# Patient Record
Sex: FEMALE | Race: BLACK OR AFRICAN AMERICAN | Employment: OTHER | URBAN - METROPOLITAN AREA
[De-identification: names, ages, dates, MRNs, and addresses within clinical notes are randomized per-mention and may not be internally consistent; named-entity substitution may affect disease eponyms.]

---

## 2017-01-03 ENCOUNTER — HOSPITAL ENCOUNTER (OUTPATIENT)
Dept: MAMMOGRAPHY | Age: 82
Discharge: HOME | End: 2017-01-03
Attending: INTERNAL MEDICINE
Payer: MEDICARE

## 2017-01-03 ENCOUNTER — TELEPHONE (OUTPATIENT)
Dept: INTERNAL MEDICINE CLINIC | Age: 82
End: 2017-01-03

## 2017-01-03 DIAGNOSIS — C50.512 MALIGNANT NEOPLASM OF LOWER-OUTER QUADRANT OF LEFT FEMALE BREAST (HCC): ICD-10-CM

## 2017-01-03 PROCEDURE — 77065 DX MAMMO INCL CAD UNI: CPT

## 2017-01-03 NOTE — TELEPHONE ENCOUNTER
[facility] is requesting rx for Lantus 100 units/mlvial # 30.0Ml thirty  With 4 refills

## 2017-01-11 ENCOUNTER — OFFICE VISIT (OUTPATIENT)
Dept: CARDIOLOGY CLINIC | Age: 82
End: 2017-01-11

## 2017-01-11 VITALS
HEART RATE: 57 BPM | OXYGEN SATURATION: 95 % | RESPIRATION RATE: 18 BRPM | SYSTOLIC BLOOD PRESSURE: 172 MMHG | BODY MASS INDEX: 29.87 KG/M2 | DIASTOLIC BLOOD PRESSURE: 82 MMHG | HEIGHT: 65 IN | WEIGHT: 179.3 LBS

## 2017-01-11 DIAGNOSIS — E78.00 HYPERCHOLESTEREMIA: ICD-10-CM

## 2017-01-11 DIAGNOSIS — R60.9 EDEMA, UNSPECIFIED TYPE: ICD-10-CM

## 2017-01-11 DIAGNOSIS — I35.0 AORTIC STENOSIS, MODERATE: Primary | ICD-10-CM

## 2017-01-11 DIAGNOSIS — R06.02 SOB (SHORTNESS OF BREATH): ICD-10-CM

## 2017-01-11 DIAGNOSIS — E11.9 CONTROLLED TYPE 2 DIABETES MELLITUS WITHOUT COMPLICATION, WITHOUT LONG-TERM CURRENT USE OF INSULIN (HCC): ICD-10-CM

## 2017-01-11 DIAGNOSIS — I27.20 PULMONARY HTN (HCC): ICD-10-CM

## 2017-01-11 DIAGNOSIS — I50.32 CHRONIC DIASTOLIC CONGESTIVE HEART FAILURE (HCC): ICD-10-CM

## 2017-01-11 DIAGNOSIS — I10 HTN (HYPERTENSION), BENIGN: ICD-10-CM

## 2017-01-11 DIAGNOSIS — R94.31 ABNORMAL EKG: ICD-10-CM

## 2017-01-11 DIAGNOSIS — R06.09 DOE (DYSPNEA ON EXERTION): ICD-10-CM

## 2017-01-11 DIAGNOSIS — I20.9 ANGINA, CLASS III (HCC): ICD-10-CM

## 2017-01-11 RX ORDER — DIPHENHYDRAMINE HCL 25 MG
25 CAPSULE ORAL
COMMUNITY
End: 2019-10-23

## 2017-01-11 RX ORDER — PREDNISONE 20 MG/1
20 TABLET ORAL
COMMUNITY
End: 2017-02-13

## 2017-01-11 NOTE — MR AVS SNAPSHOT
Visit Information Date & Time Provider Department Dept. Phone Encounter #  
 1/11/2017  1:45 PM Rigoberto Mcdonald, [address] [facility] [phone] [id number] Upcoming Health Maintenance Date Due DTaP/Tdap/Td series (1 - Tdap) 8/16/1952 EYE EXAM RETINAL OR DILATED Q1 1/9/2016 Pneumococcal 65+ High/Highest Risk (2 of 2 - PPSV23) 4/19/2016 INFLUENZA AGE 9 TO ADULT 8/1/2016 HEMOGLOBIN A1C Q6M 9/30/2016 MEDICARE YEARLY EXAM 11/19/2016 GLAUCOMA SCREENING Q2Y 1/9/2017 FOOT EXAM Q1 1/28/2017 MICROALBUMIN Q1 3/10/2017 LIPID PANEL Q1 4/1/2017 Allergies as of 1/11/2017  Review Complete On: 1/11/2017 By: Rigoberto Mcdonald MD  
  
 Severity Noted Reaction Type Reactions Ace Inhibitors  05/21/2012   Systemic Swelling Peanut  05/26/2011    Swelling Pt states she's not allergic to peanuts Current Immunizations  Reviewed on 11/29/2011 Name Date Influenza High Dose Vaccine PF 11/19/2015 Influenza Vaccine 10/22/2013, 12/13/2012 Influenza Vaccine Split 9/27/2011 11:15 AM  
 Influenza Vaccine Whole 10/8/2010 Pneumococcal Vaccine (Unspecified Type) 4/19/2011 Not reviewed this visit You Were Diagnosed With   
  
 Codes Comments Hypercholesteremia    -  Primary ICD-10-CM: E78.00 ICD-9-CM: 272.0 Aortic stenosis, moderate     ICD-10-CM: I35.0 ICD-9-CM: 424.1 Chronic diastolic congestive heart failure (HCC)     ICD-10-CM: I50.32 
ICD-9-CM: 428.32, 428.0   
 HTN (hypertension), benign     ICD-10-CM: I10 
ICD-9-CM: 401.1 SOB (shortness of breath)     ICD-10-CM: R06.02 
ICD-9-CM: 786.05   
 Pulmonary HTN ([address]     ICD-10-CM: I27.2 ICD-9-CM: 416.8 ESPANA (dyspnea on exertion)     ICD-10-CM: R06.09 
ICD-9-CM: 786.09 Edema, unspecified type     ICD-10-CM: R60.9 ICD-9-CM: 646. 3 Abnormal EKG     ICD-10-CM: R94.31 
ICD-9-CM: 794.31 Vitals BP Pulse Resp Height(growth percentile) Weight(growth percentile) SpO2  
 172/82 (BP 1 Location: Left arm, BP Patient Position: Sitting) (!) 57 18 5' 5\" (1.651 m) 179 lb 4.8 oz (81.3 kg) 95% BMI OB Status Smoking Status 29.84 kg/m2 Postmenopausal Never Smoker Vitals History BMI and BSA Data Body Mass Index Body Surface Area  
 29.84 kg/m 2 1.93 m 2 Preferred Pharmacy Pharmacy Name Phone [id number]- [place], [place] - [address] [facility] AT [address] [place], [place] [phone] Your Updated Medication List  
  
   
This list is accurate as of: 1/11/17  2:46 PM.  Always use your most recent med list.  
  
  
  
  
 albuterol 2.5 mg /3 mL (0.083 %) nebulizer solution Commonly known as:  PROVENTIL VENTOLIN  
3 mL by Nebulization route every four (4) hours as needed for Wheezing. amLODIPine 10 mg tablet Commonly known as:  Arlyss Starr TAKE 1 TABLET BY MOUTH EVERY DAY  
  
 atorvastatin 20 mg tablet Commonly known as:  LIPITOR  
TAKE 1 TAB BY MOUTH DAILY. BD INSULIN SYRINGE ULTRA-FINE 0.5 mL 31 gauge x 5/16 Syrg Generic drug:  Insulin Syringe-Needle U-100  
1 Each by SubCUTAneous route daily. * Blood-Glucose Meter monitoring kit Check blood sugar daily. May substitute for insurance preferred meter * CONTOUR NEXT METER Misc Generic drug:  Blood-Glucose Meter USE TO CHECK BLOOD SUGAR EVERY DAY  
  
 calcium carbonate 600 mg (1,500 mg) tablet Commonly known as:  Ryder Souza Take 600 mg by mouth daily. denosumab 60 mg/mL injection Commonly known as:  Nitza Ramirez 60 mg by SubCUTAneous route. Twice a year  
  
 diphenhydrAMINE 25 mg capsule Commonly known as:  BENADRYL Take 25 mg by mouth every six (6) hours as needed. ergocalciferol 50,000 unit capsule Commonly known as:  VITAMIN D2 Take 1 Cap by mouth every seven (7) days. One capsule monthly  
  
 ferrous sulfate 325 mg (65 mg iron) EC tablet Commonly known as:  IRON Take 1 Tab by mouth Daily (before breakfast). furosemide 20 mg tablet Commonly known as:  LASIX Take 1 Tab by mouth every three (3) days. glucose blood VI test strips strip Commonly known as:  blood glucose test  
Check blood sugar 3 times daily: E11.65, may substitute for insurance preferred strips. insulin glargine 100 unit/mL injection Commonly known as:  LANTUS  
50 Units by SubCUTAneous route nightly. For -175 = 20 units 175-200 = 30 units > 200 = 40 units   by SubCUTAneous route nightly. Indications: type 2 diabetes mellitus LORazepam 0.5 mg tablet Commonly known as:  ATIVAN Take 1 Tab by mouth two (2) days a week. Max Daily Amount: 0.5 mg.  
  
 metFORMIN 1,000 mg tablet Commonly known as:  GLUCOPHAGE Take 1 Tab by mouth two (2) times a day. metoprolol tartrate 50 mg tablet Commonly known as:  LOPRESSOR Take 1 Tab by mouth two (2) times a day. predniSONE 20 mg tablet Commonly known as:  Merle Horacio Take 20 mg by mouth daily (with breakfast). * Notice: This list has 2 medication(s) that are the same as other medications prescribed for you. Read the directions carefully, and ask your doctor or other care provider to review them with you. We Performed the Following AMB POC EKG ROUTINE W/ 12 LEADS, INTER & REP [25376 CPT(R)] Introducing [facility]! Joseluis Woodward introduces [employer] patient portal. Now you can access parts of your medical record, email your doctor's office, and request medication refills online. 1. In your internet browser, go to [email or web address] 2. Click on the First Time User? Click Here link in the Sign In box. You will see the New Member Sign Up page. 3. Enter your [employer] Access Code exactly as it appears below. You will not need to use this code after youve completed the sign-up process.  If you do not sign up before the expiration date, you must request a new code. · [employer] Access Code: [email or web address] Expires: 1/22/2017  3:27 PM 
 
4. Enter the last four digits of your Social Security Number (xxxx) and Date of Birth (mm/dd/yyyy) as indicated and click Submit. You will be taken to the next sign-up page. 5. Create a [employer] ID. This will be your [employer] login ID and cannot be changed, so think of one that is secure and easy to remember. 6. Create a [employer] password. You can change your password at any time. 7. Enter your Password Reset Question and Answer. This can be used at a later time if you forget your password. 8. Enter your e-mail address. You will receive e-mail notification when new information is available in [address] 9. Click Sign Up. You can now view and download portions of your medical record. 10. Click the Download Summary menu link to download a portable copy of your medical information. If you have questions, please visit the Frequently Asked Questions section of the [employer] website. Remember, [employer] is NOT to be used for urgent needs. For medical emergencies, dial 911. Now available from your iPhone and Android! Please provide this summary of care documentation to your next provider. Your primary care clinician is listed as [phone]. If you have any questions after today's visit, please call [phone].

## 2017-01-11 NOTE — PROGRESS NOTES
Chief Complaint   Patient presents with    New Patient     Dr Nora Driscoll at [facility] refeered patient for cath, c/o SOB and ankle swelling

## 2017-01-11 NOTE — PROGRESS NOTES
NAME:  Roby Plata   :   1931   MRN:   [id number]   PCP:  Purnima Beltran MD           Subjective: The patient is a 80y.o. year old female  who presents for a new patient evalation for the following: edema and dyspnea with mod/severe pulm HTN and mod aortic stenosis  Patient reports limiting ESPANA with movement of any kind. She also reports lower extremity edema that progressive throughout the day. Denies chest pain, pressure, tightness, burning or frequent indigestion. No syncope. Here to discuss cath. Patient Active Problem List   Diagnosis Code    Diabetes ([facility] E11.9    HTN (hypertension), benign I10    [facility] cancer ([facility] C50.919    Anemia D64.9    Fatigue R53.83    Vein disorder I87.9    Allergic reaction T78.40XA    Multiple allergies Z88.9    Bronchitis J40    Anxiety F41.9    Hypercholesteremia E78.00    Aortic stenosis, moderate P19.0    Diastolic CHF, acute on chronic (HCC) I50.33    SOB (shortness of breath) R06.02    CHF (congestive heart failure) (HCC) I50.9    Pulmonary edema cardiac cause I50.1       Past Medical History   Diagnosis Date    Anxiety     Breast cancer ([facility] ,      right , left     Cancer [facility])       cancer right breast cancer    Cancer ([facility]      cancer left breast/new dx 2011 +bx     Diabetes ([facility]     Hypercholesterolemia     Hypertension     Other ill-defined conditions(799.89)      osteopoosis    Other ill-defined conditions(799.89)      high cholesterol    Pneumonia     Psychiatric disorder      anxiety       Social History   Substance Use Topics    Smoking status: Never Smoker    Smokeless tobacco: Never Used    Alcohol use No      Family History   Problem Relation Age of Onset    Hypertension Mother     Stroke Other         Review of Systems  Constitutional: Negative for fever, chills, and diaphoresis.    Respiratory: Negative for cough, hemoptysis, sputum production, reports shortness of breath  Cardiovascular: Negative for chest pain, palpitations, orthopnea, claudication, reports leg swelling  Gastrointestinal: Negative for heartburn, nausea, vomiting, blood in stool and melena. Genitourinary: Negative for dysuria and flank pain. Musculoskeletal: Negative for joint pain and back pain. Skin: Negative for rash. Neurological: Negative for focal weakness, seizures, loss of consciousness, weakness and headaches. Endo/Heme/Allergies: Does not bruise/bleed easily. Psychiatric/Behavioral: Negative for memory loss. The patient does not have insomnia. Objective:       Vitals:    01/11/17 1333   BP: 172/82   Pulse: (!) 57   Resp: 18   SpO2: 95%   Weight: 179 lb 4.8 oz (81.3 kg)   Height: 5' 5\" (1.651 m)    Body mass index is 29.84 kg/(m^2). General PE    Gen: NAD     Mental Status - Alert. General Appearance - Not in acute distress. Neck - no JVD     Chest and Lung Exam     Inspection: Accessory muscles - No use of accessory muscles in breathing. Auscultation:   Breath sounds: - Normal.     Cardiovascular   Inspection: Jugular vein - Bilateral - Inspection Normal.   Palpation/Percussion:   Apical Impulse: - Normal.   Auscultation: Rhythm - Regular. Heart Sounds - S1 WNL and S2 WNL. No S3 or S4. Murmurs & Other Heart Sounds: Auscultation of the heart reveals - + WILDER    Peripheral Vascular   Upper Extremity: Inspection - Bilateral - No Cyanotic nailbeds or Digital clubbing. Lower Extremity:   Palpation: Edema - Bilateral - No edema. Abdomen: Soft, non-tender, bowel sounds are active. Neuro: A&O times 3, CN and motor grossly WNL      Data Review:     EKG -  Sinus  Bradycardia  - occasional PAC     # PACs = 1. Possible left ventricular hypertrophy on non-voltage basis.    -Old anteroseptal infarct. -ST depression     Echo 9/9/16-    Left ventricle: The cavity was small. Systolic function was vigorous. Ejection fraction was estimated to be 80 %.  There were no regional wall  motion abnormalities. Wall thickness was moderately increased. There was  moderate concentric hypertrophy. There was no asymmetric hypertrophy. Mass  was definitely increased. Features were consistent with a pseudonormal  left ventricular filling pattern, with concomitant abnormal relaxation and  increased filling pressure (grade 2 diastolic dysfunction). Ventricular septum: Thickness was moderately increased. Right ventricle: The ventricle was mildly to moderately dilated. Systolic  function was moderately reduced. Systolic pressure was markedly increased. Estimated peak pressure was 70 mmHg. Left atrium: The atrium was severely dilated. Right atrium: The atrium was moderately dilated. Mitral valve: There was moderate diffuse thickening of the anterior and  posterior leaflets, involving the leaflet base more than the margin,  without chordal involvement. Aortic valve: The aortic valve peak systolic gradient is 25 mm with mean  of 10 mm. The valve was trileaflet. Leaflets exhibited moderately to  markedly increased thickness, calcification, markedly reduced cuspal  separation, reduced mobility, and sclerosis. Transaortic velocity was  increased due to valvular stenosis. There was moderate stenosis. Tricuspid valve: There was moderate to severe regurgitation.     LABS-   Lab Results   Component Value Date/Time    Cholesterol, total 191 04/01/2016 02:37 AM    Cholesterol (POC) 246 11/19/2015 09:40 AM    HDL Cholesterol 97 04/01/2016 02:37 AM    HDL Cholesterol (POC) 76 11/19/2015 09:40 AM    LDL Cholesterol (POC) 156 11/19/2015 09:40 AM    LDL, calculated 82 04/01/2016 02:37 AM    VLDL, calculated 12 04/01/2016 02:37 AM    Triglyceride 60 04/01/2016 02:37 AM    Triglycerides (POC) 72 11/19/2015 09:40 AM    CHOL/HDL Ratio 2.0 04/01/2016 02:37 AM         Echo     Allergies reviewed  Allergies   Allergen Reactions    Ace Inhibitors Swelling    Peanut Swelling     Pt states she's not allergic to peanuts Medications reviewed  Current Outpatient Prescriptions   Medication Sig    diphenhydrAMINE (BENADRYL) 25 mg capsule Take 25 mg by mouth every six (6) hours as needed.  predniSONE (DELTASONE) 20 mg tablet Take 20 mg by mouth daily (with breakfast).  insulin glargine (LANTUS) 100 unit/mL injection 50 Units by SubCUTAneous route nightly. For -175 = 20 units  175-200 = 30 units  > 200 = 40 units     by SubCUTAneous route nightly. Indications: type 2 diabetes mellitus    LORazepam (ATIVAN) 0.5 mg tablet Take 1 Tab by mouth two (2) days a week. Max Daily Amount: 0.5 mg.    ferrous sulfate (IRON) 325 mg (65 mg iron) EC tablet Take 1 Tab by mouth Daily (before breakfast).  BD INSULIN SYRINGE ULTRA-FINE 0.5 mL 31 gauge x 5/16 syrg 1 Each by SubCUTAneous route daily.  atorvastatin (LIPITOR) 20 mg tablet TAKE 1 TAB BY MOUTH DAILY.  CONTOUR NEXT METER misc USE TO CHECK BLOOD SUGAR EVERY DAY    Blood-Glucose Meter monitoring kit Check blood sugar daily. May substitute for insurance preferred meter    glucose blood VI test strips (BLOOD GLUCOSE TEST) strip Check blood sugar 3 times daily: E11.65, may substitute for insurance preferred strips.  denosumab (PROLIA) 60 mg/mL injection 60 mg by SubCUTAneous route. Twice a year    furosemide (LASIX) 20 mg tablet Take 1 Tab by mouth every three (3) days.  metFORMIN (GLUCOPHAGE) 1,000 mg tablet Take 1 Tab by mouth two (2) times a day.  metoprolol (LOPRESSOR) 50 mg tablet Take 1 Tab by mouth two (2) times a day.  ergocalciferol (VITAMIN D2) 50,000 unit capsule Take 1 Cap by mouth every seven (7) days. One capsule monthly    calcium carbonate (CALTREX) 600 mg (1,500 mg) tablet Take 600 mg by mouth daily.  albuterol (PROVENTIL VENTOLIN) 2.5 mg /3 mL (0.083 %) nebulizer solution 3 mL by Nebulization route every four (4) hours as needed for Wheezing.     amLODIPine (NORVASC) 10 mg tablet TAKE 1 TABLET BY MOUTH EVERY DAY     No current facility-administered medications for this visit. Assessment:       ICD-10-CM ICD-9-CM    1. Aortic stenosis, moderate I35.0 424.1 PROTHROMBIN TIME + INR      CBC W/O DIFF      METABOLIC PANEL, COMPREHENSIVE   2. Angina, class III (HCC) I20.9 413.9    3. Hypercholesteremia E78.00 272.0 AMB POC EKG ROUTINE W/ 12 LEADS, INTER & REP      PROTHROMBIN TIME + INR      CBC W/O DIFF      METABOLIC PANEL, COMPREHENSIVE   4. Chronic diastolic congestive heart failure (HCC) I50.32 428.32 PROTHROMBIN TIME + INR     428.0 CBC W/O DIFF      METABOLIC PANEL, COMPREHENSIVE   5. HTN (hypertension), benign I10 401.1 PROTHROMBIN TIME + INR      CBC W/O DIFF      METABOLIC PANEL, COMPREHENSIVE   6. SOB (shortness of breath) R06.02 786.05 PROTHROMBIN TIME + INR      CBC W/O DIFF      METABOLIC PANEL, COMPREHENSIVE   7. Pulmonary HTN (HCC) I27.2 416.8 PROTHROMBIN TIME + INR      CBC W/O DIFF      METABOLIC PANEL, COMPREHENSIVE   8. ESPANA (dyspnea on exertion) R06.09 786.09 PROTHROMBIN TIME + INR      CBC W/O DIFF      METABOLIC PANEL, COMPREHENSIVE   9. Edema, unspecified type R60.9 782.3 PROTHROMBIN TIME + INR      CBC W/O DIFF      METABOLIC PANEL, COMPREHENSIVE   10. Abnormal EKG R94.31 794.31 PROTHROMBIN TIME + INR      CBC W/O DIFF      METABOLIC PANEL, COMPREHENSIVE        Orders Placed This Encounter    PROTHROMBIN TIME + INR    CBC W/O DIFF    METABOLIC PANEL, COMPREHENSIVE    AMB POC EKG ROUTINE W/ 12 LEADS, INTER & REP     Order Specific Question:   Reason for Exam:     Answer:   routine    diphenhydrAMINE (BENADRYL) 25 mg capsule     Sig: Take 25 mg by mouth every six (6) hours as needed.  predniSONE (DELTASONE) 20 mg tablet     Sig: Take 20 mg by mouth daily (with breakfast). Plan:     Patient presents for new patient evaluation.      Known AS, history of pulmonary edema/possible class III angina despite maximal antianginal regimen:  Felt to be moderate although calculated mean gradient 10 mmHg with concern for underestimation of severity by echocardiogram per Dr. Joshua Yanes without history of smoking or abnormal chest x-ray, severe pulm HTN, limiting ESPANA, seeking a cardiac cath. Recommend right and left heart cath. I have discussed the risks and benefits of cardiac cath +/- PCI- the patient expressed understanding and wishes to proceed. The patient knows to go to the ER if any symptoms not relieved promptly by rest.  If no pathology other than severe pulmonary hypertension without significant CAD or aortic stenosis, we will likely refer to Dr. Teresa Powers for further evaluation of pulmonary hypertension including possible right heart catheterization with vasodilators.     Manisha Berg MD

## 2017-01-12 RX ORDER — METOPROLOL TARTRATE 50 MG/1
50 TABLET ORAL 2 TIMES DAILY
Qty: 60 TAB | Refills: 0 | OUTPATIENT
Start: 2017-01-12

## 2017-01-12 RX ORDER — METFORMIN HYDROCHLORIDE 1000 MG/1
1000 TABLET ORAL 2 TIMES DAILY
Qty: 180 TAB | Refills: 12 | OUTPATIENT
Start: 2017-01-12

## 2017-01-17 ENCOUNTER — TELEPHONE (OUTPATIENT)
Dept: CARDIOLOGY CLINIC | Age: 82
End: 2017-01-17

## 2017-01-17 NOTE — TELEPHONE ENCOUNTER
returned patient call  Verified patient with 2 patient identifier    Patient called question if Dr Brayden Jones had received and reviewed  image results ordered by Dr Jenelle Pruitt? Informed will speak with Dr Brayden Jones and callback to advise.

## 2017-01-19 NOTE — TELEPHONE ENCOUNTER
Darian- I reviewed her images and history and further detail. I agree that she is in need of right and left heart catheterization for further evaluation of her heart. I ordered precath labs. Please get with Dez Hickey to arrange for catheterization. I can do it this coming Tuesday after by 930 catheterization or Wednesday at 930. Thanks and let me know if those times do not work.

## 2017-01-20 ENCOUNTER — TELEPHONE (OUTPATIENT)
Dept: CARDIOLOGY CLINIC | Age: 82
End: 2017-01-20

## 2017-01-20 NOTE — TELEPHONE ENCOUNTER
[address], LPN Cc: Rigoberto Mcdonald MD                   Good morning,      I have Carmelina cath set up for 01/25/17 @ 9:30am. Patient is going to come get yellow folder and get labs done today!  Thanks!

## 2017-01-20 NOTE — TELEPHONE ENCOUNTER
Patient with in office today with concerns about cath due to increased BP. Per patient's daughter when speaking with Dr Pawel Sloan she was informed patient may need to stay in hospital overnight for BP monitoring. Another concern is  incision site ? Patient has had a right breast mastectomy.     Informed patient will follow up after speaking with Dr Davey Son

## 2017-01-20 NOTE — TELEPHONE ENCOUNTER
Spoke with patient  Verified patient with 2 patient identifier    Informed patient Dr Patrick Macias reviewed images and decided patient in need of heart catheterization. Explained heart cath procedure and informed patient Vinay Coley will call  with more information such as when procedure to take place and instructions prior to cath. Patient verbalized understanding.

## 2017-01-22 LAB
ALBUMIN SERPL-MCNC: 4.2 G/DL (ref 3.5–4.7)
ALBUMIN/GLOB SERPL: 1.3 {RATIO} (ref 1.1–2.5)
ALP SERPL-CCNC: 84 IU/L (ref 39–117)
ALT SERPL-CCNC: 10 IU/L (ref 0–32)
AST SERPL-CCNC: 14 IU/L (ref 0–40)
BILIRUB SERPL-MCNC: 0.2 MG/DL (ref 0–1.2)
BUN SERPL-MCNC: 17 MG/DL (ref 8–27)
BUN/CREAT SERPL: 26 (ref 11–26)
CALCIUM SERPL-MCNC: 9.6 MG/DL (ref 8.7–10.3)
CHLORIDE SERPL-SCNC: 101 MMOL/L (ref 96–106)
CO2 SERPL-SCNC: 25 MMOL/L (ref 18–29)
CREAT SERPL-MCNC: 0.65 MG/DL (ref 0.57–1)
ERYTHROCYTE [DISTWIDTH] IN BLOOD BY AUTOMATED COUNT: 16.3 % (ref 12.3–15.4)
GLOBULIN SER CALC-MCNC: 3.2 G/DL (ref 1.5–4.5)
GLUCOSE SERPL-MCNC: 99 MG/DL (ref 65–99)
HCT VFR BLD AUTO: 35.4 % (ref 34–46.6)
HGB BLD-MCNC: 11.3 G/DL (ref 11.1–15.9)
INR PPP: 1.1 (ref 0.8–1.2)
MCH RBC QN AUTO: 26.3 PG (ref 26.6–33)
MCHC RBC AUTO-ENTMCNC: 31.9 G/DL (ref 31.5–35.7)
MCV RBC AUTO: 83 FL (ref 79–97)
PLATELET # BLD AUTO: 299 X10E3/UL (ref 150–379)
POTASSIUM SERPL-SCNC: 4 MMOL/L (ref 3.5–5.2)
PROT SERPL-MCNC: 7.4 G/DL (ref 6–8.5)
PROTHROMBIN TIME: 10.9 SEC (ref 9.1–12)
RBC # BLD AUTO: 4.29 X10E6/UL (ref 3.77–5.28)
SODIUM SERPL-SCNC: 143 MMOL/L (ref 134–144)
WBC # BLD AUTO: 8.9 X10E3/UL (ref 3.4–10.8)

## 2017-01-24 ENCOUNTER — TELEPHONE (OUTPATIENT)
Dept: CARDIOLOGY CLINIC | Age: 82
End: 2017-01-24

## 2017-01-24 NOTE — TELEPHONE ENCOUNTER
Spoke with patient  Verified patient with 2 patient identifier    Informed per Dr Glenis Claros Blood pressure was not too elevated in the last few visits so we will monitor and adjust as needed while she is at the hospital.     Regarding history of mastectomy, remind us at the hospital and we will take that into consideration with her access site. We may go through her femoral artery with a tiny needle. Patient verbalized understanding.

## 2017-01-24 NOTE — TELEPHONE ENCOUNTER
Blood pressure was not too elevated in the last few visits so we will monitor and adjust as needed while she is at the hospital.    Regarding history of mastectomy, remind us at the hospital and we will take that into consideration with her access site. We may go through her femoral artery with a tiny needle.

## 2017-01-25 ENCOUNTER — HOSPITAL ENCOUNTER (OUTPATIENT)
Dept: CARDIAC CATH/INVASIVE PROCEDURES | Age: 82
Discharge: HOME | End: 2017-01-25
Attending: INTERNAL MEDICINE | Admitting: INTERNAL MEDICINE
Payer: MEDICARE

## 2017-01-25 VITALS
HEART RATE: 64 BPM | TEMPERATURE: 98.1 F | SYSTOLIC BLOOD PRESSURE: 157 MMHG | RESPIRATION RATE: 22 BRPM | DIASTOLIC BLOOD PRESSURE: 68 MMHG | HEIGHT: 65 IN | OXYGEN SATURATION: 90 % | BODY MASS INDEX: 29.82 KG/M2 | WEIGHT: 179 LBS

## 2017-01-25 DIAGNOSIS — R60.9 EDEMA, UNSPECIFIED TYPE: ICD-10-CM

## 2017-01-25 PROBLEM — I27.20 MODERATE TO SEVERE PULMONARY HYPERTENSION (HCC): Status: ACTIVE | Noted: 2017-01-25

## 2017-01-25 PROBLEM — I25.10 CAD (CORONARY ARTERY DISEASE), NATIVE CORONARY ARTERY: Status: ACTIVE | Noted: 2017-01-25

## 2017-01-25 LAB
GLUCOSE BLD STRIP.AUTO-MCNC: 115 MG/DL (ref 65–100)
GLUCOSE BLD STRIP.AUTO-MCNC: 128 MG/DL (ref 65–100)
SERVICE CMNT-IMP: ABNORMAL
SERVICE CMNT-IMP: ABNORMAL

## 2017-01-25 PROCEDURE — 77030000299 HC FEMSTP COMP GLD STJU -B

## 2017-01-25 PROCEDURE — 99152 MOD SED SAME PHYS/QHP 5/>YRS: CPT

## 2017-01-25 PROCEDURE — C1894 INTRO/SHEATH, NON-LASER: HCPCS

## 2017-01-25 PROCEDURE — 74011636320 HC RX REV CODE- 636/320

## 2017-01-25 PROCEDURE — 77030029065 HC DRSG HEMO QCLOT ZMED -B

## 2017-01-25 PROCEDURE — C1751 CATH, INF, PER/CENT/MIDLINE: HCPCS

## 2017-01-25 PROCEDURE — C1769 GUIDE WIRE: HCPCS

## 2017-01-25 PROCEDURE — 74011250636 HC RX REV CODE- 250/636: Performed by: INTERNAL MEDICINE

## 2017-01-25 PROCEDURE — 74011250637 HC RX REV CODE- 250/637

## 2017-01-25 PROCEDURE — 77030014102

## 2017-01-25 PROCEDURE — 77030004567 HC CATH ANGI DX TELE -C

## 2017-01-25 PROCEDURE — 74011250636 HC RX REV CODE- 250/636

## 2017-01-25 PROCEDURE — 82962 GLUCOSE BLOOD TEST: CPT

## 2017-01-25 PROCEDURE — 74011000250 HC RX REV CODE- 250

## 2017-01-25 PROCEDURE — 77030004550 HC CATH ANGI DX PRF MRTM -B

## 2017-01-25 PROCEDURE — 74011250637 HC RX REV CODE- 250/637: Performed by: NURSE PRACTITIONER

## 2017-01-25 RX ORDER — MIDAZOLAM HYDROCHLORIDE 1 MG/ML
INJECTION, SOLUTION INTRAMUSCULAR; INTRAVENOUS
Status: COMPLETED
Start: 2017-01-25 | End: 2017-01-25

## 2017-01-25 RX ORDER — HEPARIN SODIUM 200 [USP'U]/100ML
INJECTION, SOLUTION INTRAVENOUS
Status: COMPLETED
Start: 2017-01-25 | End: 2017-01-25

## 2017-01-25 RX ORDER — LIDOCAINE HYDROCHLORIDE 10 MG/ML
1-20 INJECTION, SOLUTION EPIDURAL; INFILTRATION; INTRACAUDAL; PERINEURAL
Status: DISCONTINUED | OUTPATIENT
Start: 2017-01-25 | End: 2017-01-25

## 2017-01-25 RX ORDER — GUAIFENESIN 100 MG/5ML
81 LIQUID (ML) ORAL DAILY
COMMUNITY
End: 2019-10-23

## 2017-01-25 RX ORDER — FUROSEMIDE 20 MG/1
20 TABLET ORAL DAILY
Qty: 60 TAB | Refills: 0 | Status: SHIPPED | OUTPATIENT
Start: 2017-01-25 | End: 2017-02-13

## 2017-01-25 RX ORDER — MIDAZOLAM HYDROCHLORIDE 1 MG/ML
1 INJECTION, SOLUTION INTRAMUSCULAR; INTRAVENOUS ONCE
Status: COMPLETED | OUTPATIENT
Start: 2017-01-25 | End: 2017-01-25

## 2017-01-25 RX ORDER — FENTANYL CITRATE 50 UG/ML
INJECTION, SOLUTION INTRAMUSCULAR; INTRAVENOUS
Status: COMPLETED
Start: 2017-01-25 | End: 2017-01-25

## 2017-01-25 RX ORDER — LORAZEPAM 0.5 MG/1
0.5 TABLET ORAL
Status: DISCONTINUED | OUTPATIENT
Start: 2017-01-25 | End: 2017-01-25 | Stop reason: HOSPADM

## 2017-01-25 RX ORDER — SODIUM CHLORIDE 9 MG/ML
75 INJECTION, SOLUTION INTRAVENOUS CONTINUOUS
Status: DISCONTINUED | OUTPATIENT
Start: 2017-01-25 | End: 2017-01-25 | Stop reason: HOSPADM

## 2017-01-25 RX ORDER — HEPARIN SODIUM 200 [USP'U]/100ML
500 INJECTION, SOLUTION INTRAVENOUS ONCE
Status: COMPLETED | OUTPATIENT
Start: 2017-01-25 | End: 2017-01-25

## 2017-01-25 RX ORDER — HEPARIN SODIUM 200 [USP'U]/100ML
INJECTION, SOLUTION INTRAVENOUS
Status: DISCONTINUED
Start: 2017-01-25 | End: 2017-01-25 | Stop reason: HOSPADM

## 2017-01-25 RX ORDER — FENTANYL CITRATE 50 UG/ML
25 INJECTION, SOLUTION INTRAMUSCULAR; INTRAVENOUS ONCE
Status: COMPLETED | OUTPATIENT
Start: 2017-01-25 | End: 2017-01-25

## 2017-01-25 RX ORDER — MIDAZOLAM HYDROCHLORIDE 1 MG/ML
.5-2 INJECTION, SOLUTION INTRAMUSCULAR; INTRAVENOUS
Status: DISCONTINUED | OUTPATIENT
Start: 2017-01-25 | End: 2017-01-25

## 2017-01-25 RX ORDER — FENTANYL CITRATE 50 UG/ML
25-50 INJECTION, SOLUTION INTRAMUSCULAR; INTRAVENOUS
Status: DISCONTINUED | OUTPATIENT
Start: 2017-01-25 | End: 2017-01-25

## 2017-01-25 RX ORDER — LIDOCAINE HYDROCHLORIDE 10 MG/ML
INJECTION INFILTRATION; PERINEURAL
Status: COMPLETED
Start: 2017-01-25 | End: 2017-01-25

## 2017-01-25 RX ORDER — ISOSORBIDE MONONITRATE 30 MG/1
30 TABLET, EXTENDED RELEASE ORAL DAILY
Qty: 60 TAB | Refills: 0 | Status: SHIPPED | OUTPATIENT
Start: 2017-01-25 | End: 2017-02-13

## 2017-01-25 RX ORDER — LIDOCAINE HYDROCHLORIDE 10 MG/ML
1-20 INJECTION INFILTRATION; PERINEURAL ONCE
Status: COMPLETED | OUTPATIENT
Start: 2017-01-25 | End: 2017-01-25

## 2017-01-25 RX ADMIN — LIDOCAINE HYDROCHLORIDE 12 ML: 10 INJECTION, SOLUTION INFILTRATION; PERINEURAL at 11:21

## 2017-01-25 RX ADMIN — FENTANYL CITRATE 25 MCG: 50 INJECTION, SOLUTION INTRAMUSCULAR; INTRAVENOUS at 11:08

## 2017-01-25 RX ADMIN — MIDAZOLAM HYDROCHLORIDE 1 MG: 1 INJECTION, SOLUTION INTRAMUSCULAR; INTRAVENOUS at 12:00

## 2017-01-25 RX ADMIN — FENTANYL CITRATE 25 MCG: 50 INJECTION, SOLUTION INTRAMUSCULAR; INTRAVENOUS at 11:33

## 2017-01-25 RX ADMIN — LORAZEPAM 0.5 MG: 0.5 TABLET ORAL at 14:14

## 2017-01-25 RX ADMIN — HEPARIN SODIUM 1000 UNITS: 200 INJECTION, SOLUTION INTRAVENOUS at 11:35

## 2017-01-25 RX ADMIN — FENTANYL CITRATE 25 MCG: 50 INJECTION, SOLUTION INTRAMUSCULAR; INTRAVENOUS at 11:40

## 2017-01-25 RX ADMIN — SODIUM CHLORIDE 75 ML/HR: 900 INJECTION, SOLUTION INTRAVENOUS at 09:36

## 2017-01-25 RX ADMIN — MIDAZOLAM HYDROCHLORIDE 1 MG: 1 INJECTION, SOLUTION INTRAMUSCULAR; INTRAVENOUS at 11:09

## 2017-01-25 RX ADMIN — LIDOCAINE HYDROCHLORIDE 12 ML: 10 INJECTION INFILTRATION; PERINEURAL at 11:21

## 2017-01-25 RX ADMIN — IOPAMIDOL 50 ML: 755 INJECTION, SOLUTION INTRAVENOUS at 11:59

## 2017-01-25 RX ADMIN — MIDAZOLAM HYDROCHLORIDE 1 MG: 1 INJECTION, SOLUTION INTRAMUSCULAR; INTRAVENOUS at 11:24

## 2017-01-25 RX ADMIN — FENTANYL CITRATE 50 MCG: 50 INJECTION, SOLUTION INTRAMUSCULAR; INTRAVENOUS at 11:22

## 2017-01-25 RX ADMIN — MIDAZOLAM HYDROCHLORIDE 1 MG: 1 INJECTION, SOLUTION INTRAMUSCULAR; INTRAVENOUS at 11:40

## 2017-01-25 RX ADMIN — MIDAZOLAM HYDROCHLORIDE 1 MG: 1 INJECTION, SOLUTION INTRAMUSCULAR; INTRAVENOUS at 11:34

## 2017-01-25 RX ADMIN — NITROGLYCERIN 1 INCH: 20 OINTMENT TOPICAL at 11:25

## 2017-01-25 RX ADMIN — HEPARIN SODIUM 1000 UNITS: 200 INJECTION, SOLUTION INTRAVENOUS at 11:36

## 2017-01-25 NOTE — PROGRESS NOTES
1330: Femostop removed. Site CDI. 1645: Discharge instructions reviewed with patient; to be discharged to home with family. Site care instructions reviewed; site CDI. Patient instructed on which medications to continue, which to start. Prescription sent to patient's pharmacy. Medication info provided and reviewed with patient. Follow-up appointment information given; follow-up appointment already made. IV and tele box removed. Opportunity for questions provided; all questions answered. All belongings returned. Patient wheeled to front door via wheelchair by nurse; to be transported home by family. Patient ambulated in room without difficulty. Dressing CDI. No complaints.

## 2017-01-25 NOTE — DISCHARGE INSTRUCTIONS
[address] [address]  [phone]        Patient ID:  Chitra Farrell  [id number]  73 y.o.  8/16/1931    Admit Date: 1/25/2017    Discharge Date: 1/25/2017     Admitting Physician: Woody Adames MD     Discharge Physician: Woody Adames MD    Admission Diagnoses:   cath    Discharge Diagnoses:   Principal Problem: Moderate to severe pulmonary hypertension ([address] (1/25/2017)    Active Problems:    CAD (coronary artery disease), native coronary artery (1/25/2017)      Overview: 1/25/2017Mild, nonobstructive        Discharge Condition: Good    Cardiology Procedures this Admission:  Diagnostic left and right heart catheterization showing severe pulmonary hypertension and only mild coronary artery disease and mild aortic stenosis by valvular gradient    Disposition: home    Reference discharge instructions provided by nursing for diet and activity. Signed:  Woody Adames MD  1/25/2017  4:37 PM      Radial Cardiac Catheterization/Angiography Discharge Instructions    It is normal to feel tired the first couple days. Take it easy and follow the physicians instructions. CHECK THE CATHETER INSERTION SITE DAILY:    Remove the wrist dressing 24 hours after the procedure. You may shower 24 hours after the procedure. Wash with soap and water and pat dry. Gentle cleaning of the site with soap and water is sufficient, cover with a dry clean dressing or bandage. Do not apply creams or powders to the area. No soaking the wrist for 3 days. Leave the puncture site open to air after 24 hours post-procedure. CALL THE PHYSICIANS:     If the site becomes red, swollen or feels warm to the touch  If there is bleeding or drainage or if there is unusual pain at the radial site. If there is any minor oozing, you may apply a band-aid and remove after 12 hours.    If the bleeding continues, hold pressure with the middle finger against the puncture site and the thumb against the back of the wrist,call 911 to be transported to the hospital.  DO NOT DRIVE YOURSELF, [address] ACTIVITY:   For the first 24 hours do not manipulate the wrist.  No lifting, pushing or pulling over 3-5 pounds with the affected wrist for 7 daysand no straining the insertion site. Do not life grocery bags or the garbage can, do not run the vacuum  or  for 7 days. Start with short walks as in the hospital and gradually increase as tolerated each day. It is recommended to walk 30 minutes 5-7 days per week. Follow your physicians instructions on activity. Avoid walking outside in extremes of heat or cold. Walk inside when it is cold and windy or hot and humid. Things to keep in mind:  No driving for at least 24 hours, or as designated by your physician. Limit the number of times you go up and down the stairs  Take rests and pace yourself with activity. Be careful and do not strain with bowel movements. MEDICATIONS:    Take all medications as prescribed  Call your physician if you have any questions  Keep an updated list of your medications with you at all times and give a list to your physician and pharmacist    SIGNS AND SYMPTOMS:   Be cautious of symptoms of angina or recurrent symptoms such as chest discomfort, unusual shortness of breath or fatigue. These could be symptoms of restenosis, a new blockage or a heart attack. If your symptoms are relieved with rest it is still recommended that you notify your physician of recurrent chest pain or discomfort. For CHEST PAIN or symptoms of angina not relieved with rest:  If the discomfort is not relieved with rest, and you have been prescribed Nitroglycerin, take as directed (taken under the tongue, one at a time 5 minutes apart for a total of 3 doses). If the discomfort is not relieved after the 3rd nitroglycerin, call 911.   If you have not been prescribed Nitroglycerin  and your chest discomfort is not relieved with rest, call 911. AFTER CARE:   Follow up with your physician as instructed. Follow a heart healthy diet with proper portion control, daily stress management, daily exercise, blood pressure and cholesterol control , and smoking cessation. Pulmonary Hypertension: Care Instructions  Your Care Instructions  Pulmonary hypertension is high blood pressure in the arteries of your lungs. These blood vessels carry blood from the heart to the lungs, where the blood picks up oxygen. The walls of the arteries may get thick, and the arteries may get narrow. When this happens, blood does not flow as well as it should. Pressure builds up in the arteries. Then your heart has to work harder to pump blood through your lungs. There are different types of pulmonary hypertension. They are caused by different things. Causes include other health conditions such as heart or lung problems. Sometimes it can happen without a known cause. When you have this condition, your body gets less oxygen from your blood. This causes symptoms such as shortness of breath and feeling tired, faint, or dizzy. Over time, these symptoms may change or get worse if your heart gets weaker. You may get heart failure. Heart failure means your heart doesn't pump as much blood as your body needs. Treatment can help you feel better and live longer. Your treatment options will depend on the type of pulmonary hypertension you have. It can be hard to learn that you have a problem with your lungs and heart. But there are things you can do to feel better and stay as active as you can. Follow-up care is a key part of your treatment and safety. Be sure to make and go to all appointments, and call your doctor if you are having problems. It's also a good idea to know your test results and keep a list of the medicines you take. How can you care for yourself at home? Medicine  · Be safe with medicines.  Take your medicines exactly as prescribed. Call your doctor if you think you are having a problem with your medicine. You will get more details on the specific medicines your doctor prescribes. · Your doctor may prescribe oxygen therapy. You will get more details on how to use it. · Talk to your doctor before you take any vitamins, over-the-counter medicine, or herbal products. Don't take ibuprofen (Advil or Motrin) and naproxen (Aleve) without talking to your doctor first.  · If you take a blood thinner, be sure to get instructions about how to take your medicine safely. Blood thinners can cause serious bleeding problems. Activity  · Be as active as you can. Talk to your doctor about making a plan before you start a new activity. · Ask your doctor if a pulmonary rehabilitation program is right for you. · Learn how to save your energy. Making small changes in daily activities can make a big impact on how you feel. Staying healthy  · Eat healthy foods, and try to stay at a healthy weight. · Do not smoke. Smoking can make this condition worse. If you need help quitting, talk to your doctor about stop-smoking programs and medicines. These can increase your chances of quitting for good. · Talk to your doctor about preventing pregnancy. You may need to take steps to avoid becoming pregnant. Pregnancy and childbirth can cause changes in the body that could be life-threatening for women who have this condition. · Avoid colds and flu. Get a pneumococcal vaccine shot. If you have had one before, ask your doctor if you need another dose. Get a flu vaccine every year. If you must be around people with colds or flu, wash your hands often. When should you call for help? Call 911 anytime you think you may need emergency care. For example, call if:  · You have symptoms of sudden heart failure. These may include:  ¨ Severe trouble breathing. ¨ A fast or irregular heartbeat. ¨ Coughing up pink, foamy mucus. ¨ Passing out.   Call your doctor now or seek immediate medical care if:  · You have new or changed symptoms of heart failure, such as:  ¨ New or increased shortness of breath. ¨ New or worse swelling in your legs, ankles, or feet. ¨ Sudden weight gain, such as 3 pounds or more in 2 to 3 days. (Your doctor may suggest a different range of weight gain.)  ¨ Feeling dizzy or lightheaded or like you may faint. ¨ Feeling so tired or weak that you cannot do your usual activities. ¨ Not sleeping well. Shortness of breath wakes you at night. You need extra pillows to prop yourself up to breathe easier. Watch closely for changes in your health, and be sure to contact your doctor if:  · You have new or worse symptoms. Where can you learn more? Go to http://ni-nohemi.info/. Enter H650 in the search box to learn more about \"Pulmonary Hypertension: Care Instructions. \"  Current as of: January 27, 2016  Content Version: 11.1  © 2006-2016 [employer]. Care instructions adapted under license by [employer] (which disclaims liability or warranty for this information). If you have questions about a medical condition or this instruction, always ask your healthcare professional. [place] [address] any warranty or liability for your use of this information. Learning About Coronary Artery Disease (CAD)  What is coronary artery disease? Coronary artery disease (CAD) occurs when plaque builds up in the arteries that bring oxygen-rich blood to your heart. Plaque is a fatty substance made of cholesterol, calcium, and other substances in the blood. This process is called hardening of the arteries, or atherosclerosis. What happens when you have coronary artery disease? · Plaque may narrow the coronary arteries. Narrowed arteries cause poor blood flow. This can lead to angina symptoms such as chest pain or discomfort.  If blood flow is completely blocked, you could have a heart attack. · You can slow CAD and reduce the risk of future problems by making changes in your lifestyle. These include quitting smoking and eating heart-healthy foods. · Treatments for CAD, along with changes in your lifestyle, can help you live a longer and healthier life. How can you prevent coronary artery disease? · Do not smoke. It may be the best thing you can do to prevent heart disease. If you need help quitting, talk to your doctor about stop-smoking programs and medicines. These can increase your chances of quitting for good. · Be active. Get at least 30 minutes of exercise on most days of the week. Walking is a good choice. You also may want to do other activities, such as running, swimming, cycling, or playing tennis or team sports. · Eat heart-healthy foods. Eat more fruits and vegetables and less foods that contain saturated and trans fats. Limit alcohol, sodium, and sweets. · Stay at a healthy weight. Lose weight if you need to. · Manage other health problems such as diabetes, high blood pressure, and high cholesterol. · Manage stress. Stress can hurt your heart. To keep stress low, talk about your problems and feelings. Don't keep your feelings hidden. · If you have talked about it with your doctor, take a low-dose aspirin every day. Aspirin can help certain people lower their risk of a heart attack or stroke. But taking aspirin isn't right for everyone, because it can cause serious bleeding. Do not start taking daily aspirin unless your doctor knows about it. How is coronary artery disease treated? · Your doctor will suggest that you make lifestyle changes. For example, your doctor may ask you to eat healthy foods, quit smoking, lose extra weight, and be more active. · You will have to take medicines. · Your doctor may suggest a procedure to open narrowed or blocked arteries. This is called angioplasty.  Or your doctor may suggest using healthy blood vessels to create detours around narrowed or blocked arteries. This is called bypass surgery. Follow-up care is a key part of your treatment and safety. Be sure to make and go to all appointments, and call your doctor if you are having problems. It's also a good idea to know your test results and keep a list of the medicines you take. Where can you learn more? Go to http://ni-nohemi.info/. Enter (78) 7590 3499 in the search box to learn more about \"Learning About Coronary Artery Disease (CAD). \"  Current as of: January 27, 2016  Content Version: 11.1  © 4055-7372 [employer]. Care instructions adapted under license by [employer] (which disclaims liability or warranty for this information). If you have questions about a medical condition or this instruction, always ask your healthcare professional. [address] any warranty or liability for your use of this information.

## 2017-01-25 NOTE — IP AVS SNAPSHOT
[address] [facility] 
[phone] Patient: Yumi Melendez MRN: [id number] IAL:[birth year] You are allergic to the following Allergen Reactions Ace Inhibitors Swelling Peanut Swelling Pt states she's not allergic to peanuts Recent Documentation Height Weight Breastfeeding? BMI OB Status Smoking Status 1.651 m 81.2 kg No 29.79 kg/m2 Postmenopausal Never Smoker Emergency Contacts Name Discharge Info Relation Home Work Mobile Lakhwinder Santana CAREGIVER [3] Child [2] [phone] 31 Abbie Ma CAREGIVER [3] Daughter [21] [phone] About your hospitalization You were admitted on:  January 25, 2017 You last received care in the:  [facility] You were discharged on:  January 25, 2017 Unit phone number:  [phone] Why you were hospitalized Your primary diagnosis was:  Not on File Providers Seen During Your Hospitalizations Provider Role Specialty Primary office phone Aminta Cuello MD Attending Provider Cardiology [phone] Your Primary Care Physician (PCP) Primary Care Physician Office Phone Office Fax Macr Loaiza [phone] Follow-up Information Follow up With Details Comments Contact Info Ileana Sanders, [address] [facility] [phone] Your Appointments Wednesday February 01, 2017  1:15 PM EST  
3 WEEK with Aminta Cuello MD  
[facility] [address]  
[phone] Current Discharge Medication List  
  
START taking these medications Dose & Instructions Dispensing Information Comments Morning Noon Evening Bedtime  
 isosorbide mononitrate ER 30 mg tablet Commonly known as:  IMDUR  
   
 Your next dose is: Today, Tomorrow Other:  _________ Dose:  30 mg Take 1 Tab by mouth daily. To help prevent shortness of breath call MD if significant headache Quantity:  60 Tab Refills:  0 CONTINUE these medications which have CHANGED Dose & Instructions Dispensing Information Comments Morning Noon Evening Bedtime  
 furosemide 20 mg tablet Commonly known as:  LASIX What changed:   
- when to take this 
- additional instructions Your next dose is: Today, Tomorrow Other:  _________ Dose:  20 mg Take 1 Tab by mouth daily. Refills by Dr. Rosa Quantity:  60 Tab Refills:  0  
     
   
   
   
  
 insulin glargine 100 unit/mL injection Commonly known as:  LANTUS What changed:   
- how much to take 
- additional instructions Your next dose is: Today, Tomorrow Other:  _________ Dose:  50 Units 50 Units by SubCUTAneous route nightly. For -175 = 20 units 175-200 = 30 units > 200 = 40 units   by SubCUTAneous route nightly. Indications: type 2 diabetes mellitus Quantity:  1 Vial  
Refills:  6 CONTINUE these medications which have NOT CHANGED Dose & Instructions Dispensing Information Comments Morning Noon Evening Bedtime  
 albuterol 2.5 mg /3 mL (0.083 %) nebulizer solution Commonly known as:  PROVENTIL VENTOLIN Your next dose is: Today, Tomorrow Other:  _________ Dose:  2.5 mg  
3 mL by Nebulization route every four (4) hours as needed for Wheezing. Quantity:  1 Package Refills:  11 amLODIPine 10 mg tablet Commonly known as:  Evelin Darting Your next dose is: Today, Tomorrow Other:  _________ TAKE 1 TABLET BY MOUTH EVERY DAY Quantity:  90 Tab Refills:  4  
     
   
   
   
  
 aspirin 81 mg chewable tablet Your next dose is: Today, Tomorrow Other:  _________ Dose:  81 mg Take 81 mg by mouth daily. Refills:  0  
     
   
   
   
  
 atorvastatin 20 mg tablet Commonly known as:  LIPITOR Your next dose is: Today, Tomorrow Other:  _________ TAKE 1 TAB BY MOUTH DAILY. Quantity:  30 Tab Refills:  11  
     
   
   
   
  
 BD INSULIN SYRINGE ULTRA-FINE 0.5 mL 31 gauge x 5/16 Syrg Generic drug:  Insulin Syringe-Needle U-100 Your next dose is: Today, Tomorrow Other:  _________ Dose:  1 Each  
1 Each by SubCUTAneous route daily. Quantity:  100 Syringe Refills:  3 * Blood-Glucose Meter monitoring kit Your next dose is: Today, Tomorrow Other:  _________ Check blood sugar daily. May substitute for insurance preferred meter Quantity:  1 Kit Refills:  0  
     
   
   
   
  
 * CONTOUR NEXT METER Misc Generic drug:  Blood-Glucose Meter Your next dose is: Today, Tomorrow Other:  _________  
   
   
 USE TO CHECK BLOOD SUGAR EVERY DAY Refills:  0  
     
   
   
   
  
 calcium carbonate 600 mg (1,500 mg) tablet Commonly known as:  Yue Pro Your next dose is: Today, Tomorrow Other:  _________ Dose:  600 mg Take 600 mg by mouth daily. Refills:  0  
     
   
   
   
  
 denosumab 60 mg/mL injection Commonly known as:  Oscar Raw Your next dose is: Today, Tomorrow Other:  _________ Dose:  60 mg  
60 mg by SubCUTAneous route. Twice a year Refills:  0  
     
   
   
   
  
 diphenhydrAMINE 25 mg capsule Commonly known as:  BENADRYL Your next dose is: Today, Tomorrow Other:  _________ Dose:  25 mg Take 25 mg by mouth every six (6) hours as needed. Refills:  0  
     
   
   
   
  
 ergocalciferol 50,000 unit capsule Commonly known as:  VITAMIN D2 Your next dose is: Today, Tomorrow Other:  _________ Dose:  68953 Units Take 1 Cap by mouth every seven (7) days. One capsule monthly Quantity:  8 Cap Refills:  4  
     
   
   
   
  
 ferrous sulfate 325 mg (65 mg iron) EC tablet Commonly known as:  IRON Your next dose is: Today, Tomorrow Other:  _________ Dose:  325 mg Take 1 Tab by mouth Daily (before breakfast). Quantity:  90 Tab Refills:  2  
     
   
   
   
  
 glucose blood VI test strips strip Commonly known as:  blood glucose test  
   
Your next dose is: Today, Tomorrow Other:  _________ Check blood sugar 3 times daily: E11.65, may substitute for insurance preferred strips. Quantity:  100 Strip Refills:  11 LORazepam 0.5 mg tablet Commonly known as:  ATIVAN Your next dose is: Today, Tomorrow Other:  _________ Dose:  0.5 mg Take 1 Tab by mouth two (2) days a week. Max Daily Amount: 0.5 mg.  
 Quantity:  30 Tab Refills:  1  
     
   
   
   
  
 metFORMIN 1,000 mg tablet Commonly known as:  GLUCOPHAGE Your next dose is: Today, Tomorrow Other:  _________ Dose:  1000 mg Take 1 Tab by mouth two (2) times a day. Quantity:  180 Tab Refills:  12  
     
   
   
   
  
 metoprolol tartrate 50 mg tablet Commonly known as:  LOPRESSOR Your next dose is: Today, Tomorrow Other:  _________ Dose:  50 mg Take 1 Tab by mouth two (2) times a day. Quantity:  60 Tab Refills:  0  
     
   
   
   
  
 predniSONE 20 mg tablet Commonly known as:  Orlean Aas Your next dose is: Today, Tomorrow Other:  _________ Dose:  20 mg Take 20 mg by mouth daily (with breakfast). Refills:  0  
     
   
   
   
  
 * Notice: This list has 2 medication(s) that are the same as other medications prescribed for you. Read the directions carefully, and ask your doctor or other care provider to review them with you. Where to Get Your Medications These medications were sent to [facility]/pharmacy #[id number] [place], [address] [place] [address]  [address] [facility], [address] [facility] [address] Phone:  [phone]  
  furosemide 20 mg tablet Information on where to get these meds will be given to you by the nurse or doctor. ! Ask your nurse or doctor about these medications  
  isosorbide mononitrate ER 30 mg tablet Discharge Instructions None Discharge Orders None Introducing [facility]! Jacki Franks introduces [facility] patient portal. Now you can access parts of your medical record, email your doctor's office, and request medication refills online. 1. In your internet browser, go to [email or web address] 2. Click on the First Time User? Click Here link in the Sign In box. You will see the New Member Sign Up page. 3. Enter your [facility] Access Code exactly as it appears below. You will not need to use this code after youve completed the sign-up process. If you do not sign up before the expiration date, you must request a new code. · [facility] Access Code: [email or web address] Expires: 4/24/2017  3:59 PM 
 
4. Enter the last four digits of your Social Security Number (xxxx) and Date of Birth (mm/dd/yyyy) as indicated and click Submit. You will be taken to the next sign-up page. 5. Create a [facility] ID. This will be your [facility] login ID and cannot be changed, so think of one that is secure and easy to remember. 6. Create a [facility] password. You can change your password at any time. 7. Enter your Password Reset Question and Answer. This can be used at a later time if you forget your password. 8. Enter your e-mail address. You will receive e-mail notification when new information is available in [address] 9. Click Sign Up. You can now view and download portions of your medical record. 10. Click the Download Summary menu link to download a portable copy of your medical information. If you have questions, please visit the Frequently Asked Questions section of the MyChart website. Remember, MyChart is NOT to be used for urgent needs. For medical emergencies, dial 911. Now available from your iPhone and Android! General Information Please provide this summary of care documentation to your next provider. Patient Signature:  ____________________________________________________________ Date:  ____________________________________________________________  
  
Keith Gisela Provider Signature:  ____________________________________________________________ Date:  ____________________________________________________________

## 2017-01-25 NOTE — PROGRESS NOTES
TRANSFER - IN REPORT:    Verbal report received from Jenniffer(name) on Parry Friend  being received from cath lab(unit) for routine progression of care      Report consisted of patients Situation, Background, Assessment and   Recommendations(SBAR). Information from the following report(s) SBAR, Procedure Summary and MAR was reviewed with the receiving nurse. Opportunity for questions and clarification was provided. Assessment completed upon patients arrival to unit and care assumed.

## 2017-01-26 ENCOUNTER — TELEPHONE (OUTPATIENT)
Dept: CARDIOLOGY CLINIC | Age: 82
End: 2017-01-26

## 2017-01-26 NOTE — TELEPHONE ENCOUNTER
Called pharmacy spoke with Esha Srivastava in reference to Imdur refill. Says medication available for . Patient notified.

## 2017-01-26 NOTE — TELEPHONE ENCOUNTER
Please call Chela Bray about her mother medication Imdur 30 mg , that never got called in to [facility] on file. Bharat Morrissey was suppose start last night  Or this morning ,  And patient had a rough time last nite with SOB.

## 2017-02-01 ENCOUNTER — OFFICE VISIT (OUTPATIENT)
Dept: CARDIOLOGY CLINIC | Age: 82
End: 2017-02-01

## 2017-02-01 VITALS
WEIGHT: 180.7 LBS | RESPIRATION RATE: 20 BRPM | OXYGEN SATURATION: 97 % | SYSTOLIC BLOOD PRESSURE: 180 MMHG | HEART RATE: 60 BPM | DIASTOLIC BLOOD PRESSURE: 80 MMHG | HEIGHT: 65 IN | BODY MASS INDEX: 30.1 KG/M2

## 2017-02-01 DIAGNOSIS — I10 HTN (HYPERTENSION), BENIGN: ICD-10-CM

## 2017-02-01 DIAGNOSIS — I27.20 MODERATE TO SEVERE PULMONARY HYPERTENSION (HCC): Primary | ICD-10-CM

## 2017-02-01 DIAGNOSIS — I50.32 CHRONIC DIASTOLIC CONGESTIVE HEART FAILURE (HCC): ICD-10-CM

## 2017-02-01 DIAGNOSIS — E78.00 HYPERCHOLESTEREMIA: ICD-10-CM

## 2017-02-01 RX ORDER — LANOLIN ALCOHOL/MO/W.PET/CERES
CREAM (GRAM) TOPICAL
Refills: 2 | COMMUNITY
Start: 2016-12-08 | End: 2017-02-01 | Stop reason: SDUPTHER

## 2017-02-01 NOTE — MR AVS SNAPSHOT
Visit Information Date & Time Provider Department Dept. Phone Encounter #  
 2/1/2017  1:15 PM Rigoberto Mcdonald, [address] [facility] [phone] [id number] Upcoming Health Maintenance Date Due DTaP/Tdap/Td series (1 - Tdap) 8/16/1952 EYE EXAM RETINAL OR DILATED Q1 1/9/2016 Pneumococcal 65+ High/Highest Risk (2 of 2 - PPSV23) 4/19/2016 HEMOGLOBIN A1C Q6M 9/30/2016 MEDICARE YEARLY EXAM 11/19/2016 GLAUCOMA SCREENING Q2Y 1/9/2017 FOOT EXAM Q1 1/28/2017 MICROALBUMIN Q1 3/10/2017 LIPID PANEL Q1 4/1/2017 Allergies as of 2/1/2017  Review Complete On: 2/1/2017 By: Ander Nunez LPN Severity Noted Reaction Type Reactions Ace Inhibitors  05/21/2012   Systemic Swelling Peanut  05/26/2011    Swelling Pt states she's not allergic to peanuts Current Immunizations  Reviewed on 11/29/2011 Name Date Influenza High Dose Vaccine PF 11/19/2015 Influenza Vaccine 10/1/2016, 10/22/2013, 12/13/2012 Influenza Vaccine Split 9/27/2011 11:15 AM  
 Influenza Vaccine Whole 10/8/2010 Pneumococcal Vaccine (Unspecified Type) 4/19/2011 Not reviewed this visit You Were Diagnosed With   
  
 Codes Comments HTN (hypertension), benign    -  Primary ICD-10-CM: I10 
ICD-9-CM: 328. 1 Chronic diastolic congestive heart failure (HCC)     ICD-10-CM: I50.32 
ICD-9-CM: 428.32, 428.0 Hypercholesteremia     ICD-10-CM: E78.00 ICD-9-CM: 272.0 Moderate to severe pulmonary hypertension (HCC)     ICD-10-CM: I27.2 ICD-9-CM: 416.8 Vitals BP Pulse Resp Height(growth percentile) Weight(growth percentile) SpO2  
 180/80 (BP 1 Location: Left arm, BP Patient Position: Sitting) 60 20 5' 5\" (1.651 m) 180 lb 11.2 oz (82 kg) 97% BMI OB Status Smoking Status 30.07 kg/m2 Postmenopausal Never Smoker BMI and BSA Data Body Mass Index Body Surface Area 30.07 kg/m 2 1.94 m 2 Preferred Pharmacy Pharmacy Name Phone [facility]Y [id number], [place] - [address] [place] [address] [phone] Your Updated Medication List  
  
   
This list is accurate as of: 2/1/17  2:03 PM.  Always use your most recent med list.  
  
  
  
  
 albuterol 2.5 mg /3 mL (0.083 %) nebulizer solution Commonly known as:  PROVENTIL VENTOLIN  
3 mL by Nebulization route every four (4) hours as needed for Wheezing. amLODIPine 10 mg tablet Commonly known as:  Suzon Salle TAKE 1 TABLET BY MOUTH EVERY DAY  
  
 aspirin 81 mg chewable tablet Take 81 mg by mouth daily. atorvastatin 20 mg tablet Commonly known as:  LIPITOR  
TAKE 1 TAB BY MOUTH DAILY. BD INSULIN SYRINGE ULTRA-FINE 0.5 mL 31 gauge x 5/16 Syrg Generic drug:  Insulin Syringe-Needle U-100  
1 Each by SubCUTAneous route daily. * Blood-Glucose Meter monitoring kit Check blood sugar daily. May substitute for insurance preferred meter * CONTOUR NEXT METER Misc Generic drug:  Blood-Glucose Meter USE TO CHECK BLOOD SUGAR EVERY DAY  
  
 denosumab 60 mg/mL injection Commonly known as:  Oscar Raw 60 mg by SubCUTAneous route. Twice a year  
  
 diphenhydrAMINE 25 mg capsule Commonly known as:  BENADRYL Take 25 mg by mouth every six (6) hours as needed. ergocalciferol 50,000 unit capsule Commonly known as:  VITAMIN D2 Take 1 Cap by mouth every seven (7) days. One capsule monthly  
  
 ferrous sulfate 325 mg (65 mg iron) EC tablet Commonly known as:  IRON Take 1 Tab by mouth Daily (before breakfast). FLUZONE HIGH-DOSE 2016-17 (PF) Syrg injection Generic drug:  influenza vaccine 2016-17 (65 yr+)(PF)  
TO BE ADMINISTERED BY PHARMACIST FOR IMMUNIZATION  
  
 furosemide 20 mg tablet Commonly known as:  LASIX Take 1 Tab by mouth daily. Refills by Dr. Lynda Steele  
  
 glucose blood VI test strips strip Commonly known as:  blood glucose test  
 Check blood sugar 3 times daily: E11.65, may substitute for insurance preferred strips. insulin glargine 100 unit/mL injection Commonly known as:  LANTUS  
50 Units by SubCUTAneous route nightly. For -175 = 20 units 175-200 = 30 units > 200 = 40 units   by SubCUTAneous route nightly. Indications: type 2 diabetes mellitus  
  
 isosorbide mononitrate ER 30 mg tablet Commonly known as:  IMDUR Take 1 Tab by mouth daily. To help prevent shortness of breath call MD if significant headache LORazepam 0.5 mg tablet Commonly known as:  ATIVAN Take 1 Tab by mouth two (2) days a week. Max Daily Amount: 0.5 mg.  
  
 metFORMIN 1,000 mg tablet Commonly known as:  GLUCOPHAGE Take 1 Tab by mouth two (2) times a day. metoprolol tartrate 50 mg tablet Commonly known as:  LOPRESSOR Take 1 Tab by mouth two (2) times a day. predniSONE 20 mg tablet Commonly known as:  Davis Stair Take 20 mg by mouth daily (with breakfast). * Notice: This list has 2 medication(s) that are the same as other medications prescribed for you. Read the directions carefully, and ask your doctor or other care provider to review them with you. We Performed the Following AMB POC EKG ROUTINE W/ 12 LEADS, INTER & REP [11698 CPT(R)] Introducing [facility] SERVICES! Alexander Ramey introduces Lokalite patient portal. Now you can access parts of your medical record, email your doctor's office, and request medication refills online. 1. In your internet browser, go to [email or web address] 2. Click on the First Time User? Click Here link in the Sign In box. You will see the New Member Sign Up page. 3. Enter your Lokalite Access Code exactly as it appears below. You will not need to use this code after youve completed the sign-up process. If you do not sign up before the expiration date, you must request a new code.  
 
· Lokalite Access Code: [email or web address] 
 Expires: 4/24/2017  3:59 PM 
 
4. Enter the last four digits of your Social Security Number (xxxx) and Date of Birth (mm/dd/yyyy) as indicated and click Submit. You will be taken to the next sign-up page. 5. Create a enosiX ID. This will be your enosiX login ID and cannot be changed, so think of one that is secure and easy to remember. 6. Create a enosiX password. You can change your password at any time. 7. Enter your Password Reset Question and Answer. This can be used at a later time if you forget your password. 8. Enter your e-mail address. You will receive e-mail notification when new information is available in [address] 9. Click Sign Up. You can now view and download portions of your medical record. 10. Click the Download Summary menu link to download a portable copy of your medical information. If you have questions, please visit the Frequently Asked Questions section of the enosiX website. Remember, enosiX is NOT to be used for urgent needs. For medical emergencies, dial 911. Now available from your iPhone and Android! Please provide this summary of care documentation to your next provider. Your primary care clinician is listed as [employer]. If you have any questions after today's visit, please call [phone].

## 2017-02-01 NOTE — PROGRESS NOTES
NAME:  Alexia Kuo   :   1931   MRN:   [id number]   PCP:  Daria Burkitt, MD           Subjective: The patient is a 80y.o. year old female  who returns for a routine follow-up from diagnostic cardiac cath. Since the last visit, patient reports no change in exercise tolerance, chest pain, edema, medication intolerance, palpitations, shortness of breath, PND/orthopnea wheezing, sputum, syncope, dizziness or light headedness. Feels the same. Stopped amlodipine - was confused. She thought when one medication was increased, she was to stop the other.       Patient Active Problem List   Diagnosis Code    Diabetes ([facility] E11.9    HTN (hypertension), benign I10    [facility] ([facility] C50.919    Anemia D64.9    Fatigue R53.83    Vein disorder I87.9    Allergic reaction T78.40XA    Multiple allergies Z88.9    Bronchitis J40    Anxiety F41.9    Hypercholesteremia E78.00    Aortic stenosis V93.5    Diastolic CHF, acute on chronic (HCC) I50.33    SOB (shortness of breath) R06.02    CHF (congestive heart failure) (HCC) I50.9    Pulmonary edema cardiac cause I50.1    Moderate to severe pulmonary hypertension (HCC) I27.2    CAD (coronary artery disease), native coronary artery I25.10       Past Medical History   Diagnosis Date    Anxiety     Breast cancer ([facility] ,      right , left     Cancer [facility])       cancer right breast cancer    Cancer ([facility]      cancer left breast/new dx 2011 +bx     Diabetes ([facility]     Hypercholesterolemia     Hypertension     Other ill-defined conditions(799.89)      osteopoosis    Other ill-defined conditions(799.89)      high cholesterol    Pneumonia     Psychiatric disorder      anxiety       Social History   Substance Use Topics    Smoking status: Never Smoker    Smokeless tobacco: Never Used    Alcohol use No      Family History   Problem Relation Age of Onset    Hypertension Mother     Stroke Other         Review of Systems  Constitutional: Negative for fever, chills, and diaphoresis. Respiratory: Negative for cough, hemoptysis, sputum production, shortness of breath and wheezing. Cardiovascular: Negative for chest pain, palpitations, orthopnea, claudication, leg swelling and PND. Gastrointestinal: Negative for heartburn, nausea, vomiting, blood in stool and melena. Genitourinary: Negative for dysuria and flank pain. Musculoskeletal: Negative for joint pain and back pain. Skin: Negative for rash. Neurological: Negative for focal weakness, seizures, loss of consciousness, weakness and headaches. Endo/Heme/Allergies: Does not bruise/bleed easily. Psychiatric/Behavioral: Negative for memory loss. The patient does not have insomnia. Objective:       Vitals:    02/01/17 1339   BP: 180/80   Pulse: 60   Resp: 20   SpO2: 97%   Weight: 180 lb 11.2 oz (82 kg)   Height: 5' 5\" (1.651 m)    Body mass index is 30.07 kg/(m^2). General PE    Gen: NAD     Mental Status - Alert. General Appearance - Not in acute distress. Neck - no JVD     Chest and Lung Exam     Inspection: Accessory muscles - No use of accessory muscles in breathing. Auscultation:   Breath sounds: - Normal.     Cardiovascular   Inspection: Jugular vein - Bilateral - Inspection Normal.   Palpation/Percussion:   Apical Impulse: - Normal.   Auscultation: Rhythm - Regular. Heart Sounds - S1 WNL and S2 WNL. No S3 or S4. Murmurs & Other Heart Sounds: Auscultation of the heart reveals - No Murmurs. Peripheral Vascular   Upper Extremity: Inspection - Bilateral - No Cyanotic nailbeds or Digital clubbing. Lower Extremity:   Palpation: Edema - Bilateral - No edema. Right groin site with normal pulse. Abdomen: Soft, non-tender, bowel sounds are active. Neuro: A&O times 3, CN and motor grossly WNL      Data Review:     EKG -  Sinus  Bradycardia   -ST depression  -Nondiagnostic.      Cardiac cath -  --  CARDIAC STRUCTURES:  --  Global left ventricular function was normal. EF calculated by contrast  ventriculography was 60 %. --  There was mild aortic stenosis by mean gradient. --  CORONARY CIRCULATION:  --  Coronary angiography demonstrated minor luminal irregularities. Allergies reviewed  Allergies   Allergen Reactions    Ace Inhibitors Swelling    Peanut Swelling     Pt states she's not allergic to peanuts       Medications reviewed  Current Outpatient Prescriptions   Medication Sig    FLUZONE HIGH-DOSE 2016-17, PF, syrg injection TO BE ADMINISTERED BY PHARMACIST FOR IMMUNIZATION    aspirin 81 mg chewable tablet Take 81 mg by mouth daily.  furosemide (LASIX) 20 mg tablet Take 1 Tab by mouth daily. Refills by Dr. Arsenio Arguelles isosorbide mononitrate ER (IMDUR) 30 mg tablet Take 1 Tab by mouth daily. To help prevent shortness of breath- call MD if significant headache    diphenhydrAMINE (BENADRYL) 25 mg capsule Take 25 mg by mouth every six (6) hours as needed.  predniSONE (DELTASONE) 20 mg tablet Take 20 mg by mouth daily (with breakfast).  insulin glargine (LANTUS) 100 unit/mL injection 50 Units by SubCUTAneous route nightly. For -175 = 20 units  175-200 = 30 units  > 200 = 40 units     by SubCUTAneous route nightly. Indications: type 2 diabetes mellitus (Patient taking differently: 40 Units by SubCUTAneous route nightly. For -175 = 20 units  175-200 = 30 units  > 200 = 40 units     by SubCUTAneous route nightly. Indications: type 2 diabetes mellitus)    LORazepam (ATIVAN) 0.5 mg tablet Take 1 Tab by mouth two (2) days a week. Max Daily Amount: 0.5 mg.    ferrous sulfate (IRON) 325 mg (65 mg iron) EC tablet Take 1 Tab by mouth Daily (before breakfast).  BD INSULIN SYRINGE ULTRA-FINE 0.5 mL 31 gauge x 5/16 syrg 1 Each by SubCUTAneous route daily.  atorvastatin (LIPITOR) 20 mg tablet TAKE 1 TAB BY MOUTH DAILY.     CONTOUR NEXT METER misc USE TO CHECK BLOOD SUGAR EVERY DAY    Blood-Glucose Meter monitoring kit Check blood sugar daily. May substitute for insurance preferred meter    glucose blood VI test strips (BLOOD GLUCOSE TEST) strip Check blood sugar 3 times daily: E11.65, may substitute for insurance preferred strips.  denosumab (PROLIA) 60 mg/mL injection 60 mg by SubCUTAneous route. Twice a year    metFORMIN (GLUCOPHAGE) 1,000 mg tablet Take 1 Tab by mouth two (2) times a day.  metoprolol (LOPRESSOR) 50 mg tablet Take 1 Tab by mouth two (2) times a day.  ergocalciferol (VITAMIN D2) 50,000 unit capsule Take 1 Cap by mouth every seven (7) days. One capsule monthly    albuterol (PROVENTIL VENTOLIN) 2.5 mg /3 mL (0.083 %) nebulizer solution 3 mL by Nebulization route every four (4) hours as needed for Wheezing.  amLODIPine (NORVASC) 10 mg tablet TAKE 1 TABLET BY MOUTH EVERY DAY     No current facility-administered medications for this visit. Assessment:       ICD-10-CM ICD-9-CM    1. Moderate to severe pulmonary hypertension (HCC) I27.2 416.8    2. HTN (hypertension), benign I10 401.1 FLUZONE HIGH-DOSE 2016-17, PF, syrg injection      AMB POC EKG ROUTINE W/ 12 LEADS, INTER & REP      DISCONTINUED: ferrous sulfate 325 mg (65 mg iron) tablet   3. Chronic diastolic congestive heart failure (HCC) I50.32 428.32      428.0    4. Hypercholesteremia E78.00 272.0         Orders Placed This Encounter    AMB POC EKG ROUTINE W/ 12 LEADS, INTER & REP     Order Specific Question:   Reason for Exam:     Answer:   routine    DISCONTD: ferrous sulfate 325 mg (65 mg iron) tablet     Sig: TAKE 1 TABLET BY MOUTH EVERY DAY BEFORE BREAKFAST     Refill:  2    FLUZONE HIGH-DOSE 2016-17, PF, syrg injection     Sig: TO BE ADMINISTERED BY PHARMACIST FOR IMMUNIZATION     Refill:  0           Plan:     Patient presents for follow up, feeling well and stable from cardiac standpoint.       Normal coronaries, only mild AS by valve gradient, but with severe pulmonary HTN with significant transpulmonary gradient, suggesting intrinsic lung disease and/or primary pulmonary hypertension. Will refer back to Dr. Roy Ormond (cardiology) and Dr Marta Cifuentes for pulm HTN management. Advised pt to restart amlodipine. Recently added Imdur. After initial workup for lung disease, she may need a right heart cath with vasodilator challenge. Diastolic heart failure:  Treat with tight blood pressure control and diuretics. As her wedge was 25 at catheterization, she may benefit from titration of Lasix. No overt orthopnea, however, and BNP only mildly elevated. Thanks for allowing me to participate in this patient's care.     Kathy Galvez MD

## 2017-02-01 NOTE — PROGRESS NOTES
Patient C/O SOB has questions about resent testing during the hospital and checking right groin site.

## 2017-02-03 ENCOUNTER — HOSPITAL ENCOUNTER (OUTPATIENT)
Dept: CT IMAGING | Age: 82
Discharge: HOME | End: 2017-02-03
Attending: FAMILY MEDICINE
Payer: MEDICARE

## 2017-02-03 DIAGNOSIS — R06.00 DYSPNEA: ICD-10-CM

## 2017-02-03 PROCEDURE — 74011636320 HC RX REV CODE- 636/320: Performed by: FAMILY MEDICINE

## 2017-02-03 PROCEDURE — 71275 CT ANGIOGRAPHY CHEST: CPT

## 2017-02-03 RX ORDER — SODIUM CHLORIDE 0.9 % (FLUSH) 0.9 %
10 SYRINGE (ML) INJECTION
Status: COMPLETED | OUTPATIENT
Start: 2017-02-03 | End: 2017-02-03

## 2017-02-03 RX ADMIN — Medication 10 ML: at 11:34

## 2017-02-03 RX ADMIN — IOPAMIDOL 100 ML: 755 INJECTION, SOLUTION INTRAVENOUS at 11:33

## 2017-02-08 ENCOUNTER — APPOINTMENT (OUTPATIENT)
Dept: GENERAL RADIOLOGY | Age: 82
DRG: 189 | End: 2017-02-08
Attending: EMERGENCY MEDICINE
Payer: MEDICARE

## 2017-02-08 ENCOUNTER — HOSPITAL ENCOUNTER (INPATIENT)
Age: 82
LOS: 5 days | Discharge: HOME HEALTH | DRG: 189 | End: 2017-02-13
Attending: EMERGENCY MEDICINE | Admitting: INTERNAL MEDICINE
Payer: MEDICARE

## 2017-02-08 DIAGNOSIS — J96.01 ACUTE RESPIRATORY FAILURE WITH HYPOXIA (HCC): Primary | ICD-10-CM

## 2017-02-08 DIAGNOSIS — J81.0 ACUTE PULMONARY EDEMA (HCC): ICD-10-CM

## 2017-02-08 PROBLEM — J06.9 ACUTE RESPIRATORY DISEASE: Status: ACTIVE | Noted: 2017-02-08

## 2017-02-08 LAB
ALBUMIN SERPL BCP-MCNC: 3 G/DL (ref 3.5–5)
ALBUMIN/GLOB SERPL: 0.6 {RATIO} (ref 1.1–2.2)
ALP SERPL-CCNC: 113 U/L (ref 45–117)
ALT SERPL-CCNC: 21 U/L (ref 12–78)
AMPHET UR QL SCN: NEGATIVE
ANION GAP BLD CALC-SCNC: 9 MMOL/L (ref 5–15)
APPEARANCE UR: CLEAR
ARTERIAL PATENCY WRIST A: YES
AST SERPL W P-5'-P-CCNC: 13 U/L (ref 15–37)
ATRIAL RATE: 68 BPM
BACTERIA URNS QL MICRO: NEGATIVE /HPF
BARBITURATES UR QL SCN: NEGATIVE
BASE EXCESS BLD CALC-SCNC: 2 MMOL/L
BASOPHILS # BLD AUTO: 0 K/UL (ref 0–0.1)
BASOPHILS # BLD: 0 % (ref 0–1)
BDY SITE: ABNORMAL
BENZODIAZ UR QL: NEGATIVE
BILIRUB SERPL-MCNC: 1.1 MG/DL (ref 0.2–1)
BILIRUB UR QL: NEGATIVE
BNP SERPL-MCNC: 486 PG/ML (ref 0–100)
BUN SERPL-MCNC: 10 MG/DL (ref 6–20)
BUN/CREAT SERPL: 16 (ref 12–20)
CALCIUM SERPL-MCNC: 8.5 MG/DL (ref 8.5–10.1)
CALCULATED R AXIS, ECG10: 13 DEGREES
CALCULATED T AXIS, ECG11: 78 DEGREES
CANNABINOIDS UR QL SCN: NEGATIVE
CHLORIDE SERPL-SCNC: 100 MMOL/L (ref 97–108)
CO2 SERPL-SCNC: 27 MMOL/L (ref 21–32)
COCAINE UR QL SCN: NEGATIVE
COLOR UR: NORMAL
CREAT SERPL-MCNC: 0.64 MG/DL (ref 0.55–1.02)
DIAGNOSIS, 93000: NORMAL
DRUG SCRN COMMENT,DRGCM: NORMAL
EOSINOPHIL # BLD: 0.3 K/UL (ref 0–0.4)
EOSINOPHIL NFR BLD: 2 % (ref 0–7)
EPITH CASTS URNS QL MICRO: NORMAL /LPF
ERYTHROCYTE [DISTWIDTH] IN BLOOD BY AUTOMATED COUNT: 16.4 % (ref 11.5–14.5)
EST. AVERAGE GLUCOSE BLD GHB EST-MCNC: 154 MG/DL
GAS FLOW.O2 O2 DELIVERY SYS: ABNORMAL L/MIN
GLOBULIN SER CALC-MCNC: 5.2 G/DL (ref 2–4)
GLUCOSE BLD STRIP.AUTO-MCNC: 151 MG/DL (ref 65–100)
GLUCOSE BLD STRIP.AUTO-MCNC: 226 MG/DL (ref 65–100)
GLUCOSE SERPL-MCNC: 174 MG/DL (ref 65–100)
GLUCOSE UR STRIP.AUTO-MCNC: NEGATIVE MG/DL
HBA1C MFR BLD: 7 % (ref 4.2–6.3)
HCO3 BLD-SCNC: 27.2 MMOL/L (ref 22–26)
HCT VFR BLD AUTO: 33.5 % (ref 35–47)
HGB BLD-MCNC: 10.2 G/DL (ref 11.5–16)
HGB UR QL STRIP: NEGATIVE
HYALINE CASTS URNS QL MICRO: NORMAL /LPF (ref 0–5)
KETONES UR QL STRIP.AUTO: NEGATIVE MG/DL
LEUKOCYTE ESTERASE UR QL STRIP.AUTO: NEGATIVE
LYMPHOCYTES # BLD AUTO: 13 % (ref 12–49)
LYMPHOCYTES # BLD: 1.8 K/UL (ref 0.8–3.5)
MCH RBC QN AUTO: 26 PG (ref 26–34)
MCHC RBC AUTO-ENTMCNC: 30.4 G/DL (ref 30–36.5)
MCV RBC AUTO: 85.5 FL (ref 80–99)
METHADONE UR QL: NEGATIVE
MONOCYTES # BLD: 0.9 K/UL (ref 0–1)
MONOCYTES NFR BLD AUTO: 6 % (ref 5–13)
NEUTS SEG # BLD: 11.3 K/UL (ref 1.8–8)
NEUTS SEG NFR BLD AUTO: 79 % (ref 32–75)
NITRITE UR QL STRIP.AUTO: NEGATIVE
O2/TOTAL GAS SETTING VFR VENT: 45 %
OPIATES UR QL: NEGATIVE
PCO2 BLD: 46.6 MMHG (ref 35–45)
PCP UR QL: NEGATIVE
PEEP RESPIRATORY: 5 CMH2O
PH BLD: 7.37 [PH] (ref 7.35–7.45)
PH UR STRIP: 5 [PH] (ref 5–8)
PLATELET # BLD AUTO: 343 K/UL (ref 150–400)
PO2 BLD: 130 MMHG (ref 80–100)
POTASSIUM SERPL-SCNC: 3.4 MMOL/L (ref 3.5–5.1)
PROT SERPL-MCNC: 8.2 G/DL (ref 6.4–8.2)
PROT UR STRIP-MCNC: NEGATIVE MG/DL
Q-T INTERVAL, ECG07: 404 MS
QRS DURATION, ECG06: 88 MS
QTC CALCULATION (BEZET), ECG08: 465 MS
RBC # BLD AUTO: 3.92 M/UL (ref 3.8–5.2)
RBC #/AREA URNS HPF: NORMAL /HPF (ref 0–5)
SAO2 % BLD: 99 % (ref 92–97)
SERVICE CMNT-IMP: ABNORMAL
SERVICE CMNT-IMP: ABNORMAL
SODIUM SERPL-SCNC: 136 MMOL/L (ref 136–145)
SP GR UR REFRACTOMETRY: 1.01 (ref 1–1.03)
SPECIMEN TYPE: ABNORMAL
TOTAL RESP. RATE, ITRR: 28
TROPONIN I SERPL-MCNC: <0.04 NG/ML
UA: UC IF INDICATED,UAUC: NORMAL
UROBILINOGEN UR QL STRIP.AUTO: 1 EU/DL (ref 0.2–1)
VENTRICULAR RATE, ECG03: 80 BPM
WBC # BLD AUTO: 14.3 K/UL (ref 3.6–11)
WBC URNS QL MICRO: NORMAL /HPF (ref 0–4)

## 2017-02-08 PROCEDURE — 82803 BLOOD GASES ANY COMBINATION: CPT

## 2017-02-08 PROCEDURE — 81001 URINALYSIS AUTO W/SCOPE: CPT | Performed by: EMERGENCY MEDICINE

## 2017-02-08 PROCEDURE — 71010 XR CHEST PORT: CPT

## 2017-02-08 PROCEDURE — 74011250636 HC RX REV CODE- 250/636: Performed by: INTERNAL MEDICINE

## 2017-02-08 PROCEDURE — 94660 CPAP INITIATION&MGMT: CPT

## 2017-02-08 PROCEDURE — 36415 COLL VENOUS BLD VENIPUNCTURE: CPT | Performed by: EMERGENCY MEDICINE

## 2017-02-08 PROCEDURE — 74011250636 HC RX REV CODE- 250/636: Performed by: EMERGENCY MEDICINE

## 2017-02-08 PROCEDURE — 77030013140 HC MSK NEB VYRM -A

## 2017-02-08 PROCEDURE — 94640 AIRWAY INHALATION TREATMENT: CPT

## 2017-02-08 PROCEDURE — 83880 ASSAY OF NATRIURETIC PEPTIDE: CPT | Performed by: EMERGENCY MEDICINE

## 2017-02-08 PROCEDURE — 99285 EMERGENCY DEPT VISIT HI MDM: CPT

## 2017-02-08 PROCEDURE — 74011000250 HC RX REV CODE- 250: Performed by: INTERNAL MEDICINE

## 2017-02-08 PROCEDURE — 80307 DRUG TEST PRSMV CHEM ANLYZR: CPT | Performed by: INTERNAL MEDICINE

## 2017-02-08 PROCEDURE — 83036 HEMOGLOBIN GLYCOSYLATED A1C: CPT | Performed by: EMERGENCY MEDICINE

## 2017-02-08 PROCEDURE — 93005 ELECTROCARDIOGRAM TRACING: CPT

## 2017-02-08 PROCEDURE — 84484 ASSAY OF TROPONIN QUANT: CPT | Performed by: EMERGENCY MEDICINE

## 2017-02-08 PROCEDURE — 80053 COMPREHEN METABOLIC PANEL: CPT | Performed by: EMERGENCY MEDICINE

## 2017-02-08 PROCEDURE — 77030012879 HC MSK CPAP FLL FAC PHIL -B

## 2017-02-08 PROCEDURE — 82962 GLUCOSE BLOOD TEST: CPT

## 2017-02-08 PROCEDURE — 74011000250 HC RX REV CODE- 250: Performed by: EMERGENCY MEDICINE

## 2017-02-08 PROCEDURE — 96374 THER/PROPH/DIAG INJ IV PUSH: CPT

## 2017-02-08 PROCEDURE — 85025 COMPLETE CBC W/AUTO DIFF WBC: CPT | Performed by: EMERGENCY MEDICINE

## 2017-02-08 PROCEDURE — 74011250637 HC RX REV CODE- 250/637: Performed by: INTERNAL MEDICINE

## 2017-02-08 PROCEDURE — 36600 WITHDRAWAL OF ARTERIAL BLOOD: CPT

## 2017-02-08 PROCEDURE — 65610000006 HC RM INTENSIVE CARE

## 2017-02-08 PROCEDURE — 74011636637 HC RX REV CODE- 636/637: Performed by: INTERNAL MEDICINE

## 2017-02-08 PROCEDURE — 93306 TTE W/DOPPLER COMPLETE: CPT

## 2017-02-08 RX ORDER — FUROSEMIDE 10 MG/ML
40 INJECTION INTRAMUSCULAR; INTRAVENOUS
Status: COMPLETED | OUTPATIENT
Start: 2017-02-08 | End: 2017-02-08

## 2017-02-08 RX ORDER — IPRATROPIUM BROMIDE 0.5 MG/2.5ML
0.5 SOLUTION RESPIRATORY (INHALATION)
Status: DISCONTINUED | OUTPATIENT
Start: 2017-02-08 | End: 2017-02-12

## 2017-02-08 RX ORDER — SODIUM CHLORIDE 0.9 % (FLUSH) 0.9 %
5-10 SYRINGE (ML) INJECTION AS NEEDED
Status: DISCONTINUED | OUTPATIENT
Start: 2017-02-08 | End: 2017-02-13 | Stop reason: HOSPADM

## 2017-02-08 RX ORDER — LORAZEPAM 1 MG/1
1 TABLET ORAL
Status: DISCONTINUED | OUTPATIENT
Start: 2017-02-08 | End: 2017-02-13 | Stop reason: HOSPADM

## 2017-02-08 RX ORDER — DEXTROSE 50 % IN WATER (D50W) INTRAVENOUS SYRINGE
12.5-25 AS NEEDED
Status: DISCONTINUED | OUTPATIENT
Start: 2017-02-08 | End: 2017-02-13 | Stop reason: HOSPADM

## 2017-02-08 RX ORDER — MAGNESIUM SULFATE 100 %
4 CRYSTALS MISCELLANEOUS AS NEEDED
Status: DISCONTINUED | OUTPATIENT
Start: 2017-02-08 | End: 2017-02-13 | Stop reason: HOSPADM

## 2017-02-08 RX ORDER — INSULIN LISPRO 100 [IU]/ML
INJECTION, SOLUTION INTRAVENOUS; SUBCUTANEOUS
Status: DISCONTINUED | OUTPATIENT
Start: 2017-02-08 | End: 2017-02-13 | Stop reason: HOSPADM

## 2017-02-08 RX ORDER — ENOXAPARIN SODIUM 100 MG/ML
1 INJECTION SUBCUTANEOUS EVERY 12 HOURS
Status: DISCONTINUED | OUTPATIENT
Start: 2017-02-08 | End: 2017-02-11

## 2017-02-08 RX ORDER — BUMETANIDE 0.25 MG/ML
1 INJECTION INTRAMUSCULAR; INTRAVENOUS EVERY 12 HOURS
Status: DISCONTINUED | OUTPATIENT
Start: 2017-02-08 | End: 2017-02-11

## 2017-02-08 RX ORDER — SODIUM CHLORIDE 0.9 % (FLUSH) 0.9 %
5-10 SYRINGE (ML) INJECTION EVERY 8 HOURS
Status: DISCONTINUED | OUTPATIENT
Start: 2017-02-08 | End: 2017-02-13 | Stop reason: HOSPADM

## 2017-02-08 RX ORDER — LANOLIN ALCOHOL/MO/W.PET/CERES
325 CREAM (GRAM) TOPICAL
Status: DISCONTINUED | OUTPATIENT
Start: 2017-02-09 | End: 2017-02-13 | Stop reason: HOSPADM

## 2017-02-08 RX ORDER — METOPROLOL TARTRATE 50 MG/1
50 TABLET ORAL 2 TIMES DAILY
Status: DISCONTINUED | OUTPATIENT
Start: 2017-02-08 | End: 2017-02-13 | Stop reason: HOSPADM

## 2017-02-08 RX ORDER — LORAZEPAM 0.5 MG/1
1 TABLET ORAL
COMMUNITY
End: 2017-04-03 | Stop reason: ALTCHOICE

## 2017-02-08 RX ORDER — ALBUTEROL SULFATE 0.83 MG/ML
2.5 SOLUTION RESPIRATORY (INHALATION)
Status: DISCONTINUED | OUTPATIENT
Start: 2017-02-08 | End: 2017-02-12

## 2017-02-08 RX ORDER — ATORVASTATIN CALCIUM 20 MG/1
20 TABLET, FILM COATED ORAL DAILY
Status: DISCONTINUED | OUTPATIENT
Start: 2017-02-09 | End: 2017-02-13 | Stop reason: HOSPADM

## 2017-02-08 RX ADMIN — METOPROLOL TARTRATE 50 MG: 50 TABLET, FILM COATED ORAL at 21:49

## 2017-02-08 RX ADMIN — ALBUTEROL SULFATE 1 DOSE: 2.5 SOLUTION RESPIRATORY (INHALATION) at 10:16

## 2017-02-08 RX ADMIN — INSULIN LISPRO 3 UNITS: 100 INJECTION, SOLUTION INTRAVENOUS; SUBCUTANEOUS at 18:44

## 2017-02-08 RX ADMIN — METHYLPREDNISOLONE SODIUM SUCCINATE 60 MG: 125 INJECTION, POWDER, FOR SOLUTION INTRAMUSCULAR; INTRAVENOUS at 15:14

## 2017-02-08 RX ADMIN — ALBUTEROL SULFATE 2.5 MG: 2.5 SOLUTION RESPIRATORY (INHALATION) at 16:24

## 2017-02-08 RX ADMIN — IPRATROPIUM BROMIDE 0.5 MG: 0.5 SOLUTION RESPIRATORY (INHALATION) at 16:37

## 2017-02-08 RX ADMIN — FUROSEMIDE 40 MG: 10 INJECTION, SOLUTION INTRAMUSCULAR; INTRAVENOUS at 11:34

## 2017-02-08 RX ADMIN — BUMETANIDE 1 MG: 0.25 INJECTION, SOLUTION INTRAMUSCULAR; INTRAVENOUS at 21:52

## 2017-02-08 RX ADMIN — Medication 10 ML: at 15:16

## 2017-02-08 RX ADMIN — METHYLPREDNISOLONE SODIUM SUCCINATE 60 MG: 125 INJECTION, POWDER, FOR SOLUTION INTRAMUSCULAR; INTRAVENOUS at 21:51

## 2017-02-08 RX ADMIN — ENOXAPARIN SODIUM 80 MG: 80 INJECTION SUBCUTANEOUS at 16:19

## 2017-02-08 NOTE — ED TRIAGE NOTES
Pt arrives via EMS from PCP Dr. Constance Becker, c/o sob. she has been having shortness of breath for a couple weeks, hx of right side heart failure, COPD, 70% of room air upon EMs arrival, belly breathing, crackles on lower lobes. Pt arrives on CPAP.  Respiratory and Dr. Jackie Jaime at the bedside    Pt was given enroute:  1- duoneb  324 mg ASA

## 2017-02-08 NOTE — IP AVS SNAPSHOT
Current Discharge Medication List  
  
Take these medications at their scheduled times Dose & Instructions Dispensing Information Comments Morning Noon Evening Bedtime  
 aspirin 81 mg chewable tablet Your next dose is: Today, Tomorrow Other:  ____________ Dose:  81 mg Take 81 mg by mouth daily. Refills:  0  
     
   
   
   
  
 bumetanide 1 mg tablet Commonly known as:  Shy Altman Your next dose is: Today, Tomorrow Other:  ____________ Dose:  1 mg Take 1 Tab by mouth two (2) times a day. Quantity:  60 Tab Refills:  0  
     
   
   
   
  
 ergocalciferol 50,000 unit capsule Commonly known as:  VITAMIN D2 Your next dose is: Today, Tomorrow Other:  ____________ Dose:  73397 Units Take 1 Cap by mouth every seven (7) days. One capsule monthly Quantity:  8 Cap Refills:  4  
     
   
   
   
  
 ferrous sulfate 325 mg (65 mg iron) EC tablet Commonly known as:  IRON Your next dose is: Today, Tomorrow Other:  ____________ Dose:  325 mg Take 1 Tab by mouth Daily (before breakfast). Quantity:  90 Tab Refills:  2  
     
   
   
   
  
 insulin glargine 100 unit/mL injection Commonly known as:  LANTUS Your next dose is: Today, Tomorrow Other:  ____________ Dose:  50 Units 50 Units by SubCUTAneous route nightly. For -175 = 20 units 175-200 = 30 units > 200 = 40 units   by SubCUTAneous route nightly. Indications: type 2 diabetes mellitus Quantity:  1 Vial  
Refills:  6  
     
   
   
   
  
 metFORMIN 1,000 mg tablet Commonly known as:  GLUCOPHAGE Your next dose is: Today, Tomorrow Other:  ____________ Dose:  1000 mg Take 1 Tab by mouth two (2) times a day. Quantity:  180 Tab Refills:  12  
     
   
   
   
  
 metoprolol tartrate 50 mg tablet Commonly known as:  LOPRESSOR  
   
 Your next dose is: Today, Tomorrow Other:  ____________ Dose:  50 mg Take 1 Tab by mouth two (2) times a day. Quantity:  60 Tab Refills:  0  
     
   
   
   
  
 potassium chloride SR 20 mEq tablet Commonly known as:  K-TAB Your next dose is: Today, Tomorrow Other:  ____________ Dose:  20 mEq Take 1 Tab by mouth daily. Quantity:  30 Tab Refills:  0  
     
   
   
   
  
 rivaroxaban 20 mg Tab tablet Commonly known as:  Carmina Mira Your next dose is: Today, Tomorrow Other:  ____________ Dose:  20 mg Take 1 Tab by mouth every evening. Quantity:  30 Tab Refills:  0 Take these medications as needed Dose & Instructions Dispensing Information Comments Morning Noon Evening Bedtime  
 albuterol 2.5 mg /3 mL (0.083 %) nebulizer solution Commonly known as:  PROVENTIL VENTOLIN Your next dose is: Today, Tomorrow Other:  ____________ Dose:  2.5 mg  
3 mL by Nebulization route every four (4) hours as needed for Wheezing. Quantity:  1 Package Refills:  11  
     
   
   
   
  
 diphenhydrAMINE 25 mg capsule Commonly known as:  BENADRYL Your next dose is: Today, Tomorrow Other:  ____________ Dose:  25 mg Take 25 mg by mouth every six (6) hours as needed (Allergic reaction/Angioedema). Refills:  0 LORazepam 0.5 mg tablet Commonly known as:  ATIVAN Your next dose is: Today, Tomorrow Other:  ____________ Dose:  1 mg Take 1 mg by mouth every eight (8) hours as needed for Anxiety (maily used when patient experiences an Allergic Reaction/Angioedema). Refills:  0 Take these medications as directed Dose & Instructions Dispensing Information Comments Morning Noon Evening Bedtime  
 atorvastatin 20 mg tablet Commonly known as:  LIPITOR Your next dose is: Today, Tomorrow Other:  ____________ TAKE 1 TAB BY MOUTH DAILY. Quantity:  30 Tab Refills:  11  
     
   
   
   
  
 denosumab 60 mg/mL injection Commonly known as:  Bruno Mckeon Your next dose is: Today, Tomorrow Other:  ____________ Dose:  60 mg  
60 mg by SubCUTAneous route. Twice a year (June and December) Refills:  0  
     
   
   
   
  
 predniSONE 5 mg tablet Commonly known as:  Hong Lowry Your next dose is: Today, Tomorrow Other:  ____________  
   
   
 6 tabs daily for 2 days then drop to, 4 tabs daily for 2 days then drop to, 2 tabs daily for 2 days then drop to, 1 tab daily for 2 days then stop. Quantity:  26 Tab Refills:  0 Where to Get Your Medications Information about where to get these medications is not yet available ! Ask your nurse or doctor about these medications  
  bumetanide 1 mg tablet  
 potassium chloride SR 20 mEq tablet  
 predniSONE 5 mg tablet  
 rivaroxaban 20 mg Tab tablet

## 2017-02-08 NOTE — PROGRESS NOTES
Spiritual Care Assessment/Progress Notes    Alexia Kuo [id number]  [id number]    8/16/1931  80 y.o.  female    Patient Telephone Number: [phone] (home)   [place] Affiliation: Roman Catholic   Language: English   Extended Emergency Contact Information  Primary Emergency Contact: Jd Swanson Phone: [phone]  Relation: Child  Secondary Emergency Contact: Julissa Santana  Address: [address] Phone: [phone]  Mobile Phone: [phone]  Relation: Daughter   Patient Active Problem List    Diagnosis Date Noted    Moderate to severe pulmonary hypertension ([facility] 01/25/2017    CAD (coronary artery disease), native coronary artery 01/25/2017    Pulmonary edema cardiac cause 04/01/2016    SOB (shortness of breath) 03/30/2016    CHF (congestive heart failure) ([facility] 40/94/9084    Diastolic [facility], [facility] 01/31/2016    Aortic stenosis 01/20/2016    Hypercholesteremia 12/13/2012    Anxiety 05/21/2012    Multiple allergies 10/12/2011    Bronchitis 10/12/2011    Allergic reaction 10/04/2011    Vein disorder 09/07/2011    Fatigue 06/28/2011    Breast cancer ([facility] 05/26/2011    Anemia 05/26/2011    Diabetes ([facility] 04/19/2011    HTN (hypertension), benign 04/19/2011        Date: 2/8/2017       Level of Bahai/Spiritual Activity:  [x]         Involved in ray tradition/spiritual practice    []         Not involved in ray tradition/spiritual practice  [x]         Spiritually oriented    []         Claims no spiritual orientation    []         seeking spiritual identity  []         Feels alienated from Catholic practice/tradition  []         Feels angry about Catholic practice/tradition  []         Spirituality/Catholic tradition a resource for coping at this time.   []         Not able to assess due to medical condition    Services Provided Today:  []         crisis intervention    [] reading Scriptures  [x]         spiritual assessment    [x]         prayer  []         empathic listening/emotional support  []         rites and rituals (cite in comments)  []         life review     []         Buddhism support  []         theological development   []         advocacy  []         ethical dialog     []         blessing  []         bereavement support    []         support to family  []         anticipatory grief support   []         help with AMD  []         spiritual guidance    []         meditation      Spiritual Care Needs  []         Emotional Support  []         Spiritual/Jain Care  []         Loss/Adjustment  []         Advocacy/Referral                /Ethics  [x]         No needs expressed at               this time  []         Other: (note in               comments)  [address]  []         Follow up visits with               pt/family  []         Provide materials  []         Schedule sacraments  []         Contact Community               Clergy  [x]         Follow up as needed  []         Other: (note in               comments)   Responded to request from RN for support of patient in [phone] who had just come into ER for SOB. Found patient on CPAP but was able to engage in conversation. Patient appeared to be in good spirits despite circumstances and coping well. Pt shared events leading to her visit to the E.R while  provided active listening. Identified as member of the [address]. Affirmed patient's ray as she presented as calm and at peace. Patient's family were with her but had stepped out for a moment. Prayed for God's leading and lending of wisdom in her case and advised of spiritual care availability. Stepped away to attend to incoming page. Will follow as needed. PATRICK Denson

## 2017-02-08 NOTE — PROGRESS NOTES
Full H&P to follow. Patient recently had negative cath at [facility] 2 weeks ago no CAD but found to have severe pulm HTN. Discussed with PCP no risk factors no tobacco hx, no environmental exposures, no hx COPD but spirometry in office consistent with severe obstructive pattern. This morning in clinic was in respiratory distress with accessory muscle use. CTA on 2/3/17   1. No evidence of pulmonary thromboembolism. Enlarged main pulmonary artery,  consistent with pulmonary arterial hypertension. 2. Groundglass opacification in the lungs and mild linear interstitial lung  disease. This may be chronic. No definite acute findings in the chest.  3. Mild right hilar and mediastinal adenopathy. 4. Bilateral pleural thickening. No free pleural fluid. Candance Huger CXR with pulm edema    Will discuss with pulmonary to determine further work-up needed, patient clinically improving on continuous BiPAP will admit to ICU and follow up labs, concern for decompensation.

## 2017-02-08 NOTE — PROGRESS NOTES
Admission Medication Reconciliation:    Information obtained from:  Patient/Family/Medication list    Significant PMH/Disease States:   Past Medical History   Diagnosis Date    Anxiety     Breast cancer ([facility] 2005, 2010     right 2005, left 2010    Cancer [facility])       cancer right breast cancer    Cancer ([facility]      cancer left breast/new dx 2011 +bx     Diabetes ([facility]     Hypercholesterolemia     Hypertension     Other ill-defined conditions(799.89)      osteopoosis    Other ill-defined conditions(799.89)      high cholesterol    Pneumonia     Psychiatric disorder      anxiety     Chief Complaint for this Admission:    Chief Complaint   Patient presents with    Shortness of Breath     Allergies:  Ace inhibitors    Prior to Admission Medications:   Prior to Admission Medications   Prescriptions Last Dose Informant Patient Reported? Taking? LORazepam (ATIVAN) 0.5 mg tablet   Yes Yes   Sig: Take 1 mg by mouth every eight (8) hours as needed for Anxiety (maily used when patient experiences an Allergic Reaction/Angioedema). albuterol (PROVENTIL VENTOLIN) 2.5 mg /3 mL (0.083 %) nebulizer solution 2017 at Unknown time  No Yes   Sig: 3 mL by Nebulization route every four (4) hours as needed for Wheezing. amLODIPine (NORVASC) 10 mg tablet 2017 at Unknown time  No Yes   Sig: TAKE 1 TABLET BY MOUTH EVERY DAY   aspirin 81 mg chewable tablet 2017 at Unknown time  Yes Yes   Sig: Take 81 mg by mouth daily. atorvastatin (LIPITOR) 20 mg tablet 2017 at Unknown time  No Yes   Sig: TAKE 1 TAB BY MOUTH DAILY. denosumab (PROLIA) 60 mg/mL injection 2016  Yes Yes   Si mg by SubCUTAneous route. Twice a year ( and December)   diphenhydrAMINE (BENADRYL) 25 mg capsule   Yes Yes   Sig: Take 25 mg by mouth every six (6) hours as needed (Allergic reaction/Angioedema). ergocalciferol (VITAMIN D2) 50,000 unit capsule 2017 at   No Yes   Sig: Take 1 Cap by mouth every seven (7) days. One capsule monthly   ferrous sulfate (IRON) 325 mg (65 mg iron) EC tablet 2017 at Unknown time  No Yes   Sig: Take 1 Tab by mouth Daily (before breakfast). furosemide (LASIX) 20 mg tablet 2017 at Unknown time  No Yes   Sig: Take 1 Tab by mouth daily. Refills by Dr. Nora Driscoll   insulin glargine (LANTUS) 100 unit/mL injection 2017 at Unknown time  No Yes   Si Units by SubCUTAneous route nightly. For -175 = 20 units  175-200 = 30 units  > 200 = 40 units     by SubCUTAneous route nightly. Indications: type 2 diabetes mellitus   Patient taking differently: 20-40 Units by SubCUTAneous route nightly. For -175 = 20 units  175-200 = 30 units  > 200 = 40 units     by SubCUTAneous route nightly. Indications: type 2 diabetes mellitus   isosorbide mononitrate ER (IMDUR) 30 mg tablet 2017 at Unknown time  No Yes   Sig: Take 1 Tab by mouth daily. To help prevent shortness of breath call MD if significant headache   metFORMIN (GLUCOPHAGE) 1,000 mg tablet 2017 at Unknown time  No Yes   Sig: Take 1 Tab by mouth two (2) times a day. metoprolol (LOPRESSOR) 50 mg tablet 2017 at Unknown time  No Yes   Sig: Take 1 Tab by mouth two (2) times a day. predniSONE (DELTASONE) 20 mg tablet   Yes Yes   Sig: Take 20 mg by mouth daily as needed (Allergic Reaction/Angioedema). Facility-Administered Medications: None     Comments/Recommendations: Updated PTA meds/reviewed patient's allergies. 1)  Patient has a history of angioedema to ACEi, however also experiences angioedema occasionally to triggers that are unproven on rechallenge. Patient has standing orders for Benadryl and prednisone when this occurs. 2)  Last doses above. Patient only took IMDUR today for SOB.

## 2017-02-08 NOTE — ED NOTES
Bedside commode placed at bedside. Pt and family updated with plan of care. Call bell within reach. VSS.

## 2017-02-08 NOTE — CONSULTS
Cardiology Consult Note      Patient Name: Juliocesar Guerrero  : 1931 MRN: [id number]  Date: 2017  Time: 4:21 PM    Admit Diagnosis: Acute respiratory disease    Primary Cardiologist: Dr. Perry Servant Cardiologist: Mahsa Walls M.D.    Reason for Consult: CHF    Requesting MD: Jarocho Baca MD    HPI:  Juliocesar Guerrero is a 80 y.o. female admitted on 2017  for Acute respiratory disease. Past medical history of Anxiety; Breast cancer ([facility] (, ); Cancer ([facility] Cancer ([facility] Diabetes ([facility] Hypercholesterolemia; Hypertension; Other ill-defined conditions(799.89); Other ill-defined conditions(799.89); Pneumonia; and Psychiatric disorder. Patient sent to ED for ongoing SOB. Patient has been feeling increasing SOB over the past few weeks. Noticeably worse today. O2 sats as low as 70%. Cardiac cath on 17 finds EF of 60%, severe pulmonary HTN. Subjective:  No c/o CP. Complains of SOB. Feels much better on BIPAP. Assessment and Plan     1. Severe PHTN   - normal cardiac cath with EF 60%   - Ground glass opacities on prior CTA   - ? ILD - ? High res CT  2. Cor Pulmonale   -    - CXR seems more c/w fibrosis   - Bumex 1 mg IV BID   - check TSH   - Echo ordered. 3. Hypoxic respiratory failure   - BIPAP as needed   - per pulmonary     4. Hypokalemia   - Replace while getting diuretics   - Follow labs    Normal cardiac cath in relation to mild if any valve disease or CAD. Severe PHTN. Prior CT scans with ground glass opacities and ? ILD. Suspect Cor Pulmonale as cause for mildly elevated BNP. Patient's SOB and O2 sats out of proportion to minimal amount of volume overload. Follow up official echo results as well as labs.   I have seen and examined the patient and agree with pa assessment  I suspect cor pulmonale possibly related to lung disease clearly DD and aortic stenosis contributing some but the degree of pulmonary htn seems to be out of proportion only for DD and a mild to moderate AS  Ca blockers  O2 and diuresis  No additional cardiac intervention   Her EF remains normal          Prior studies  TTE 3/31/16 LVEF 60%. No rWMA. G2DD. REINA. Mild MR, mild to mod MAC. AV Leaflets mod to markedly increased thickness, mod calcification, and mod  reduced cuspal separation. Mild to Mod AS. Indexed aortic valve area (by VTI) was 0.6 cm squared/m squared. Mild TR. PASP 53      TTE 1/20/16 LVEF 65-70%. No WMA. Mod- marked LVH, particularly septum consistent with eccentric hypertrophy. Doppler parameters were consistent with a restrictive pattern, indicative of decreased left ventricular diastolic compliance and/or increased left atrial pressure (grade 3/4 diastolic dysfunction). Mod LAE. Atrial septum bows from left to right. Mild to mod MAC. Mild MR. Mild to mod AV calcification, mod reduced cuspal separation, and reduced motility. Transaortic velocity and gradient were increased due to stenosis as well as concomitant increased transaortic flow. There was mild stenosis. Valve peak gradient was 29 mmHg. Valve mean gradient was 16 mmHg. Estimated aortic valve area (by VTI) was 1.7 cm squared. Estimated aortic valve area (by Vmax) was 1.5 cm2. Tricuspid valve: There was mild regurgitation. Pulmonary artery systolic  pressure: 45 mmHg. There was mild pulmonary hypertension. Review of Symptoms:  Pertinent items are noted in HPI. Previous treatment/evaluation includes echocardiogram and cardiac catheterization .   Cardiac risk factors: dyslipidemia, hypertension, post-menopausal.    Past Medical History   Diagnosis Date    Anxiety     Breast cancer ([address] 2005, 2010     right 2005, left 2010    Cancer [facility])       cancer right breast cancer    Cancer ([address]      cancer left breast/new dx 8/2011 +bx     Diabetes ([address]     Hypercholesterolemia     Hypertension     Other ill-defined conditions(389.89) osteopoosis    Other ill-defined conditions(799.89)      high cholesterol    Pneumonia     Psychiatric disorder      anxiety     Past Surgical History   Procedure Laterality Date    Pr breast surgery procedure unlisted       right mastectomy    Pr biopsy of breast, incisional       left breast 8/2011 positive for cancer cells    Hx mastectomy Right 2005    Hx breast lumpectomy Left 2010     Current Facility-Administered Medications   Medication Dose Route Frequency    sodium chloride (NS) flush 5-10 mL  5-10 mL IntraVENous Q8H    sodium chloride (NS) flush 5-10 mL  5-10 mL IntraVENous PRN    bumetanide (BUMEX) injection 1 mg  1 mg IntraVENous Q12H    albuterol (PROVENTIL VENTOLIN) nebulizer solution 2.5 mg  2.5 mg Nebulization QID RT    ipratropium (ATROVENT) 0.02 % nebulizer solution 0.5 mg  0.5 mg Nebulization QID RT    methylPREDNISolone (PF) (SOLU-MEDROL) injection 60 mg  60 mg IntraVENous Q6H    enoxaparin (LOVENOX) injection 80 mg  1 mg/kg SubCUTAneous Q12H    LORazepam (ATIVAN) tablet 1 mg  1 mg Oral Q8H PRN    . PHARMACY TO SUBSTITUTE PER PROTOCOL    Per Protocol    [START ON 2/9/2017] atorvastatin (LIPITOR) tablet 20 mg  20 mg Oral DAILY    metoprolol tartrate (LOPRESSOR) tablet 50 mg  50 mg Oral BID    glucose chewable tablet 16 g  4 Tab Oral PRN    dextrose (D50W) injection syrg 12.5-25 g  12.5-25 g IntraVENous PRN    glucagon (GLUCAGEN) injection 1 mg  1 mg IntraMUSCular PRN    insulin lispro (HUMALOG) injection   SubCUTAneous AC&HS     Current Outpatient Prescriptions   Medication Sig    LORazepam (ATIVAN) 0.5 mg tablet Take 1 mg by mouth every eight (8) hours as needed for Anxiety (maily used when patient experiences an Allergic Reaction/Angioedema).  aspirin 81 mg chewable tablet Take 81 mg by mouth daily.  furosemide (LASIX) 20 mg tablet Take 1 Tab by mouth daily. Refills by Dr. Jeannine Villanueva isosorbide mononitrate ER (IMDUR) 30 mg tablet Take 1 Tab by mouth daily.  To help prevent shortness of breath call MD if significant headache    diphenhydrAMINE (BENADRYL) 25 mg capsule Take 25 mg by mouth every six (6) hours as needed (Allergic reaction/Angioedema).  predniSONE (DELTASONE) 20 mg tablet Take 20 mg by mouth daily as needed (Allergic Reaction/Angioedema).  insulin glargine (LANTUS) 100 unit/mL injection 50 Units by SubCUTAneous route nightly. For -175 = 20 units  175-200 = 30 units  > 200 = 40 units     by SubCUTAneous route nightly. Indications: type 2 diabetes mellitus (Patient taking differently: 20-40 Units by SubCUTAneous route nightly. For -175 = 20 units  175-200 = 30 units  > 200 = 40 units     by SubCUTAneous route nightly. Indications: type 2 diabetes mellitus)    ferrous sulfate (IRON) 325 mg (65 mg iron) EC tablet Take 1 Tab by mouth Daily (before breakfast).  atorvastatin (LIPITOR) 20 mg tablet TAKE 1 TAB BY MOUTH DAILY.  denosumab (PROLIA) 60 mg/mL injection 60 mg by SubCUTAneous route. Twice a year (June and December)    metFORMIN (GLUCOPHAGE) 1,000 mg tablet Take 1 Tab by mouth two (2) times a day.  metoprolol (LOPRESSOR) 50 mg tablet Take 1 Tab by mouth two (2) times a day.  ergocalciferol (VITAMIN D2) 50,000 unit capsule Take 1 Cap by mouth every seven (7) days. One capsule monthly    albuterol (PROVENTIL VENTOLIN) 2.5 mg /3 mL (0.083 %) nebulizer solution 3 mL by Nebulization route every four (4) hours as needed for Wheezing.     amLODIPine (NORVASC) 10 mg tablet TAKE 1 TABLET BY MOUTH EVERY DAY       Allergies   Allergen Reactions    Ace Inhibitors Angioedema      Family History   Problem Relation Age of Onset    Hypertension Mother     Stroke Other       Social History     Social History    Marital status:      Spouse name: N/A    Number of children: N/A    Years of education: N/A     Social History Main Topics    Smoking status: Never Smoker    Smokeless tobacco: Never Used    Alcohol use No    Drug use: No    Sexual activity: Not Currently     Other Topics Concern    None     Social History Narrative        She does not have a history of smoking, but does have second hand smoking exposure from her  , for a period of 27- 36 yrs, , approx 30 yrs ago. 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 She has two children, her dtr and son, both of whom are present during the interview, patient states that she wants them both to make any medical decisions for her, she wants to be a full code. Objective:    Physical Exam    Vitals:   Vitals:    02/08/17 1530 02/08/17 1600 02/08/17 1630 02/08/17 1637   BP: 143/61 157/82 153/62    Pulse: 73 71 73    Resp: 24 20 22    Temp:  97.9 °F (36.6 °C)     SpO2: 98% 98% 100% 100%   Weight:       Height:           General:    Alert, cooperative, no distress, appears stated age. Neck:   Supple, no carotid bruit and no JVD. Back:     Symmetric, normal curvature. Lungs:     Clear to auscultation bilaterally. Heart[de-identified]    Regular rate and rhythm, S1, S2 normal, no murmur, click, rub or gallop. Abdomen:     Soft, non-tender. Bowel sounds normal. No masses,  No      organomegaly. Extremities:   Extremities normal, atraumatic, no cyanosis or edema. Vascular:   Pulses - 2+ radials   Skin:   Skin color normal. No rashes or lesions   Neurologic:   CN II-XII grossly intact. Telemetry: normal sinus rhythm    ECG: normal EKG, normal sinus rhythm    Data Review:     Radiology: mild edema, effusion. Recent Labs      02/08/17   1514  02/08/17   0959   TROIQ  <0.04  <0.04     Recent Labs      02/08/17   0959   NA  136   K  3.4*   CL  100   CO2  27   BUN  10   CREA  0.64   GLU  174*   CA  8.5     Recent Labs      02/08/17   0959   WBC  14.3*   HGB  10.2*   HCT  33.5*   PLT  343     Recent Labs      02/08/17   0959   SGOT  13*   AP  113     No results for input(s): TGL, CHOL, LDLC in the last 72 hours.     No lab exists for component: HDLC,  HBA1C  No results for input(s): CRP, TSH, TSHEXT, TSHEXT in the last 72 hours. No lab exists for component: ESR    Thank you very much for this referral. I appreciate the opportunity to participate in this patient's care. I will follow along with above stated plan.     Erik Patel MD         [facility] [address] [place] [address] Rd[address] [facility], [address] [address]     [facility]     [phone]    Marvin Swartz MD

## 2017-02-08 NOTE — ED PROVIDER NOTES
HPI Comments: 80 y.o. female with past medical history significant for DM, hypertension, anxiety, hypercholesterolemia, pneumonia and bilateral breast cancer who presents via EMS with chief complaint of shortness of breath. Patient reports that she has felt short of breath over the past two weeks but describes feeling severely worse this morning. She went to her PCPs office to be evaluated for this and was referred to the ED after having sats of 70% on room air while at the office. Patient was placed on a CPAP en route and given 5 of albuterol and 5 of atrovent with improvement of her sats. Patient notes additional lightheadedness and dizziness. She denies chest pain, headache, nausea, vomiting, urinary distress or issues with bowel movements. Patient had a cardiac cath 2 weeks ago with no blockages found. 324 mg ASA given en route. There are no other acute medical concerns at this time. PCP: Zhen Moreau MD    Note written by ness Chapman, as dictated by Eloisa Jaramillo MD 10:30 AM      The history is provided by the patient.         Past Medical History:   Diagnosis Date    Anxiety     Breast cancer ([address] 2005, 2010     right 2005, left 2010    Cancer [facility])       cancer right breast cancer    Cancer ([address]      cancer left breast/new dx 8/2011 +bx     Diabetes ([address]     Hypercholesterolemia     Hypertension     Other ill-defined conditions(799.89)      osteopoosis    Other ill-defined conditions(799.89)      high cholesterol    Pneumonia     Psychiatric disorder      anxiety       Past Surgical History:   Procedure Laterality Date    Pr breast surgery procedure unlisted       right mastectomy    Pr biopsy of breast, incisional       left breast 8/2011 positive for cancer cells    Hx mastectomy Right 2005    Hx breast lumpectomy Left 2010         Family History:   Problem Relation Age of Onset    Hypertension Mother     Stroke Other        Social History     Social History    Marital status:      Spouse name: N/A    Number of children: N/A    Years of education: N/A     Occupational History    Not on file. Social History Main Topics    Smoking status: Never Smoker    Smokeless tobacco: Never Used    Alcohol use No    Drug use: No    Sexual activity: Not Currently     Other Topics Concern    Not on file     Social History Narrative        She does not have a history of smoking, but does have second hand smoking exposure from her  , for a period of 27- 36 yrs, , approx 30 yrs ago. 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 She has two children, her dtr and son, both of whom are present during the interview, patient states that she wants them both to make any medical decisions for her, she wants to be a full code. ALLERGIES: Ace inhibitors and Peanut    Review of Systems   Constitutional: Negative for chills and fever. HENT: Negative for rhinorrhea and sore throat. Respiratory: Positive for shortness of breath. Negative for cough. Cardiovascular: Negative for chest pain. Gastrointestinal: Negative for abdominal pain, diarrhea, nausea and vomiting. Genitourinary: Negative for dysuria and urgency. Musculoskeletal: Negative for arthralgias and back pain. Skin: Negative for rash. Neurological: Positive for dizziness and light-headedness. Negative for weakness. All other systems reviewed and are negative.       Vitals:    02/08/17 1002 02/08/17 1009   BP: 156/60    Pulse: 78    Resp: 22    Temp: 97.4 °F (36.3 °C)    SpO2: 99% 100%   Weight: 81.6 kg (180 lb)    Height: 5' 5\" (1.651 m)             Physical Exam   Const:  No acute distress, well developed, well nourished  Head:  Atraumatic, normocephalic  Eyes:  PERRL, conjunctiva normal, no scleral icterus  Neck:  Supple, trachea midline  Cardiovascular:  RRR, no murmurs, no gallops, no rubs  Resp:  Decreased air flow in all lung fields, rales in bilateral bases, respiratory distress, increased work of breathing  Abd:  Soft, non-tender, non-distended, no rebound, no guarding, no CVA tenderness  :  Deferred  MSK:  No pedal edema, normal ROM  Neuro:  Alert and oriented x3, no cranial nerve defect  Skin:  Warm, dry, intact  Psych: normal mood and affect, behavior is normal, judgement and thought content is normal    Note written by ness Milan, as dictated by Suad Platt MD 10:32 AM    MDM  Number of Diagnoses or Management Options  Acute pulmonary edema [facility]):   Acute respiratory failure with hypoxia [facility]):      Amount and/or Complexity of Data Reviewed  Clinical lab tests: ordered and reviewed  Tests in the radiology section of CPT®: ordered and reviewed  Review and summarize past medical records: yes    Critical Care  Total time providing critical care: 30-74 minutes (40min)    Patient Progress  Patient progress: stable    ED Course     Pt. Presents to the ER with SOB. Pt's sats were in the 70s PTA. Pt. Found to have pulmonary edema, likely related to her pulmonary HTN. No pneumonia on xray. She has improved significantly in the ER after BiPAP and lasix. Pt. To be evaluated for admission by the [facility] hospitalist.      Procedures  CONSULT NOTE:  12:30 PM Suad Platt MD spoke with Dr. Babita Urena, Consult for Primary Care. Discussed available diagnostic tests and clinical findings. She asks that we call the [facility] hospitalist.    CONSULT NOTE:  12:33 PM Suad Platt MD spoke with Dr. Angelita Powell, Consult for [facility] hospitalist.  Discussed available diagnostic tests and clinical findings. She is in agreement with care plans as outlined and will admit.

## 2017-02-08 NOTE — PROGRESS NOTES
CM consult received and appreciated. EMR reviewed. History significant for DM, HTN, CAD, CHF, anxiety, hypercholesterolemia and bilateral breast CA. Patient presents to the ED w/ SOB. Met w/patient and family at bedside - introduced to role of CM.  acknowledged being hard to understand w/ breathing mask on - patient in agreement for family to remain at bedside and also provide additional history. Patient resides w/ son Mariola Thornton and daughter Douglas Gonsales. Patient is independent w/ ADLs - has a cane and uses when needed for longer distances. Patient is followed by Vinny David. Noted referral to [facility] program on 3/31/16. Patient states she had one time visit in 2011 after hospitalization.  has received rehab in [address] however unable to remember name of facility also has had [facility]. Family expressed the need for O2 to be available at home. CM informed patient would be followed by interdisciplinary team and would be tested for DME/ O2 requirements. [employer] and [employer] are utilized. PCP is Lalo Goetz. No additional transitional care needs verbalized at this time. Care Management Interventions  PCP Verified by CM:  Yes  Last Visit to PCP: 12/08/16  Mode of Transport at Discharge: [address]  [address] Time of Discharge: [address] (CM Consult): Discharge Planning  MyChart Signup: No  Discharge Durable Medical Equipment: No  Physical Therapy Consult: No  Occupational Therapy Consult: No  Speech Therapy Consult: No  Current Support Network: Relative's Home  Confirm Follow Up Transport:  (TBD)  Plan discussed with Pt/Family/Caregiver: Yes  Discharge Location  Discharge Placement:  (TBD)

## 2017-02-08 NOTE — IP AVS SNAPSHOT
[address] 
[phone] Patient: Matt Haas MRN: [id number] IID:[id number] You are allergic to the following Allergen Reactions Ace Inhibitors Angioedema Recent Documentation Height Weight BMI OB Status Smoking Status 1.651 m 77.2 kg 28.32 kg/m2 Postmenopausal Never Smoker Emergency Contacts Name Discharge Info Relation Home Work Lakhwinder Nunes CAREGIVER [3] Child [2] [phone] [address] CAREGIVER [3] Daughter [21] [phone] About your hospitalization You were admitted on:  February 8, 2017 You last received care in the:  [facility] 4 SURG/BARIATRICS You were discharged on:  February 13, 2017 Unit phone number:  [phone] Why you were hospitalized Your primary diagnosis was:  Not on File Your diagnoses also included:  Acute Respiratory Disease Providers Seen During Your Hospitalizations Provider Role Specialty Primary office phone Shay Kent MD Attending Provider Emergency Medicine [phone] [address] [place] [address],  Attending Provider Internal Medicine [phone] Your Primary Care Physician (PCP) Primary Care Physician Office Phone Office Fax Wilman Marin [phone] Follow-up Information Follow up With Details Comments Contact Info Bonnye Dakins, NP On 2/17/2017 Appoinntment on 2/17/17 at 11:20 am.  [facility] 7 [id number] 
[phone] Edith Sent On 2/14/2017 Referrred for home health services. Service to begin the day after discharge. Please call the agency if no contact by 12 noon the day after discharge. [phone] [address], [address] [facility] [address] [place] [address] 
[phone] Your Appointments Friday February 17, 2017 11:20 AM EST ACUTE CARE with Inessa Dexter NP  
[facility]) [address] [address]  
[phone] Monday February 20, 2017  1:20 PM EST  
ESTABLISHED PATIENT with Rigoberto Mayorga MD  
[facility]) [address]  
[phone] Current Discharge Medication List  
  
START taking these medications Dose & Instructions Dispensing Information Comments Morning Noon Evening Bedtime  
 bumetanide 1 mg tablet Commonly known as:  Corinn Kiang Your next dose is: Today, Tomorrow Other:  _________ Dose:  1 mg Take 1 Tab by mouth two (2) times a day. Quantity:  60 Tab Refills:  0  
     
   
   
   
  
 potassium chloride SR 20 mEq tablet Commonly known as:  K-TAB Your next dose is: Today, Tomorrow Other:  _________ Dose:  20 mEq Take 1 Tab by mouth daily. Quantity:  30 Tab Refills:  0  
     
   
   
   
  
 rivaroxaban 20 mg Tab tablet Commonly known as:  Rendell Spittle Your next dose is: Today, Tomorrow Other:  _________ Dose:  20 mg Take 1 Tab by mouth every evening. Quantity:  30 Tab Refills:  0 CONTINUE these medications which have CHANGED Dose & Instructions Dispensing Information Comments Morning Noon Evening Bedtime  
 insulin glargine 100 unit/mL injection Commonly known as:  LANTUS What changed:   
- how much to take 
- additional instructions Your next dose is: Today, Tomorrow Other:  _________ Dose:  50 Units 50 Units by SubCUTAneous route nightly. For -175 = 20 units 175-200 = 30 units > 200 = 40 units   by SubCUTAneous route nightly. Indications: type 2 diabetes mellitus Quantity:  1 Vial  
Refills:  6 LORazepam 0.5 mg tablet Commonly known as:  ATIVAN What changed:  Another medication with the same name was removed. Continue taking this medication, and follow the directions you see here. Your next dose is: Today, Tomorrow Other:  _________ Dose:  1 mg Take 1 mg by mouth every eight (8) hours as needed for Anxiety (maily used when patient experiences an Allergic Reaction/Angioedema). Refills:  0  
     
   
   
   
  
 predniSONE 5 mg tablet Commonly known as:  Orlando Cordon What changed:   
- medication strength 
- how much to take 
- how to take this - when to take this 
- reasons to take this 
- additional instructions Your next dose is: Today, Tomorrow Other:  _________  
   
   
 6 tabs daily for 2 days then drop to, 4 tabs daily for 2 days then drop to, 2 tabs daily for 2 days then drop to, 1 tab daily for 2 days then stop. Quantity:  26 Tab Refills:  0 CONTINUE these medications which have NOT CHANGED Dose & Instructions Dispensing Information Comments Morning Noon Evening Bedtime  
 albuterol 2.5 mg /3 mL (0.083 %) nebulizer solution Commonly known as:  PROVENTIL VENTOLIN Your next dose is: Today, Tomorrow Other:  _________ Dose:  2.5 mg  
3 mL by Nebulization route every four (4) hours as needed for Wheezing. Quantity:  1 Package Refills:  11  
     
   
   
   
  
 aspirin 81 mg chewable tablet Your next dose is: Today, Tomorrow Other:  _________ Dose:  81 mg Take 81 mg by mouth daily. Refills:  0  
     
   
   
   
  
 atorvastatin 20 mg tablet Commonly known as:  LIPITOR Your next dose is: Today, Tomorrow Other:  _________ TAKE 1 TAB BY MOUTH DAILY. Quantity:  30 Tab Refills:  11  
     
   
   
   
  
 denosumab 60 mg/mL injection Commonly known as:  Renee Lux Your next dose is: Today, Tomorrow Other:  _________ Dose:  60 mg  
60 mg by SubCUTAneous route. Twice a year (June and December) Refills:  0  
     
   
   
   
  
 diphenhydrAMINE 25 mg capsule Commonly known as:  BENADRYL Your next dose is: Today, Tomorrow Other:  _________ Dose:  25 mg Take 25 mg by mouth every six (6) hours as needed (Allergic reaction/Angioedema). Refills:  0  
     
   
   
   
  
 ergocalciferol 50,000 unit capsule Commonly known as:  VITAMIN D2 Your next dose is: Today, Tomorrow Other:  _________ Dose:  28224 Units Take 1 Cap by mouth every seven (7) days. One capsule monthly Quantity:  8 Cap Refills:  4  
     
   
   
   
  
 ferrous sulfate 325 mg (65 mg iron) EC tablet Commonly known as:  IRON Your next dose is: Today, Tomorrow Other:  _________ Dose:  325 mg Take 1 Tab by mouth Daily (before breakfast). Quantity:  90 Tab Refills:  2  
     
   
   
   
  
 metFORMIN 1,000 mg tablet Commonly known as:  GLUCOPHAGE Your next dose is: Today, Tomorrow Other:  _________ Dose:  1000 mg Take 1 Tab by mouth two (2) times a day. Quantity:  180 Tab Refills:  12  
     
   
   
   
  
 metoprolol tartrate 50 mg tablet Commonly known as:  LOPRESSOR Your next dose is: Today, Tomorrow Other:  _________ Dose:  50 mg Take 1 Tab by mouth two (2) times a day. Quantity:  60 Tab Refills:  0 STOP taking these medications   
 amLODIPine 10 mg tablet Commonly known as:  NORVASC  
   
  
 furosemide 20 mg tablet Commonly known as:  LASIX  
   
  
 isosorbide mononitrate ER 30 mg tablet Commonly known as:  IMDUR Where to Get Your Medications Information on where to get these meds will be given to you by the nurse or doctor. ! Ask your nurse or doctor about these medications  
  bumetanide 1 mg tablet potassium chloride SR 20 mEq tablet  
 predniSONE 5 mg tablet  
 rivaroxaban 20 mg Tab tablet Discharge Instructions DISCHARGE SUMMARY from Nurse The following personal items are in your possession at time of discharge: 
 
  
Visual Aid: Glasses, With patient PATIENT INSTRUCTIONS: 
 
After general anesthesia or intravenous sedation, for 24 hours or while taking prescription Narcotics: · Limit your activities · Do not drive and operate hazardous machinery · Do not make important personal or business decisions · Do  not drink alcoholic beverages · If you have not urinated within 8 hours after discharge, please contact your surgeon on call. Report the following to your surgeon: 
· Excessive pain, swelling, redness or odor of or around the surgical area · Temperature over 100.5 · Nausea and vomiting lasting longer than 4 hours or if unable to take medications · Any signs of decreased circulation or nerve impairment to extremity: change in color, persistent  numbness, tingling, coldness or increase pain · Any questions What to do at Home: *  Please give a list of your current medications to your Primary Care Provider. *  Please update this list whenever your medications are discontinued, doses are 
    changed, or new medications (including over-the-counter products) are added. *  Please carry medication information at all times in case of emergency situations. These are general instructions for a healthy lifestyle: No smoking/ No tobacco products/ Avoid exposure to second hand smoke Surgeon General's Warning:  Quitting smoking now greatly reduces serious risk to your health. Obesity, smoking, and sedentary lifestyle greatly increases your risk for illness A healthy diet, regular physical exercise & weight monitoring are important for maintaining a healthy lifestyle You may be retaining fluid if you have a history of heart failure or if you experience any of the following symptoms:  Weight gain of 3 pounds or more overnight or 5 pounds in a week, increased swelling in our hands or feet or shortness of breath while lying flat in bed. Please call your doctor as soon as you notice any of these symptoms; do not wait until your next office visit. Recognize signs and symptoms of STROKE: 
 
F-face looks uneven A-arms unable to move or move unevenly S-speech slurred or non-existent T-time-call 911 as soon as signs and symptoms begin-DO NOT go Back to bed or wait to see if you get better-TIME IS BRAIN. Warning Signs of HEART ATTACK Call 911 if you have these symptoms: 
? Chest discomfort. Most heart attacks involve discomfort in the center of the chest that lasts more than a few minutes, or that goes away and comes back. It can feel like uncomfortable pressure, squeezing, fullness, or pain. ? Discomfort in other areas of the upper body. Symptoms can include pain or discomfort in one or both arms, the back, neck, jaw, or stomach. ? Shortness of breath with or without chest discomfort. ? Other signs may include breaking out in a cold sweat, nausea, or lightheadedness. Don't wait more than five minutes to call [address]! Fast action can save your life. Calling 911 is almost always the fastest way to get lifesaving treatment. Emergency Medical Services staff can begin treatment when they arrive  up to an hour sooner than if someone gets to the hospital by car. The discharge information has been reviewed with the patient. The patient verbalized understanding. Discharge medications reviewed with the patient and appropriate educational materials and side effects teaching were provided. Discharge Instructions Attachments/References HEART FAILURE: AVOIDING TRIGGERS (ENGLISH) Discharge Orders None [employer] Announcement We are excited to announce that we are making your provider's discharge notes available to you in Baiy[employer]uan. You will see these notes when they are completed and signed by the physician that discharged you from your recent hospital stay. If you have any questions or concerns about any information you see in B[employer], please call the Health Information Department where you were seen or reach out to your Primary Care Provider for more information about your plan of care. Introducing [facility] & HEALTH SERVICES! [employer] introduces [employer] patient portal. Now you can access parts of your medical record, email your doctor's office, and request medication refills online. 1. In your internet browser, go to [email or web address] 2. Click on the First Time User? Click Here link in the Sign In box. You will see the New Member Sign Up page. 3. Enter your B[employer] Access Code exactly as it appears below. You will not need to use this code after youve completed the sign-up process. If you do not sign up before the expiration date, you must request a new code. · [employer] Access Code: [email or web address] Expires: 4/24/2017  3:59 PM 
 
4. Enter the last four digits of your Social Security Number (xxxx) and Date of Birth (mm/dd/yyyy) as indicated and click Submit. You will be taken to the next sign-up page. 5. Create a Baiyaxuan ID. This will be your B[employer] login ID and cannot be changed, so think of one that is secure and easy to remember. 6. Create a [employer] password. You can change your password at any time. 7. Enter your Password Reset Question and Answer. This can be used at a later time if you forget your password. 8. Enter your e-mail address. You will receive e-mail notification when new information is available in [address] 9. Click Sign Up. You can now view and download portions of your medical record. 10. Click the Download Summary menu link to download a portable copy of your medical information. If you have questions, please visit the Frequently Asked Questions section of the [employer] website. Remember, [employer] is NOT to be used for urgent needs. For medical emergencies, dial 911. Now available from your iPhone and Android! General Information Please provide this summary of care documentation to your next provider. Patient Signature:  ____________________________________________________________ Date:  ____________________________________________________________  
  
Aloma Snellen Provider Signature:  ____________________________________________________________ Date:  ____________________________________________________________ More Information Avoiding Triggers With Heart Failure: Care Instructions Your Care Instructions Triggers are anything that make your heart failure flare up. A flare-up is also called \"sudden heart failure\" or \"acute heart failure. \" When you have a flare-up, fluid builds up in your lungs, and you have problems breathing. You might need to go to the hospital. By watching for changes in your condition and avoiding triggers, you can prevent heart failure flare-ups. Follow-up care is a key part of your treatment and safety. Be sure to make and go to all appointments, and call your doctor if you are having problems. It's also a good idea to know your test results and keep a list of the medicines you take. How can you care for yourself at home? Watch for changes in your weight and condition · Weigh yourself without clothing at the same time each day. Record your weight. Call your doctor if you gain 3 pounds or more in 2 to 3 days. A sudden weight gain may mean that your heart failure is getting worse. · Keep a daily record of your symptoms.  Write down any changes in how you feel, such as new shortness of breath, cough, or problems eating. Also record if your ankles are more swollen than usual and if you have to urinate in the night more often. Note anything that you ate or did that could have triggered these changes. Limit sodium Sodium causes your body to hold on to water, making it harder for your heart to pump. People get most of their sodium from processed foods. Fast food and restaurant meals also tend to be very high in sodium. · Your doctor may suggest that you limit sodium to 2,000 milligrams (mg) a day or less. That is less than 1 teaspoon of salt a day, including all the salt you eat in cooking or in packaged foods. · Read food labels on cans and food packages. They tell you how much sodium you get in one serving. Check the serving size. If you eat more than one serving, you are getting more sodium. · Be aware that sodium can come in forms other than salt, including monosodium glutamate (MSG), sodium citrate, and sodium bicarbonate (baking soda). MSG is often added to Asian food. You can sometimes ask for food without MSG or salt. · Slowly reducing salt will help you adjust to the taste. Take the salt shaker off the table. · Flavor your food with garlic, lemon juice, onion, vinegar, herbs, and spices instead of salt. Do not use soy sauce, steak sauce, onion salt, garlic salt, mustard, or ketchup on your food, unless it is labeled \"low-sodium\" or \"low-salt. \" 
· Make your own salad dressings, sauces, and ketchup without adding salt. · Use fresh or frozen ingredients, instead of canned ones, whenever you can. Choose low-sodium canned goods. · Eat less processed food and food from restaurants, including fast food. Exercise as directed Moderate, regular exercise is very good for your heart. It improves your blood flow and helps control your weight. But too much exercise can stress your heart and cause a heart failure flare-up. · Check with your doctor before you start an exercise program. 
· Walking is an easy way to get exercise. Start out slowly. Gradually increase the length and pace of your walk. Swimming, riding a bike, and using a treadmill are also good forms of exercise. · When you exercise, watch for signs that your heart is working too hard. You are pushing yourself too hard if you cannot talk while you are exercising. If you become short of breath or dizzy or have chest pain, stop, sit down, and rest. 
· Do not exercise when you do not feel well. Take medicines correctly · Take your medicines exactly as prescribed. Call your doctor if you think you are having a problem with your medicine. · Make a list of all the medicines you take. Include those prescribed to you by other doctors and any over-the-counter medicines, vitamins, or supplements you take. Take this list with you when you go to any doctor. · Take your medicines at the same time every day. It may help you to post a list of all the medicines you take every day and what time of day you take them. · Make taking your medicine as simple as you can. Plan times to take your medicines when you are doing other things, such as eating a meal or getting ready for bed. This will make it easier to remember to take your medicines. · Get organized. Use helpful tools, such as daily or weekly pill containers. When should you call for help? Call 911 if you have symptoms of sudden heart failure such as: 
· You have severe trouble breathing. · You cough up pink, foamy mucus. · You have a new irregular or rapid heartbeat. Call your doctor now or seek immediate medical care if: 
· You have new or increased shortness of breath. · You are dizzy or lightheaded, or you feel like you may faint. · You have sudden weight gain, such as 3 pounds or more in 2 to 3 days. · You have increased swelling in your legs, ankles, or feet. · You are suddenly so tired or weak that you cannot do your usual activities. Watch closely for changes in your health, and be sure to contact your doctor if you develop new symptoms. Where can you learn more? Go to http://ni-nohemi.info/. Enter W249 in the search box to learn more about \"Avoiding Triggers With Heart Failure: Care Instructions. \" Current as of: April 27, 2016 Content Version: 11.1 © 8113-7407 [employer]. Care instructions adapted under license by [employer] (which disclaims liability or warranty for this information). If you have questions about a medical condition or this instruction, always ask your healthcare professional. [address] any warranty or liability for your use of this information.

## 2017-02-08 NOTE — CONSULTS
PULMONARY/CRITICAL CARE/SLEEP MEDICINE    Initial Physician Consultation Note    Name: Curt Barlow   : 1931   MRN: [id number]   Date: 2017      Subjective:   Consult Note: 2017     This patient has been seen and evaluated for resp distress. Medical records and data reviewed. Patient is a 80 y.o. female who was sent to the ER from physician's office for dyspnea and hypoxemia. She reports she has had worsening pedal edema despite adjustment in diuretic therapy after recent cath. She denies fever, cough, wheeze. She is a non-smoker and denies history of asthma. She does have OHD and underwent recent R/LHC that showed severe mixed pulmonary hypertension with both precapillary and post capillary components and elevated filling pressures. Previous echo has shown LVH and dilated LA suggestive of left ventricular dysfunction. CTA done this admission was negative for PE but did show bilateral GGO suggestive of pulmonary edema. Previous VQ scan was normal in 2016. Assessment:   Acute hypoxic resp insufficiency  Acute on chronic HFpEF with LVH and minimal CAD/valvular heart disease  Pulmonary hypertension severe, group 2 and associated precapillary component from arteriopathy from long standing left sided CHF  H/O systemic hypertension  Other medical problems per chart  Non-smoker  Normal VQ scan 2016      Recommendations:     O2- may need home O2  Diuresis- will need optimization of outpatient dose  Continue calcium channel blocker  CHF educator for salt and fluid restriction  Underlying LV diastolic dysfunction of uncorrectable etiology (no CAD or valvular disease to fix) will make treatment with pulmonary vasodilators difficult and would not be recommended  OP PFTs when CHF is compensated  Discussed with Dr. Cony Lee, patient and her family at bedside. Acutely ill.  Time spent 28'      Active Problem List:     Problem List  Date Reviewed: 2017          Codes Class    Acute respiratory disease ICD-10-CM: J06.9  ICD-9-CM: 465.9         Moderate to severe pulmonary hypertension (HCC) ICD-10-CM: I27.2  ICD-9-CM: 416.8         CAD (coronary artery disease), native coronary artery ICD-10-CM: I25.10  ICD-9-CM: 414.01     Overview Signed 1/25/2017  4:38 PM by Julianna Holly MD     1/25/2017Mild, nonobstructive             Pulmonary edema cardiac cause ICD-10-CM: I50.1  ICD-9-CM: 514         SOB (shortness of breath) ICD-10-CM: R06.02  ICD-9-CM: 786.05         CHF (congestive heart failure) (HCC) ICD-10-CM: I50.9  ICD-9-CM: 340.8         Diastolic CHF, acute on chronic (HCC) ICD-10-CM: I50.33  ICD-9-CM: 428.33, 428.0         Aortic stenosis ICD-10-CM: I35.0  ICD-9-CM: 424.1         Hypercholesteremia ICD-10-CM: E78.00  ICD-9-CM: 272.0         Anxiety ICD-10-CM: F41.9  ICD-9-CM: 300.00         Multiple allergies ICD-10-CM: Z88.9  ICD-9-CM: V15.09         Bronchitis ICD-10-CM: J40  ICD-9-CM: 490         Allergic reaction ICD-10-CM: T78.40XA  ICD-9-CM: 995.3         Vein disorder ICD-10-CM: I87.9  ICD-9-CM: 459.9         Fatigue ICD-10-CM: R53.83  ICD-9-CM: 780.79         Breast cancer ([facility] ICD-10-CM: C50.919  ICD-9-CM: 174.9         Anemia ICD-10-CM: D64.9  ICD-9-CM: 285.9         Diabetes ([facility] ICD-10-CM: E11.9  ICD-9-CM: 250.00         HTN (hypertension), benign ICD-10-CM: I10  ICD-9-CM: 401.1               Past Medical History:      has a past medical history of Anxiety; Breast cancer ([facility] (2005, 2010); Cancer ([facility] Cancer ([facility] Diabetes ([facility] Hypercholesterolemia; Hypertension; Other ill-defined conditions(799.89); Other ill-defined conditions(799.89); Pneumonia; and Psychiatric disorder. Past Surgical History:      has a past surgical history that includes breast surgery procedure unlisted; biopsy of breast, incisional; mastectomy (Right, 2005); and breast lumpectomy (Left, 2010). Home Medications:     Prior to Admission medications    Medication Sig Start Date End Date Taking?  Authorizing Provider   LORazepam (ATIVAN) 0.5 mg tablet Take 1 mg by mouth every eight (8) hours as needed for Anxiety (maily used when patient experiences an Allergic Reaction/Angioedema). Yes Historical Provider   aspirin 81 mg chewable tablet Take 81 mg by mouth daily. Yes Historical Provider   furosemide (LASIX) 20 mg tablet Take 1 Tab by mouth daily. Refills by Dr. Dionna Rivera 1/25/17  Yes Donya Lee MD   isosorbide mononitrate ER (IMDUR) 30 mg tablet Take 1 Tab by mouth daily. To help prevent shortness of breath call MD if significant headache 1/25/17  Yes Donya Lee MD   diphenhydrAMINE (BENADRYL) 25 mg capsule Take 25 mg by mouth every six (6) hours as needed (Allergic reaction/Angioedema). Yes Historical Provider   predniSONE (DELTASONE) 20 mg tablet Take 20 mg by mouth daily as needed (Allergic Reaction/Angioedema). Yes Historical Provider   insulin glargine (LANTUS) 100 unit/mL injection 50 Units by SubCUTAneous route nightly. For -175 = 20 units  175-200 = 30 units  > 200 = 40 units     by SubCUTAneous route nightly. Indications: type 2 diabetes mellitus  Patient taking differently: 20-40 Units by SubCUTAneous route nightly. For -175 = 20 units  175-200 = 30 units  > 200 = 40 units     by SubCUTAneous route nightly. Indications: type 2 diabetes mellitus 12/9/16  Yes Oley Landau, MD   ferrous sulfate (IRON) 325 mg (65 mg iron) EC tablet Take 1 Tab by mouth Daily (before breakfast). 12/8/16  Yes Norma Parada PA-C   atorvastatin (LIPITOR) 20 mg tablet TAKE 1 TAB BY MOUTH DAILY. 10/17/16  Yes Lynn Galdamez NP   denosumab (PROLIA) 60 mg/mL injection 60 mg by SubCUTAneous route. Twice a year (June and December)   Yes Historical Provider   metFORMIN (GLUCOPHAGE) 1,000 mg tablet Take 1 Tab by mouth two (2) times a day. 7/21/16  Yes Oley Landau, MD   metoprolol (LOPRESSOR) 50 mg tablet Take 1 Tab by mouth two (2) times a day.  4/1/16  Yes Hermes Sarkar NP ergocalciferol (VITAMIN D2) 50,000 unit capsule Take 1 Cap by mouth every seven (7) days. One capsule monthly 3/10/16  Yes Whit Mendiola MD   albuterol (PROVENTIL VENTOLIN) 2.5 mg /3 mL (0.083 %) nebulizer solution 3 mL by Nebulization route every four (4) hours as needed for Wheezing. 1/9/15  Yes Whit Mendiola MD   amLODIPine (NORVASC) 10 mg tablet TAKE 1 TABLET BY MOUTH EVERY DAY 13  Yes Mikie Murguia MD       Allergies/Social/Family History: Allergies   Allergen Reactions    Ace Inhibitors Angioedema      Social History   Substance Use Topics    Smoking status: Never Smoker    Smokeless tobacco: Never Used    Alcohol use No      Family History   Problem Relation Age of Onset    Hypertension Mother     Stroke Other         Review of Systems:     A comprehensive review of systems was negative except for that written in the HPI. Objective:   Vital Signs:  Visit Vitals    /68    Pulse 75    Temp 97.4 °F (36.3 °C)    Resp 23    Ht 5' 5\" (1.651 m)    Wt 82 kg (180 lb 12.4 oz)    SpO2 97%    BMI 30.08 kg/m2      O2 Device: CPAP mask Temp (24hrs), Av.4 °F (36.3 °C), Min:97.4 °F (36.3 °C), Max:97.4 °F (36.3 °C)           Intake/Output:     Intake/Output Summary (Last 24 hours) at 17 1537  Last data filed at 17 1300   Gross per 24 hour   Intake                0 ml   Output              250 ml   Net             -250 ml       Physical Exam:   General:  Alert, cooperative, no distress, appears stated age. Head:  Normocephalic, without obvious abnormality, atraumatic. Eyes:  Conjunctivae/corneas clear. PERRL, EOMs intact. Neck: Supple, symmetrical, trachea midline, no adenopathy, thyroid: no enlargment/tenderness/nodules, no carotid bruit and no JVD. Lungs:   Diminished air entry   Chest wall:  No tenderness or deformity. Heart:  Regular rate and rhythm, S1, S2 normal, 3/6 apical systolic murmur, no click, rub or gallop.    Abdomen:   Soft, non-tender. Bowel sounds normal. No masses,  No organomegaly. Extremities: Extremities normal, atraumatic, no cyanosis or edema. Pulses: 2+ and symmetric all extremities. Skin: Skin color, texture, turgor normal. No rashes or lesions   Neurologic: Grossly nonfocal         LABS AND  DATA: Personally reviewed  Recent Labs      02/08/17   0959   WBC  14.3*   HGB  10.2*   HCT  33.5*   PLT  343     Recent Labs      02/08/17   0959   NA  136   K  3.4*   CL  100   CO2  27   BUN  10   CREA  0.64   GLU  174*   CA  8.5     Recent Labs      02/08/17   0959   SGOT  13*   AP  113   TP  8.2   ALB  3.0*   GLOB  5.2*     No results for input(s): INR, PTP, APTT in the last 72 hours. No lab exists for component: INREXT   No results for input(s): PHI, PCO2I, PO2I, FIO2I in the last 72 hours. Recent Labs      02/08/17   0959   TROIQ  <0.04   BNPP  486*       MEDS: Reviewed    Chest X-Ray: personally reviewed and report checked    EKG/ Tele      Thank you for allowing me to participate in this patient's care.     MD Kamala Marino MD, [facility]  [facility]

## 2017-02-08 NOTE — H&P
[address] [facility] [place] [facility], [address]   HISTORY AND PHYSICAL       Name:  Shanda Livingston   MR#:  [id number]   :  1931   Account #:  [de-identified]        Date of Adm:  2017       PRIMARY ADMITTING DIAGNOSES   1. Acute respiratory failure. 2. Right-sided heart failure secondary to possible underlying chronic   obstructive pulmonary disease exacerbation versus other. 3. Pulmonary edema. 4. Dizziness and lightheadedness, likely secondary to above. 5. Diastolic congestive heart failure. 6. Leukocytosis, likely secondary to stress reaction. 7. Atrial fibrillation, rate controlled. HISTORY OF PRESENT ILLNESS: The patient is an 44-year-old   female who is relatively new to the [facility], presented to the   clinic today after reports of feeling shortness of breath over the last 2   weeks, ever since she had her heart cath 2 weeks ago at [facility], which was negative for any coronary artery   disease, but did show severe pulmonary hypertension. While she was   in the office, her oxygen level was found to be in the 70s, and she was   found to have accessory muscle use with retractions, was given back-  to-back DuoNeb treatments, and EMS was called. The patient was   placed on CPAP en route, and taken emergently to [facility].   Since then, her respiratory status has improved. She is currently   weaned off of BiPAP completely, and currently sitting up with the nasal   cannula. She did report some feelings of lightheadedness and   dizziness, which have now improved. Overall, she feels like a new   person. She does report that lately she has not been able to ambulate   as much as she previously could, reported that she could not walk up   stairs at all and has not been able to walk around the [employer] field as   she previously could. This has been progressive over time.  When   asking about pulmonary risk factors, the patient does not have any   history of tobacco use. She has no history of environmental exposures,   has never been diagnosed with asthma or COPD, has never been on   any nebulizers before, reports no previous history of lung cancer. She   does have a history of breast cancer, for which she had a mastectomy   in the past, was previously on aromatase inhibitor, but discontinued in   the last year. She has had no fevers or chills, no cough, no congestion,   no recent history of infections, no other medical concerns. PAST MEDICAL HISTORY: Includes the diabetes, history of breast   cancer, hyperlipidemia, hypertension and history of anxiety. PAST SURGICAL HISTORY: Includes the right breast mastectomy   done in 2005. FAMILY HISTORY: Noncontributory. Denies any family history of   cardiac, pulmonary or cancers. SOCIAL HISTORY: The patient lives at home alone. She is able to   perform most activities of daily living without difficulty. She has no   history of tobacco, alcohol or drug use. The patient has 2 children, her   son and daughter. The patient states that she wants both of them to   make her medical decisions for her, and SHE WANTS TO BE A FULL   CODE. ALLERGIES: INCLUDE   1. ACE INHIBITOR. 1. PEANUTS. REVIEW OF SYSTEMS: A 12-point review of system was reviewed   and unremarkable, except for that stated per HPI. CURRENT PHYSICAL EXAMINATION   VITAL SIGNS: Temperature 97.4, pulse is 75, respirations of 23, blood   pressure 157/68, saturation 97%. GENERAL: The patient is awake, alert, oriented to person, place and   time, not in any distress at this time. She is currently sitting up with   THE nasal cannula. HENT: Normocephalic, atraumatic. Pupils are round, equal and react   to light. Extraocular muscles are intact. NECK: Supple, no JVD, no carotid bruit. CARDIOVASCULAR: Regular. She is in sinus rhythm per telemetry. No murmurs, gallops or rubs appreciated.    LUNGS: She has poor inspiratory effort, but otherwise good air   movement. She does have some bilateral expiratory wheezes. ABDOMEN: Soft, nontender, nondistended, positive bowel sounds. EXTREMITIES: She has 2+ edema of the lower extremities bilaterally   up to the mid tibial region. Normal range of motion. Palpable dorsalis   pedis and radial pulses. NEUROLOGIC: The patient is awake, alert, oriented. Speech,   language and memory are intact. Cranial nerves 2 through 12 are   grossly intact. No focal neurologic deficits noted. PSYCHIATRIC: Mood and affect are appropriate. LABORATORY DATA: WBC 14.3, hemoglobin 10.2, hematocrit 33.4,   platelets 696. Sodium 136, potassium 3.4, chloride 100, bicarbonate   27, BUN 10, creatinine 0.6, glucose 174. LFTs are grossly within   normal limits. Troponin less than 0.04. . DIAGNOSTIC DATA: Chest x-ray showed small bilateral effusions,   with underlying atelectasis and pulmonary edema. She had a CT angio   back on 02/03/2017, which showed no evidence of pulmonary   thromboembolism, enlarged pulmonary artery consistent with   pulmonary artery hypertension, ground-glass opacification of the lungs   with mild linear interstitial lung disease, which may be chronic, no   definitive acute findings in the chest, mild right hilar and mediastinal   adenopathy and bilateral pleural thickening. ASSESSMENT AND PLAN   1. Acute respiratory distress, possibly secondary to underlying chronic   obstructive pulmonary disease exacerbation, as well as severe   pulmonary hypertension and possible right-sided heart failure. I have   called Dr. Haley Knight, who will come and evaluate the patient. This is unlikely   to be a pulmonary embolism, given her recent negative CT angio;   however, given the severity of her dyspnea and hypoxia, I am unclear   of the etiology. She did have a recent catheterization, but this was   negative.  She does not have a body habitus consistent with obstructive   sleep apnea, but at this time, she does appear to have had need for   diuresis, as well as oxygen. Will continue to titrate as needed. At this   time, I do have the patient on therapeutic Lovenox, but likely I do not   see a thromboembolic etiology at this time, so will switch to   prophylactic dose. 2. Diastolic congestive heart failure exacerbation. Again, etiology not   entirely clear. Will ask Cardiology to evaluate. Continue diuresis and   monitor troponin, but as first set is negative and recent left heart   catheterization was negative, unlikely to be acute coronary disease. Will obtain echo for further evaluation.         [facility],       AB / [facility] BARRIE STRICKLAND   D:  02/08/2017   16:00   T:  02/08/2017   16:34   Job #:  [id number]

## 2017-02-09 LAB
ALBUMIN SERPL BCP-MCNC: 2.6 G/DL (ref 3.5–5)
ALBUMIN/GLOB SERPL: 0.6 {RATIO} (ref 1.1–2.2)
ALP SERPL-CCNC: 96 U/L (ref 45–117)
ALT SERPL-CCNC: 17 U/L (ref 12–78)
ANION GAP BLD CALC-SCNC: 9 MMOL/L (ref 5–15)
ARTERIAL PATENCY WRIST A: ABNORMAL
AST SERPL W P-5'-P-CCNC: 10 U/L (ref 15–37)
BASE EXCESS BLD CALC-SCNC: 3 MMOL/L
BDY SITE: ABNORMAL
BILIRUB SERPL-MCNC: 0.5 MG/DL (ref 0.2–1)
BUN SERPL-MCNC: 13 MG/DL (ref 6–20)
BUN/CREAT SERPL: 21 (ref 12–20)
CALCIUM SERPL-MCNC: 7.9 MG/DL (ref 8.5–10.1)
CHLORIDE SERPL-SCNC: 103 MMOL/L (ref 97–108)
CO2 SERPL-SCNC: 27 MMOL/L (ref 21–32)
CREAT SERPL-MCNC: 0.63 MG/DL (ref 0.55–1.02)
ERYTHROCYTE [DISTWIDTH] IN BLOOD BY AUTOMATED COUNT: 16.5 % (ref 11.5–14.5)
GAS FLOW.O2 O2 DELIVERY SYS: ABNORMAL L/MIN
GLOBULIN SER CALC-MCNC: 4.3 G/DL (ref 2–4)
GLUCOSE BLD STRIP.AUTO-MCNC: 214 MG/DL (ref 65–100)
GLUCOSE BLD STRIP.AUTO-MCNC: 237 MG/DL (ref 65–100)
GLUCOSE BLD STRIP.AUTO-MCNC: 269 MG/DL (ref 65–100)
GLUCOSE SERPL-MCNC: 203 MG/DL (ref 65–100)
HCO3 BLD-SCNC: 27.7 MMOL/L (ref 22–26)
HCT VFR BLD AUTO: 29.6 % (ref 35–47)
HGB BLD-MCNC: 9.1 G/DL (ref 11.5–16)
MCH RBC QN AUTO: 25.9 PG (ref 26–34)
MCHC RBC AUTO-ENTMCNC: 30.7 G/DL (ref 30–36.5)
MCV RBC AUTO: 84.1 FL (ref 80–99)
O2/TOTAL GAS SETTING VFR VENT: 0.35 %
PCO2 BLD: 46.5 MMHG (ref 35–45)
PEEP RESPIRATORY: 5 CMH2O
PH BLD: 7.38 [PH] (ref 7.35–7.45)
PLATELET # BLD AUTO: 330 K/UL (ref 150–400)
PO2 BLD: 89 MMHG (ref 80–100)
POTASSIUM SERPL-SCNC: 3.8 MMOL/L (ref 3.5–5.1)
PROT SERPL-MCNC: 6.9 G/DL (ref 6.4–8.2)
RBC # BLD AUTO: 3.52 M/UL (ref 3.8–5.2)
SAO2 % BLD: 97 % (ref 92–97)
SERVICE CMNT-IMP: ABNORMAL
SODIUM SERPL-SCNC: 139 MMOL/L (ref 136–145)
SPECIMEN TYPE: ABNORMAL
T4 FREE SERPL-MCNC: 1.3 NG/DL (ref 0.8–1.5)
TROPONIN I SERPL-MCNC: <0.04 NG/ML
TSH SERPL DL<=0.05 MIU/L-ACNC: 0.79 UIU/ML (ref 0.36–3.74)
WBC # BLD AUTO: 5.6 K/UL (ref 3.6–11)

## 2017-02-09 PROCEDURE — 82962 GLUCOSE BLOOD TEST: CPT

## 2017-02-09 PROCEDURE — 84443 ASSAY THYROID STIM HORMONE: CPT | Performed by: INTERNAL MEDICINE

## 2017-02-09 PROCEDURE — 74011250636 HC RX REV CODE- 250/636: Performed by: INTERNAL MEDICINE

## 2017-02-09 PROCEDURE — 94640 AIRWAY INHALATION TREATMENT: CPT

## 2017-02-09 PROCEDURE — 36415 COLL VENOUS BLD VENIPUNCTURE: CPT | Performed by: INTERNAL MEDICINE

## 2017-02-09 PROCEDURE — 65660000000 HC RM CCU STEPDOWN

## 2017-02-09 PROCEDURE — 80053 COMPREHEN METABOLIC PANEL: CPT | Performed by: INTERNAL MEDICINE

## 2017-02-09 PROCEDURE — 77010033678 HC OXYGEN DAILY

## 2017-02-09 PROCEDURE — 82803 BLOOD GASES ANY COMBINATION: CPT

## 2017-02-09 PROCEDURE — 74011250637 HC RX REV CODE- 250/637: Performed by: INTERNAL MEDICINE

## 2017-02-09 PROCEDURE — 74011000250 HC RX REV CODE- 250: Performed by: INTERNAL MEDICINE

## 2017-02-09 PROCEDURE — 84439 ASSAY OF FREE THYROXINE: CPT | Performed by: INTERNAL MEDICINE

## 2017-02-09 PROCEDURE — 77030029684 HC NEB SM VOL KT MONA -A

## 2017-02-09 PROCEDURE — 84484 ASSAY OF TROPONIN QUANT: CPT | Performed by: INTERNAL MEDICINE

## 2017-02-09 PROCEDURE — 85027 COMPLETE CBC AUTOMATED: CPT | Performed by: INTERNAL MEDICINE

## 2017-02-09 PROCEDURE — 74011636637 HC RX REV CODE- 636/637: Performed by: INTERNAL MEDICINE

## 2017-02-09 PROCEDURE — 36600 WITHDRAWAL OF ARTERIAL BLOOD: CPT

## 2017-02-09 RX ADMIN — ATORVASTATIN CALCIUM 20 MG: 20 TABLET, FILM COATED ORAL at 08:19

## 2017-02-09 RX ADMIN — INSULIN LISPRO 5 UNITS: 100 INJECTION, SOLUTION INTRAVENOUS; SUBCUTANEOUS at 23:46

## 2017-02-09 RX ADMIN — METOPROLOL TARTRATE 50 MG: 50 TABLET, FILM COATED ORAL at 08:19

## 2017-02-09 RX ADMIN — METHYLPREDNISOLONE SODIUM SUCCINATE 60 MG: 125 INJECTION, POWDER, FOR SOLUTION INTRAMUSCULAR; INTRAVENOUS at 03:00

## 2017-02-09 RX ADMIN — INSULIN LISPRO 3 UNITS: 100 INJECTION, SOLUTION INTRAVENOUS; SUBCUTANEOUS at 11:45

## 2017-02-09 RX ADMIN — Medication 10 ML: at 21:21

## 2017-02-09 RX ADMIN — IPRATROPIUM BROMIDE 0.5 MG: 0.5 SOLUTION RESPIRATORY (INHALATION) at 08:16

## 2017-02-09 RX ADMIN — INSULIN LISPRO 5 UNITS: 100 INJECTION, SOLUTION INTRAVENOUS; SUBCUTANEOUS at 16:16

## 2017-02-09 RX ADMIN — BUMETANIDE 1 MG: 0.25 INJECTION, SOLUTION INTRAMUSCULAR; INTRAVENOUS at 08:20

## 2017-02-09 RX ADMIN — Medication 10 ML: at 22:00

## 2017-02-09 RX ADMIN — ALBUTEROL SULFATE 2.5 MG: 2.5 SOLUTION RESPIRATORY (INHALATION) at 08:16

## 2017-02-09 RX ADMIN — METOPROLOL TARTRATE 50 MG: 50 TABLET, FILM COATED ORAL at 21:21

## 2017-02-09 RX ADMIN — METHYLPREDNISOLONE SODIUM SUCCINATE 60 MG: 125 INJECTION, POWDER, FOR SOLUTION INTRAMUSCULAR; INTRAVENOUS at 08:19

## 2017-02-09 RX ADMIN — ALBUTEROL SULFATE 2.5 MG: 2.5 SOLUTION RESPIRATORY (INHALATION) at 11:34

## 2017-02-09 RX ADMIN — ALBUTEROL SULFATE 2.5 MG: 2.5 SOLUTION RESPIRATORY (INHALATION) at 15:27

## 2017-02-09 RX ADMIN — FERROUS SULFATE TAB 325 MG (65 MG ELEMENTAL FE) 325 MG: 325 (65 FE) TAB at 06:57

## 2017-02-09 RX ADMIN — IPRATROPIUM BROMIDE 0.5 MG: 0.5 SOLUTION RESPIRATORY (INHALATION) at 11:35

## 2017-02-09 RX ADMIN — IPRATROPIUM BROMIDE 0.5 MG: 0.5 SOLUTION RESPIRATORY (INHALATION) at 15:27

## 2017-02-09 RX ADMIN — ALBUTEROL SULFATE 2.5 MG: 2.5 SOLUTION RESPIRATORY (INHALATION) at 19:09

## 2017-02-09 RX ADMIN — METHYLPREDNISOLONE SODIUM SUCCINATE 60 MG: 125 INJECTION, POWDER, FOR SOLUTION INTRAMUSCULAR; INTRAVENOUS at 16:18

## 2017-02-09 RX ADMIN — BUMETANIDE 1 MG: 0.25 INJECTION, SOLUTION INTRAMUSCULAR; INTRAVENOUS at 21:20

## 2017-02-09 RX ADMIN — ENOXAPARIN SODIUM 80 MG: 80 INJECTION SUBCUTANEOUS at 05:05

## 2017-02-09 RX ADMIN — METHYLPREDNISOLONE SODIUM SUCCINATE 60 MG: 125 INJECTION, POWDER, FOR SOLUTION INTRAMUSCULAR; INTRAVENOUS at 21:21

## 2017-02-09 RX ADMIN — Medication 10 ML: at 05:06

## 2017-02-09 RX ADMIN — ENOXAPARIN SODIUM 80 MG: 80 INJECTION SUBCUTANEOUS at 16:17

## 2017-02-09 RX ADMIN — IPRATROPIUM BROMIDE 0.5 MG: 0.5 SOLUTION RESPIRATORY (INHALATION) at 19:09

## 2017-02-09 RX ADMIN — INSULIN LISPRO 3 UNITS: 100 INJECTION, SOLUTION INTRAVENOUS; SUBCUTANEOUS at 06:57

## 2017-02-09 NOTE — ED NOTES
TRANSFER - OUT REPORT:    Verbal report given to Kp(name) on Rajwinder Sanders  being transferred to ICU(unit) for routine progression of care       Report consisted of patients Situation, Background, Assessment and   Recommendations(SBAR). Information from the following report(s) SBAR and ED Summary was reviewed with the receiving nurse. Lines:   Peripheral IV 02/08/17 Left Forearm (Active)   Site Assessment Clean, dry, & intact 2/8/2017  3:19 PM   Phlebitis Assessment 0 2/8/2017  3:19 PM   Infiltration Assessment 0 2/8/2017  3:19 PM   Dressing Status Clean, dry, & intact 2/8/2017  3:19 PM   Dressing Type Transparent 2/8/2017  3:19 PM   Hub Color/Line Status Pink;Flushed;Patent 2/8/2017  3:19 PM   Action Taken Blood drawn 2/8/2017  3:19 PM        Opportunity for questions and clarification was provided.       Patient transported with:   Monitor  Registered Nurse

## 2017-02-09 NOTE — PROGRESS NOTES
PULMONARY/CRITICAL CARE/SLEEP MEDICINE    Initial Physician Consultation Note    Name: Shanique Motley   : 1931   MRN: [id number]   Date: 2017      Subjective:       Feels better. Labs stable. Gas exchange preserved. CTA with ground glass opacities and fluid in fissures suggestive of cardiogenic pulmonary edema    Consult Note:    This patient has been seen and evaluated for resp distress. Medical records and data reviewed. Patient is a 80 y.o. female who was sent to the ER from physician's office for dyspnea and hypoxemia. She reports she has had worsening pedal edema despite adjustment in diuretic therapy after recent cath. She denies fever, cough, wheeze. She is a non-smoker and denies history of asthma. She does have OHD and underwent recent R/LHC that showed severe mixed pulmonary hypertension with both precapillary and post capillary components and elevated filling pressures. Previous echo has shown LVH and dilated LA suggestive of left ventricular dysfunction. CTA done this admission was negative for PE but did show bilateral GGO suggestive of pulmonary edema. Previous VQ scan was normal in 2016. Assessment:   Acute hypoxic resp insufficiency  Acute on chronic HFpEF with LVH and minimal CAD/valvular heart disease, pulmonary edema and fluid in major fissures on CT, no significant ILD  ?  New onset A fib may have precipitated CHF  Pulmonary hypertension severe, group 2 and associated precapillary component from arteriopathy from long standing left sided CHF  H/O systemic hypertension  Other medical problems per chart  Non-smoker  Normal VQ scan 2016      Recommendations:     O2- may need home O2  Diuresis- will need optimization of outpatient dose  Continue calcium channel blocker  Does not need therapeutic anticoagulation from pulmonary standpoint  CHF educator for salt and fluid restriction  Underlying LV diastolic dysfunction of uncorrectable etiology (no CAD or valvular disease to fix) will make treatment with pulmonary vasodilators difficult and would not be recommended  Rhythm management per cardiology  OP PFTs when CHF is compensated- we will arrange this after discharge  Discussed with Dr. Reno November 2/8  Transfer to [facility] and we will be available to see her during hospitalization as needed        Active Problem List:     Problem List  Date Reviewed: 2/5/2017          Codes Class    Acute respiratory disease ICD-10-CM: J06.9  ICD-9-CM: 465.9         Moderate to severe pulmonary hypertension (HCC) ICD-10-CM: I27.2  ICD-9-CM: 416.8         CAD (coronary artery disease), native coronary artery ICD-10-CM: I25.10  ICD-9-CM: 414.01     Overview Signed 1/25/2017  4:38 PM by Jordin Jewell MD     1/25/2017Mild, nonobstructive             Pulmonary edema cardiac cause ICD-10-CM: I50.1  ICD-9-CM: 514         SOB (shortness of breath) ICD-10-CM: R06.02  ICD-9-CM: 786.05         CHF (congestive heart failure) ([facility] ICD-10-CM: I50.9  ICD-9-CM: 117.2         Diastolic CHF, acute on chronic (HCC) ICD-10-CM: I50.33  ICD-9-CM: 428.33, 428.0         Aortic stenosis ICD-10-CM: I35.0  ICD-9-CM: 424.1         Hypercholesteremia ICD-10-CM: E78.00  ICD-9-CM: 272.0         Anxiety ICD-10-CM: F41.9  ICD-9-CM: 300.00         Multiple allergies ICD-10-CM: Z88.9  ICD-9-CM: V15.09         Bronchitis ICD-10-CM: J40  ICD-9-CM: 490         Allergic reaction ICD-10-CM: T78.40XA  ICD-9-CM: 995.3         Vein disorder ICD-10-CM: I87.9  ICD-9-CM: 459.9         Fatigue ICD-10-CM: R53.83  ICD-9-CM: 780.79         Breast cancer ([facility] ICD-10-CM: C50.919  ICD-9-CM: 174.9         Anemia ICD-10-CM: D64.9  ICD-9-CM: 285.9         Diabetes ([facility] ICD-10-CM: E11.9  ICD-9-CM: 250.00         HTN (hypertension), benign ICD-10-CM: I10  ICD-9-CM: 401.1               Past Medical History:      has a past medical history of Anxiety; Breast cancer ([facility] (2005, 2010); Cancer ([facility] Cancer ([facility] Diabetes ([facility]  Hypercholesterolemia; Hypertension; Other ill-defined conditions(799.89); Other ill-defined conditions(799.89); Pneumonia; and Psychiatric disorder. Past Surgical History:      has a past surgical history that includes breast surgery procedure unlisted; biopsy of breast, incisional; mastectomy (Right, 2005); and breast lumpectomy (Left, 2010). Home Medications:     Prior to Admission medications    Medication Sig Start Date End Date Taking? Authorizing Provider   LORazepam (ATIVAN) 0.5 mg tablet Take 1 mg by mouth every eight (8) hours as needed for Anxiety (maily used when patient experiences an Allergic Reaction/Angioedema). Yes Historical Provider   aspirin 81 mg chewable tablet Take 81 mg by mouth daily. Yes Historical Provider   furosemide (LASIX) 20 mg tablet Take 1 Tab by mouth daily. Refills by Dr. Rosa 1/25/17  Yes Pricila Mari MD   isosorbide mononitrate ER (IMDUR) 30 mg tablet Take 1 Tab by mouth daily. To help prevent shortness of breath call MD if significant headache 1/25/17  Yes Pricila Mari MD   diphenhydrAMINE (BENADRYL) 25 mg capsule Take 25 mg by mouth every six (6) hours as needed (Allergic reaction/Angioedema). Yes Historical Provider   predniSONE (DELTASONE) 20 mg tablet Take 20 mg by mouth daily as needed (Allergic Reaction/Angioedema). Yes Historical Provider   insulin glargine (LANTUS) 100 unit/mL injection 50 Units by SubCUTAneous route nightly. For -175 = 20 units  175-200 = 30 units  > 200 = 40 units     by SubCUTAneous route nightly. Indications: type 2 diabetes mellitus  Patient taking differently: 20-40 Units by SubCUTAneous route nightly. For -175 = 20 units  175-200 = 30 units  > 200 = 40 units     by SubCUTAneous route nightly. Indications: type 2 diabetes mellitus 12/9/16  Yes Mag Gould MD   ferrous sulfate (IRON) 325 mg (65 mg iron) EC tablet Take 1 Tab by mouth Daily (before breakfast).  12/8/16  Yes Norma Parada PA-C   atorvastatin (LIPITOR) 20 mg tablet TAKE 1 TAB BY MOUTH DAILY. 10/17/16  Yes Ramon Saha NP   denosumab (PROLIA) 60 mg/mL injection 60 mg by SubCUTAneous route. Twice a year ( and December)   Yes Historical Provider   metFORMIN (GLUCOPHAGE) 1,000 mg tablet Take 1 Tab by mouth two (2) times a day. 16  Yes Dylan Almonte MD   metoprolol (LOPRESSOR) 50 mg tablet Take 1 Tab by mouth two (2) times a day. 16  Yes Bella uGillory NP   ergocalciferol (VITAMIN D2) 50,000 unit capsule Take 1 Cap by mouth every seven (7) days. One capsule monthly 3/10/16  Yes Whit Goode MD   albuterol (PROVENTIL VENTOLIN) 2.5 mg /3 mL (0.083 %) nebulizer solution 3 mL by Nebulization route every four (4) hours as needed for Wheezing. 1/9/15  Yes Whit Goode MD   amLODIPine (NORVASC) 10 mg tablet TAKE 1 TABLET BY MOUTH EVERY DAY 13  Yes Jovany Gabriel MD       Allergies/Social/Family History: Allergies   Allergen Reactions    Ace Inhibitors Angioedema      Social History   Substance Use Topics    Smoking status: Never Smoker    Smokeless tobacco: Never Used    Alcohol use No      Family History   Problem Relation Age of Onset    Hypertension Mother     Stroke Other         Review of Systems:     A comprehensive review of systems was negative except for that written in the HPI. Objective:   Vital Signs:  Visit Vitals    /56    Pulse 65    Temp 97.2 °F (36.2 °C)    Resp 20    Ht 5' 5\" (1.651 m)    Wt 82 kg (180 lb 12.4 oz)    SpO2 98%    BMI 30.08 kg/m2    O2 Flow Rate (L/min): 4 l/min O2 Device: CPAP mask Temp (24hrs), Av.6 °F (36.4 °C), Min:97.2 °F (36.2 °C), Max:97.9 °F (36.6 °C)           Intake/Output:     Intake/Output Summary (Last 24 hours) at 17 1009  Last data filed at 17 0200   Gross per 24 hour   Intake              240 ml   Output              650 ml   Net             -410 ml       Physical Exam:   General:  Alert, cooperative, no distress, appears stated age.    Head: Normocephalic, without obvious abnormality, atraumatic. Eyes:  Conjunctivae/corneas clear. PERRL, EOMs intact. Neck: Supple, symmetrical, trachea midline, no adenopathy, thyroid: no enlargment/tenderness/nodules, no carotid bruit and no JVD. Lungs:   Diminished air entry   Chest wall:  No tenderness or deformity. Heart:  Regular rate and rhythm, S1, S2 normal, 3/6 apical systolic murmur, no click, rub or gallop. Abdomen:   Soft, non-tender. Bowel sounds normal. No masses,  No organomegaly. Extremities: Extremities normal, atraumatic, no cyanosis or edema. Pulses: 2+ and symmetric all extremities. Skin: Skin color, texture, turgor normal. No rashes or lesions   Neurologic: Grossly nonfocal         LABS AND  DATA: Personally reviewed  Recent Labs      02/09/17   0504  02/08/17   0959   WBC  5.6  14.3*   HGB  9.1*  10.2*   HCT  29.6*  33.5*   PLT  330  343     Recent Labs      02/09/17   0504  02/08/17   0959   NA  139  136   K  3.8  3.4*   CL  103  100   CO2  27  27   BUN  13  10   CREA  0.63  0.64   GLU  203*  174*   CA  7.9*  8.5     Recent Labs      02/09/17   0504  02/08/17   0959   SGOT  10*  13*   AP  96  113   TP  6.9  8.2   ALB  2.6*  3.0*   GLOB  4.3*  5.2*     No results for input(s): INR, PTP, APTT in the last 72 hours. No lab exists for component: Lizeth Eagle   Recent Labs      02/09/17   0536  02/08/17   1644   PHI  7.382  7.374   PCO2I  46.5*  46.6*   PO2I  89  130*   FIO2I  0.35  45     Recent Labs      02/09/17   0503  02/08/17   1851   02/08/17   0959   TROIQ  <0.04  <0.04   < >  <0.04   BNPP   --    --    --   486*    < > = values in this interval not displayed. MEDS: Reviewed    Chest X-Ray: personally reviewed and report checked    EKG/ Tele      Thank you for allowing me to participate in this patient's care.     MD Kwaku Duke MD, [facility]  [facility]

## 2017-02-09 NOTE — CARDIO/PULMONARY
Cardiac Wellness: Visited to provide CHF education to Ricardo De Leon for [facility] based on a Cardiac Rehab consult. Education deferred at this time by the staff nurse since she is in ICU utilizing Wilfr[facility] Matthewra. Will follow for transfer to stepdown and see then. Zander Mitchell RN

## 2017-02-09 NOTE — CDMP QUERY
Dr. Ben Wright,     Please clarify if this patient is being treated/managed for:    =>Acute on chronic diastolic CHF in the setting of SOB and elevated BNP requiring IV Lasix  =>Other Explanation of clinical findings  =>Unable to Determine (no explanation of clinical findings)    The medical record reflects the following clinical findings, treatment, and risk factors:    Risk Factors: 81 y/o pt  Clinical Indicators: , normal EF per echo  Treatment: IV Lasix, Bumex    Please clarify and document your clinical opinion in the progress notes and discharge summary including the definitive and/or presumptive diagnosis, (suspected or probable), related to the above clinical findings. Please include clinical findings supporting your diagnosis.     Thank you,   Henry Miller, [facility]

## 2017-02-09 NOTE — NURSE NAVIGATOR
Chart reviewed by Heart Failure Nurse Navigator. Heart Failure database completed. EF 55/60%. ACEi/ARB: allergy to ACEi. BB: not currently indicated. CRT not currently indicated    NYHA Functional Class documentation requested via Provider Message on [address]. Heart Failure Teach Back in Patient Education. Heart Failure Avoiding Triggers on Discharge Instructions. Cardiologist:  Dr. Marylen Gale ([facility])    RAINA Cloud ([facility]) notified of admission.

## 2017-02-09 NOTE — PROGRESS NOTES
Patient admitted to ICU with acute respiratory failure and right sided heart failure. Patient is followed by Dr Omayra Edwards with [facility]. Patient lives with her adult children. Noted consult for [facility] for heart failure. Care management will follow for additional care management needs.  Vika Rice RN,CRM

## 2017-02-09 NOTE — PROGRESS NOTES
[facility]   [address] [place] [address], [address]  [phone]                                   Hospitalist Service  Progress Note    Daily Progress Note: 2017    Assessment/Plan:   1. Acute Respiratory Failure with hypoxia need for continuous BiPAP on admission- clinically improved with diuesis. ECHO with EF 55-60%, no wall motion abd, severe Pulm HTN  2. Severe Pulm HTN- etiology unclear may be mixed picture of both pulmonary and cardiac component, no reversible causes so continue symptom support, unclear if patient may need BiPAP outpatient? 3. A. Fib rate controlled on BB will start coumadin in AM  4. Weakness/decondioning will have PT/OT eval  5. Hypokalemia- replete  6. DM on SSI    VTE prophylaxis: on lovenox  Discussed plan of care with Nurse   Disposition:  discharge to home likely     Subjective:   Patient seen and examined with family at bedside report she is doing great much better today from yesterday. They are concerned with the rapid decline she has had over the last few months as she was previously able to walk without difficult and now SOB with minimal activity. Review of Systems:   Pertinent items are noted in HPI.     Objective:   Physical examination  Visit Vitals    /64    Pulse 68    Temp 97.5 °F (36.4 °C)    Resp 20    Ht 5' 5\" (1.651 m)    Wt 82 kg (180 lb 12.4 oz)    SpO2 99%    BMI 30.08 kg/m2      Temp (24hrs), Av.6 °F (36.4 °C), Min:97.2 °F (36.2 °C), Max:97.9 °F (36.6 °C)     O2 Flow Rate (L/min): 4 l/min   O2 Device: Nasal cannula      Intake/Output Summary (Last 24 hours) at 17 1235  Last data filed at 17 0900   Gross per 24 hour   Intake              240 ml   Output              800 ml   Net             -560 ml     Last shift:    701 - 1900  In: -   Out: 150 [Urine:150]  Last 3 shifts:    1901 -  07  In: 240 [P.O.:240]  Out: 650 [Urine:650]    General:   Alert, cooperative, no acute distress   Head:   Atraumatic   Eyes:   Conjunctivae clear   ENT:  Oral mucosa normal   Neck:  Supple, trachea midline, no adenopathy   No JVD   Back:    No CVA tenderness    Chest wall:    No tenderness or deformities    Lungs:   Decreased BS, prolonged expirtory phase    Heart:   Regular rhythm, no murmur   Abdomen:    Soft, non-tender   No masses or organomegaly    Extremities:  No edema or DVT signs   Pulses:  Symmetric all extremities   Skin:  Warm and dry    No rashes or lesions   Neurologic:  Oriented   No focal deficits   Psychiatric:  good insight; normal affect   Urinary catheter:  none     Data Review:   Reviewed yes  Telemetry sinus    Recent Labs      02/09/17   0504  02/08/17   0959   WBC  5.6  14.3*   HGB  9.1*  10.2*   HCT  29.6*  33.5*   PLT  330  343     Recent Labs      02/09/17   0504  02/08/17   0959   NA  139  136   K  3.8  3.4*   CL  103  100   CO2  27  27   GLU  203*  174*   BUN  13  10   CREA  0.63  0.64   CA  7.9*  8.5   ALB  2.6*  3.0*   SGOT  10*  13*   ALT  17  21     All Micro Results     None        Medications reviewed  Current Facility-Administered Medications   Medication Dose Route Frequency    sodium chloride (NS) flush 5-10 mL  5-10 mL IntraVENous Q8H    sodium chloride (NS) flush 5-10 mL  5-10 mL IntraVENous PRN    bumetanide (BUMEX) injection 1 mg  1 mg IntraVENous Q12H    albuterol (PROVENTIL VENTOLIN) nebulizer solution 2.5 mg  2.5 mg Nebulization QID RT    ipratropium (ATROVENT) 0.02 % nebulizer solution 0.5 mg  0.5 mg Nebulization QID RT    methylPREDNISolone (PF) (SOLU-MEDROL) injection 60 mg  60 mg IntraVENous Q6H    enoxaparin (LOVENOX) injection 80 mg  1 mg/kg SubCUTAneous Q12H    LORazepam (ATIVAN) tablet 1 mg  1 mg Oral Q8H PRN    ferrous sulfate tablet 325 mg  325 mg Oral ACB    atorvastatin (LIPITOR) tablet 20 mg  20 mg Oral DAILY    metoprolol tartrate (LOPRESSOR) tablet 50 mg  50 mg Oral BID    glucose chewable tablet 16 g  4 Tab Oral PRN    dextrose (D50W) injection syrg 12.5-25 g  12.5-25 g IntraVENous PRN    glucagon (GLUCAGEN) injection 1 mg  1 mg IntraMUSCular PRN    insulin lispro (HUMALOG) injection   SubCUTAneous AC&HS         Signed:   Luma Salcedo DO   Internist / Yessenia Casarez

## 2017-02-09 NOTE — DIABETES MGMT
DTC Progress Note    Recommendations/ Comments: Chart reviewed due to hyperglycemia likely due to steroids. If appropriate, please consider:  1. Changing diet to carb consistent cardiac  2. Change correction scale to resistant scale  3. Starting Lantus 40 units. Chart reviewed on Tasha Micheline. Patient is a 80 y.o. female with known diabetes on Lantus variable scale and Metformin 1000 BID at home. A1c:   Lab Results   Component Value Date/Time    Hemoglobin A1c 7.0 02/08/2017 09:59 AM    Hemoglobin A1c 7.9 03/31/2016 03:57 AM       Recent Glucose Results: Lab Results   Component Value Date/Time     (H) 02/09/2017 05:04 AM    GLUCPOC 237 (H) 02/09/2017 11:41 AM    GLUCPOC 214 (H) 02/09/2017 06:53 AM    GLUCPOC 226 (H) 02/08/2017 06:38 PM        Lab Results   Component Value Date/Time    Creatinine 0.63 02/09/2017 05:04 AM       Active Orders   Diet    DIET CARDIAC Regular        PO intake: Patient Vitals for the past 72 hrs:   % Diet Eaten   02/08/17 1600 10 %       Current hospital DM medication: correction scale Humalog, normal sensitivity. Will continue to follow as needed.     Thank you  Pamila Oppenheim, RD, CDE

## 2017-02-09 NOTE — ED NOTES
Report received from [facility]. Assumed care at this time. Pt is A&OX3, airway patent resting on str without signs of distress. Pt on BiPAP and tolerating well. Awaiting room assignment at this time.

## 2017-02-09 NOTE — PROGRESS NOTES
Cardiology Progress Note            Admit Date: 2/8/2017  Admit Diagnosis: Acute respiratory disease  Date: 2/9/2017     Time: 9:18 AM    HPI:  Kat Rice is a 80 y.o. female admitted on 2/8/2017 for Acute respiratory disease. Past medical history of Anxiety; Breast cancer ([facility] (2005, 2010); Cancer ([facility] Cancer ([facility] Diabetes ([facility] Hypercholesterolemia; Hypertension; Other ill-defined conditions(799.89); Other ill-defined conditions(799.89); Pneumonia; and Psychiatric disorder. Patient sent to ED for ongoing SOB. Patient has been feeling increasing SOB over the past few weeks. Noticeably worse today. O2 sats as low as 70%. Cardiac cath on 1/27/17 finds EF of 60%, severe pulmonary HTN.     Subjective:  No c/o CP. Complains of SOB. Continues to feel much better on BIPAP.     Assessment and Plan      1. Severe PHTN   - normal cardiac cath with EF 60%   - Echo EF 55-60%, NOWMA, severe PHTN    - Ground glass opacities and ILD on prior CTA   - ? ILD more extensive - ? High res CT  2. Cor Pulmonale   -    - CXR seems more c/w fibrosis   - Bumex 1 mg IV BID   - check TSH  3. Hypoxic respiratory failure   - BIPAP as needed   - per pulmonary    4. Hypokalemia   - Replace while getting diuretics   - Follow labs     Echo confirms EF 55-60%, continues with severe PHTN. Associated DD and mild to mod AS, but degree of SOB way out of proportion to degree of DD and AS. Severe PHTN - ? Etiology, suspect more related to Group 3 - ILD. Continue diuresis, BB. Needs no further cardiac testing at this time. I have seen and examined the patient and agree with pa. Assessment  Continue lopressor and bumex , dyspnea improved  Dyspnea multifactorial but I suspect lung disease playing also a significant role in addition to  DD and AS  AF: somewhat new finding rate is controlled continue metoprolol and for now full dose of lovenox.  She will likely need [address] no h/o significant bleeding reported at this time        ROS:  Pertinent items are noted in HPI. Objective:      Physical Exam:                Visit Vitals    /56    Pulse 65    Temp 97.2 °F (36.2 °C)    Resp 20    Ht 5' 5\" (1.651 m)    Wt 82 kg (180 lb 12.4 oz)    SpO2 98%    BMI 30.08 kg/m2          General Appearance:   Well developed, well nourished,alert and oriented x 3, and   individual in no acute distress. Ears/Nose/Mouth/Throat:    Hearing grossly normal.         Neck:  Supple. Chest:    Lungs decreased BS, prolonged expiration phase to auscultation bilaterally. Cardiovascular:   Regular rate and rhythm, S1, S2 normal, no murmur. Abdomen:    Soft, non-tender, bowel sounds are active. Extremities:  No edema bilaterally. Skin:  Warm and dry.      Telemetry: normal sinus rhythm          Data Review:    Labs:    Recent Results (from the past 24 hour(s))   GLUCOSE, POC    Collection Time: 02/08/17  2:25 PM   Result Value Ref Range    Glucose (POC) 151 (H) 65 - 100 mg/dL    Performed by Diann Conway (PCT)    TROPONIN I    Collection Time: 02/08/17  3:14 PM   Result Value Ref Range    Troponin-I, Qt. <0.04 <0.05 ng/mL   DRUG SCREEN, URINE    Collection Time: 02/08/17  4:03 PM   Result Value Ref Range    AMPHETAMINE NEGATIVE  NEG      BARBITURATES NEGATIVE  NEG      BENZODIAZEPINE NEGATIVE  NEG      COCAINE NEGATIVE  NEG      METHADONE NEGATIVE  NEG      OPIATES NEGATIVE  NEG      PCP(PHENCYCLIDINE) NEGATIVE  NEG      THC (TH-CANNABINOL) NEGATIVE  NEG      Drug screen comment (NOTE)    URINALYSIS W/ REFLEX CULTURE    Collection Time: 02/08/17  4:03 PM   Result Value Ref Range    Color YELLOW/STRAW      Appearance CLEAR CLEAR      Specific gravity 1.009 1.003 - 1.030      pH (UA) 5.0 5.0 - 8.0      Protein NEGATIVE  NEG mg/dL    Glucose NEGATIVE  NEG mg/dL    Ketone NEGATIVE  NEG mg/dL    Bilirubin NEGATIVE  NEG      Blood NEGATIVE  NEG      Urobilinogen 1.0 0.2 - 1.0 EU/dL Nitrites NEGATIVE  NEG      Leukocyte Esterase NEGATIVE  NEG      WBC 0-4 0 - 4 /hpf    RBC 0-5 0 - 5 /hpf    Epithelial cells FEW FEW /lpf    Bacteria NEGATIVE  NEG /hpf    UA:UC IF INDICATED CULTURE NOT INDICATED BY UA RESULT CNI      Hyaline cast 0-2 0 - 5 /lpf   POC G3 - PUL    Collection Time: 02/08/17  4:44 PM   Result Value Ref Range    FIO2 (POC) 45 %    pH (POC) 7.374 7.35 - 7.45      pCO2 (POC) 46.6 (H) 35.0 - 45.0 MMHG    pO2 (POC) 130 (H) 80 - 100 MMHG    HCO3 (POC) 27.2 (H) 22 - 26 MMOL/L    sO2 (POC) 99 (H) 92 - 97 %    Base excess (POC) 2 mmol/L    Site LEFT RADIAL      Device: CPAP      PEEP/CPAP (POC) 5 cmH2O    Allens test (POC) YES      Specimen type (POC) ARTERIAL      Total resp. rate 28     GLUCOSE, POC    Collection Time: 02/08/17  6:38 PM   Result Value Ref Range    Glucose (POC) 226 (H) 65 - 100 mg/dL    Performed by iNrmal Ramos    TROPONIN I    Collection Time: 02/08/17  6:51 PM   Result Value Ref Range    Troponin-I, Qt. <0.04 <0.05 ng/mL   TROPONIN I    Collection Time: 02/09/17  5:03 AM   Result Value Ref Range    Troponin-I, Qt. <0.04 <0.05 ng/mL   METABOLIC PANEL, COMPREHENSIVE    Collection Time: 02/09/17  5:04 AM   Result Value Ref Range    Sodium 139 136 - 145 mmol/L    Potassium 3.8 3.5 - 5.1 mmol/L    Chloride 103 97 - 108 mmol/L    CO2 27 21 - 32 mmol/L    Anion gap 9 5 - 15 mmol/L    Glucose 203 (H) 65 - 100 mg/dL    BUN 13 6 - 20 MG/DL    Creatinine 0.63 0.55 - 1.02 MG/DL    BUN/Creatinine ratio 21 (H) 12 - 20      GFR est AA >60 >60 ml/min/1.73m2    GFR est non-AA >60 >60 ml/min/1.73m2    Calcium 7.9 (L) 8.5 - 10.1 MG/DL    Bilirubin, total 0.5 0.2 - 1.0 MG/DL    ALT (SGPT) 17 12 - 78 U/L    AST (SGOT) 10 (L) 15 - 37 U/L    Alk.  phosphatase 96 45 - 117 U/L    Protein, total 6.9 6.4 - 8.2 g/dL    Albumin 2.6 (L) 3.5 - 5.0 g/dL    Globulin 4.3 (H) 2.0 - 4.0 g/dL    A-G Ratio 0.6 (L) 1.1 - 2.2     CBC W/O DIFF    Collection Time: 02/09/17  5:04 AM   Result Value Ref Range WBC 5.6 3.6 - 11.0 K/uL    RBC 3.52 (L) 3.80 - 5.20 M/uL    HGB 9.1 (L) 11.5 - 16.0 g/dL    HCT 29.6 (L) 35.0 - 47.0 %    MCV 84.1 80.0 - 99.0 FL    MCH 25.9 (L) 26.0 - 34.0 PG    MCHC 30.7 30.0 - 36.5 g/dL    RDW 16.5 (H) 11.5 - 14.5 %    PLATELET 241 767 - 077 K/uL   T4, FREE    Collection Time: 02/09/17  5:04 AM   Result Value Ref Range    T4, Free 1.3 0.8 - 1.5 NG/DL   TSH, 3RD GENERATION    Collection Time: 02/09/17  5:04 AM   Result Value Ref Range    TSH 0.79 0.36 - 3.74 uIU/mL   POC G3 - PUL    Collection Time: 02/09/17  5:36 AM   Result Value Ref Range    FIO2 (POC) 0.35 %    pH (POC) 7.382 7.35 - 7.45      pCO2 (POC) 46.5 (H) 35.0 - 45.0 MMHG    pO2 (POC) 89 80 - 100 MMHG    HCO3 (POC) 27.7 (H) 22 - 26 MMOL/L    sO2 (POC) 97 92 - 97 %    Base excess (POC) 3 mmol/L    Site LEFT RADIAL      Device: CPAP      PEEP/CPAP (POC) 5 cmH2O    Allens test (POC) N/A      Specimen type (POC) ARTERIAL     GLUCOSE, POC    Collection Time: 02/09/17  6:53 AM   Result Value Ref Range    Glucose (POC) 214 (H) 65 - 100 mg/dL    Performed by [employer]           Radiology:        Current Facility-Administered Medications   Medication Dose Route Frequency    sodium chloride (NS) flush 5-10 mL  5-10 mL IntraVENous Q8H    sodium chloride (NS) flush 5-10 mL  5-10 mL IntraVENous PRN    bumetanide (BUMEX) injection 1 mg  1 mg IntraVENous Q12H    albuterol (PROVENTIL VENTOLIN) nebulizer solution 2.5 mg  2.5 mg Nebulization QID RT    ipratropium (ATROVENT) 0.02 % nebulizer solution 0.5 mg  0.5 mg Nebulization QID RT    methylPREDNISolone (PF) (SOLU-MEDROL) injection 60 mg  60 mg IntraVENous Q6H    enoxaparin (LOVENOX) injection 80 mg  1 mg/kg SubCUTAneous Q12H    LORazepam (ATIVAN) tablet 1 mg  1 mg Oral Q8H PRN    ferrous sulfate tablet 325 mg  325 mg Oral ACB    atorvastatin (LIPITOR) tablet 20 mg  20 mg Oral DAILY    metoprolol tartrate (LOPRESSOR) tablet 50 mg  50 mg Oral BID    glucose chewable tablet 16 g  4 Tab Oral PRN    dextrose (D50W) injection syrg 12.5-25 g  12.5-25 g IntraVENous PRN    glucagon (GLUCAGEN) injection 1 mg  1 mg IntraMUSCular PRN    insulin lispro (HUMALOG) injection   SubCUTAneous AC&HS          Radha Saravia MD     [facility] [address] [place] [address], [address] [place] [address],[address]   Mari Mason   [phone]

## 2017-02-09 NOTE — PROGRESS NOTES
Care Management: Care Management order received, noting that patient has Heart Failure. I spoke to patient & her family at bedside & offered one time, complimentary [facility] to Home visit, that will occur, once patient is discharged. She was agreeable to the one visit with the Hospital to Home program.  Will send referral via [facility]. Care Management will continue to follow for disposition needs.   MANSI Lancaster BSN CCM

## 2017-02-10 LAB
GLUCOSE BLD STRIP.AUTO-MCNC: 212 MG/DL (ref 65–100)
GLUCOSE BLD STRIP.AUTO-MCNC: 261 MG/DL (ref 65–100)
GLUCOSE BLD STRIP.AUTO-MCNC: 278 MG/DL (ref 65–100)
GLUCOSE BLD STRIP.AUTO-MCNC: 325 MG/DL (ref 65–100)
GLUCOSE BLD STRIP.AUTO-MCNC: 329 MG/DL (ref 65–100)
GLUCOSE BLD STRIP.AUTO-MCNC: 333 MG/DL (ref 65–100)
SERVICE CMNT-IMP: ABNORMAL

## 2017-02-10 PROCEDURE — 74011636637 HC RX REV CODE- 636/637: Performed by: INTERNAL MEDICINE

## 2017-02-10 PROCEDURE — 97165 OT EVAL LOW COMPLEX 30 MIN: CPT

## 2017-02-10 PROCEDURE — 94640 AIRWAY INHALATION TREATMENT: CPT

## 2017-02-10 PROCEDURE — 94660 CPAP INITIATION&MGMT: CPT

## 2017-02-10 PROCEDURE — 77010033678 HC OXYGEN DAILY

## 2017-02-10 PROCEDURE — 82962 GLUCOSE BLOOD TEST: CPT

## 2017-02-10 PROCEDURE — 74011000250 HC RX REV CODE- 250: Performed by: INTERNAL MEDICINE

## 2017-02-10 PROCEDURE — 74011250637 HC RX REV CODE- 250/637: Performed by: INTERNAL MEDICINE

## 2017-02-10 PROCEDURE — 74011250636 HC RX REV CODE- 250/636: Performed by: INTERNAL MEDICINE

## 2017-02-10 PROCEDURE — 94761 N-INVAS EAR/PLS OXIMETRY MLT: CPT

## 2017-02-10 PROCEDURE — 65660000000 HC RM CCU STEPDOWN

## 2017-02-10 RX ADMIN — METHYLPREDNISOLONE SODIUM SUCCINATE 60 MG: 125 INJECTION, POWDER, FOR SOLUTION INTRAMUSCULAR; INTRAVENOUS at 15:54

## 2017-02-10 RX ADMIN — ALBUTEROL SULFATE 2.5 MG: 2.5 SOLUTION RESPIRATORY (INHALATION) at 15:34

## 2017-02-10 RX ADMIN — INSULIN LISPRO 5 UNITS: 100 INJECTION, SOLUTION INTRAVENOUS; SUBCUTANEOUS at 17:37

## 2017-02-10 RX ADMIN — ALBUTEROL SULFATE 2.5 MG: 2.5 SOLUTION RESPIRATORY (INHALATION) at 11:29

## 2017-02-10 RX ADMIN — ATORVASTATIN CALCIUM 20 MG: 20 TABLET, FILM COATED ORAL at 08:50

## 2017-02-10 RX ADMIN — Medication 10 ML: at 15:55

## 2017-02-10 RX ADMIN — FERROUS SULFATE TAB 325 MG (65 MG ELEMENTAL FE) 325 MG: 325 (65 FE) TAB at 07:04

## 2017-02-10 RX ADMIN — BUMETANIDE 1 MG: 0.25 INJECTION, SOLUTION INTRAMUSCULAR; INTRAVENOUS at 08:50

## 2017-02-10 RX ADMIN — ENOXAPARIN SODIUM 80 MG: 80 INJECTION SUBCUTANEOUS at 15:55

## 2017-02-10 RX ADMIN — IPRATROPIUM BROMIDE 0.5 MG: 0.5 SOLUTION RESPIRATORY (INHALATION) at 20:00

## 2017-02-10 RX ADMIN — METOPROLOL TARTRATE 50 MG: 50 TABLET, FILM COATED ORAL at 08:51

## 2017-02-10 RX ADMIN — Medication 10 ML: at 21:52

## 2017-02-10 RX ADMIN — INSULIN LISPRO 7 UNITS: 100 INJECTION, SOLUTION INTRAVENOUS; SUBCUTANEOUS at 22:00

## 2017-02-10 RX ADMIN — METHYLPREDNISOLONE SODIUM SUCCINATE 60 MG: 125 INJECTION, POWDER, FOR SOLUTION INTRAMUSCULAR; INTRAVENOUS at 21:52

## 2017-02-10 RX ADMIN — METHYLPREDNISOLONE SODIUM SUCCINATE 60 MG: 125 INJECTION, POWDER, FOR SOLUTION INTRAMUSCULAR; INTRAVENOUS at 04:54

## 2017-02-10 RX ADMIN — IPRATROPIUM BROMIDE 0.5 MG: 0.5 SOLUTION RESPIRATORY (INHALATION) at 07:22

## 2017-02-10 RX ADMIN — IPRATROPIUM BROMIDE 0.5 MG: 0.5 SOLUTION RESPIRATORY (INHALATION) at 15:34

## 2017-02-10 RX ADMIN — BUMETANIDE 1 MG: 0.25 INJECTION, SOLUTION INTRAMUSCULAR; INTRAVENOUS at 21:52

## 2017-02-10 RX ADMIN — METHYLPREDNISOLONE SODIUM SUCCINATE 60 MG: 125 INJECTION, POWDER, FOR SOLUTION INTRAMUSCULAR; INTRAVENOUS at 08:51

## 2017-02-10 RX ADMIN — ENOXAPARIN SODIUM 80 MG: 80 INJECTION SUBCUTANEOUS at 04:00

## 2017-02-10 RX ADMIN — ALBUTEROL SULFATE 2.5 MG: 2.5 SOLUTION RESPIRATORY (INHALATION) at 07:22

## 2017-02-10 RX ADMIN — ALBUTEROL SULFATE 2.5 MG: 2.5 SOLUTION RESPIRATORY (INHALATION) at 20:00

## 2017-02-10 RX ADMIN — IPRATROPIUM BROMIDE 0.5 MG: 0.5 SOLUTION RESPIRATORY (INHALATION) at 11:29

## 2017-02-10 RX ADMIN — ALBUTEROL SULFATE 2.5 MG: 2.5 SOLUTION RESPIRATORY (INHALATION) at 19:24

## 2017-02-10 RX ADMIN — INSULIN LISPRO 7 UNITS: 100 INJECTION, SOLUTION INTRAVENOUS; SUBCUTANEOUS at 12:05

## 2017-02-10 RX ADMIN — INSULIN LISPRO 3 UNITS: 100 INJECTION, SOLUTION INTRAVENOUS; SUBCUTANEOUS at 07:07

## 2017-02-10 RX ADMIN — METOPROLOL TARTRATE 50 MG: 50 TABLET, FILM COATED ORAL at 21:52

## 2017-02-10 RX ADMIN — Medication 10 ML: at 07:04

## 2017-02-10 NOTE — PROGRESS NOTES
PULMONARY/CRITICAL CARE/SLEEP MEDICINE    Physician Consultation Note    Name: Tasha Tobias   : 1931   MRN: [id number]   Date: 2/10/2017      Subjective:     2/10  Seen and examined. Feels better after adequate diuresis. SpO2 100% on 2 L      Feels better. Labs stable. Gas exchange preserved. CTA with ground glass opacities and fluid in fissures suggestive of cardiogenic pulmonary edema    Consult Note:    This patient has been seen and evaluated for resp distress. Medical records and data reviewed. Patient is a 80 y.o. female who was sent to the ER from physician's office for dyspnea and hypoxemia. She reports she has had worsening pedal edema despite adjustment in diuretic therapy after recent cath. She denies fever, cough, wheeze. She is a non-smoker and denies history of asthma. She does have OHD and underwent recent R/LHC that showed severe mixed pulmonary hypertension with both precapillary and post capillary components and elevated filling pressures. Previous echo has shown LVH and dilated LA suggestive of left ventricular dysfunction. CTA done this admission was negative for PE but did show bilateral GGO suggestive of pulmonary edema. Previous VQ scan was normal in 2016. Assessment:   Acute hypoxic resp insufficiency  Acute on chronic HFpEF with LVH and minimal CAD/valvular heart disease, pulmonary edema and fluid in major fissures on CT, no significant ILD  ?  New onset A fib may have precipitated CHF  Pulmonary hypertension severe, group 2 and associated precapillary component from arteriopathy from long standing left sided CHF  H/O systemic hypertension  Other medical problems per chart  Non-smoker  Normal VQ scan 2016      Recommendations:     O2- may need home O2: assess with exercise oximetry and arrange as indicated  Diuresis- will need optimization of outpatient dose  Continue calcium channel blocker  Does not need therapeutic anticoagulation from pulmonary standpoint  CHF educator for salt and fluid restriction  Underlying LV diastolic dysfunction of uncorrectable etiology (no CAD or valvular disease to fix) will make treatment with pulmonary vasodilators difficult and would not be recommended  Rhythm management per cardiology  OP PFTs when CHF is compensated- we will arrange this after discharge  Discussed with Dr. Violetta Romero 2/8  Transfer to [facility] and we will be available to see her during hospitalization again as needed        Active Problem List:     Problem List  Date Reviewed: 2/5/2017          Codes Class    Acute respiratory disease ICD-10-CM: J06.9  ICD-9-CM: 465.9         Moderate to severe pulmonary hypertension (HCC) ICD-10-CM: I27.2  ICD-9-CM: 416.8         CAD (coronary artery disease), native coronary artery ICD-10-CM: I25.10  ICD-9-CM: 414.01     Overview Signed 1/25/2017  4:38 PM by Aminta Cuello MD     1/25/2017Mild, nonobstructive             Pulmonary edema cardiac cause ICD-10-CM: I50.1  ICD-9-CM: 514         SOB (shortness of breath) ICD-10-CM: R06.02  ICD-9-CM: 786.05         CHF (congestive heart failure) ([facility] ICD-10-CM: I50.9  ICD-9-CM: 076.3         Diastolic CHF, acute on chronic (HCC) ICD-10-CM: I50.33  ICD-9-CM: 428.33, 428.0         Aortic stenosis ICD-10-CM: I35.0  ICD-9-CM: 424.1         Hypercholesteremia ICD-10-CM: E78.00  ICD-9-CM: 272.0         Anxiety ICD-10-CM: F41.9  ICD-9-CM: 300.00         Multiple allergies ICD-10-CM: Z88.9  ICD-9-CM: V15.09         Bronchitis ICD-10-CM: J40  ICD-9-CM: 490         Allergic reaction ICD-10-CM: T78.40XA  ICD-9-CM: 995.3         Vein disorder ICD-10-CM: I87.9  ICD-9-CM: 459.9         Fatigue ICD-10-CM: R53.83  ICD-9-CM: 780.79         Breast cancer ([facility] ICD-10-CM: C50.919  ICD-9-CM: 174.9         Anemia ICD-10-CM: D64.9  ICD-9-CM: 285.9         Diabetes ([address] ICD-10-CM: E11.9  ICD-9-CM: 250.00         HTN (hypertension), benign ICD-10-CM: I10  ICD-9-CM: 401.1               Past Medical History:      has a past medical history of Anxiety; Breast cancer ([facility] (2005, 2010); Cancer ([facility] Cancer ([facility] Diabetes ([facility] Hypercholesterolemia; Hypertension; Other ill-defined conditions(799.89); Other ill-defined conditions(799.89); Pneumonia; and Psychiatric disorder. Past Surgical History:      has a past surgical history that includes breast surgery procedure unlisted; biopsy of breast, incisional; mastectomy (Right, 2005); and breast lumpectomy (Left, 2010). Home Medications:     Prior to Admission medications    Medication Sig Start Date End Date Taking? Authorizing Provider   LORazepam (ATIVAN) 0.5 mg tablet Take 1 mg by mouth every eight (8) hours as needed for Anxiety (maily used when patient experiences an Allergic Reaction/Angioedema). Yes Historical Provider   aspirin 81 mg chewable tablet Take 81 mg by mouth daily. Yes Historical Provider   furosemide (LASIX) 20 mg tablet Take 1 Tab by mouth daily. Refills by Dr. Floridalma Vargas 1/25/17  Yes Sintia Hood MD   isosorbide mononitrate ER (IMDUR) 30 mg tablet Take 1 Tab by mouth daily. To help prevent shortness of breath call MD if significant headache 1/25/17  Yes Sintia Hood MD   diphenhydrAMINE (BENADRYL) 25 mg capsule Take 25 mg by mouth every six (6) hours as needed (Allergic reaction/Angioedema). Yes Historical Provider   predniSONE (DELTASONE) 20 mg tablet Take 20 mg by mouth daily as needed (Allergic Reaction/Angioedema). Yes Historical Provider   insulin glargine (LANTUS) 100 unit/mL injection 50 Units by SubCUTAneous route nightly. For -175 = 20 units  175-200 = 30 units  > 200 = 40 units     by SubCUTAneous route nightly. Indications: type 2 diabetes mellitus  Patient taking differently: 20-40 Units by SubCUTAneous route nightly. For -175 = 20 units  175-200 = 30 units  > 200 = 40 units     by SubCUTAneous route nightly.   Indications: type 2 diabetes mellitus 12/9/16  Yes Kelsey Monteiro MD   ferrous sulfate (IRON) 325 mg (65 mg iron) EC tablet Take 1 Tab by mouth Daily (before breakfast). 16  Yes Norma Parada PA-C   atorvastatin (LIPITOR) 20 mg tablet TAKE 1 TAB BY MOUTH DAILY. 10/17/16  Yes Birgit Boss NP   denosumab (PROLIA) 60 mg/mL injection 60 mg by SubCUTAneous route. Twice a year ( and December)   Yes Historical Provider   metFORMIN (GLUCOPHAGE) 1,000 mg tablet Take 1 Tab by mouth two (2) times a day. 16  Yes Adrien Chowdhury MD   metoprolol (LOPRESSOR) 50 mg tablet Take 1 Tab by mouth two (2) times a day. 16  Yes Concepcion Tomlinson NP   ergocalciferol (VITAMIN D2) 50,000 unit capsule Take 1 Cap by mouth every seven (7) days. One capsule monthly 3/10/16  Yes Whit Berumen MD   albuterol (PROVENTIL VENTOLIN) 2.5 mg /3 mL (0.083 %) nebulizer solution 3 mL by Nebulization route every four (4) hours as needed for Wheezing. 1/9/15  Yes Whit Berumen MD   amLODIPine (NORVASC) 10 mg tablet TAKE 1 TABLET BY MOUTH EVERY DAY 13  Yes Steve Chandler MD       Allergies/Social/Family History: Allergies   Allergen Reactions    Ace Inhibitors Angioedema      Social History   Substance Use Topics    Smoking status: Never Smoker    Smokeless tobacco: Never Used    Alcohol use No      Family History   Problem Relation Age of Onset    Hypertension Mother     Stroke Other         Review of Systems:     A comprehensive review of systems was negative except for that written in the HPI.     Objective:   Vital Signs:  Visit Vitals    /66    Pulse 83    Temp 98.3 °F (36.8 °C)    Resp 20    Ht 5' 5\" (1.651 m)    Wt 87.1 kg (192 lb 0.3 oz)    SpO2 100%    BMI 31.95 kg/m2    O2 Flow Rate (L/min): 2 l/min O2 Device: Nasal cannula Temp (24hrs), Av.2 °F (36.8 °C), Min:97.5 °F (36.4 °C), Max:98.5 °F (36.9 °C)           Intake/Output:     Intake/Output Summary (Last 24 hours) at 02/10/17 1013  Last data filed at 02/10/17 0500   Gross per 24 hour   Intake                0 ml   Output 701 ml   Net             -701 ml       Physical Exam:   General:  Alert, cooperative, no distress, appears stated age. Head:  Normocephalic, without obvious abnormality, atraumatic. Eyes:  Conjunctivae/corneas clear. PERRL, EOMs intact. Neck: Supple, symmetrical, trachea midline, no adenopathy, thyroid: no enlargment/tenderness/nodules, no carotid bruit and no JVD. Lungs:   Diminished air entry   Chest wall:  No tenderness or deformity. Heart:  Regular rate and rhythm, S1, S2 normal, 3/6 apical systolic murmur, no click, rub or gallop. Abdomen:   Soft, non-tender. Bowel sounds normal. No masses,  No organomegaly. Extremities: Extremities normal, atraumatic, no cyanosis or edema. Pulses: 2+ and symmetric all extremities. Skin: Skin color, texture, turgor normal. No rashes or lesions   Neurologic: Grossly nonfocal         LABS AND  DATA: Personally reviewed  Recent Labs      02/09/17   0504  02/08/17   0959   WBC  5.6  14.3*   HGB  9.1*  10.2*   HCT  29.6*  33.5*   PLT  330  343     Recent Labs      02/09/17   0504  02/08/17   0959   NA  139  136   K  3.8  3.4*   CL  103  100   CO2  27  27   BUN  13  10   CREA  0.63  0.64   GLU  203*  174*   CA  7.9*  8.5     Recent Labs      02/09/17   0504  02/08/17   0959   SGOT  10*  13*   AP  96  113   TP  6.9  8.2   ALB  2.6*  3.0*   GLOB  4.3*  5.2*     No results for input(s): INR, PTP, APTT in the last 72 hours. No lab exists for component: Paco Aw   Recent Labs      02/09/17   0536  02/08/17   1644   PHI  7.382  7.374   PCO2I  46.5*  46.6*   PO2I  89  130*   FIO2I  0.35  45     Recent Labs      02/09/17   0503  02/08/17   1851   02/08/17   0959   TROIQ  <0.04  <0.04   < >  <0.04   BNPP   --    --    --   486*    < > = values in this interval not displayed. MEDS: Reviewed    Chest X-Ray: personally reviewed and report checked    EKG/ Tele      Thank you for allowing me to participate in this patient's care.     Qian Dimas, MD Mary Borden MD, [facility]  [facility]

## 2017-02-10 NOTE — PROGRESS NOTES
0800  Bedside shift change report given to Tc Bernstein (oncoming nurse) by Miguel Angel Núñez (offgoing nurse). Report included the following information SBAR, Kardex and MAR. Full assessment done. No complaints. Discussed the plan of care for the day. 1000  OOB to chair and commode. Ambulates with one person minimal assist.      1200 Shift reassessment done.      1400.  6 minute walk with RT to assess home O2 needs. Able to walk one time around unit with walker. Did desaturate on room air, had to wear 3 liters while ambulating.   Family in to [address].

## 2017-02-10 NOTE — PROGRESS NOTES
Cardiology Progress Note            Admit Date: 2/8/2017  Admit Diagnosis: Acute respiratory disease  Date: 2/10/2017     Time: 9:18 AM    HPI:  Chitra Farrell is a 80 y.o. female admitted on 2/8/2017 for Acute respiratory disease. Past medical history of Anxiety; Breast cancer ([facility] (2005, 2010); Cancer ([facility] Cancer ([facility] Diabetes ([facility] Hypercholesterolemia; Hypertension; Other ill-defined conditions(799.89); Other ill-defined conditions(799.89); Pneumonia; and Psychiatric disorder. Patient sent to ED for ongoing SOB. Patient has been feeling increasing SOB over the past few weeks. Noticeably worse today. O2 sats as low as 70%. Cardiac cath on 1/27/17 finds EF of 60%, severe pulmonary HTN.     Subjective:  No c/o CP. SOB improved off BIPAP. Feeling better     Assessment and Plan      1. Severe PHTN   - normal cardiac cath with EF 60%   - Echo EF 55-60%, NOWMA, severe PHTN    - Ground glass opacities and ILD on prior CTA   - ? ILD more extensive - ? High res CT  2. Cor Pulmonale   -    - CXR seems more c/w fibrosis   - Bumex 1 mg IV BID   - check TSH  3. Hypoxic respiratory failure   - BIPAP as needed   - per pulmonary  4. Afib - rate controlled   - Lopressor 50 mg BID   - [id number] - 4   - Currently Lovenox 1mg/kg   - No history for bleeding by chart review and patient admission. She will need [facility] prior to d/c     Continues to improve. Now with Afib and controlled rate. Lovenox currently, but will need [facility] prior to d/c. Angelica Longoria from Cardiology standpoint to transfer to step down unit. ROS:  Pertinent items are noted in HPI.     Objective:      Physical Exam:                Visit Vitals    /66    Pulse 83    Temp 98.3 °F (36.8 °C)    Resp 20    Ht 5' 5\" (1.651 m)    Wt 87.1 kg (192 lb 0.3 oz)    SpO2 100%    BMI 31.95 kg/m2          General Appearance:   Well developed, well nourished,alert and oriented x 3, and   individual in no acute distress. Ears/Nose/Mouth/Throat:    Hearing grossly normal.         Neck:  Supple. Chest:    Lungs decreased BS, prolonged expiration phase to auscultation bilaterally. Cardiovascular:   Irregular rate and rhythm, S1, S2 normal, no murmur. Abdomen:    Soft, non-tender, bowel sounds are active. Extremities:  No edema bilaterally. Skin:  Warm and dry.      Telemetry: AFIB          Data Review:    Labs:    Recent Results (from the past 24 hour(s))   GLUCOSE, POC    Collection Time: 02/09/17 11:41 AM   Result Value Ref Range    Glucose (POC) 237 (H) 65 - 100 mg/dL    Performed by Calli Wolf, POC    Collection Time: 02/09/17  3:33 PM   Result Value Ref Range    Glucose (POC) 269 (H) 65 - 100 mg/dL    Performed by Hermila Walker    GLUCOSE, POC    Collection Time: 02/09/17 11:43 PM   Result Value Ref Range    Glucose (POC) 261 (H) 65 - 100 mg/dL    Performed by [employer]    GLUCOSE, POC    Collection Time: 02/10/17  7:07 AM   Result Value Ref Range    Glucose (POC) 212 (H) 65 - 100 mg/dL    Performed by [employer]           Radiology:        Current Facility-Administered Medications   Medication Dose Route Frequency    sodium chloride (NS) flush 5-10 mL  5-10 mL IntraVENous Q8H    sodium chloride (NS) flush 5-10 mL  5-10 mL IntraVENous PRN    bumetanide (BUMEX) injection 1 mg  1 mg IntraVENous Q12H    albuterol (PROVENTIL VENTOLIN) nebulizer solution 2.5 mg  2.5 mg Nebulization QID RT    ipratropium (ATROVENT) 0.02 % nebulizer solution 0.5 mg  0.5 mg Nebulization QID RT    methylPREDNISolone (PF) (SOLU-MEDROL) injection 60 mg  60 mg IntraVENous Q6H    enoxaparin (LOVENOX) injection 80 mg  1 mg/kg SubCUTAneous Q12H    LORazepam (ATIVAN) tablet 1 mg  1 mg Oral Q8H PRN    ferrous sulfate tablet 325 mg  325 mg Oral ACB    atorvastatin (LIPITOR) tablet 20 mg  20 mg Oral DAILY    metoprolol tartrate (LOPRESSOR) tablet 50 mg  50 mg Oral BID    glucose chewable tablet 16 g  4 Tab Oral PRN    dextrose (D50W) injection syrg 12.5-25 g  12.5-25 g IntraVENous PRN    glucagon (GLUCAGEN) injection 1 mg  1 mg IntraMUSCular PRN    insulin lispro (HUMALOG) injection   SubCUTAneous AC&HS          Lakia Lea PA-C     [facility] [address] [place] [address] Drive, [address] [place] [address],[address]   [address]   [phone]

## 2017-02-10 NOTE — DIABETES MGMT
DTC Progress Note    Recommendations/ Comments: If appropriate, please consider adding 40 units of Lantus for BS consistently over 180 mg/dl. This would be her home dose for BS > 200 mg/dl . Pt required 16 units of correction insulin yesterday and BS remained elevated never returning to target. Also please consider:  1. Changing diet to carb consistent cardiac  2. Changing correction scale to resistant scale if Lantus does not control BS    Chart reviewed on [facility]. Patient is a 80 y.o. female with known  Type 2 Diabetes on oral agent (monotherapy): metformin 1000 mg bid (generic)  insulin injections: Lantus : if -175 20 units; 176-200- 30 units; > 200 mg/dl 40 units at home. A1c:   Lab Results   Component Value Date/Time    Hemoglobin A1c 7.0 02/08/2017 09:59 AM    Hemoglobin A1c 7.9 03/31/2016 03:57 AM       Recent Glucose Results: Lab Results   Component Value Date/Time    GLUCPOC 212 (H) 02/10/2017 07:07 AM    GLUCPOC 261 (H) 02/09/2017 11:43 PM    GLUCPOC 269 (H) 02/09/2017 03:33 PM        Lab Results   Component Value Date/Time    Creatinine 0.63 02/09/2017 05:04 AM       Active Orders   Diet    DIET CARDIAC Regular        PO intake: Patient Vitals for the past 72 hrs:   % Diet Eaten   02/08/17 1600 10 %       Current hospital DM medication: lispro insulin correction scale    Will continue to follow as needed.     Thank you

## 2017-02-10 NOTE — NURSE NAVIGATOR
Follow up scheduled with primary care, Kris Sheppard NP for 2/17/17 at 11:20 am information placed on After Visit Summary.

## 2017-02-10 NOTE — PROGRESS NOTES
02/10/17 1415 02/10/17 1416 02/10/17 1418   RT Walking Oximetry   Stage Resting (Room Air) During Walk (Room Air) During Walk (on O2)   SpO2 94 % (!) 86 % 94 %   HR 87 bpm 88 bpm 88 bpm   Rate of Dyspnea 1 1 1   Symptoms --  Shortness of Breath Shortness of Breath   O2 Device None (Room air) None (Room air) Nasal cannula   O2 Flow Rate (L/min) --  --  2 l/min   Walk/Assistive Device --  Cleatis Spare       02/10/17 1420 02/10/17 1421 02/10/17 1423   RT Walking Oximetry   Stage During Walk (on O2) During Walk (on O2) During Walk (on O2)   SpO2 90 % (!) 88 % 95 %   HR 91 bpm 91 bpm 87 bpm   Rate of Dyspnea 1 1 1   Symptoms Shortness of Breath Shortness of Breath Shortness of Breath   O2 Device Nasal cannula Nasal cannula Nasal cannula   O2 Flow Rate (L/min) 2 l/min 2 l/min 3 l/min   Walk/Assistive Device Walker Walker Walker       02/10/17 1425   RT Walking Oximetry   Stage After Walk   SpO2 94 %   HR 87 bpm   Rate of Dyspnea 1   Symptoms Shortness of Breath   O2 Device Nasal cannula   O2 Flow Rate (L/min) 3 l/min   Walk/Assistive Device --

## 2017-02-10 NOTE — PROGRESS NOTES
Problem: Self Care Deficits Care Plan (Adult)  Goal: *Acute Goals and Plan of Care (Insert Text)  Occupational Therapy Goals  Initiated 2/10/2017    1. Patient will perform simple grooming tasks standing at sink for 5 minutes without LOB or complaints of fatigue within 7 days. 2. Patient will gather 5/5 ADL items around the room with overall independence within 7 days. 3. Patient will participate in upper body therapeutic exercises/activities in order to maximize strength and activity tolerance needed for functional ADLs within 7 days. OCCUPATIONAL THERAPY EVALUATION  Patient: Tammy Mckeon (82 y.o. female)  Date: 2/10/2017  Primary Diagnosis: Acute respiratory disease        Precautions:          ASSESSMENT :  Based on the objective data described below, the patient presents with overall supervision-CGA with RW for functional mobility and dynamic standing ADLs, independent for upper body and seated ADLs s/p acute respiratory disease. Patient received supine in bed with nursing present, cleared for therapy by nursing. Patient overall independent for bed mobility. Demonstrating good functional reach to BLEs for lower body dressing. O2 saturation remaining stable on NC. Patient appears close to functional baseline (has been ambulating around unit with RT). However, will follow up 1-2 more times to ensure patient has returned to baseline activity tolerance/endurance. Anticipate no further needs following d/c home when medically stable. Patient will benefit from skilled intervention to address the above impairments.   Patients rehabilitation potential is considered to be Good  Factors which may influence rehabilitation potential include:   [ ]             None noted  [ ]             Mental ability/status  [ ]             Medical condition  [ ]             Home/family situation and support systems  [ ]             Safety awareness  [ ]             Pain tolerance/management  [ ]             Other:        PLAN :  Recommendations and Planned Interventions:  [X]               Self Care Training                  [X]        Therapeutic Activities  [X]               Functional Mobility Training    [ ]        Cognitive Retraining  [X]               Therapeutic Exercises           [X]        Endurance Activities  [X]               Balance Training                   [ ]        Neuromuscular Re-Education  [ ]               Visual/Perceptual Training     [X]   Home Safety Training  [X]               Patient Education                 [X]        Family Training/Education  [ ]               Other (comment):     Frequency/Duration: Patient will be followed by occupational therapy 2 times a week to address goals. Discharge Recommendations: None  Further Equipment Recommendations for Discharge: None noted       SUBJECTIVE:   Patient stated I like doing this, it makes me stronger!       OBJECTIVE DATA SUMMARY:   HISTORY:   Past Medical History   Diagnosis Date    Anxiety      Breast cancer ([facility] [address] 2005, 2010       right 2005, left 2010    Cancer [facility])          cancer right breast cancer    Cancer ([facility] [address]         cancer left breast/new dx 8/2011 +bx     Diabetes ([facility] [address]      Hypercholesterolemia      Hypertension      Other ill-defined conditions(799.89)         osteopoosis    Other ill-defined conditions(799.89)         high cholesterol    Pneumonia      Psychiatric disorder         anxiety     Past Surgical History   Procedure Laterality Date    Pr breast surgery procedure unlisted           right mastectomy    Pr biopsy of breast, incisional           left breast 8/2011 positive for cancer cells    Hx mastectomy Right 2005    Hx breast lumpectomy Left 2010        Prior Level of Function/Home Situation: Per patient report, lives at home with family. Independent at baseline. Ambulates with walker. Completes ADLs. Walk in shower.   Expanded or extensive additional review of patient history:         [ ]  Right hand dominant   [ ] Left hand dominant     EXAMINATION OF PERFORMANCE DEFICITS:  Cognitive/Behavioral Status:  Neurologic State: Alert  Orientation Level: Oriented X4  Cognition: Appropriate decision making; Appropriate for age attention/concentration; Appropriate safety awareness; Follows commands  Perception: Appears intact  Perseveration: No perseveration noted  Safety/Judgement: Awareness of environment;Home safety;Good awareness of safety precautions; Insight into deficits  Skin: Appears intact  Edema: None noted in BUEs  Vision/Perceptual:    Tracking: Able to track stimulus in all quadrants w/o difficulty                      Acuity: Within Defined Limits       Range of Motion:  AROM: Within functional limits  PROM: Within functional limits                    Strength:  Strength: Within functional limits              Coordination:  Coordination: Within functional limits  Fine Motor Skills-Upper: Left Intact; Right Intact    Gross Motor Skills-Upper: Left Intact; Right Intact  Tone & Sensation:  Tone: Normal  Sensation: Intact                       Balance:  Sitting: Intact  Standing: Intact     Functional Mobility and Transfers for ADLs:  Bed Mobility:  Supine to Sit: Independent  Sit to Supine: Independent  Scooting: Independent     Transfers:  Sit to Stand: Supervision  Stand to Sit: Supervision  Toilet Transfer : Supervision     ADL Assessment:  Feeding: Independent     Oral Facial Hygiene/Grooming: Independent     Bathing: Supervision     Upper Body Dressing: Independent     Lower Body Dressing: Supervision     Toileting: Supervision                 ADL Intervention and task modifications:     Lower Body Dressing Assistance  Socks: Independent  Leg Crossed Method Used: Yes  Position Performed: Seated edge of bed           Cognitive Retraining  Safety/Judgement: Awareness of environment;Home safety;Good awareness of safety precautions; Insight into deficits     Functional Measure:  Barthel Index:      Bathin  Bladder: 10  Bowels: 10  Groomin  Dressing: 10  Feeding: 10  Mobility: 10  Stairs: 0  Toilet Use: 10  Transfer (Bed to Chair and Back): 10  Total: 75         Barthel and G-code impairment scale:  Percentage of impairment CH  0% CI  1-19% CJ  20-39% CK  40-59% CL  60-79% CM  80-99% CN  100%   Barthel Score 0-100 100 99-80 79-60 59-40 20-39 1-19    0   Barthel Score 0-20 20 17-19 13-16 9-12 5-8 1-4 0      The Barthel ADL Index: Guidelines  1. The index should be used as a record of what a patient does, not as a record of what a patient could do. 2. The main aim is to establish degree of independence from any help, physical or verbal, however minor and for whatever reason. 3. The need for supervision renders the patient not independent. 4. A patient's performance should be established using the best available evidence. Asking the patient, friends/relatives and nurses are the usual sources, but direct observation and common sense are also important. However direct testing is not needed. 5. Usually the patient's performance over the preceding 24-48 hours is important, but occasionally longer periods will be relevant. 6. Middle categories imply that the patient supplies over 50 per cent of the effort. 7. Use of aids to be independent is allowed. Marce Allen., Barthel, D.W. (0665). Functional evaluation: the Barthel Index. [facility] (14)2. [facility] mariah Evie, SIERRA.J.KALEIGH, Lidya Marcelo., [facility] [place], [address] [place] [address] (). Measuring the change indisability after inpatient rehabilitation; comparison of the responsiveness of the Barthel Index and Functional [place] Measure. Journal of Neurology, Neurosurgery, and Psychiatry, 66(4), 950-254. Beatrice Aragon, N.J.A, Alie Castaneda,  W.J.M, & Fatuma Flowers MJUAN MIGUEL. (2004.) Assessment of post-stroke quality of life in cost-effectiveness studies: The usefulness of the Barthel Index and the EuroQoL-5D. Quality of Life Research, 13, 233-97            G codes:   In compliance with CMSs Claims Based Outcome Reporting, the following G-code set was chosen for this patient based on their primary functional limitation being treated: The outcome measure chosen to determine the severity of the functional limitation was the Barthel Index with a score of 75/100 which was correlated with the impairment scale. · Self Care:               - CURRENT STATUS:    CJ - 20%-39% impaired, limited or restricted               - GOAL STATUS:           CI - 1%-19% impaired, limited or restricted               - D/C STATUS:                       ---------------To be determined---------------      Occupational Therapy Evaluation Charge Determination   History Examination Decision-Making   LOW Complexity : Brief history review  LOW Complexity : 1-3 performance deficits relating to physical, cognitive , or psychosocial skils that result in activity limitations and / or participation restrictions  LOW Complexity : No comorbidities that affect functional and no verbal or physical assistance needed to complete eval tasks       Based on the above components, the patient evaluation is determined to be of the following complexity level: LOW   Pain:  Pain Scale 1: Numeric (0 - 10)  Pain Intensity 1: 0              Activity Tolerance:   Good. VSS. Please refer to the flowsheet for vital signs taken during this treatment. After treatment:   [ ] Patient left in no apparent distress sitting up in chair  [X] Patient left in no apparent distress in bed  [X] Call bell left within reach  [X] Nursing notified  [X] Caregiver present  [ ] Bed alarm activated      COMMUNICATION/EDUCATION:   The patients plan of care was discussed with: Physical Therapist and Registered Nurse.  [X] Home safety education was provided and the patient/caregiver indicated understanding. [X] Patient/family have participated as able in goal setting and plan of care.   [ ] Patient/family agree to work toward stated goals and plan of care.  [ ] Patient understands intent and goals of therapy, but is neutral about his/her participation. [ ] Patient is unable to participate in goal setting and plan of care. This patients plan of care is appropriate for delegation to [facility].      Thank you for this referral.  Pipo Galeano OT  Time Calculation: 11 mins

## 2017-02-10 NOTE — PROGRESS NOTES
Care Management: Phone call received from Hector Ojeda, advising me that [facility] was not on contract for Hospital to Home services. I called Dr. Karena Pink MD, and she advised that [facility] were in network with [facility]. I spoke to patient & family at bedside & they were agreeable to referrals to [facility] signed Freedom of Choice forms were placed on chart. Preliminary O2 orders & 6 min walk test done by [facility]piratory [facility] (patient dropped to 86% on room air) sent via [employer] to [address] [place] [address]. [address] [place] [address] will require more specific orders faxed via [address] left for MD.  Preliminary referral sent to [facility] via [employer], noting FYI left on chart requesting MD order for PT/OT/Skilled Nursing for Heart Failure. Care Management will continue to follow. MANSI Lancaster CCM     Addendum at 4:28 PM: Received call from [address] [place] [address] and they will accept patient for home O2. They will reach out to nursing unit over the weekend to follow-up on patient, if they need more specific orders. Please advise [address] [place] [address] regarding discharge date, so they can bring portable oxygen set up to hospital for patient's transport home.   MANSI Lancaster CCM

## 2017-02-11 LAB
GLUCOSE BLD STRIP.AUTO-MCNC: 241 MG/DL (ref 65–100)
GLUCOSE BLD STRIP.AUTO-MCNC: 263 MG/DL (ref 65–100)
GLUCOSE BLD STRIP.AUTO-MCNC: 290 MG/DL (ref 65–100)
GLUCOSE BLD STRIP.AUTO-MCNC: 320 MG/DL (ref 65–100)
SERVICE CMNT-IMP: ABNORMAL

## 2017-02-11 PROCEDURE — 74011250636 HC RX REV CODE- 250/636: Performed by: INTERNAL MEDICINE

## 2017-02-11 PROCEDURE — G8978 MOBILITY CURRENT STATUS: HCPCS

## 2017-02-11 PROCEDURE — G8979 MOBILITY GOAL STATUS: HCPCS

## 2017-02-11 PROCEDURE — 77010033678 HC OXYGEN DAILY

## 2017-02-11 PROCEDURE — 97116 GAIT TRAINING THERAPY: CPT

## 2017-02-11 PROCEDURE — 65660000000 HC RM CCU STEPDOWN

## 2017-02-11 PROCEDURE — 82962 GLUCOSE BLOOD TEST: CPT

## 2017-02-11 PROCEDURE — 74011636637 HC RX REV CODE- 636/637: Performed by: INTERNAL MEDICINE

## 2017-02-11 PROCEDURE — 74011000250 HC RX REV CODE- 250: Performed by: INTERNAL MEDICINE

## 2017-02-11 PROCEDURE — 74011250637 HC RX REV CODE- 250/637: Performed by: INTERNAL MEDICINE

## 2017-02-11 PROCEDURE — 97161 PT EVAL LOW COMPLEX 20 MIN: CPT

## 2017-02-11 PROCEDURE — 94640 AIRWAY INHALATION TREATMENT: CPT

## 2017-02-11 RX ORDER — BUMETANIDE 1 MG/1
1 TABLET ORAL 2 TIMES DAILY
Status: DISCONTINUED | OUTPATIENT
Start: 2017-02-11 | End: 2017-02-13 | Stop reason: HOSPADM

## 2017-02-11 RX ADMIN — METOPROLOL TARTRATE 50 MG: 50 TABLET, FILM COATED ORAL at 22:00

## 2017-02-11 RX ADMIN — IPRATROPIUM BROMIDE 0.5 MG: 0.5 SOLUTION RESPIRATORY (INHALATION) at 15:54

## 2017-02-11 RX ADMIN — INSULIN LISPRO 3 UNITS: 100 INJECTION, SOLUTION INTRAVENOUS; SUBCUTANEOUS at 07:08

## 2017-02-11 RX ADMIN — METOPROLOL TARTRATE 50 MG: 50 TABLET, FILM COATED ORAL at 08:00

## 2017-02-11 RX ADMIN — ENOXAPARIN SODIUM 80 MG: 80 INJECTION SUBCUTANEOUS at 04:00

## 2017-02-11 RX ADMIN — ALBUTEROL SULFATE 2.5 MG: 2.5 SOLUTION RESPIRATORY (INHALATION) at 07:18

## 2017-02-11 RX ADMIN — IPRATROPIUM BROMIDE 0.5 MG: 0.5 SOLUTION RESPIRATORY (INHALATION) at 12:02

## 2017-02-11 RX ADMIN — METHYLPREDNISOLONE SODIUM SUCCINATE 60 MG: 125 INJECTION, POWDER, FOR SOLUTION INTRAMUSCULAR; INTRAVENOUS at 08:04

## 2017-02-11 RX ADMIN — Medication 10 ML: at 14:00

## 2017-02-11 RX ADMIN — ALBUTEROL SULFATE 2.5 MG: 2.5 SOLUTION RESPIRATORY (INHALATION) at 20:00

## 2017-02-11 RX ADMIN — BUMETANIDE 1 MG: 0.25 INJECTION, SOLUTION INTRAMUSCULAR; INTRAVENOUS at 08:00

## 2017-02-11 RX ADMIN — INSULIN LISPRO 5 UNITS: 100 INJECTION, SOLUTION INTRAVENOUS; SUBCUTANEOUS at 11:33

## 2017-02-11 RX ADMIN — RIVAROXABAN 20 MG: 20 TABLET, FILM COATED ORAL at 18:17

## 2017-02-11 RX ADMIN — BUMETANIDE 1 MG: 1 TABLET ORAL at 18:17

## 2017-02-11 RX ADMIN — FERROUS SULFATE TAB 325 MG (65 MG ELEMENTAL FE) 325 MG: 325 (65 FE) TAB at 07:08

## 2017-02-11 RX ADMIN — ATORVASTATIN CALCIUM 20 MG: 20 TABLET, FILM COATED ORAL at 08:00

## 2017-02-11 RX ADMIN — INSULIN LISPRO 4 UNITS: 100 INJECTION, SOLUTION INTRAVENOUS; SUBCUTANEOUS at 22:10

## 2017-02-11 RX ADMIN — ALBUTEROL SULFATE 2.5 MG: 2.5 SOLUTION RESPIRATORY (INHALATION) at 12:02

## 2017-02-11 RX ADMIN — METHYLPREDNISOLONE SODIUM SUCCINATE 60 MG: 125 INJECTION, POWDER, FOR SOLUTION INTRAMUSCULAR; INTRAVENOUS at 04:00

## 2017-02-11 RX ADMIN — Medication 10 ML: at 05:42

## 2017-02-11 RX ADMIN — ALBUTEROL SULFATE 2.5 MG: 2.5 SOLUTION RESPIRATORY (INHALATION) at 15:54

## 2017-02-11 RX ADMIN — Medication 10 ML: at 22:00

## 2017-02-11 RX ADMIN — IPRATROPIUM BROMIDE 0.5 MG: 0.5 SOLUTION RESPIRATORY (INHALATION) at 20:00

## 2017-02-11 RX ADMIN — IPRATROPIUM BROMIDE 0.5 MG: 0.5 SOLUTION RESPIRATORY (INHALATION) at 07:18

## 2017-02-11 RX ADMIN — INSULIN LISPRO 5 UNITS: 100 INJECTION, SOLUTION INTRAVENOUS; SUBCUTANEOUS at 16:17

## 2017-02-11 NOTE — PROGRESS NOTES
Problem: Mobility Impaired (Adult and Pediatric)  Goal: *Acute Goals and Plan of Care (Insert Text)  Physical Therapy Goals  Initiated 2/11/2017  1. Patient will move from supine to sit and sit to supine , scoot up and down and roll side to side in bed with independence within 7 day(s). 2. Patient will transfer from bed to chair and chair to bed with modified independence using the least restrictive device within 7 day(s). 3. Patient will perform sit to stand with independence within 7 day(s). 4. Patient will ambulate with modified independence for 300 feet with the least restrictive device within 7 day(s). 5. Patient will ascend/descend 2 stairs with no handrail(s) with modified independence within 7 day(s). 6. Patient will improve Tinetti score by 4-5 points within 7 days. PHYSICAL THERAPY EVALUATION  Patient: Alexia Kuo (37 y.o. female)  Date: 2/11/2017  Primary Diagnosis: Acute respiratory disease  Precautions:   Fall, no BP in R arm (mastectomy)      ASSESSMENT :  Based on the objective data described below, the patient presents with decreased activity tolerance, requiring O2 support and overall decreased endurance. Patient received supine in bed with HOB elevated. Mod I with bed mobility and required no physical assist throughout session with transfers or gait. Patient ambulated 200 ft around unit with RW and on 2 L O2 via NC. Did not require standing rest breaks or cues for breathing. Returned to bed per patient preference, however encouraged her to sit in chair with RN assist for all meals. Patient agreeable. Patient does not use O2 at home, and if d/c tomorrow, RN will need to complete home O2 assessment. Patient is clear and safe to ambulate with RN and RW. Will see 1-2 more times to assist with increasing endurance. Patient may d/c with HHPT vs none. Patient will benefit from skilled intervention to address the above impairments.   Patients rehabilitation potential is considered to be Good  Factors which may influence rehabilitation potential include:   [ ]         None noted  [ ]         Mental ability/status  [X]         Medical condition  [ ]         Home/family situation and support systems  [ ]         Safety awareness  [ ]         Pain tolerance/management  [ ]         Other:        PLAN :  Recommendations and Planned Interventions:  [X]           Bed Mobility Training             [ ]    Neuromuscular Re-Education  [X]           Transfer Training                   [ ]    Orthotic/Prosthetic Training  [X]           Gait Training                         [ ]    Modalities  [X]           Therapeutic Exercises           [ ]    Edema Management/Control  [X]           Therapeutic Activities            [X]    Patient and Family Training/Education  [ ]           Other (comment):     Frequency/Duration: Patient will be followed by physical therapy  3 times a week to address goals. Discharge Recommendations: Home Health vs None  Further Equipment Recommendations for Discharge: TBD       SUBJECTIVE:   Patient stated I think it's just my nerves.       OBJECTIVE DATA SUMMARY:   HISTORY:    Past Medical History   Diagnosis Date    Anxiety      Breast cancer ([facility] [address] 2005, 2010       right 2005, left 2010    Cancer [facility])          cancer right breast cancer    Cancer ([facility] [address]         cancer left breast/new dx 8/2011 +bx     Diabetes ([facility] [address]      Hypercholesterolemia      Hypertension      Other ill-defined conditions(799.89)         osteopoosis    Other ill-defined conditions(799.89)         high cholesterol    Pneumonia      Psychiatric disorder         anxiety     Past Surgical History   Procedure Laterality Date    Pr breast surgery procedure unlisted           right mastectomy    Pr biopsy of breast, incisional           left breast 8/2011 positive for cancer cells    Hx mastectomy Right 2005    Hx breast lumpectomy Left 2010     Prior Level of Function/Home Situation: Patient lives with daughter and son-in-law who are both retired. Ambulates with SPC but owns RW at home. No fall hx. Does not drive. But goes out into community with family. Personal factors and/or comorbidities impacting plan of care: PMH     Home Situation  Home Environment: Private residence  # Steps to Enter: 2  One/Two Story Residence: Two story, live on 1st floor  Lift Chair Available: No  Living Alone: No  Support Systems: Child(leah), Family member(s)  Current DME Used/Available at Home: [employer], straight     EXAMINATION/PRESENTATION/DECISION MAKING:   Critical Behavior:  Neurologic State: Alert  Orientation Level: Oriented X4  Cognition: Appropriate decision making, Appropriate for age attention/concentration, Appropriate safety awareness, Follows commands  Safety/Judgement: Awareness of environment, Home safety, Good awareness of safety precautions, Insight into deficits  Strength:    Strength: Within functional limits  Tone & Sensation:   Tone: Normal  Sensation: Intact  Range Of Motion:  AROM: Within functional limits  Coordination:  Coordination: Within functional limits  Functional Mobility:  Bed Mobility:  Supine to Sit: Modified independent (HOB elevated)  Sit to Supine: Modified independent  Scooting: Modified independent  Transfers:  Sit to Stand: Supervision  Stand to Sit: Supervision  Balance:   Sitting: Intact  Standing: Intact; With support  Ambulation/Gait Training:  Distance (ft): 200 Feet (ft)  Assistive Device: Gait belt;Walker, rolling  Ambulation - Level of Assistance: Contact guard assistance;Stand-by asssistance (CGA progressing to SBA)  Gait Abnormalities: Decreased step clearance  Base of Support:  (WNL)  Speed/Chana: Pace decreased (<100 feet/min)  Step Length: Right shortened;Left shortened     Functional Measure:  Tinetti test:      Sitting Balance: 1  Arises: 1  Attempts to Rise: 2  Immediate Standing Balance: 2  Standing Balance: 2  Nudged: 1  Eyes Closed: 1  Turn 360 Degrees - Continuous/Discontinuous: 1  Turn 360 Degrees - Steady/Unsteady: 1  Sitting Down: 1  Balance Score: 13  Indication of Gait: 1  R Step Length/Height: 1  L Step Length/Height: 1  R Foot Clearance: 1  L Foot Clearance: 1  Step Symmetry: 1  Step Continuity: 1  Path: 1  Trunk: 0  Walking Time: 1  Gait Score: 9  Total Score: 22         Tinetti Test and G-code impairment scale:  Percentage of Impairment CH     0%    CI     1-19% CJ     20-39% CK     40-59% CL     60-79% CM     80-99% CN      100%   Tinetti  Score 0-28 28 23-27 17-22 12-16 6-11 1-5 0          Tinetti Tool Score Risk of Falls  <19 = High Fall Risk  19-24 = Moderate Fall Risk  25-28 = Low Fall Risk  Tinetti ME. Performance-Oriented Assessment of Mobility Problems in Elderly Patients. [facility] 66; [id number]. (Scoring Description: PT Bulletin Feb. 10, 1993)     Older adults: Arron Jackson et al, 2009; n = 1000 [facility] elderly evaluated with ABC, ETIENNE, ADL, and IADL)  · Mean ETIENNE score for males aged 69-68 years = 26.21(3.40)  · Mean ETIENNE score for females age 69-68 years = 25.16(4.30)  · Mean ETIENNE score for males over 80 years = 23.29(6.02)  · Mean ETIENNE score for females over 80 years = 17.20(8.32)            G codes: In compliance with CMSs Claims Based Outcome Reporting, the following G-code set was chosen for this patient based on their primary functional limitation being treated: The outcome measure chosen to determine the severity of the functional limitation was the Tinetti with a score of 22/28 which was correlated with the impairment scale.       · Mobility - Walking and Moving Around:               - CURRENT STATUS:    CJ - 20%-39% impaired, limited or restricted               - GOAL STATUS:           CI - 1%-19% impaired, limited or restricted               - D/C STATUS:                       ---------------To be determined---------------      Physical Therapy Evaluation Charge Determination   History Examination Presentation Decision-Making HIGH Complexity :3+ comorbidities / personal factors will impact the outcome/ POC  MEDIUM Complexity : 3 Standardized tests and measures addressing body structure, function, activity limitation and / or participation in recreation  LOW Complexity : Stable, uncomplicated  Other outcome measures Tinetti 22/28  LOW       Based on the above components, the patient evaluation is determined to be of the following complexity level: LOW      Pain:  Pain Scale 1: Numeric (0 - 10)  Pain Intensity 1: 0  Activity Tolerance:   Good, sats >94% throughout session  Please refer to the flowsheet for vital signs taken during this treatment. After treatment:   [ ]         Patient left in no apparent distress sitting up in chair  [X]         Patient left in no apparent distress in bed  [X]         Call bell left within reach  [X]         Nursing notified  [ ]         Caregiver present  [ ]         Bed alarm activated      COMMUNICATION/EDUCATION:   The patients plan of care was discussed with: Registered Nurse.  [X]         Fall prevention education was provided and the patient/caregiver indicated understanding. [X]         Patient/family have participated as able in goal setting and plan of care. [X]         Patient/family agree to work toward stated goals and plan of care. [ ]         Patient understands intent and goals of therapy, but is neutral about his/her participation. [ ]         Patient is unable to participate in goal setting and plan of care.      Thank you for this referral.  Rebekah Liz, PT, DPT   Time Calculation: 17 mins

## 2017-02-11 NOTE — PROGRESS NOTES
Bedside and Verbal shift change report given to [facility] Toya Guan (oncoming nurse) by Shante Osorio RN (offgoing nurse). Report included the following information Kardex, Intake/Output, MAR and Recent Results. 0800: VSS pt assessed will cont to monitor. Pt sitting bed watching tv. Pt states\"feels much better today\"  1130: PT working with pt walking in guzman  1200: no changes will cont to monitor  1300: family here pt sitting in chair  1600: no changes will cont to monitor. Sitting in bed watching tv with family  1930: Bedside and Verbal shift change report given to [place] (oncoming nurse) by Liudmila Mercado RN (offgoing nurse). Report included the following information Kardex, Intake/Output and MAR.

## 2017-02-11 NOTE — PROGRESS NOTES
[address], [address] D[address]  [phone]                                   Hospitalist Service  Progress Note    Daily Progress Note: 2017    Assessment/Plan:   1. Acute Respiratory Failure with hypoxia need for continuous BiPAP on admission- clinically improved with diuesis. ECHO with EF 55-60%, no wall motion abd, severe Pulm HTN, noted to have desats to 86% with ambulation will need continuous O2 2lNC  2. Severe Pulm HTN- etiology unclear may be mixed picture of both pulmonary and cardiac component, no reversible causes so continue symptom support, diuresis with bumex BID,  likely no benefit from pulmonary vasodilators, will need outpatient PFTs   3. A. Fib rate controlled on BB will start xeralto to discharge since CHADs=4  4. CHF with preserved EF- as above, continue CHF education  5. Weakness/decondioning will have PT/OT eval  6. Hypokalemia- repleted  7. DM on SSI    VTE prophylaxis: on lovenox  Discussed plan of care with Nurse   Disposition:  discharge to home clinically ready to go today but will need home O2 delivered prior to discharge. Subjective:   Patient seen and examined feels better, ready to go home. Review of Systems:   Pertinent items are noted in HPI.     Objective:   Physical examination  Visit Vitals    /67    Pulse 61    Temp 98 °F (36.7 °C)    Resp 20    Ht 5' 5\" (1.651 m)    Wt 79.8 kg (175 lb 14.8 oz)    SpO2 100%    BMI 29.28 kg/m2      Temp (24hrs), Av.1 °F (36.7 °C), Min:97.6 °F (36.4 °C), Max:98.5 °F (36.9 °C)     O2 Flow Rate (L/min): 2 l/min   O2 Device: Nasal cannula      Intake/Output Summary (Last 24 hours) at 17 1139  Last data filed at 17 0100   Gross per 24 hour   Intake              240 ml   Output              665 ml   Net             -425 ml     Last shift:       Last 3 shifts:     1901 -  0700  In: 360 [P.O.:360]  [facility] 1366 [id number]    General:   Alert, cooperative, no acute distress   Head:   Atraumatic   Eyes:   Conjunctivae clear   ENT:  Oral mucosa normal   Neck:  Supple, trachea midline, no adenopathy   No JVD   Back:    No CVA tenderness    Chest wall:    No tenderness or deformities    Lungs:   Decreased BS, prolonged expirtory phase    Heart:   Regular rhythm, no murmur   Abdomen:    Soft, non-tender   No masses or organomegaly    Extremities:  No edema or DVT signs   Pulses:  Symmetric all extremities   Skin:  Warm and dry    No rashes or lesions   Neurologic:  Oriented   No focal deficits   Psychiatric:  good insight; normal affect   Urinary catheter:  none     Data Review:   Reviewed yes  Telemetry sinus    Recent Labs      02/09/17   0504   WBC  5.6   HGB  9.1*   HCT  29.6*   PLT  330     Recent Labs      02/09/17   0504   NA  139   K  3.8   CL  103   CO2  27   GLU  203*   BUN  13   CREA  0.63   CA  7.9*   ALB  2.6*   SGOT  10*   ALT  17     All Micro Results     None        Medications reviewed  Current Facility-Administered Medications   Medication Dose Route Frequency    sodium chloride (NS) flush 5-10 mL  5-10 mL IntraVENous Q8H    sodium chloride (NS) flush 5-10 mL  5-10 mL IntraVENous PRN    bumetanide (BUMEX) injection 1 mg  1 mg IntraVENous Q12H    albuterol (PROVENTIL VENTOLIN) nebulizer solution 2.5 mg  2.5 mg Nebulization QID RT    ipratropium (ATROVENT) 0.02 % nebulizer solution 0.5 mg  0.5 mg Nebulization QID RT    methylPREDNISolone (PF) (SOLU-MEDROL) injection 60 mg  60 mg IntraVENous Q6H    enoxaparin (LOVENOX) injection 80 mg  1 mg/kg SubCUTAneous Q12H    LORazepam (ATIVAN) tablet 1 mg  1 mg Oral Q8H PRN    ferrous sulfate tablet 325 mg  325 mg Oral ACB    atorvastatin (LIPITOR) tablet 20 mg  20 mg Oral DAILY    metoprolol tartrate (LOPRESSOR) tablet 50 mg  50 mg Oral BID    glucose chewable tablet 16 g  4 Tab Oral PRN    dextrose (D50W) injection syrg 12.5-25 g  12.5-25 g IntraVENous PRN  glucagon (GLUCAGEN) injection 1 mg  1 mg IntraMUSCular PRN    insulin lispro (HUMALOG) injection   SubCUTAneous AC&HS         Signed:   Luma Willoughby, DO   Internist / Jose Carlos Arguelles

## 2017-02-12 LAB
ANION GAP BLD CALC-SCNC: 7 MMOL/L (ref 5–15)
BASOPHILS # BLD AUTO: 0 K/UL (ref 0–0.1)
BASOPHILS # BLD: 0 % (ref 0–1)
BUN SERPL-MCNC: 16 MG/DL (ref 6–20)
BUN/CREAT SERPL: 23 (ref 12–20)
CALCIUM SERPL-MCNC: 7.3 MG/DL (ref 8.5–10.1)
CHLORIDE SERPL-SCNC: 101 MMOL/L (ref 97–108)
CO2 SERPL-SCNC: 33 MMOL/L (ref 21–32)
CREAT SERPL-MCNC: 0.7 MG/DL (ref 0.55–1.02)
DIFFERENTIAL METHOD BLD: ABNORMAL
EOSINOPHIL # BLD: 0 K/UL (ref 0–0.4)
EOSINOPHIL NFR BLD: 0 % (ref 0–7)
ERYTHROCYTE [DISTWIDTH] IN BLOOD BY AUTOMATED COUNT: 16.9 % (ref 11.5–14.5)
GLUCOSE BLD STRIP.AUTO-MCNC: 162 MG/DL (ref 65–100)
GLUCOSE BLD STRIP.AUTO-MCNC: 243 MG/DL (ref 65–100)
GLUCOSE BLD STRIP.AUTO-MCNC: 341 MG/DL (ref 65–100)
GLUCOSE BLD STRIP.AUTO-MCNC: 342 MG/DL (ref 65–100)
GLUCOSE SERPL-MCNC: 193 MG/DL (ref 65–100)
HCT VFR BLD AUTO: 31.7 % (ref 35–47)
HGB BLD-MCNC: 9.5 G/DL (ref 11.5–16)
LYMPHOCYTES # BLD AUTO: 9 % (ref 12–49)
LYMPHOCYTES # BLD: 0.7 K/UL (ref 0.8–3.5)
MCH RBC QN AUTO: 25.6 PG (ref 26–34)
MCHC RBC AUTO-ENTMCNC: 30 G/DL (ref 30–36.5)
MCV RBC AUTO: 85.4 FL (ref 80–99)
MONOCYTES # BLD: 0.4 K/UL (ref 0–1)
MONOCYTES NFR BLD AUTO: 5 % (ref 5–13)
NEUTS SEG # BLD: 6.6 K/UL (ref 1.8–8)
NEUTS SEG NFR BLD AUTO: 86 % (ref 32–75)
PLATELET # BLD AUTO: 326 K/UL (ref 150–400)
POTASSIUM SERPL-SCNC: 3.3 MMOL/L (ref 3.5–5.1)
RBC # BLD AUTO: 3.71 M/UL (ref 3.8–5.2)
RBC MORPH BLD: ABNORMAL
RBC MORPH BLD: ABNORMAL
SERVICE CMNT-IMP: ABNORMAL
SODIUM SERPL-SCNC: 141 MMOL/L (ref 136–145)
WBC # BLD AUTO: 7.7 K/UL (ref 3.6–11)

## 2017-02-12 PROCEDURE — 77010033678 HC OXYGEN DAILY

## 2017-02-12 PROCEDURE — 74011636637 HC RX REV CODE- 636/637: Performed by: INTERNAL MEDICINE

## 2017-02-12 PROCEDURE — 74011000250 HC RX REV CODE- 250: Performed by: INTERNAL MEDICINE

## 2017-02-12 PROCEDURE — 80048 BASIC METABOLIC PNL TOTAL CA: CPT | Performed by: INTERNAL MEDICINE

## 2017-02-12 PROCEDURE — 82962 GLUCOSE BLOOD TEST: CPT

## 2017-02-12 PROCEDURE — 74011250637 HC RX REV CODE- 250/637: Performed by: INTERNAL MEDICINE

## 2017-02-12 PROCEDURE — 74011250636 HC RX REV CODE- 250/636: Performed by: INTERNAL MEDICINE

## 2017-02-12 PROCEDURE — 94640 AIRWAY INHALATION TREATMENT: CPT

## 2017-02-12 PROCEDURE — 65660000000 HC RM CCU STEPDOWN

## 2017-02-12 PROCEDURE — 85025 COMPLETE CBC W/AUTO DIFF WBC: CPT | Performed by: INTERNAL MEDICINE

## 2017-02-12 PROCEDURE — 36415 COLL VENOUS BLD VENIPUNCTURE: CPT | Performed by: INTERNAL MEDICINE

## 2017-02-12 RX ORDER — ALBUTEROL SULFATE 0.83 MG/ML
2.5 SOLUTION RESPIRATORY (INHALATION)
Status: DISCONTINUED | OUTPATIENT
Start: 2017-02-12 | End: 2017-02-13 | Stop reason: HOSPADM

## 2017-02-12 RX ORDER — IPRATROPIUM BROMIDE 0.5 MG/2.5ML
0.5 SOLUTION RESPIRATORY (INHALATION)
Status: DISCONTINUED | OUTPATIENT
Start: 2017-02-12 | End: 2017-02-13 | Stop reason: HOSPADM

## 2017-02-12 RX ORDER — POTASSIUM CHLORIDE 750 MG/1
20 TABLET, FILM COATED, EXTENDED RELEASE ORAL
Status: COMPLETED | OUTPATIENT
Start: 2017-02-12 | End: 2017-02-12

## 2017-02-12 RX ADMIN — ALBUTEROL SULFATE 2.5 MG: 2.5 SOLUTION RESPIRATORY (INHALATION) at 21:44

## 2017-02-12 RX ADMIN — METOPROLOL TARTRATE 50 MG: 50 TABLET, FILM COATED ORAL at 08:00

## 2017-02-12 RX ADMIN — ALBUTEROL SULFATE 2.5 MG: 2.5 SOLUTION RESPIRATORY (INHALATION) at 13:06

## 2017-02-12 RX ADMIN — Medication 10 ML: at 22:54

## 2017-02-12 RX ADMIN — METOPROLOL TARTRATE 50 MG: 50 TABLET, FILM COATED ORAL at 22:53

## 2017-02-12 RX ADMIN — ALBUTEROL SULFATE 2.5 MG: 2.5 SOLUTION RESPIRATORY (INHALATION) at 16:02

## 2017-02-12 RX ADMIN — BUMETANIDE 1 MG: 1 TABLET ORAL at 17:08

## 2017-02-12 RX ADMIN — IPRATROPIUM BROMIDE 0.5 MG: 0.5 SOLUTION RESPIRATORY (INHALATION) at 21:44

## 2017-02-12 RX ADMIN — BUMETANIDE 1 MG: 1 TABLET ORAL at 08:00

## 2017-02-12 RX ADMIN — IPRATROPIUM BROMIDE 0.5 MG: 0.5 SOLUTION RESPIRATORY (INHALATION) at 07:29

## 2017-02-12 RX ADMIN — METHYLPREDNISOLONE SODIUM SUCCINATE 60 MG: 125 INJECTION, POWDER, FOR SOLUTION INTRAMUSCULAR; INTRAVENOUS at 08:01

## 2017-02-12 RX ADMIN — INSULIN LISPRO 7 UNITS: 100 INJECTION, SOLUTION INTRAVENOUS; SUBCUTANEOUS at 11:30

## 2017-02-12 RX ADMIN — IPRATROPIUM BROMIDE 0.5 MG: 0.5 SOLUTION RESPIRATORY (INHALATION) at 16:05

## 2017-02-12 RX ADMIN — FERROUS SULFATE TAB 325 MG (65 MG ELEMENTAL FE) 325 MG: 325 (65 FE) TAB at 06:03

## 2017-02-12 RX ADMIN — INSULIN LISPRO 4 UNITS: 100 INJECTION, SOLUTION INTRAVENOUS; SUBCUTANEOUS at 22:53

## 2017-02-12 RX ADMIN — ATORVASTATIN CALCIUM 20 MG: 20 TABLET, FILM COATED ORAL at 08:00

## 2017-02-12 RX ADMIN — INSULIN LISPRO 3 UNITS: 100 INJECTION, SOLUTION INTRAVENOUS; SUBCUTANEOUS at 17:06

## 2017-02-12 RX ADMIN — Medication 10 ML: at 14:00

## 2017-02-12 RX ADMIN — ALBUTEROL SULFATE 2.5 MG: 2.5 SOLUTION RESPIRATORY (INHALATION) at 07:29

## 2017-02-12 RX ADMIN — INSULIN LISPRO 2 UNITS: 100 INJECTION, SOLUTION INTRAVENOUS; SUBCUTANEOUS at 06:40

## 2017-02-12 RX ADMIN — IPRATROPIUM BROMIDE 0.5 MG: 0.5 SOLUTION RESPIRATORY (INHALATION) at 13:06

## 2017-02-12 RX ADMIN — POTASSIUM CHLORIDE 20 MEQ: 750 TABLET, FILM COATED, EXTENDED RELEASE ORAL at 12:06

## 2017-02-12 RX ADMIN — RIVAROXABAN 20 MG: 20 TABLET, FILM COATED ORAL at 17:08

## 2017-02-12 RX ADMIN — Medication 10 ML: at 06:03

## 2017-02-12 NOTE — ROUTINE PROCESS
CM follow up. CM noted previous home health referral had been sent via [employer] to [address] [place] [address] noting order was pending, and referral was accepted. CM received needed order today and forwarded order to [address] [place] [address] along with clinical updates. CM updated AVS.    CM also noted that referral had been sent to [address] [place] [address] for home oxygen, and needed order is now available, but oxygen sats need to be done closer to discharge.

## 2017-02-12 NOTE — PROGRESS NOTES
[employer]   [address] [facility], [address] D[address]O.  [phone]                                   Hospitalist Service  Progress Note    Daily Progress Note: 2017    Assessment/Plan:   1. Acute Respiratory Failure with hypoxia need for continuous BiPAP on admission- clinically improved with diuesis. ECHO with EF 55-60%, no wall motion abd, severe Pulm HTN, noted to have desats to 86% with ambulation will need continuous O2 2lNC while ambulating  2. Severe Pulm HTN- etiology unclear may be mixed picture of both pulmonary and cardiac component, no reversible causes so continue symptom support, diuresis with bumex BID,  likely no benefit from pulmonary vasodilators, will need outpatient PFTs   3. A. Fib rate controlled on BB will start xeralto to discharge since CHADs=4  4. CHF with preserved EF- as above, continue CHF education  5. Weakness/decondioning will have PT/OT eval  6. Hypokalemia- repleted  7. DM on SSI    VTE prophylaxis: on lovenox  Discussed plan of care with Nurse   Disposition:  discharge to home clinically ready to go today but will need home O2 delivered prior to discharge. Subjective:   Patient seen and examined feels better, no respiratory distress. Review of Systems:   Pertinent items are noted in HPI.     Objective:   Physical examination  Visit Vitals    /48    Pulse (!) 59    Temp 98.2 °F (36.8 °C)    Resp 19    Ht 5' 5\" (1.651 m)    Wt 77.6 kg (171 lb 1.2 oz)    SpO2 99%    BMI 28.47 kg/m2      Temp (24hrs), Av.9 °F (36.6 °C), Min:96.2 °F (35.7 °C), Max:98.7 °F (37.1 °C)     O2 Flow Rate (L/min): 2 l/min   O2 Device: Nasal cannula      Intake/Output Summary (Last 24 hours) at 17 1045  Last data filed at 17 0900   Gross per 24 hour   Intake                0 ml   Output             2075 ml   Net            -2075 ml     Last shift:     0701 -  1900  In: -   Out: 206 [Urine:425]  Last 3 shifts:    02/10 1901 - 02/12 0700  In: -   Out: 2650 [Urine:2650]    General:   Alert, cooperative, no acute distress   Head:   Atraumatic   Eyes:   Conjunctivae clear   ENT:  Oral mucosa normal   Neck:  Supple, trachea midline, no adenopathy   No JVD   Back:    No CVA tenderness    Chest wall:    No tenderness or deformities    Lungs:   Decreased BS, prolonged expirtory phase    Heart:   Regular rhythm, no murmur   Abdomen:    Soft, non-tender   No masses or organomegaly    Extremities:  No edema or DVT signs   Pulses:  Symmetric all extremities   Skin:  Warm and dry    No rashes or lesions   Neurologic:  Oriented   No focal deficits   Psychiatric:  good insight; normal affect   Urinary catheter:  none     Data Review:   Reviewed yes  Telemetry sinus    Recent Labs      02/12/17 0241   WBC  7.7   HGB  9.5*   HCT  31.7*   PLT  326     Recent Labs      02/12/17 0241   NA  141   K  3.3*   CL  101   CO2  33*   GLU  193*   BUN  16   CREA  0.70   CA  7.3*     All Micro Results     None        Medications reviewed  Current Facility-Administered Medications   Medication Dose Route Frequency    bumetanide (BUMEX) tablet 1 mg  1 mg Oral BID    methylPREDNISolone (PF) (SOLU-MEDROL) injection 60 mg  60 mg IntraVENous DAILY    rivaroxaban (XARELTO) tablet 20 mg  20 mg Oral QPM    sodium chloride (NS) flush 5-10 mL  5-10 mL IntraVENous Q8H    sodium chloride (NS) flush 5-10 mL  5-10 mL IntraVENous PRN    albuterol (PROVENTIL VENTOLIN) nebulizer solution 2.5 mg  2.5 mg Nebulization QID RT    ipratropium (ATROVENT) 0.02 % nebulizer solution 0.5 mg  0.5 mg Nebulization QID RT    LORazepam (ATIVAN) tablet 1 mg  1 mg Oral Q8H PRN    ferrous sulfate tablet 325 mg  325 mg Oral ACB    atorvastatin (LIPITOR) tablet 20 mg  20 mg Oral DAILY    metoprolol tartrate (LOPRESSOR) tablet 50 mg  50 mg Oral BID    glucose chewable tablet 16 g  4 Tab Oral PRN    dextrose (D50W) injection syrg 12.5-25 g  12.5-25 g IntraVENous PRN    glucagon (GLUCAGEN) injection 1 mg  1 mg IntraMUSCular PRN    insulin lispro (HUMALOG) injection   SubCUTAneous AC&HS         Signed:   Luma Lyle DO   Internist / Siddharth Gallagher

## 2017-02-12 NOTE — PROGRESS NOTES
[facility]  -Progress Note     Tannre Alexandra [phone]   2/11/2017      Qi Dickey M.D. , F.A.C.C.   --------Claudene Francis, MD   -----Subjective:   . [facility] is a 80 y.o. female   Presented 2-8-17 with shortness of breath and COPD with hypoxia  Has atrial fibrillation   No complaints  Feels back to usual baseline  Nurse reports pt going home sson  Patient Vitals for the past 12 hrs:   Temp Pulse Resp BP SpO2   02/11/17 1800 - 74 - 148/60 97 %   02/11/17 1700 - 64 - 139/56 -   02/11/17 1600 98.7 °F (37.1 °C) 60 - 138/59 100 %   02/11/17 1554 - - - - 98 %   02/11/17 1500 - (!) 59 - 132/53 100 %   02/11/17 1400 - 81 - 140/84 -   02/11/17 1300 - 67 - 124/58 99 %   02/11/17 1202 - - - - 100 %   02/11/17 1200 96.2 °F (35.7 °C) (!) 56 - 149/53 100 %   02/11/17 1000 - 61 20 136/67 100 %   02/11/17 0800 98 °F (36.7 °C) 71 18 141/56 100 %   02/11/17 0718 - - - - 99 %        Discussion/Plans/Recs    afib-rate controlled, main question is risk benefit of OAC  Discussed pt to call MD if any bleeding or tarry stools  Pa htn  Afib - rate controlled  - Lopressor 50 mg BID  - [id number] - 4  - Currently Lovenox 1mg/kg  - No history for bleeding by chart review and patient admission. She will need [facility] prior to d/c      Severe PHTN  - normal cardiac cath with EF 60%  - Echo EF 55-60%, NOWMA, severe PHTN   - Ground glass opacities and ILD on prior CTA      Cor Pulmonale  -   - CXR seems more c/w fibrosis  - Bumex 1 mg IV BID  Cor pulmonale  diastolic dysfun-mild  5-2+ AS  Consider [facility] for stroke prevention  Fu with Dr Rex Monson  We will sign off, thanks  Future Appointments  Date Time Provider Calli Jefferson   2/17/2017 11:20 AM Cassandra Joe  [address]   2/20/2017 1:20 PM Mayra Sims MD [address] [place] [address]           Cardiac Studies/Hx:  TTE 3/31/16 LVEF 60%. No rWMA. G2DD. REINA. Mild MR, mild to mod MAC.    AV Leaflets mod to markedly increased thickness, mod calcification, and mod  reduced cuspal separation. Mild to Mod AS. Indexed aortic valve area (by VTI) was 0.6 cm squared/m squared. Mild TR. PASP 53      TTE 1/20/16 LVEF 65-70%. No WMA. Mod- marked LVH, particularly septum consistent with eccentric hypertrophy. Doppler parameters were consistent with a restrictive pattern, indicative of decreased left ventricular diastolic compliance and/or increased left atrial pressure (grade 3/4 diastolic dysfunction). Mod LAE. Atrial septum bows from left to right. Mild to mod MAC. Mild MR. Mild to mod AV calcification, mod reduced cuspal separation, and reduced motility. Transaortic velocity and gradient were increased due to stenosis as well as concomitant increased transaortic flow. There was mild stenosis. Valve peak gradient was 29 mmHg. Valve mean gradient was 16 mmHg. Estimated aortic valve area (by VTI) was 1.7 cm squared. Estimated aortic valve area (by Vmax) was 1.5 cm2. Tricuspid valve: There was mild regurgitation. Pulmonary artery systolic  pressure: 45 mmHg. There was mild pulmonary hypertension. Past Medical History   Diagnosis Date    Anxiety     Breast cancer ([facility] 2005, 2010     right 2005, left 2010    Cancer [facility])       cancer right breast cancer    Cancer ([facility]      cancer left breast/new dx 8/2011 +bx     Diabetes ([facility]     Hypercholesterolemia     Hypertension     Other ill-defined conditions(799.89)      osteopoosis    Other ill-defined conditions(799.89)      high cholesterol    Pneumonia     Psychiatric disorder      anxiety      ROS-pertinents  negative except as above  The pertinent portions of the medical history,physician and nursing notes, meds,vitals , labs and Ins/Outs,are reviewed in the electronic record.     Results for orders placed or performed during the hospital encounter of 02/08/17   EKG, 12 LEAD, INITIAL   Result Value Ref Range    Ventricular Rate 80 BPM    Atrial Rate 68 BPM    QRS Duration 88 ms    Q-T Interval 404 ms    QTC Calculation (Bezet) 465 ms    Calculated R Axis 13 degrees    Calculated T Axis 78 degrees    Diagnosis       Atrial fibrillation  ST depression, consider subendocardial injury  When compared with ECG of 01-APR-2016 09:45,  Atrial fibrillation has replaced Sinus rhythm  Confirmed by Bisi Bahena MD, Donya Gates ([id number]) on 2/8/2017 10:45:02 AM        Vitals:    02/11/17 1554 02/11/17 1600 02/11/17 1700 02/11/17 1800   BP:  138/59 139/56 148/60   BP 1 Location:  Left arm     BP Patient Position:  At rest     Pulse:  60 64 74   Resp:       Temp:  98.7 °F (37.1 °C)     SpO2: 98% 100%  97%   Weight:       Height:           Objective:    Physical Exam:   Patient Vitals for the past 12 hrs:   Temp Pulse Resp BP SpO2   02/11/17 1800 - 74 - 148/60 97 %   02/11/17 1700 - 64 - 139/56 -   02/11/17 1600 98.7 °F (37.1 °C) 60 - 138/59 100 %   02/11/17 1554 - - - - 98 %   02/11/17 1500 - (!) 59 - 132/53 100 %   02/11/17 1400 - 81 - 140/84 -   02/11/17 1300 - 67 - 124/58 99 %   02/11/17 1202 - - - - 100 %   02/11/17 1200 96.2 °F (35.7 °C) (!) 56 - 149/53 100 %   02/11/17 1000 - 61 20 136/67 100 %   02/11/17 0800 98 °F (36.7 °C) 71 18 141/56 100 %   02/11/17 0718 - - - - 99 %      General:  alert, cooperative, no distress, appears stated age   ENT, Neck:  no jvd   Chest Wall: inspection normal - no chest wall deformities or tenderness, respiratory effort normal   Lung: clear to auscultation bilaterally   Heart:  no murmurs noted, no gallops noted, irregularly irregular rhythm with rate ok, no JVD   Abdomen: nondistended   Extremities: no edema     Last 24hr Input/Output:    Intake/Output Summary (Last 24 hours) at 02/11/17 1910  Last data filed at 02/11/17 1437   Gross per 24 hour   Intake                0 ml   Output             1150 ml   Net            -1150 ml        Data Review:   Recent Results (from the past 24 hour(s))   GLUCOSE, POC    Collection Time: 02/10/17  9:54 PM   Result Value Ref Range    Glucose (POC) 325 (H) 65 - 100 mg/dL    Performed by JAYESH JENNIFER    GLUCOSE, POC    Collection Time: 02/11/17  7:01 AM   Result Value Ref Range    Glucose (POC) 241 (H) 65 - 100 mg/dL    Performed by JAYESH JENNIFER    GLUCOSE, POC    Collection Time: 02/11/17 11:29 AM   Result Value Ref Range    Glucose (POC) 263 (H) 65 - 100 mg/dL    Performed by Alina Willingham ([facility]) [address] [place] [address],[address], POC    Collection Time: 02/11/17  4:13 PM   Result Value Ref Range    Glucose (POC) 290 (H) 65 - 100 mg/dL    Performed by Saúl Maria MD 2/11/2017

## 2017-02-12 NOTE — PROGRESS NOTES
Bedside and Verbal shift change report given to [address] Toya Guan (oncoming nurse) by Richa Sauceda RN (offgoing nurse). Report included the following information Kardex, Intake/Output, MAR and Recent Results. 0800: VSS pt assessed will cont to monitor  1100: ambulated pt around unit on room air. Pt desatted 86-88%. Upon returning to room put on O2 2Lnc pt O2 sats returned to 94-98% within 3 minutes. 1200: . No changes will cont to monitor. Pt has been transferred waiting for bed on stepdown  1445: TRANSFER - OUT REPORT:    Verbal report given to [facility] RN(name) on Da Wong  being transferred to [facility](unit) for routine progression of care       Report consisted of patients Situation, Background, Assessment and   Recommendations(SBAR). Information from the following report(s) Kardex, Intake/Output, MAR and Accordion was reviewed with the receiving nurse. Lines:   Peripheral IV 02/08/17 Left Forearm (Active)   Site Assessment Clean, dry, & intact 2/12/2017 12:00 PM   Phlebitis Assessment 0 2/12/2017 12:00 PM   Infiltration Assessment 0 2/12/2017 12:00 PM   Dressing Status Clean, dry, & intact 2/12/2017 12:00 PM   Dressing Type Transparent;Tape 2/12/2017 12:00 PM   Hub Color/Line Status Pink;Capped 2/12/2017 12:00 PM   Action Taken Open ports on tubing capped 2/12/2017 12:00 PM   Alcohol Cap Used Yes 2/12/2017 12:00 PM        Opportunity for questions and clarification was provided.       Patient transported with:   Monitor  O2 @ 2 liters  Registered Nurse  [employer]

## 2017-02-12 NOTE — PROGRESS NOTES
ADULT PROTOCOL: JET AEROSOL ASSESSMENT    Patient  Chitra Farrell     80 y.o.   female     2/12/2017  5:51 PM    Breath Sounds Pre Procedure: Right Breath Sounds: Diminished                               Left Breath Sounds: Diminished    Breath Sounds Post Procedure: Right Breath Sounds: Clear, Diminished                                 Left Breath Sounds: Clear, Diminished    Breathing pattern: Pre procedure Breathing Pattern: Regular          Post procedure Breathing Pattern: Regular    Heart Rate: Pre procedure Pulse: 72           Post procedure Pulse: 69    Resp Rate: Pre procedure Respirations: 20           Post procedure Respirations: 18    Peak Flow: Pre bronchodilator             Post bronchodilator       FVC/FEV1:      Incentive Spirometry:  Actual Volume (ml): 300 ml          Cough: Pre procedure Cough: Non-productive               Post procedure Cough: Non-productive    Suctioned: NO    Sputum: Pre procedure                   Post procedure      Oxygen: O2 Device: Nasal cannula  1LNC     Changed: NO    SpO2: Pre procedure SpO2: 99 %   with oxygen              Post procedure SpO2: 99 %  with oxygen    Nebulizer Therapy: Current medications Aerosolized Medications: Albuterol & Atrovent Q4      Changed: YES (changed Atroven & Albuterol frequency to BID & added Q4 Prn Albuterol to match home regimen)      Problem List:   Patient Active Problem List   Diagnosis Code    Diabetes ([facility] E11.9    HTN (hypertension), benign I10    [facility] C50.919    Anemia D64.9    Fatigue R53.83    Vein disorder I87.9    Allergic reaction T78.40XA    Multiple allergies Z88.9    Bronchitis J40    Anxiety F41.9    Hypercholesteremia E78.00    Aortic stenosis M20.6    Diastolic CHF, acute on chronic (HCC) I50.33    SOB (shortness of breath) R06.02    CHF (congestive heart failure) (HCC) I50.9    Pulmonary edema cardiac cause I50.1    Moderate to severe pulmonary hypertension (HCC) I27.2    CAD (coronary artery disease), native coronary artery I25.10    Acute respiratory disease J06.9       Respiratory Therapist: Mary Pham RT

## 2017-02-12 NOTE — PROGRESS NOTES
2000:pt's assessment done, pt assisted to bedside commode, pt has no complaints of pain, pt can move all extremities, pt on 2L NC and is in no resp distress at this time. Bedside and Verbal shift change report given to [address] (oncoming nurse) by Vida wheeler (offgoing nurse). Report included the following information SBAR, Kardex, ED Summary, Med Rec Status and Cardiac Rhythm nsr.

## 2017-02-12 NOTE — ROUTINE PROCESS
TRANSFER - IN REPORT:    Verbal report received from Tania RN(name) on Jennifer Wisdom  being received from ICU(unit) for routine progression of care      Report consisted of patients Situation, Background, Assessment and   Recommendations(SBAR). Information from the following report(s) SBAR, ED Summary, MAR, Recent Results, Med Rec Status and Cardiac Rhythm NSR was reviewed with the receiving nurse. Opportunity for questions and clarification was provided. Assessment completed upon patients arrival to unit and care assumed.

## 2017-02-13 VITALS
BODY MASS INDEX: 28.36 KG/M2 | RESPIRATION RATE: 18 BRPM | SYSTOLIC BLOOD PRESSURE: 158 MMHG | DIASTOLIC BLOOD PRESSURE: 60 MMHG | HEART RATE: 65 BPM | HEIGHT: 65 IN | WEIGHT: 170.19 LBS | OXYGEN SATURATION: 99 % | TEMPERATURE: 98.1 F

## 2017-02-13 LAB
GLUCOSE BLD STRIP.AUTO-MCNC: 201 MG/DL (ref 65–100)
GLUCOSE BLD STRIP.AUTO-MCNC: 391 MG/DL (ref 65–100)
SERVICE CMNT-IMP: ABNORMAL
SERVICE CMNT-IMP: ABNORMAL

## 2017-02-13 PROCEDURE — 74011000250 HC RX REV CODE- 250: Performed by: INTERNAL MEDICINE

## 2017-02-13 PROCEDURE — 77010033678 HC OXYGEN DAILY

## 2017-02-13 PROCEDURE — 74011250637 HC RX REV CODE- 250/637: Performed by: INTERNAL MEDICINE

## 2017-02-13 PROCEDURE — 94640 AIRWAY INHALATION TREATMENT: CPT

## 2017-02-13 PROCEDURE — 82962 GLUCOSE BLOOD TEST: CPT

## 2017-02-13 PROCEDURE — 97116 GAIT TRAINING THERAPY: CPT

## 2017-02-13 PROCEDURE — 74011250636 HC RX REV CODE- 250/636: Performed by: INTERNAL MEDICINE

## 2017-02-13 PROCEDURE — 74011636637 HC RX REV CODE- 636/637: Performed by: INTERNAL MEDICINE

## 2017-02-13 RX ORDER — BUMETANIDE 1 MG/1
1 TABLET ORAL 2 TIMES DAILY
Qty: 60 TAB | Refills: 0 | Status: SHIPPED | OUTPATIENT
Start: 2017-02-13 | End: 2017-03-20 | Stop reason: ALTCHOICE

## 2017-02-13 RX ORDER — POTASSIUM CHLORIDE 1500 MG/1
20 TABLET, FILM COATED, EXTENDED RELEASE ORAL DAILY
Qty: 30 TAB | Refills: 0 | Status: SHIPPED | OUTPATIENT
Start: 2017-02-13 | End: 2017-03-20 | Stop reason: ALTCHOICE

## 2017-02-13 RX ORDER — PREDNISONE 5 MG/1
TABLET ORAL
Qty: 26 TAB | Refills: 0 | Status: SHIPPED | OUTPATIENT
Start: 2017-02-13 | End: 2019-08-30 | Stop reason: ALTCHOICE

## 2017-02-13 RX ADMIN — METOPROLOL TARTRATE 50 MG: 50 TABLET, FILM COATED ORAL at 09:15

## 2017-02-13 RX ADMIN — ATORVASTATIN CALCIUM 20 MG: 20 TABLET, FILM COATED ORAL at 09:15

## 2017-02-13 RX ADMIN — METHYLPREDNISOLONE SODIUM SUCCINATE 60 MG: 125 INJECTION, POWDER, FOR SOLUTION INTRAMUSCULAR; INTRAVENOUS at 09:17

## 2017-02-13 RX ADMIN — IPRATROPIUM BROMIDE 0.5 MG: 0.5 SOLUTION RESPIRATORY (INHALATION) at 08:25

## 2017-02-13 RX ADMIN — INSULIN LISPRO 8 UNITS: 100 INJECTION, SOLUTION INTRAVENOUS; SUBCUTANEOUS at 06:46

## 2017-02-13 RX ADMIN — Medication 10 ML: at 06:47

## 2017-02-13 RX ADMIN — INSULIN LISPRO 3 UNITS: 100 INJECTION, SOLUTION INTRAVENOUS; SUBCUTANEOUS at 12:11

## 2017-02-13 RX ADMIN — FERROUS SULFATE TAB 325 MG (65 MG ELEMENTAL FE) 325 MG: 325 (65 FE) TAB at 06:36

## 2017-02-13 RX ADMIN — BUMETANIDE 1 MG: 1 TABLET ORAL at 09:15

## 2017-02-13 RX ADMIN — ALBUTEROL SULFATE 2.5 MG: 2.5 SOLUTION RESPIRATORY (INHALATION) at 08:25

## 2017-02-13 NOTE — ROUTINE PROCESS
TRANSFER - OUT REPORT:    Verbal report given to Otto Cannon on Lj Singh, who was transferred to [facility] [employer] [facility] [place] for routine progression of care   Report consisted of patients Situation, Background, Assessment and Recommendations(SBAR).   Tonny RN BSN CCM

## 2017-02-13 NOTE — ROUTINE PROCESS
Bedside shift change report given to Mihaela Duron RN (oncoming nurse) by Stephenie Killian RN (offgoing nurse). Report included the following information SBAR, Intake/Output, Recent Results, Med Rec Status and Cardiac Rhythm A fib.

## 2017-02-13 NOTE — DISCHARGE SUMMARY
[facility]  Parul Harman D.O.  [phone]    Discharge Summary     PATIENT ID: William Hawkins  MRN: [id number]   YOB: 1931    DATE OF ADMISSION: 2/8/2017  9:49 AM    DATE OF DISCHARGE: 2/13/17   PRIMARY CARE PROVIDER: Kamala Valdes MD       DISCHARGING PHYSICIAN: Shalonda Monterroso DO    To contact this individual call [phone] and ask the  to page. If unavailable ask to be transferred the Adult Hospitalist Department. CONSULTATIONS: IP CONSULT TO FAMILY PRACTICE  IP CONSULT TO CARDIOLOGY  IP CONSULT TO PULMONOLOGY    PROCEDURES/SURGERIES: * No surgery found *    ADMITTING [facility] COURSE:   The patient is an 51-year-old   female who is relatively new to the [facility], presented to the   clinic today after reports of feeling shortness of breath over the last 2   weeks, ever since she had her heart cath 2 weeks ago at [facility], which was negative for any coronary artery   disease, but did show severe pulmonary hypertension. While she was   in the office, her oxygen level was found to be in the 70s, and she was   found to have accessory muscle use with retractions, was given back-  to-back DuoNeb treatments, and EMS was called. The patient was   placed on CPAP en route, and taken emergently to [address]   Since then, her respiratory status has improved. She is currently   weaned off of BiPAP completely, and currently sitting up with the nasal   cannula. She did report some feelings of lightheadedness and   dizziness, which have now improved. Overall, she feels like a new   person. She does report that lately she has not been able to ambulate   as much as she previously could, reported that she could not walk up   stairs at all and has not been able to walk around the [place] as   she previously could. This has been progressive over time.  When asking about pulmonary risk factors, the patient does not have any   history of tobacco use. She has no history of environmental exposures,   has never been diagnosed with asthma or COPD, has never been on   any nebulizers before, reports no previous history of lung cancer. She   does have a history of breast cancer, for which she had a mastectomy   in the past, was previously on aromatase inhibitor, but discontinued in   the last year. She has had no fevers or chills, no cough, no congestion,   no recent history of infections, no other medical concerns. DISCHARGE DIAGNOSES / PLAN:      1. Acute Respiratory Failure with hypoxia need for continuous BiPAP on admission- clinically improved with diuesis. ECHO with EF 55-60%, no wall motion abd, severe Pulm HTN, noted to have desats to 86% with ambulation will need continuous O2 2lNC while ambulating  2. Severe Pulm HTN- etiology unclear may be mixed picture of both pulmonary and cardiac component, no reversible causes so continue symptom support, diuresis with bumex BID, likely no benefit from pulmonary vasodilators, will need outpatient PFTs   3. A. Fib rate controlled on BB will start xeralto to discharge since CHADs=4  4. CHF with preserved EF- as above, continue CHF education  5. Weakness/decondioning will have PT/OT eval  6. Hypokalemia- repleted  7. DM on SSI     VTE prophylaxis: on lovenox  Discussed plan of care with Nurse   Disposition: discharge to home clinically ready to go today but will need home O2 delivered prior to discharge.        PENDING TEST RESULTS:   At the time of discharge the following test results are still pending: none, waiting on home O2    FOLLOW UP APPOINTMENTS:    Follow-up Information     Follow up With Details Comments Contact ESTEFANY Meyer On 2/17/2017 Appoinntment on 2/17/17 at 11:20 am.  [facility] [address]  [address]  Referrred for home health services. Service to begin the day after discharge. Please call the agency if no contact by 12 noon the day after discharge. Salima Lorenzo [phone], [id number] [facility]  [phone]             ADDITIONAL CARE RECOMMENDATIONS: Will need outpateint    DIET: Diabetic Diet    ACTIVITY: Activity as tolerated    WOUND CARE: none    EQUIPMENT needed: Oxygen      DISCHARGE MEDICATIONS:  Current Discharge Medication List      START taking these medications    Details   bumetanide (BUMEX) 1 mg tablet Take 1 Tab by mouth two (2) times a day. Qty: 60 Tab, Refills: 0      rivaroxaban (XARELTO) 20 mg tab tablet Take 1 Tab by mouth every evening. Qty: 30 Tab, Refills: 0      potassium chloride SR (K-TAB) 20 mEq tablet Take 1 Tab by mouth daily. Qty: 30 Tab, Refills: 0         CONTINUE these medications which have CHANGED    Details   predniSONE (DELTASONE) 5 mg tablet 6 tabs daily for 2 days then drop to, 4 tabs daily for 2 days then drop to, 2 tabs daily for 2 days then drop to, 1 tab daily for 2 days then stop. Qty: 26 Tab, Refills: 0         CONTINUE these medications which have NOT CHANGED    Details   LORazepam (ATIVAN) 0.5 mg tablet Take 1 mg by mouth every eight (8) hours as needed for Anxiety (maily used when patient experiences an Allergic Reaction/Angioedema). aspirin 81 mg chewable tablet Take 81 mg by mouth daily. diphenhydrAMINE (BENADRYL) 25 mg capsule Take 25 mg by mouth every six (6) hours as needed (Allergic reaction/Angioedema). insulin glargine (LANTUS) 100 unit/mL injection 50 Units by SubCUTAneous route nightly. For -175 = 20 units  175-200 = 30 units  > 200 = 40 units     by SubCUTAneous route nightly. Indications: type 2 diabetes mellitus  Qty: 1 Vial, Refills: 6    Associated Diagnoses: SOB (shortness of breath); Encounter for medication refill;  Aortic stenosis, moderate; Type 2 diabetes mellitus without complication, with long-term current use of insulin (HCC)      ferrous sulfate (IRON) 325 mg (65 mg iron) EC tablet Take 1 Tab by mouth Daily (before breakfast). Qty: 90 Tab, Refills: 2    Associated Diagnoses: Iron deficiency anemia, unspecified iron deficiency anemia type      atorvastatin (LIPITOR) 20 mg tablet TAKE 1 TAB BY MOUTH DAILY. Qty: 30 Tab, Refills: 11      denosumab (PROLIA) 60 mg/mL injection 60 mg by SubCUTAneous route. Twice a year (June and December)      metFORMIN (GLUCOPHAGE) 1,000 mg tablet Take 1 Tab by mouth two (2) times a day. Qty: 180 Tab, Refills: 12    Associated Diagnoses: Type II diabetes mellitus, uncontrolled ([address] Diabetes mellitus type 2, controlled ([address]      metoprolol (LOPRESSOR) 50 mg tablet Take 1 Tab by mouth two (2) times a day. Qty: 60 Tab, Refills: 0      ergocalciferol (VITAMIN D2) 50,000 unit capsule Take 1 Cap by mouth every seven (7) days. One capsule monthly  Qty: 8 Cap, Refills: 4    Associated Diagnoses: Vitamin D deficiency; Osteoporosis      albuterol (PROVENTIL VENTOLIN) 2.5 mg /3 mL (0.083 %) nebulizer solution 3 mL by Nebulization route every four (4) hours as needed for Wheezing. Qty: 1 Package, Refills: 11    Associated Diagnoses: SOB (shortness of breath)         STOP taking these medications       furosemide (LASIX) 20 mg tablet Comments:   Reason for Stopping:         isosorbide mononitrate ER (IMDUR) 30 mg tablet Comments:   Reason for Stopping:         amLODIPine (NORVASC) 10 mg tablet Comments:   Reason for Stopping:                 NOTIFY YOUR PHYSICIAN FOR ANY OF THE FOLLOWING:   Fever over 101 degrees for 24 hours. Chest pain, shortness of breath, fever, chills, nausea, vomiting, diarrhea, change in mentation, falling, weakness, bleeding. Severe pain or pain not relieved by medications. Or, any other signs or symptoms that you may have questions about.     DISPOSITION:  x  Home With:  x OT x PT x HH  RN       Long term SNF/Inpatient Rehab    Independent/assisted living Hospice    Other:       PATIENT CONDITION AT DISCHARGE:     Functional status    Poor     Deconditioned    x Independent      Cognition    x Lucid     Forgetful     Dementia      Catheters/lines (plus indication)    Mejia     PICC     PEG    x None      Code status   x  Full code     DNR      PHYSICAL EXAMINATION AT DISCHARGE:     General:  Alert, cooperative, no acute distress   Head:  Atraumatic   Eyes:  Conjunctivae clear   ENT: Oral mucosa normal   Neck: Supple, trachea midline, no adenopathy  No JVD   Back:  No CVA tenderness    Chest wall:  No tenderness or deformities    Lungs:  Decreased BS, prolonged expirtory phase    Heart:  Regular rhythm, no murmur   Abdomen:  Soft, non-tender  No masses or organomegaly    Extremities: No edema or DVT signs   Pulses: Symmetric all extremities   Skin: Warm and dry   No rashes or lesions   Neurologic: Oriented  No focal deficits   Psychiatric: good insight; normal affect   Urinary catheter: none         CHRONIC MEDICAL DIAGNOSES:  Problem List as of 2/13/2017  Date Reviewed: 2/5/2017          Codes Class Noted - Resolved    Acute respiratory disease ICD-10-CM: J06.9  ICD-9-CM: 465.9  2/8/2017 - Present        Moderate to severe pulmonary hypertension (HCC) ICD-10-CM: I27.2  ICD-9-CM: 416.8  1/25/2017 - Present        CAD (coronary artery disease), native coronary artery ICD-10-CM: I25.10  ICD-9-CM: 414.01  1/25/2017 - Present    Overview Signed 1/25/2017  4:38 PM by Vickey Maguire MD     1/25/2017Mild, nonobstructive             Pulmonary edema cardiac cause ICD-10-CM: I50.1  ICD-9-CM: 514  4/1/2016 - Present        SOB (shortness of breath) ICD-10-CM: R06.02  ICD-9-CM: 786.05  3/30/2016 - Present        CHF (congestive heart failure) (HCC) ICD-10-CM: I50.9  ICD-9-CM: 428.0  3/30/2016 - Present        Diastolic CHF, acute on chronic (HCC) ICD-10-CM: I50.33  ICD-9-CM: 428.33, 428.0  1/31/2016 - Present        Aortic stenosis ICD-10-CM: I35.0  ICD-9-CM: 424.1 1/20/2016 - Present        Hypercholesteremia ICD-10-CM: E78.00  ICD-9-CM: 272.0  12/13/2012 - Present        Anxiety ICD-10-CM: F41.9  ICD-9-CM: 300.00  5/21/2012 - Present        Multiple allergies ICD-10-CM: Z88.9  ICD-9-CM: V15.09  10/12/2011 - Present        Bronchitis ICD-10-CM: J40  ICD-9-CM: 490  10/12/2011 - Present        Allergic reaction ICD-10-CM: T78.40XA  ICD-9-CM: 995.3  10/4/2011 - Present        Vein disorder ICD-10-CM: I87.9  ICD-9-CM: 459.9  9/7/2011 - Present        Fatigue ICD-10-CM: R53.83  ICD-9-CM: 780.79  6/28/2011 - Present        Breast cancer ([facility] ICD-10-CM: C50.919  ICD-9-CM: 174.9  5/26/2011 - Present        Anemia ICD-10-CM: D64.9  ICD-9-CM: 285.9  5/26/2011 - Present        Diabetes ([facility] ICD-10-CM: E11.9  ICD-9-CM: 250.00  4/19/2011 - Present        HTN (hypertension), benign ICD-10-CM: I10  ICD-9-CM: 401.1  4/19/2011 - Present        RESOLVED: Angioedema ICD-10-CM: T78. 3XXA  ICD-9-CM: 995.1  8/8/2011 - 8/19/2011        RESOLVED: Acute respiratory failure ([facility] ICD-10-CM: J96.00  ICD-9-CM: 518.81  8/8/2011 - 8/19/2011        RESOLVED: Pneumonia, organism unspecified ICD-10-CM: J18.9  ICD-9-CM: 023  4/19/2011 - 5/26/2011        RESOLVED: Hypoxia ICD-10-CM: R09.02  ICD-9-CM: 799.02  4/19/2011 - 5/26/2011              Greater than 35 minutes were spent with the patient on counseling and coordination of care    Signed:   Pk Dobbins DO  2/13/2017  11:27 AM

## 2017-02-13 NOTE — DISCHARGE INSTRUCTIONS
DISCHARGE SUMMARY from Nurse    The following personal items are in your possession at time of discharge:       Visual Aid: Glasses, With patient                            PATIENT INSTRUCTIONS:    After general anesthesia or intravenous sedation, for 24 hours or while taking prescription Narcotics:  · Limit your activities  · Do not drive and operate hazardous machinery  · Do not make important personal or business decisions  · Do  not drink alcoholic beverages  · If you have not urinated within 8 hours after discharge, please contact your surgeon on call. Report the following to your surgeon:  · Excessive pain, swelling, redness or odor of or around the surgical area  · Temperature over 100.5  · Nausea and vomiting lasting longer than 4 hours or if unable to take medications  · Any signs of decreased circulation or nerve impairment to extremity: change in color, persistent  numbness, tingling, coldness or increase pain  · Any questions        What to do at Home:    *  Please give a list of your current medications to your Primary Care Provider. *  Please update this list whenever your medications are discontinued, doses are      changed, or new medications (including over-the-counter products) are added. *  Please carry medication information at all times in case of emergency situations. These are general instructions for a healthy lifestyle:    No smoking/ No tobacco products/ Avoid exposure to second hand smoke    Surgeon General's Warning:  Quitting smoking now greatly reduces serious risk to your health.     Obesity, smoking, and sedentary lifestyle greatly increases your risk for illness    A healthy diet, regular physical exercise & weight monitoring are important for maintaining a healthy lifestyle    You may be retaining fluid if you have a history of heart failure or if you experience any of the following symptoms:  Weight gain of 3 pounds or more overnight or 5 pounds in a week, increased swelling in our hands or feet or shortness of breath while lying flat in bed. Please call your doctor as soon as you notice any of these symptoms; do not wait until your next office visit. Recognize signs and symptoms of STROKE:    F-face looks uneven    A-arms unable to move or move unevenly    S-speech slurred or non-existent    T-time-call 911 as soon as signs and symptoms begin-DO NOT go       Back to bed or wait to see if you get better-TIME IS BRAIN. Warning Signs of HEART ATTACK     Call 911 if you have these symptoms:   Chest discomfort. Most heart attacks involve discomfort in the center of the chest that lasts more than a few minutes, or that goes away and comes back. It can feel like uncomfortable pressure, squeezing, fullness, or pain.  Discomfort in other areas of the upper body. Symptoms can include pain or discomfort in one or both arms, the back, neck, jaw, or stomach.  Shortness of breath with or without chest discomfort.  Other signs may include breaking out in a cold sweat, nausea, or lightheadedness. Don't wait more than five minutes to call 911 - MINUTES MATTER! Fast action can save your life. Calling 911 is almost always the fastest way to get lifesaving treatment. Emergency Medical Services staff can begin treatment when they arrive -- up to an hour sooner than if someone gets to the hospital by car. The discharge information has been reviewed with the patient. The patient verbalized understanding. Discharge medications reviewed with the patient and appropriate educational materials and side effects teaching were provided.

## 2017-02-13 NOTE — INTERDISCIPLINARY ROUNDS
Interdisciplinary team rounds were held 2/13/2017 with the following team members:Care Management, Nursing, Nutrition, Physical Therapy and Clinical Coordinator and the patient. Plan of care discussed. See clinical pathway and/or care plan for interventions and desired outcomes.

## 2017-02-13 NOTE — PROGRESS NOTES
Problem: Diabetes Self-Management  Goal: *Disease process and treatment process  Define diabetes and identify own type of diabetes; list 3 options for treating diabetes. Outcome: Progressing Towards Goal  Blood sugar monitored AC and HS, results and tx discussed with pt. Problem: Falls - Risk of  Goal: *Absence of falls  Outcome: Progressing Towards Goal  Pt alert and oriented. Bedside commode easily accessible. Call bell and belongings within reach. Problem: Pressure Ulcer - Risk of  Goal: *Prevention of pressure ulcer  Outcome: Progressing Towards Goal  Skin cdi and blanchable with no breakdown. Problem: Heart Failure: Day 4  Goal: *Optimal pain control at patients stated goal  Outcome: Progressing Towards Goal  Pt denies pain at this time.

## 2017-02-13 NOTE — PROGRESS NOTES
0640  Pt has blood sugar of 391. Dr Joshua Marques paged. Order received on telephone for 8 units humalog. 2822  Bedside shift change report given to Bright (oncoming nurse) by Karishma Hunt (offgoing nurse). Report included the following information SBAR, Intake/Output, MAR and Cardiac Rhythm A fib.

## 2017-02-13 NOTE — CARDIO/PULMONARY
Cardiac Wellness: Heart Failure education folder given to Kadeem Griffiths. Consult for cardiac rehab noted; diastolic dysfunction per cardiology note. Educated using teach back method. Discussed diagnosis definition and assessed patient understanding. Reviewed importance of daily weight monitoring and low sodium diet (less than 1500 mg. daily). Reviewed the \"heart failure zones\" with patient, and discussed importance of reporting signs and symptoms of exacerbation and when to report them to the doctor to prevent re-hospitalization. Kadeem Griffiths was encouraged to keep all appointments with doctor. Smoking history assessed. Pt is a non smoker. HF teachback questions answered by patient.     Ana Lilia William RN

## 2017-02-13 NOTE — PROGRESS NOTES
CM noted patient is discharged home with home health for PT/OT/SN and home O2. [facility] notified of discharge. 11:00 am. CM called [facility] for home O2 tank and home set up. 11:30 am. Faxed signed order to [phone]    11:45 am. CM met with patient to discuss discharge planning, informed patient of services from [facility] and [facility] for home O2. Patient discharged on Xarelto, CM gave patient a 30 Day Free Trial Card to use at the drug store. Patient understood instructions. 12:20 pm. [facility] for estimated time of delivery, spoke with Sera Tomeka, he stated that he sent the referral to their . CM informed bedside nurse of home O2 delivery. Randy Galvan MSA, RN, CRM.

## 2017-02-16 ENCOUNTER — PATIENT OUTREACH (OUTPATIENT)
Dept: CARDIOLOGY CLINIC | Age: 82
End: 2017-02-16

## 2017-02-20 ENCOUNTER — OFFICE VISIT (OUTPATIENT)
Dept: CARDIOLOGY CLINIC | Age: 82
End: 2017-02-20

## 2017-02-20 VITALS
HEIGHT: 65 IN | WEIGHT: 171 LBS | OXYGEN SATURATION: 96 % | HEART RATE: 65 BPM | SYSTOLIC BLOOD PRESSURE: 174 MMHG | BODY MASS INDEX: 28.49 KG/M2 | DIASTOLIC BLOOD PRESSURE: 73 MMHG

## 2017-02-20 DIAGNOSIS — I27.20 PULMONARY HYPERTENSION (HCC): ICD-10-CM

## 2017-02-20 DIAGNOSIS — I48.0 PAROXYSMAL ATRIAL FIBRILLATION (HCC): Primary | ICD-10-CM

## 2017-02-20 RX ORDER — PEN NEEDLE, DIABETIC 29 G X1/2"
NEEDLE, DISPOSABLE MISCELLANEOUS
COMMUNITY
Start: 2017-02-18 | End: 2019-09-17

## 2017-02-20 RX ORDER — CALCIUM CITRATE/VITAMIN D3 200MG-6.25
TABLET ORAL
COMMUNITY
Start: 2017-02-18 | End: 2019-09-17

## 2017-02-20 RX ORDER — FUROSEMIDE 20 MG/1
TABLET ORAL
COMMUNITY
Start: 2017-01-22 | End: 2017-03-20 | Stop reason: ALTCHOICE

## 2017-02-20 RX ORDER — IPRATROPIUM BROMIDE 0.5 MG/2.5ML
0.5 SOLUTION RESPIRATORY (INHALATION)
COMMUNITY
Start: 2017-02-02

## 2017-02-20 RX ORDER — ATORVASTATIN CALCIUM 40 MG/1
TABLET, FILM COATED ORAL
COMMUNITY
Start: 2017-02-02 | End: 2017-05-15 | Stop reason: DRUGHIGH

## 2017-02-20 RX ORDER — POTASSIUM CHLORIDE 1500 MG/1
TABLET, EXTENDED RELEASE ORAL
Refills: 0 | COMMUNITY
Start: 2017-02-14 | End: 2017-03-20 | Stop reason: ALTCHOICE

## 2017-02-20 RX ORDER — ALBUTEROL SULFATE 90 UG/1
AEROSOL, METERED RESPIRATORY (INHALATION)
COMMUNITY
Start: 2017-02-02 | End: 2017-03-20 | Stop reason: ALTCHOICE

## 2017-02-20 NOTE — MR AVS SNAPSHOT
Visit Information Date & Time Provider Department Dept. Phone Encounter #  
 2/20/2017  1:20 PM Hyacinth Mtz MD [facility] [phone] [id number] Upcoming Health Maintenance Date Due DTaP/Tdap/Td series (1 - Tdap) 8/16/1952 EYE EXAM RETINAL OR DILATED Q1 1/9/2016 Pneumococcal 65+ High/Highest Risk (2 of 2 - PPSV23) 4/19/2016 MEDICARE YEARLY EXAM 11/19/2016 GLAUCOMA SCREENING Q2Y 1/9/2017 FOOT EXAM Q1 1/28/2017 MICROALBUMIN Q1 3/10/2017 LIPID PANEL Q1 4/1/2017 HEMOGLOBIN A1C Q6M 8/8/2017 Allergies as of 2/20/2017  Review Complete On: 2/20/2017 By: Yudelka Robbins Severity Noted Reaction Type Reactions Ace Inhibitors High 05/21/2012   Systemic Angioedema Current Immunizations  Reviewed on 2/13/2017 Name Date Influenza High Dose Vaccine PF 11/19/2015 Influenza Vaccine 10/1/2016, 10/22/2013, 12/13/2012 Influenza Vaccine Split 9/27/2011 11:15 AM  
 Influenza Vaccine Whole 10/8/2010 Pneumococcal Vaccine (Unspecified Type) 4/19/2011 Not reviewed this visit You Were Diagnosed With   
  
 Codes Comments Paroxysmal atrial fibrillation (HCC)    -  Primary ICD-10-CM: I48.0 ICD-9-CM: 427.31 Vitals BP Pulse Height(growth percentile) Weight(growth percentile) SpO2 BMI  
 174/73 65 5' 5\" (1.651 m) 171 lb (77.6 kg) 96% 28.46 kg/m2 OB Status Smoking Status Postmenopausal Never Smoker Vitals History BMI and BSA Data Body Mass Index Body Surface Area  
 28.46 kg/m 2 1.89 m 2 Preferred Pharmacy Pharmacy Name Phone [facility]/PHARMACY #[id number] [id number] [facility] AT [address] [place] [address] [phone] Your Updated Medication List  
  
   
This list is accurate as of: 2/20/17  2:13 PM.  Always use your most recent med list.  
  
  
  
  
 albuterol 2.5 mg /3 mL (0.083 %) nebulizer solution Commonly known as:  PROVENTIL VENTOLIN  
3 mL by Nebulization route every four (4) hours as needed for Wheezing. aspirin 81 mg chewable tablet Take 81 mg by mouth daily. atorvastatin 20 mg tablet Commonly known as:  LIPITOR  
TAKE 1 TAB BY MOUTH DAILY. bumetanide 1 mg tablet Commonly known as:  Marcheta Mocha Take 1 Tab by mouth two (2) times a day. denosumab 60 mg/mL injection Commonly known as:  Kirti Mires 60 mg by SubCUTAneous route. Twice a year (June and December) diphenhydrAMINE 25 mg capsule Commonly known as:  BENADRYL Take 25 mg by mouth every six (6) hours as needed (Allergic reaction/Angioedema). ergocalciferol 50,000 unit capsule Commonly known as:  VITAMIN D2 Take 1 Cap by mouth every seven (7) days. One capsule monthly  
  
 ferrous sulfate 325 mg (65 mg iron) EC tablet Commonly known as:  IRON Take 1 Tab by mouth Daily (before breakfast). insulin glargine 100 unit/mL injection Commonly known as:  LANTUS  
50 Units by SubCUTAneous route nightly. For -175 = 20 units 175-200 = 30 units > 200 = 40 units   by SubCUTAneous route nightly. Indications: type 2 diabetes mellitus LORazepam 0.5 mg tablet Commonly known as:  ATIVAN Take 1 mg by mouth every eight (8) hours as needed for Anxiety (maily used when patient experiences an Allergic Reaction/Angioedema). metFORMIN 1,000 mg tablet Commonly known as:  GLUCOPHAGE Take 1 Tab by mouth two (2) times a day. metoprolol tartrate 50 mg tablet Commonly known as:  LOPRESSOR Take 1 Tab by mouth two (2) times a day. potassium chloride SR 20 mEq tablet Commonly known as:  K-TAB Take 1 Tab by mouth daily. predniSONE 5 mg tablet Commonly known as:  DELTASONE  
6 tabs daily for 2 days then drop to, 4 tabs daily for 2 days then drop to, 2 tabs daily for 2 days then drop to, 1 tab daily for 2 days then stop.  
  
 rivaroxaban 20 mg Tab tablet Commonly known as:  [place] Take 1 Tab by mouth every evening. To-Do List   
 02/20/2017 ECG:  ECG HOLTER MONITOR, UP TO 48 HRS Patient Instructions -- Please make appointment for the 48-hour holter monitor 
-- We will call you with the results of this test and further need for Xarelto -- Please call Dr. Elba Maria office for evaluation of pulmonary hypertension -- Please make a follow up appointment with us in 1 month Atrial Fibrillation: Care Instructions Your Care Instructions Atrial fibrillation is an irregular and often fast heartbeat. Treating this condition is important for several reasons. It can cause blood clots, which can travel from your heart to your brain and cause a stroke. If you have a fast heartbeat, you may feel lightheaded, dizzy, and weak. An irregular heartbeat can also increase your risk for heart failure. Atrial fibrillation is often the result of another heart condition, such as high blood pressure or coronary artery disease. Making changes to improve your heart condition will help you stay healthy and active. Follow-up care is a key part of your treatment and safety. Be sure to make and go to all appointments, and call your doctor if you are having problems. It's also a good idea to know your test results and keep a list of the medicines you take. How can you care for yourself at home? Medicines · Take your medicines exactly as prescribed. Call your doctor if you think you are having a problem with your medicine. You will get more details on the specific medicines your doctor prescribes. · If your doctor has given you a blood thinner to prevent a stroke, be sure you get instructions about how to take your medicine safely. Blood thinners can cause serious bleeding problems. · Do not take any vitamins, over-the-counter drugs, or herbal products without talking to your doctor first. 
Lifestyle changes · Do not smoke. Smoking can increase your chance of a stroke and heart attack. If you need help quitting, talk to your doctor about stop-smoking programs and medicines. These can increase your chances of quitting for good. · Eat a heart-healthy diet. · Stay at a healthy weight. Lose weight if you need to. · Limit alcohol to 2 drinks a day for men and 1 drink a day for women. Too much alcohol can cause health problems. · Avoid colds and flu. Get a pneumococcal vaccine shot. If you have had one before, ask your doctor whether you need another dose. Get a flu shot every year. If you must be around people with colds or flu, wash your hands often. Activity · If your doctor recommends it, get more exercise. Walking is a good choice. Bit by bit, increase the amount you walk every day. Try for at least 30 minutes on most days of the week. You also may want to swim, bike, or do other activities. Your doctor may suggest that you join a cardiac rehabilitation program so that you can have help increasing your physical activity safely. · Start light exercise if your doctor says it is okay. Even a small amount will help you get stronger, have more energy, and manage stress. Walking is an easy way to get exercise. Start out by walking a little more than you did in the hospital. Gradually increase the amount you walk. · When you exercise, watch for signs that your heart is working too hard. You are pushing too hard if you cannot talk while you are exercising. If you become short of breath or dizzy or have chest pain, sit down and rest immediately. · Check your pulse regularly. Place two fingers on the artery at the palm side of your wrist, in line with your thumb. If your heartbeat seems uneven or fast, talk to your doctor. When should you call for help? Call 911 anytime you think you may need emergency care. For example, call if: 
· You have symptoms of a heart attack. These may include: ¨ Chest pain or pressure, or a strange feeling in the chest. 
¨ Sweating. ¨ Shortness of breath. ¨ Nausea or vomiting. ¨ Pain, pressure, or a strange feeling in the back, neck, jaw, or upper belly or in one or both shoulders or arms. ¨ Lightheadedness or sudden weakness. ¨ A fast or irregular heartbeat. After you call 911, the  may tell you to chew 1 adult-strength or 2 to 4 low-dose aspirin. Wait for an ambulance. Do not try to drive yourself. · You have symptoms of a stroke. These may include: 
¨ Sudden numbness, tingling, weakness, or loss of movement in your face, arm, or leg, especially on only one side of your body. ¨ Sudden vision changes. ¨ Sudden trouble speaking. ¨ Sudden confusion or trouble understanding simple statements. ¨ Sudden problems with walking or balance. ¨ A sudden, severe headache that is different from past headaches. · You passed out (lost consciousness). Call your doctor now or seek immediate medical care if: 
· You have new or increased shortness of breath. · You feel dizzy or lightheaded, or you feel like you may faint. · Your heart rate becomes irregular. · You can feel your heart flutter in your chest or skip heartbeats. Tell your doctor if these symptoms are new or worse. Watch closely for changes in your health, and be sure to contact your doctor if you have any problems. Where can you learn more? Go to http://ni-nohemi.info/. Enter U020 in the search box to learn more about \"Atrial Fibrillation: Care Instructions. \" Current as of: January 27, 2016 Content Version: 11.1 © 2616-0624 [employer] Care instructions adapted under license by [employer] (which disclaims liability or warranty for this information).  If you have questions about a medical condition or this instruction, always ask your healthcare professional. [address] any warranty or liability for your use of this information. Introducing hospitals & HEALTH SERVICES! [facility] introduces eGifter patient portal. Now you can access parts of your medical record, email your doctor's office, and request medication refills online. 1. In your internet browser, go to [email or web address] 2. Click on the First Time User? Click Here link in the Sign In box. You will see the New Member Sign Up page. 3. Enter your eGifter Access Code exactly as it appears below. You will not need to use this code after youve completed the sign-up process. If you do not sign up before the expiration date, you must request a new code. · eGifter Access Code: [email or web address] Expires: 4/24/2017  3:59 PM 
 
4. Enter the last four digits of your Social Security Number (xxxx) and Date of Birth (mm/dd/yyyy) as indicated and click Submit. You will be taken to the next sign-up page. 5. Create a eGifter ID. This will be your eGifter login ID and cannot be changed, so think of one that is secure and easy to remember. 6. Create a eGifter password. You can change your password at any time. 7. Enter your Password Reset Question and Answer. This can be used at a later time if you forget your password. 8. Enter your e-mail address. You will receive e-mail notification when new information is available in [address] 9. Click Sign Up. You can now view and download portions of your medical record. 10. Click the Download Summary menu link to download a portable copy of your medical information. If you have questions, please visit the Frequently Asked Questions section of the eGifter website. Remember, eGifter is NOT to be used for urgent needs. For medical emergencies, dial 911. Now available from your iPhone and Android! Please provide this summary of care documentation to your next provider. Your primary care clinician is listed as [phone].  If you have any questions after today's visit, please call [phone].

## 2017-02-20 NOTE — PATIENT INSTRUCTIONS
-- Please make appointment for the 48-hour holter monitor  -- We will call you with the results of this test and further need for Xarelto  -- Please call Dr. Skinny Shafer office for evaluation of pulmonary hypertension  -- Please make a follow up appointment with us in 1 month    Atrial Fibrillation: Care Instructions  Your Care Instructions    Atrial fibrillation is an irregular and often fast heartbeat. Treating this condition is important for several reasons. It can cause blood clots, which can travel from your heart to your brain and cause a stroke. If you have a fast heartbeat, you may feel lightheaded, dizzy, and weak. An irregular heartbeat can also increase your risk for heart failure. Atrial fibrillation is often the result of another heart condition, such as high blood pressure or coronary artery disease. Making changes to improve your heart condition will help you stay healthy and active. Follow-up care is a key part of your treatment and safety. Be sure to make and go to all appointments, and call your doctor if you are having problems. It's also a good idea to know your test results and keep a list of the medicines you take. How can you care for yourself at home? Medicines  · Take your medicines exactly as prescribed. Call your doctor if you think you are having a problem with your medicine. You will get more details on the specific medicines your doctor prescribes. · If your doctor has given you a blood thinner to prevent a stroke, be sure you get instructions about how to take your medicine safely. Blood thinners can cause serious bleeding problems. · Do not take any vitamins, over-the-counter drugs, or herbal products without talking to your doctor first.  Lifestyle changes  · Do not smoke. Smoking can increase your chance of a stroke and heart attack. If you need help quitting, talk to your doctor about stop-smoking programs and medicines.  These can increase your chances of quitting for good.  · Eat a heart-healthy diet. · Stay at a healthy weight. Lose weight if you need to. · Limit alcohol to 2 drinks a day for men and 1 drink a day for women. Too much alcohol can cause health problems. · Avoid colds and flu. Get a pneumococcal vaccine shot. If you have had one before, ask your doctor whether you need another dose. Get a flu shot every year. If you must be around people with colds or flu, wash your hands often. Activity  · If your doctor recommends it, get more exercise. Walking is a good choice. Bit by bit, increase the amount you walk every day. Try for at least 30 minutes on most days of the week. You also may want to swim, bike, or do other activities. Your doctor may suggest that you join a cardiac rehabilitation program so that you can have help increasing your physical activity safely. · Start light exercise if your doctor says it is okay. Even a small amount will help you get stronger, have more energy, and manage stress. Walking is an easy way to get exercise. Start out by walking a little more than you did in the hospital. Gradually increase the amount you walk. · When you exercise, watch for signs that your heart is working too hard. You are pushing too hard if you cannot talk while you are exercising. If you become short of breath or dizzy or have chest pain, sit down and rest immediately. · Check your pulse regularly. Place two fingers on the artery at the palm side of your wrist, in line with your thumb. If your heartbeat seems uneven or fast, talk to your doctor. When should you call for help? Call 911 anytime you think you may need emergency care. For example, call if:  · You have symptoms of a heart attack. These may include:  ¨ Chest pain or pressure, or a strange feeling in the chest.  ¨ Sweating. ¨ Shortness of breath. ¨ Nausea or vomiting.   ¨ Pain, pressure, or a strange feeling in the back, neck, jaw, or upper belly or in one or both shoulders or arms.  ¨ Lightheadedness or sudden weakness. ¨ A fast or irregular heartbeat. After you call 911, the  may tell you to chew 1 adult-strength or 2 to 4 low-dose aspirin. Wait for an ambulance. Do not try to drive yourself. · You have symptoms of a stroke. These may include:  ¨ Sudden numbness, tingling, weakness, or loss of movement in your face, arm, or leg, especially on only one side of your body. ¨ Sudden vision changes. ¨ Sudden trouble speaking. ¨ Sudden confusion or trouble understanding simple statements. ¨ Sudden problems with walking or balance. ¨ A sudden, severe headache that is different from past headaches. · You passed out (lost consciousness). Call your doctor now or seek immediate medical care if:  · You have new or increased shortness of breath. · You feel dizzy or lightheaded, or you feel like you may faint. · Your heart rate becomes irregular. · You can feel your heart flutter in your chest or skip heartbeats. Tell your doctor if these symptoms are new or worse. Watch closely for changes in your health, and be sure to contact your doctor if you have any problems. Where can you learn more? Go to http://ni-nohemi.info/. Enter U020 in the search box to learn more about \"Atrial Fibrillation: Care Instructions. \"  Current as of: January 27, 2016  Content Version: 11.1  © 0010-5229 [employer]. Care instructions adapted under license by [employer] (which disclaims liability or warranty for this information). If you have questions about a medical condition or this instruction, always ask your healthcare professional. [place] [address] any warranty or liability for your use of this information.

## 2017-02-20 NOTE — PROGRESS NOTES
[facility]   [address] [facility], [address] [place] [address]                                          NEW PATIENT HPI/FOLLOW-UP      NAME:  William Hawkins   :   1931   MRN:   [id number]   PCP:  Kamala Valdes MD           Subjective: The patient is a 80y.o. year old female  who returns for a routine follow-up. Since the last visit, patient reports no new symptoms. Denies change in exercise tolerance, chest pain, edema, medication intolerance, palpitations, shortness of breath, PND/orthopnea wheezing, sputum, syncope, dizziness or light headedness. Doing satisfactorily. Review of Systems  General: Pt denies excessive weight gain or loss. Pt is able to conduct ADL's. Respiratory: Denies shortness of breath, ESPANA, wheezing or stridor.   Cardiovascular: Denies precordial pain, palpitations, edema or PND  Gastrointestinal: Denies poor appetite, indigestion, abdominal pain or blood in stool  Peripheral vascular: Denies claudication, leg cramps  Neuropsychiatric: Denies paresthesias,tingling,numbness,anxiety,depression,fatigue  Musculoskeletal: Denies pain,tenderness, soreness,swelling      Past Medical History   Diagnosis Date    Anxiety     Breast cancer ([address] , 2010     right , left 2010    Cancer [facility])       cancer right breast cancer    Cancer ([address]      cancer left breast/new dx 2011 +bx     Diabetes ([address]     Hypercholesterolemia     Hypertension     Other ill-defined conditions(799.89)      osteopoosis    Other ill-defined conditions(799.89)      high cholesterol    Pneumonia     Psychiatric disorder      anxiety     Patient Active Problem List    Diagnosis Date Noted    Acute respiratory disease 2017    Moderate to severe pulmonary hypertension ([address] 2017    CAD (coronary artery disease), native coronary artery 2017    Pulmonary edema cardiac cause 2016    SOB (shortness of breath) 2016    CHF (congestive heart failure) ([facility] 45/45/7964    Diastolic CHF, acute on chronic ([place] [address] 01/31/2016    Aortic stenosis 01/20/2016    Hypercholesteremia 12/13/2012    Anxiety 05/21/2012    Multiple allergies 10/12/2011    Bronchitis 10/12/2011    Allergic reaction 10/04/2011    Vein disorder 09/07/2011    Fatigue 06/28/2011    Breast cancer ([place] [address] 05/26/2011    Anemia 05/26/2011    Diabetes ([place] [address] 04/19/2011    HTN (hypertension), benign 04/19/2011      Past Surgical History   Procedure Laterality Date    Pr breast surgery procedure unlisted       right mastectomy    Pr biopsy of breast, incisional       left breast 8/2011 positive for cancer cells    Hx mastectomy Right 2005    Hx breast lumpectomy Left 2010     Allergies   Allergen Reactions    Ace Inhibitors Angioedema      Family History   Problem Relation Age of Onset    Hypertension Mother     Stroke Other       Social History     Social History    Marital status:      Spouse name: N/A    Number of children: N/A    Years of education: N/A     Occupational History    Not on file. Social History Main Topics    Smoking status: Never Smoker    Smokeless tobacco: Never Used    Alcohol use No    Drug use: No    Sexual activity: Not Currently     Other Topics Concern    Not on file     Social History Narrative        She does not have a history of smoking, but does have second hand smoking exposure from her  , for a period of 27- 36 yrs, , approx 30 yrs ago. 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 She has two children, her dtr and son, both of whom are present during the interview, patient states that she wants them both to make any medical decisions for her, she wants to be a full code. Current Outpatient Prescriptions   Medication Sig    bumetanide (BUMEX) 1 mg tablet Take 1 Tab by mouth two (2) times a day.  rivaroxaban (XARELTO) 20 mg tab tablet Take 1 Tab by mouth every evening.     predniSONE (DELTASONE) 5 mg tablet 6 tabs daily for 2 days then drop to, 4 tabs daily for 2 days then drop to, 2 tabs daily for 2 days then drop to, 1 tab daily for 2 days then stop.  potassium chloride SR (K-TAB) 20 mEq tablet Take 1 Tab by mouth daily.  LORazepam (ATIVAN) 0.5 mg tablet Take 1 mg by mouth every eight (8) hours as needed for Anxiety (maily used when patient experiences an Allergic Reaction/Angioedema).  aspirin 81 mg chewable tablet Take 81 mg by mouth daily.  diphenhydrAMINE (BENADRYL) 25 mg capsule Take 25 mg by mouth every six (6) hours as needed (Allergic reaction/Angioedema).  insulin glargine (LANTUS) 100 unit/mL injection 50 Units by SubCUTAneous route nightly. For -175 = 20 units  175-200 = 30 units  > 200 = 40 units     by SubCUTAneous route nightly. Indications: type 2 diabetes mellitus (Patient taking differently: 20-40 Units by SubCUTAneous route nightly. For -175 = 20 units  175-200 = 30 units  > 200 = 40 units     by SubCUTAneous route nightly. Indications: type 2 diabetes mellitus)    ferrous sulfate (IRON) 325 mg (65 mg iron) EC tablet Take 1 Tab by mouth Daily (before breakfast).  atorvastatin (LIPITOR) 20 mg tablet TAKE 1 TAB BY MOUTH DAILY.  denosumab (PROLIA) 60 mg/mL injection 60 mg by SubCUTAneous route. Twice a year (June and December)    metFORMIN (GLUCOPHAGE) 1,000 mg tablet Take 1 Tab by mouth two (2) times a day.  metoprolol (LOPRESSOR) 50 mg tablet Take 1 Tab by mouth two (2) times a day.  ergocalciferol (VITAMIN D2) 50,000 unit capsule Take 1 Cap by mouth every seven (7) days. One capsule monthly    albuterol (PROVENTIL VENTOLIN) 2.5 mg /3 mL (0.083 %) nebulizer solution 3 mL by Nebulization route every four (4) hours as needed for Wheezing.     VENTOLIN HFA 90 mcg/actuation inhaler     atorvastatin (LIPITOR) 40 mg tablet     TRUE METRIX GLUCOSE TEST STRIP strip     furosemide (LASIX) 20 mg tablet     ipratropium (ATROVENT) 0.02 % nebulizer solution     KLOR-CON M20 20 mEq tablet TAKE 1 TABLET BY MOUTH EVERY DAY    BD INSULIN SYRINGE ULTRA-FINE 0.5 mL 31 gauge x 5/16 syrg      No current facility-administered medications for this visit. I have reviewed the nurses notes, vitals, problem list, allergy list, medical history, family medical, social history and medications. Objective:     Physical Exam:     Vitals:    02/20/17 1313   BP: 174/73   Pulse: 65   SpO2: 96%   Weight: 171 lb (77.6 kg)   Height: 5' 5\" (1.651 m)    Body mass index is 28.46 kg/(m^2). General: Well developed, in no acute distress. HEENT: No carotid bruits, no JVD, trach is midline. Heart:  Normal S1/S2 negative S3 or S4. Regular, no murmur, gallop or rub.   Respiratory: Clear bilaterally, no wheezing or rales  Abdomen:   Soft, non-tender, bowel sounds are active.   Extremities:  No edema, normal cap refill, no cyanosis. Neuro: A&Ox3, speech clear, gait stable. Skin: Skin color is normal. No rashes or lesions. No diaphoresis.   Vascular: 2+ pulses symmetric in all extremities        Data Review:       Cardiographics:    EKG: NSR, with inferolateral ST depression at rest.     Cardiology Labs:    Results for orders placed or performed during the hospital encounter of 02/08/17   EKG, 12 LEAD, INITIAL   Result Value Ref Range    Ventricular Rate 80 BPM    Atrial Rate 68 BPM    QRS Duration 88 ms    Q-T Interval 404 ms    QTC Calculation (Bezet) 465 ms    Calculated R Axis 13 degrees    Calculated T Axis 78 degrees    Diagnosis       Atrial fibrillation  ST depression, consider subendocardial injury  When compared with ECG of 01-APR-2016 09:45,  Atrial fibrillation has replaced Sinus rhythm  Confirmed by Sabi Wilson MD, Peyton Officer ([id number]) on 2/8/2017 10:45:02 AM         Lab Results   Component Value Date/Time    Cholesterol, total 191 04/01/2016 02:37 AM    HDL Cholesterol 97 04/01/2016 02:37 AM    LDL, calculated 82 04/01/2016 02:37 AM    Triglyceride 60 04/01/2016 02:37 AM    CHOL/HDL Ratio 2.0 04/01/2016 02:37 AM       Lab Results   Component Value Date/Time    Sodium 141 02/12/2017 02:41 AM    Potassium 3.3 02/12/2017 02:41 AM    Chloride 101 02/12/2017 02:41 AM    CO2 33 02/12/2017 02:41 AM    Anion gap 7 02/12/2017 02:41 AM    Glucose 193 02/12/2017 02:41 AM    BUN 16 02/12/2017 02:41 AM    Creatinine 0.70 02/12/2017 02:41 AM    BUN/Creatinine ratio 23 02/12/2017 02:41 AM    GFR est AA >60 02/12/2017 02:41 AM    GFR est non-AA >60 02/12/2017 02:41 AM    Calcium 7.3 02/12/2017 02:41 AM    Bilirubin, total 0.5 02/09/2017 05:04 AM    AST (SGOT) 10 02/09/2017 05:04 AM    Alk. phosphatase 96 02/09/2017 05:04 AM    Protein, total 6.9 02/09/2017 05:04 AM    Albumin 2.6 02/09/2017 05:04 AM    Globulin 4.3 02/09/2017 05:04 AM    A-G Ratio 0.6 02/09/2017 05:04 AM    ALT (SGPT) 17 02/09/2017 05:04 AM          Assessment:       ICD-10-CM ICD-9-CM    1. Paroxysmal atrial fibrillation (HCC) I48.0 427.31 ECG HOLTER MONITOR, UP TO 48 HRS         Discussion: Patient presents at this time stable from a cardiac perspective. Pleased with present status. Plan: 1. Continue same meds. 2.Encouraged to exercise to tolerance, lose weight, stop smoking and follow low fat, low cholesterol, low sodium predominantly Plant-based (consider Mediterranean) diet. Call with questions or concerns. Will follow up any test results by phone and/or f/u here in office if needed. Andrzej Zuniga 3.Follow up: on a monthly basis    I have discussed the diagnosis with the patient and the intended plan as seen in the above orders. The patient has received an after-visit summary and questions were answered concerning future plans. I have discussed any concerning medication side effects and warnings with the patient as well.     Veverly CB Nicholas  2/20/2017

## 2017-02-22 ENCOUNTER — DOCUMENTATION ONLY (OUTPATIENT)
Dept: CARDIOLOGY CLINIC | Age: 82
End: 2017-02-22

## 2017-02-22 ENCOUNTER — HOSPITAL ENCOUNTER (OUTPATIENT)
Dept: NON INVASIVE DIAGNOSTICS | Age: 82
Discharge: HOME | End: 2017-02-22
Attending: PHYSICIAN ASSISTANT

## 2017-02-22 DIAGNOSIS — I48.0 PAROXYSMAL ATRIAL FIBRILLATION (HCC): ICD-10-CM

## 2017-02-22 NOTE — PROGRESS NOTES
holter placed after instruction to pt and daughter, o2 placed on patient for duration of instruction and visit as well as transport to Dr Fischer Child office. holter placed with instruction sheet and extra supplies given , diary explained, noted to daughter to return monitor on friday  After 1130. fo reading. Pt states\" I have had bleeding from right nares Monday and last night and this am spit up some blood for a bout one to two minutes. \"    This rn transported the patient to Dr Fischer Child office where Chaparrita Sheridan spoke with patient and daughter, noting to complete holter and wait for report. Then decision will be made to conitnue zarelto or discontinue or change med completely, this noted by roro Green to pt and daughter. Noted by Lidia Blunt if bleeding is not controllable return to emergency room. I as well told pt if she does not feel well to return to er. I assisted pt to car with daughter and placed seat belt for safety. Oxygen was changed over to pt's tank while in car at 2l nc.    [address]  [facility] [id number]

## 2017-02-22 NOTE — PROGRESS NOTES
12:23 PM    Pt was wheeled over to our office from cardiology lab by cardiac RN Louise Najera, where pt was having a 48-hr holter was being placed. Pt noted a nosebleed last night that resolved after 5 minutes of pressure to cardiac RN, which prompted the quick visit to our clinic. She also notes coughing up a blood clot this morning after the nosebleed. She is concerned the nosebleed was d/t Xarelto. She is also on O2 by NC. Pt was advised to use vaseline to keep nasal membranes moist, to return to ER if she has another nosebleed that she cannot stop. A stat read request was placed on the 48-hr holter and I advised we address the need for blood thinner after determining burden of A-fib at that time. Discussed with Dr. Sonja Burns.      Mohini Rose PA-C

## 2017-02-28 NOTE — PROGRESS NOTES
Cardiology attending:    I also interviewed and examined this patient and we had a discussion with her family about the cardiac catheterization findings and plan of care. Recent right and left heart catheterization are summarized as follows:      --  HEMODYNAMICS:  --  Hemodynamic assessment demonstrates mildly to moderately depressed  cardiac output and mildly to moderately elevated pulmonary capillary wedge  pressure. -- Cole Ricardo is severe pulmonary hypertension. --  CARDIAC STRUCTURES:  --  Global left ventricular function was normal. EF calculated by contrast  ventriculography was 60 %. --  There was mild aortic stenosis by mean gradient. --  CORONARY CIRCULATION:  --  Coronary angiography demonstrated minor luminal irregularities. RECOMMENDATIONS:  Patient management should include optimal medical therapy, an exercise  program, and weight reduction. The patient should follow a low fat and low  calorie diet. Recommend further pulmonary evaluation of severe pulmonary  hypertension of combined precapillary greater than postcapillary origin. The patient would benefit from nitric oxide challenge in order to decide about potential reversibility of the pulmonary hypertension. She will be offered the opportunity to be assessed by Dr. Jyoti Paul for this purpose.     All findings and full management plan discussed and agreed with CESAR Parada

## 2017-03-20 ENCOUNTER — OFFICE VISIT (OUTPATIENT)
Dept: CARDIOLOGY CLINIC | Age: 82
End: 2017-03-20

## 2017-03-20 VITALS
SYSTOLIC BLOOD PRESSURE: 182 MMHG | OXYGEN SATURATION: 93 % | DIASTOLIC BLOOD PRESSURE: 72 MMHG | WEIGHT: 176.4 LBS | BODY MASS INDEX: 29.39 KG/M2 | HEIGHT: 65 IN | HEART RATE: 69 BPM

## 2017-03-20 DIAGNOSIS — I50.32 CHRONIC DIASTOLIC CONGESTIVE HEART FAILURE (HCC): ICD-10-CM

## 2017-03-20 DIAGNOSIS — I25.10 CORONARY ARTERY DISEASE INVOLVING NATIVE CORONARY ARTERY OF NATIVE HEART WITHOUT ANGINA PECTORIS: Primary | ICD-10-CM

## 2017-03-20 DIAGNOSIS — R60.9 EDEMA, UNSPECIFIED TYPE: ICD-10-CM

## 2017-03-20 RX ORDER — SPIRONOLACTONE 25 MG/1
25 TABLET ORAL DAILY
Qty: 30 TAB | Refills: 3 | Status: SHIPPED | OUTPATIENT
Start: 2017-03-20 | End: 2017-11-01 | Stop reason: SDUPTHER

## 2017-03-20 RX ORDER — POTASSIUM CHLORIDE 1500 MG/1
TABLET, EXTENDED RELEASE ORAL
Qty: 30 TAB | Refills: 3 | Status: SHIPPED | OUTPATIENT
Start: 2017-03-20 | End: 2017-08-02 | Stop reason: ALTCHOICE

## 2017-03-20 RX ORDER — BUMETANIDE 1 MG/1
1 TABLET ORAL 2 TIMES DAILY
Qty: 60 TAB | Refills: 3 | Status: SHIPPED | OUTPATIENT
Start: 2017-03-20 | End: 2017-08-02 | Stop reason: SDUPTHER

## 2017-03-20 NOTE — PATIENT INSTRUCTIONS
-- START Bumex, Spironolactone and Potassium  -- STOP Lasix   -- You did not show any A-fib on your holter monitor, and since there was some concern with the nosebleeds, we can stop the Xarelto  -- Please make a follow up appointment with us in 2 weeks   -- We will recheck your labs at this next appointment       Heart-Healthy Diet: Care Instructions  Your Care Instructions    A heart-healthy diet has lots of vegetables, fruits, nuts, beans, and whole grains, and is low in salt. It limits foods that are high in saturated fat, such as meats, cheeses, and fried foods. It may be hard to change your diet, but even small changes can lower your risk of heart attack and heart disease. Follow-up care is a key part of your treatment and safety. Be sure to make and go to all appointments, and call your doctor if you are having problems. It's also a good idea to know your test results and keep a list of the medicines you take. How can you care for yourself at home? Watch your portions  · Learn what a serving is. A \"serving\" and a \"portion\" are not always the same thing. Make sure that you are not eating larger portions than are recommended. For example, a serving of pasta is ½ cup. A serving size of meat is 2 to 3 ounces. A 3-ounce serving is about the size of a deck of cards. Measure serving sizes until you are good at [employer]" them. Keep in mind that restaurants often serve portions that are 2 or 3 times the size of one serving. · To keep your energy level up and keep you from feeling hungry, eat often but in smaller portions. · Eat only the number of calories you need to stay at a healthy weight. If you need to lose weight, eat fewer calories than your body burns (through exercise and other physical activity). Eat more fruits and vegetables  · Eat a variety of fruit and vegetables every day. Dark green, deep orange, red, or yellow fruits and vegetables are especially good for you.  Examples include spinach, carrots, peaches, and berries. · Keep carrots, celery, and other veggies handy for snacks. Buy fruit that is in season and store it where you can see it so that you will be tempted to eat it. · Cook dishes that have a lot of veggies in them, such as stir-fries and soups. Limit saturated and trans fat  · Read food labels, and try to avoid saturated and trans fats. They increase your risk of heart disease. Trans fat is found in many processed foods such as cookies and crackers. · Use olive or canola oil when you cook. Try cholesterol-lowering spreads, such as Benecol or Take Control. · Bake, broil, grill, or steam foods instead of frying them. · Choose lean meats instead of high-fat meats such as hot dogs and sausages. Cut off all visible fat when you prepare meat. · Eat fish, skinless poultry, and meat alternatives such as soy products instead of high-fat meats. Soy products, such as tofu, may be especially good for your heart. · Choose low-fat or fat-free milk and dairy products. Eat fish  · Eat at least two servings of fish a week. Certain fish, such as salmon and tuna, contain omega-3 fatty acids, which may help reduce your risk of heart attack. Eat foods high in fiber  · Eat a variety of grain products every day. Include whole-grain foods that have lots of fiber and nutrients. Examples of whole-grain foods include oats, whole wheat bread, and brown rice. · Buy whole-grain breads and cereals, instead of white bread or pastries. Limit salt and sodium  · Limit how much salt and sodium you eat to help lower your blood pressure. · Taste food before you salt it. Add only a little salt when you think you need it. With time, your taste buds will adjust to less salt. · Eat fewer snack items, fast foods, and other high-salt, processed foods. Check food labels for the amount of sodium in packaged foods. · Choose low-sodium versions of canned goods (such as soups, vegetables, and beans).   Limit sugar  · Limit drinks and foods with added sugar. These include candy, desserts, and soda pop. Limit alcohol  · Limit alcohol to no more than 2 drinks a day for men and 1 drink a day for women. Too much alcohol can cause health problems. When should you call for help? Watch closely for changes in your health, and be sure to contact your doctor if:  · You would like help planning heart-healthy meals. Where can you learn more? Go to http://ni-nohemi.info/. Enter V137 in the search box to learn more about \"Heart-Healthy Diet: Care Instructions. \"  Current as of: January 27, 2016  Content Version: 11.1  © 2262-8801 [employer]. Care instructions adapted under license by [employer] (which disclaims liability or warranty for this information). If you have questions about a medical condition or this instruction, always ask your healthcare professional. [place] [address] any warranty or liability for your use of this information.

## 2017-03-20 NOTE — PROGRESS NOTES
[facility]   [address] [facility], [address] [place] [address]                                          NEW PATIENT HPI/FOLLOW-UP      NAME:  aD Wong   :   1931   MRN:   [id number]   PCP:  Noemi More MD           Subjective: The patient is a 80y.o. year old female  who returns for a routine follow-up. Since the last visit, patient reports no new symptoms. Denies change in exercise tolerance, chest pain, edema, medication intolerance, palpitations, shortness of breath, PND/orthopnea wheezing, sputum, syncope, dizziness or light headedness. Doing satisfactorily. Review of Systems  General: Pt denies excessive weight gain or loss. Pt is able to conduct ADL's. Respiratory: Denies shortness of breath, ESPANA, wheezing or stridor.   Cardiovascular: Denies precordial pain, palpitations, edema or PND  Gastrointestinal: Denies poor appetite, indigestion, abdominal pain or blood in stool  Peripheral vascular: Denies claudication, leg cramps  Neuropsychiatric: Denies paresthesias,tingling,numbness,anxiety,depression,fatigue  Musculoskeletal: Denies pain,tenderness, soreness,swelling      Past Medical History:   Diagnosis Date    Anxiety     Breast cancer ([address] ,     right , left 2010    Cancer [facility])      cancer right breast cancer    Cancer ([address]     cancer left breast/new dx 2011 +bx     Diabetes ([address]     Hypercholesterolemia     Hypertension     Other ill-defined conditions(799.89)     osteopoosis    Other ill-defined conditions(799.89)     high cholesterol    Pneumonia     Psychiatric disorder     anxiety     Patient Active Problem List    Diagnosis Date Noted    Acute respiratory disease 2017    Moderate to severe pulmonary hypertension ([address] 2017    CAD (coronary artery disease), native coronary artery 2017    Pulmonary edema cardiac cause 2016    SOB (shortness of breath) 2016    CHF (congestive heart failure) ([address] 50/63/3220    Diastolic CHF, acute on chronic (HCC) 01/31/2016    Aortic stenosis 01/20/2016    Hypercholesteremia 12/13/2012    Anxiety 05/21/2012    Multiple allergies 10/12/2011    Bronchitis 10/12/2011    Allergic reaction 10/04/2011    Vein disorder 09/07/2011    Fatigue 06/28/2011    Breast cancer ([email or web address] 05/26/2011    Anemia 05/26/2011    Diabetes ([email or web address] 04/19/2011    HTN (hypertension), benign 04/19/2011      Past Surgical History:   Procedure Laterality Date    BIOPSY OF BREAST, INCISIONAL      left breast 8/2011 positive for cancer cells    BREAST SURGERY PROCEDURE UNLISTED      right mastectomy    HX BREAST LUMPECTOMY Left 2010    HX MASTECTOMY Right 2005     Allergies   Allergen Reactions    Ace Inhibitors Angioedema      Family History   Problem Relation Age of Onset    Hypertension Mother     Stroke Other       Social History     Social History    Marital status:      Spouse name: N/A    Number of children: N/A    Years of education: N/A     Occupational History    Not on file. Social History Main Topics    Smoking status: Never Smoker    Smokeless tobacco: Never Used    Alcohol use No    Drug use: No    Sexual activity: Not Currently     Other Topics Concern    Not on file     Social History Narrative        She does not have a history of smoking, but does have second hand smoking exposure from her  , for a period of 27- 36 yrs, , approx 30 yrs ago. 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 She has two children, her dtr and son, both of whom are present during the interview, patient states that she wants them both to make any medical decisions for her, she wants to be a full code. Current Outpatient Prescriptions   Medication Sig    bumetanide (BUMEX) 1 mg tablet Take 1 Tab by mouth two (2) times a day.     KLOR-CON M20 20 mEq tablet TAKE 1 TABLET BY MOUTH EVERY DAY    spironolactone (ALDACTONE) 25 mg tablet Take 1 Tab by mouth daily. Indications: Edema    atorvastatin (LIPITOR) 40 mg tablet     TRUE METRIX GLUCOSE TEST STRIP strip     ipratropium (ATROVENT) 0.02 % nebulizer solution     BD INSULIN SYRINGE ULTRA-FINE 0.5 mL 31 gauge x 5/16 syrg     predniSONE (DELTASONE) 5 mg tablet 6 tabs daily for 2 days then drop to, 4 tabs daily for 2 days then drop to, 2 tabs daily for 2 days then drop to, 1 tab daily for 2 days then stop.  LORazepam (ATIVAN) 0.5 mg tablet Take 1 mg by mouth every eight (8) hours as needed for Anxiety (maily used when patient experiences an Allergic Reaction/Angioedema).  aspirin 81 mg chewable tablet Take 81 mg by mouth daily.  diphenhydrAMINE (BENADRYL) 25 mg capsule Take 25 mg by mouth every six (6) hours as needed (Allergic reaction/Angioedema).  insulin glargine (LANTUS) 100 unit/mL injection 50 Units by SubCUTAneous route nightly. For -175 = 20 units  175-200 = 30 units  > 200 = 40 units     by SubCUTAneous route nightly. Indications: type 2 diabetes mellitus (Patient taking differently: 20-40 Units by SubCUTAneous route nightly. For -175 = 20 units  175-200 = 30 units  > 200 = 40 units     by SubCUTAneous route nightly. Indications: type 2 diabetes mellitus)    ferrous sulfate (IRON) 325 mg (65 mg iron) EC tablet Take 1 Tab by mouth Daily (before breakfast).  denosumab (PROLIA) 60 mg/mL injection 60 mg by SubCUTAneous route. Twice a year (June and December)    metFORMIN (GLUCOPHAGE) 1,000 mg tablet Take 1 Tab by mouth two (2) times a day.  metoprolol (LOPRESSOR) 50 mg tablet Take 1 Tab by mouth two (2) times a day.  ergocalciferol (VITAMIN D2) 50,000 unit capsule Take 1 Cap by mouth every seven (7) days. One capsule monthly    albuterol (PROVENTIL VENTOLIN) 2.5 mg /3 mL (0.083 %) nebulizer solution 3 mL by Nebulization route every four (4) hours as needed for Wheezing. No current facility-administered medications for this visit.          I have reviewed the nurses notes, vitals, problem list, allergy list, medical history, family medical, social history and medications. Objective:     Physical Exam:     Vitals:    17 1316   BP: 182/72   Pulse: 69   SpO2: 93%   Weight: 176 lb 6.4 oz (80 kg)   Height: 5' 5\" (1.651 m)    Body mass index is 29.35 kg/(m^2). General: Well developed, in no acute distress. HEENT: No carotid bruits, no JVD, trach is midline. Heart:  Normal S1/S2 negative S3 or S4. Regular, no murmur, gallop or rub.   Respiratory: Clear bilaterally, no wheezing or rales  Abdomen:   Soft, non-tender, bowel sounds are active.   Extremities:  No edema, normal cap refill, no cyanosis. Neuro: A&Ox3, speech clear, gait stable. Skin: Skin color is normal. No rashes or lesions. No diaphoresis. Vascular: 2+ pulses symmetric in all extremities        Data Review:       Cardiographics:    EKG: None today    Cardiology Labs:    Results for orders placed or performed during the hospital encounter of 17   [facility]  [address], [address] [place] [address]                                         Test Date:    2017  Julianna Hernandez Name:     Per Navarro             Department:     Patient ID:   [id number]                Room:           Gender:       Female                   Technician:     :          66-               Requested By: Dr. Rogelio Gao  Order Number:                          Reading MD:   Rogelio Gao MD                    Interpretive Statements  Andrew Mckeon was in Sinus Rhythm. The average heart rate, excluding ectopy, was 62 BPM with a minimum of 47 BPM   at  01:52D3 and a maximum of 109 BPM at   07:35D3. Heart beats, including ectopy, totaled [id number] beats.     VENTRICULAR ECTOPICS totaled 1011  averaging  22.0 per hour  with 1005   single, 0 paired, 0 trigeminy and 0 R on T.  VENTRICULAR TACHYCARDIA occurred 2 times. The fastest run was at 132 BPM and occurred at 17:42D1 with 3 beats   The longest run was 3 beats and occurred at  17:42D1 at a rate of 132 BPM.    SUPRAVENTRICULAR ECTOPICS totaled 777  averaging  16.9 per hour  ,with 680   single and 48 paired beats. SUPRAVENTRICULAR TACHYCARDIA occurred 9 times. The fastest run was at 129 BPM and occurred at 18:59D2 with 5 beats. The longest run was 13 beats at 13:52D1 at a rate of 117 BPM.                                                  Report Summary    Susan Lacy was in Sinus Rhythm over 48 hr HM recording. A diary was not   submitted. The average heart rate, excluding ectopy, was 62 BPM with a minimum of 47 BPM   at 01:52D3 and a maximum of 109 BPM at 07:35D3. Heart beats, including ectopy, totaled [id number] beats. VENTRICULAR ECTOPICS totaled 1011 averaging 22.0 per hour with 1005 single, 0   paired, 0 trigeminy and 0 R on T.    VENTRICULAR TACHYCARDIA occurred 2 times. The fastest run was at 132 BPM and occurred at 17:42D1 with 3 beats  The longest run was 3 beats and occurred at 17:42D1 at a rate of 132 BPM.    SUPRAVENTRICULAR ECTOPICS totaled 777 averaging 16.9 per hour ,with 680   single and 48 paired beats. SUPRAVENTRICULAR TACHYCARDIA occurred 5 times. The fastest run was at 129 BPM and occurred at 18:59D2 with 5 beats. The longest run was 13 beats at 13:52D1 at a rate of 117 BPM.      Conclusion:    1. Normal sinus rhythm with sinus bradycardia to as slow as 47 bpm and sinus   tachycardia to as rapid as 109 bpm.  2. Rare to occasional single APC's, rare atrial couplets and five (5) short   bursts of essentially regular SVT to as slow as 118 bpm and to as rapid as   130 bpm over durations ranging from 5 to 13 beats.   3. Rare to occasional single PVC's with two (2) short bursts of ventricular   tachycardia (triplets) ranging from 100 to 132 bpm.  4. No conduction abnormalities except for borderline first degree AV HB,   sustained rhythm disturbances, obvious ischemic ST segment changes noted. Signed: Radha Moise        Electronically signed by Brian Navarro MD on Mar  1 2017  5:30PM CDT   Results for orders placed or performed during the hospital encounter of 02/08/17   EKG, 12 LEAD, INITIAL   Result Value Ref Range    Ventricular Rate 80 BPM    Atrial Rate 68 BPM    QRS Duration 88 ms    Q-T Interval 404 ms    QTC Calculation (Bezet) 465 ms    Calculated R Axis 13 degrees    Calculated T Axis 78 degrees    Diagnosis       Atrial fibrillation  ST depression, consider subendocardial injury  When compared with ECG of 01-APR-2016 09:45,  Atrial fibrillation has replaced Sinus rhythm  Confirmed by Sabi Wilson MD, Peyton Officer ([id number]) on 2/8/2017 10:45:02 AM         Lab Results   Component Value Date/Time    Cholesterol, total 191 04/01/2016 02:37 AM    HDL Cholesterol 97 04/01/2016 02:37 AM    LDL, calculated 82 04/01/2016 02:37 AM    Triglyceride 60 04/01/2016 02:37 AM    CHOL/HDL Ratio 2.0 04/01/2016 02:37 AM       Lab Results   Component Value Date/Time    Sodium 141 02/12/2017 02:41 AM    Potassium 3.3 02/12/2017 02:41 AM    Chloride 101 02/12/2017 02:41 AM    CO2 33 02/12/2017 02:41 AM    Anion gap 7 02/12/2017 02:41 AM    Glucose 193 02/12/2017 02:41 AM    BUN 16 02/12/2017 02:41 AM    Creatinine 0.70 02/12/2017 02:41 AM    BUN/Creatinine ratio 23 02/12/2017 02:41 AM    GFR est AA >60 02/12/2017 02:41 AM    GFR est non-AA >60 02/12/2017 02:41 AM    Calcium 7.3 02/12/2017 02:41 AM    Bilirubin, total 0.5 02/09/2017 05:04 AM    AST (SGOT) 10 02/09/2017 05:04 AM    Alk. phosphatase 96 02/09/2017 05:04 AM    Protein, total 6.9 02/09/2017 05:04 AM    Albumin 2.6 02/09/2017 05:04 AM    Globulin 4.3 02/09/2017 05:04 AM    A-G Ratio 0.6 02/09/2017 05:04 AM    ALT (SGPT) 17 02/09/2017 05:04 AM          Assessment:       ICD-10-CM ICD-9-CM    1.  Coronary artery disease involving native coronary artery of native heart without angina pectoris I25.10 414.01 bumetanide (BUMEX) 1 mg tablet      KLOR-CON M20 20 mEq tablet      spironolactone (ALDACTONE) 25 mg tablet   2. Chronic diastolic congestive heart failure (HCC) I50.32 428.32 bumetanide (BUMEX) 1 mg tablet     428.0 KLOR-CON M20 20 mEq tablet      spironolactone (ALDACTONE) 25 mg tablet   3. Edema, unspecified type R60.9 782.3 bumetanide (BUMEX) 1 mg tablet      KLOR-CON M20 20 mEq tablet      spironolactone (ALDACTONE) 25 mg tablet         Discussion: Patient presents at this time stable from a cardiac perspective; has run out of hospital prescribed medications (Bumex, potassium and XArelto) and is having LE edema. Otherwise pleased with present status. Plan:     1. STOP Lasix. -- START Spironolactone and Bumex   -- Continue Potassium   -- STOP Xarelto   -- Will recheck labs at next visit    2. Encouraged to exercise to tolerance, lose weight, stop smoking and follow low fat, low cholesterol, low sodium predominantly Plant-based (consider Mediterranean) diet. Call with questions or concerns. 3.Follow up: 2 weeks    I have discussed the diagnosis with the patient and the intended plan as seen in the above orders. The patient has received an after-visit summary and questions were answered concerning future plans. I have discussed any concerning medication side effects and warnings with the patient as well.     Ramonita Curry PA-C  3/20/2017

## 2017-03-20 NOTE — MR AVS SNAPSHOT
Visit Information Date & Time Provider Department Dept. Phone Encounter #  
 3/20/2017  1:00 PM MD [facility] [place]  Your Appointments 4/3/2017  1:00 PM  
ESTABLISHED PATIENT with MD [facility]) Appt Note: 2 WEEKS F/U  
 [address] 
[address]  
[phone] [address] Upcoming Health Maintenance Date Due DTaP/Tdap/Td series (1 - Tdap) 8/16/1952 EYE EXAM RETINAL OR DILATED Q1 1/9/2016 Pneumococcal 65+ High/Highest Risk (2 of 2 - PPSV23) 4/19/2016 MEDICARE YEARLY EXAM 11/19/2016 GLAUCOMA SCREENING Q2Y 1/9/2017 FOOT EXAM Q1 1/28/2017 MICROALBUMIN Q1 3/10/2017 LIPID PANEL Q1 4/1/2017 HEMOGLOBIN A1C Q6M 8/8/2017 Allergies as of 3/20/2017  Review Complete On: 3/20/2017 By: Pinky Modi Severity Noted Reaction Type Reactions Ace Inhibitors High 05/21/2012   Systemic Angioedema Current Immunizations  Reviewed on 2/13/2017 Name Date Influenza High Dose Vaccine PF 11/19/2015 Influenza Vaccine 10/1/2016, 10/22/2013, 12/13/2012 Influenza Vaccine Split 9/27/2011 11:15 AM  
 Influenza Vaccine Whole 10/8/2010 Pneumococcal Vaccine (Unspecified Type) 4/19/2011 Not reviewed this visit You Were Diagnosed With   
  
 Codes Comments Coronary artery disease involving native coronary artery of native heart without angina pectoris    -  Primary ICD-10-CM: I25.10 ICD-9-CM: 414.01 Chronic diastolic congestive heart failure (HCC)     ICD-10-CM: I50.32 
ICD-9-CM: 428.32, 428.0 Edema, unspecified type     ICD-10-CM: R60.9 ICD-9-CM: 307. 3 Vitals BP Pulse Height(growth percentile) Weight(growth percentile) SpO2 BMI  
 182/72 69 5' 5\" (1.651 m) 176 lb 6.4 oz (80 kg) 93% 29.35 kg/m2 OB Status Smoking Status Postmenopausal Never Smoker Vitals History BMI and BSA Data Body Mass Index Body Surface Area  
 29.35 kg/m 2 1.92 m 2 Preferred Pharmacy Pharmacy Name Phone [facility]/PHARMACY #[id number]- [place], [place] - [address] AT [address] [place] [address] [phone] Your Updated Medication List  
  
   
This list is accurate as of: 3/20/17  3:06 PM.  Always use your most recent med list.  
  
  
  
  
 albuterol 2.5 mg /3 mL (0.083 %) nebulizer solution Commonly known as:  PROVENTIL VENTOLIN  
3 mL by Nebulization route every four (4) hours as needed for Wheezing. aspirin 81 mg chewable tablet Take 81 mg by mouth daily. atorvastatin 40 mg tablet Commonly known as:  LIPITOR  
  
 BD INSULIN SYRINGE ULTRA-FINE 0.5 mL 31 gauge x 5/16 Syrg Generic drug:  Insulin Syringe-Needle U-100  
  
 bumetanide 1 mg tablet Commonly known as:  Barnie Pulse Take 1 Tab by mouth two (2) times a day. denosumab 60 mg/mL injection Commonly known as:  Pedro Pablo Jay 60 mg by SubCUTAneous route. Twice a year (June and December) diphenhydrAMINE 25 mg capsule Commonly known as:  BENADRYL Take 25 mg by mouth every six (6) hours as needed (Allergic reaction/Angioedema). ergocalciferol 50,000 unit capsule Commonly known as:  VITAMIN D2 Take 1 Cap by mouth every seven (7) days. One capsule monthly  
  
 ferrous sulfate 325 mg (65 mg iron) EC tablet Commonly known as:  IRON Take 1 Tab by mouth Daily (before breakfast). insulin glargine 100 unit/mL injection Commonly known as:  LANTUS  
50 Units by SubCUTAneous route nightly. For -175 = 20 units 175-200 = 30 units > 200 = 40 units   by SubCUTAneous route nightly. Indications: type 2 diabetes mellitus  
  
 ipratropium 0.02 % nebulizer solution Commonly known as:  ATROVENT  
  
 KLOR-CON M20 20 mEq tablet Generic drug:  potassium chloride TAKE 1 TABLET BY MOUTH EVERY DAY  
 LORazepam 0.5 mg tablet Commonly known as:  ATIVAN Take 1 mg by mouth every eight (8) hours as needed for Anxiety (maily used when patient experiences an Allergic Reaction/Angioedema). metFORMIN 1,000 mg tablet Commonly known as:  GLUCOPHAGE Take 1 Tab by mouth two (2) times a day. metoprolol tartrate 50 mg tablet Commonly known as:  LOPRESSOR Take 1 Tab by mouth two (2) times a day. predniSONE 5 mg tablet Commonly known as:  DELTASONE  
6 tabs daily for 2 days then drop to, 4 tabs daily for 2 days then drop to, 2 tabs daily for 2 days then drop to, 1 tab daily for 2 days then stop. spironolactone 25 mg tablet Commonly known as:  ALDACTONE Take 1 Tab by mouth daily. Indications: Edema TRUE METRIX GLUCOSE TEST STRIP strip Generic drug:  glucose blood VI test strips Prescriptions Sent to Pharmacy Refills  
 bumetanide (BUMEX) 1 mg tablet 3 Sig: Take 1 Tab by mouth two (2) times a day. Class: Normal  
 Pharmacy: [facility]/pharmacy #[id number] AT [address] [place] [address] Ph #: [phone] Route: Oral  
 KLOR-CON M20 20 mEq tablet 3 Sig: TAKE 1 TABLET BY MOUTH EVERY DAY Class: Normal  
 Pharmacy: [facility]/pharmacy #[id number] AT [address] [place] [address] Ph #: [phone]  
 spironolactone (ALDACTONE) 25 mg tablet 3 Sig: Take 1 Tab by mouth daily. Indications: Edema Class: Normal  
 Pharmacy: [facility]/pharmacy #[id number] AT [address] [place] [address] Ph #: [phone] Route: Oral  
  
Patient Instructions -- START Bumex, Spironolactone and Potassium -- STOP Lasix -- You did not show any A-fib on your holter monitor, and since there was some concern with the nosebleeds, we can stop the Xarelto -- Please make a follow up appointment with us in 2 weeks  
-- We will recheck your labs at this next appointment Heart-Healthy Diet: Care Instructions Your Care Instructions A heart-healthy diet has lots of vegetables, fruits, nuts, beans, and whole grains, and is low in salt. It limits foods that are high in saturated fat, such as meats, cheeses, and fried foods. It may be hard to change your diet, but even small changes can lower your risk of heart attack and heart disease. Follow-up care is a key part of your treatment and safety. Be sure to make and go to all appointments, and call your doctor if you are having problems. It's also a good idea to know your test results and keep a list of the medicines you take. How can you care for yourself at home? Watch your portions · Learn what a serving is. A \"serving\" and a \"portion\" are not always the same thing. Make sure that you are not eating larger portions than are recommended. For example, a serving of pasta is ½ cup. A serving size of meat is 2 to 3 ounces. A 3-ounce serving is about the size of a deck of cards. Measure serving sizes until you are good at [place]" them. Keep in mind that restaurants often serve portions that are 2 or 3 times the size of one serving. · To keep your energy level up and keep you from feeling hungry, eat often but in smaller portions. · Eat only the number of calories you need to stay at a healthy weight. If you need to lose weight, eat fewer calories than your body burns (through exercise and other physical activity). Eat more fruits and vegetables · Eat a variety of fruit and vegetables every day. Dark green, deep orange, red, or yellow fruits and vegetables are especially good for you. Examples include spinach, carrots, peaches, and berries. · Keep carrots, celery, and other veggies handy for snacks. Buy fruit that is in season and store it where you can see it so that you will be tempted to eat it. · Cook dishes that have a lot of veggies in them, such as stir-fries and soups. Limit saturated and trans fat · Read food labels, and try to avoid saturated and trans fats. They increase your risk of heart disease. Trans fat is found in many processed foods such as cookies and crackers. · Use olive or canola oil when you cook. Try cholesterol-lowering spreads, such as Benecol or Take Control. · Bake, broil, grill, or steam foods instead of frying them. · Choose lean meats instead of high-fat meats such as hot dogs and sausages. Cut off all visible fat when you prepare meat. · Eat fish, skinless poultry, and meat alternatives such as soy products instead of high-fat meats. Soy products, such as tofu, may be especially good for your heart. · Choose low-fat or fat-free milk and dairy products. Eat fish · Eat at least two servings of fish a week. Certain fish, such as salmon and tuna, contain omega-3 fatty acids, which may help reduce your risk of heart attack. Eat foods high in fiber · Eat a variety of grain products every day. Include whole-grain foods that have lots of fiber and nutrients. Examples of whole-grain foods include oats, whole wheat bread, and brown rice. · Buy whole-grain breads and cereals, instead of white bread or pastries. Limit salt and sodium · Limit how much salt and sodium you eat to help lower your blood pressure. · Taste food before you salt it. Add only a little salt when you think you need it. With time, your taste buds will adjust to less salt. · Eat fewer snack items, fast foods, and other high-salt, processed foods. Check food labels for the amount of sodium in packaged foods. · Choose low-sodium versions of canned goods (such as soups, vegetables, and beans). Limit sugar · Limit drinks and foods with added sugar. These include candy, desserts, and soda pop. Limit alcohol · Limit alcohol to no more than 2 drinks a day for men and 1 drink a day for women. Too much alcohol can cause health problems. When should you call for help? Watch closely for changes in your health, and be sure to contact your doctor if: 
· You would like help planning heart-healthy meals. Where can you learn more? Go to http://ni-nohemi.info/. Enter V137 in the search box to learn more about \"Heart-Healthy Diet: Care Instructions. \" Current as of: January 27, 2016 Content Version: 11.1 © 0191-3658 [employer]. Care instructions adapted under license by [employer] (which disclaims liability or warranty for this information). If you have questions about a medical condition or this instruction, always ask your healthcare professional. [place] [address] any warranty or liability for your use of this information. Introducing [facility]! [employer] introduces myMedScore patient portal. Now you can access parts of your medical record, email your doctor's office, and request medication refills online. 1. In your internet browser, go to [email or web address] 2. Click on the First Time User? Click Here link in the Sign In box. You will see the New Member Sign Up page. 3. Enter your myMedScore Access Code exactly as it appears below. You will not need to use this code after youve completed the sign-up process. If you do not sign up before the expiration date, you must request a new code. · myMedScore Access Code: [email or web address] Expires: 4/24/2017  4:59 PM 
 
4. Enter the last four digits of your Social Security Number (xxxx) and Date of Birth (mm/dd/yyyy) as indicated and click Submit. You will be taken to the next sign-up page. 5. Create a myMedScore ID. This will be your myMedScore login ID and cannot be changed, so think of one that is secure and easy to remember. 6. Create a myMedScore password. You can change your password at any time. 7. Enter your Password Reset Question and Answer. This can be used at a later time if you forget your password. 8. Enter your e-mail address. You will receive e-mail notification when new information is available in [address]. 9. Click Sign Up. You can now view and download portions of your medical record. 10. Click the Download Summary menu link to download a portable copy of your medical information. If you have questions, please visit the Frequently Asked Questions section of the [employer] website. Remember, [employer] is NOT to be used for urgent needs. For medical emergencies, dial 911. Now available from your iPhone and Android! Please provide this summary of care documentation to your next provider. Your primary care clinician is listed as [employer]. If you have any questions after today's visit, please call [phone].

## 2017-04-03 ENCOUNTER — OFFICE VISIT (OUTPATIENT)
Dept: CARDIOLOGY CLINIC | Age: 82
End: 2017-04-03

## 2017-04-03 ENCOUNTER — HOSPITAL ENCOUNTER (OUTPATIENT)
Dept: LAB | Age: 82
Discharge: HOME | End: 2017-04-03

## 2017-04-03 VITALS
HEIGHT: 65 IN | BODY MASS INDEX: 28.66 KG/M2 | DIASTOLIC BLOOD PRESSURE: 71 MMHG | OXYGEN SATURATION: 97 % | HEART RATE: 99 BPM | WEIGHT: 172 LBS | SYSTOLIC BLOOD PRESSURE: 125 MMHG

## 2017-04-03 DIAGNOSIS — I50.32 CHRONIC DIASTOLIC CONGESTIVE HEART FAILURE (HCC): ICD-10-CM

## 2017-04-03 DIAGNOSIS — I10 HTN (HYPERTENSION), BENIGN: ICD-10-CM

## 2017-04-03 DIAGNOSIS — I35.0 AORTIC VALVE STENOSIS, UNSPECIFIED ETIOLOGY: ICD-10-CM

## 2017-04-03 DIAGNOSIS — I25.10 CORONARY ARTERY DISEASE INVOLVING NATIVE CORONARY ARTERY OF NATIVE HEART WITHOUT ANGINA PECTORIS: Primary | ICD-10-CM

## 2017-04-03 PROCEDURE — 99001 SPECIMEN HANDLING PT-LAB: CPT | Performed by: PHYSICIAN ASSISTANT

## 2017-04-03 RX ORDER — CITALOPRAM 10 MG/1
TABLET ORAL DAILY
COMMUNITY
End: 2017-08-02 | Stop reason: ALTCHOICE

## 2017-04-03 NOTE — PROGRESS NOTES
[facility]   [address] [facility], [address] [place] [address]                                          NEW PATIENT HPI/FOLLOW-UP      NAME:  Enoch Coles   :   1931   MRN:   [id number]   PCP:  Levy River MD           Subjective: The patient is a 80y.o. year old female  who returns for a routine follow-up. Since the last visit, patient reports two episode of tachycardia that she caught with home BP and HR monitoring. Rate to 114. Pt states she felt \"off\", without being able to fully describe lightheadedness or other sx. She specifically denied chest pain or palpitations a/w tachycardia. She denies SOB. Reports improvement in LE edema with change in medications at last visit. Notes that she was placed on Celexa instead of Lorazepam by her PCP. Pt asked about interactions with current meds. Denies change in exercise tolerance, chest pain, edema, medication intolerance, PND/orthopnea, wheezing, sputum, syncope, dizziness or light headedness. Doing satisfactorily. Review of Systems  General: Pt denies excessive weight gain or loss. Pt is able to conduct ADL's. Respiratory: Denies shortness of breath, ESPANA, wheezing or stridor.   Cardiovascular: +rapid HR, Denies precordial pain, palpitations, edema or PND  Gastrointestinal: Denies poor appetite, indigestion, abdominal pain or blood in stool  Peripheral vascular: Denies claudication, leg cramps  Neuropsychiatric: Denies paresthesias,tingling,numbness,anxiety,depression,fatigue  Musculoskeletal: Denies pain,tenderness, soreness,swelling      Past Medical History:   Diagnosis Date    Anxiety     Breast cancer ([facility] ,     right , left 2010    Cancer [facility])      cancer right breast cancer    Cancer ([facility]     cancer left breast/new dx 2011 +bx     Diabetes ([facility]     Hypercholesterolemia     Hypertension     Other ill-defined conditions     osteopoosis    Other ill-defined conditions     high cholesterol    Pneumonia     Psychiatric disorder     anxiety     Patient Active Problem List    Diagnosis Date Noted    Acute respiratory disease 02/08/2017    Moderate to severe pulmonary hypertension ([facility] 01/25/2017    CAD (coronary artery disease), native coronary artery 01/25/2017    Pulmonary edema cardiac cause (HCC) 04/01/2016    SOB (shortness of breath) 03/30/2016    CHF (congestive heart failure) ([facility] 41/85/6683    Diastolic CHF, acute on chronic (HCC) 01/31/2016    Aortic stenosis 01/20/2016    Hypercholesteremia 12/13/2012    Anxiety 05/21/2012    Multiple allergies 10/12/2011    Bronchitis 10/12/2011    Allergic reaction 10/04/2011    Vein disorder 09/07/2011    Fatigue 06/28/2011    Breast cancer ([facility] 05/26/2011    Anemia 05/26/2011    Diabetes ([facility] 04/19/2011    HTN (hypertension), benign 04/19/2011      Past Surgical History:   Procedure Laterality Date    BIOPSY OF BREAST, INCISIONAL      left breast 8/2011 positive for cancer cells    BREAST SURGERY PROCEDURE UNLISTED      right mastectomy    HX BREAST LUMPECTOMY Left 2010    HX MASTECTOMY Right 2005     Allergies   Allergen Reactions    Ace Inhibitors Angioedema      Family History   Problem Relation Age of Onset    Hypertension Mother     Stroke Other       Social History     Social History    Marital status:      Spouse name: N/A    Number of children: N/A    Years of education: N/A     Occupational History    Not on file. Social History Main Topics    Smoking status: Never Smoker    Smokeless tobacco: Never Used    Alcohol use No    Drug use: No    Sexual activity: Not Currently     Other Topics Concern    Not on file     Social History Narrative        She does not have a history of smoking, but does have second hand smoking exposure from her  , for a period of 27- 36 yrs, , approx 30 yrs ago. 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         She has two children, her dtr and son, both of whom are present during the interview, patient states that she wants them both to make any medical decisions for her, she wants to be a full code. Current Outpatient Prescriptions   Medication Sig    citalopram (CELEXA) 10 mg tablet Take  by mouth daily.  bumetanide (BUMEX) 1 mg tablet Take 1 Tab by mouth two (2) times a day.  KLOR-CON M20 20 mEq tablet TAKE 1 TABLET BY MOUTH EVERY DAY    spironolactone (ALDACTONE) 25 mg tablet Take 1 Tab by mouth daily. Indications: Edema    atorvastatin (LIPITOR) 40 mg tablet     TRUE METRIX GLUCOSE TEST STRIP strip     ipratropium (ATROVENT) 0.02 % nebulizer solution     BD INSULIN SYRINGE ULTRA-FINE 0.5 mL 31 gauge x 5/16 syrg     predniSONE (DELTASONE) 5 mg tablet 6 tabs daily for 2 days then drop to, 4 tabs daily for 2 days then drop to, 2 tabs daily for 2 days then drop to, 1 tab daily for 2 days then stop.  aspirin 81 mg chewable tablet Take 81 mg by mouth daily.  diphenhydrAMINE (BENADRYL) 25 mg capsule Take 25 mg by mouth every six (6) hours as needed (Allergic reaction/Angioedema).  insulin glargine (LANTUS) 100 unit/mL injection 50 Units by SubCUTAneous route nightly. For -175 = 20 units  175-200 = 30 units  > 200 = 40 units     by SubCUTAneous route nightly. Indications: type 2 diabetes mellitus (Patient taking differently: 20-40 Units by SubCUTAneous route nightly. For -175 = 20 units  175-200 = 30 units  > 200 = 40 units     by SubCUTAneous route nightly. Indications: type 2 diabetes mellitus)    ferrous sulfate (IRON) 325 mg (65 mg iron) EC tablet Take 1 Tab by mouth Daily (before breakfast).  denosumab (PROLIA) 60 mg/mL injection 60 mg by SubCUTAneous route. Twice a year (June and December)    metFORMIN (GLUCOPHAGE) 1,000 mg tablet Take 1 Tab by mouth two (2) times a day.  metoprolol (LOPRESSOR) 50 mg tablet Take 1 Tab by mouth two (2) times a day.     ergocalciferol (VITAMIN D2) 50,000 unit capsule Take 1 Cap by mouth every seven (7) days. One capsule monthly    albuterol (PROVENTIL VENTOLIN) 2.5 mg /3 mL (0.083 %) nebulizer solution 3 mL by Nebulization route every four (4) hours as needed for Wheezing. No current facility-administered medications for this visit. I have reviewed the MAs notes, vitals, problem list, allergy list, medical history, family medical, social history and medications. Objective:     Physical Exam:     Vitals:    17 1319   BP: 125/71   Pulse: 99   SpO2: 97%   Weight: 172 lb (78 kg)   Height: 5' 5\" (1.651 m)    Body mass index is 28.62 kg/(m^2). General: WDWN elderly woman, in no acute distress. HEENT: No carotid bruits, no JVD, trach is midline. Heart:  Normal S1/S2 negative S3 or S4. Regular, no murmur, gallop or rub.   Respiratory: Clear bilaterally, no wheezing or rales  Abdomen:   Soft, non-tender, bowel sounds are active.   Extremities:  No edema, normal cap refill, no cyanosis. Neuro: A&Ox3, speech clear, gait stable. Skin: Skin color is normal. No rashes or lesions. No diaphoresis.   Vascular: 2+ pulses symmetric in all extremities        Data Review:       Cardiographics:    EKG: None today    Cardiology Labs:    Results for orders placed or performed during the hospital encounter of 17   ECG HOLTER MONITOR, UP  [facility]                    [address] [facility], [address]                                         Test Date:    2017  [facility] Name:     Salazar Newell             Department:     Patient ID:   [id number]                Room:           Gender:       Female                   Technician:     :          58-               Requested By: Dr. Jaylyn Denny  Order Number:                          Reading MD:   Jaylyn Denny MD                    Interpretive Statements  Joaquín Tucker was in Sinus Rhythm. The average heart rate, excluding ectopy, was 62 BPM with a minimum of 47 BPM   at  01:52D3 and a maximum of 109 BPM at   07:35D3. Heart beats, including ectopy, totaled 261975 beats. VENTRICULAR ECTOPICS totaled 1011  averaging  22.0 per hour  with 1005   single, 0 paired, 0 trigeminy and 0 R on T.  VENTRICULAR TACHYCARDIA occurred 2 times. The fastest run was at 132 BPM and occurred at 17:42D1 with 3 beats   The longest run was 3 beats and occurred at  17:42D1 at a rate of 132 BPM.    SUPRAVENTRICULAR ECTOPICS totaled 777  averaging  16.9 per hour  ,with 680   single and 48 paired beats. SUPRAVENTRICULAR TACHYCARDIA occurred 9 times. The fastest run was at 129 BPM and occurred at 18:59D2 with 5 beats. The longest run was 13 beats at 13:52D1 at a rate of 117 BPM.                                                  Report Summary    Karlene Bonilla was in Sinus Rhythm over 48 hr HM recording. A diary was not   submitted. The average heart rate, excluding ectopy, was 62 BPM with a minimum of 47 BPM   at 01:52D3 and a maximum of 109 BPM at 07:35D3. Heart beats, including ectopy, totaled [id number] beats. VENTRICULAR ECTOPICS totaled 1011 averaging 22.0 per hour with 1005 single, 0   paired, 0 trigeminy and 0 R on T.    VENTRICULAR TACHYCARDIA occurred 2 times. The fastest run was at 132 BPM and occurred at 17:42D1 with 3 beats  The longest run was 3 beats and occurred at 17:42D1 at a rate of 132 BPM.    SUPRAVENTRICULAR ECTOPICS totaled 777 averaging 16.9 per hour ,with 680   single and 48 paired beats. SUPRAVENTRICULAR TACHYCARDIA occurred 5 times. The fastest run was at 129 BPM and occurred at 18:59D2 with 5 beats. The longest run was 13 beats at 13:52D1 at a rate of 117 BPM.      Conclusion:    1.  Normal sinus rhythm with sinus bradycardia to as slow as 47 bpm and sinus   tachycardia to as rapid as 109 bpm.  2. Rare to occasional single APC's, rare atrial couplets and five (5) short   bursts of essentially regular SVT to as slow as 118 bpm and to as rapid as   130 bpm over durations ranging from 5 to 13 beats. 3. Rare to occasional single PVC's with two (2) short bursts of ventricular   tachycardia (triplets) ranging from 100 to 132 bpm.  4. No conduction abnormalities except for borderline first degree AV HB,   sustained rhythm disturbances, obvious ischemic ST segment changes noted. Signed: Stacy Irene        Electronically signed by Sumit Mario MD on Mar  1 2017  5:30PM CDT   Results for orders placed or performed during the hospital encounter of 02/08/17   EKG, 12 LEAD, INITIAL   Result Value Ref Range    Ventricular Rate 80 BPM    Atrial Rate 68 BPM    QRS Duration 88 ms    Q-T Interval 404 ms    QTC Calculation (Bezet) 465 ms    Calculated R Axis 13 degrees    Calculated T Axis 78 degrees    Diagnosis       Atrial fibrillation  ST depression, consider subendocardial injury  When compared with ECG of 01-APR-2016 09:45,  Atrial fibrillation has replaced Sinus rhythm  Confirmed by Wendie Branch MD, Gwendolyn Shoemaker ([id number]) on 2/8/2017 10:45:02 AM         Lab Results   Component Value Date/Time    Cholesterol, total 191 04/01/2016 02:37 AM    HDL Cholesterol 97 04/01/2016 02:37 AM    LDL, calculated 82 04/01/2016 02:37 AM    Triglyceride 60 04/01/2016 02:37 AM    CHOL/HDL Ratio 2.0 04/01/2016 02:37 AM       Lab Results   Component Value Date/Time    Sodium 141 02/12/2017 02:41 AM    Potassium 3.3 02/12/2017 02:41 AM    Chloride 101 02/12/2017 02:41 AM    CO2 33 02/12/2017 02:41 AM    Anion gap 7 02/12/2017 02:41 AM    Glucose 193 02/12/2017 02:41 AM    BUN 16 02/12/2017 02:41 AM    Creatinine 0.70 02/12/2017 02:41 AM    BUN/Creatinine ratio 23 02/12/2017 02:41 AM    GFR est AA >60 02/12/2017 02:41 AM    GFR est non-AA >60 02/12/2017 02:41 AM    Calcium 7.3 02/12/2017 02:41 AM    Bilirubin, total 0.5 02/09/2017 05:04 AM    AST (SGOT) 10 02/09/2017 05:04 AM    Alk. phosphatase 96 02/09/2017 05:04 AM    Protein, total 6.9 02/09/2017 05:04 AM    Albumin 2.6 02/09/2017 05:04 AM    Globulin 4.3 02/09/2017 05:04 AM    A-G Ratio 0.6 02/09/2017 05:04 AM    ALT (SGPT) 17 02/09/2017 05:04 AM          Assessment:       ICD-10-CM ICD-9-CM    1. Coronary artery disease involving native coronary artery of native heart without angina pectoris P81.29 437.73 METABOLIC PANEL, BASIC      MAGNESIUM   2. Chronic diastolic congestive heart failure (HCC) W11.84 724.89 METABOLIC PANEL, BASIC     428.0 MAGNESIUM   3. Aortic valve stenosis, unspecified etiology N56.1 977.7 METABOLIC PANEL, BASIC      MAGNESIUM   4. HTN (hypertension), benign Q17 118.3 METABOLIC PANEL, BASIC      MAGNESIUM         Discussion: Patient presents at this time stable from a cardiac perspective. Pleased with present status. LE edema much improved. Will check K+ status with other electrolytes today. Plan:   Discussed with Dr. Alisha Craig     1. Continue same meds. -- BMP and Mag drawn in lab today    2. Encouraged to exercise to tolerance and follow low fat, low cholesterol, low sodium predominantly Plant-based (consider Mediterranean) diet. Call with questions or concerns. Will follow up any test results by phone and/or f/u here in office if needed. Rey Diaz 3.Follow up: 1 month    I have discussed the diagnosis with the patient and the intended plan as seen in the above orders. The patient has received an after-visit summary and questions were answered concerning future plans. I have discussed any concerning medication side effects and warnings with the patient as well.     Keily Saenz PA-C  4/3/2017

## 2017-04-03 NOTE — PATIENT INSTRUCTIONS
-- No medication changes today  -- Please get labs drawn  -- We will call you with results  -- 1 month follow up     Heart Failure: Care Instructions  Your Care Instructions    Heart failure occurs when your heart does not pump as much blood as the body needs. Failure does not mean that the heart has stopped pumping but rather that it is not pumping as well as it should. Over time, this causes fluid buildup in your lungs and other parts of your body. Fluid buildup can cause shortness of breath, fatigue, swollen ankles, and other problems. By taking medicines regularly, reducing sodium (salt) in your diet, checking your weight every day, and making lifestyle changes, you can feel better and live longer. Follow-up care is a key part of your treatment and safety. Be sure to make and go to all appointments, and call your doctor if you are having problems. It's also a good idea to know your test results and keep a list of the medicines you take. How can you care for yourself at home? Medicines  · Be safe with medicines. Take your medicines exactly as prescribed. Call your doctor if you think you are having a problem with your medicine. · Do not take any vitamins, over-the-counter medicine, or herbal products without talking to your doctor first. Too Staff not take ibuprofen (Advil or Motrin) and naproxen (Aleve) without talking to your doctor first. They could make your heart failure worse. · You may be taking some of the following medicine. ¨ Beta-blockers can slow heart rate, decrease blood pressure, and improve your condition. Taking a beta-blocker may lower your chance of needing to be hospitalized. ¨ Angiotensin-converting enzyme inhibitors (ACEIs) reduce the heart's workload, lower blood pressure, and reduce swelling. Taking an ACEI may lower your chance of needing to be hospitalized again. ¨ Angiotensin II receptor blockers (ARBs) work like ACEIs. Your doctor may prescribe them instead of ACEIs.   ¨ Diuretics, also called water pills, reduce swelling. ¨ Potassium supplements replace this important mineral, which is sometimes lost with diuretics. ¨ Aspirin and other blood thinners prevent blood clots, which can cause a stroke or heart attack. You will get more details on the specific medicines your doctor prescribes. Diet  · Your doctor may suggest that you limit sodium to 2,000 milligrams (mg) a day or less. That is less than 1 teaspoon of salt a day, including all the salt you eat in cooking or in packaged foods. People get most of their sodium from processed foods. Fast food and restaurant meals also tend to be very high in sodium. · Ask your doctor how much liquid you can drink each day. You may have to limit liquids. Weight  · Weigh yourself without clothing at the same time each day. Record your weight. Call your doctor if you gain more than 3 pounds in 2 to 3 days. A sudden weight gain may mean that your heart failure is getting worse. Activity level  · Start light exercise (if your doctor says it is okay). Even if you can only do a small amount, exercise will help you get stronger, have more energy, and manage your weight and your stress. Walking is an easy way to get exercise. Start out by walking a little more than you did before. Bit by bit, increase the amount you walk. · When you exercise, watch for signs that your heart is working too hard. You are pushing yourself too hard if you cannot talk while you are exercising. If you become short of breath or dizzy or have chest pain, stop, sit down, and rest.  · If you feel \"wiped out\" the day after you exercise, walk slower or for a shorter distance until you can work up to a better pace. · Get enough rest at night. Sleeping with 1 or 2 pillows under your upper body and head may help you breathe easier. Lifestyle changes  · Do not smoke. Smoking can make a heart condition worse.  If you need help quitting, talk to your doctor about stop-smoking programs and medicines. These can increase your chances of quitting for good. Quitting smoking may be the most important step you can take to protect your heart. · Limit alcohol to 2 drinks a day for men and 1 drink a day for women. Too much alcohol can cause health problems. · Avoid getting sick from colds and the flu. Get a pneumococcal vaccine shot. If you have had one before, ask your doctor whether you need another dose. Get a flu shot each year. If you must be around people with colds or the flu, wash your hands often. When should you call for help? Call 911 if you have symptoms of sudden heart failure such as:  · You have severe trouble breathing. · You cough up pink, foamy mucus. · You have a new irregular or rapid heartbeat. Call your doctor now or seek immediate medical care if:  · You have new or increased shortness of breath. · You are dizzy or lightheaded, or you feel like you may faint. · You have sudden weight gain, such as 3 pounds or more in 2 to 3 days. · You have increased swelling in your legs, ankles, or feet. · You are suddenly so tired or weak that you cannot do your usual activities. Watch closely for changes in your health, and be sure to contact your doctor if:  · You develop new symptoms. Where can you learn more? Go to http://ni-nohemi.info/. Enter K164 in the search box to learn more about \"Heart Failure: Care Instructions. \"  Current as of: January 27, 2016  Content Version: 11.2  © 1157-8271 [employer]. Care instructions adapted under license by [employer] (which disclaims liability or warranty for this information). If you have questions about a medical condition or this instruction, always ask your healthcare professional. [place] [address] any warranty or liability for your use of this information.

## 2017-04-03 NOTE — MR AVS SNAPSHOT
Visit Information Date & Time Provider Department Dept. Phone Encounter #  
 4/3/2017  1:00 PM MD [facility] [phone] Your Appointments 5/3/2017  1:00 AM  
ESTABLISHED PATIENT with MD Marlon [facility]) Appt Note: 1 MO. F/U  
 [address] 1400 [address] [place] [address]  
[phone] [address] Upcoming Health Maintenance Date Due DTaP/Tdap/Td series (1 - Tdap) 8/16/1952 EYE EXAM RETINAL OR DILATED Q1 1/9/2016 Pneumococcal 65+ High/Highest Risk (2 of 2 - PPSV23) 4/19/2016 MEDICARE YEARLY EXAM 11/19/2016 GLAUCOMA SCREENING Q2Y 1/9/2017 FOOT EXAM Q1 1/28/2017 MICROALBUMIN Q1 3/10/2017 LIPID PANEL Q1 4/1/2017 HEMOGLOBIN A1C Q6M 8/8/2017 Allergies as of 4/3/2017  Review Complete On: 4/3/2017 By: Penny Quispe PA-C Severity Noted Reaction Type Reactions Ace Inhibitors High 05/21/2012   Systemic Angioedema Current Immunizations  Reviewed on 2/13/2017 Name Date Influenza High Dose Vaccine PF 11/19/2015 Influenza Vaccine 10/1/2016, 10/22/2013, 12/13/2012 Influenza Vaccine Split 9/27/2011 11:15 AM  
 Influenza Vaccine Whole 10/8/2010 Pneumococcal Vaccine (Unspecified Type) 4/19/2011 Not reviewed this visit You Were Diagnosed With   
  
 Codes Comments Coronary artery disease involving native coronary artery of native heart without angina pectoris    -  Primary ICD-10-CM: I25.10 ICD-9-CM: 414.01 Chronic diastolic congestive heart failure (HCC)     ICD-10-CM: I50.32 
ICD-9-CM: 428.32, 428.0 Aortic valve stenosis, unspecified etiology     ICD-10-CM: I35.0 ICD-9-CM: 424.1 HTN (hypertension), benign     ICD-10-CM: I10 
ICD-9-CM: 401.1 Vitals BP Pulse Height(growth percentile) Weight(growth percentile) SpO2 BMI  
 125/71 99 5' 5\" (1.651 m) 172 lb (78 kg) 97% 28.62 kg/m2 OB Status Smoking Status Postmenopausal Never Smoker Vitals History BMI and BSA Data Body Mass Index Body Surface Area  
 28.62 kg/m 2 1.89 m 2 Preferred Pharmacy Pharmacy Name Phone [id number] [address] [facility] AT [address] [place] [address] [phone] Your Updated Medication List  
  
   
This list is accurate as of: 4/3/17  2:08 PM.  Always use your most recent med list.  
  
  
  
  
 albuterol 2.5 mg /3 mL (0.083 %) nebulizer solution Commonly known as:  PROVENTIL VENTOLIN  
3 mL by Nebulization route every four (4) hours as needed for Wheezing. aspirin 81 mg chewable tablet Take 81 mg by mouth daily. atorvastatin 40 mg tablet Commonly known as:  LIPITOR  
  
 BD INSULIN SYRINGE ULTRA-FINE 0.5 mL 31 gauge x 5/16 Syrg Generic drug:  Insulin Syringe-Needle U-100  
  
 bumetanide 1 mg tablet Commonly known as:  Velmelanie Sandifer Take 1 Tab by mouth two (2) times a day. citalopram 10 mg tablet Commonly known as:  Cyrus Allen Take  by mouth daily. denosumab 60 mg/mL injection Commonly known as:  Maribel Medina 60 mg by SubCUTAneous route. Twice a year (June and December) diphenhydrAMINE 25 mg capsule Commonly known as:  BENADRYL Take 25 mg by mouth every six (6) hours as needed (Allergic reaction/Angioedema). ergocalciferol 50,000 unit capsule Commonly known as:  VITAMIN D2 Take 1 Cap by mouth every seven (7) days. One capsule monthly  
  
 ferrous sulfate 325 mg (65 mg iron) EC tablet Commonly known as:  IRON Take 1 Tab by mouth Daily (before breakfast). insulin glargine 100 unit/mL injection Commonly known as:  LANTUS  
50 Units by SubCUTAneous route nightly. For -175 = 20 units 175-200 = 30 units > 200 = 40 units   by SubCUTAneous route nightly. Indications: type 2 diabetes mellitus  
  
 ipratropium 0.02 % nebulizer solution Commonly known as:  ATROVENT  
  
 KLOR-CON M20 20 mEq tablet Generic drug:  potassium chloride TAKE 1 TABLET BY MOUTH EVERY DAY  
  
 metFORMIN 1,000 mg tablet Commonly known as:  GLUCOPHAGE Take 1 Tab by mouth two (2) times a day. metoprolol tartrate 50 mg tablet Commonly known as:  LOPRESSOR Take 1 Tab by mouth two (2) times a day. predniSONE 5 mg tablet Commonly known as:  DELTASONE  
6 tabs daily for 2 days then drop to, 4 tabs daily for 2 days then drop to, 2 tabs daily for 2 days then drop to, 1 tab daily for 2 days then stop. spironolactone 25 mg tablet Commonly known as:  ALDACTONE Take 1 Tab by mouth daily. Indications: Edema TRUE METRIX GLUCOSE TEST STRIP strip Generic drug:  glucose blood VI test strips We Performed the Following MAGNESIUM [id number] CPT(R)] METABOLIC PANEL, BASIC [59457 CPT(R)] Patient Instructions -- No medication changes today -- Please get labs drawn 
-- We will call you with results -- 1 month follow up Heart Failure: Care Instructions Your Care Instructions Heart failure occurs when your heart does not pump as much blood as the body needs. Failure does not mean that the heart has stopped pumping but rather that it is not pumping as well as it should. Over time, this causes fluid buildup in your lungs and other parts of your body. Fluid buildup can cause shortness of breath, fatigue, swollen ankles, and other problems. By taking medicines regularly, reducing sodium (salt) in your diet, checking your weight every day, and making lifestyle changes, you can feel better and live longer. Follow-up care is a key part of your treatment and safety. Be sure to make and go to all appointments, and call your doctor if you are having problems.  It's also a good idea to know your test results and keep a list of the medicines you take. How can you care for yourself at home? Medicines · Be safe with medicines. Take your medicines exactly as prescribed. Call your doctor if you think you are having a problem with your medicine. · Do not take any vitamins, over-the-counter medicine, or herbal products without talking to your doctor first. Yasmeen Gu not take ibuprofen (Advil or Motrin) and naproxen (Aleve) without talking to your doctor first. They could make your heart failure worse. · You may be taking some of the following medicine. ¨ Beta-blockers can slow heart rate, decrease blood pressure, and improve your condition. Taking a beta-blocker may lower your chance of needing to be hospitalized. ¨ Angiotensin-converting enzyme inhibitors (ACEIs) reduce the heart's workload, lower blood pressure, and reduce swelling. Taking an ACEI may lower your chance of needing to be hospitalized again. ¨ Angiotensin II receptor blockers (ARBs) work like ACEIs. Your doctor may prescribe them instead of ACEIs. ¨ Diuretics, also called water pills, reduce swelling. ¨ Potassium supplements replace this important mineral, which is sometimes lost with diuretics. ¨ Aspirin and other blood thinners prevent blood clots, which can cause a stroke or heart attack. You will get more details on the specific medicines your doctor prescribes. Diet · Your doctor may suggest that you limit sodium to 2,000 milligrams (mg) a day or less. That is less than 1 teaspoon of salt a day, including all the salt you eat in cooking or in packaged foods. People get most of their sodium from processed foods. Fast food and restaurant meals also tend to be very high in sodium. · Ask your doctor how much liquid you can drink each day. You may have to limit liquids. Weight · Weigh yourself without clothing at the same time each day. Record your weight. Call your doctor if you gain more than 3 pounds in 2 to 3 days.  A sudden weight gain may mean that your heart failure is getting worse. Activity level · Start light exercise (if your doctor says it is okay). Even if you can only do a small amount, exercise will help you get stronger, have more energy, and manage your weight and your stress. Walking is an easy way to get exercise. Start out by walking a little more than you did before. Bit by bit, increase the amount you walk. · When you exercise, watch for signs that your heart is working too hard. You are pushing yourself too hard if you cannot talk while you are exercising. If you become short of breath or dizzy or have chest pain, stop, sit down, and rest. 
· If you feel \"wiped out\" the day after you exercise, walk slower or for a shorter distance until you can work up to a better pace. · Get enough rest at night. Sleeping with 1 or 2 pillows under your upper body and head may help you breathe easier. Lifestyle changes · Do not smoke. Smoking can make a heart condition worse. If you need help quitting, talk to your doctor about stop-smoking programs and medicines. These can increase your chances of quitting for good. Quitting smoking may be the most important step you can take to protect your heart. · Limit alcohol to 2 drinks a day for men and 1 drink a day for women. Too much alcohol can cause health problems. · Avoid getting sick from colds and the flu. Get a pneumococcal vaccine shot. If you have had one before, ask your doctor whether you need another dose. Get a flu shot each year. If you must be around people with colds or the flu, wash your hands often. When should you call for help? Call 911 if you have symptoms of sudden heart failure such as: 
· You have severe trouble breathing. · You cough up pink, foamy mucus. · You have a new irregular or rapid heartbeat. Call your doctor now or seek immediate medical care if: 
· You have new or increased shortness of breath. · You are dizzy or lightheaded, or you feel like you may faint. · You have sudden weight gain, such as 3 pounds or more in 2 to 3 days. · You have increased swelling in your legs, ankles, or feet. · You are suddenly so tired or weak that you cannot do your usual activities. Watch closely for changes in your health, and be sure to contact your doctor if: 
· You develop new symptoms. Where can you learn more? Go to http://ni-nohemi.info/. Enter H054 in the search box to learn more about \"Heart Failure: Care Instructions. \" Current as of: January 27, 2016 Content Version: 11.2 © 4595-4921 [employer]. Care instructions adapted under license by [employer] (which disclaims liability or warranty for this information). If you have questions about a medical condition or this instruction, always ask your healthcare professional. [place] [address] any warranty or liability for your use of this information. Introducing [facility] & HEALTH SERVICES! Richy Noland introduces [employer] patient portal. Now you can access parts of your medical record, email your doctor's office, and request medication refills online. 1. In your internet browser, go to [email or web address] 2. Click on the First Time User? Click Here link in the Sign In box. You will see the New Member Sign Up page. 3. Enter your [employer] Access Code exactly as it appears below. You will not need to use this code after youve completed the sign-up process. If you do not sign up before the expiration date, you must request a new code. · [employer] Access Code: [email or web address] Expires: 4/24/2017  4:59 PM 
 
4. Enter the last four digits of your Social Security Number (xxxx) and Date of Birth (mm/dd/yyyy) as indicated and click Submit. You will be taken to the next sign-up page. 5. Create a [employer] ID.  This will be your [employer] login ID and cannot be changed, so think of one that is secure and easy to remember. 6. Create a Medmonk password. You can change your password at any time. 7. Enter your Password Reset Question and Answer. This can be used at a later time if you forget your password. 8. Enter your e-mail address. You will receive e-mail notification when new information is available in [address] 9. Click Sign Up. You can now view and download portions of your medical record. 10. Click the Download Summary menu link to download a portable copy of your medical information. If you have questions, please visit the Frequently Asked Questions section of the [employer]k website. Remember, Med[employer]k is NOT to be used for urgent needs. For medical emergencies, dial 911. Now available from your iPhone and Android! Please provide this summary of care documentation to your next provider. Your primary care clinician is listed as [phone]. If you have any questions after today's visit, please call [phone].

## 2017-04-04 ENCOUNTER — TELEPHONE (OUTPATIENT)
Dept: CARDIOLOGY CLINIC | Age: 82
End: 2017-04-04

## 2017-04-04 LAB
BUN SERPL-MCNC: 17 MG/DL (ref 8–27)
BUN/CREAT SERPL: 22 (ref 12–28)
CALCIUM SERPL-MCNC: 9.2 MG/DL (ref 8.7–10.3)
CHLORIDE SERPL-SCNC: 99 MMOL/L (ref 96–106)
CO2 SERPL-SCNC: 25 MMOL/L (ref 18–29)
CREAT SERPL-MCNC: 0.79 MG/DL (ref 0.57–1)
GLUCOSE SERPL-MCNC: 206 MG/DL (ref 65–99)
MAGNESIUM SERPL-MCNC: 2.1 MG/DL (ref 1.6–2.3)
POTASSIUM SERPL-SCNC: 5.2 MMOL/L (ref 3.5–5.2)
SODIUM SERPL-SCNC: 143 MMOL/L (ref 134–144)

## 2017-04-04 NOTE — TELEPHONE ENCOUNTER
Outgoing call to patient per. [facility] AMANDA GIPSON PA-C letting her know that her lab results were normal.   Patient verbalized understanding.

## 2017-05-15 ENCOUNTER — OFFICE VISIT (OUTPATIENT)
Dept: CARDIOLOGY CLINIC | Age: 82
End: 2017-05-15

## 2017-05-15 VITALS
OXYGEN SATURATION: 95 % | DIASTOLIC BLOOD PRESSURE: 77 MMHG | HEIGHT: 65 IN | HEART RATE: 89 BPM | WEIGHT: 178 LBS | BODY MASS INDEX: 29.66 KG/M2 | SYSTOLIC BLOOD PRESSURE: 133 MMHG

## 2017-05-15 DIAGNOSIS — I50.33 DIASTOLIC CHF, ACUTE ON CHRONIC (HCC): ICD-10-CM

## 2017-05-15 DIAGNOSIS — R06.02 SOB (SHORTNESS OF BREATH): ICD-10-CM

## 2017-05-15 DIAGNOSIS — I50.30 DIASTOLIC CONGESTIVE HEART FAILURE, UNSPECIFIED CONGESTIVE HEART FAILURE CHRONICITY: ICD-10-CM

## 2017-05-15 DIAGNOSIS — I35.0 NONRHEUMATIC AORTIC VALVE STENOSIS: ICD-10-CM

## 2017-05-15 DIAGNOSIS — I10 HTN (HYPERTENSION), BENIGN: Primary | ICD-10-CM

## 2017-05-15 RX ORDER — LANOLIN ALCOHOL/MO/W.PET/CERES
CREAM (GRAM) TOPICAL
Refills: 2 | COMMUNITY
Start: 2017-03-30 | End: 2017-05-15 | Stop reason: DRUGHIGH

## 2017-05-15 RX ORDER — METOPROLOL TARTRATE 50 MG/1
50 TABLET ORAL 2 TIMES DAILY
Qty: 60 TAB | Refills: 6 | Status: SHIPPED | OUTPATIENT
Start: 2017-05-15 | End: 2017-09-07 | Stop reason: SDUPTHER

## 2017-05-15 RX ORDER — ATORVASTATIN CALCIUM 20 MG/1
TABLET, FILM COATED ORAL
Refills: 11 | COMMUNITY
Start: 2017-03-22 | End: 2019-09-17

## 2017-05-15 NOTE — PATIENT INSTRUCTIONS
You can stop the potassium pill (Chlor-Con) now that you are taking the spironolactone.     We will try to get you AM appointment witht the doctor for pulmonary hypertension

## 2017-05-15 NOTE — MR AVS SNAPSHOT
Visit Information Date & Time Provider Department Dept. Phone Encounter #  
 5/15/2017  1:30 PM Carisa Cox MD [facility] [place] [address] [id number] Upcoming Health Maintenance Date Due DTaP/Tdap/Td series (1 - Tdap) 8/16/1952 EYE EXAM RETINAL OR DILATED Q1 1/9/2016 Pneumococcal 65+ High/Highest Risk (2 of 2 - PPSV23) 4/19/2016 MEDICARE YEARLY EXAM 11/19/2016 GLAUCOMA SCREENING Q2Y 1/9/2017 FOOT EXAM Q1 1/28/2017 MICROALBUMIN Q1 3/10/2017 LIPID PANEL Q1 4/1/2017 INFLUENZA AGE 9 TO ADULT 8/1/2017 HEMOGLOBIN A1C Q6M 8/8/2017 Allergies as of 5/15/2017  Review Complete On: 5/15/2017 By: Rip Cool Severity Noted Reaction Type Reactions Ace Inhibitors High 05/21/2012   Systemic Angioedema Current Immunizations  Reviewed on 2/13/2017 Name Date Influenza High Dose Vaccine PF 11/19/2015 Influenza Vaccine 10/1/2016, 10/22/2013, 12/13/2012 Influenza Vaccine Split 9/27/2011 11:15 AM  
 Influenza Vaccine Whole 10/8/2010 Pneumococcal Vaccine (Unspecified Type) 4/19/2011 Not reviewed this visit Vitals BP Pulse Height(growth percentile) Weight(growth percentile) SpO2 BMI  
 133/77 89 5' 5\" (1.651 m) 178 lb (80.7 kg) 95% 29.62 kg/m2 OB Status Smoking Status Postmenopausal Never Smoker Vitals History BMI and BSA Data Body Mass Index Body Surface Area  
 29.62 kg/m 2 1.92 m 2 Preferred Pharmacy Pharmacy Name Phone [address] [facility] [address] [phone] Your Updated Medication List  
  
   
This list is accurate as of: 5/15/17  2:10 PM.  Always use your most recent med list.  
  
  
  
  
 albuterol 2.5 mg /3 mL (0.083 %) nebulizer solution Commonly known as:  PROVENTIL VENTOLIN  
3 mL by Nebulization route every four (4) hours as needed for Wheezing. aspirin 81 mg chewable tablet Take 81 mg by mouth daily. atorvastatin 20 mg tablet Commonly known as:  LIPITOR  
TAKE 1 TABLET BY MOUTH EVERY DAY  
  
 BD INSULIN SYRINGE ULTRA-FINE 0.5 mL 31 gauge x 5/16 Syrg Generic drug:  Insulin Syringe-Needle U-100  
  
 bumetanide 1 mg tablet Commonly known as:  Beckey Pancoast Take 1 Tab by mouth two (2) times a day. citalopram 10 mg tablet Commonly known as:  Paul Breeding Take  by mouth daily. denosumab 60 mg/mL injection Commonly known as:  Maebelle Cobble 60 mg by SubCUTAneous route. Twice a year (June and December) diphenhydrAMINE 25 mg capsule Commonly known as:  BENADRYL Take 25 mg by mouth every six (6) hours as needed (Allergic reaction/Angioedema). ergocalciferol 50,000 unit capsule Commonly known as:  VITAMIN D2 Take 1 Cap by mouth every seven (7) days. One capsule monthly  
  
 ferrous sulfate 325 mg (65 mg iron) EC tablet Commonly known as:  IRON Take 1 Tab by mouth Daily (before breakfast). insulin glargine 100 unit/mL injection Commonly known as:  LANTUS  
50 Units by SubCUTAneous route nightly. For -175 = 20 units 175-200 = 30 units > 200 = 40 units   by SubCUTAneous route nightly. Indications: type 2 diabetes mellitus  
  
 ipratropium 0.02 % nebulizer solution Commonly known as:  ATROVENT  
  
 KLOR-CON M20 20 mEq tablet Generic drug:  potassium chloride TAKE 1 TABLET BY MOUTH EVERY DAY  
  
 metFORMIN 1,000 mg tablet Commonly known as:  GLUCOPHAGE Take 1 Tab by mouth two (2) times a day. metoprolol tartrate 50 mg tablet Commonly known as:  LOPRESSOR Take 1 Tab by mouth two (2) times a day. predniSONE 5 mg tablet Commonly known as:  DELTASONE  
6 tabs daily for 2 days then drop to, 4 tabs daily for 2 days then drop to, 2 tabs daily for 2 days then drop to, 1 tab daily for 2 days then stop. spironolactone 25 mg tablet Commonly known as:  ALDACTONE Take 1 Tab by mouth daily. Indications: Edema TRUE METRIX GLUCOSE TEST STRIP strip Generic drug:  glucose blood VI test strips Prescriptions Sent to Pharmacy Refills  
 metoprolol tartrate (LOPRESSOR) 50 mg tablet 06 Sig: Take 1 Tab by mouth two (2) times a day. Class: Normal  
 Pharmacy: [facility]/pharmacy #[id number] AT [address] [place] [address] #: [phone] Route: Oral  
  
Patient Instructions You can stop the potassium pill (Chlor-Con) now that you are taking the spironolactone. EWe will try to get you anm appointment witht  doctor for pulmonary hypertension Introducing [facility] SERVICES! Johanny Edwards introduces Ever[employer] patient portal. Now you can access parts of your medical record, email your doctor's office, and request medication refills online. 1. In your internet browser, go to [email or web address] 2. Click on the First Time User? Click Here link in the Sign In box. You will see the New Member Sign Up page. 3. Enter your Everloop Access Code exactly as it appears below. You will not need to use this code after youve completed the sign-up process. If you do not sign up before the expiration date, you must request a new code. · Everloop Access Code: [email or web address] Expires: 8/13/2017  2:10 PM 
 
4. Enter the last four digits of your Social Security Number (xxxx) and Date of Birth (mm/dd/yyyy) as indicated and click Submit. You will be taken to the next sign-up page. 5. Create a Ludic Labst ID. This will be your Everloop login ID and cannot be changed, so think of one that is secure and easy to remember. 6. Create a Everloop password. You can change your password at any time. 7. Enter your Password Reset Question and Answer. This can be used at a later time if you forget your password. 8. Enter your e-mail address. You will receive e-mail notification when new information is available in [address] 9. Click Sign Up. You can now view and download portions of your medical record. 10. Click the Download Summary menu link to download a portable copy of your medical information. If you have questions, please visit the Frequently Asked Questions section of the Divided website. Remember, Divided is NOT to be used for urgent needs. For medical emergencies, dial 911. Now available from your iPhone and Android! Please provide this summary of care documentation to your next provider. Your primary care clinician is listed as [id number]. If you have any questions after today's visit, please call [phone].

## 2017-05-31 NOTE — PROGRESS NOTES
Mrs. Jose Manzanares is back for routine reassessment. She is being followed for hypertension with underlying aortic stenosis. She has been feeling well. She has moderate exertional dyspnea, but she has not had any episodes of angina or syncope or near syncope. Her last echo in February of this year was summarized as follows;    SUMMARY:  Left ventricle: Systolic function was normal. Ejection fraction was  estimated in the range of 55 % to 60 %. No obvious wall motion  abnormalities identified in the views obtained. Right ventricle: The ventricle was mildly to moderately dilated. Systolic  function was moderately reduced. Left atrium: The atrium was moderately dilated. Right atrium: The atrium was moderately to severely dilated. Mitral valve: There was mild regurgitation. Aortic valve: Leaflets exhibited calcification and sclerosis. There was  mild to moderate stenosis. DIAN 1.5 cm2 and mean gradient of 17 mmhg    Tricuspid valve: There was mild to moderate regurgitation. Pulmonary arteries: There was severe pulmonary artery hypertension. rvsp  around 85-90 mmhg    Thus, her biggest issue is pulmonary hypertension out of proportion to the rest of her heart disease. She has significant LV diastolic impairment which might be contributory via elevation of left atrial and pulmonary venous pressure. She feels well at the present time without major cardiopulmonary complaints. She denies chest pain, dyspnea, and exertional dizziness, and there have been no episodes of syncope. She has had episodes of clinical CHF in the past that were easily reversed. She has also been in and out of chemotherapy for breast cancer for the last several years. It appears that hewr principal limitation for activity tolerance right now is from the pulmonary hypertension. On ROS, she notes ESPANA with moderate activity.  She doesn't initially offer this aspect but on further questioning she admits to gradually increasing effort intolerance manifest as fatigue without chest pain, with dyspnea if she pushes herself \"far enough\"    On exam, she is a robust older lady appearing less than stated age. She is in no distress. BP is 133/77. Cardiac rhythm is regular with normal S1. S2 is audible but blends in to the harsh systolic murmur audible at aortic focus and along LSB. A diastolic murmur of AI is not audible. There is no audible gallop. Peripheral pulses are normal with minimal slowing of upstroke. There is no capillary pulsation in the nailbeds. Lungs are clear with some dullness in the left base. There is trivial ankle edema witrh no sign of DVT. Leg pulses are normal. Carotids are probably silent but a transmitted systolic murmur is notable mostly on the right. Her last chest X ray earlier this year shows increased lung vascular markings in general with likely fluid in the left base. EKG in late January showed sinus rhythm with anterolateral ST depression consistent with strain or ischemia. Heart catheterization in January was summarized as follows:      --  HEMODYNAMICS:  --  Hemodynamic assessment demonstrates mildly to moderately depressed  cardiac output and mildly to moderately elevated pulmonary capillary wedge  pressure. -- Toula Smack is severe pulmonary hypertension. --  CARDIAC STRUCTURES:  --  Global left ventricular function was normal. EF calculated by contrast  ventriculography was 60 %. --  There was mild aortic stenosis by mean gradient. --  CORONARY CIRCULATION:  --  Coronary angiography demonstrated minor luminal irregularities. RECOMMENDATIONS:  Patient management should include optimal medical therapy, an exercise  program, and weight reduction. The patient should follow a low fat and low  calorie diet. Recommend further pulmonary evaluation of severe pulmonary  hypertension of combined precapillary greater than postcapillary origin.     It seems that the PA HTN is mostly from elevated pulmonary venous pressure from LV diastolic dysfunction. Impression: Aortic stenosis, mild    Aortic regurgitation, moderate    History of breast cancer with prior chemo    The myocardium has been unaffected by the chemo and remains hypertrophic with above normal systolic function. Hypertension, adequate;ly controlled at present. Sleep apnea    Pleural reaction and probable effusion left base    Small pericardial effusion            Plan:  No change in existing treatment. Continue existing medications    Follow closely in ambulatory setting.      Defer management of intrathoracic process to oncology and pulmonary

## 2017-08-02 ENCOUNTER — OFFICE VISIT (OUTPATIENT)
Dept: CARDIOLOGY CLINIC | Age: 82
End: 2017-08-02

## 2017-08-02 VITALS
WEIGHT: 186 LBS | OXYGEN SATURATION: 96 % | BODY MASS INDEX: 30.99 KG/M2 | HEIGHT: 65 IN | DIASTOLIC BLOOD PRESSURE: 67 MMHG | SYSTOLIC BLOOD PRESSURE: 120 MMHG | HEART RATE: 91 BPM

## 2017-08-02 DIAGNOSIS — I50.32 CHRONIC DIASTOLIC CONGESTIVE HEART FAILURE (HCC): ICD-10-CM

## 2017-08-02 DIAGNOSIS — R60.9 EDEMA, UNSPECIFIED TYPE: ICD-10-CM

## 2017-08-02 DIAGNOSIS — I27.20 PULMONARY HYPERTENSION (HCC): Primary | ICD-10-CM

## 2017-08-02 DIAGNOSIS — I25.10 CORONARY ARTERY DISEASE INVOLVING NATIVE CORONARY ARTERY OF NATIVE HEART WITHOUT ANGINA PECTORIS: ICD-10-CM

## 2017-08-02 RX ORDER — ALLOPURINOL 100 MG/1
TABLET ORAL
Refills: 0 | COMMUNITY
Start: 2017-06-21 | End: 2017-09-07 | Stop reason: SDUPTHER

## 2017-08-02 RX ORDER — AZITHROMYCIN 250 MG/1
TABLET, FILM COATED ORAL
COMMUNITY
Start: 2017-07-27 | End: 2017-08-02 | Stop reason: ALTCHOICE

## 2017-08-02 RX ORDER — CITALOPRAM 20 MG/1
20 TABLET, FILM COATED ORAL DAILY
COMMUNITY
Start: 2017-07-17 | End: 2019-09-17

## 2017-08-02 RX ORDER — LANOLIN ALCOHOL/MO/W.PET/CERES
CREAM (GRAM) TOPICAL
Refills: 2 | COMMUNITY
Start: 2017-07-02 | End: 2017-08-02 | Stop reason: SDUPTHER

## 2017-08-02 RX ORDER — BUMETANIDE 1 MG/1
1 TABLET ORAL DAILY
Qty: 60 TAB | Refills: 3 | Status: SHIPPED | OUTPATIENT
Start: 2017-08-02 | End: 2017-09-07 | Stop reason: SDUPTHER

## 2017-08-02 RX ORDER — ATORVASTATIN CALCIUM 40 MG/1
TABLET, FILM COATED ORAL
COMMUNITY
Start: 2017-06-15 | End: 2017-08-02 | Stop reason: ALTCHOICE

## 2017-08-02 NOTE — Clinical Note
This lady has extremely severe pulmonary hypertension. She has postural symptoms based on fixed cardiac output. She needs to be on as little diuretic as possible. I will be setting her up for referral to a specialty pulmonary hypertension service if she is willing.

## 2017-08-02 NOTE — PROGRESS NOTES
Ramesh Granados put her on allopurinol,  presumably for hyperuricemia. We do not have that lab value in our files. I asked the patient to request physical copies of her labs to bring to her next office visit. She is getting postural dizinees bad enough to have to sit back down sometimes, but no syncope. She mainly notices this problem getting out of bed and she has learned to get up very slowly flexing her leg muscles before she moves, and using her walker right away at bedside. She denies chest pain. She denies PND and orthopnea. She has had trivial edema. Still on Bumex BID    On examination, blood pressure is 120/67 with a rapid resting pulse. Lungs are clear to auscultation. She has trivial ankle edema with no sign of DVT. Abdominal girth is increased since the last visit but there is no sign of ascites. The liver feels normal.  Bowel sounds are normal.  There is no abdominal bruit or pulsation. Lungs are clear to auscultation. Cardiac auscultation shows regular rhythm with very loud P2, a harsh systolic murmur along the left sternal border and at the aortic focus with diminished aortic second sound, and moderate pulmonic regurgitation audible on the left side of the sternum as well. Peripheral pulse upstroke does not seem severely reduced. There is no readily audible gallop. Echocardiography performed in February of this year shows moderate aortic stenosis with severe pulmonary hypertension and RV dilatation and hypokinesis. Holter was also performed in February and did not demonstrate any form of complex ectopy or AV block. She did have episodes of sinus bradycardia as low as 47 bpm.    Laboratory work in April of this year was as follows:    Results for Iain Colorado (MRN [id number]) as of 8/7/2017 13:53   Ref.  Range 2/20/2017 12:00 4/3/2017 00:00   Sodium Latest Ref Range: 134 - 144 mmol/L  143   Potassium Latest Ref Range: 3.5 - 5.2 mmol/L  5.2   Chloride Latest Ref Range: 96 - 106 mmol/L  99 CO2 Latest Ref Range: 18 - 29 mmol/L  25   Glucose Latest Ref Range: 65 - 99 mg/dL  206 (H)   BUN Latest Ref Range: 8 - 27 mg/dL  17   Creatinine Latest Ref Range: 0.57 - 1.00 mg/dL  0.79   BUN/Creatinine ratio Latest Ref Range: 12 - 28   22   Calcium Latest Ref Range: 8.7 - 10.3 mg/dL  9.2   Magnesium Latest Ref Range: 1.6 - 2.3 mg/dL  2.1   GFR est non-AA Latest Ref Range: >59 mL/min/1.73  68   GFR est AA Latest Ref Range: >59 mL/min/1.73  79   Her last hemogram was in February, resulted as follows:    Results for Alejandro Alvarado (MRN [id number]) as of 8/7/2017 13:53   Ref. Range 2/12/2017 02:41   WBC Latest Ref Range: 3.6 - 11.0 K/uL 7.7   RBC Latest Ref Range: 3.80 - 5.20 M/uL 3.71 (L)   HGB Latest Ref Range: 11.5 - 16.0 g/dL 9.5 (L)   HCT Latest Ref Range: 35.0 - 47.0 % 31.7 (L)   MCV Latest Ref Range: 80.0 - 99.0 FL 85.4   MCH Latest Ref Range: 26.0 - 34.0 PG 25.6 (L)   MCHC Latest Ref Range: 30.0 - 36.5 g/dL 30.0   RDW Latest Ref Range: 11.5 - 14.5 % 16.9 (H)   PLATELET Latest Ref Range: 150 - 400 K/uL 326   NEUTROPHILS Latest Ref Range: 32 - 75 % 86 (H)   LYMPHOCYTES Latest Ref Range: 12 - 49 % 9 (L)   MONOCYTES Latest Ref Range: 5 - 13 % 5   EOSINOPHILS Latest Ref Range: 0 - 7 % 0   BASOPHILS Latest Ref Range: 0 - 1 % 0   DF Latest Units:   MANUAL   ABS. NEUTROPHILS Latest Ref Range: 1.8 - 8.0 K/UL 6.6   ABS. LYMPHOCYTES Latest Ref Range: 0.8 - 3.5 K/UL 0.7 (L)   ABS. MONOCYTES Latest Ref Range: 0.0 - 1.0 K/UL 0.4   ABS. EOSINOPHILS Latest Ref Range: 0.0 - 0.4 K/UL 0.0   ABS. BASOPHILS Latest Ref Range: 0.0 - 0.1 K/UL 0.0   We do not have a uric acid measurement in our system. I explained to the patient and her  that uric acid elevation can predispose to gout and that preemptive treatment with allopurinol is generally advisable under the circumstances. Mrs. Cosme had not been back to this office since her cardiac catheterization performed in January, resulted as follows:    [address] [facility] [address] [facility], [address] [facility]  [phone]    Cardiovascular Catheterization Comprehensive Report    Patient: Karthikeyan Mauricio  MRN: [id number]  ACCT #: [de-identified]  : 16-Aug-1931  Age: 80 years  Gender: Female  Height: 65 in  Weight: 178.6 lb  BSA: 1.89 m squared  Study date: 2017  Study #:   Fluoro time:  [facility] #: [id number]    Allergies: ACE INHIBITORS, PEANUT    Diagnostic Cardiologist: Rose Amaya M.D. PROCEDURES PERFORMED:    --  Right heart catheterization. --  Left coronary angiography. --  Right coronary angiography. --  MOD CS SAME PHY+5 YR  15 [id number]. --  Left Heart Cath Complete. --  Left and Right Heart Cath Complete. --  MOD CS SAME PHY ADD 15 MIN 52127. --  MOD CS SAME PHY ADD 15 MIN 07755. --  MOD CS SAME PHY ADD 15 MIN 85345. --  MOD CS SAME PHY ADD 15 MIN 59907. SUMMARY:    --  HEMODYNAMICS:  --  Hemodynamic assessment demonstrates mildly to moderately depressed  cardiac output and mildly to moderately elevated pulmonary capillary wedge  pressure. -- Espiridion Amel is severe pulmonary hypertension. --  CARDIAC STRUCTURES:  --  Global left ventricular function was normal. EF calculated by contrast  ventriculography was 60 %. --  There was mild aortic stenosis by mean gradient. --  CORONARY CIRCULATION:  --  Coronary angiography demonstrated minor luminal irregularities. RECOMMENDATIONS:  Patient management should include optimal medical therapy, an exercise  program, and weight reduction. The patient should follow a low fat and low  calorie diet. Recommend further pulmonary evaluation of severe pulmonary  hypertension of combined precapillary greater than postcapillary origin. DISPOSITION: The patient left the catheterization laboratory in stable  condition. HISTORY: No history of previous myocardial infarction. The patient has  hypertension and insulin-controlled diabetes.  There was no history of  cerebrovascular disease, peripheral arterial disease, or chronic lung  disease. There was no family history of coronary artery disease. PRIOR  CARDIOVASCULAR PROCEDURES: No history of valve surgery, coronary or graft  percutaneous intervention, or coronary bypass surgery. HEMODYNAMICS: Hemodynamic assessment demonstrates mildly to moderately  depressed cardiac output and mildly to moderately elevated pulmonary  capillary wedge pressure. There is severe pulmonary hypertension. VENTRICLES: Global left ventricular function was normal. EF calculated by  contrast ventriculography was 60 %. VALVES: AORTIC VALVE: There was mild aortic stenosis by mean gradient. CORONARY CIRCULATION: The coronary circulation is right dominant. Coronary  angiography demonstrated minor luminal irregularities. Left main:  Angiography showed minor luminal irregularities. LAD: Angiography showed  minor luminal irregularities. Circumflex: Angiography showed minor luminal  irregularities. 1st obtuse marginal: Angiography showed minor luminal  irregularities. 2nd obtuse marginal: Angiography showed minor luminal  irregularities. 3rd obtuse marginal: Angiography showed minor luminal  irregularities. Ramus intermedius: Angiography showed minor luminal  irregularities. RCA: Angiography showed minor luminal irregularities. Right PDA: Angiography showed minor luminal irregularities. Right  posterolateral segment: Angiography showed minor luminal irregularities. PROCEDURE: The risks and alternatives of the procedures and conscious  sedation were explained to the patient and informed consent was obtained. The patient was brought to the cath lab and placed on the table. The  planned puncture sites were prepped and draped in the usual sterile  fashion. A timeout was performed prior to the start of the case. Cardiac  catheterization performed electively. --  Right femoral artery access. The puncture site was infiltrated with 2  % lidocaine.  The vessel was accessed using the modified Seldinger  technique, a wire was threaded into the vessel, and a sheath was advanced  over the wire into the vessel. --  Right femoral vein access. The puncture site was infiltrated with 2 %  lidocaine. The vessel was accessed using the modified Seldinger technique,  a wire was threaded into the vessel, and a sheath was advanced over the  wire into the vessel. --  Right heart catheterization. A catheter was advanced to the pulmonary  artery wedge position. Measurements of cardiac output (by Lakshmi using  measured VO2) were obtained. --  Left coronary artery angiography. A catheter was advanced to the aorta  and positioned in the vessel ostium under fluoroscopic guidance. Angiography was performed in multiple projections using hand-injection of  contrast.    --  Right coronary artery angiography. A catheter was advanced to the  aorta and positioned in the vessel ostium under fluoroscopic guidance. Angiography was performed in multiple projections using hand-injection of  contrast.    --  MOD CS SAME PHY+5 YR 1ST 15 [id number]. --  Left Heart Cath Complete. --  Left and Right Heart Cath Complete. --  MOD CS SAME PHY ADD 15 MIN 70903. --  MOD CS SAME PHY ADD 15 MIN 35361. --  MOD CS SAME PHY ADD 15 MIN 68604. --  MOD CS SAME PHY ADD 15 MIN 29082. COMPLICATIONS:  There were no adverse outcomes. Estimated blood loss: minimal.  Specimens removed: none. PROCEDURE COMPLETION: The patient tolerated the procedure well. An IABP  was not used. No mechanical ventricular support was required. TIMING: Test  started at 11:15. Test concluded at 12:04. RADIATION EXPOSURE: Fluoroscopy  dose: 0.994 Vazquez. Prepared and E-signed by    Kendra Santos. Julian Lehman M.D.   Signed 25-Jan-2017 13:19:57    HEMODYNAMIC TABLES    Pressures:  Baseline  Pressures:  - HR: 58  Pressures:  - Rhythm:  Pressures:  -- Aortic Pressure (S/D/M): 159/62/99  Pressures:  -- Aortic Valve Splice - AO: 159/61/--  Pressures:  -- Aortic Valve Splice - LV: 179/8/--  Pressures:  -- Left Ventricle (s/edp): 162/18/--  Pressures:  -- Pulmonary Artery (S/D/M): 84/35/45  Pressures:  -- Pulmonary Capillary Wedge: 25/25/25  Pressures:  -- Right Atrium (a/v/M): 13/13/13  Pressures:  -- Right Ventricle (s/edp): 85/15/--    O2 Sats:  Baseline  O2 Sats:  - HR: 58  O2 Sats:  - Rhythm:  O2 Sats:  -- AO: 11.3/91.5/14.06  O2 Sats:  -- Pa: 11.3/55.8/8.58    Valves:  Baseline  Valves:  -- AORTIC: Aortic systolic ejection period: 19.05  Valves:  -- AORTIC: Aortic valve area: 0.95  Valves:  -- AORTIC: Aortic valve flow: 152.70  Valves:  -- AORTIC: Aortic valve index: 0.50  Valves:  -- AORTIC: Aortic Valve Splice - Gradient: 61.37    Outputs:  Baseline  Outputs:  -- CALCULATIONS[age]  Outputs:  -- CALCULATIONS: Body Surface Area: 1.89  Outputs:  -- CALCULATIONS: Height in cm: 165.00  Outputs:  -- CALCULATIONS: Sex: Female  Outputs:  -- CALCULATIONS: Weight in k.20  Outputs:  -- OUTPUTS: Blood Oxygen Difference: 5.49  Outputs:  -- OUTPUTS: CO by Lakshmi: 2.91  Outputs:  -- OUTPUTS: Lakshmi cardiac index: 1.54  Outputs:  -- OUTPUTS: Lakshmi HR: 59.00  Outputs:  -- OUTPUTS: O2 consumption: 159.60  Outputs:  -- OUTPUTS: Vo2 Indexed: 84.61  Outputs:  -- RESISTANCES: Pulmonary vascular index (Wood Units): 46.69  Outputs:  -- RESISTANCES: Pulmonary vascular resistance (Wood Units):  24.75    Impression: Mild to moderate aortic stenosis    Orthostatic hypotension    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   (Chandni Avendaño MD is Dr. Merlinda Amis  [phone])    Plan:  Daily weights with trend reporting to this office    Decrease Bumex to once daily with the ultimate goal being even less diuresis because of pulmonary hypertension and orthostatic hypotension (fixed cardiac output)    Patient will ask Jayna Martinez for copies of her lab work    She will need to be referred to a pulmonary hypertension specialty service for further management based on the cardiac catheterization and echocardiography data    Return to this office within 2-3 weeks

## 2017-08-02 NOTE — PATIENT INSTRUCTIONS
Please weigh your self daily and keep a record. If you notice an upward trend of more than two pounds or you notice ankle puffiness, please call me. Please come back for a visit in 3-4 weeks. Please decrease the BUMEX to once a day, taken early in the morning. Please ask Sissy Settler for copies of your laboratory tests for our records.

## 2017-08-02 NOTE — MR AVS SNAPSHOT
Visit Information Date & Time Provider Department Dept. Phone Encounter #  
 8/2/2017  1:20 PM Mary Barfield MD [facility] at [address] [place] [address] [phone] [id number] Upcoming Health Maintenance Date Due DTaP/Tdap/Td series (1 - Tdap) 8/16/1952 EYE EXAM RETINAL OR DILATED Q1 1/9/2016 Pneumococcal 65+ High/Highest Risk (2 of 2 - PPSV23) 4/19/2016 MEDICARE YEARLY EXAM 11/19/2016 GLAUCOMA SCREENING Q2Y 1/9/2017 FOOT EXAM Q1 1/28/2017 MICROALBUMIN Q1 3/10/2017 LIPID PANEL Q1 4/1/2017 INFLUENZA AGE 9 TO ADULT 8/1/2017 HEMOGLOBIN A1C Q6M 8/8/2017 Allergies as of 8/2/2017  Review Complete On: 8/2/2017 By: Mary Barfield MD  
  
 Severity Noted Reaction Type Reactions Ace Inhibitors High 05/21/2012   Systemic Angioedema Current Immunizations  Reviewed on 2/13/2017 Name Date Influenza High Dose Vaccine PF 11/19/2015 Influenza Vaccine 10/1/2016, 10/22/2013, 12/13/2012 Influenza Vaccine Split 9/27/2011 11:15 AM  
 Influenza Vaccine Whole 10/8/2010 ZZZ-RETIRED (DO NOT USE) Pneumococcal Vaccine (Unspecified Type) 4/19/2011 Not reviewed this visit You Were Diagnosed With   
  
 Codes Comments Coronary artery disease involving native coronary artery of native heart without angina pectoris     ICD-10-CM: I25.10 ICD-9-CM: 414.01 Chronic diastolic congestive heart failure (HCC)     ICD-10-CM: I50.32 
ICD-9-CM: 428.32, 428.0 Edema, unspecified type     ICD-10-CM: R60.9 ICD-9-CM: 772. 3 Vitals BP Pulse Height(growth percentile) Weight(growth percentile) SpO2 BMI  
 120/67 91 5' 5\" (1.651 m) 186 lb (84.4 kg) 96% 30.95 kg/m2 OB Status Smoking Status Postmenopausal Never Smoker BMI and BSA Data Body Mass Index Body Surface Area 30.95 kg/m 2 1.97 m 2 Preferred Pharmacy Pharmacy Name Phone [id number] [facility] AT [facility] [phone] Your Updated Medication List  
  
   
This list is accurate as of: 8/2/17  2:48 PM.  Always use your most recent med list.  
  
  
  
  
 albuterol 2.5 mg /3 mL (0.083 %) nebulizer solution Commonly known as:  PROVENTIL VENTOLIN  
3 mL by Nebulization route every four (4) hours as needed for Wheezing. allopurinol 100 mg tablet Commonly known as:  ZYLOPRIM  
TAKE 1 TABLET BY MOUTH EVERY DAY  
  
 aspirin 81 mg chewable tablet Take 81 mg by mouth daily. atorvastatin 20 mg tablet Commonly known as:  LIPITOR  
TAKE 1 TABLET BY MOUTH EVERY DAY  
  
 BD INSULIN SYRINGE ULTRA-FINE 0.5 mL 31 gauge x 5/16 Syrg Generic drug:  Insulin Syringe-Needle U-100  
  
 bumetanide 1 mg tablet Commonly known as:  Royal Patrick Take 1 Tab by mouth two (2) times a day. citalopram 20 mg tablet Commonly known as:  CELEXA  
daily. denosumab 60 mg/mL injection Commonly known as:  Gaston Lazar 60 mg by SubCUTAneous route. Twice a year (June and December) diphenhydrAMINE 25 mg capsule Commonly known as:  BENADRYL Take 25 mg by mouth every six (6) hours as needed (Allergic reaction/Angioedema). ferrous sulfate 325 mg (65 mg iron) EC tablet Commonly known as:  IRON Take 1 Tab by mouth Daily (before breakfast). insulin glargine 100 unit/mL injection Commonly known as:  LANTUS  
50 Units by SubCUTAneous route nightly. For -175 = 20 units 175-200 = 30 units > 200 = 40 units   by SubCUTAneous route nightly. Indications: type 2 diabetes mellitus  
  
 ipratropium 0.02 % nebulizer solution Commonly known as:  ATROVENT  
  
 metFORMIN 1,000 mg tablet Commonly known as:  GLUCOPHAGE Take 1 Tab by mouth two (2) times a day. metoprolol tartrate 50 mg tablet Commonly known as:  LOPRESSOR Take 1 Tab by mouth two (2) times a day. predniSONE 5 mg tablet Commonly known as:  DELTASONE  
6 tabs daily for 2 days then drop to, 4 tabs daily for 2 days then drop to, 2 tabs daily for 2 days then drop to, 1 tab daily for 2 days then stop. spironolactone 25 mg tablet Commonly known as:  ALDACTONE Take 1 Tab by mouth daily. Indications: Edema TRUE METRIX GLUCOSE TEST STRIP strip Generic drug:  glucose blood VI test strips To-Do List   
 01/05/2018 11:00 AM  
  Appointment with [address] [place] [address] 1 at [address] [place] [address] ([phone]) Shower or bathe using soap and water. Do not use deodorant, powder, perfumes, or lotion the day of your exam.  If your prior mammograms were not performed at [facility] 6 please bring films with you or forward prior images 2 days before your procedure. If patient is not a callback diagnostic, the patient must have an order/script from the physician for the diagnostic. Please bring it on the day of the mammogram or have it faxed to the department. [facility]  [phone] [facility] [address]  [phone] [facility] [phone] [facility] [address] [facility] [phone] [facility] [phone] Patient Instructions Please weigh your self daily and keep a record. If you notice an upward trend of more than two pounds or you notice ankle puffiness, please call me. Please come back for a visit in 3-4 weeks. Please decrease the BUMEX to once a day, taken early in the morning. Please ask Margie Brandon for copies of your laboratory tests for our records. Introducing [facility]! Kartik Hernandez introduces [employer] patient portal. Now you can access parts of your medical record, email your doctor's office, and request medication refills online. 1. In your internet browser, go to [email or web address] 2. Click on the First Time User? Click Here link in the Sign In box. You will see the New Member Sign Up page. 3. Enter your [employer] Access Code exactly as it appears below.  You will not need to use this code after youve completed the sign-up process. If you do not sign up before the expiration date, you must request a new code. · [employer] Access Code: [email or web address] Expires: 8/13/2017  2:10 PM 
 
4. Enter the last four digits of your Social Security Number (xxxx) and Date of Birth (mm/dd/yyyy) as indicated and click Submit. You will be taken to the next sign-up page. 5. Create a Tel[employer]i ID. This will be your [employer] login ID and cannot be changed, so think of one that is secure and easy to remember. 6. Create a [employer] password. You can change your password at any time. 7. Enter your Password Reset Question and Answer. This can be used at a later time if you forget your password. 8. Enter your e-mail address. You will receive e-mail notification when new information is available in [address]. 9. Click Sign Up. You can now view and download portions of your medical record. 10. Click the Download Summary menu link to download a portable copy of your medical information. If you have questions, please visit the Frequently Asked Questions section of the [employer] website. Remember, [employer] is NOT to be used for urgent needs. For medical emergencies, dial 911. Now available from your iPhone and Android! Please provide this summary of care documentation to your next provider. Your primary care clinician is listed as [phone]. If you have any questions after today's visit, please call [phone].

## 2017-09-07 ENCOUNTER — OFFICE VISIT (OUTPATIENT)
Dept: CARDIOLOGY CLINIC | Age: 82
End: 2017-09-07

## 2017-09-07 VITALS
WEIGHT: 187.8 LBS | SYSTOLIC BLOOD PRESSURE: 124 MMHG | HEART RATE: 95 BPM | HEIGHT: 65 IN | DIASTOLIC BLOOD PRESSURE: 83 MMHG | BODY MASS INDEX: 31.29 KG/M2 | OXYGEN SATURATION: 95 %

## 2017-09-07 DIAGNOSIS — R00.0 TACHYCARDIA: ICD-10-CM

## 2017-09-07 DIAGNOSIS — I25.10 CORONARY ARTERY DISEASE INVOLVING NATIVE CORONARY ARTERY OF NATIVE HEART WITHOUT ANGINA PECTORIS: ICD-10-CM

## 2017-09-07 DIAGNOSIS — M10.00 IDIOPATHIC GOUT, UNSPECIFIED CHRONICITY, UNSPECIFIED SITE: ICD-10-CM

## 2017-09-07 DIAGNOSIS — E78.00 HYPERCHOLESTEREMIA: ICD-10-CM

## 2017-09-07 DIAGNOSIS — R60.9 EDEMA, UNSPECIFIED TYPE: ICD-10-CM

## 2017-09-07 DIAGNOSIS — E79.0 HYPERURICEMIA: ICD-10-CM

## 2017-09-07 DIAGNOSIS — I50.32 CHRONIC DIASTOLIC CONGESTIVE HEART FAILURE (HCC): ICD-10-CM

## 2017-09-07 DIAGNOSIS — I35.0 AORTIC VALVE STENOSIS, UNSPECIFIED ETIOLOGY: ICD-10-CM

## 2017-09-07 DIAGNOSIS — I10 HTN (HYPERTENSION), BENIGN: Primary | ICD-10-CM

## 2017-09-07 RX ORDER — METOPROLOL TARTRATE 100 MG/1
TABLET ORAL
COMMUNITY
Start: 2017-08-21 | End: 2017-09-07 | Stop reason: ALTCHOICE

## 2017-09-07 RX ORDER — ALLOPURINOL 100 MG/1
100 TABLET ORAL DAILY
Qty: 30 TAB | Refills: 12 | Status: SHIPPED | OUTPATIENT
Start: 2017-09-07

## 2017-09-07 RX ORDER — BUMETANIDE 1 MG/1
1 TABLET ORAL 2 TIMES DAILY
Qty: 60 TAB | Refills: 12 | Status: ON HOLD | OUTPATIENT
Start: 2017-09-07 | End: 2019-11-01 | Stop reason: SDUPTHER

## 2017-09-07 RX ORDER — METOPROLOL TARTRATE 50 MG/1
100 TABLET ORAL 2 TIMES DAILY
Qty: 60 TAB | Refills: 12 | Status: SHIPPED | OUTPATIENT
Start: 2017-09-07 | End: 2019-08-30

## 2017-09-07 NOTE — PROGRESS NOTES
Feels well. Sleeping well. on and off but she is unclkear as to whether this has changed before or after the change in  Christos Che. She went back to BID Bumex because of edema. This oimproved her but breathjing is satisfactory and there was no change either way. She was lightheadd she seems very anxious at the prospect of being on Spironolactone by itself. She  denies chest pain, lightheadedness, syncope, and paroxysmal nocturnal dyspnea. She has mild to moderate dyspnea on exertion but the pattern has not changed in the last year. The low lung is answering      Last echo 2/17:    SUMMARY:  Left ventricle: Systolic function was normal. Ejection fraction was  estimated in the range of 55 % to 60 %. No obvious wall motion  abnormalities identified in the views obtained. Right ventricle: The ventricle was mildly to moderately dilated. Systolic  function was moderately reduced. Left atrium: The atrium was moderately dilated. Right atrium: The atrium was moderately to severely dilated. Mitral valve: There was mild regurgitation. Aortic valve: Leaflets exhibited calcification and sclerosis. There was  mild to moderate stenosis. DIAN 1.5 cm2 and mean gradient of 17 mmhg    Her PMD is Dr. Felicitas Guy. On examination today, she is in no distress. She has trivial ankle edema with no sign of DVT. Peripheral pulses are intact. Carotid upstrokes are normal without primary bruit but there is a slight transmitted systolic murmur on the right side. Lungs are clear to auscultation. Cardiac auscultation shows regular rhythm with normal quality S1, diminished but intact aortic second sound, with a crescendo decrescendo systolic murmur at the aortic focus and left sternal border consistent with her known aortic valve disease. There is no audible diastolic murmur or gallop. She is 5 feet 5 inches tall, weight is 187 pounds. Room air oxygen saturation is 95%. Resting pulse is 95/min and regular.   Blood pressure is 124/83. Abdomen is negative for pulsation, bruit, tenderness, endorgan enlargement. Impression: Mild aortic stenosis. Not critical but requiring antibiotic prophylaxis    Preserved left ventricular systolic function with mild diastolic impairment    Tendency to retain water independent of cardiac status    Well-controlled hypertension    Plan:  Encourage reduction of oral diuretic whenever edema is fully controlled    Follow aortic valve about once every 18 months for possible progression of stenosis    As long as she follows with her primary physician new visits to this office can be every 3-4 months.     No other change was made in existing therapy at this time

## 2017-09-07 NOTE — PATIENT INSTRUCTIONS
You are doing very well. Please continue the same treatment.  I will take care of renewals and communicate with Dr. Luis Chen

## 2017-09-07 NOTE — Clinical Note
Mrs. Brandon Oliveros is completely stable from a cardiac standpoint. She has mild noncritical aortic stenosis that does not require further intervention other than consideration of appropriate antibiotic prophylaxis. She probably has a tendency to overuse loop diuretic. She should be returning to your care with as needed visits to this office. Thank you.   Kayli Benitez

## 2017-10-30 ENCOUNTER — OFFICE VISIT (OUTPATIENT)
Dept: CARDIOLOGY CLINIC | Age: 82
End: 2017-10-30

## 2017-10-30 VITALS
OXYGEN SATURATION: 95 % | HEART RATE: 97 BPM | WEIGHT: 191.6 LBS | BODY MASS INDEX: 31.92 KG/M2 | DIASTOLIC BLOOD PRESSURE: 76 MMHG | SYSTOLIC BLOOD PRESSURE: 136 MMHG | HEIGHT: 65 IN

## 2017-10-30 DIAGNOSIS — I35.0 AORTIC VALVE STENOSIS, ETIOLOGY OF CARDIAC VALVE DISEASE UNSPECIFIED: ICD-10-CM

## 2017-10-30 DIAGNOSIS — I10 HTN (HYPERTENSION), BENIGN: ICD-10-CM

## 2017-10-30 DIAGNOSIS — I50.30 DIASTOLIC CONGESTIVE HEART FAILURE, UNSPECIFIED CONGESTIVE HEART FAILURE CHRONICITY: Primary | ICD-10-CM

## 2017-10-30 RX ORDER — ATORVASTATIN CALCIUM 40 MG/1
40 TABLET, FILM COATED ORAL
COMMUNITY
Start: 2017-10-02

## 2017-10-30 NOTE — PATIENT INSTRUCTIONS
-- You did not show any A-fib on your holter monitor, and since there was some concern with the nosebleeds, we stopped the Xarelto    Heart-Healthy Diet: Care Instructions  Your Care Instructions    A heart-healthy diet has lots of vegetables, fruits, nuts, beans, and whole grains, and is low in salt. It limits foods that are high in saturated fat, such as meats, cheeses, and fried foods. It may be hard to change your diet, but even small changes can lower your risk of heart attack and heart disease. Follow-up care is a key part of your treatment and safety. Be sure to make and go to all appointments, and call your doctor if you are having problems. It's also a good idea to know your test results and keep a list of the medicines you take. How can you care for yourself at home? Watch your portions  · Learn what a serving is. A \"serving\" and a \"portion\" are not always the same thing. Make sure that you are not eating larger portions than are recommended. For example, a serving of pasta is ½ cup. A serving size of meat is 2 to 3 ounces. A 3-ounce serving is about the size of a deck of cards. Measure serving sizes until you are good at [place]" them. Keep in mind that restaurants often serve portions that are 2 or 3 times the size of one serving. · To keep your energy level up and keep you from feeling hungry, eat often but in smaller portions. · Eat only the number of calories you need to stay at a healthy weight. If you need to lose weight, eat fewer calories than your body burns (through exercise and other physical activity). Eat more fruits and vegetables  · Eat a variety of fruit and vegetables every day. Dark green, deep orange, red, or yellow fruits and vegetables are especially good for you. Examples include spinach, carrots, peaches, and berries. · Keep carrots, celery, and other veggies handy for snacks.  Buy fruit that is in season and store it where you can see it so that you will be tempted to eat it.  · Cook dishes that have a lot of veggies in them, such as stir-fries and soups. Limit saturated and trans fat  · Read food labels, and try to avoid saturated and trans fats. They increase your risk of heart disease. Trans fat is found in many processed foods such as cookies and crackers. · Use olive or canola oil when you cook. Try cholesterol-lowering spreads, such as Benecol or Take Control. · Bake, broil, grill, or steam foods instead of frying them. · Choose lean meats instead of high-fat meats such as hot dogs and sausages. Cut off all visible fat when you prepare meat. · Eat fish, skinless poultry, and meat alternatives such as soy products instead of high-fat meats. Soy products, such as tofu, may be especially good for your heart. · Choose low-fat or fat-free milk and dairy products. Eat fish  · Eat at least two servings of fish a week. Certain fish, such as salmon and tuna, contain omega-3 fatty acids, which may help reduce your risk of heart attack. Eat foods high in fiber  · Eat a variety of grain products every day. Include whole-grain foods that have lots of fiber and nutrients. Examples of whole-grain foods include oats, whole wheat bread, and brown rice. · Buy whole-grain breads and cereals, instead of white bread or pastries. Limit salt and sodium  · Limit how much salt and sodium you eat to help lower your blood pressure. · Taste food before you salt it. Add only a little salt when you think you need it. With time, your taste buds will adjust to less salt. · Eat fewer snack items, fast foods, and other high-salt, processed foods. Check food labels for the amount of sodium in packaged foods. · Choose low-sodium versions of canned goods (such as soups, vegetables, and beans). Limit sugar  · Limit drinks and foods with added sugar. These include candy, desserts, and soda pop. Limit alcohol  · Limit alcohol to no more than 2 drinks a day for men and 1 drink a day for women.  Too much alcohol can cause health problems. When should you call for help? Watch closely for changes in your health, and be sure to contact your doctor if:  ? · You would like help planning heart-healthy meals. Where can you learn more? Go to http://ni-nohemi.info/. Enter V137 in the search box to learn more about \"Heart-Healthy Diet: Care Instructions. \"  Current as of: September 21, 2016  Content Version: 11.4  © 2982-4122 Healthwise, [employer]. Care instructions adapted under license by [employer] (which disclaims liability or warranty for this information). If you have questions about a medical condition or this instruction, always ask your healthcare professional. [address] any warranty or liability for your use of this information.

## 2017-10-30 NOTE — MR AVS SNAPSHOT
Visit Information Date & Time Provider Department Dept. Phone Encounter #  
 10/30/2017  3:40 PM Hyacinth Mtz MD [facility] [phone] Upcoming Health Maintenance Date Due DTaP/Tdap/Td series (1 - Tdap) 8/16/1952 EYE EXAM RETINAL OR DILATED Q1 1/9/2016 Pneumococcal 65+ High/Highest Risk (2 of 2 - PPSV23) 4/19/2016 MEDICARE YEARLY EXAM 11/19/2016 GLAUCOMA SCREENING Q2Y 1/9/2017 FOOT EXAM Q1 1/28/2017 MICROALBUMIN Q1 3/10/2017 LIPID PANEL Q1 4/1/2017 INFLUENZA AGE 9 TO ADULT 8/1/2017 HEMOGLOBIN A1C Q6M 8/8/2017 Allergies as of 10/30/2017  Review Complete On: 10/30/2017 By: Yudelka Robbins Severity Noted Reaction Type Reactions Ace Inhibitors High 05/21/2012   Systemic Angioedema Current Immunizations  Reviewed on 2/13/2017 Name Date Influenza High Dose Vaccine PF 11/19/2015 Influenza Vaccine 10/1/2016, 10/22/2013, 12/13/2012 Influenza Vaccine Split 9/27/2011 11:15 AM  
 Influenza Vaccine Whole 10/8/2010 ZZZ-RETIRED (DO NOT USE) Pneumococcal Vaccine (Unspecified Type) 4/19/2011 Not reviewed this visit You Were Diagnosed With   
  
 Codes Comments Diastolic congestive heart failure, unspecified congestive heart failure chronicity ([facility] [address]    -  Primary ICD-10-CM: I50.30 ICD-9-CM: 428.30, 428.0 Aortic valve stenosis, etiology of cardiac valve disease unspecified     ICD-10-CM: I35.0 ICD-9-CM: 424.1 HTN (hypertension), benign     ICD-10-CM: I10 
ICD-9-CM: 401.1 Vitals BP Pulse Height(growth percentile) Weight(growth percentile) SpO2 BMI  
 136/76 97 5' 5\" (1.651 m) 191 lb 9.6 oz (86.9 kg) 95% 31.88 kg/m2 OB Status Smoking Status Postmenopausal Never Smoker Vitals History BMI and BSA Data Body Mass Index Body Surface Area  
 31.88 kg/m 2 2 m 2 Preferred Pharmacy Pharmacy Name Phone [id number] [id number] [facility] [address] [facility] [place] [phone] Your Updated Medication List  
  
   
This list is accurate as of: 10/30/17  4:07 PM.  Always use your most recent med list.  
  
  
  
  
 albuterol 2.5 mg /3 mL (0.083 %) nebulizer solution Commonly known as:  PROVENTIL VENTOLIN  
3 mL by Nebulization route every four (4) hours as needed for Wheezing. allopurinol 100 mg tablet Commonly known as:  [place]ler Night Take 1 Tab by mouth daily. aspirin 81 mg chewable tablet Take 81 mg by mouth daily. * atorvastatin 20 mg tablet Commonly known as:  LIPITOR  
TAKE 1 TABLET BY MOUTH EVERY DAY  
  
 * atorvastatin 40 mg tablet Commonly known as:  LIPITOR  
  
 BD INSULIN SYRINGE ULTRA-FINE 0.5 mL 31 gauge x 5/16 Syrg Generic drug:  Insulin Syringe-Needle U-100  
  
 bumetanide 1 mg tablet Commonly known as:  Romana  Take 1 Tab by mouth two (2) times a day. citalopram 20 mg tablet Commonly known as:  Chad Flatter 20 mg daily. denosumab 60 mg/mL injection Commonly known as:  Dollene Caulk 60 mg by SubCUTAneous route. Twice a year (June and December) diphenhydrAMINE 25 mg capsule Commonly known as:  BENADRYL Take 25 mg by mouth every six (6) hours as needed (Allergic reaction/Angioedema). ferrous sulfate 325 mg (65 mg iron) EC tablet Commonly known as:  IRON Take 1 Tab by mouth Daily (before breakfast). insulin glargine 100 unit/mL injection Commonly known as:  LANTUS  
50 Units by SubCUTAneous route nightly. For -175 = 20 units 175-200 = 30 units > 200 = 40 units   by SubCUTAneous route nightly. Indications: type 2 diabetes mellitus  
  
 ipratropium 0.02 % Soln Commonly known as:  ATROVENT  
  
 metFORMIN 1,000 mg tablet Commonly known as:  GLUCOPHAGE Take 1 Tab by mouth two (2) times a day. metoprolol tartrate 50 mg tablet Commonly known as:  LOPRESSOR  
 Take 2 Tabs by mouth two (2) times a day. predniSONE 5 mg tablet Commonly known as:  DELTASONE  
6 tabs daily for 2 days then drop to, 4 tabs daily for 2 days then drop to, 2 tabs daily for 2 days then drop to, 1 tab daily for 2 days then stop. spironolactone 25 mg tablet Commonly known as:  ALDACTONE Take 1 Tab by mouth daily. Indications: Edema TRUE METRIX GLUCOSE TEST STRIP strip Generic drug:  glucose blood VI test strips * Notice: This list has 2 medication(s) that are the same as other medications prescribed for you. Read the directions carefully, and ask your doctor or other care provider to review them with you. To-Do List   
 01/05/2018 11:00 AM  
  Appointment with Melinda Prather 1 at [facility] ([phone]) Shower or bathe using soap and water. Do not use deodorant, powder, perfumes, or lotion the day of your exam.  If your prior mammograms were not performed at [facility] please bring films with you or forward prior images 2 days before your procedure. If patient is not a callback diagnostic, the patient must have an order/script from the physician for the diagnostic. Please bring it on the day of the mammogram or have it faxed to the department. [facility] [place]  [phone] [facility] [address]  [phone] [facility] [phone] [facility] [address] [facility] [phone] [facility] [phone] Patient Instructions   
-- You did not show any A-fib on your holter monitor, and since there was some concern with the nosebleeds, we stopped the Xarelto Heart-Healthy Diet: Care Instructions Your Care Instructions A heart-healthy diet has lots of vegetables, fruits, nuts, beans, and whole grains, and is low in salt. It limits foods that are high in saturated fat, such as meats, cheeses, and fried foods. It may be hard to change your diet, but even small changes can lower your risk of heart attack and heart disease. Follow-up care is a key part of your treatment and safety. Be sure to make and go to all appointments, and call your doctor if you are having problems. It's also a good idea to know your test results and keep a list of the medicines you take. How can you care for yourself at home? Watch your portions · Learn what a serving is. A \"serving\" and a \"portion\" are not always the same thing. Make sure that you are not eating larger portions than are recommended. For example, a serving of pasta is ½ cup. A serving size of meat is 2 to 3 ounces. A 3-ounce serving is about the size of a deck of cards. Measure serving sizes until you are good at [place]" them. Keep in mind that restaurants often serve portions that are 2 or 3 times the size of one serving. · To keep your energy level up and keep you from feeling hungry, eat often but in smaller portions. · Eat only the number of calories you need to stay at a healthy weight. If you need to lose weight, eat fewer calories than your body burns (through exercise and other physical activity). Eat more fruits and vegetables · Eat a variety of fruit and vegetables every day. Dark green, deep orange, red, or yellow fruits and vegetables are especially good for you. Examples include spinach, carrots, peaches, and berries. · Keep carrots, celery, and other veggies handy for snacks. Buy fruit that is in season and store it where you can see it so that you will be tempted to eat it. · Cook dishes that have a lot of veggies in them, such as stir-fries and soups. Limit saturated and trans fat · Read food labels, and try to avoid saturated and trans fats. They increase your risk of heart disease. Trans fat is found in many processed foods such as cookies and crackers. · Use olive or canola oil when you cook. Try cholesterol-lowering spreads, such as Benecol or Take Control. · Bake, broil, grill, or steam foods instead of frying them. · Choose lean meats instead of high-fat meats such as hot dogs and sausages. Cut off all visible fat when you prepare meat. · Eat fish, skinless poultry, and meat alternatives such as soy products instead of high-fat meats. Soy products, such as tofu, may be especially good for your heart. · Choose low-fat or fat-free milk and dairy products. Eat fish · Eat at least two servings of fish a week. Certain fish, such as salmon and tuna, contain omega-3 fatty acids, which may help reduce your risk of heart attack. Eat foods high in fiber · Eat a variety of grain products every day. Include whole-grain foods that have lots of fiber and nutrients. Examples of whole-grain foods include oats, whole wheat bread, and brown rice. · Buy whole-grain breads and cereals, instead of white bread or pastries. Limit salt and sodium · Limit how much salt and sodium you eat to help lower your blood pressure. · Taste food before you salt it. Add only a little salt when you think you need it. With time, your taste buds will adjust to less salt. · Eat fewer snack items, fast foods, and other high-salt, processed foods. Check food labels for the amount of sodium in packaged foods. · Choose low-sodium versions of canned goods (such as soups, vegetables, and beans). Limit sugar · Limit drinks and foods with added sugar. These include candy, desserts, and soda pop. Limit alcohol · Limit alcohol to no more than 2 drinks a day for men and 1 drink a day for women. Too much alcohol can cause health problems. When should you call for help? Watch closely for changes in your health, and be sure to contact your doctor if: 
? · You would like help planning heart-healthy meals. Where can you learn more? Go to http://ni-nohemi.info/. Enter V137 in the search box to learn more about \"Heart-Healthy Diet: Care Instructions. \" Current as of: September 21, 2016 Content Version: 11.4 © 1525-9420 Healthwise, Incorporated. Care instructions adapted under license by [employer] (which disclaims liability or warranty for this information). If you have questions about a medical condition or this instruction, always ask your healthcare professional. [address] any warranty or liability for your use of this information. Introducing hospitals & HEALTH SERVICES! Hector Ojeda introduces Tunespeak patient portal. Now you can access parts of your medical record, email your doctor's office, and request medication refills online. 1. In your internet browser, go to [email or web address] 2. Click on the First Time User? Click Here link in the Sign In box. You will see the New Member Sign Up page. 3. Enter your Tunespeak Access Code exactly as it appears below. You will not need to use this code after youve completed the sign-up process. If you do not sign up before the expiration date, you must request a new code. · Tunespeak Access Code: [email or web address] Expires: 12/6/2017 12:53 PM 
 
4. Enter the last four digits of your Social Security Number (xxxx) and Date of Birth (mm/dd/yyyy) as indicated and click Submit. You will be taken to the next sign-up page. 5. Create a Tunespeak ID. This will be your Tunespeak login ID and cannot be changed, so think of one that is secure and easy to remember. 6. Create a Tunespeak password. You can change your password at any time. 7. Enter your Password Reset Question and Answer. This can be used at a later time if you forget your password. 8. Enter your e-mail address. You will receive e-mail notification when new information is available in [address] 9. Click Sign Up. You can now view and download portions of your medical record. 10. Click the Download Summary menu link to download a portable copy of your medical information.  
 
If you have questions, please visit the Frequently Asked Questions section of the [employer]. Remember, Predict[employer]hart is NOT to be used for urgent needs. For medical emergencies, dial 911. Now available from your iPhone and Android! Please provide this summary of care documentation to your next provider. Your primary care clinician is listed as Saima Zhao. If you have any questions after today's visit, please call [phone].

## 2017-11-01 ENCOUNTER — TELEPHONE (OUTPATIENT)
Dept: CARDIOLOGY CLINIC | Age: 82
End: 2017-11-01

## 2017-11-01 DIAGNOSIS — R60.9 EDEMA, UNSPECIFIED TYPE: ICD-10-CM

## 2017-11-01 DIAGNOSIS — I25.10 CORONARY ARTERY DISEASE INVOLVING NATIVE CORONARY ARTERY OF NATIVE HEART WITHOUT ANGINA PECTORIS: ICD-10-CM

## 2017-11-01 DIAGNOSIS — I50.32 CHRONIC DIASTOLIC CONGESTIVE HEART FAILURE (HCC): ICD-10-CM

## 2017-11-01 RX ORDER — SPIRONOLACTONE 25 MG/1
25 TABLET ORAL DAILY
Qty: 30 TAB | Refills: 11 | Status: SHIPPED | OUTPATIENT
Start: 2017-11-01 | End: 2019-10-23

## 2017-11-01 NOTE — TELEPHONE ENCOUNTER
Please call patient she needs refills on medication sent to [facility] on [address]. Patient phone #[phone].  Thanks   Rina Vicente

## 2019-08-30 ENCOUNTER — OFFICE VISIT (OUTPATIENT)
Dept: CARDIOLOGY CLINIC | Age: 84
End: 2019-08-30

## 2019-08-30 VITALS
WEIGHT: 194 LBS | HEIGHT: 65 IN | SYSTOLIC BLOOD PRESSURE: 124 MMHG | BODY MASS INDEX: 32.32 KG/M2 | HEART RATE: 92 BPM | OXYGEN SATURATION: 98 % | RESPIRATION RATE: 20 BRPM | DIASTOLIC BLOOD PRESSURE: 84 MMHG

## 2019-08-30 DIAGNOSIS — R00.0 TACHYCARDIA: ICD-10-CM

## 2019-08-30 DIAGNOSIS — I50.9 CONGESTIVE HEART FAILURE, UNSPECIFIED HF CHRONICITY, UNSPECIFIED HEART FAILURE TYPE (HCC): ICD-10-CM

## 2019-08-30 DIAGNOSIS — I10 HTN (HYPERTENSION), BENIGN: Primary | ICD-10-CM

## 2019-08-30 DIAGNOSIS — I48.0 PAROXYSMAL ATRIAL FIBRILLATION (HCC): ICD-10-CM

## 2019-08-30 RX ORDER — METOPROLOL TARTRATE 50 MG/1
100 TABLET ORAL 2 TIMES DAILY
Qty: 60 TAB | Refills: 12 | Status: ON HOLD | OUTPATIENT
Start: 2019-08-30 | End: 2019-10-22 | Stop reason: SDUPTHER

## 2019-08-30 NOTE — PROGRESS NOTES
Vikki Crowell is an 80year old lady last seen in this office in September 2017. She has a prior history of breast cancer dating back to 2005. She is a type II diabetic requiring insulin and a hypertensive with chronic obstructive pulmonary disease and a prior history of heart failure (diastolic). She also has previously moderate aortic stenosis. At the present time she is obtaining  primary care with [facility], Dr. Sissy Matute (lady). She has had a problem in the past with recurrent edema with significant diuretic use. Echocardiography performed in 2017 showed moderate LVH with 55 to 60% ejection fraction and left atrial enlargement. RV function was moderately reduced. The aortic valve had noncritical stenosis with a calculated area of 1.5 cm². There was severe pulmonary hypertension with estimated right heart pressure is 85 to 90 mm. The study was performed during a bout of pulmonary edema without hypotension. 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 Vision loss was gradual with futile injection therapy    At present she uses oxygen at a constant 2L/min. She has a concentrator with her but she isn't using it. Room air 98% without supplement today. Her activity level has clearly been limited but she denies recent syncope and lightheadedness. She is complaining of palpitations that do not appear to be associated with physical activity. She does have intermittent episodes of palpitations with subjective sensation of rapid heartbeat. It is noted that back in 2017 her left atrium was already enlarged. She admits to intermittent recurrent leg edema. She denies claudication. She sleeps on 2 pillows but she does not have PND. She denies chest pain. On examination today, she is 5 feet 5 inches tall. She weighs 194 pounds. Respiratory rate is 20 without visible distress. She is not using her oxygen concentrator at the time of interview and exam and her room air oxygen saturation is 98%.   Blood pressure is 124/84 in the left arm. Resting pulse is 92 and irregular. Jugular veins fill without pulsation below 45 degrees, carotids are silent. Lungs are clear without wheezing. Cardiac auscultation shows a harsh systolic murmur on both sides of the sternal border. A2 is present but diminished. P2 is slightly louder than A2. There is no audible gallop or diastolic murmur. Cardiac rhythm is continuously irregular with rapid ventricular rate and with no pauses. Peripheral pulse varies in intensity. There is 2+ ankle edema with no sign of DVT. Abdomen is nontender without pulsation or bruit. Welve-lead EKG performed today shows atrial fibrillation with moderately rapid ventricular response, no ventricular aberrancy but with diffuse inferolateral rounded ST segment depression. Axis is mildly leftward    There are no chemistries in this chart after 2017. At that time potassium was 5.2, creatinine was 0.73.     Recent chest x-ray shows severe right heart enlargement with congested hilar vessels:        Impression: Sustained atrial fibrillation    Moderate hypertensive heart disease    History of pulmonary hypertension and right-sided failure    History of breast cancer currently not an issue    Plan:  Determine total burden of atrial fibrillation with Holter monitoring    Echocardiography to try to determine prognosis for stable conversion and for further triage    Continue metoprolol 100 mg twice daily    Early contact to arrange definitive care for atrial fibrillation as needed    Lindsay Jimenez MD, [facility]

## 2019-08-30 NOTE — LETTER
10/14/19 Patient: Lexi Echeverria YOB: 1931 Date of Visit: 8/30/2019 Yolanda Del Valle MD 
[place] [address] VIA Facsimile: [phone] Dear Yolanda Del Valle MD, Thank you for referring Ms. Meli Maurer to [address] [place] [address] for evaluation. My notes for this consultation are attached. If you have questions, please do not hesitate to call me. I look forward to following your patient along with you.  
 
 
Sincerely, 
 
Dario Martinez MD

## 2019-08-30 NOTE — PROGRESS NOTES
Chief Complaint   Patient presents with    New Patient    CHF    Hypertension     1. Have you been to the ER, urgent care clinic since your last visit? Hospitalized since your last visit? No    2. Have you seen or consulted any other health care providers outside of the [address] [place] [address] since your last visit? Include any pap smears or colon screening.  No

## 2019-08-30 NOTE — PATIENT INSTRUCTIONS
I updated your medication list to reflect the 100 mg dose of METOPROLOL twice daily. I have not written any new prescriptions in case [facility] calls you. I have ordered two tests, both of which are familiar to you. An ECHOCARDIOGRAM, which is an ultrasound of your heart, and a HOLTER MONITOR, which is a 48 hour recording of your electrocardiogram.     You are in an abnormal cardiac rhythm today. If it is persistent, we need to do something about it, and you might need to be considered for a blood thinner to prevent stroke.      I will be in touch as soon as I have these test results

## 2019-09-12 ENCOUNTER — HOSPITAL ENCOUNTER (OUTPATIENT)
Dept: NON INVASIVE DIAGNOSTICS | Age: 84
Discharge: HOME | End: 2019-09-12
Attending: INTERNAL MEDICINE
Payer: MEDICARE

## 2019-09-12 DIAGNOSIS — I10 HTN (HYPERTENSION), BENIGN: ICD-10-CM

## 2019-09-12 DIAGNOSIS — I50.9 CONGESTIVE HEART FAILURE, UNSPECIFIED HF CHRONICITY, UNSPECIFIED HEART FAILURE TYPE (HCC): ICD-10-CM

## 2019-09-12 DIAGNOSIS — R00.0 TACHYCARDIA: ICD-10-CM

## 2019-09-12 DIAGNOSIS — I48.0 PAROXYSMAL ATRIAL FIBRILLATION (HCC): ICD-10-CM

## 2019-09-12 LAB
ECHO AO ROOT DIAM: 2.82 CM
ECHO AV AREA PEAK VELOCITY: 0.7 CM2
ECHO AV AREA PLAN: 0.7 CM2
ECHO AV AREA/BSA PEAK VELOCITY: 0.3 CM2/M2
ECHO AV MEAN GRADIENT: 15.6 MMHG
ECHO AV PEAK GRADIENT: 29.1 MMHG
ECHO AV PEAK VELOCITY: 269.51 CM/S
ECHO AV VTI: 51.62 CM
ECHO EST RA PRESSURE: 5 MMHG
ECHO LA AREA 4C: 30.7 CM2
ECHO LA MAJOR AXIS: 4.72 CM
ECHO LA TO AORTIC ROOT RATIO: 1.67
ECHO LA VOL 4C: 104.55 ML (ref 22–52)
ECHO LV EDV A4C: 62.4 ML
ECHO LV EJECTION FRACTION A4C: 80 %
ECHO LV ESV A4C: 12.7 ML
ECHO LV INTERNAL DIMENSION DIASTOLIC: 5 CM (ref 3.9–5.3)
ECHO LV INTERNAL DIMENSION SYSTOLIC: 4.18 CM
ECHO LV IVSD: 1.01 CM (ref 0.6–0.9)
ECHO LV MASS 2D: 223.2 G (ref 67–162)
ECHO LV POSTERIOR WALL DIASTOLIC: 1.06 CM (ref 0.6–0.9)
ECHO LVOT DIAM: 1.7 CM
ECHO LVOT PEAK GRADIENT: 2.4 MMHG
ECHO LVOT PEAK VELOCITY: 77.09 CM/S
ECHO MV A VELOCITY: 15.59 CM/S
ECHO MV AREA PHT: 5.8 CM2
ECHO MV AREA PLAN: 2.7 CM2
ECHO MV E DECELERATION TIME (DT): 89.2 MS
ECHO MV E VELOCITY: 65.73 CM/S
ECHO MV E/A RATIO: 4.22
ECHO MV PRESSURE HALF TIME (PHT): 37.6 MS
ECHO MV REGURGITANT PEAK GRADIENT: 85.6 MMHG
ECHO MV REGURGITANT PEAK VELOCITY: 462.62 CM/S
ECHO PULMONARY ARTERY SYSTOLIC PRESSURE (PASP): 60 MMHG
ECHO PV MAX VELOCITY: 53.85 CM/S
ECHO PV PEAK GRADIENT: 1.2 MMHG
ECHO PV REGURGITANT MAX VELOCITY: 185.66 CM/S
ECHO RA AREA 4C: 24.88 CM2
ECHO RIGHT VENTRICULAR SYSTOLIC PRESSURE (RVSP): 60.7 MMHG
ECHO TV REGURGITANT MAX VELOCITY: 373.29 CM/S
ECHO TV REGURGITANT PEAK GRADIENT: 55.7 MMHG

## 2019-09-12 PROCEDURE — 93306 TTE W/DOPPLER COMPLETE: CPT

## 2019-09-12 PROCEDURE — 93225 XTRNL ECG REC<48 HRS REC: CPT

## 2019-09-12 NOTE — PROGRESS NOTES
Explanation of monitor to pt and family member  50 hour holter. Relating that this records event to the chip and if she has a life threatening event occurs  she needs to call 911, pt was wearing 02 per portable tank at 2l nc and sats were 92-97% , pt relates no distress at this time of any kind, pt just had echo completed. Noted to pt to remove device on Saturday and return on Monday to  by family member. Pt was given extra supplies and reminded not to get the device wet. Pt transported to and from this cardiology area by wheel chair by tech with family in attendance.

## 2019-09-17 ENCOUNTER — APPOINTMENT (OUTPATIENT)
Dept: CT IMAGING | Age: 84
DRG: 270 | End: 2019-09-17
Attending: EMERGENCY MEDICINE
Payer: MEDICARE

## 2019-09-17 ENCOUNTER — HOSPITAL ENCOUNTER (INPATIENT)
Age: 84
LOS: 36 days | Discharge: NURSING, REHAB OR LONG-TERM CARE | DRG: 270 | End: 2019-10-23
Attending: EMERGENCY MEDICINE | Admitting: INTERNAL MEDICINE
Payer: MEDICARE

## 2019-09-17 ENCOUNTER — APPOINTMENT (OUTPATIENT)
Dept: GENERAL RADIOLOGY | Age: 84
DRG: 270 | End: 2019-09-17
Attending: EMERGENCY MEDICINE
Payer: MEDICARE

## 2019-09-17 DIAGNOSIS — I50.9 ACUTE ON CHRONIC CONGESTIVE HEART FAILURE, UNSPECIFIED HEART FAILURE TYPE (HCC): Primary | ICD-10-CM

## 2019-09-17 DIAGNOSIS — R53.1 WEAKNESS GENERALIZED: ICD-10-CM

## 2019-09-17 DIAGNOSIS — Z71.89 ADVANCED CARE PLANNING/COUNSELING DISCUSSION: ICD-10-CM

## 2019-09-17 DIAGNOSIS — R06.02 SOB (SHORTNESS OF BREATH): ICD-10-CM

## 2019-09-17 DIAGNOSIS — R00.0 TACHYCARDIA: ICD-10-CM

## 2019-09-17 DIAGNOSIS — I99.8 ISCHEMIC LEG: ICD-10-CM

## 2019-09-17 DIAGNOSIS — Z71.89 GOALS OF CARE, COUNSELING/DISCUSSION: ICD-10-CM

## 2019-09-17 DIAGNOSIS — R29.898 TRANSIENT RIGHT LEG WEAKNESS: ICD-10-CM

## 2019-09-17 DIAGNOSIS — I10 HTN (HYPERTENSION), BENIGN: ICD-10-CM

## 2019-09-17 DIAGNOSIS — Z71.89 ACP (ADVANCE CARE PLANNING): ICD-10-CM

## 2019-09-17 DIAGNOSIS — Z71.89 DNR (DO NOT RESUSCITATE) DISCUSSION: ICD-10-CM

## 2019-09-17 DIAGNOSIS — G46.4 CEREBELLAR STROKE SYNDROME: ICD-10-CM

## 2019-09-17 DIAGNOSIS — R53.81 PHYSICAL DEBILITY: ICD-10-CM

## 2019-09-17 DIAGNOSIS — R53.81 DEBILITY: ICD-10-CM

## 2019-09-17 DIAGNOSIS — E88.09 HYPOALBUMINEMIA: ICD-10-CM

## 2019-09-17 DIAGNOSIS — R06.00 DYSPNEA, UNSPECIFIED TYPE: ICD-10-CM

## 2019-09-17 DIAGNOSIS — F41.9 ANXIETY: ICD-10-CM

## 2019-09-17 DIAGNOSIS — N28.9 ACUTE RENAL INSUFFICIENCY: ICD-10-CM

## 2019-09-17 PROBLEM — E87.5 HYPERKALEMIA: Status: ACTIVE | Noted: 2019-09-17

## 2019-09-17 PROBLEM — N17.9 AKI (ACUTE KIDNEY INJURY) (HCC): Status: ACTIVE | Noted: 2019-09-17

## 2019-09-17 PROBLEM — E87.1 HYPONATREMIA: Status: ACTIVE | Noted: 2019-09-17

## 2019-09-17 LAB
ALBUMIN SERPL-MCNC: 2.9 G/DL (ref 3.5–5)
ALBUMIN/GLOB SERPL: 0.5 {RATIO} (ref 1.1–2.2)
ALP SERPL-CCNC: 109 U/L (ref 45–117)
ALT SERPL-CCNC: 24 U/L (ref 12–78)
ANION GAP SERPL CALC-SCNC: 8 MMOL/L (ref 5–15)
APPEARANCE UR: CLEAR
AST SERPL-CCNC: 18 U/L (ref 15–37)
ATRIAL RATE: 59 BPM
BACTERIA URNS QL MICRO: NEGATIVE /HPF
BASOPHILS # BLD: 0 K/UL (ref 0–0.1)
BASOPHILS NFR BLD: 0 % (ref 0–1)
BILIRUB SERPL-MCNC: 0.7 MG/DL (ref 0.2–1)
BILIRUB UR QL: NEGATIVE
BNP SERPL-MCNC: 4677 PG/ML
BUN SERPL-MCNC: 27 MG/DL (ref 6–20)
BUN/CREAT SERPL: 20 (ref 12–20)
CALCIUM SERPL-MCNC: 9 MG/DL (ref 8.5–10.1)
CALCULATED P AXIS, ECG09: 51 DEGREES
CALCULATED R AXIS, ECG10: 14 DEGREES
CALCULATED T AXIS, ECG11: 3 DEGREES
CHLORIDE SERPL-SCNC: 101 MMOL/L (ref 97–108)
CHOLEST SERPL-MCNC: 144 MG/DL
CO2 SERPL-SCNC: 24 MMOL/L (ref 21–32)
COLOR UR: NORMAL
COMMENT, HOLDF: NORMAL
CREAT SERPL-MCNC: 1.35 MG/DL (ref 0.55–1.02)
DIAGNOSIS, 93000: NORMAL
DIFFERENTIAL METHOD BLD: ABNORMAL
EOSINOPHIL # BLD: 0 K/UL (ref 0–0.4)
EOSINOPHIL NFR BLD: 0 % (ref 0–7)
EPITH CASTS URNS QL MICRO: NORMAL /LPF
ERYTHROCYTE [DISTWIDTH] IN BLOOD BY AUTOMATED COUNT: 15.3 % (ref 11.5–14.5)
EST. AVERAGE GLUCOSE BLD GHB EST-MCNC: 206 MG/DL
GLOBULIN SER CALC-MCNC: 5.5 G/DL (ref 2–4)
GLUCOSE BLD STRIP.AUTO-MCNC: 224 MG/DL (ref 65–100)
GLUCOSE BLD STRIP.AUTO-MCNC: 299 MG/DL (ref 65–100)
GLUCOSE SERPL-MCNC: 199 MG/DL (ref 65–100)
GLUCOSE UR STRIP.AUTO-MCNC: NEGATIVE MG/DL
HBA1C MFR BLD: 8.8 % (ref 4.2–6.3)
HCT VFR BLD AUTO: 32.3 % (ref 35–47)
HDLC SERPL-MCNC: 55 MG/DL
HDLC SERPL: 2.6 {RATIO} (ref 0–5)
HGB BLD-MCNC: 9.7 G/DL (ref 11.5–16)
HGB UR QL STRIP: NEGATIVE
HYALINE CASTS URNS QL MICRO: NORMAL /LPF (ref 0–5)
IMM GRANULOCYTES # BLD AUTO: 0.1 K/UL (ref 0–0.04)
IMM GRANULOCYTES NFR BLD AUTO: 1 % (ref 0–0.5)
KETONES UR QL STRIP.AUTO: NEGATIVE MG/DL
LDLC SERPL CALC-MCNC: 72.6 MG/DL (ref 0–100)
LEUKOCYTE ESTERASE UR QL STRIP.AUTO: NEGATIVE
LIPID PROFILE,FLP: NORMAL
LYMPHOCYTES # BLD: 0.6 K/UL (ref 0.8–3.5)
LYMPHOCYTES NFR BLD: 5 % (ref 12–49)
MCH RBC QN AUTO: 27.3 PG (ref 26–34)
MCHC RBC AUTO-ENTMCNC: 30 G/DL (ref 30–36.5)
MCV RBC AUTO: 91 FL (ref 80–99)
MONOCYTES # BLD: 0.4 K/UL (ref 0–1)
MONOCYTES NFR BLD: 3 % (ref 5–13)
NEUTS SEG # BLD: 10.8 K/UL (ref 1.8–8)
NEUTS SEG NFR BLD: 91 % (ref 32–75)
NITRITE UR QL STRIP.AUTO: NEGATIVE
NRBC # BLD: 0 K/UL (ref 0–0.01)
NRBC BLD-RTO: 0 PER 100 WBC
P-R INTERVAL, ECG05: 188 MS
PH UR STRIP: 6 [PH] (ref 5–8)
PLATELET # BLD AUTO: 291 K/UL (ref 150–400)
PMV BLD AUTO: 10.2 FL (ref 8.9–12.9)
POTASSIUM SERPL-SCNC: 5.4 MMOL/L (ref 3.5–5.1)
POTASSIUM SERPL-SCNC: 5.6 MMOL/L (ref 3.5–5.1)
PROT SERPL-MCNC: 8.4 G/DL (ref 6.4–8.2)
PROT UR STRIP-MCNC: NEGATIVE MG/DL
Q-T INTERVAL, ECG07: 460 MS
QRS DURATION, ECG06: 92 MS
QTC CALCULATION (BEZET), ECG08: 455 MS
RBC # BLD AUTO: 3.55 M/UL (ref 3.8–5.2)
RBC #/AREA URNS HPF: NORMAL /HPF (ref 0–5)
RBC MORPH BLD: ABNORMAL
SAMPLES BEING HELD,HOLD: NORMAL
SERVICE CMNT-IMP: ABNORMAL
SERVICE CMNT-IMP: ABNORMAL
SODIUM SERPL-SCNC: 133 MMOL/L (ref 136–145)
SP GR UR REFRACTOMETRY: 1.03 (ref 1–1.03)
T4 FREE SERPL-MCNC: 1.3 NG/DL (ref 0.8–1.5)
TRIGL SERPL-MCNC: 82 MG/DL (ref ?–150)
TSH SERPL DL<=0.05 MIU/L-ACNC: 2.3 UIU/ML (ref 0.36–3.74)
UR CULT HOLD, URHOLD: NORMAL
UROBILINOGEN UR QL STRIP.AUTO: 0.2 EU/DL (ref 0.2–1)
VENTRICULAR RATE, ECG03: 59 BPM
VLDLC SERPL CALC-MCNC: 16.4 MG/DL
WBC # BLD AUTO: 11.9 K/UL (ref 3.6–11)
WBC URNS QL MICRO: NORMAL /HPF (ref 0–4)

## 2019-09-17 PROCEDURE — 74011250636 HC RX REV CODE- 250/636: Performed by: INTERNAL MEDICINE

## 2019-09-17 PROCEDURE — 36415 COLL VENOUS BLD VENIPUNCTURE: CPT

## 2019-09-17 PROCEDURE — 83880 ASSAY OF NATRIURETIC PEPTIDE: CPT

## 2019-09-17 PROCEDURE — 70450 CT HEAD/BRAIN W/O DYE: CPT

## 2019-09-17 PROCEDURE — 0042T CT CODE NEURO PERF W CBF: CPT

## 2019-09-17 PROCEDURE — 71046 X-RAY EXAM CHEST 2 VIEWS: CPT

## 2019-09-17 PROCEDURE — 84439 ASSAY OF FREE THYROXINE: CPT

## 2019-09-17 PROCEDURE — 82962 GLUCOSE BLOOD TEST: CPT

## 2019-09-17 PROCEDURE — 74011636637 HC RX REV CODE- 636/637: Performed by: INTERNAL MEDICINE

## 2019-09-17 PROCEDURE — 74011636320 HC RX REV CODE- 636/320: Performed by: RADIOLOGY

## 2019-09-17 PROCEDURE — 84443 ASSAY THYROID STIM HORMONE: CPT

## 2019-09-17 PROCEDURE — 74011250637 HC RX REV CODE- 250/637: Performed by: INTERNAL MEDICINE

## 2019-09-17 PROCEDURE — 94762 N-INVAS EAR/PLS OXIMTRY CONT: CPT

## 2019-09-17 PROCEDURE — 93005 ELECTROCARDIOGRAM TRACING: CPT

## 2019-09-17 PROCEDURE — 70496 CT ANGIOGRAPHY HEAD: CPT

## 2019-09-17 PROCEDURE — 81001 URINALYSIS AUTO W/SCOPE: CPT

## 2019-09-17 PROCEDURE — 74011250636 HC RX REV CODE- 250/636: Performed by: EMERGENCY MEDICINE

## 2019-09-17 PROCEDURE — 65660000001 HC RM ICU INTERMED STEPDOWN

## 2019-09-17 PROCEDURE — 80061 LIPID PANEL: CPT

## 2019-09-17 PROCEDURE — 99285 EMERGENCY DEPT VISIT HI MDM: CPT

## 2019-09-17 PROCEDURE — 84132 ASSAY OF SERUM POTASSIUM: CPT

## 2019-09-17 PROCEDURE — 80053 COMPREHEN METABOLIC PANEL: CPT

## 2019-09-17 PROCEDURE — 71045 X-RAY EXAM CHEST 1 VIEW: CPT

## 2019-09-17 PROCEDURE — 85025 COMPLETE CBC W/AUTO DIFF WBC: CPT

## 2019-09-17 PROCEDURE — 74011000258 HC RX REV CODE- 258: Performed by: RADIOLOGY

## 2019-09-17 PROCEDURE — 83036 HEMOGLOBIN GLYCOSYLATED A1C: CPT

## 2019-09-17 RX ORDER — METOPROLOL TARTRATE 50 MG/1
100 TABLET ORAL 2 TIMES DAILY
Status: DISCONTINUED | OUTPATIENT
Start: 2019-09-17 | End: 2019-09-21

## 2019-09-17 RX ORDER — FUROSEMIDE 10 MG/ML
40 INJECTION INTRAMUSCULAR; INTRAVENOUS EVERY 12 HOURS
Status: DISCONTINUED | OUTPATIENT
Start: 2019-09-17 | End: 2019-09-18

## 2019-09-17 RX ORDER — FUROSEMIDE 10 MG/ML
40 INJECTION INTRAMUSCULAR; INTRAVENOUS
Status: COMPLETED | OUTPATIENT
Start: 2019-09-17 | End: 2019-09-17

## 2019-09-17 RX ORDER — SODIUM POLYSTYRENE SULFONATE 15 G/60ML
30 SUSPENSION ORAL; RECTAL
Status: COMPLETED | OUTPATIENT
Start: 2019-09-17 | End: 2019-09-17

## 2019-09-17 RX ORDER — SODIUM CHLORIDE 0.9 % (FLUSH) 0.9 %
10 SYRINGE (ML) INJECTION
Status: COMPLETED | OUTPATIENT
Start: 2019-09-17 | End: 2019-09-17

## 2019-09-17 RX ORDER — INSULIN LISPRO 100 [IU]/ML
INJECTION, SOLUTION INTRAVENOUS; SUBCUTANEOUS
Status: DISCONTINUED | OUTPATIENT
Start: 2019-09-17 | End: 2019-09-18

## 2019-09-17 RX ORDER — ACETAMINOPHEN 325 MG/1
650 TABLET ORAL
Status: DISCONTINUED | OUTPATIENT
Start: 2019-09-17 | End: 2019-10-17

## 2019-09-17 RX ORDER — SODIUM CHLORIDE 0.9 % (FLUSH) 0.9 %
5-40 SYRINGE (ML) INJECTION AS NEEDED
Status: DISCONTINUED | OUTPATIENT
Start: 2019-09-17 | End: 2019-10-23 | Stop reason: HOSPADM

## 2019-09-17 RX ORDER — HEPARIN SODIUM 5000 [USP'U]/ML
5000 INJECTION, SOLUTION INTRAVENOUS; SUBCUTANEOUS EVERY 8 HOURS
Status: DISCONTINUED | OUTPATIENT
Start: 2019-09-17 | End: 2019-09-20

## 2019-09-17 RX ORDER — MAGNESIUM SULFATE 100 %
4 CRYSTALS MISCELLANEOUS AS NEEDED
Status: DISCONTINUED | OUTPATIENT
Start: 2019-09-17 | End: 2019-09-24 | Stop reason: SDUPTHER

## 2019-09-17 RX ORDER — GUAIFENESIN 100 MG/5ML
81 LIQUID (ML) ORAL DAILY
Status: DISCONTINUED | OUTPATIENT
Start: 2019-09-18 | End: 2019-09-30

## 2019-09-17 RX ORDER — ONDANSETRON 2 MG/ML
4 INJECTION INTRAMUSCULAR; INTRAVENOUS
Status: DISCONTINUED | OUTPATIENT
Start: 2019-09-17 | End: 2019-10-23 | Stop reason: HOSPADM

## 2019-09-17 RX ORDER — ALLOPURINOL 100 MG/1
100 TABLET ORAL DAILY
Status: DISCONTINUED | OUTPATIENT
Start: 2019-09-18 | End: 2019-10-23 | Stop reason: HOSPADM

## 2019-09-17 RX ORDER — INSULIN GLARGINE 100 [IU]/ML
30-40 INJECTION, SOLUTION SUBCUTANEOUS
COMMUNITY
End: 2019-10-23

## 2019-09-17 RX ORDER — SODIUM CHLORIDE 0.9 % (FLUSH) 0.9 %
5-40 SYRINGE (ML) INJECTION EVERY 8 HOURS
Status: DISCONTINUED | OUTPATIENT
Start: 2019-09-17 | End: 2019-10-23 | Stop reason: HOSPADM

## 2019-09-17 RX ORDER — IPRATROPIUM BROMIDE AND ALBUTEROL SULFATE 2.5; .5 MG/3ML; MG/3ML
3 SOLUTION RESPIRATORY (INHALATION)
Status: DISCONTINUED | OUTPATIENT
Start: 2019-09-17 | End: 2019-09-23

## 2019-09-17 RX ORDER — ATORVASTATIN CALCIUM 40 MG/1
40 TABLET, FILM COATED ORAL DAILY
Status: DISCONTINUED | OUTPATIENT
Start: 2019-09-17 | End: 2019-09-30

## 2019-09-17 RX ADMIN — FUROSEMIDE 40 MG: 10 INJECTION, SOLUTION INTRAMUSCULAR; INTRAVENOUS at 21:48

## 2019-09-17 RX ADMIN — FUROSEMIDE 40 MG: 10 INJECTION, SOLUTION INTRAMUSCULAR; INTRAVENOUS at 12:00

## 2019-09-17 RX ADMIN — Medication 10 ML: at 09:49

## 2019-09-17 RX ADMIN — INSULIN LISPRO 5 UNITS: 100 INJECTION, SOLUTION INTRAVENOUS; SUBCUTANEOUS at 17:16

## 2019-09-17 RX ADMIN — HEPARIN SODIUM 5000 UNITS: 5000 INJECTION INTRAVENOUS; SUBCUTANEOUS at 21:47

## 2019-09-17 RX ADMIN — ATORVASTATIN CALCIUM 40 MG: 40 TABLET, FILM COATED ORAL at 17:16

## 2019-09-17 RX ADMIN — METOPROLOL TARTRATE 100 MG: 50 TABLET ORAL at 17:15

## 2019-09-17 RX ADMIN — INSULIN LISPRO 2 UNITS: 100 INJECTION, SOLUTION INTRAVENOUS; SUBCUTANEOUS at 21:48

## 2019-09-17 RX ADMIN — SODIUM POLYSTYRENE SULFONATE 30 G: 15 SUSPENSION ORAL; RECTAL at 15:32

## 2019-09-17 RX ADMIN — SODIUM CHLORIDE 100 ML: 900 INJECTION, SOLUTION INTRAVENOUS at 09:49

## 2019-09-17 RX ADMIN — Medication 10 ML: at 21:51

## 2019-09-17 RX ADMIN — IOPAMIDOL 120 ML: 755 INJECTION, SOLUTION INTRAVENOUS at 09:49

## 2019-09-17 NOTE — ED TRIAGE NOTES
Pt states that she was in her \"normal\" state at 9 pm, woke at  3 am unable to walk with no feeling from her right mid thigh down the remainder of her right leg.  Pt is on continuous oxygen but states that her SOB became more intense at 3 am. BS per EMS was 286

## 2019-09-17 NOTE — ACP (ADVANCE CARE PLANNING)
Advance Care Planning Note    Name: Anne-Marie Blair  YOB: 1931  MRN: [id number]  Admission Date: 9/17/2019  8:47 AM    Date of discussion: 9/17/2019    Active Diagnoses:    Hospital Problems  Date Reviewed: 9/17/2019          Codes Class Noted POA    CHF exacerbation ([facility] [address] ICD-10-CM: I50.9  ICD-9-CM: 428.0  9/17/2019 Yes        Hyperkalemia ICD-10-CM: E87.5  ICD-9-CM: 276.7  9/17/2019 Yes        Hyponatremia ICD-10-CM: E87.1  ICD-9-CM: 276.1  9/17/2019 Yes        HOLLAND (acute kidney injury) [facility]) ICD-10-CM: N17.9  ICD-9-CM: 584.9  9/17/2019 Yes        Acute hypoxemic respiratory failure [facility]) ICD-10-CM: J96.01  ICD-9-CM: 518.81  8/8/2011 Yes        Debility ICD-10-CM: R53.81  ICD-9-CM: 799.3  6/28/2011 Yes               These active diagnoses are of sufficient risk that focused discussion on advance care planning is indicated in order to allow the patient to thoughtfully consider personal goals of care, and if situations arise that prevent the ability to personally give input, to ensure appropriate representation of their personal desires for different levels and aggressiveness of care. Discussion:     Persons present and participating in discussion: Anne-Marie Blair, patient's son Filiberto Cooepr and  Dillon Hneley MD.    Discussion: Addressed decompensated medical problems, co-morbidities, Advance Directives and confirmation of a Surrogate decision Maker. Time Spent:    Total time spent face-to-face in education and discussion: 16 minutes.      Dillon Henley MD  9/17/2019  1:09 PM

## 2019-09-17 NOTE — H&P
[address]   HISTORY AND PHYSICAL    Name:  Iain Cifuentes  MR#:  [id number]  :  1931  ACCOUNT #:  [de-identified]  ADMIT DATE:  2019    PRIMARY CARE PHYSICIAN:  Pari Arias MD.    PRIMARY CARDIOLOGIST:  Joanne Sadler MD.    CHIEF COMPLAINT:  Shortness of breath. HISTORY OF PRESENTING ILLNESS:  Please note the patient's son at the bedside contributed to most of the history. An 80-year-old female with an extensive past medical history including chronic hypoxemic respiratory failure with home oxygen, COPD, chronic kidney disease, type 2 diabetes mellitus, atrial fibrillation, diastolic congestive heart failure, dyslipidemia, primary hypertension, osteoporosis, pulmonary hypertension, depression and anxiety disorder, was brought to the emergency room from home for evaluation of an approximately 2-3 day history of worsening shortness of breath. Per the patient's son, the patient ambulates unaided at baseline; however, in the 2-3 days leading up to the emergency room presentation, noted to have worsening shortness of breath even with easy activity. The patient also complained of some numbness and weakness in the right lower extremity. The patient admitted to being compliant with all her medical therapies. The patient denied associated headaches, dizziness, visual deficits, chest pains, palpitations, nausea, vomiting, cough, sputum production, fevers, chills, abdominal pain, bladder or bowel irregularities. PAST MEDICAL HISTORY:  1. Chronic hypoxemic respiratory failure with home oxygen. 2.  COPD. 3.  Chronic kidney disease. 4.  Type 2 diabetes mellitus. 5.  Atrial fibrillation. 6.  Diastolic CHF. 7.  Pulmonary hypertension. 8.  Depression. 9.  Anxiety disorder. PAST SURGICAL HISTORY:  1. Breast lumpectomy. 2.  Right mastectomy. HOME MEDICATIONS:  1. Albuterol nebulizer every 4 hours as needed for wheezing. 2.  Allopurinol 100 mg p.o. daily.   3.  Aspirin 81 mg p.o. daily. 4.  Atorvastatin 20 mg p.o. daily. 5.  Bumex 1 mg p.o. twice a day. 6.  Celexa 20 mg p.o. daily. 7.  Prolia 60 mg subcutaneous twice a year. 8.  Benadryl 25 mg p.o. every 6 hours as needed for allergic reaction. 9.  Ferrous sulfate 325 mg p.o. daily. 10.  Insulin glargine 50 units subcutaneous nightly. 11.  Ipratropium nebulizer every 4 hours as needed. 12.  Metformin 1000 mg p.o. twice a day. 13.  Metoprolol tartrate 50 mg 2 tablets p.o. twice a day. 14.  Spironolactone 25 mg p.o. daily. ALLERGIES:  ACE INHIBITORS, DEVELOPS ANGIOEDEMA. SOCIAL HISTORY:  Significant for living at home with her son, requires some assistance with activities and instrumental activities of daily living, is a never smoker. Denies alcohol use disorder. Denies any recent contacts. FAMILY HISTORY:  Reviewed and positive for hypertension on the maternal side of the family, CVAs on the paternal side of the family. REVIEW OF SYSTEMS:  A complete system review conducted essentially negative. Otherwise as outlined in the history of the presenting illness. PHYSICAL EXAMINATION:  GENERAL:  The patient was awake, alert, not in any distress. VITAL SIGNS:  Blood pressure of 147/83, heart rate 71, respiratory rate 25, afebrile, saturating 99% on 4 liters nasal cannula. HEAD:  Normocephalic. Pupils equal and reactive to light without any nystagmus. ENT:  Ear, nose and throat clear. NECK:  No jugular venous distention or carotid bruits. RESPIRATORY:  Lungs with decreased air entry at both bases with bibasilar crepitations. No rhonchi appreciated. CARDIOVASCULAR:  S1, S2 present. GI:  Bowel sounds present. Abdomen is soft, nontender, no rebound, no guarding. GENITOURINARY:  No suprapubic or flank tenderness. MUSCULOSKELETAL:  No asymmetry of the extremities, about 1-2+ bipedal edema. Power intact in bilateral upper and lower extremities.   CNS:  The patient is awake, alert, oriented to place, person. Sensation intact. Coordination was not assessed. Gait was not assessed. LABORATORY/RADIOGRAPHIC FINDINGS:  A CT of the head with no evidence of acute large vessel occlusion or flow limiting stenosis. A CT angiogram of the neck with no evidence of flow-limiting stenosis. CT of the brain without any acute abnormality. A 12-lead EKG personally reviewed that showed sinus bradycardia at 59 per minute. Urinalysis currently pending. CBC with a WBC of 11.9, 91% polys, hemoglobin 9.7, hematocrit 32.3, platelet count of 424. Metabolic panel with a sodium of 133, potassium 5.6, chloride 101, bicarb 24, BUN 27, creatinine 1.35, glucose 199, albumin of 2.9, ALT of 24, AST of 18. ProBNP of 4677. Most recent 2-D echocardiogram done from 09/12/2019 reviewed from the old records that showed normal cavity size with an ejection fraction of 70%, moderate pulmonary hypertension, moderate to severe tricuspid valve regurgitation. ASSESSMENT/PLAN:  1. Cardiovascular: The patient will be admitted to the inpatient level monitored floor for acute-on-chronic diastolic congestive heart failure with a preserved ejection fraction of about 70%. [place] Heart Association Functional Class IV on admission. Probable uncontrolled primary hypertension. Dyslipidemia. Most recent 2-D echocardiogram reviewed from 09/12/2019 with ejection fraction of 70%, moderate pulmonary hypertension and moderate to severe tricuspid valve regurgitation. We will continue with oxygen via nasal cannula, IV diuresis, daily weights, input-output monitoring, congestive heart failure teaching. Cardiology and heart failure team consults. 2.  Electrolytes. Probable multifactorial hyponatremia present on admission. Hyperkalemia without any dysrhythmias. We will repeat levels and treat accordingly. 3.  Renal.  Acute kidney injury, on probable chronic kidney disease stage II to III probably due to medication side effects including diuretics. We will hold nephrotoxic medications. Close monitoring of BUN and creatinine whilst being diuresed. 4.  Hematology. Probable anemia of chronicity. 5.  CNS. Resolving right lower extremity numbness and weakness without any acute findings on CT of the head, CT angiogram of the head and neck. No other focal neurologic deficits noted at this time. Neurochecks. 6.  Endocrine. Probable uncontrolled insulin treated type 2 diabetes mellitus with complications including hyperglycemia. We will hold metformin, initiate Accu-Chek monitoring, basal and sliding scale insulin, diabetic teaching. 7.  Respiratory:  Chronic obstructive pulmonary disease without any prior tobacco use. Chronic hypoxemic respiratory failure with home oxygen. We will continue with oxygen and nebulizers as needed. 8.  Musculoskeletal.  Osteoporosis. 9.  Psychiatry. Anxiety disorder and depression without any current behavioral disturbances. 10.  Prophylaxis for deep venous thrombosis. 11.  Directives. Full code with a very guarded prognosis. Discussed with the patient. In summary, an 27-year-old female with an extensive past medical history including chronic diastolic congestive heart failure, COPD, chronic hypoxemic respiratory failure with home oxygen, uncontrolled insulin treated type 2 diabetes mellitus and moderate pulmonary hypertension is being admitted to the inpatient level monitored floor for acute-on-chronic diastolic congestive heart failure exacerbation, electrolyte abnormalities, acute kidney injury with debility. This patient is at increased risk of further decompensation and will benefit from inpatient management including with IV diuresis, correction of electrolytes, Cardiology and Heart Failure team consults. The patient may even benefit from short-term post acute care facility placement for ongoing PT, OT on discharge.  The entire admission plan i discussed in detail with the patient and the patient's son, Asif Crandall Esther who was at the bedside and can be reached at cell [phone]. All questions and concerns were addressed. The patient's functional status prior to admission significant for the patient being able to ambulate unassisted. Total time for admission 45 minutes of which at least 50% of the time was spent in plan of care and counseling of the patient.         Cheyenne Clifton MD      [id number]  D:  09/17/2019 13:07  T:  09/17/2019 13:13  JOB #:  [id number]

## 2019-09-17 NOTE — CONSULTS
Monico Casillas, Lexi Yoder, and Joann Angeles have taken care of this patient in recent past. Please call them for cardiology consult on this patient

## 2019-09-17 NOTE — ED PROVIDER NOTES
80 y.o. female with past medical history significant for breast cancer, a-fib, CHF, hypercholesterolemia, anxiety, HTN, DM, and osteoporosis who presents from home with chief complaint of SOB. It is also noted that the patient has right lower extremity numbness/decreased movement as well. Pt complains of numbness and weakness in her right lower extremity from the mid thigh down that started around 3:00 AM today. The numbness is more of a tingly sensation after having the patient explained in detail which she was feeling. The patient states she is unable to ambulate due to the \"numbness. \" Pt also complains of worsening shortness of breath from baseline around the same time. This is unchanged with ambulation. It seems to be constant since 3 AM pt states she is on 2 L continuous home oxygen. Pt notes she did not take her lasix yesterday. Pt denies chest pain, headache, visual disturbance, hearing change, abdominal pain, diarrhea, constipation, or urinary symptoms. There are no other acute medical concerns at this time. Social hx: Never Smoker. Denies EtOH Use. PCP: Dr. Trinidad Adkins   Cardiology: Yonas Alfredfer    Note written by Tierra Aguilar. Bridger Escalera, as dictated by Aleisha Harman, DO 8:46 AM      The history is provided by the patient and the EMS personnel.         Past Medical History:   Diagnosis Date    Anxiety     Breast cancer ([address] 2005, 2010    right 2005, left 2010    Cancer [facility])      cancer right breast cancer    Cancer ([address]     cancer left breast/new dx 8/2011 +bx     Chronic obstructive pulmonary disease (HCC)     Diabetes ([address]     Heart failure ([address]     Hypercholesterolemia     Hypertension     Other ill-defined conditions(799.89)     osteopoosis    Other ill-defined conditions(799.89)     high cholesterol    Pneumonia     Psychiatric disorder     anxiety       Past Surgical History:   Procedure Laterality Date    BIOPSY OF BREAST, INCISIONAL      left breast 8/2011 positive for cancer cells    BREAST SURGERY PROCEDURE UNLISTED      right mastectomy    HX BREAST LUMPECTOMY Left 2010    HX MASTECTOMY Right 2005         Family History:   Problem Relation Age of Onset    Hypertension Mother     Stroke Other        Social History     Socioeconomic History    Marital status:      Spouse name: Not on file    Number of children: Not on file    Years of education: Not on file    Highest education level: Not on file   Occupational History    Not on file   Social Needs    Financial resource strain: Not on file    Food insecurity:     Worry: Not on file     Inability: Not on file    Transportation needs:     Medical: Not on file     Non-medical: Not on file   Tobacco Use    Smoking status: Never Smoker    Smokeless tobacco: Never Used   Substance and Sexual Activity    Alcohol use: No    Drug use: No    Sexual activity: Not Currently   Lifestyle    Physical activity:     Days per week: Not on file     Minutes per session: Not on file    Stress: Not on file   Relationships    Social connections:     Talks on phone: Not on file     Gets together: Not on file     Attends Congregation service: Not on file     Active member of club or organization: Not on file     Attends meetings of clubs or organizations: Not on file     Relationship status: Not on file    Intimate partner violence:     Fear of current or ex partner: Not on file     Emotionally abused: Not on file     Physically abused: Not on file     Forced sexual activity: Not on file   Other Topics Concern    Not on file   Social History Narrative        She does not have a history of smoking, but does have second hand smoking exposure from her  , for a period of 27- 36 yrs, , approx 30 yrs ago. 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         She has two children, her dtr and son, both of whom are present during the interview, patient states that she wants them both to make any medical decisions for her, she wants to be a full code. ALLERGIES: Ace inhibitors    Review of Systems   Constitutional: Negative for fever. HENT: Negative for hearing loss. Eyes: Negative for visual disturbance. Respiratory: Positive for shortness of breath. Cardiovascular: Negative for chest pain. Gastrointestinal: Negative for abdominal pain. Genitourinary: Negative for flank pain. Musculoskeletal: Negative for back pain. Skin: Negative for rash. Neurological: Positive for weakness and numbness. Negative for dizziness and light-headedness. Further description of this \"numbness\" sensation is more of a tingling sensation. All other systems reviewed and are negative. Vitals:    09/17/19 1100 09/17/19 1115 09/17/19 1136 09/17/19 1152   BP: 148/71 146/64 (!) 157/98 147/83   Pulse: 61 61 67 71   Resp: 24 23 27 25   Temp:    97.6 °F (36.4 °C)   SpO2: 95% 98% 96% 99%   Weight:    91.2 kg (201 lb 1 oz)   Height:                Physical Exam   Constitutional: She is oriented to person, place, and time. She appears well-developed and well-nourished. No distress. HENT:   Head: Normocephalic and atraumatic. Mouth/Throat: Oropharynx is clear and moist.   Eyes: Pupils are equal, round, and reactive to light. Conjunctivae and EOM are normal. No scleral icterus. Neck: Neck supple. No JVD present. No tracheal deviation present. Cardiovascular: Normal rate and regular rhythm. Exam reveals no gallop. No murmur heard. Pulmonary/Chest: Effort normal. No respiratory distress. Coarse breath sounds throughout, but good air entry. Abdominal: Soft. Bowel sounds are normal. She exhibits no distension. There is no tenderness. Musculoskeletal: Normal range of motion. She exhibits no edema, tenderness or deformity. Neurological: She is alert and oriented to person, place, and time. A sensory deficit is present. No cranial nerve deficit.    Right lower extremity 0/5 strength from the knee down with the exception of some plantar flexion. No dorsiflexion of the right foot. No sensation from the right mid thigh down. No feeling of pain, sharp or dull sensory    Otherwise:    Mental Status:  Oriented to time, place and person. Cranial Nerves: Intact visual fields. Fundi are benign. PERRL, EOM's full and intact, no nystagmus, no ptosis. Facial sensation is normal. Facial movement is symmetric. Palate is midline with normal sternocleidomastoid and trapezius muscle strength. Tongue is midline. Motor:  5/5 strength in upper; Normal bulk and tone. Sensory:  Normal to light touch in b/l UE, LLE    Gait:   Not tested    Cerebellar:  Normal finger-to-nose and heal to shin. Negative Romberg. Skin: Skin is warm and dry. Capillary refill takes less than 2 seconds. No rash noted. Psychiatric: She has a normal mood and affect. Her behavior is normal. Judgment and thought content normal.   Nursing note and vitals reviewed. Note written by Jaida Triana. Ruiz Peñaloza, as dictated by Addy Zepeda, DO 8:47 AM      MDM  Number of Diagnoses or Management Options  Acute on chronic congestive heart failure, unspecified heart failure type [facility]):   Acute renal insufficiency:   Dyspnea, unspecified type:   Transient right leg weakness:      Amount and/or Complexity of Data Reviewed  Decide to obtain previous medical records or to obtain history from someone other than the patient: yes      This patient arrived as a level 2 stroke alert. Upon arrival, patient had no movement of her right lower extremity with exception of some plantar flexion. Throughout her ER course her movement and motor skills did improve. Tele-neurologist thought they may have seen a clot but the CTA was read as unremarkable. At this time, patient also was found to have acute on chronic renal insufficiency, acute congestive heart failure shortness of breath.   Patient will remain in the hospital for further evaluation and management of her symptoms. Patient is agreeable to do so. At disposition she remains hemodynamically stable. ED Course as of Sep 17 1614   Tue Sep 17, 2019   3585 I spoke with the tele-neurologist who noted that they will see the patient after the level 1 stroke patient is evaluated    [GG]   2123 Nurse reports patient can move right LE now. Dopler pulses noted behind bilateral knees. Difficult finding pulses in bilateral feet. [GG]   1109 Baseline     HGB(!): 9.7 [GG]      ED Course User Index  [GG] Kwame Bo DO       Procedures    CONSULT NOTE:  8:52 AM Kwame Bo DO spoke with Dr. Deanne Finnegan, Consult for Teleneurology. Discussed available diagnostic tests and clinical findings. Dr. Deanne Finnegan will evaluate the patient. PROGRESS NOTE:  9:18 AM  Patient is now complaining of a sensation of pain in the right lower extremity, but states she still cannot feel the right lower extremity. Patient is breathing better. ED EKG interpretation:  Rhythm: sinus bradycardia; and regular . Rate (approx.): 59; Axis: normal; ST/T wave: normal; No acute ischemia. Note written by Kalyan Carson. Thersa Fears, as dictated by Kwame Bo DO 9:19 AM       10:40 AM  Dr. Deanne Finnegan states she sees a small clot in the left SANDRA which corresponds with the patient right lower extremity symptoms. Hospitalist Adolph for Admission  11:34 AM    ED Room Number: ER25/25  Patient Name and age:  Saunders Bamberger 80 y.o.  female  Working Diagnosis:   1. Acute on chronic congestive heart failure, unspecified heart failure type ([address]    2. Dyspnea, unspecified type    3. Acute renal insufficiency    4.  Transient right leg weakness      Readmission: no  Isolation Requirements:  no  Recommended Level of Care:  telemetry  Code Status:  Full Code  Department:[facility] Adult ED - 21   Other:  Pending consult with neuro/interventional rad

## 2019-09-17 NOTE — PROGRESS NOTES
Admission Medication Reconciliation:    Information obtained from:  Patient, family member, RX query  RxQuery data available¹:  YES    Comments/Recommendations: Updated PTA meds/reviewed patient's allergies. 1.  Patient was a fair historian. Was able to recall medication names once prompted and knew doses and frequencies. 2.  Medication changes (since last review): Added  - none    Adjusted  - Lantus 50 units to 40 units if BG >200 and 30 units if BG <200    Removed  - Lipitor 20mg, Prolia, Celexa, ferrous sulfate       ¹RxQuery pharmacy benefit data reflects medications filled and processed through the patient's insurance, however   this data does NOT capture whether the medication was picked up or is currently being taken by the patient. Allergies:  Ace inhibitors    Significant PMH/Disease States:   Past Medical History:   Diagnosis Date    Anxiety     Breast cancer ([facility] [address] 2005, 2010    right 2005, left 2010    Cancer [facility])      cancer right breast cancer    Cancer ([facility] [address]     cancer left breast/new dx 8/2011 +bx     Chronic obstructive pulmonary disease ([facility] [address]     Diabetes ([facility] [address]     Heart failure ([facility] [address]     Hypercholesterolemia     Hypertension     Other ill-defined conditions(799.89)     osteopoosis    Other ill-defined conditions(799.89)     high cholesterol    Pneumonia     Psychiatric disorder     anxiety     Chief Complaint for this Admission:    Chief Complaint   Patient presents with    Numbness     Prior to Admission Medications   Prescriptions Last Dose Informant Patient Reported? Taking? albuterol (PROVENTIL VENTOLIN) 2.5 mg /3 mL (0.083 %) nebulizer solution   No Yes   Sig: 3 mL by Nebulization route every four (4) hours as needed for Wheezing. allopurinol (ZYLOPRIM) 100 mg tablet 9/16/2019  No Yes   Sig: Take 1 Tab by mouth daily. aspirin 81 mg chewable tablet 9/16/2019  Yes Yes   Sig: Take 81 mg by mouth daily.    atorvastatin (LIPITOR) 40 mg tablet 9/16/2019  Yes Yes   Sig: Take 40 mg by mouth daily. bumetanide (BUMEX) 1 mg tablet 2019  No Yes   Sig: Take 1 Tab by mouth two (2) times a day. diphenhydrAMINE (BENADRYL) 25 mg capsule   Yes Yes   Sig: Take 25 mg by mouth every six (6) hours as needed (Allergic reaction/Angioedema). insulin glargine (LANTUS U-100 INSULIN) 100 unit/mL injection 2019  Yes Yes   Si-40 Units by SubCUTAneous route nightly. 40 units if BG >200  30 units if BG <200   ipratropium (ATROVENT) 0.02 % nebulizer solution   Yes Yes   Si.5 mg by Nebulization route every four (4) hours as needed. metFORMIN (GLUCOPHAGE) 1,000 mg tablet 2019  No Yes   Sig: Take 1 Tab by mouth two (2) times a day. metoprolol tartrate (LOPRESSOR) 50 mg tablet 2019  No Yes   Sig: Take 2 Tabs by mouth two (2) times a day. spironolactone (ALDACTONE) 25 mg tablet 2019  No Yes   Sig: Take 1 Tab by mouth daily.  Indications: Edema      Facility-Administered Medications: None

## 2019-09-18 LAB
ANION GAP SERPL CALC-SCNC: 10 MMOL/L (ref 5–15)
ATRIAL RATE: 60 BPM
BASOPHILS # BLD: 0 K/UL (ref 0–0.1)
BASOPHILS NFR BLD: 0 % (ref 0–1)
BUN SERPL-MCNC: 32 MG/DL (ref 6–20)
BUN/CREAT SERPL: 21 (ref 12–20)
CALCIUM SERPL-MCNC: 9.3 MG/DL (ref 8.5–10.1)
CALCULATED P AXIS, ECG09: 70 DEGREES
CALCULATED R AXIS, ECG10: 27 DEGREES
CALCULATED T AXIS, ECG11: -54 DEGREES
CHLORIDE SERPL-SCNC: 101 MMOL/L (ref 97–108)
CO2 SERPL-SCNC: 27 MMOL/L (ref 21–32)
CREAT SERPL-MCNC: 1.53 MG/DL (ref 0.55–1.02)
DIAGNOSIS, 93000: NORMAL
DIFFERENTIAL METHOD BLD: ABNORMAL
EOSINOPHIL # BLD: 0 K/UL (ref 0–0.4)
EOSINOPHIL NFR BLD: 0 % (ref 0–7)
ERYTHROCYTE [DISTWIDTH] IN BLOOD BY AUTOMATED COUNT: 15.3 % (ref 11.5–14.5)
GLUCOSE BLD STRIP.AUTO-MCNC: 165 MG/DL (ref 65–100)
GLUCOSE BLD STRIP.AUTO-MCNC: 172 MG/DL (ref 65–100)
GLUCOSE BLD STRIP.AUTO-MCNC: 198 MG/DL (ref 65–100)
GLUCOSE BLD STRIP.AUTO-MCNC: 199 MG/DL (ref 65–100)
GLUCOSE SERPL-MCNC: 191 MG/DL (ref 65–100)
HCT VFR BLD AUTO: 30.2 % (ref 35–47)
HGB BLD-MCNC: 9.1 G/DL (ref 11.5–16)
IMM GRANULOCYTES # BLD AUTO: 0.1 K/UL (ref 0–0.04)
IMM GRANULOCYTES NFR BLD AUTO: 1 % (ref 0–0.5)
LYMPHOCYTES # BLD: 0.7 K/UL (ref 0.8–3.5)
LYMPHOCYTES NFR BLD: 6 % (ref 12–49)
MCH RBC QN AUTO: 27 PG (ref 26–34)
MCHC RBC AUTO-ENTMCNC: 30.1 G/DL (ref 30–36.5)
MCV RBC AUTO: 89.6 FL (ref 80–99)
MONOCYTES # BLD: 0.9 K/UL (ref 0–1)
MONOCYTES NFR BLD: 8 % (ref 5–13)
NEUTS SEG # BLD: 9.5 K/UL (ref 1.8–8)
NEUTS SEG NFR BLD: 85 % (ref 32–75)
NRBC # BLD: 0 K/UL (ref 0–0.01)
NRBC BLD-RTO: 0 PER 100 WBC
P-R INTERVAL, ECG05: 210 MS
PLATELET # BLD AUTO: 270 K/UL (ref 150–400)
PMV BLD AUTO: 10.1 FL (ref 8.9–12.9)
POTASSIUM SERPL-SCNC: 4.9 MMOL/L (ref 3.5–5.1)
Q-T INTERVAL, ECG07: 536 MS
QRS DURATION, ECG06: 88 MS
QTC CALCULATION (BEZET), ECG08: 536 MS
RBC # BLD AUTO: 3.37 M/UL (ref 3.8–5.2)
SERVICE CMNT-IMP: ABNORMAL
SODIUM SERPL-SCNC: 138 MMOL/L (ref 136–145)
TROPONIN I SERPL-MCNC: 0.58 NG/ML
TROPONIN I SERPL-MCNC: 0.7 NG/ML
VENTRICULAR RATE, ECG03: 60 BPM
WBC # BLD AUTO: 11.2 K/UL (ref 3.6–11)

## 2019-09-18 PROCEDURE — 97161 PT EVAL LOW COMPLEX 20 MIN: CPT

## 2019-09-18 PROCEDURE — 93005 ELECTROCARDIOGRAM TRACING: CPT

## 2019-09-18 PROCEDURE — 94762 N-INVAS EAR/PLS OXIMTRY CONT: CPT

## 2019-09-18 PROCEDURE — 97116 GAIT TRAINING THERAPY: CPT

## 2019-09-18 PROCEDURE — 82962 GLUCOSE BLOOD TEST: CPT

## 2019-09-18 PROCEDURE — 36415 COLL VENOUS BLD VENIPUNCTURE: CPT

## 2019-09-18 PROCEDURE — 85025 COMPLETE CBC W/AUTO DIFF WBC: CPT

## 2019-09-18 PROCEDURE — 80048 BASIC METABOLIC PNL TOTAL CA: CPT

## 2019-09-18 PROCEDURE — 74011636637 HC RX REV CODE- 636/637: Performed by: INTERNAL MEDICINE

## 2019-09-18 PROCEDURE — 77010033678 HC OXYGEN DAILY

## 2019-09-18 PROCEDURE — 74011250636 HC RX REV CODE- 250/636: Performed by: INTERNAL MEDICINE

## 2019-09-18 PROCEDURE — 65660000000 HC RM CCU STEPDOWN

## 2019-09-18 PROCEDURE — 84484 ASSAY OF TROPONIN QUANT: CPT

## 2019-09-18 PROCEDURE — 74011250637 HC RX REV CODE- 250/637: Performed by: INTERNAL MEDICINE

## 2019-09-18 RX ORDER — MAGNESIUM SULFATE 100 %
4 CRYSTALS MISCELLANEOUS AS NEEDED
Status: DISCONTINUED | OUTPATIENT
Start: 2019-09-18 | End: 2019-09-18 | Stop reason: SDUPTHER

## 2019-09-18 RX ORDER — INSULIN GLARGINE 100 [IU]/ML
15 INJECTION, SOLUTION SUBCUTANEOUS DAILY
Status: DISCONTINUED | OUTPATIENT
Start: 2019-09-18 | End: 2019-10-18

## 2019-09-18 RX ORDER — FUROSEMIDE 10 MG/ML
20 INJECTION INTRAMUSCULAR; INTRAVENOUS EVERY 12 HOURS
Status: DISCONTINUED | OUTPATIENT
Start: 2019-09-18 | End: 2019-09-20

## 2019-09-18 RX ORDER — INSULIN LISPRO 100 [IU]/ML
INJECTION, SOLUTION INTRAVENOUS; SUBCUTANEOUS
Status: DISCONTINUED | OUTPATIENT
Start: 2019-09-18 | End: 2019-09-24

## 2019-09-18 RX ADMIN — ASPIRIN 81 MG CHEWABLE TABLET 81 MG: 81 TABLET CHEWABLE at 08:37

## 2019-09-18 RX ADMIN — Medication 10 ML: at 14:23

## 2019-09-18 RX ADMIN — ALLOPURINOL 100 MG: 100 TABLET ORAL at 08:37

## 2019-09-18 RX ADMIN — METOPROLOL TARTRATE 100 MG: 50 TABLET ORAL at 21:45

## 2019-09-18 RX ADMIN — METOPROLOL TARTRATE 100 MG: 50 TABLET ORAL at 08:36

## 2019-09-18 RX ADMIN — FUROSEMIDE 40 MG: 10 INJECTION, SOLUTION INTRAMUSCULAR; INTRAVENOUS at 08:37

## 2019-09-18 RX ADMIN — INSULIN LISPRO 2 UNITS: 100 INJECTION, SOLUTION INTRAVENOUS; SUBCUTANEOUS at 12:08

## 2019-09-18 RX ADMIN — INSULIN GLARGINE 15 UNITS: 100 INJECTION, SOLUTION SUBCUTANEOUS at 16:36

## 2019-09-18 RX ADMIN — INSULIN LISPRO 3 UNITS: 100 INJECTION, SOLUTION INTRAVENOUS; SUBCUTANEOUS at 16:36

## 2019-09-18 RX ADMIN — Medication 10 ML: at 21:52

## 2019-09-18 RX ADMIN — Medication 10 ML: at 07:00

## 2019-09-18 RX ADMIN — FUROSEMIDE 20 MG: 10 INJECTION, SOLUTION INTRAMUSCULAR; INTRAVENOUS at 21:45

## 2019-09-18 RX ADMIN — HEPARIN SODIUM 5000 UNITS: 5000 INJECTION INTRAVENOUS; SUBCUTANEOUS at 21:47

## 2019-09-18 RX ADMIN — ATORVASTATIN CALCIUM 40 MG: 40 TABLET, FILM COATED ORAL at 08:37

## 2019-09-18 RX ADMIN — HEPARIN SODIUM 5000 UNITS: 5000 INJECTION INTRAVENOUS; SUBCUTANEOUS at 14:22

## 2019-09-18 RX ADMIN — INSULIN LISPRO 2 UNITS: 100 INJECTION, SOLUTION INTRAVENOUS; SUBCUTANEOUS at 06:59

## 2019-09-18 NOTE — PROGRESS NOTES
Hospitalist Progress Note                               Edgar Peters MD                                     Answering service: [phone]                               [phone] from in house phone                                         Date of Service:  2019  NAME:  Zane Moe  :  1931  MRN:  [id number]      Admission Summary:   80-year-old female with an extensive past medical history including chronic hypoxemic respiratory failure with home oxygen, COPD, chronic kidney disease, type 2 diabetes mellitus, atrial fibrillation, diastolic congestive heart failure, dyslipidemia, primary hypertension, osteoporosis, pulmonary hypertension, depression and anxiety disorder, was brought to the emergency room from home for evaluation of an approximately 2-3 day history of worsening shortness of breath. Per the patient's son, the patient ambulates unaided at baseline; however, in the 2-3 days leading up to the emergency room presentation, noted to have worsening shortness of breath even with easy activity. The patient also complained of some numbness and weakness in the right lower extremity. The patient admitted to being compliant with all her medical therapies. The patient denied associated headaches, dizziness, visual deficits, chest pains, palpitations, nausea, vomiting, cough, sputum production, fevers, chills, abdominal pain, bladder or bowel irregularities. Reason for follow up:       CC:SOB    All events in the past 24 hours reviewed in their entirety. Reviewed with nursing at bedside. Patient was in no acute distress on early am rounds. Denied any chest pains or worsening SOB. Stated that she felt mildly better since admission. Assessment & Plan:     1) CVS: Acute on chronic diastolic CHF exarcerbation with a preserved EF. Probable Demand ischemia with mildly elevated troponins due to the CHF exarcerbation.  Indeterminable NYHA Functional Class due to the multiple Co-morbidities per Cardiology. Primary Hypertension. Dyslipidemia. 2D ECHO (9/12/19) with EF 70 percent. Continue oxygen, IV diuresis, daily weight input/output monitoring, CHF teaching. Cardiology and Heart failure team evals noted and appreciated. 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 Will decrease lasix from 40mg to 20mg twice daily with close monitoring of the BUN/Cr. 3) Electrolytes: Resolving probable multifactorial Hyponatremia present on admission. Resolved Hyperkalemia without any dysrhythmias. 4) Resp: Chronic Hypoxemic respiratory failure due to COPD with home oxygen. Nebulizers, inhalers, incentive spirometry. 5) Hematology: Anemia of chronicity. 6) CNS: Resolved RLE numbness and weakness without any other focal neurologic deficits. CT head, CTA head and neck negative for acute findings. 7) Endocrine: Uncontrolled insulin treated type 2 DM with complications including hyperglycemia. Hold metformin. Accucheck monitoring, Basal and sliding scale insulins, diabetic teaching. Diabetic team evaluation with recommendations appreciated. 8) MANNIE: Osteoporosis. 9) Psychiatry: Anxiety disorder and Depression without any current behavioral disturbances. 10) Prophylaxis: DVT. 11) Directives: Full Code with a guarded prognosis. D/W patient and patient's son.    15) Plan: Downgrade to telemetry. Anticipate D/C to home in 3-5 days. D/W patient, patient's son Ellie Gaston at [phone]. D/W Nursing.     Hospital Problems  Date Reviewed: 9/17/2019          Codes Class Noted POA    CHF exacerbation ([facility] ICD-10-CM: I50.9  ICD-9-CM: 428.0  9/17/2019 Yes        Hyperkalemia ICD-10-CM: E87.5  ICD-9-CM: 276.7  9/17/2019 Yes        Hyponatremia ICD-10-CM: E87.1  ICD-9-CM: 276.1  9/17/2019 Yes        HOLLAND (acute kidney injury) ([facility] ICD-10-CM: N17.9  ICD-9-CM: 584.9 9/17/2019 Yes        Acute hypoxemic respiratory failure [facility]) ICD-10-CM: J96.01  ICD-9-CM: 518.81  8/8/2011 Yes        Debility ICD-10-CM: R53.81  ICD-9-CM: 799.3  6/28/2011 Yes                  Physical Examination:      Last 24hrs VS reviewed since prior progress note. Most recent are:  Visit Vitals  /46 (BP 1 Location: Left arm, BP Patient Position: At rest)   Pulse 62   Temp 98.5 °F (36.9 °C)   Resp 16   Ht 5' 5\" (1.651 m)   Wt 88.1 kg (194 lb 3.6 oz)   SpO2 100%   Breastfeeding? No   BMI 32.32 kg/m²           Constitutional:  No acute distress. HEENT: Head is a traumatic,  Un icteric sclera. Pink conjunctiva,no erythema or discharge. Oral mucous moist, oropharynx benign. Neck supple,    Resp:  Decreased air entry B/L.   CV:  S1,S2 present. GI:  Soft, non distended, non tender. normoactive bowel sounds, no hepatosplenomegaly    :  No CVA or suprapubic tenderness   Skin  :  No erythema,rash,bullae,dipigmentation     Musculoskeletal:  No asymmetry. Bipedal edema. Neurologic:  Awake, alert and oriented. No intake or output data in the 24 hours ending 09/18/19 1437           Labs:     Recent Labs     09/18/19 0223 09/17/19  0921   WBC 11.2* 11.9*   HGB 9.1* 9.7*   HCT 30.2* 32.3*    291     Recent Labs     09/18/19  0223 09/17/19  1236 09/17/19  0921     --  133*   K 4.9 5.4* 5.6*     --  101   CO2 27  --  24   BUN 32*  --  27*   CREA 1.53*  --  1.35*   *  --  199*   CA 9.3  --  9.0     Recent Labs     09/17/19  0921   SGOT 18   ALT 24      TBILI 0.7   TP 8.4*   ALB 2.9*   GLOB 5.5*     No results for input(s): INR, PTP, APTT in the last 72 hours. No lab exists for component: INREXT   No results for input(s): FE, TIBC, PSAT, FERR in the last 72 hours. No results found for: FOL, RBCF   No results for input(s): PH, PCO2, PO2 in the last 72 hours.   Recent Labs     09/18/19  0847 09/18/19  0223   TROIQ 0.58* 0.70*     Lab Results   Component Value Date/Time Cholesterol, total 144 09/17/2019 09:21 AM    HDL Cholesterol 55 09/17/2019 09:21 AM    LDL, calculated 72.6 09/17/2019 09:21 AM    Triglyceride 82 09/17/2019 09:21 AM    CHOL/HDL Ratio 2.6 09/17/2019 09:21 AM     Lab Results   Component Value Date/Time    Glucose (POC) 172 (H) 09/18/2019 11:53 AM    Glucose (POC) 198 (H) 09/18/2019 06:27 AM    Glucose (POC) 224 (H) 09/17/2019 09:21 PM    Glucose (POC) 299 (H) 09/17/2019 05:10 PM    Glucose (POC) 201 (H) 02/13/2017 11:35 AM     Lab Results   Component Value Date/Time    Color YELLOW/STRAW 09/17/2019 12:49 PM    Appearance CLEAR 09/17/2019 12:49 PM    Specific gravity 1.030 09/17/2019 12:49 PM    Specific gravity 1.010 12/18/2011 09:28 AM    pH (UA) 6.0 09/17/2019 12:49 PM    Protein NEGATIVE  09/17/2019 12:49 PM    Glucose NEGATIVE  09/17/2019 12:49 PM    Ketone NEGATIVE  09/17/2019 12:49 PM    Bilirubin NEGATIVE  09/17/2019 12:49 PM    Urobilinogen 0.2 09/17/2019 12:49 PM    Nitrites NEGATIVE  09/17/2019 12:49 PM    Leukocyte Esterase NEGATIVE  09/17/2019 12:49 PM    Epithelial cells FEW 09/17/2019 12:49 PM    Bacteria NEGATIVE  09/17/2019 12:49 PM    WBC 0-4 09/17/2019 12:49 PM    RBC 0-5 09/17/2019 12:49 PM         Medications Reviewed:     Current Facility-Administered Medications   Medication Dose Route Frequency    furosemide (LASIX) injection 20 mg  20 mg IntraVENous Q12H    ondansetron (ZOFRAN) injection 4 mg  4 mg IntraVENous Q6H PRN    glucose chewable tablet 16 g  4 Tab Oral PRN    glucagon (GLUCAGEN) injection 1 mg  1 mg IntraMUSCular PRN    insulin lispro (HUMALOG) injection   SubCUTAneous AC&HS    sodium chloride (NS) flush 5-40 mL  5-40 mL IntraVENous Q8H    sodium chloride (NS) flush 5-40 mL  5-40 mL IntraVENous PRN    acetaminophen (TYLENOL) tablet 650 mg  650 mg Oral Q4H PRN    heparin (porcine) injection 5,000 Units  5,000 Units SubCUTAneous Q8H    allopurinol (ZYLOPRIM) tablet 100 mg  100 mg Oral DAILY    aspirin chewable tablet 81 mg 81 mg Oral DAILY    atorvastatin (LIPITOR) tablet 40 mg  40 mg Oral DAILY    metoprolol tartrate (LOPRESSOR) tablet 100 mg  100 mg Oral BID    albuterol-ipratropium (DUO-NEB) 2.5 MG-0.5 MG/3 ML  3 mL Nebulization Q4H PRN     ______________________________________________________________________  EXPECTED LENGTH OF STAY: - - -  ACTUAL LENGTH OF STAY:          1                 Anita Cordova MD

## 2019-09-18 NOTE — PROGRESS NOTES
MARIANN: Discharge back home/ TBD    Reason for Admission:  CHF exacerbation, c/o numbness and weakness RLE, hx of breast CA, HTN, Hypocholesteremia, anxiety, DM, osteoporosis               RRAT Score: 22/ high                 Resources/supports as identified by patient/family:  Pt has [employer] Medicare HMO, [employer], [employer]. Pt gets her medications from [employer] or uses [facility] [address].                 Rohm and Braxton facing patient (as identified by patient/family and CM): Finances/Medication cost?  None expressed                Transportation? Pt's son, Ellie Gaston, [phone], provides transportation               Support system or lack thereof? Pt's son and daughter live with her. Living arrangements? Pt lives in private residence with family. Self-care/ADLs/Cognition? Pt does own self care. She understands what is said and answers appropriately. Her son helps make decision. Current Advanced Directive/Advance Care Plan:  Pt does not have an AMD. Her son, Ellie Gaston, would make decisions if she were not able to do so. Plan for utilizing home health:  Pt in agreement for [facility] visit from [facility]. She has used them in past for [facility]. Transition of Care Plan:  TBD per PT/ot recommendations. At this time pt plans to return home and f/u up with PCP and Cardiology. Care Management Interventions  PCP Verified by CM: Yes(JOE Culver MD, last office visit was 2 weeks ago)  Palliative Care Criteria Met (RRAT>21 & CHF Dx)?: Yes(RRAT score 22 /high)  Palliative Consult Recommended?: No(No order rec'd)  Mode of Transport at Discharge:  Other (see comment)(son will )  Current Support Network: Own Home(pt's son and daughter live with her)  Confirm Follow Up Transport: Family  Plan discussed with Pt/Family/Caregiver: Yes  Freedom of Choice Offered: Yes     Referral sent to [facility] via Selina Lyman, RN ACM [facility] canceled [facility] referral because pt is a [facility] pt.

## 2019-09-18 NOTE — NURSE NAVIGATOR
Chart reviewed by Heart Failure Nurse Navigator. Heart Failure database completed. EF:  >70%     ACEi/ARB/ARNi: contrainidcated due to allergy    BB: Lopressor 50mg BID    Aldosterone Antagonist: Spironolactone 25mg QD    Obstructive Sleep Apnea Screening:   STOP-BANG score:   Referred to Sleep Medicine:     CRT currently not indicated. NYHA Functional Class IV on admission. Heart Failure Teach Back in Patient Education. Heart Failure Avoiding Triggers on Discharge Instructions. Cardiologist: Dr. Washington/CAV; Dr. Kwaku Nguyễn regular cardiologist.  Last OV 8/30/19      Post discharge follow up phone call to be made within 48-72 hours of discharge.

## 2019-09-18 NOTE — PROGRESS NOTES
Primary RN(chaitanya) agrees with Student RN( concepcion) assessment & documentation.  Cosign Complete

## 2019-09-18 NOTE — ROUTINE PROCESS
TRANSFER - OUT REPORT:    Verbal report given to IMCU RN(name) on Luther Haile  being transferred to [facility]) for routine progression of care       Report consisted of patients Situation, Background, Assessment and   Recommendations(SBAR). Information from the following report(s) SBAR, ED Summary and MAR was reviewed with the receiving nurse. Lines:   Peripheral IV 09/17/19 Left Forearm (Active)        Opportunity for questions and clarification was provided.       Patient transported with:   [employer]

## 2019-09-18 NOTE — PROGRESS NOTES
Problem: Diabetes Self-Management  Goal: *Disease process and treatment process  Description  Define diabetes and identify own type of diabetes; list 3 options for treating diabetes. Outcome: Progressing Towards Goal  Goal: *Incorporating nutritional management into lifestyle  Description  Describe effect of type, amount and timing of food on blood glucose; list 3 methods for planning meals. Outcome: Progressing Towards Goal     Problem: Heart Failure: Day 2  Goal: Activity/Safety  Outcome: Progressing Towards Goal  Note:   PT/OT consulted  Goal: Consults, if ordered  Outcome: Progressing Towards Goal     Problem: Breathing Pattern - Ineffective  Goal: *Use of effective breathing techniques  Outcome: Progressing Towards Goal     Problem: Pressure Injury - Risk of  Goal: *Prevention of pressure injury  Description  Document Ajgdeep Scale and appropriate interventions in the flowsheet. Outcome: Progressing Towards Goal  Note:   Pressure Injury Interventions:  Sensory Interventions: Assess changes in LOC    Moisture Interventions: Absorbent underpads, Check for incontinence Q2 hours and as needed, Internal/External urinary devices    Activity Interventions: Pressure redistribution bed/mattress(bed type), PT/OT evaluation    Mobility Interventions: HOB 30 degrees or less, Pressure redistribution bed/mattress (bed type)    Nutrition Interventions: Document food/fluid/supplement intake    Friction and Shear Interventions: Lift sheet     Bedside and Verbal shift change report given to [facility] (oncoming nurse) by Buzz Tinajero (student nurse) & Thea Hassan (offgoing nurse).  Report included the following information SBAR, Kardex, [facility] and Recent Results   Patient Vitals for the past 12 hrs:   Temp Pulse Resp BP SpO2   09/18/19 1910 98.1 °F (36.7 °C) 62 18 123/47 100 %   09/18/19 1600     100 %   09/18/19 1510 97.9 °F (36.6 °C) 88 26 111/54 100 %   09/18/19 1200     100 %   09/18/19 1148 98.5 °F (36.9 °C) 62 16 109/46 100 % 09/18/19 1111     100 %   09/18/19 0836  63  119/54    09/18/19 0800     100 %   .

## 2019-09-18 NOTE — DIABETES MGMT
[facility]    [facility] Consult Note    Recommendations/ Comments: Consult received for medication recommendations and hospital glucose mgmt. Pt currently NPO status. BG consistently above 180 mg/dl. Pt uses Lantus at home. If appropriate, please consider:  Initiate 15 units of Lantus to optimize BG control  Change lispro correction scale to high sensitivity given renal status     Current hospital DM medication: lispro correction scale - emely sensitivity    Chart reviewed on [facility]. Patient is a 80 y.o. female with known DM on Lantus 30-40 units HS depending on BG and 1000 mg Metformin BID at home. A1c:   Lab Results   Component Value Date/Time    Hemoglobin A1c 8.8 (H) 09/17/2019 09:21 AM    Hemoglobin A1c 7.0 (H) 02/08/2017 09:59 AM       Recent Glucose Results:   Lab Results   Component Value Date/Time     (H) 09/18/2019 02:23 AM    GLUCPOC 198 (H) 09/18/2019 06:27 AM    GLUCPOC 224 (H) 09/17/2019 09:21 PM    GLUCPOC 299 (H) 09/17/2019 05:10 PM        Lab Results   Component Value Date/Time    Creatinine 1.53 (H) 09/18/2019 02:23 AM     Estimated Creatinine Clearance: 27.8 mL/min (A) (based on SCr of 1.53 mg/dL (H)). Active Orders   Diet    DIET NPO        PO intake: No data found. Will continue to follow as needed.     Thank you  Barby Schuler RD, Diabetes Clinician       Time spent: 4 minutes

## 2019-09-18 NOTE — PROGRESS NOTES
A Spiritual Care Partner Volunteer visited patient in Rm [id number] on 9/18/2019.   Documented by:  Chaplain Marinelli MDiv, MS, [email or web address]  [facility] ([id number])

## 2019-09-18 NOTE — CONSULTS
Cardiology Consult Note      Patient Name: Chelsey Blackmon  : 1931 MRN: [id number]  Date: 2019  Time: 10:48 AM    Admit Diagnosis: CHF exacerbation ([address] [I50.9]    Primary Cardiologist: Dr. Mynor Wolff   Consulting Cardiologist: Pamela Villegas III, M.D. Delroy Hurter for Consult: HF    Requesting MD: Oscar Kwok MD    HPI:  Chelsey Blackmon is a 80 y.o. female admitted on 2019  for CHF exacerbation ([address] [I50.9]. has a past medical history of Anxiety, Breast cancer ([address] (, ), Cancer ([address] Cancer ([address] Chronic obstructive pulmonary disease ([address] Diabetes ([address] Heart failure ([address] Hypercholesterolemia, Hypertension, Other ill-defined conditions(799.89), Other ill-defined conditions(799.89), Pneumonia, and Psychiatric disorder. Presented to the ED with c/o right leg weakness and numbness. She states yesterday, she was unable to walk because her right leg is \"so weak\". No significant pain, some numbness though. She also notes her leg feels colder than her left. Prior to day of presentation, she was able to walk. She has a h/o COPD, DHF, PHTN and mild linear interstitial lung disease on CTA in 2017. She notes her currently level of SOB is the same as when she was diagnosed with HF 2 years ago. She uses a concentrator at home. Mostly ambulating short distances before need to sit and rest.   Other histories include Afib, CKD, BrCa, HTN, HLD, and depression/anxiety d/o    Subjective:  No c/o SOB above her baseline. No CP or palpitations. C/o R LE weakness and feeling cold. No pain in right leg, however. Assessment and Plan     1. Diastolic HF - chronic- compensated   - Unable to classify NYHA level with concurrent COPD/PHTN and chronic O2 use 2-4 L at home   - Lungs without crackles from edema.   Coarse BS and \"dry\" rales    - Echo EF >70% (19) -     19   ECHO ADULT COMPLETE 2019 Narrative · Left Ventricle: Normal cavity size. Mild to moderate asymmetric   hypertrophy predominantly basal septal, ovoid-shaped cavity. Hyperdynamic   systolic dysfunction. Estimated left ventricular ejection fraction is   >70%. Visually measured ejection fraction. No regional wall motion   abnormality noted. · Aortic Valve: Probably trileaflet aortic valve. Aortic valve leaflet   calcification present. Moderate aortic valve stenosis is present. · Mitral Valve: Mitral valve non-specific thickening. Moderate mitral   annular calcification. Mild to moderate mitral valve regurgitation is   present. · Tricuspid Valve: Moderate to severe tricuspid valve regurgitation is   present. · Pulmonary Artery: Moderate pulmonary hypertension. · Right Ventricle: Increased (hyperdynamic) systolic function. · Pulmonic Valve: Mild pulmonic valve regurgitation is present. Signed by: Tyesha Witt MD    - PTA Bumex 1 mg BID   - proBNP elevation - suspect from [place] from PHTN, chronic (oxygen dependent) respiratory failure and AS  2. R LE weakness   - CTA head negative for acute CVA   - non palpable pulse on right (iliac, popliteal or DP/PT). - cold to touch   - ABIs  3. Elevated troponin   - suspect demand mismatch   - Cath in 2017 was without significant CAD. Minor luminal irregularities in all vessels  4. Chronic respiratory failure   - chronic oxygen use. 2-4L 24 hours   - multifactorial - COPD, PHTN, AS, noted ILD on CTA in 2017  5. COPD   - Home O2   - PTA Duonebs   - Does not follow up with Pulmonary specialist  6. PHTN   - moderate on last echo. PAS 60 mmHg  7. AS   - Moderate on last echo. The peak gradient was 36 mmHg and mean gradient 20 mmHg. 8. Anemia   - Hgb 9.1, chronic  9.  HLD   Cholesterol, total 144 09/17/2019 09:21 AM   HDL Cholesterol 55 09/17/2019 09:21 AM   LDL, calculated 72.6 09/17/2019 09:21 AM   VLDL, calculated 16.4 09/17/2019 09:21 AM   Triglyceride 82 09/17/2019 09:21 AM   CHOL/HDL Ratio 2.6 09/17/2019 09:21 AM    - Lipitor 40 PTA  10. HTN   - Lopressor 50 mg PTA   - Well controlled  11. DM   - A1c 8.8   - Glucophage PTA    Chronic respiratory failure, on home O2 24 hours. No SOB above baseline. Dry rales on exam, but no crackles. No edema of legs. Continue to c/o R LE weakness. CTA negative for CVA. Non palpable pulses on R. Left popliteal 2+. Troponin elevation of no consequence, related to mismatch. Cath in 2017 without CAD. Compensated from a HF standpoint. Many contributing factors to chronic respiratory failure, but SOB is baseline. Needs no further cardiac testing. She can resume her home meds for diuresis. Cardiology attending: seen and examined. Agree with assess and plan  She came to hospital for leg symptoms, not sob which she and daughter state is baseline. Chest few dry crackles, cv rrr as murmur, ext cool right greater than left pulses not palpable in feet. Her sob is baseline, bnp elevation likely to due to severe pul htn and no signs of volume overload, if anything may be dry given increase creatine. Chest ct from 2017 suggests interstitial lung disease and cxray always shows increased markings. Doubt elevated trop represents acs. Needs lower extremity vascular evaluation.          Patient Active Problem List   Diagnosis Code    Diabetes ([facility] E11.9    HTN (hypertension), benign I10    Breast cancer ([facility] C50.919    Anemia D64.9    Debility R53.81    Acute hypoxemic respiratory failure (HCC) J96.01    Vein disorder I87.9    Allergic reaction T78.40XA    Multiple allergies Z88.9    Bronchitis J40    Anxiety F41.9    Hypercholesteremia E78.00    Aortic stenosis U66.6    Diastolic CHF, acute on chronic (HCC) I50.33    SOB (shortness of breath) R06.02    CHF (congestive heart failure) (HCC) I50.9    Pulmonary edema cardiac cause (HCC) I50.1    Moderate to severe pulmonary hypertension (HCC) I27.20    Acute respiratory disease J06.9    CHF exacerbation (HCC) I50.9    Hyperkalemia E87.5    Hyponatremia E87.1    HOLLAND (acute kidney injury) ([facility] N17.9     TTE 3/31/16 LVEF 60%. No rWMA. G2DD. REINA. Mild MR, mild to mod MAC. AV Leaflets mod to markedly increased thickness, mod calcification, and mod  reduced cuspal separation. Mild to Mod AS. Indexed aortic valve area (by VTI) was 0.6 cm squared/m squared. Mild TR. PASP 53      TTE 1/20/16 LVEF 65-70%. No WMA. Mod- marked LVH, particularly septum consistent with eccentric hypertrophy. Doppler parameters were consistent with a restrictive pattern, indicative of decreased left ventricular diastolic compliance and/or increased left atrial pressure (grade 3/4 diastolic dysfunction). Mod LAE. Atrial septum bows from left to right. Mild to mod MAC. Mild MR. Mild to mod AV calcification, mod reduced cuspal separation, and reduced motility. Transaortic velocity and gradient were increased due to stenosis as well as concomitant increased transaortic flow. There was mild stenosis. Valve peak gradient was 29 mmHg. Valve mean gradient was 16 mmHg. Estimated aortic valve area (by VTI) was 1.7 cm squared. Estimated aortic valve area (by Vmax) was 1.5 cm2. Tricuspid valve: There was mild regurgitation. Pulmonary artery systolic  pressure: 45 mmHg. There was mild pulmonary hypertension.     Review of Systems:     GENERAL   Recent weight loss - no   Fever -----------------   no   Chills -----------------   no     EYES, VISION   Visual Changes - no     EARS, NOSE, THROAT   Hearing loss ----------- no   Swallowing difficulties - no     CARDIOVASCULAR   Chest pain/pressure ---- no   Arrhythmia/palpitations - no       RESPIRATORY   Cough ------------------ no   Shortness of breath - yes   Wheezing -------------- no   GASTROINTESTINAL   Abdominal pain - no   Heartburn -------- no   Bloody stool ----- no     GENITOURINARY   Frequent urination - no   Urgency -------------- no     MUSCULOSKELETAL   Joint pain/swelling ---- no   Musculoskeletal pain - no   Weakness -- yes   SKIN & INTEGUMENTARY   Rashes - no   Sores --- no         NEUROLOGICAL   Numbness/tingling - yes   Sensation loss ------ no     PSYCHIATRIC   Nervousness/anxiety - no   Depression -------------- no     ENDOCRINE   Heat/cold intolerance - no   Excessive thirst -------- no     HEMATOLOGIC/LYMPHATIC   Abnormal bleeding - no     ALL/IMMUN   Allergic reaction ------ no   Recurrent infections - no     Previous treatment/evaluation includes echocardiogram and cardiac catheterization .   Cardiac risk factors: dyslipidemia, diabetes mellitus, sedentary life style, hypertension, post-menopausal.    Past Medical History:   Diagnosis Date    Anxiety     Breast cancer ([facility] 2005, 2010    right 2005, left 2010    Cancer [facility])      cancer right breast cancer    Cancer ([facility]     cancer left breast/new dx 8/2011 +bx     Chronic obstructive pulmonary disease (HCC)     Diabetes ([facility]     Heart failure ([facility]     Hypercholesterolemia     Hypertension     Other ill-defined conditions(799.89)     osteopoosis    Other ill-defined conditions(799.89)     high cholesterol    Pneumonia     Psychiatric disorder     anxiety     Past Surgical History:   Procedure Laterality Date    BIOPSY OF BREAST, INCISIONAL      left breast 8/2011 positive for cancer cells    BREAST SURGERY PROCEDURE UNLISTED      right mastectomy    HX BREAST LUMPECTOMY Left 2010    HX MASTECTOMY Right 2005     Current Facility-Administered Medications   Medication Dose Route Frequency    furosemide (LASIX) injection 20 mg  20 mg IntraVENous Q12H    ondansetron (ZOFRAN) injection 4 mg  4 mg IntraVENous Q6H PRN    glucose chewable tablet 16 g  4 Tab Oral PRN    glucagon (GLUCAGEN) injection 1 mg  1 mg IntraMUSCular PRN    insulin lispro (HUMALOG) injection   SubCUTAneous AC&HS    sodium chloride (NS) flush 5-40 mL  5-40 mL IntraVENous Q8H    sodium chloride (NS) flush 5-40 mL  5-40 mL IntraVENous PRN    acetaminophen (TYLENOL) tablet 650 mg  650 mg Oral Q4H PRN    heparin (porcine) injection 5,000 Units  5,000 Units SubCUTAneous Q8H    allopurinol (ZYLOPRIM) tablet 100 mg  100 mg Oral DAILY    aspirin chewable tablet 81 mg  81 mg Oral DAILY    atorvastatin (LIPITOR) tablet 40 mg  40 mg Oral DAILY    metoprolol tartrate (LOPRESSOR) tablet 100 mg  100 mg Oral BID    albuterol-ipratropium (DUO-NEB) 2.5 MG-0.5 MG/3 ML  3 mL Nebulization Q4H PRN       Allergies   Allergen Reactions    Ace Inhibitors Angioedema      Family History   Problem Relation Age of Onset    Hypertension Mother     Stroke Other       Social History     Socioeconomic History    Marital status:      Spouse name: Not on file    Number of children: Not on file    Years of education: Not on file    Highest education level: Not on file   Tobacco Use    Smoking status: Never Smoker    Smokeless tobacco: Never Used   Substance and Sexual Activity    Alcohol use: No    Drug use: No    Sexual activity: Not Currently   Social History Narrative        She does not have a history of smoking, but does have second hand smoking exposure from her  , for a period of 27- 40 yrs, , approx 30 yrs ago. 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 She has two children, her dtr and son, both of whom are present during the interview, patient states that she wants them both to make any medical decisions for her, she wants to be a full code. Objective:    Physical Exam    Vitals:   Vitals:    09/17/19 2322 09/18/19 0330 09/18/19 0734 09/18/19 0836   BP: 107/58 112/44 117/55 119/54   Pulse: 62 61 63 63   Resp: 21 21 20    Temp: 97.6 °F (36.4 °C) 97.8 °F (36.6 °C) 98.3 °F (36.8 °C)    SpO2: 100% 100% 100%    Weight:  194 lb 3.6 oz (88.1 kg)     Height:           General:    Alert, cooperative, no distress, appears stated age. Neck:   Supple, no carotid bruit and no JVD.    Back: Symmetric, normal curvature. Lungs:     Dry rales to auscultation bilaterally. Heart[de-identified]    Regular rate and rhythm, S1, S2 normal as murmur, no click, rub or gallop. Abdomen:     Soft, non-tender. Bowel sounds normal.    Extremities:   Extremities normal, atraumatic, no cyanosis or edema. Vascular:   Pulses - non palpable on right LE.  2+ LE popliteal   Skin:   Skin color normal. No rashes or lesions   Neurologic:   CN II-XII grossly intact. Telemetry: normal sinus rhythm    ECG:   EKG Results     Procedure 720 Value Units Date/Time    EKG, 12 LEAD, INITIAL [[id number]]     Order Status:  Sent     EKG, 12 LEAD, INITIAL [[id number]] Collected:  09/17/19 0913    Order Status:  Completed Updated:  09/17/19 1233     Ventricular Rate 59 BPM      Atrial Rate 59 BPM      P-R Interval 188 ms      QRS Duration 92 ms      Q-T Interval 460 ms      QTC Calculation (Bezet) 455 ms      Calculated P Axis 51 degrees      Calculated R Axis 14 degrees      Calculated T Axis 3 degrees      Diagnosis --     Sinus bradycardia  Nonspecific ST and T wave abnormality  When compared with ECG of 08-FEB-2017 09:58,  No significant change  Confirmed by Tim La M.D., Camila Whitlock ([id number]) on 9/17/2019 12:33:10 PM              Data Review:     Radiology:   XR Results (most recent):  Results from Hospital Encounter encounter on 09/17/19   XR CHEST PORT    Narrative PORTABLE CHEST RADIOGRAPH/S: 9/17/2019 11:35 AM    INDICATION: Stroke, dyspnea. COMPARISON: 2/8/2017, 12/8/2016, 9/19/2016, 3/30/2016; CT chest 2/3/2017. TECHNIQUE: Portable frontal semiupright radiograph/s of the chest.    FINDINGS:   The heart is enlarged, better seen on prior chest CT. There is pulmonary  vascular congestion and interstitial edema. Chronic volume loss in the left  lung. Trace right pleural effusion. No pneumothorax. The central airways are  patent. Impression IMPRESSION:   Pulmonary edema and trace right pleural effusion.          Recent Labs 09/18/19  0847 09/18/19 0223   TROIQ 0.58* 0.70*     Recent Labs     09/18/19  0223 09/17/19  1236 09/17/19  0921     --  133*   K 4.9 5.4* 5.6*     --  101   CO2 27  --  24   BUN 32*  --  27*   CREA 1.53*  --  1.35*   *  --  199*   CA 9.3  --  9.0     Recent Labs     09/18/19 0223 09/17/19  0921   WBC 11.2* 11.9*   HGB 9.1* 9.7*   HCT 30.2* 32.3*    291     Recent Labs     09/17/19  0921   SGOT 18        Recent Labs     09/17/19 0921   CHOL 144   LDLC 72.6     Recent Labs     09/17/19  0921   TSH 2.30       Abebe Garcia M.D.          [facility] [address] [place] [address] Rd[address] [facility] [address], [address] [facility] [address]     Mari Mason     [phone]    Amber Simmons MD

## 2019-09-18 NOTE — PROGRESS NOTES
Bedside shift change report given to Vale RN (oncoming nurse) by Thomas Sinclair RN (offgoing nurse). Report included the following information SBAR, Kardex, Intake/Output, MAR, Recent Results and Cardiac Rhythm SR w/First Degree AV Block. Problem: Diabetes Self-Management  Goal: *Disease process and treatment process  Description  Define diabetes and identify own type of diabetes; list 3 options for treating diabetes.   Outcome: Progressing Towards Goal     Problem: Heart Failure: Day 1  Goal: Off Pathway (Use only if patient is Off Pathway)  Outcome: Progressing Towards Goal  Goal: Activity/Safety  Outcome: Progressing Towards Goal  Goal: Consults, if ordered  Outcome: Progressing Towards Goal  Goal: Diagnostic Test/Procedures  Outcome: Progressing Towards Goal  Goal: Nutrition/Diet  Outcome: Progressing Towards Goal  Goal: Discharge Planning  Outcome: Progressing Towards Goal  Goal: Medications  Outcome: Progressing Towards Goal  Goal: Respiratory  Outcome: Progressing Towards Goal  Goal: Treatments/Interventions/Procedures  Outcome: Progressing Towards Goal  Goal: Psychosocial  Outcome: Progressing Towards Goal  Goal: *Oxygen saturation within defined limits  Outcome: Progressing Towards Goal  Goal: *Hemodynamically stable  Outcome: Progressing Towards Goal  Goal: *Optimal pain control at patient's stated goal  Outcome: Progressing Towards Goal  Goal: *Anxiety reduced or absent  Outcome: Progressing Towards Goal

## 2019-09-18 NOTE — PROGRESS NOTES
Problem: Mobility Impaired (Adult and Pediatric)  Goal: *Acute Goals and Plan of Care (Insert Text)  Description  FUNCTIONAL STATUS PRIOR TO ADMISSION: Patient was independent for all functional mobility. Patient on 2L/min O2 via nasal cannula at baseline. HOME SUPPORT PRIOR TO ADMISSION: The patient lived with her son and daughter but did not require assist.    Physical Therapy Goals  Initiated 9/18/2019  1. Patient will move from supine to sit and sit to supine  in bed with independence within 7 day(s). 2.  Patient will transfer from bed to chair and chair to bed with independence using the least restrictive device within 7 day(s). 3.  Patient will perform sit to stand with independence within 7 day(s). 4.  Patient will ambulate with independence for 150 feet with the least restrictive device within 7 day(s). 5.  Patient will ascend/descend 1 stairs with 1 handrail(s) with modified independence within 7 day(s). Outcome: Progressing Towards Goal   PHYSICAL THERAPY EVALUATION  Patient: Anne-Marie Blair (36 y.o. female)  Date: 9/18/2019  Primary Diagnosis: [facility]F exacerbation ([facility] [I50.9]       Precautions: Fall         ASSESSMENT  Based on the objective data described below, the patient presents with decreased R LE active ROM, decreased strength, decreased sensation, and increased need for assistance for functional mobility S/P admission for CHF exacerbation. Prior to admission patient reported increased SOB and progressive R LE weakness and numbness. CT scan is clear. Patient awaiting dopplers. On evaluation patient is received on 4l/min O2 via nasal cannula with O2 saturation 98% and HR 64 bpm. On evaluation patient R leg is cold to touch. She is unable to wiggle her R toes or perform a SLR. Patient is able ADD and ABD R LE for supine<>sit with CGA. Patient declines attempting to stand at this time stating she is fearful.      Current Level of Function Impacting Discharge (mobility/balance): CGA-SBA for supine<>sit, Max A scooting up the bed    Functional Outcome Measure: The patient scored 40/100 on the Barthel outcome measure which is indicative of need for assistance with at least 60% of ADLs and IADLs. Other factors to consider for discharge: PLOF independent      Patient will benefit from skilled therapy intervention to address the above noted impairments. PLAN :  Recommendations and Planned Interventions: bed mobility training, transfer training, gait training, therapeutic exercises, neuromuscular re-education, edema management/control, patient and family training/education and therapeutic activities      Frequency/Duration: Patient will be followed by physical therapy:  5 times a week to address goals. Recommendation for discharge: (in order for the patient to meet his/her long term goals)  To be determined: patient unable to stand at this time, awaiting DVT rule out     This discharge recommendation:  Has not yet been discussed the attending provider and/or case management    Equipment recommendations for successful discharge (if) home: TBD          SUBJECTIVE:   Patient stated i'm not going to fool with standing, This leg is not working.     OBJECTIVE DATA SUMMARY:   HISTORY:    Past Medical History:   Diagnosis Date    Anxiety     Breast cancer ([facility] 2005, 2010    right 2005, left 2010    Cancer [facility])      cancer right breast cancer    Cancer ([facility]     cancer left breast/new dx 8/2011 +bx     Chronic obstructive pulmonary disease ([address]     Diabetes ([address]     Heart failure ([address]     Hypercholesterolemia     Hypertension     Other ill-defined conditions(799.89)     osteopoosis    Other ill-defined conditions(799.89)     high cholesterol    Pneumonia     Psychiatric disorder     anxiety     Past Surgical History:   Procedure Laterality Date    BIOPSY OF BREAST, INCISIONAL      left breast 8/2011 positive for cancer cells    BREAST SURGERY PROCEDURE UNLISTED      right mastectomy    HX BREAST LUMPECTOMY Left 2010    HX MASTECTOMY Right        Personal factors and/or comorbidities impacting plan of care: please see above    Home Situation  Home Environment: Private residence  # Steps to Enter: 1  Rails to Enter: Yes  One/Two Story Residence: Two story, live on 1st floor  Interior Rails: Both  Lift Chair Available: No  Living Alone: No  Support Systems: Family member(s)( )  Patient Expects to be Discharged to[de-identified] Private residence  Current DME Used/Available at Home: Commode, bedside, Oxygen, portable, Shower chair, Grab bars  Tub or Shower Type: Shower    EXAMINATION/PRESENTATION/DECISION MAKING:   Critical Behavior:  Neurologic State: Alert, Appropriate for age, Eyes open spontaneously  Orientation Level: Oriented X4  Cognition: Appropriate decision making     Hearing: Auditory  Auditory Impairment: None  Skin:    Edema:   Range Of Motion:  AROM: Generally decreased, functional(R LE)           PROM: Within functional limits           Strength:    Strength: Generally decreased, functional                    Tone & Sensation:   Tone: Normal              Sensation: Impaired               Coordination:  Coordination: Generally decreased, functional  Vision:      Functional Mobility:  Bed Mobility:     Supine to Sit: Contact guard assistance  Sit to Supine: Contact guard assistance  Scooting: Maximum assistance  Transfers:                             Balance:   Sitting: Intact; Without support  Ambulation/Gait Training:                                                         Stairs: Therapeutic Exercises:       Functional Measure:  Barthel Index:    Bathin  Bladder: 5  Bowels: 10  Groomin  Dressin  Feeding: 10  Mobility: 0  Stairs: 0  Toilet Use: 0  Transfer (Bed to Chair and Back): 5  Total: 40/100       The Barthel ADL Index: Guidelines  1. The index should be used as a record of what a patient does, not as a record of what a patient could do.   2. The main aim is to establish degree of independence from any help, physical or verbal, however minor and for whatever reason. 3. The need for supervision renders the patient not independent. 4. A patient's performance should be established using the best available evidence. Asking the patient, friends/relatives and nurses are the usual sources, but direct observation and common sense are also important. However direct testing is not needed. 5. Usually the patient's performance over the preceding 24-48 hours is important, but occasionally longer periods will be relevant. 6. Middle categories imply that the patient supplies over 50 per cent of the effort. 7. Use of aids to be independent is allowed. Norm Alva., Barthel, DKerrieW. (6401). Functional evaluation: the Barthel Index. [address] [facility] (14)2. Laurent Hodgkins der [place], J.J.M.F, Ru Palencia., Dali Post., [place], [address] (1999). Measuring the change indisability after inpatient rehabilitation; comparison of the responsiveness of the Barthel Index and Functional Walker Measure. Journal of Neurology, Neurosurgery, and Psychiatry, 66(4), 294-360. Lucretia Conway, N.J.A, BARRY Garcia, & Fredy Galvez, MKerrieA. (2004.) Assessment of post-stroke quality of life in cost-effectiveness studies: The usefulness of the Barthel Index and the EuroQoL-5D.  Quality of Life Research, 15, 275-67            Physical Therapy Evaluation Charge Determination   History Examination Presentation Decision-Making   MEDIUM  Complexity : 1-2 comorbidities / personal factors will impact the outcome/ POC  LOW Complexity : 1-2 Standardized tests and measures addressing body structure, function, activity limitation and / or participation in recreation  MEDIUM Complexity : Evolving with changing characteristics  Other outcome measures Barthel  HIGH       Based on the above components, the patient evaluation is determined to be of the following complexity level: MEDIUM    Pain Rating:      Activity Tolerance:   Fair  Please refer to the flowsheet for vital signs taken during this treatment. After treatment patient left in no apparent distress:   Supine in bed, Call bell within reach, Caregiver / family present and Side rails x 3    COMMUNICATION/EDUCATION:   The patients plan of care was discussed with: Registered Nurse. Fall prevention education was provided and the patient/caregiver indicated understanding., Patient/family have participated as able in goal setting and plan of care. and Patient/family agree to work toward stated goals and plan of care.     Thank you for this referral.  Yvette Cochran, PT   Time Calculation: 27 mins

## 2019-09-18 NOTE — PROGRESS NOTES
Nocturnist NP Progress note    Name: Zane Moe  YOB: 1931  MRN: [id number]  Admission Date: 9/17/2019  8:47 AM    Date of service: 9/18/2019 0300 AM    Overnight Update:        Nursing notes new inverted T waves on patient in inferior and anterolateral leads, not noted on prior EKG. Patient sleeping, no complaints at this time. Cardiology consult pending, will trend troponins, diet NPO. Discussed with Dr Marylen Hillock, floor cross cover attending, no emergent intervention required at this time.      Fatemeh Wall FNP-C, PA-C  [phone] or [email or web address]

## 2019-09-19 ENCOUNTER — APPOINTMENT (OUTPATIENT)
Dept: ULTRASOUND IMAGING | Age: 84
DRG: 270 | End: 2019-09-19
Attending: INTERNAL MEDICINE
Payer: MEDICARE

## 2019-09-19 ENCOUNTER — APPOINTMENT (OUTPATIENT)
Dept: VASCULAR SURGERY | Age: 84
DRG: 270 | End: 2019-09-19
Attending: INTERNAL MEDICINE
Payer: MEDICARE

## 2019-09-19 ENCOUNTER — APPOINTMENT (OUTPATIENT)
Dept: MRI IMAGING | Age: 84
DRG: 270 | End: 2019-09-19
Attending: INTERNAL MEDICINE
Payer: MEDICARE

## 2019-09-19 LAB
ANION GAP SERPL CALC-SCNC: 8 MMOL/L (ref 5–15)
BNP SERPL-MCNC: ABNORMAL PG/ML
BUN SERPL-MCNC: 48 MG/DL (ref 6–20)
BUN/CREAT SERPL: 26 (ref 12–20)
CALCIUM SERPL-MCNC: 8.8 MG/DL (ref 8.5–10.1)
CHLORIDE SERPL-SCNC: 99 MMOL/L (ref 97–108)
CO2 SERPL-SCNC: 29 MMOL/L (ref 21–32)
CREAT SERPL-MCNC: 1.86 MG/DL (ref 0.55–1.02)
GLUCOSE BLD STRIP.AUTO-MCNC: 104 MG/DL (ref 65–100)
GLUCOSE BLD STRIP.AUTO-MCNC: 111 MG/DL (ref 65–100)
GLUCOSE BLD STRIP.AUTO-MCNC: 114 MG/DL (ref 65–100)
GLUCOSE BLD STRIP.AUTO-MCNC: 136 MG/DL (ref 65–100)
GLUCOSE BLD STRIP.AUTO-MCNC: 202 MG/DL (ref 65–100)
GLUCOSE SERPL-MCNC: 165 MG/DL (ref 65–100)
POTASSIUM SERPL-SCNC: 4.6 MMOL/L (ref 3.5–5.1)
SERVICE CMNT-IMP: ABNORMAL
SODIUM SERPL-SCNC: 136 MMOL/L (ref 136–145)

## 2019-09-19 PROCEDURE — 74011250637 HC RX REV CODE- 250/637: Performed by: INTERNAL MEDICINE

## 2019-09-19 PROCEDURE — 36415 COLL VENOUS BLD VENIPUNCTURE: CPT

## 2019-09-19 PROCEDURE — 77010033678 HC OXYGEN DAILY

## 2019-09-19 PROCEDURE — 83880 ASSAY OF NATRIURETIC PEPTIDE: CPT

## 2019-09-19 PROCEDURE — 74011250636 HC RX REV CODE- 250/636: Performed by: INTERNAL MEDICINE

## 2019-09-19 PROCEDURE — 74011250637 HC RX REV CODE- 250/637: Performed by: PSYCHIATRY & NEUROLOGY

## 2019-09-19 PROCEDURE — 76770 US EXAM ABDO BACK WALL COMP: CPT

## 2019-09-19 PROCEDURE — 82962 GLUCOSE BLOOD TEST: CPT

## 2019-09-19 PROCEDURE — 65660000000 HC RM CCU STEPDOWN

## 2019-09-19 PROCEDURE — 70553 MRI BRAIN STEM W/O & W/DYE: CPT

## 2019-09-19 PROCEDURE — 93922 UPR/L XTREMITY ART 2 LEVELS: CPT

## 2019-09-19 PROCEDURE — 74011636637 HC RX REV CODE- 636/637: Performed by: INTERNAL MEDICINE

## 2019-09-19 PROCEDURE — 80048 BASIC METABOLIC PNL TOTAL CA: CPT

## 2019-09-19 PROCEDURE — A9575 INJ GADOTERATE MEGLUMI 0.1ML: HCPCS | Performed by: INTERNAL MEDICINE

## 2019-09-19 PROCEDURE — 74011250637 HC RX REV CODE- 250/637: Performed by: NURSE PRACTITIONER

## 2019-09-19 RX ORDER — SODIUM CHLORIDE 0.9 % (FLUSH) 0.9 %
10 SYRINGE (ML) INJECTION
Status: COMPLETED | OUTPATIENT
Start: 2019-09-19 | End: 2019-09-19

## 2019-09-19 RX ORDER — CLOPIDOGREL BISULFATE 75 MG/1
75 TABLET ORAL DAILY
Status: DISCONTINUED | OUTPATIENT
Start: 2019-09-19 | End: 2019-09-30

## 2019-09-19 RX ORDER — CYCLOBENZAPRINE HCL 10 MG
5 TABLET ORAL ONCE
Status: COMPLETED | OUTPATIENT
Start: 2019-09-20 | End: 2019-09-19

## 2019-09-19 RX ORDER — GADOTERATE MEGLUMINE 376.9 MG/ML
19 INJECTION INTRAVENOUS
Status: COMPLETED | OUTPATIENT
Start: 2019-09-19 | End: 2019-09-19

## 2019-09-19 RX ADMIN — ATORVASTATIN CALCIUM 40 MG: 40 TABLET, FILM COATED ORAL at 09:13

## 2019-09-19 RX ADMIN — Medication 10 ML: at 07:34

## 2019-09-19 RX ADMIN — HEPARIN SODIUM 5000 UNITS: 5000 INJECTION INTRAVENOUS; SUBCUTANEOUS at 06:00

## 2019-09-19 RX ADMIN — Medication 10 ML: at 21:48

## 2019-09-19 RX ADMIN — CLOPIDOGREL BISULFATE 75 MG: 75 TABLET ORAL at 14:53

## 2019-09-19 RX ADMIN — Medication 10 ML: at 14:00

## 2019-09-19 RX ADMIN — ASPIRIN 81 MG CHEWABLE TABLET 81 MG: 81 TABLET CHEWABLE at 09:13

## 2019-09-19 RX ADMIN — INSULIN LISPRO 4 UNITS: 100 INJECTION, SOLUTION INTRAVENOUS; SUBCUTANEOUS at 07:34

## 2019-09-19 RX ADMIN — METOPROLOL TARTRATE 100 MG: 50 TABLET ORAL at 21:41

## 2019-09-19 RX ADMIN — Medication 10 ML: at 11:00

## 2019-09-19 RX ADMIN — INSULIN GLARGINE 15 UNITS: 100 INJECTION, SOLUTION SUBCUTANEOUS at 09:18

## 2019-09-19 RX ADMIN — ACETAMINOPHEN 650 MG: 325 TABLET, FILM COATED ORAL at 12:40

## 2019-09-19 RX ADMIN — ALLOPURINOL 100 MG: 100 TABLET ORAL at 09:13

## 2019-09-19 RX ADMIN — ACETAMINOPHEN 650 MG: 325 TABLET, FILM COATED ORAL at 06:14

## 2019-09-19 RX ADMIN — GADOTERATE MEGLUMINE 19 ML: 376.9 INJECTION INTRAVENOUS at 11:00

## 2019-09-19 RX ADMIN — METOPROLOL TARTRATE 100 MG: 50 TABLET ORAL at 09:13

## 2019-09-19 RX ADMIN — FUROSEMIDE 20 MG: 10 INJECTION, SOLUTION INTRAMUSCULAR; INTRAVENOUS at 21:40

## 2019-09-19 RX ADMIN — FUROSEMIDE 20 MG: 10 INJECTION, SOLUTION INTRAMUSCULAR; INTRAVENOUS at 09:13

## 2019-09-19 RX ADMIN — HEPARIN SODIUM 5000 UNITS: 5000 INJECTION INTRAVENOUS; SUBCUTANEOUS at 14:53

## 2019-09-19 RX ADMIN — ACETAMINOPHEN 650 MG: 325 TABLET, FILM COATED ORAL at 19:41

## 2019-09-19 RX ADMIN — CYCLOBENZAPRINE HYDROCHLORIDE 5 MG: 10 TABLET, FILM COATED ORAL at 23:35

## 2019-09-19 NOTE — PROGRESS NOTES
Hospitalist Progress Note                               Chani River MD                                     Answering service: [phone]                               OR 36 from in house phone                                         Date of Service:  2019  NAME:  Alfredo Sandifer  :  1931  MRN:  [id number]      Admission Summary:   80-year-old female with an extensive past medical history including chronic hypoxemic respiratory failure with home oxygen, COPD, chronic kidney disease, type 2 diabetes mellitus, atrial fibrillation, diastolic congestive heart failure, dyslipidemia, primary hypertension, osteoporosis, pulmonary hypertension, depression and anxiety disorder, was brought to the emergency room from home for evaluation of an approximately 2-3 day history of worsening shortness of breath. Per the patient's son, the patient ambulates unaided at baseline; however, in the 2-3 days leading up to the emergency room presentation, noted to have worsening shortness of breath even with easy activity. The patient also complained of some numbness and weakness in the right lower extremity. The patient admitted to being compliant with all her medical therapies. The patient denied associated headaches, dizziness, visual deficits, chest pains, palpitations, nausea, vomiting, cough, sputum production, fevers, chills, abdominal pain, bladder or bowel irregularities. Reason for follow up:       CC: SOB    All events in the past 24 hours reviewed in their entirety. Reviewed with nursing at bedside. Patient was in no acute distress on early am rounds. However, C/O recurrent right leg numbness and weakness. Assessment & Plan:     1) CVS: Acute on chronic diastolic CHF exarcerbation with a preserved EF. Probable Demand ischemia with mildly elevated troponins due to the CHF exarcerbation.  Indeterminable NYHA Functional Class due to the multiple Co-morbidities per Cardiology. Primary Hypertension. Dyslipidemia. 2D ECHO (9/12/19) with EF 70 percent. Continue oxygen, IV diuresis, daily weight input/output monitoring, CHF teaching. Cardiology and Heart failure team evals noted and appreciated. 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 Worsening BUN and Cr. Lasix decreased from 40mg to 20mg twice. Renal US to evaluate for obstructive uropathy. Nephrology consult. 3) Electrolytes: Resolved probable multifactorial Hyponatremia present on admission. Resolved Hyperkalemia without any dysrhythmias. 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 Continue Aspirin, statin, neurochecks. Neurology consult. 5) Vascular: LE asymmetry with decreased pulsation. For Doppler studies. 6) Resp: Chronic Hypoxemic respiratory failure due to COPD with home oxygen. Nebulizers, inhalers, incentive spirometry. 7) Hematology: Anemia of chronicity. 8) CNS: Resolved RLE numbness and weakness without any other focal neurologic deficits. CT head, CTA head and neck negative for acute findings. 9) Endocrine: Uncontrolled insulin treated type 2 DM with complications including hyperglycemia. Hold metformin. Accucheck monitoring, Basal and sliding scale insulins, diabetic teaching. Diabetic team evaluation with recommendations appreciated. 10) MANNIE: Osteoporosis. 11) Psychiatry: Anxiety disorder and Depression without any current behavioral disturbances. 12) Prophylaxis: DVT. 13) Directives: Full Code with a guarded prognosis. Readdressed with patient and patient's son. 14) Plan: Anticipate patient may require Short-term Post-Acute care facility placement for ongoing PT/OT. D/W patient, patient's son Carlin Olson at [phone]. D/W Nursing.     Hospital Problems  Date Reviewed: 9/17/2019          Codes Class Noted POA    CHF exacerbation ([facility] ICD-10-CM: I50.9  ICD-9-CM: 428.0  9/17/2019 Yes        Hyperkalemia ICD-10-CM: E87.5  ICD-9-CM: 276.7  9/17/2019 Yes        Hyponatremia ICD-10-CM: E87.1  ICD-9-CM: 276.1  9/17/2019 Yes        HOLLAND (acute kidney injury) ([facility] ICD-10-CM: N17.9  ICD-9-CM: 584.9  9/17/2019 Yes        Acute hypoxemic respiratory failure ([facility] ICD-10-CM: J96.01  ICD-9-CM: 518.81  8/8/2011 Yes        Debility ICD-10-CM: R53.81  ICD-9-CM: 799.3  6/28/2011 Yes                  Physical Examination:      Last 24hrs VS reviewed since prior progress note. Most recent are:  Visit Vitals  /53   Pulse 65   Temp 97.9 °F (36.6 °C)   Resp 17   Ht 5' 5\" (1.651 m)   Wt 86.4 kg (190 lb 7.6 oz)   SpO2 100%   Breastfeeding? No   BMI 31.70 kg/m²           Constitutional:  No acute distress. HEENT: Head is a traumatic,  Un icteric sclera. Pink conjunctiva,no erythema or discharge. Oral mucous moist, oropharynx benign. Neck supple,    Resp:  Decreased air entry B/L.   CV:  S1,S2 present. GI:  Soft, non distended, non tender. normoactive bowel sounds, no hepatosplenomegaly    :  No CVA or suprapubic tenderness   Skin  :  No erythema,rash,bullae,dipigmentation     Musculoskeletal:  RLE slightly bigger than LLE. Decreased Dorsalis Pedis Pulses B/L. Power 3-4/5 RLE, 5/5 other extremities. Neurologic:  Awake, alert and oriented. Decreased sensation RLE.              Intake/Output Summary (Last 24 hours) at 9/19/2019 1149  Last data filed at 9/19/2019 0629  Gross per 24 hour   Intake    Output 200 ml   Net -200 ml              Labs:     Recent Labs     09/18/19  0223 09/17/19  0921   WBC 11.2* 11.9*   HGB 9.1* 9.7*   HCT 30.2* 32.3*    291     Recent Labs     09/19/19  0534 09/18/19  0223 09/17/19  1236 09/17/19  0921    138  --  133*   K 4.6 4.9 5.4* 5.6*   CL 99 101  --  101   CO2 29 27  --  24   BUN 48* 32*  --  27*   CREA 1.86* 1.53*  --  1.35*   * 191*  --  199*   CA 8.8 9.3  --  9.0 Recent Labs     09/17/19  0921   SGOT 18   ALT 24      TBILI 0.7   TP 8.4*   ALB 2.9*   GLOB 5.5*     No results for input(s): INR, PTP, APTT in the last 72 hours. No lab exists for component: INREXT, INREXT   No results for input(s): FE, TIBC, PSAT, FERR in the last 72 hours. No results found for: FOL, RBCF   No results for input(s): PH, PCO2, PO2 in the last 72 hours.   Recent Labs     09/18/19  0847 09/18/19  0223   TROIQ 0.58* 0.70*     Lab Results   Component Value Date/Time    Cholesterol, total 144 09/17/2019 09:21 AM    HDL Cholesterol 55 09/17/2019 09:21 AM    LDL, calculated 72.6 09/17/2019 09:21 AM    Triglyceride 82 09/17/2019 09:21 AM    CHOL/HDL Ratio 2.6 09/17/2019 09:21 AM     Lab Results   Component Value Date/Time    Glucose (POC) 202 (H) 09/19/2019 06:53 AM    Glucose (POC) 199 (H) 09/18/2019 09:10 PM    Glucose (POC) 165 (H) 09/18/2019 04:24 PM    Glucose (POC) 172 (H) 09/18/2019 11:53 AM    Glucose (POC) 198 (H) 09/18/2019 06:27 AM     Lab Results   Component Value Date/Time    Color YELLOW/STRAW 09/17/2019 12:49 PM    Appearance CLEAR 09/17/2019 12:49 PM    Specific gravity 1.030 09/17/2019 12:49 PM    Specific gravity 1.010 12/18/2011 09:28 AM    pH (UA) 6.0 09/17/2019 12:49 PM    Protein NEGATIVE  09/17/2019 12:49 PM    Glucose NEGATIVE  09/17/2019 12:49 PM    Ketone NEGATIVE  09/17/2019 12:49 PM    Bilirubin NEGATIVE  09/17/2019 12:49 PM    Urobilinogen 0.2 09/17/2019 12:49 PM    Nitrites NEGATIVE  09/17/2019 12:49 PM    Leukocyte Esterase NEGATIVE  09/17/2019 12:49 PM    Epithelial cells FEW 09/17/2019 12:49 PM    Bacteria NEGATIVE  09/17/2019 12:49 PM    WBC 0-4 09/17/2019 12:49 PM    RBC 0-5 09/17/2019 12:49 PM         Medications Reviewed:     Current Facility-Administered Medications   Medication Dose Route Frequency    furosemide (LASIX) injection 20 mg  20 mg IntraVENous Q12H    insulin lispro (HUMALOG) injection   SubCUTAneous AC&HS    insulin glargine (LANTUS) injection 15 Units  15 Units SubCUTAneous DAILY    ondansetron (ZOFRAN) injection 4 mg  4 mg IntraVENous Q6H PRN    glucose chewable tablet 16 g  4 Tab Oral PRN    glucagon (GLUCAGEN) injection 1 mg  1 mg IntraMUSCular PRN    sodium chloride (NS) flush 5-40 mL  5-40 mL IntraVENous Q8H    sodium chloride (NS) flush 5-40 mL  5-40 mL IntraVENous PRN    acetaminophen (TYLENOL) tablet 650 mg  650 mg Oral Q4H PRN    heparin (porcine) injection 5,000 Units  5,000 Units SubCUTAneous Q8H    allopurinol (ZYLOPRIM) tablet 100 mg  100 mg Oral DAILY    aspirin chewable tablet 81 mg  81 mg Oral DAILY    atorvastatin (LIPITOR) tablet 40 mg  40 mg Oral DAILY    metoprolol tartrate (LOPRESSOR) tablet 100 mg  100 mg Oral BID    albuterol-ipratropium (DUO-NEB) 2.5 MG-0.5 MG/3 ML  3 mL Nebulization Q4H PRN     ______________________________________________________________________  EXPECTED LENGTH OF STAY: 3d 7h  ACTUAL LENGTH OF STAY:          2                 Chaim Humphreys MD

## 2019-09-19 NOTE — CONSULTS
NEPHROLOGY CONSULT NOTE     Patient: Giovanni Ram MRN: [id number]  PCP: Antonella Santa MD   :     1931  Age:   80 y.o. Sex:  female      Referring physician: Belen Eagle MD  Reason for consultation: 80 y.o. 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  - agree with diuretic reduction  - daily labs   - strict I/Os    Probable CKD:  - unclear baseline but suspect she has some component of CKD  - likely from DM and HTN    Hypervolemia:  - cont diuretics for now    Acute CVA:  - R cerebellar infarct  - on ASA, plavix and statin  - per neurology    HFpEF:  - cont diuretics    DM2:  - on insulin     Active Problems / Assessment AAActive  : Active Problems:    Debility (2011)      Acute hypoxemic respiratory failure ([facility] (2011)      CHF exacerbation (HCC) (2019)      Hyperkalemia (2019)      Hyponatremia (2019)      HOLLAND (acute kidney injury) ([facility] (2019)         Subjective:   HPI: Giovanni Ram is a 80 y.o.  female who has been admitted to the hospital for SOB and weakness. She has a hx of HFpEF, HLD, HTN, DM2. She had sudden onset R LE weakness. CTA done on  showed atherosclerotic disease. MRI  showed a small R cerebellar infarct. CXR showed pulm edema and effusions. She was started on lasix 40mg IV BID. Cr was 1.3 on admission, now up to 1.9. Renal U/S today showed no obstruction. Her lasix was cut back to 20mg IV BID. She has lost about 10 lbs in the past day, no recorded UOP. On NC O2, feeling better. BP stable. No cp, sob, n/v/d reported at this time.      Past Medical Hx:   Past Medical History:   Diagnosis Date    Anxiety     Breast cancer ([facility] ,  right 2005, left 2010    Cancer [facility])      cancer right breast cancer    Cancer ([facility]     cancer left breast/new dx 8/2011 +bx     Chronic obstructive pulmonary disease (HCC)     Diabetes ([facility]     Heart failure ([facility]     Hypercholesterolemia     Hypertension     Other ill-defined conditions(799.89)     osteopoosis    Other ill-defined conditions(799.89)     high cholesterol    Pneumonia     Psychiatric disorder     anxiety        Past Surgical Hx:     Past Surgical History:   Procedure Laterality Date    BIOPSY OF BREAST, INCISIONAL      left breast 8/2011 positive for cancer cells    BREAST SURGERY PROCEDURE UNLISTED      right mastectomy    HX BREAST LUMPECTOMY Left 2010    HX MASTECTOMY Right 2005       Medications:  Prior to Admission medications    Medication Sig Start Date End Date Taking? Authorizing Provider   insulin glargine (LANTUS U-100 INSULIN) 100 unit/mL injection 30-40 Units by SubCUTAneous route nightly. 40 units if BG >200  30 units if BG <200   Yes Provider, Historical   metoprolol tartrate (LOPRESSOR) 50 mg tablet Take 2 Tabs by mouth two (2) times a day. 8/30/19  Yes Radha Schaeffer MD   spironolactone (ALDACTONE) 25 mg tablet Take 1 Tab by mouth daily. Indications: Edema 11/1/17  Yes Norma Parada PA-C   atorvastatin (LIPITOR) 40 mg tablet Take 40 mg by mouth daily. 10/2/17  Yes Provider, Historical   allopurinol (ZYLOPRIM) 100 mg tablet Take 1 Tab by mouth daily. 9/7/17  Yes Radha Schaeffer MD   bumetanide (BUMEX) 1 mg tablet Take 1 Tab by mouth two (2) times a day. 9/7/17  Yes Radha Schaeffer MD   ipratropium (ATROVENT) 0.02 % nebulizer solution 0.5 mg by Nebulization route every four (4) hours as needed. 2/2/17  Yes Provider, Historical   aspirin 81 mg chewable tablet Take 81 mg by mouth daily. Yes Provider, Historical   diphenhydrAMINE (BENADRYL) 25 mg capsule Take 25 mg by mouth every six (6) hours as needed (Allergic reaction/Angioedema).    Yes Provider, Historical   metFORMIN (GLUCOPHAGE) 1,000 mg tablet Take 1 Tab by mouth two (2) times a day. 7/21/16  Yes Fela Almaraz MD   albuterol (PROVENTIL VENTOLIN) 2.5 mg /3 mL (0.083 %) nebulizer solution 3 mL by Nebulization route every four (4) hours as needed for Wheezing. 1/9/15  Yes Nancy Garcia MD       Allergies   Allergen Reactions    Ace Inhibitors Angioedema       Social Hx:  reports that she has never smoked. She has never used smokeless tobacco. She reports that she does not drink alcohol or use drugs. Family History   Problem Relation Age of Onset    Hypertension Mother     Stroke Other        Review of Systems:  A twelve point review of system was performed today. Pertinent positives and negatives are mentioned in the HPI. The reminder of the ROS is negative and noncontributory. Objective:    Vitals:    Vitals:    09/19/19 0800 09/19/19 0808 09/19/19 0913 09/19/19 1220   BP:  133/54 132/53 105/52   Pulse:  60 65 (!) 49   Resp:  17  20   Temp:  97.9 °F (36.6 °C)  97.9 °F (36.6 °C)   SpO2: 95% 100%  100%   Weight:       Height:         I&O's:  09/18 0701 - 09/19 0700  In: -   Out: 200 [Urine:200]  Visit Vitals  /52 (BP Patient Position: At rest)   Pulse (!) 49   Temp 97.9 °F (36.6 °C)   Resp 20   Ht 5' 5\" (1.651 m)   Wt 86.4 kg (190 lb 7.6 oz)   SpO2 100%   Breastfeeding? No   BMI 31.70 kg/m²       Physical Exam:  General:Alert, No distress  HEENT: Eyes are PERRL. Conjunctiva without pallor ,erythema. The sclerae without icterus. .   Neck:Supple,no mass palpable  Lungs : lungs mostly clear b/l  CVS: RRR, S1 S2 normal, No rub,  1-2+ b/l LE edema  Abdomen: Soft, Non tender, No hepatosplenomegaly, bowel sounds present  Extremities: No cyanosis, No clubbing  Skin: No rash or lesions.   Lymph nodes: No palpable nodes  MS: No joint swelling, erythema, warmth  Neurologic: awake and alert, RLE weakness  Psych: normal affect    Laboratory Results:    Lab Results   Component Value Date BUN 48 (H) 09/19/2019     09/19/2019    K 4.6 09/19/2019    CL 99 09/19/2019    CO2 29 09/19/2019       Lab Results   Component Value Date    BUN 48 (H) 09/19/2019    BUN 32 (H) 09/18/2019    BUN 27 (H) 09/17/2019    BUN 17 04/03/2017    BUN 16 02/12/2017    K 4.6 09/19/2019    K 4.9 09/18/2019    K 5.4 (H) 09/17/2019    K 5.6 (H) 09/17/2019    K 5.2 04/03/2017       Lab Results   Component Value Date    WBC 11.2 (H) 09/18/2019    RBC 3.37 (L) 09/18/2019    HGB 9.1 (L) 09/18/2019    HCT 30.2 (L) 09/18/2019    MCV 89.6 09/18/2019    MCH 27.0 09/18/2019    RDW 15.3 (H) 09/18/2019     09/18/2019       Lab Results   Component Value Date    PHOS 4.0 03/31/2016       Urine dipstick:   Lab Results   Component Value Date/Time    Color YELLOW/STRAW 09/17/2019 12:49 PM    Appearance CLEAR 09/17/2019 12:49 PM    Specific gravity 1.030 09/17/2019 12:49 PM    Specific gravity 1.010 12/18/2011 09:28 AM    pH (UA) 6.0 09/17/2019 12:49 PM    Protein NEGATIVE  09/17/2019 12:49 PM    Glucose NEGATIVE  09/17/2019 12:49 PM    Ketone NEGATIVE  09/17/2019 12:49 PM    Bilirubin NEGATIVE  09/17/2019 12:49 PM    Urobilinogen 0.2 09/17/2019 12:49 PM    Nitrites NEGATIVE  09/17/2019 12:49 PM    Leukocyte Esterase NEGATIVE  09/17/2019 12:49 PM    Epithelial cells FEW 09/17/2019 12:49 PM    Bacteria NEGATIVE  09/17/2019 12:49 PM    WBC 0-4 09/17/2019 12:49 PM    RBC 0-5 09/17/2019 12:49 PM       I have reviewed the following: All pertinent labs, microbiology data, radiology imaging for my assessment         Thank you for allowing us to participate in the care of this patient. We will follow patient. Please dont hesitate to call with any questions    Jayla Kessler MD  9/19/2019        [facility] THE [address], [place] , [facility]  Phone - [phone]   Fax - [phone]  [email or web address]

## 2019-09-19 NOTE — PROGRESS NOTES
CM noted consult and reviewed PT/OT recommendations. CM notes patient is a [facility] member. CM contacted nurse SANJUANA Desai [phone] to inform of the possibility of patient needing SNF. Typically [address] [place] [address] recommends [facility] or [facility] for SNF and [facility] for home health but patient still has the freedom choose. CM to monitor.      Jena Veras MS

## 2019-09-19 NOTE — DIABETES MGMT
[facility]    [facility] Consult Follow Up Note    Recommendations/ Comments: BG variable at this time.  mg/dl after receiving 15 units of Lantus. PPG at lunch down to 136 mg/dl. She is on a resistant correction scale. GFR of 26 ml/min noted. If appropriate, please consider:  Increase Lantus to 20 units daily  Change lispro correction scale to high sensitivity given renal status     Current hospital DM medication: lispro correction scale - normal sensitivity    Chart reviewed on Anne-Marie Blair. Patient is a 80 y.o. female with known DM on Lantus 30-40 units HS depending on BG and 1000 mg Metformin BID at home. A1c:   Lab Results   Component Value Date/Time    Hemoglobin A1c 8.8 (H) 09/17/2019 09:21 AM    Hemoglobin A1c 7.0 (H) 02/08/2017 09:59 AM       Recent Glucose Results:   Lab Results   Component Value Date/Time     (H) 09/19/2019 05:34 AM    GLUCPOC 136 (H) 09/19/2019 12:37 PM    GLUCPOC 202 (H) 09/19/2019 06:53 AM    GLUCPOC 199 (H) 09/18/2019 09:10 PM        Lab Results   Component Value Date/Time    Creatinine 1.86 (H) 09/19/2019 05:34 AM     Estimated Creatinine Clearance: 22.7 mL/min (A) (based on SCr of 1.86 mg/dL (H)). Active Orders   Diet    DIET DIABETIC CONSISTENT CARB Regular        PO intake: No data found. Will continue to follow as needed.     Thank you  Jeniffer Beltran RD, Diabetes Clinician       Time spent: 4 minutes

## 2019-09-19 NOTE — PROGRESS NOTES
Occupational Therapy    Acknowledge OT order and completed chart review. Patient currently leaving floor for head MRI. Will follow up for OT eval as medically appropriate. Patient back to floor from MRU- noted tiny infarct in cerebellum. Awaiting B LE doppler. Patient with c/o R LE weakness and numbness. Will hold for OT until doppler study completed.      Thank you,    Amanda Dorsey, OT

## 2019-09-19 NOTE — PROGRESS NOTES
Problem: Diabetes Self-Management  Goal: *Disease process and treatment process  Description  Define diabetes and identify own type of diabetes; list 3 options for treating diabetes. Outcome: Progressing Towards Goal     Problem: Diabetes Self-Management  Goal: *Disease process and treatment process  Description  Define diabetes and identify own type of diabetes; list 3 options for treating diabetes.   Outcome: Progressing Towards Goal     Problem: Heart Failure: Day 2  Goal: Off Pathway (Use only if patient is Off Pathway)  Outcome: Progressing Towards Goal

## 2019-09-19 NOTE — CONSULTS
INPATIENT NEUROLOGY CONSULTATION  9/19/2019     Consulted by: Lizeth Villasenor MD        Patient ID:  Vikki Crowell  [id number]  6 y.o.  8/16/1931    CC: Right leg weakness    HPI    Jo Figueroa is an 69-year-old woman who was admitted 2 days ago for increasing shortness of breath with right leg weakness. Her baseline is to walk independently. She has multiple risk factors for stroke include COPD, diabetes, A. fib, CHF and hypertension. She tells me she takes aspirin daily prior to admission without fail. CTA showing scattered moderate atherosclerosis but normal perfusion. MRI was done showing a very small punctate right cerebellar infarct. Her right leg still feels weak. No double vision or slurred speech. Review of Systems   Eyes: Negative for double vision. Neurological: Positive for focal weakness. Negative for speech change. All other systems reviewed and are negative.       Past Medical History:   Diagnosis Date    Anxiety     Breast cancer ([facility] [address] 2005, 2010    right 2005, left 2010    Cancer [facility])      cancer right breast cancer    Cancer ([facility] [address]     cancer left breast/new dx 8/2011 +bx     Chronic obstructive pulmonary disease (HCC)     Diabetes ([facility] [address]     Heart failure ([facility] [address]     Hypercholesterolemia     Hypertension     Other ill-defined conditions(799.89)     osteopoosis    Other ill-defined conditions(799.89)     high cholesterol    Pneumonia     Psychiatric disorder     anxiety     Family History   Problem Relation Age of Onset    Hypertension Mother     Stroke Other      Social History     Socioeconomic History    Marital status:      Spouse name: Not on file    Number of children: Not on file    Years of education: Not on file    Highest education level: Not on file   Occupational History    Not on file   Social Needs    Financial resource strain: Not on file    Food insecurity:     Worry: Not on file     Inability: Not on file    Transportation needs: Medical: Not on file     Non-medical: Not on file   Tobacco Use    Smoking status: Never Smoker    Smokeless tobacco: Never Used   Substance and Sexual Activity    Alcohol use: No    Drug use: No    Sexual activity: Not Currently   Lifestyle    Physical activity:     Days per week: Not on file     Minutes per session: Not on file    Stress: Not on file   Relationships    Social connections:     Talks on phone: Not on file     Gets together: Not on file     Attends Protestant service: Not on file     Active member of club or organization: Not on file     Attends meetings of clubs or organizations: Not on file     Relationship status: Not on file    Intimate partner violence:     Fear of current or ex partner: Not on file     Emotionally abused: Not on file     Physically abused: Not on file     Forced sexual activity: Not on file   Other Topics Concern    Not on file   Social History Narrative        She does not have a history of smoking, but does have second hand smoking exposure from her  , for a period of 27- 36 yrs, , approx 30 yrs ago. 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 She has two children, her dtr and son, both of whom are present during the interview, patient states that she wants them both to make any medical decisions for her, she wants to be a full code.      Current Facility-Administered Medications   Medication Dose Route Frequency    furosemide (LASIX) injection 20 mg  20 mg IntraVENous Q12H    insulin lispro (HUMALOG) injection   SubCUTAneous AC&HS    insulin glargine (LANTUS) injection 15 Units  15 Units SubCUTAneous DAILY    ondansetron (ZOFRAN) injection 4 mg  4 mg IntraVENous Q6H PRN    glucose chewable tablet 16 g  4 Tab Oral PRN    glucagon (GLUCAGEN) injection 1 mg  1 mg IntraMUSCular PRN    sodium chloride (NS) flush 5-40 mL  5-40 mL IntraVENous Q8H    sodium chloride (NS) flush 5-40 mL  5-40 mL IntraVENous PRN    acetaminophen (TYLENOL) tablet 650 mg  650 mg Oral Q4H PRN    heparin (porcine) injection 5,000 Units  5,000 Units SubCUTAneous Q8H    allopurinol (ZYLOPRIM) tablet 100 mg  100 mg Oral DAILY    aspirin chewable tablet 81 mg  81 mg Oral DAILY    atorvastatin (LIPITOR) tablet 40 mg  40 mg Oral DAILY    metoprolol tartrate (LOPRESSOR) tablet 100 mg  100 mg Oral BID    albuterol-ipratropium (DUO-NEB) 2.5 MG-0.5 MG/3 ML  3 mL Nebulization Q4H PRN     Allergies   Allergen Reactions    Ace Inhibitors Angioedema       Visit Vitals  /52 (BP Patient Position: At rest)   Pulse (!) 49   Temp 97.9 °F (36.6 °C)   Resp 20   Ht 5' 5\" (1.651 m)   Wt 86.4 kg (190 lb 7.6 oz)   SpO2 100%   Breastfeeding? No   BMI 31.70 kg/m²     Physical Exam   Constitutional: She appears well-developed and well-nourished. Cardiovascular: Normal rate. Pulmonary/Chest: Effort normal.   Skin: Skin is warm and dry. Psychiatric: Her behavior is normal.   Vitals reviewed. Neurologic Exam     Mental Status   Elderly woman in bed awake and alert talkative can answer my questions and follows commands.   Pupils are equal, EOMI  Face is grossly symmetric tongue midline speech clear  Right arm 4/5 right leg 1/5  Left side intact  Sensation intact throughout   no abnormal movements  Gait deferred              Lab Results   Component Value Date/Time    WBC 11.2 (H) 09/18/2019 02:23 AM    HGB 9.1 (L) 09/18/2019 02:23 AM    Hemoglobin (POC) 11.2 12/13/2012 09:11 AM    HCT 30.2 (L) 09/18/2019 02:23 AM    PLATELET 203 15/17/9501 02:23 AM    MCV 89.6 09/18/2019 02:23 AM     Lab Results   Component Value Date/Time    Hemoglobin A1c 8.8 (H) 09/17/2019 09:21 AM    Hemoglobin A1c 7.0 (H) 02/08/2017 09:59 AM    Hemoglobin A1c 7.9 (H) 03/31/2016 03:57 AM    Glucose 165 (H) 09/19/2019 05:34 AM    Glucose (POC) 136 (H) 09/19/2019 12:37 PM    LDL, calculated 72.6 09/17/2019 09:21 AM    Creatinine 1.86 (H) 09/19/2019 05:34 AM      Lab Results   Component Value Date/Time    Cholesterol, total 144 09/17/2019 09:21 AM    Cholesterol (POC) 246 11/19/2015 09:40 AM    HDL Cholesterol 55 09/17/2019 09:21 AM    LDL, calculated 72.6 09/17/2019 09:21 AM    LDL Cholesterol (POC) 156 11/19/2015 09:40 AM    Triglyceride 82 09/17/2019 09:21 AM    Triglycerides (POC) 72 11/19/2015 09:40 AM    CHOL/HDL Ratio 2.6 09/17/2019 09:21 AM     Lab Results   Component Value Date/Time    ALT (SGPT) 24 09/17/2019 09:21 AM    AST (SGOT) 18 09/17/2019 09:21 AM    Alk. phosphatase 109 09/17/2019 09:21 AM    Bilirubin, total 0.7 09/17/2019 09:21 AM    Albumin 2.9 (L) 09/17/2019 09:21 AM    Protein, total 8.4 (H) 09/17/2019 09:21 AM    INR 1.1 01/20/2017 01:24 PM    Prothrombin time 10.9 01/20/2017 01:24 PM    PLATELET 850 43/86/7869 02:23 AM        CT Results (maximum last 3): Results from Hospital Encounter encounter on 09/17/19   CT CODE NEURO PERF W CBF    Narrative EXAM:  CTA CODE NEURO HEAD AND NECK W CONT, CT CODE NEURO PERF W CBF    INDICATION:   Right lower extremity numbness/weakness. COMPARISON:  CT head 9/17/2019. CONTRAST:  120 mL of Isovue-370. TECHNIQUE:  Unenhanced  images were obtained to localize the volume for  acquisition. Multislice helical axial CT angiography was performed from the  aortic arch to the top of the head during uneventful rapid bolus intravenous  contrast administration. Coronal and sagittal reformations and 3D post  processing was performed. CT dose reduction was achieved through use of a  standardized protocol tailored for this examination and automatic exposure  control for dose modulation. CT brain perfusion was performed with generation of hemodynamic maps of multiple  parameters, including cerebral blood flow, cerebral blood volume, and MTT (mean  transit time). CT dose reduction was achieved through use of a standardized  protocol tailored for this examination and automatic exposure control for dose  modulation.     FINDINGS:    CTA Head:  There is no evidence of large vessel occlusion or flow-limiting stenosis of the  intracranial internal carotid, anterior cerebral, and middle cerebral arteries. Calcific atherosclerosis of the right carotid siphon with moderate stenosis of  the cavernous right internal carotid artery. Calcific atherosclerosis of the  left carotid siphon with moderate stenosis of the distal cavernous left internal  carotid artery. The anterior communicating artery is patent with small right A1  segment. There is no evidence of large vessel occlusion or flow-limiting stenosis of the  intracranial vertebral arteries, basilar artery, or posterior cerebral arteries. Multifocal moderate stenoses of the right P2 segment. The posterior  communicating arteries are not seen. There is no evidence of aneurysm or vascular malformation. The dural venous  sinuses and deep cerebral venous system are patent. No evidence of abnormal  enhancement on delayed phase images. CTA NECK:  NASCET method was utilized for calculating stenosis. Moderate calcific atherosclerosis of the aortic arch. The common carotid  arteries are normal in course and caliber bilaterally. Calcific atherosclerosis  of the right carotid bifurcation without significant stenosis. Calcific  atherosclerosis of the left carotid bifurcation without significant stenosis. There is a codominant vertebrobasilar arterial system. Mild stenosis at the  origin of the right vertebral artery. Visualized soft tissues of the neck are unremarkable. Small right pleural  effusion. Diffuse interlobular septal thickening and scattered groundglass  opacities throughout the upper lobes. There are secretions noted in the trachea. No acute fracture or aggressive osseous lesion. Degenerative disc disease in the  mid and lower cervical spine.     CT brain perfusion:  Qualitatively, there are no regional areas of increased mean transit time,  decreased cerebral blood flow or blood volume. Impression IMPRESSION:   CTA Head:  1. No evidence of large vessel occlusion or flow-limiting stenosis. 2. Scattered atherosclerotic disease as above, including moderate stenoses of  the right P2 segment and moderate stenoses of the bilateral cavernous internal  carotid arteries. CTA Neck:  1. No evidence of flow-limiting stenosis. 2. Pulmonary interstitial and alveolar edema. Small right pleural effusion. Secretions in the trachea. CT brain perfusion:  1. No acute abnormality. CTA CODE NEURO HEAD AND NECK W CONT    Narrative EXAM:  CTA CODE NEURO HEAD AND NECK W CONT, CT CODE NEURO PERF W CBF    INDICATION:   Right lower extremity numbness/weakness. COMPARISON:  CT head 9/17/2019. CONTRAST:  120 mL of Isovue-370. TECHNIQUE:  Unenhanced  images were obtained to localize the volume for  acquisition. Multislice helical axial CT angiography was performed from the  aortic arch to the top of the head during uneventful rapid bolus intravenous  contrast administration. Coronal and sagittal reformations and 3D post  processing was performed. CT dose reduction was achieved through use of a  standardized protocol tailored for this examination and automatic exposure  control for dose modulation. CT brain perfusion was performed with generation of hemodynamic maps of multiple  parameters, including cerebral blood flow, cerebral blood volume, and MTT (mean  transit time). CT dose reduction was achieved through use of a standardized  protocol tailored for this examination and automatic exposure control for dose  modulation. FINDINGS:    CTA Head:  There is no evidence of large vessel occlusion or flow-limiting stenosis of the  intracranial internal carotid, anterior cerebral, and middle cerebral arteries. Calcific atherosclerosis of the right carotid siphon with moderate stenosis of  the cavernous right internal carotid artery.  Calcific atherosclerosis of the  left carotid siphon with moderate stenosis of the distal cavernous left internal  carotid artery. The anterior communicating artery is patent with small right A1  segment. There is no evidence of large vessel occlusion or flow-limiting stenosis of the  intracranial vertebral arteries, basilar artery, or posterior cerebral arteries. Multifocal moderate stenoses of the right P2 segment. The posterior  communicating arteries are not seen. There is no evidence of aneurysm or vascular malformation. The dural venous  sinuses and deep cerebral venous system are patent. No evidence of abnormal  enhancement on delayed phase images. CTA NECK:  NASCET method was utilized for calculating stenosis. Moderate calcific atherosclerosis of the aortic arch. The common carotid  arteries are normal in course and caliber bilaterally. Calcific atherosclerosis  of the right carotid bifurcation without significant stenosis. Calcific  atherosclerosis of the left carotid bifurcation without significant stenosis. There is a codominant vertebrobasilar arterial system. Mild stenosis at the  origin of the right vertebral artery. Visualized soft tissues of the neck are unremarkable. Small right pleural  effusion. Diffuse interlobular septal thickening and scattered groundglass  opacities throughout the upper lobes. There are secretions noted in the trachea. No acute fracture or aggressive osseous lesion. Degenerative disc disease in the  mid and lower cervical spine. CT brain perfusion:  Qualitatively, there are no regional areas of increased mean transit time,  decreased cerebral blood flow or blood volume. Impression IMPRESSION:   CTA Head:  1. No evidence of large vessel occlusion or flow-limiting stenosis. 2. Scattered atherosclerotic disease as above, including moderate stenoses of  the right P2 segment and moderate stenoses of the bilateral cavernous internal  carotid arteries. CTA Neck:  1.  No evidence of flow-limiting stenosis. 2. Pulmonary interstitial and alveolar edema. Small right pleural effusion. Secretions in the trachea. CT brain perfusion:  1. No acute abnormality. MRI Results (maximum last 3): Results from [place] encounter on 09/17/19   MRI BRAIN W WO CONT    Narrative EXAM:  MRI BRAIN W WO CONT    INDICATION:    Stroke; Right leg weakness    COMPARISON:  CTA head neck 9/17/2019. CONTRAST: 19 ml Dotarem. TECHNIQUE:    Multiplanar multisequence acquisition without and with contrast of the brain. FINDINGS:  Tiny acute infarct in the right cerebellum. No acute hemorrhage. Mild generalized parenchymal volume loss with commensurate dilation of the sulci  and ventricular system. Periventricular deep white matter T2/FLAIR  hyperintensities, and patchy T2/FLAIR hyperintensity in the raymundo, consistent  with mild chronic microvascular ischemic disease. Small chronic infarct in the  right frontal periventricular white matter. There is no cerebellar tonsillar  herniation. Expected arterial flow-voids are present. No evidence of abnormal  enhancement. Paranasal sinuses are clear. Trace bilateral mastoid effusions. The orbital  contents are within normal limits with bilateral lens implants. No significant  osseous or scalp lesions are identified. Impression IMPRESSION:     1. Tiny acute infarct in the right cerebellum. 2. Mild generalized parenchymal volume loss and mild chronic microvascular  ischemic disease. Small chronic infarct in the right frontal periventricular  white matter. 23X             VAS/US/Carotid Doppler Results (maximum last 3): No results found for this or any previous visit. PET Results (maximum last 3): No results found for this or any previous visit. Assessment and Plan        80year-old woman who presented with shortness of breath and right leg weakness. The small cerebellar infarct does explain her weakness.   She is technically an aspirin failure now.  I am going to add Plavix given the evidence of atherosclerosis on vascular imaging. She has history of heart failure. Will defer to cardiology if there is any indication for anticoagulation. Echo reviewed. She will need rehabilitation. I discussed this with her and her family at the bedside at length. Questions answered. Please consult inpatient rehabilitation. During this evaluation, we also discussed stroke education to include signs and symptoms of stroke and TIA. This clinical note was dictated with an electronic dictation software that can make unintentional errors. If there are any questions, please contact me directly for clarification.       [address] [facility],   NEUROLOGIST  Diplomate ABPN  9/19/2019

## 2019-09-19 NOTE — PROGRESS NOTES
Cardiology Progress Note            Admit Date: 9/17/2019  Admit Diagnosis: CHF exacerbation ([address] [I50.9]  Date: 9/19/2019     Time: 12:31 PM    Subjective:  Still c/o right leg weakness and numbness. SOB is baseline     Assessment and Plan     1. Diastolic HF - chronic- compensated              - Unable to classify NYHA level with concurrent COPD/PHTN and chronic O2 use 2-4 L at home              - Lungs without crackles from edema. Coarse BS and \"dry\" rales               - Echo EF >70% (9/12/19) -                       09/12/19   ECHO ADULT COMPLETE 09/12/2019 9/12/2019     Narrative · Left Ventricle: Normal cavity size. Mild to moderate asymmetric   hypertrophy predominantly basal septal, ovoid-shaped cavity. Hyperdynamic   systolic dysfunction. Estimated left ventricular ejection fraction is   >70%. Visually measured ejection fraction. No regional wall motion   abnormality noted. · Aortic Valve: Probably trileaflet aortic valve. Aortic valve leaflet   calcification present. Moderate aortic valve stenosis is present. · Mitral Valve: Mitral valve non-specific thickening. Moderate mitral   annular calcification. Mild to moderate mitral valve regurgitation is   present. · Tricuspid Valve: Moderate to severe tricuspid valve regurgitation is   present. · Pulmonary Artery: Moderate pulmonary hypertension. · Right Ventricle: Increased (hyperdynamic) systolic function. · Pulmonic Valve: Mild pulmonic valve regurgitation is present.          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 R LE weakness              - CTA head negative for acute CVA              - non palpable pulse on right (iliac, popliteal or DP/PT).                       - cold to touch              - ABIs   - MRI with small cerebellar infarct - Neuro following. Starting Plavix  3. Elevated troponin              - suspect demand mismatch              - Cath in 2017 was without significant CAD. Minor luminal irregularities in all vessels  4. Chronic respiratory failure              - chronic oxygen use. 2-4L 24 hours              - multifactorial - COPD, PHTN, AS, noted ILD on CTA in 2017  5. COPD              - Home O2              - PTA Duonebs              - Does not follow up with Pulmonary specialist  6. PHTN              - moderate on last echo. PAS 60 mmHg  7. AS              - Moderate on last echo. The peak gradient was 36 mmHg and mean gradient 20 mmHg. 8. Anemia              - Hgb 9.1, chronic  9. HLD   Cholesterol, total 144 09/17/2019 09:21 AM   HDL Cholesterol 55 09/17/2019 09:21 AM   LDL, calculated 72.6 09/17/2019 09:21 AM   VLDL, calculated 16.4 09/17/2019 09:21 AM   Triglyceride 82 09/17/2019 09:21 AM   CHOL/HDL Ratio 2.6 09/17/2019 09:21 AM               - Lipitor 40 PTA  10. HTN              - Lopressor 50 mg PTA              - Well controlled  11. DM              - A1c 8.8              - Glucophage PTA     Still with right leg weakness and numbness. MRI notes small cerebellar infarct. Neuro starting Plavix. SOB baseline. Still awaiting ABIs. Cr increased. Renal US per Primary team.  It would appropriate for Palliative Care to see for goals of care and completion of ACP. No further cardiac testing at this time    Cardiology attending: seen and examined early this am. Agree with assess and plan  Symptoms unchanged, breathing baseline. Slept poorly. Chest dry crackles, cv rrr as murmur, ext no edema. Cardiac status appears stable. minal's pending.         Past Medical History:   Diagnosis Date    Anxiety     Breast cancer ([address] 2005, 2010    right 2005, left 2010    Cancer [facility])      cancer right breast cancer    Cancer ([facility]     cancer left breast/new dx 8/2011 +bx     Chronic obstructive pulmonary disease ([facility]     Diabetes ([facility]     Heart failure ([facility]     Hypercholesterolemia     Hypertension     Other ill-defined conditions(799.89)     osteopoosis    Other ill-defined conditions(799.89)     high cholesterol    Pneumonia     Psychiatric disorder     anxiety      Social History     Tobacco Use    Smoking status: Never Smoker    Smokeless tobacco: Never Used   Substance Use Topics    Alcohol use: No    Drug use: No         ROS:     GENERAL   Recent weight loss - no   Fever -----------------   no   Chills -----------------   no     EYES, VISION   Visual Changes - no     EARS, NOSE, THROAT   Hearing loss ----------- no   Swallowing difficulties - no     CARDIOVASCULAR   Chest pain/pressure ---- no   Arrhythmia/palpitations - no       RESPIRATORY   Cough ------------------ no   Shortness of breath - yes   Wheezing -------------- no   GASTROINTESTINAL   Abdominal pain - no   Heartburn -------- no   Bloody stool ----- no     GENITOURINARY   Frequent urination - no   Urgency -------------- no     MUSCULOSKELETAL   Joint pain/swelling ---- no   Musculoskeletal pain - yes     SKIN & INTEGUMENTARY   Rashes - no   Sores --- no         NEUROLOGICAL   Numbness/tingling - no   Sensation loss ------ no     PSYCHIATRIC   Nervousness/anxiety - no   Depression -------------- no     ENDOCRINE   Heat/cold intolerance - no   Excessive thirst -------- no     HEMATOLOGIC/LYMPHATIC   Abnormal bleeding - no     ALL/IMMUN   Allergic reaction ------ no   Recurrent infections - no     Objective:     Physical Exam:                Visit Vitals  /52 (BP Patient Position: At rest)   Pulse (!) 49   Temp 97.9 °F (36.6 °C)   Resp 20   Ht 5' 5\" (1.651 m)   Wt 190 lb 7.6 oz (86.4 kg)   SpO2 100%   Breastfeeding? No   BMI 31.70 kg/m²        General Appearance:   Well developed, well nourished,alert and oriented x 3, and   individual in no acute distress.    Ears/Nose/Mouth/Throat:    Hearing grossly normal.         Neck: Supple. Chest:    Lungs dry crackles to auscultation bilaterally. Cardiovascular:   Regular rate and rhythm, S1, S2 normal, no murmur. Abdomen:    Soft, non-tender, bowel sounds are active. Extremities:  No edema bilaterally. R LE non palpable pulses.   Cool to touch   Skin:  without rashes or lesions     Telemetry: normal sinus rhythm          Data Review:    Labs:    Recent Results (from the past 24 hour(s))   GLUCOSE, POC    Collection Time: 09/18/19  4:24 PM   Result Value Ref Range    Glucose (POC) 165 (H) 65 - 100 mg/dL    Performed by [facility], POC    Collection Time: 09/18/19  9:10 PM   Result Value Ref Range    Glucose (POC) 199 (H) 65 - 100 mg/dL    Performed by [address] [place] [address], BASIC    Collection Time: 09/19/19  5:34 AM   Result Value Ref Range    Sodium 136 136 - 145 mmol/L    Potassium 4.6 3.5 - 5.1 mmol/L    Chloride 99 97 - 108 mmol/L    CO2 29 21 - 32 mmol/L    Anion gap 8 5 - 15 mmol/L    Glucose 165 (H) 65 - 100 mg/dL    BUN 48 (H) 6 - 20 MG/DL    Creatinine 1.86 (H) 0.55 - 1.02 MG/DL    BUN/Creatinine ratio 26 (H) 12 - 20      GFR est AA 31 (L) >60 ml/min/1.73m2    GFR est non-AA 26 (L) >60 ml/min/1.73m2    Calcium 8.8 8.5 - 10.1 MG/DL   NT-PRO BNP    Collection Time: 09/19/19  5:34 AM   Result Value Ref Range    NT pro-BNP 32,354 (H) <450 PG/ML   GLUCOSE, POC    Collection Time: 09/19/19  6:53 AM   Result Value Ref Range    Glucose (POC) 202 (H) 65 - 100 mg/dL    Performed by Valda Romberg           Radiology:        Current Facility-Administered Medications   Medication Dose Route Frequency    furosemide (LASIX) injection 20 mg  20 mg IntraVENous Q12H    insulin lispro (HUMALOG) injection   SubCUTAneous AC&HS    insulin glargine (LANTUS) injection 15 Units  15 Units SubCUTAneous DAILY    ondansetron (ZOFRAN) injection 4 mg  4 mg IntraVENous Q6H PRN    glucose chewable tablet 16 g  4 Tab Oral PRN    glucagon (GLUCAGEN) injection 1 mg  1 mg IntraMUSCular PRN    sodium chloride (NS) flush 5-40 mL  5-40 mL IntraVENous Q8H    sodium chloride (NS) flush 5-40 mL  5-40 mL IntraVENous PRN    acetaminophen (TYLENOL) tablet 650 mg  650 mg Oral Q4H PRN    heparin (porcine) injection 5,000 Units  5,000 Units SubCUTAneous Q8H    allopurinol (ZYLOPRIM) tablet 100 mg  100 mg Oral DAILY    aspirin chewable tablet 81 mg  81 mg Oral DAILY    atorvastatin (LIPITOR) tablet 40 mg  40 mg Oral DAILY    metoprolol tartrate (LOPRESSOR) tablet 100 mg  100 mg Oral BID    albuterol-ipratropium (DUO-NEB) 2.5 MG-0.5 MG/3 ML  3 mL Nebulization Q4H PRN       Chacho Dickens. Hubert Pinto M.D.      [facility] [address] [place] [address] 13, [address] [facility] [address]   [facility]   [phone]

## 2019-09-19 NOTE — PROGRESS NOTES
Problem: Diabetes Self-Management  Goal: *Disease process and treatment process  Description  Define diabetes and identify own type of diabetes; list 3 options for treating diabetes. Outcome: Progressing Towards Goal  Goal: *Incorporating nutritional management into lifestyle  Description  Describe effect of type, amount and timing of food on blood glucose; list 3 methods for planning meals. Outcome: Progressing Towards Goal  Goal: *Incorporating physical activity into lifestyle  Description  State effect of exercise on blood glucose levels. Outcome: Progressing Towards Goal     Problem: Heart Failure: Day 3  Goal: Activity/Safety  Outcome: Progressing Towards Goal  Goal: Diagnostic Test/Procedures  Outcome: Progressing Towards Goal  Goal: Nutrition/Diet  Outcome: Progressing Towards Goal  Goal: Discharge Planning  Outcome: Progressing Towards Goal     Problem: Falls - Risk of  Goal: *Absence of Falls  Description  Document Candice Garcia Fall Risk and appropriate interventions in the flowsheet. Outcome: Progressing Towards Goal  Note:   Fall Risk Interventions:  Mobility Interventions: Communicate number of staff needed for ambulation/transfer, OT consult for ADLs, Patient to call before getting OOB, PT Consult for mobility concerns, Strengthening exercises (ROM-active/passive)  Elimination Interventions: Call light in reach, Elevated toilet seat, Stay With Me (per policy), Toileting schedule/hourly rounds     Problem: Patient Education: Go to Patient Education Activity  Goal: Patient/Family Education  Outcome: Progressing Towards Goal     Problem: Pressure Injury - Risk of  Goal: *Prevention of pressure injury  Description  Document Jagdeep Scale and appropriate interventions in the flowsheet.   Outcome: Progressing Towards Goal  Note:   Pressure Injury Interventions:  Sensory Interventions: Assess need for specialty bed, Check visual cues for pain, Discuss PT/OT consult with provider, Float heels, Keep linens dry and wrinkle-free, Minimize linen layers, Monitor skin under medical devices, Pad between skin to skin, Turn and reposition approx. every two hours (pillows and wedges if needed)    Moisture Interventions: Apply protective barrier, creams and emollients, Assess need for specialty bed, Check for incontinence Q2 hours and as needed, Limit adult briefs, Maintain skin hydration (lotion/cream), Minimize layers, Offer toileting Q_hr    Activity Interventions: Assess need for specialty bed, Increase time out of bed, PT/OT evaluation    Mobility Interventions: Assess need for specialty bed, Float heels, Pressure redistribution bed/mattress (bed type), PT/OT evaluation, Turn and reposition approx. every two hours(pillow and wedges)    Nutrition Interventions: Document food/fluid/supplement intake    Friction and Shear Interventions: Apply protective barrier, creams and emollients, Lift sheet, Lift team/patient mobility team, Minimize layers    Bedside and Verbal shift change report given to MANSI Madrid (oncoming nurse) by Maribel Pedro (offgoing nurse). Report included the following information SBAR, Kardex, MAR, Recent Results and Cardiac Rhythm NSR.      Patient Vitals for the past 12 hrs:   Temp Pulse Resp BP SpO2   09/19/19 1941 97.5 °F (36.4 °C) 66 19 112/54 100 %   09/19/19 1627 97.8 °F (36.6 °C) (!) 55 17 121/55 100 %   09/19/19 1220 97.9 °F (36.6 °C) (!) 49 20 105/52 100 %   09/19/19 0913  65  132/53 

## 2019-09-19 NOTE — CDMP QUERY
Query 1 of 1 Patient admitted with increased SOB. Noted documentation of (acute on chronic diastolic HF by hospitalist in H&P 9/17 and PN 9/18) and (chronic diastolic HF by cardiology in PN 9/18). If possible, please document in progress notes and d/c summary if you are evaluating and /or treating any of the following: 
 
=> (acute on chronic diastolic HF) confirmed and (chronic diastolic HF), ruled out 
=> (chronic diastolic HF) confirmed and (acute on chronic diastolic HF), ruled out 
=> Other Explanation of clinical findings 
=> Clinically Undetermined The medical record reflects the following: 
  Risk Factors: hx dHF Clinical Indicators: cards consult \"Diastolic HF - chronic- compensated. Lesle Gulling Lesle Gulling Lungs without crackles from edema. 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  
[facility] 
[phone]

## 2019-09-20 ENCOUNTER — ANESTHESIA (OUTPATIENT)
Dept: SURGERY | Age: 84
DRG: 270 | End: 2019-09-20
Payer: MEDICARE

## 2019-09-20 ENCOUNTER — ANESTHESIA EVENT (OUTPATIENT)
Dept: SURGERY | Age: 84
DRG: 270 | End: 2019-09-20
Payer: MEDICARE

## 2019-09-20 LAB
ANION GAP SERPL CALC-SCNC: 7 MMOL/L (ref 5–15)
APTT PPP: 30.3 SEC (ref 22.1–32)
APTT PPP: 76.3 SEC (ref 22.1–32)
BASOPHILS # BLD: 0 K/UL (ref 0–0.1)
BASOPHILS NFR BLD: 0 % (ref 0–1)
BUN SERPL-MCNC: 51 MG/DL (ref 6–20)
BUN/CREAT SERPL: 26 (ref 12–20)
CALCIUM SERPL-MCNC: 8.8 MG/DL (ref 8.5–10.1)
CHLORIDE SERPL-SCNC: 99 MMOL/L (ref 97–108)
CO2 SERPL-SCNC: 29 MMOL/L (ref 21–32)
CREAT SERPL-MCNC: 1.93 MG/DL (ref 0.55–1.02)
DIFFERENTIAL METHOD BLD: ABNORMAL
EOSINOPHIL # BLD: 0.2 K/UL (ref 0–0.4)
EOSINOPHIL NFR BLD: 1 % (ref 0–7)
ERYTHROCYTE [DISTWIDTH] IN BLOOD BY AUTOMATED COUNT: 15.6 % (ref 11.5–14.5)
GLUCOSE BLD STRIP.AUTO-MCNC: 115 MG/DL (ref 65–100)
GLUCOSE BLD STRIP.AUTO-MCNC: 117 MG/DL (ref 65–100)
GLUCOSE BLD STRIP.AUTO-MCNC: 122 MG/DL (ref 65–100)
GLUCOSE BLD STRIP.AUTO-MCNC: 131 MG/DL (ref 65–100)
GLUCOSE BLD STRIP.AUTO-MCNC: 132 MG/DL (ref 65–100)
GLUCOSE BLD STRIP.AUTO-MCNC: 139 MG/DL (ref 65–100)
GLUCOSE BLD STRIP.AUTO-MCNC: 284 MG/DL (ref 65–100)
GLUCOSE SERPL-MCNC: 106 MG/DL (ref 65–100)
HCT VFR BLD AUTO: 29.4 % (ref 35–47)
HGB BLD-MCNC: 8.9 G/DL (ref 11.5–16)
IMM GRANULOCYTES # BLD AUTO: 0 K/UL
IMM GRANULOCYTES NFR BLD AUTO: 0 %
LEFT ARM BP: 124 MMHG
LYMPHOCYTES # BLD: 0.5 K/UL (ref 0.8–3.5)
LYMPHOCYTES NFR BLD: 3 % (ref 12–49)
MCH RBC QN AUTO: 27.1 PG (ref 26–34)
MCHC RBC AUTO-ENTMCNC: 30.3 G/DL (ref 30–36.5)
MCV RBC AUTO: 89.4 FL (ref 80–99)
MONOCYTES # BLD: 0.9 K/UL (ref 0–1)
MONOCYTES NFR BLD: 6 % (ref 5–13)
NEUTS SEG # BLD: 13.7 K/UL (ref 1.8–8)
NEUTS SEG NFR BLD: 90 % (ref 32–75)
NRBC # BLD: 0 K/UL (ref 0–0.01)
NRBC BLD-RTO: 0 PER 100 WBC
PLATELET # BLD AUTO: 263 K/UL (ref 150–400)
PMV BLD AUTO: 10.2 FL (ref 8.9–12.9)
POTASSIUM SERPL-SCNC: 4.4 MMOL/L (ref 3.5–5.1)
RBC # BLD AUTO: 3.29 M/UL (ref 3.8–5.2)
RBC MORPH BLD: ABNORMAL
RBC MORPH BLD: ABNORMAL
SERVICE CMNT-IMP: ABNORMAL
SODIUM SERPL-SCNC: 135 MMOL/L (ref 136–145)
THERAPEUTIC RANGE,PTTT: ABNORMAL SECS (ref 58–77)
THERAPEUTIC RANGE,PTTT: NORMAL SECS (ref 58–77)
WBC # BLD AUTO: 15.3 K/UL (ref 3.6–11)

## 2019-09-20 PROCEDURE — 74011250637 HC RX REV CODE- 250/637: Performed by: PSYCHIATRY & NEUROLOGY

## 2019-09-20 PROCEDURE — 77030039266 HC ADH SKN EXOFIN S2SG -A: Performed by: SURGERY

## 2019-09-20 PROCEDURE — 74011636637 HC RX REV CODE- 636/637: Performed by: INTERNAL MEDICINE

## 2019-09-20 PROCEDURE — 77030008467 HC STPLR SKN COVD -B: Performed by: SURGERY

## 2019-09-20 PROCEDURE — 74011250637 HC RX REV CODE- 250/637: Performed by: INTERNAL MEDICINE

## 2019-09-20 PROCEDURE — 77030018846 HC SOL IRR STRL H20 ICUM -A: Performed by: SURGERY

## 2019-09-20 PROCEDURE — 85730 THROMBOPLASTIN TIME PARTIAL: CPT

## 2019-09-20 PROCEDURE — 77030003704 HC NDL VASC ACC ARMD -A: Performed by: SURGERY

## 2019-09-20 PROCEDURE — 74011250636 HC RX REV CODE- 250/636: Performed by: NURSE ANESTHETIST, CERTIFIED REGISTERED

## 2019-09-20 PROCEDURE — 80048 BASIC METABOLIC PNL TOTAL CA: CPT

## 2019-09-20 PROCEDURE — 74011250637 HC RX REV CODE- 250/637: Performed by: SURGERY

## 2019-09-20 PROCEDURE — 65660000000 HC RM CCU STEPDOWN

## 2019-09-20 PROCEDURE — 77030031753 HC SHR ENDO COAG HARM J&J -E: Performed by: SURGERY

## 2019-09-20 PROCEDURE — 77030031139 HC SUT VCRL2 J&J -A: Performed by: SURGERY

## 2019-09-20 PROCEDURE — 74011250636 HC RX REV CODE- 250/636: Performed by: SURGERY

## 2019-09-20 PROCEDURE — 77030018836 HC SOL IRR NACL ICUM -A: Performed by: SURGERY

## 2019-09-20 PROCEDURE — 77030011640 HC PAD GRND REM COVD -A: Performed by: SURGERY

## 2019-09-20 PROCEDURE — 04CC0ZZ EXTIRPATION OF MATTER FROM RIGHT COMMON ILIAC ARTERY, OPEN APPROACH: ICD-10-PCS | Performed by: SURGERY

## 2019-09-20 PROCEDURE — 77030002986 HC SUT PROL J&J -A: Performed by: SURGERY

## 2019-09-20 PROCEDURE — 77030020256 HC SOL INJ NACL 0.9%  500ML: Performed by: SURGERY

## 2019-09-20 PROCEDURE — 77010033678 HC OXYGEN DAILY

## 2019-09-20 PROCEDURE — 77030002933 HC SUT MCRYL J&J -A: Performed by: SURGERY

## 2019-09-20 PROCEDURE — 74011250636 HC RX REV CODE- 250/636: Performed by: INTERNAL MEDICINE

## 2019-09-20 PROCEDURE — 77030008684 HC TU ET CUF COVD -B: Performed by: ANESTHESIOLOGY

## 2019-09-20 PROCEDURE — C1757 CATH, THROMBECTOMY/EMBOLECT: HCPCS | Performed by: SURGERY

## 2019-09-20 PROCEDURE — 74011000250 HC RX REV CODE- 250: Performed by: NURSE ANESTHETIST, CERTIFIED REGISTERED

## 2019-09-20 PROCEDURE — 77030014008 HC SPNG HEMSTAT J&J -C: Performed by: SURGERY

## 2019-09-20 PROCEDURE — 36415 COLL VENOUS BLD VENIPUNCTURE: CPT

## 2019-09-20 PROCEDURE — 76210000016 HC OR PH I REC 1 TO 1.5 HR: Performed by: SURGERY

## 2019-09-20 PROCEDURE — C1894 INTRO/SHEATH, NON-LASER: HCPCS | Performed by: SURGERY

## 2019-09-20 PROCEDURE — 76010000149 HC OR TIME 1 TO 1.5 HR: Performed by: SURGERY

## 2019-09-20 PROCEDURE — 85025 COMPLETE CBC W/AUTO DIFF WBC: CPT

## 2019-09-20 PROCEDURE — 76060000034 HC ANESTHESIA 1.5 TO 2 HR: Performed by: SURGERY

## 2019-09-20 PROCEDURE — 74011000272 HC RX REV CODE- 272: Performed by: SURGERY

## 2019-09-20 PROCEDURE — 88304 TISSUE EXAM BY PATHOLOGIST: CPT

## 2019-09-20 PROCEDURE — 82962 GLUCOSE BLOOD TEST: CPT

## 2019-09-20 PROCEDURE — 74011000250 HC RX REV CODE- 250: Performed by: SURGERY

## 2019-09-20 RX ORDER — SODIUM CHLORIDE, SODIUM LACTATE, POTASSIUM CHLORIDE, CALCIUM CHLORIDE 600; 310; 30; 20 MG/100ML; MG/100ML; MG/100ML; MG/100ML
INJECTION, SOLUTION INTRAVENOUS
Status: DISCONTINUED | OUTPATIENT
Start: 2019-09-20 | End: 2019-09-20 | Stop reason: HOSPADM

## 2019-09-20 RX ORDER — PROPOFOL 10 MG/ML
INJECTION, EMULSION INTRAVENOUS AS NEEDED
Status: DISCONTINUED | OUTPATIENT
Start: 2019-09-20 | End: 2019-09-20 | Stop reason: HOSPADM

## 2019-09-20 RX ORDER — FENTANYL CITRATE 50 UG/ML
50 INJECTION, SOLUTION INTRAMUSCULAR; INTRAVENOUS
Status: DISCONTINUED | OUTPATIENT
Start: 2019-09-20 | End: 2019-10-17 | Stop reason: SDUPTHER

## 2019-09-20 RX ORDER — CEFAZOLIN SODIUM/WATER 2 G/20 ML
2 SYRINGE (ML) INTRAVENOUS ONCE
Status: COMPLETED | OUTPATIENT
Start: 2019-09-20 | End: 2019-09-20

## 2019-09-20 RX ORDER — FENTANYL CITRATE 50 UG/ML
INJECTION, SOLUTION INTRAMUSCULAR; INTRAVENOUS AS NEEDED
Status: DISCONTINUED | OUTPATIENT
Start: 2019-09-20 | End: 2019-09-20 | Stop reason: HOSPADM

## 2019-09-20 RX ORDER — TRAMADOL HYDROCHLORIDE 50 MG/1
25 TABLET ORAL
Status: DISCONTINUED | OUTPATIENT
Start: 2019-09-20 | End: 2019-10-23 | Stop reason: HOSPADM

## 2019-09-20 RX ORDER — GLYCOPYRROLATE 0.2 MG/ML
INJECTION INTRAMUSCULAR; INTRAVENOUS AS NEEDED
Status: DISCONTINUED | OUTPATIENT
Start: 2019-09-20 | End: 2019-09-20 | Stop reason: HOSPADM

## 2019-09-20 RX ORDER — HEPARIN SODIUM 10000 [USP'U]/100ML
16-36 INJECTION, SOLUTION INTRAVENOUS
Status: DISCONTINUED | OUTPATIENT
Start: 2019-09-20 | End: 2019-09-24

## 2019-09-20 RX ORDER — LIDOCAINE HYDROCHLORIDE 20 MG/ML
INJECTION, SOLUTION EPIDURAL; INFILTRATION; INTRACAUDAL; PERINEURAL AS NEEDED
Status: DISCONTINUED | OUTPATIENT
Start: 2019-09-20 | End: 2019-09-20 | Stop reason: HOSPADM

## 2019-09-20 RX ORDER — ONDANSETRON 2 MG/ML
INJECTION INTRAMUSCULAR; INTRAVENOUS AS NEEDED
Status: DISCONTINUED | OUTPATIENT
Start: 2019-09-20 | End: 2019-09-20 | Stop reason: HOSPADM

## 2019-09-20 RX ORDER — ROCURONIUM BROMIDE 10 MG/ML
INJECTION, SOLUTION INTRAVENOUS AS NEEDED
Status: DISCONTINUED | OUTPATIENT
Start: 2019-09-20 | End: 2019-09-20 | Stop reason: HOSPADM

## 2019-09-20 RX ORDER — SUCCINYLCHOLINE CHLORIDE 20 MG/ML
INJECTION INTRAMUSCULAR; INTRAVENOUS AS NEEDED
Status: DISCONTINUED | OUTPATIENT
Start: 2019-09-20 | End: 2019-09-20 | Stop reason: HOSPADM

## 2019-09-20 RX ORDER — SODIUM CHLORIDE 9 MG/ML
125 INJECTION, SOLUTION INTRAVENOUS CONTINUOUS
Status: DISCONTINUED | OUTPATIENT
Start: 2019-09-20 | End: 2019-09-22

## 2019-09-20 RX ORDER — NEOSTIGMINE METHYLSULFATE 1 MG/ML
INJECTION INTRAVENOUS AS NEEDED
Status: DISCONTINUED | OUTPATIENT
Start: 2019-09-20 | End: 2019-09-20 | Stop reason: HOSPADM

## 2019-09-20 RX ORDER — EPHEDRINE SULFATE/0.9% NACL/PF 50 MG/5 ML
SYRINGE (ML) INTRAVENOUS AS NEEDED
Status: DISCONTINUED | OUTPATIENT
Start: 2019-09-20 | End: 2019-09-20 | Stop reason: HOSPADM

## 2019-09-20 RX ADMIN — Medication 10 ML: at 21:59

## 2019-09-20 RX ADMIN — ACETAMINOPHEN 650 MG: 325 TABLET, FILM COATED ORAL at 09:08

## 2019-09-20 RX ADMIN — Medication 10 ML: at 19:28

## 2019-09-20 RX ADMIN — PHENYLEPHRINE HYDROCHLORIDE 20 MCG/MIN: 10 INJECTION INTRAVENOUS at 17:16

## 2019-09-20 RX ADMIN — CLOPIDOGREL BISULFATE 75 MG: 75 TABLET ORAL at 09:02

## 2019-09-20 RX ADMIN — ALLOPURINOL 100 MG: 100 TABLET ORAL at 09:03

## 2019-09-20 RX ADMIN — Medication 10 MG: at 17:09

## 2019-09-20 RX ADMIN — SODIUM CHLORIDE 75 ML/HR: 900 INJECTION, SOLUTION INTRAVENOUS at 19:39

## 2019-09-20 RX ADMIN — SODIUM CHLORIDE, POTASSIUM CHLORIDE, SODIUM LACTATE AND CALCIUM CHLORIDE: 600; 310; 30; 20 INJECTION, SOLUTION INTRAVENOUS at 16:31

## 2019-09-20 RX ADMIN — Medication 10 ML: at 05:34

## 2019-09-20 RX ADMIN — FUROSEMIDE 20 MG: 10 INJECTION, SOLUTION INTRAMUSCULAR; INTRAVENOUS at 09:02

## 2019-09-20 RX ADMIN — ROCURONIUM BROMIDE 20 MG: 10 SOLUTION INTRAVENOUS at 17:02

## 2019-09-20 RX ADMIN — ONDANSETRON HYDROCHLORIDE 4 MG: 2 INJECTION, SOLUTION INTRAMUSCULAR; INTRAVENOUS at 17:52

## 2019-09-20 RX ADMIN — METOPROLOL TARTRATE 100 MG: 50 TABLET ORAL at 09:02

## 2019-09-20 RX ADMIN — SUCCINYLCHOLINE CHLORIDE 120 MG: 20 INJECTION, SOLUTION INTRAMUSCULAR; INTRAVENOUS at 16:47

## 2019-09-20 RX ADMIN — ASPIRIN 81 MG CHEWABLE TABLET 81 MG: 81 TABLET CHEWABLE at 09:03

## 2019-09-20 RX ADMIN — ATORVASTATIN CALCIUM 40 MG: 40 TABLET, FILM COATED ORAL at 09:03

## 2019-09-20 RX ADMIN — Medication 10 MG: at 16:54

## 2019-09-20 RX ADMIN — Medication 2 G: at 17:01

## 2019-09-20 RX ADMIN — FENTANYL CITRATE 50 MCG: 50 INJECTION, SOLUTION INTRAMUSCULAR; INTRAVENOUS at 17:04

## 2019-09-20 RX ADMIN — PROPOFOL 140 MG: 10 INJECTION, EMULSION INTRAVENOUS at 16:47

## 2019-09-20 RX ADMIN — HEPARIN SODIUM 16 UNITS/KG/HR: 10000 INJECTION, SOLUTION INTRAVENOUS at 11:59

## 2019-09-20 RX ADMIN — ACETAMINOPHEN 650 MG: 325 TABLET, FILM COATED ORAL at 03:14

## 2019-09-20 RX ADMIN — INSULIN GLARGINE 15 UNITS: 100 INJECTION, SOLUTION SUBCUTANEOUS at 09:01

## 2019-09-20 RX ADMIN — GLYCOPYRROLATE 0.6 MG: 0.2 INJECTION, SOLUTION INTRAMUSCULAR; INTRAVENOUS at 17:50

## 2019-09-20 RX ADMIN — LIDOCAINE HYDROCHLORIDE 50 MG: 20 INJECTION, SOLUTION EPIDURAL; INFILTRATION; INTRACAUDAL; PERINEURAL at 16:47

## 2019-09-20 RX ADMIN — TRAMADOL HYDROCHLORIDE 25 MG: 50 TABLET ORAL at 19:42

## 2019-09-20 RX ADMIN — HEPARIN SODIUM 5000 UNITS: 5000 INJECTION INTRAVENOUS; SUBCUTANEOUS at 07:30

## 2019-09-20 RX ADMIN — METOPROLOL TARTRATE 100 MG: 50 TABLET ORAL at 21:58

## 2019-09-20 RX ADMIN — ROCURONIUM BROMIDE 5 MG: 10 SOLUTION INTRAVENOUS at 16:47

## 2019-09-20 RX ADMIN — NEOSTIGMINE METHYLSULFATE 3 MG: 1 INJECTION, SOLUTION INTRAMUSCULAR; INTRAVENOUS; SUBCUTANEOUS at 17:50

## 2019-09-20 RX ADMIN — INSULIN LISPRO 10 UNITS: 100 INJECTION, SOLUTION INTRAVENOUS; SUBCUTANEOUS at 16:48

## 2019-09-20 NOTE — ANESTHESIA PREPROCEDURE EVALUATION
Relevant Problems   No relevant active problems       Anesthetic History   No history of anesthetic complications            Review of Systems / Medical History  Patient summary reviewed, nursing notes reviewed and pertinent labs reviewed    Pulmonary  Within defined limits  COPD: moderate            Comments:  - COPD/PHTN and chronic O2 use 2-4 L at home              - Lungs without crackles from edema.  Coarse BS and \"dry\" rales    Neuro/Psych   Within defined limits    CVA       Cardiovascular  Within defined limits  Hypertension              Exercise tolerance: <4 METS  Comments: Diastolic HF - chronic- compensated  9/19 echo:  LV/RVEF 70%  Mod AS, Mod/Sev TR, Mod MR   GI/Hepatic/Renal  Within defined limits       Renal disease: CRI       Endo/Other  Within defined limits  Diabetes: type 2    Anemia     Other Findings   Comments: . R LE weakness   3. Elevated troponin   4. Chronic respiratory failure              - chronic oxygen use. 2-4L 24 hours              - multifactorial - COPD, PHTN, AS, noted ILD on CTA in 2017  5. COPD  6. PHTN              - moderate on last echo. PAS 60 mmHg  7. AS              -8. Anemia             10. HTN                11. DM              12. HOLLAND on CKD           Physical Exam    Airway  Mallampati: II  TM Distance: > 6 cm  Neck ROM: normal range of motion   Mouth opening: Normal     Cardiovascular          Murmur: Grade 3, Aortic area     Dental    Dentition: Edentulous     Pulmonary      Decreased breath sounds           Abdominal  GI exam deferred       Other Findings            Anesthetic Plan    ASA: 4, emergent  Anesthesia type: general          Induction: Intravenous  Anesthetic plan and risks discussed with: Patient      Pt understands the mod-high cardiopulm risk due to emergent surgery, and that resp failure is a risk requiring vent support. All questions answered.

## 2019-09-20 NOTE — ANESTHESIA POSTPROCEDURE EVALUATION
Post-Anesthesia Evaluation and Assessment    Patient: Liliane Johnston MRN: [id number]  SSN: [id number]    YOB: 1931  Age: 80 y.o. Sex: female      I have evaluated the patient and they are stable and ready for discharge from the PACU. Cardiovascular Function/Vital Signs  Visit Vitals  /56   Pulse 71   Temp 36.9 °C (98.4 °F)   Resp 23   Ht 5' 5\" (1.651 m)   Wt 89.2 kg (196 lb 10.4 oz)   SpO2 95%   Breastfeeding? No   BMI 32.72 kg/m²       Patient is status post General anesthesia for Procedure(s):  THROMBECTOMY/EMBOLECTOMY RIGHT LEG. Nausea/Vomiting: None    Postoperative hydration reviewed and adequate. Pain:  Pain Scale 1: Numeric (0 - 10) (09/20/19 1813)  Pain Intensity 1: 0 (09/20/19 1813)   Managed    Neurological Status:   Neuro (WDL): Within Defined Limits (09/20/19 1813)  Neuro  Neurologic State: Alert; Appropriate for age (09/20/19 5037)  Orientation Level: Appropriate for age;Oriented X4 (09/20/19 1593)  Cognition: Appropriate for age attention/concentration (09/20/19 3311)  Speech: Appropriate for age (09/20/19 6493)  Assessment L Pupil: Brisk (09/20/19 0839)  Size L Pupil (mm): 3 (09/19/19 2010)  Assessment R Pupil: Brisk (09/20/19 0839)  Size R Pupil (mm): 3 (09/19/19 2010)  LUE Motor Response: Purposeful (09/20/19 1813)  LLE Motor Response: Purposeful (09/20/19 1813)  RUE Motor Response: Purposeful (09/20/19 1813)  RLE Motor Response: Purposeful (09/20/19 1813)   At baseline    Mental Status, Level of Consciousness: Alert and  oriented to person, place, and time    Pulmonary Status:   O2 Device: CO2 nasal cannula (09/20/19 1813)   Adequate oxygenation and airway patent    Complications related to anesthesia: None    Post-anesthesia assessment completed. No concerns    Signed By: Juliet Villagran MD     September 20, 2019              Procedure(s):  THROMBECTOMY/EMBOLECTOMY RIGHT LEG.     general    <BSHSIANPOST>    Vitals Value Taken Time   /56 9/20/2019  6:45 PM Temp 36.9 °C (98.4 °F) 9/20/2019  6:13 PM   Pulse 69 9/20/2019  6:56 PM   Resp 20 9/20/2019  6:56 PM   SpO2 96 % 9/20/2019  6:56 PM   Vitals shown include unvalidated device data.

## 2019-09-20 NOTE — PROGRESS NOTES
Spiritual Care Assessment/Progress Note  [facility]      NAME: Junior Koyanagi      MRN: [id number]  AGE: 80 y.o.  SEX: female  Baptism Affiliation: Baptism   Language: English     9/20/2019     Total Time (in minutes): 5     Spiritual Assessment begun in [facility] 4 IMCU 2 through conversation with:         [x]Patient        [] Family    [] Friend(s)        Reason for Consult: Palliative Care, Initial/Spiritual Assessment     Spiritual beliefs: (Please include comment if needed)     [x] Identifies with a ray tradition:  [address]       [x] Supported by a ray community:            [] Claims no spiritual orientation:           [] Seeking spiritual identity:                [] Adheres to an individual form of spirituality:           [] Not able to assess:                           Identified resources for coping:      [x] Prayer                               [] Music                  [] Guided Imagery     [x] Family/friends                 [] Pet visits     [] Devotional reading                         [] Unknown     [] Other:                                               Interventions offered during this visit: (See comments for more details)    Patient Interventions: Affirmation of emotions/emotional suffering, Catharsis/review of pertinent events in supportive environment, Iconic (affirming the presence of God/Higher Power), Initial/Spiritual assessment, patient floor, Prayer (actual), Prayer (assurance of)           Plan of Care:     [x] Support spiritual and/or cultural needs    [] Support AMD and/or advance care planning process      [] Support grieving process   [] Coordinate Rites and/or Rituals    [] Coordination with community clergy   [] No spiritual needs identified at this time   [] Detailed Plan of Care below (See Comments)  [] Make referral to Music Therapy  [] Make referral to Pet Therapy     [] Make referral to Addiction services  [] Make referral to [facility]  [] Make referral to Spiritual Care Partner  [] No future visits requested        [x] Follow up visits as needed     Comments: Visited Ms Monica Deal in room [phone] for initial Palliative Care spiritual assessment. Ms Monica Deal was lying quietly in bed; she was frowning. Patient's nurse was with her at time of visit. Provided active listening as she shared that she had had a stroke; she said that her primary concern was pain control and she would like for  to have prayer for her in regards to that. Ms Monica Deal stated that she was a member of [employer] and that her  had visited her yesterday. Had prayer on behalf of patient as requested and assured her of ongoing  availability for support. : Rev. Lakhwinder Chawla.  Ryder Valentin; [facility], to contact [phone] call: [phone]

## 2019-09-20 NOTE — PROGRESS NOTES
0730- Bedside and Verbal shift change report given to Kathy Ochoa RN (oncoming nurse) by Gisela Verdugo RN (offgoing nurse). Report included the following information SBAR, Kardex, Procedure Summary, Intake/Output, MAR, Accordion and Recent Results. PT is able to keep O2 sat with 4L of O2 per NC.     Problem: Heart Failure: Day 3  Goal: Respiratory  Outcome: Progressing Towards Goal

## 2019-09-20 NOTE — CONSULTS
[address]    Name:  Kimberly Nickerson  MR#:  [id number]  :  1931  ACCOUNT #:  [de-identified]  DATE OF SERVICE:  2019      REASON FOR CONSULTATION:  Acute right lower extremity ischemia. HISTORY OF PRESENT ILLNESS:  The patient is an 66-year-old female with extensive medical history, who presented to the hospital three days ago with worsening shortness of breath and right leg pain and weakness, difficulty ambulating. She was evaluated for her shortness of breath and possible fluid overload and also seen by Neurology for a workup that showed a small cerebellar infarct. Of note, she has noted a complaint of right leg pain and numbness extending up her leg, and a vascular study showed no ankle pressure in the right leg. We are consulted for vascular evaluation. The patient reports an abrupt acute onset of right leg pain and weakness on Tuesday morning. PAST MEDICAL HISTORY:  Significant for,  1. Diabetes. 2.  CHF. 3.  Morbid obesity. 4.  atrial fibrillation. 5.  Depression. 6.  Anxiety. 7.  Pulmonary hypertension. 8.  COPD. MEDICATIONS AT HOME:  Included:  1. Albuterol nebulizer. 2.  Allopurinol. 3.  Aspirin. 4.  Atorvastatin. 5.  Bumex. 6.  Celexa. 7.  Prolia. 8.  Benadryl. 9.  Ferrous sulfate. 10.  Insulin. 11.  Ipratropium nebulizer. 12.  Metformin. 13.  Metoprolol. 14.  Spironolactone. ALLERGIES:  SHE IS ALLERGIC TO ACE INHIBITORS. SOCIAL HISTORY:  The patient denies ever smoking. No daily alcohol. REVIEW OF SYSTEMS:  Negative other than what has been mentioned. PHYSICAL EXAMINATION:  GENERAL:  She is alert and oriented, able to answer questions appropriately. VITAL SIGNS:  Temperature is 98.3, heart rate 56, blood pressure is 145/60, she is 100% on 3 L nasal cannula. LUNGS:  Coarse breath sounds bilaterally. HEART:  Regular rate and rhythm at this time. ABDOMEN:  Morbidly obese, nontender.   EXTREMITIES:  Pulse exam reveals a strong palpable left femoral pulse. Left foot is lukewarm, palpable popliteal pulse. The right femoral pulse is absent. The right foot is cool and the calf is somewhat tender. She has had right foot weakness. LABORATORY DATA:  Lab values have been reviewed. White blood cell count is 11.2, her hemoglobin is 9.1, platelet count is 112. Chemistries have been reviewed. She does have a very elevated BNP and decreased GFR of 30. There has been no other pertinent imaging. IMPRESSION AND PLAN:  An 49-year-old female with extensive medical history and acute ischemia of the right leg consistent with probable arterial thrombus and possible \"acute-on-chronic disease. \"  I discussed with the patient and her son and daughter and they are all in agreement to attempt at salvage keeping in mind that she is high risk for any procedure. Given the length of the ischemia, there is a possibility of some permanent deficit, I have explained this to them. We would agree with continuing the heparin and we will proceed with surgical thrombectomy plus revascularization.       Estelita Russo MD      [id number]  D:  09/20/2019 14:34  T:  09/20/2019 17:28  JOB #:  [id number]  CC:  MD Reza Orantes MD

## 2019-09-20 NOTE — CONSULTS
Vascular Surgery Consult  --full note dictated  --Impression: acute RLE ischemia; presented Tuesday and has tolerated this situation since then; may have some chronic disease.   --Plan:  Emergent right leg revascularization in OR; may require femoral femoral bypass or fasciotomy; possible risk of permanent deficits discussed with family   --continuing heparin

## 2019-09-20 NOTE — PROGRESS NOTES
Palliative Medicine  [place]: [phone]  [facility]: [phone]        Code Status: Full Code    Advance Care Planning:  Advance Care Planning 9/17/2019   Patient's Healthcare Decision Maker is: -   Primary Decision Maker Name -   Primary Decision Maker Phone Number -   Primary Decision Maker Relationship to Patient -   Secondary Decision Maker Name -   Secondary Decision Maker Relationship to Patient -   Confirm Advance Directive None   Patient Would Like to Complete Advance Directive No   Does the patient have other document types -   [address] (Other) Decision Maker: Lakhwinder Santana - Son - [phone]    Supplemental (Other) Decision Maker: Magdalene Skelton - Daughter - [phone]    Patient / Family Encounter Documentation    Participants (names): Patient, daughter Jocelyne Villaseñor LSW and Vida ALLISON    Narrative:     Met with patient and daughter in room. Patient was very sleepy. Daughter provided information. Daughter and son live with patient in family home in [facility]. Patient able to walk, stopped cooking about a year ago because she was becoming forgetful. Per daughter, son Hank Wiley is MPOA though there are no documents. Patient has previously verbalized that he would be decisions maker. Will follow up with Hankcarol Wiely and patient on Monday to complete AMD if patient willing. Longer note to follow    Goals of Care / Plan:     Attempted to call ilene Wiley to set up Monday meeting. Could not leave message. Will try again Monday. Per daughter, he comes to hospital at 4pm most days. Thank you for the opportunity to be involved in the care of Ms. Cosme and her family.     Valentino Guy LMSW, Supervisee in Social Work  Palliative Medicine   [phone]

## 2019-09-20 NOTE — BRIEF OP NOTE
BRIEF OPERATIVE NOTE    Date of Procedure: 9/20/2019   Preoperative Diagnosis: Acute right lower leg ischemia   Postoperative Diagnosis: Acute right lower leg ischemia     Procedure(s):  THROMBECTOMY/EMBOLECTOMY RIGHT LEG  Surgeon(s) and Role:     Samuel Johnson MD - Primary         Surgical Assistant: Carmina Gabriel    Surgical Staff:  Circ-1: Christiano Henry RN  Scrub RN-1: Stephan Lee RN  Surg Asst-1: Christin PRASAD  Event Time In Time Out   Incision Start 09/20/2019 1707    Incision Close 09/20/2019 1755      Anesthesia: General   Estimated Blood Loss: 100cc  Specimens:   ID Type Source Tests Collected by Time Destination   1 : Right iliac thrombus [employer]  Diaz Prince MD 9/20/2019 1729 Pathology      Findings: iliac artery subacute thrombus; good doppler signal following thrombectomy  Complications: none  Implants: * No implants in log *

## 2019-09-20 NOTE — CONSULTS
Palliative Medicine Consult  [place]: [phone])    Patient Name: Ronit Najera  YOB: 1931    Date of Initial Consult: 9/20/2019  Reason for Consult: Goals of care discussion   Requesting Provider: Dr. Fernando Cruz  Primary Care Physician: Kori Jorgensen MD     SUMMARY:   Ronit Najera is a 80 y.o. with a past history of COPD on home O2, hypoxic respiratory failure, DM 2, A. fib, CKD, CHF, HTN, pulmonary hypertension, moderate AV stenosis, echo with EF >70% (9/12/2019), MV with moderate annular calcification, TV with moderate to severe regurgitation depression and anxiety, who was admitted on 9/17/2019 from home with a diagnosis of acute on chronic CHF. Patient presented to the ER with CC of 2-3-day worsening of shortness of breath with numbness and weakness of right lower extremity. In ER labs revealed + hyponatremia with , K5.6 and hypoalbuminemia with albumin 2.9      Current medical issues leading to Palliative Medicine involvement include: Goals of care discussion      Psychosocial assessment: See palliative  Sammie Knutson note, she lives with her daugher and son. Baseline cognitive and functional status: Patient alert and oriented able to ambulate independently no longer cooks because was forgetful and left hands on the stove burning     PALLIATIVE DIAGNOSES:   1. Advanced care planning discussion  2. DNR discussion  3. Goals of care discussion  4. Weakness, generalized  5. Debility       PLAN:   1. Prior to visit completed extensive chart review including results of labs and other diagnostics. 2. Patient was seen and evaluated, she was somewhat somnolent, difficult to wake and would not stay awake long enough to answer questions. Her daughter Yunior Obrien was at bedside  3. Advanced care planning discussion-patient has 2 children, 1 son and 1 daughter, both live with the patient.   Daughter Yunior collins stated that her brother [address], is the patient's primary healthcare decision-maker. We have splinted to bring in copy of AMD they are able to find one. 4. Goals of care discussion-likely home with home health and therapy. 5. DNR discussion-will wait to discuss with patient's son who reportedly is also with patient's medical decision-maker, patient drowsy not able to answer all questions  6. Weakness, generalized-worsening  7. Initial consult note routed to primary continuity provider and/or primary health care team members  8. Communicated plan of care with: Palliative IDT, [facility] [place], operated by [facility] Team     GOALS OF CARE / TREATMENT PREFERENCES:     GOALS OF CARE:  Patient/Health Care Proxy Stated Goals: Cure    TREATMENT PREFERENCES:   Code Status: Full Code    Advance Care Planning:  [x] [facility] Interdisciplinary Team has updated the ACP Navigator with Health Care Decision Maker and Patient Capacity      Advance Care Planning 9/17/2019   Patient's Healthcare Decision Maker is: -   Primary Decision Maker Name -   Primary Decision Maker Phone Number -   Primary Decision Maker Relationship to Patient -   Secondary Decision Maker Name -   Secondary Decision Maker Relationship to Patient -   Confirm Advance Directive None   Patient Would Like to Complete Advance Directive No   Does the patient have other document types -       Medical Interventions: Full interventions     Other Instructions: Other:    As far as possible, the palliative care team has discussed with patient / health care proxy about goals of care / treatment preferences for patient.      HISTORY:     History obtained from: Medical records    CHIEF COMPLAINT: N/A    HPI/SUBJECTIVE:    The patient is:   [] Verbal and participatory  [x] Non-participatory due to:       Clinical Pain Assessment (nonverbal scale for severity on nonverbal patients):   Clinical Pain Assessment  Location: right leg  Character: patient unable to report  Duration: patient unable to report  Effect: patient unable to report  Factors: patient unable to report  Frequency: patient unable to report     Activity (Movement): Lying quietly, normal position    Duration: for how long has pt been experiencing pain (e.g., 2 days, 1 month, years)  Frequency: how often pain is an issue (e.g., several times per day, once every few days, constant)     FUNCTIONAL ASSESSMENT:     Palliative Performance Scale (PPS):  PPS: 30       PSYCHOSOCIAL/SPIRITUAL SCREENING:     Palliative IDT has assessed this patient for cultural preferences / practices and a referral made as appropriate to needs (Cultural Services, Patient Advocacy, Ethics, etc.)    Any spiritual / Muslim concerns:  [] Yes /  [x] No    Caregiver Burnout:  [] Yes /  [x] No /  [] No Caregiver Present      Anticipatory grief assessment:   [x] Normal  / [] Maladaptive       ESAS Anxiety:      ESAS Depression:          REVIEW OF SYSTEMS:     Positive and pertinent negative findings in ROS are noted above in HPI. The following systems were [x] reviewed / [] unable to be reviewed as noted in HPI  Other findings are noted below. Systems: constitutional, ears/nose/mouth/throat, respiratory, gastrointestinal, genitourinary, musculoskeletal, integumentary, neurologic, psychiatric, endocrine. Positive findings noted below. Modified ESAS Completed by: provider   Fatigue: 10 Drowsiness: 9               Anorexia: 8 Dyspnea: 0           Stool Occurrence(s): 1        PHYSICAL EXAM:     From RN flowsheet:  Wt Readings from Last 3 Encounters:   09/20/19 196 lb 10.4 oz (89.2 kg)   09/19/19 190 lb 7.6 oz (86.4 kg)   08/30/19 194 lb (88 kg)     Blood pressure 124/88, pulse 71, temperature 97.6 °F (36.4 °C), resp. rate 24, height 5' 5\" (1.651 m), weight 196 lb 10.4 oz (89.2 kg), SpO2 99 %, not currently breastfeeding.     Pain Scale 1: Numeric (0 - 10)  Pain Intensity 1: 0  Pain Onset 1: Just now- postop  Pain Location 1: Leg  Pain Orientation 1: Right  Pain Description 1: Sore  Pain Intervention(s) 1: Medication (see MAR)  Last bowel movement, if known:     Constitutional: Well-nourished, somnolent, NAD  Eyes: pupils equal, anicteric  ENMT: no nasal discharge, moist mucous membranes  Cardiovascular: regular rhythm, distal pulses intact  Respiratory: breathing not labored, symmetric  Gastrointestinal: soft non-tender, +bowel sounds  Musculoskeletal: no deformity, no tenderness to palpation  Skin: warm, dry  Neurologic: following simple commands, no movement observed in her lower extremities  Psychiatric: Unable to fully assess  Other:       HISTORY:     Active Problems:    Debility (6/28/2011)      Acute hypoxemic respiratory failure ([address] (8/8/2011)      CHF exacerbation (HCC) (9/17/2019)      Hyperkalemia (9/17/2019)      Hyponatremia (9/17/2019)      HOLLAND (acute kidney injury) ([address] (9/17/2019)      Past Medical History:   Diagnosis Date    Anxiety     Breast cancer ([address] 2005, 2010    right 2005, left 2010    Cancer [facility])      cancer right breast cancer    Cancer ([address]     cancer left breast/new dx 8/2011 +bx     Chronic obstructive pulmonary disease (HCC)     Diabetes ([address]     Heart failure ([address]     Hypercholesterolemia     Hypertension     Other ill-defined conditions(799.89)     osteopoosis    Other ill-defined conditions(799.89)     high cholesterol    Pneumonia     Psychiatric disorder     anxiety      Past Surgical History:   Procedure Laterality Date    BIOPSY OF BREAST, INCISIONAL      left breast 8/2011 positive for cancer cells    BREAST SURGERY PROCEDURE UNLISTED      right mastectomy    HX BREAST LUMPECTOMY Left 2010    HX MASTECTOMY Right 2005      Family History   Problem Relation Age of Onset    Hypertension Mother     Stroke Other       History reviewed, no pertinent family history.   Social History     Tobacco Use    Smoking status: Never Smoker    Smokeless tobacco: Never Used   Substance Use Topics    Alcohol use: No     Allergies   Allergen Reactions    Ace Inhibitors Angioedema      Current Facility-Administered Medications   Medication Dose Route Frequency    heparin 25,000 units in D5W 250 ml infusion  16-36 Units/kg/hr IntraVENous TITRATE    traMADol (ULTRAM) tablet 25 mg  25 mg Oral Q8H PRN    0.9% sodium chloride infusion  75 mL/hr IntraVENous CONTINUOUS    fentaNYL citrate (PF) injection 50 mcg  50 mcg IntraVENous Q3H PRN    clopidogrel (PLAVIX) tablet 75 mg  75 mg Oral DAILY    insulin lispro (HUMALOG) injection   SubCUTAneous AC&HS    insulin glargine (LANTUS) injection 15 Units  15 Units SubCUTAneous DAILY    ondansetron (ZOFRAN) injection 4 mg  4 mg IntraVENous Q6H PRN    glucose chewable tablet 16 g  4 Tab Oral PRN    glucagon (GLUCAGEN) injection 1 mg  1 mg IntraMUSCular PRN    sodium chloride (NS) flush 5-40 mL  5-40 mL IntraVENous Q8H    sodium chloride (NS) flush 5-40 mL  5-40 mL IntraVENous PRN    acetaminophen (TYLENOL) tablet 650 mg  650 mg Oral Q4H PRN    allopurinol (ZYLOPRIM) tablet 100 mg  100 mg Oral DAILY    aspirin chewable tablet 81 mg  81 mg Oral DAILY    atorvastatin (LIPITOR) tablet 40 mg  40 mg Oral DAILY    metoprolol tartrate (LOPRESSOR) tablet 100 mg  100 mg Oral BID    albuterol-ipratropium (DUO-NEB) 2.5 MG-0.5 MG/3 ML  3 mL Nebulization Q4H PRN          LAB AND IMAGING FINDINGS:     Lab Results   Component Value Date/Time    WBC 15.3 (H) 09/20/2019 07:44 PM    HGB 8.9 (L) 09/20/2019 07:44 PM    PLATELET 335 46/47/7160 07:44 PM     Lab Results   Component Value Date/Time    Sodium 135 (L) 09/20/2019 03:08 AM    Potassium 4.4 09/20/2019 03:08 AM    Chloride 99 09/20/2019 03:08 AM    CO2 29 09/20/2019 03:08 AM    BUN 51 (H) 09/20/2019 03:08 AM    Creatinine 1.93 (H) 09/20/2019 03:08 AM    Calcium 8.8 09/20/2019 03:08 AM    Magnesium 2.1 04/03/2017 12:00 AM    Phosphorus 4.0 03/31/2016 03:57 AM      Lab Results   Component Value Date/Time    AST (SGOT) 18 09/17/2019 09:21 AM    Alk.  phosphatase 109 09/17/2019 09:21 AM    Protein, total 8.4 (H) 09/17/2019 09:21 AM    Albumin 2.9 (L) 09/17/2019 09:21 AM    Globulin 5.5 (H) 09/17/2019 09:21 AM     Lab Results   Component Value Date/Time    INR 1.1 01/20/2017 01:24 PM    Prothrombin time 10.9 01/20/2017 01:24 PM    aPTT 76.3 (H) 09/20/2019 07:44 PM      No results found for: IRON, FE, TIBC, IBCT, PSAT, FERR   Lab Results   Component Value Date/Time    pH 7.42 04/20/2011 01:50 AM    PCO2 49 (H) 04/20/2011 01:50 AM    PO2 82 04/20/2011 01:50 AM     No components found for: [place]   Lab Results   Component Value Date/Time     01/20/2016 10:30 AM    CK - MB 2.3 01/20/2016 10:30 AM                Total time: 70 min  Counseling / coordination time, spent as noted above: 50  > 50% counseling / coordination?:  Yes    Prolonged service was provided for  []30 min   []75 min in face to face time in the presence of the patient, spent as noted above. Time Start:   Time End:   Note: this can only be billed with 12377 (initial) or 60998 (follow up). If multiple start / stop times, list each separately.

## 2019-09-20 NOTE — PROGRESS NOTES
Orders acknowledged and chart reviewed. MD requesting to hold OT at this time stating she is adjusting Heparin and patient with no pulse in RLE (vascular consult). Will follow up for OT evaluation as able and appropriate. Thank you.

## 2019-09-20 NOTE — PROGRESS NOTES
Hospitalist Progress Note                               Leonard Patrick MD                                     Answering service: [phone]                               [phone] from in house phone                                         Date of Service:  2019  NAME:  Tom Galdamez  :  1931  MRN:  [id number]      Admission Summary:   60-year-old female with an extensive past medical history including chronic hypoxemic respiratory failure with home oxygen, COPD, chronic kidney disease, type 2 diabetes mellitus, atrial fibrillation, diastolic congestive heart failure, dyslipidemia, primary hypertension, osteoporosis, pulmonary hypertension, depression and anxiety disorder, was brought to the emergency room from home for evaluation of an approximately 2-3 day history of worsening shortness of breath. Per the patient's son, the patient ambulates unaided at baseline; however, in the 2-3 days leading up to the emergency room presentation, noted to have worsening shortness of breath even with easy activity. The patient also complained of some numbness and weakness in the right lower extremity. The patient admitted to being compliant with all her medical therapies. The patient denied associated headaches, dizziness, visual deficits, chest pains, palpitations, nausea, vomiting, cough, sputum production, fevers, chills, abdominal pain, bladder or bowel irregularities. Reason for follow up:       CC: SOB    All events in the past 24 hours reviewed in their entirety. Reviewed with nursing at bedside. Patient C/O persistent right leg pain with numbness. Denied any chest pains or worsening  SOB. Assessment & Plan:     1) CVS: Acute on chronic diastolic CHF exarcerbation with a preserved EF. Probable Demand ischemia with mildly elevated troponins due to the CHF exarcerbation.  Indeterminable NYHA Functional Class due to the multiple Co-morbidities per Cardiology. Primary Hypertension. Dyslipidemia. 2D ECHO (9/12/19) with EF 70 percent. Continue oxygen, gentle IV diuresis, daily weight input/output monitoring, CHF teaching. Cardiology and Heart failure team evals noted and appreciated. 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 Worsening BUN and Cr. Lasix decreased from 40mg to 20mg twice. Renal US negative for obstructive uropathy. Nephrology eval noted and appreciated. D/W Dr Prasad Coleman. 3) Electrolytes: Resolving probable multifactorial Hyponatremia present on admission. Resolved Hyperkalemia without any dysrhythmias. 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 Continue Aspirin, Plavix, statin, neurochecks. Neurology eval noted and appreciated. D/W Dr Cyndi Baker. 5) Vascular: RLE numbness with absent pulsation. Unobtainable GONAZLEZ/PVR. Heparin drip. Vascular surgical consult. 6) Resp: Chronic Hypoxemic respiratory failure due to COPD with home oxygen. Nebulizers, inhalers, incentive spirometry. 7) Hematology: Anemia of chronicity. 8) Endocrine: Uncontrolled insulin treated type 2 DM with complications including hyperglycemia. Hold metformin. Accucheck monitoring, Basal and sliding scale insulins, diabetic teaching. Diabetic team evaluation with recommendations appreciated. 9) MANNIE: Osteoporosis. 10) Psychiatry: Anxiety disorder and Depression without any current behavioral disturbances. 11) Prophylaxis: DVT. 12) Directives: Full Code with a guarded prognosis. Readdressed with patient and patient's son. Palliative care consult. 13) Plan: Anticipate patient will require Short-term Post-Acute care facility placement for ongoing PT/OT. D/W patient, patient's son Daniele Viramontes at [phone]. D/W MANSI Russo.     Hospital Problems  Date Reviewed: 9/17/2019          Codes Class Noted POA CHF exacerbation ([facility] ICD-10-CM: I50.9  ICD-9-CM: 428.0  9/17/2019 Yes        Hyperkalemia ICD-10-CM: E87.5  ICD-9-CM: 276.7  9/17/2019 Yes        Hyponatremia ICD-10-CM: E87.1  ICD-9-CM: 276.1  9/17/2019 Yes        HOLLAND (acute kidney injury) ([facility] ICD-10-CM: N17.9  ICD-9-CM: 584.9  9/17/2019 Yes        Acute hypoxemic respiratory failure ([facility] ICD-10-CM: J96.01  ICD-9-CM: 518.81  8/8/2011 Yes        Debility ICD-10-CM: R53.81  ICD-9-CM: 799.3  6/28/2011 Yes                  Physical Examination:      Last 24hrs VS reviewed since prior progress note. Most recent are:  Visit Vitals  /44   Pulse 64   Temp 97.8 °F (36.6 °C)   Resp 20   Ht 5' 5\" (1.651 m)   Wt 89.2 kg (196 lb 10.4 oz)   SpO2 100%   Breastfeeding? No   BMI 32.72 kg/m²           Constitutional:  No acute distress. HEENT: Head is a traumatic,  Un icteric sclera. Pink conjunctiva,no erythema or discharge. Oral mucous moist, oropharynx benign. Neck supple,    Resp:  Decreased air entry B/L.   CV:  S1,S2 present. GI:  Soft, non distended, non tender. normoactive bowel sounds, no hepatosplenomegaly    :  No CVA or suprapubic tenderness   Skin  :  No erythema,rash,bullae,dipigmentation . Cool lower extremities. Musculoskeletal:  RLE slightly bigger than LLE. Absent Dorsalis Pedis Pulse RLE. Power 3-4/5 RLE, 5/5 other extremities. Neurologic:  Awake, alert and oriented. Decreased sensation RLE. No intake or output data in the 24 hours ending 09/20/19 1008           Labs:     Recent Labs     09/18/19  0223   WBC 11.2*   HGB 9.1*   HCT 30.2*        Recent Labs     09/20/19  0308 09/19/19  0534 09/18/19  0223   * 136 138   K 4.4 4.6 4.9   CL 99 99 101   CO2 29 29 27   BUN 51* 48* 32*   CREA 1.93* 1.86* 1.53*   * 165* 191*   CA 8.8 8.8 9.3     No results for input(s): SGOT, GPT, ALT, AP, TBIL, TBILI, TP, ALB, GLOB, GGT, AML, LPSE in the last 72 hours.     No lab exists for component: AMYP, HLPSE  No results for input(s): INR, PTP, APTT in the last 72 hours. No lab exists for component: INREXT, INREXT   No results for input(s): FE, TIBC, PSAT, FERR in the last 72 hours. No results found for: FOL, RBCF   No results for input(s): PH, PCO2, PO2 in the last 72 hours.   Recent Labs     09/18/19  0847 09/18/19  0223   TROIQ 0.58* 0.70*     Lab Results   Component Value Date/Time    Cholesterol, total 144 09/17/2019 09:21 AM    HDL Cholesterol 55 09/17/2019 09:21 AM    LDL, calculated 72.6 09/17/2019 09:21 AM    Triglyceride 82 09/17/2019 09:21 AM    CHOL/HDL Ratio 2.6 09/17/2019 09:21 AM     Lab Results   Component Value Date/Time    Glucose (POC) 117 (H) 09/20/2019 09:00 AM    Glucose (POC) 115 (H) 09/20/2019 06:32 AM    Glucose (POC) 104 (H) 09/19/2019 09:39 PM    Glucose (POC) 114 (H) 09/19/2019 09:13 PM    Glucose (POC) 111 (H) 09/19/2019 04:46 PM     Lab Results   Component Value Date/Time    Color YELLOW/STRAW 09/17/2019 12:49 PM    Appearance CLEAR 09/17/2019 12:49 PM    Specific gravity 1.030 09/17/2019 12:49 PM    Specific gravity 1.010 12/18/2011 09:28 AM    pH (UA) 6.0 09/17/2019 12:49 PM    Protein NEGATIVE  09/17/2019 12:49 PM    Glucose NEGATIVE  09/17/2019 12:49 PM    Ketone NEGATIVE  09/17/2019 12:49 PM    Bilirubin NEGATIVE  09/17/2019 12:49 PM    Urobilinogen 0.2 09/17/2019 12:49 PM    Nitrites NEGATIVE  09/17/2019 12:49 PM    Leukocyte Esterase NEGATIVE  09/17/2019 12:49 PM    Epithelial cells FEW 09/17/2019 12:49 PM    Bacteria NEGATIVE  09/17/2019 12:49 PM    WBC 0-4 09/17/2019 12:49 PM    RBC 0-5 09/17/2019 12:49 PM         Medications Reviewed:     Current Facility-Administered Medications   Medication Dose Route Frequency    heparin 25,000 units in D5W 250 ml infusion  18-36 Units/kg/hr IntraVENous TITRATE    clopidogrel (PLAVIX) tablet 75 mg  75 mg Oral DAILY    insulin lispro (HUMALOG) injection   SubCUTAneous AC&HS    insulin glargine (LANTUS) injection 15 Units  15 Units SubCUTAneous DAILY    ondansetron (ZOFRAN) injection 4 mg  4 mg IntraVENous Q6H PRN    glucose chewable tablet 16 g  4 Tab Oral PRN    glucagon (GLUCAGEN) injection 1 mg  1 mg IntraMUSCular PRN    sodium chloride (NS) flush 5-40 mL  5-40 mL IntraVENous Q8H    sodium chloride (NS) flush 5-40 mL  5-40 mL IntraVENous PRN    acetaminophen (TYLENOL) tablet 650 mg  650 mg Oral Q4H PRN    allopurinol (ZYLOPRIM) tablet 100 mg  100 mg Oral DAILY    aspirin chewable tablet 81 mg  81 mg Oral DAILY    atorvastatin (LIPITOR) tablet 40 mg  40 mg Oral DAILY    metoprolol tartrate (LOPRESSOR) tablet 100 mg  100 mg Oral BID    albuterol-ipratropium (DUO-NEB) 2.5 MG-0.5 MG/3 ML  3 mL Nebulization Q4H PRN     ______________________________________________________________________  EXPECTED LENGTH OF STAY: 3d 7h  ACTUAL LENGTH OF STAY:          3                 Morena Carmona MD

## 2019-09-20 NOTE — PROGRESS NOTES
Chart checked, case dicussed with Dr. Nathaniel Alicea who asked that therapy hold at this time, stating she is adjusting Heparin and patient with no pulse in RLE (now has a vascular consult). Will defer, follow and see for PT as appropriate. Thank you.  Gaynel Nageotte, PT

## 2019-09-20 NOTE — PROGRESS NOTES
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 Stable, Cr up slightly, UOP not recorded. O2 sats stable, no edema on exam.    Current Medications: all current  Medications have been eviewed in EPIC  Review of Systems: Pertinent items are noted in HPI. Objective:  Vitals:    Vitals:    09/20/19 0558 09/20/19 0727 09/20/19 0836 09/20/19 0902   BP:  135/42 149/62 134/44   Pulse:  64 62 64   Resp:  19 20    Temp:  97.4 °F (36.3 °C) 97.8 °F (36.6 °C)    SpO2: 100% 100% 100%    Weight:       Height:         Intake and Output:  No intake/output data recorded.   09/18 1901 - 09/20 0700  In: -   Out: 200 [Urine:200]    Physical Examination:  General: NAD,Conversant, frail  Neck:  Supple, no mass  Resp:  Lungs mostly clear b/l  CV:  RRR,  no murmur or rub, no LE edema, no palpable LE pulses, cold extremities  GI:  Soft, NT, + Bowel sounds, no hepatosplenomegaly  Neurologic:  Non focal  Psych:             AAO x 3 appropriate affect   Skin:  No Rash  :  No altman    []    High complexity decision making was performed  []    Patient is at high-risk of decompensation with multiple organ involvement    Lab Data Personally Reviewed: I have reviewed all the pertinent labs, microbiology data and radiology studies during assessment. Recent Labs     09/20/19  0308 09/19/19  0534 09/18/19 0223 09/17/19  0921   * 136 138  --  133*   K 4.4 4.6 4.9   < > 5.6*   CL 99 99 101  --  101   CO2 29 29 27  --  24   * 165* 191*  --  199*   BUN 51* 48* 32*  --  27*   CREA 1.93* 1.86* 1.53*  --  1.35*   CA 8.8 8.8 9.3  --  9.0   ALB  --   --   --   --  2.9*   SGOT  --   --   --   --  18   ALT  --   --   --   --  24    < > = values in this interval not displayed. Recent Labs     09/18/19 0223 09/17/19 0921   WBC 11.2* 11.9*   HGB 9.1* 9.7*   HCT 30.2* 32.3*    291     Lab Results   Component Value Date/Time    Specimen Description: NARES 09/27/2011 03:58 AM    Specimen Description: BLOOD 08/15/2011 06:15 AM    Specimen Description: CATH URINE 08/15/2011 06:15 AM     Lab Results   Component Value Date/Time    Culture result: MRSA NOT PRESENT 03/31/2016 12:30 PM    Culture result:  03/31/2016 12:30 PM         Screening of patient nares for MRSA is for surveillance purposes and, if positive, to facilitate isolation considerations in high risk settings. It is not intended for automatic decolonization interventions per se as regimens are not sufficiently effective to warrant routine use. Culture result:  09/27/2011 03:58 AM     MRSA PRESENT      Screening of patient nares for MRSA is for surveillance purposes and, if positive, to facilitate isolation considerations in high risk settings. It is not intended for automatic decolonization interventions per se as regimens are not sufficiently effective to warrant routine use.      Recent Results (from the past 24 hour(s))   GLUCOSE, POC    Collection Time: 09/19/19 12:37 PM   Result Value Ref Range    Glucose (POC) 136 (H) 65 - 100 mg/dL    Performed by DORINA DE LA ROSA    ANKLE BRACHIAL INDEX    Collection Time: 09/19/19  4:42 PM   Result Value Ref Range    Left arm  mmHg   GLUCOSE, POC    Collection Time: 09/19/19 4:46 PM   Result Value Ref Range    Glucose (POC) 111 (H) 65 - 100 mg/dL    Performed by Ryley Diaz, POC    Collection Time: 09/19/19  9:13 PM   Result Value Ref Range    Glucose (POC) 114 (H) 65 - 100 mg/dL    Performed by Richa Garay    GLUCOSE, POC    Collection Time: 09/19/19  9:39 PM   Result Value Ref Range    Glucose (POC) 104 (H) 65 - 100 mg/dL    Performed by Juan David Hurtado    METABOLIC PANEL, BASIC    Collection Time: 09/20/19  3:08 AM   Result Value Ref Range    Sodium 135 (L) 136 - 145 mmol/L    Potassium 4.4 3.5 - 5.1 mmol/L    Chloride 99 97 - 108 mmol/L    CO2 29 21 - 32 mmol/L    Anion gap 7 5 - 15 mmol/L    Glucose 106 (H) 65 - 100 mg/dL    BUN 51 (H) 6 - 20 MG/DL    Creatinine 1.93 (H) 0.55 - 1.02 MG/DL    BUN/Creatinine ratio 26 (H) 12 - 20      GFR est AA 30 (L) >60 ml/min/1.73m2    GFR est non-AA 25 (L) >60 ml/min/1.73m2    Calcium 8.8 8.5 - 10.1 MG/DL   GLUCOSE, POC    Collection Time: 09/20/19  6:32 AM   Result Value Ref Range    Glucose (POC) 115 (H) 65 - 100 mg/dL    Performed by Richa Garay    GLUCOSE, POC    Collection Time: 09/20/19  9:00 AM   Result Value Ref Range    Glucose (POC) 117 (H) 65 - 100 mg/dL    Performed by Nixon Hutchinson MD  [address]  Phone - [phone]   Fax - [phone]  www. [facility]

## 2019-09-20 NOTE — PROGRESS NOTES
Cardiology Progress Note            Admit Date: 9/17/2019  Admit Diagnosis: CHF exacerbation ([address] [I50.9]  Date: 9/20/2019     Time: 12:31 PM    Subjective:  R LE now very ttp. Cold to touch. SOB baseline     Assessment and Plan     1. Diastolic HF - chronic- compensated              - Unable to classify NYHA level with concurrent COPD/PHTN and chronic O2 use 2-4 L at home              - Lungs without crackles from edema. Coarse BS and \"dry\" rales               - Echo EF >70% (9/12/19) -                       09/12/19   ECHO ADULT COMPLETE 09/12/2019 9/12/2019     Narrative · Left Ventricle: Normal cavity size. Mild to moderate asymmetric   hypertrophy predominantly basal septal, ovoid-shaped cavity. Hyperdynamic   systolic dysfunction. Estimated left ventricular ejection fraction is   >70%. Visually measured ejection fraction. No regional wall motion   abnormality noted. · Aortic Valve: Probably trileaflet aortic valve. Aortic valve leaflet   calcification present. Moderate aortic valve stenosis is present. · Mitral Valve: Mitral valve non-specific thickening. Moderate mitral   annular calcification. Mild to moderate mitral valve regurgitation is   present. · Tricuspid Valve: Moderate to severe tricuspid valve regurgitation is   present. · Pulmonary Artery: Moderate pulmonary hypertension. · Right Ventricle: Increased (hyperdynamic) systolic function. · Pulmonic Valve: Mild pulmonic valve regurgitation is present.          Signed by: Prema Camp MD               - PTA Bumex 1 mg BID              - proBNP elevation - suspect from [place] Pulmon[place] from PHTN, chronic (oxygen dependent) respiratory failure and AS  2. R LE weakness              - CTA head negative for acute CVA   - MRI with small cerebellar infarct - Neuro following. Starting Plavix              - non palpable pulse on right (iliac, popliteal or DP/PT). - cold to touch              - ABIs - no pulsatile flow to measure - Vascular Surgery consulted. 3. Elevated troponin              - suspect demand mismatch              - Cath in 2017 was without significant CAD. Minor luminal irregularities in all vessels  4. Chronic respiratory failure              - chronic oxygen use. 2-4L 24 hours              - multifactorial - COPD, PHTN, AS, noted ILD on CTA in 2017  5. COPD              - Home O2              - PTA Duonebs              - Does not follow up with Pulmonary specialist  6. PHTN              - moderate on last echo. PAS 60 mmHg  7. AS              - Moderate on last echo. The peak gradient was 36 mmHg and mean gradient 20 mmHg. 8. Anemia              - Hgb 9.1, chronic  9. HLD   Cholesterol, total 144 09/17/2019 09:21 AM   HDL Cholesterol 55 09/17/2019 09:21 AM   LDL, calculated 72.6 09/17/2019 09:21 AM   VLDL, calculated 16.4 09/17/2019 09:21 AM   Triglyceride 82 09/17/2019 09:21 AM   CHOL/HDL Ratio 2.6 09/17/2019 09:21 AM               - Lipitor 40 PTA  10. HTN              - Lopressor 50 mg PTA              - Well controlled  11. DM              - A1c 8.8              - Glucophage PTA  12. HOLLAND on CKD   - D/w Dr. Juan Carlos Olivera - He notes most likely over diuresed and contrast load as cause   - Nephrology stopped Bumex. Suspect Cr at plateau, should decrease tomorrow. .  Compensated and stable cardiac wise. Exam unchanged. Nephrology suspect over diuresed and contrast load as cause for HOLLAND. Bumex stopped. R LE now very TTP. No pulsatile flow for GONZALEZ to detect. Suspect arterial occlusion at inflow tract. Vascular surgery consulted. Cardiology attending: seen and examined. Agree with assess and plan  No new complaints. Exam unchanged. Cardiac status stable. Vascular to see. Will see prn.     Past Medical History:   Diagnosis Date    Anxiety     Breast cancer ([facility] [address] 2005, 2010    right 2005, left 2010    Cancer [facility])      cancer right breast cancer    Cancer [facility])     cancer left breast/new dx 8/2011 +bx     Chronic obstructive pulmonary disease (HCC)     Diabetes (HCC)     Heart failure (HCC)     Hypercholesterolemia     Hypertension     Other ill-defined conditions(799.89)     osteopoosis    Other ill-defined conditions(799.89)     high cholesterol    Pneumonia     Psychiatric disorder     anxiety      Social History     Tobacco Use    Smoking status: Never Smoker    Smokeless tobacco: Never Used   Substance Use Topics    Alcohol use: No    Drug use: No         ROS:     GENERAL   Recent weight loss - no   Fever -----------------   no   Chills -----------------   no     EYES, VISION   Visual Changes - no     EARS, NOSE, THROAT   Hearing loss ----------- no   Swallowing difficulties - no     CARDIOVASCULAR   Chest pain/pressure ---- no   Arrhythmia/palpitations - no       RESPIRATORY   Cough ------------------ no   Shortness of breath - yes   Wheezing -------------- no   GASTROINTESTINAL   Abdominal pain - no   Heartburn -------- no   Bloody stool ----- no     GENITOURINARY   Frequent urination - no   Urgency -------------- no     MUSCULOSKELETAL   Joint pain/swelling ---- no   Musculoskeletal pain - yes     SKIN & INTEGUMENTARY   Rashes - no   Sores --- no         NEUROLOGICAL   Numbness/tingling - no   Sensation loss ------ no     PSYCHIATRIC   Nervousness/anxiety - no   Depression -------------- no     ENDOCRINE   Heat/cold intolerance - no   Excessive thirst -------- no     HEMATOLOGIC/LYMPHATIC   Abnormal bleeding - no     ALL/IMMUN   Allergic reaction ------ no   Recurrent infections - no     Objective:     Physical Exam:                Visit Vitals  /60 (BP 1 Location: Left arm)   Pulse (!) 56   Temp 98.3 °F (36.8 °C)   Resp 21   Ht 5' 5\" (1.651 m)   Wt 196 lb 10.4 oz (89.2 kg)   SpO2 100%   Breastfeeding?  No   BMI 32.72 kg/m²        General Appearance:   Well developed, well nourished,alert and oriented x 3, and individual in no acute distress. Ears/Nose/Mouth/Throat:    Hearing grossly normal.         Neck:  Supple. Chest:    Lungs dry crackles to auscultation bilaterally. Cardiovascular:   Regular rate and rhythm, S1, S2 normal, as murmur. Abdomen:    Soft, non-tender, bowel sounds are active. Extremities:  No edema bilaterally. R LE non palpable pulses. Cool to touch.  Very TTP   Skin:  without rashes or lesions     Telemetry: normal sinus rhythm          Data Review:    Labs:    Recent Results (from the past 24 hour(s))   GLUCOSE, POC    Collection Time: 09/19/19 12:37 PM   Result Value Ref Range    Glucose (POC) 136 (H) 65 - 100 mg/dL    Performed by DORINA DE LA ROSA    ANKLE BRACHIAL INDEX    Collection Time: 09/19/19  4:42 PM   Result Value Ref Range    Left arm  mmHg   GLUCOSE, POC    Collection Time: 09/19/19  4:46 PM   Result Value Ref Range    Glucose (POC) 111 (H) 65 - 100 mg/dL    Performed by Rolo Orantes    GLUCOSE, POC    Collection Time: 09/19/19  9:13 PM   Result Value Ref Range    Glucose (POC) 114 (H) 65 - 100 mg/dL    Performed by Tere Ariza    GLUCOSE, POC    Collection Time: 09/19/19  9:39 PM   Result Value Ref Range    Glucose (POC) 104 (H) 65 - 100 mg/dL    Performed by Jagdeep Ocasio    METABOLIC PANEL, BASIC    Collection Time: 09/20/19  3:08 AM   Result Value Ref Range    Sodium 135 (L) 136 - 145 mmol/L    Potassium 4.4 3.5 - 5.1 mmol/L    Chloride 99 97 - 108 mmol/L    CO2 29 21 - 32 mmol/L    Anion gap 7 5 - 15 mmol/L    Glucose 106 (H) 65 - 100 mg/dL    BUN 51 (H) 6 - 20 MG/DL    Creatinine 1.93 (H) 0.55 - 1.02 MG/DL    BUN/Creatinine ratio 26 (H) 12 - 20      GFR est AA 30 (L) >60 ml/min/1.73m2    GFR est non-AA 25 (L) >60 ml/min/1.73m2    Calcium 8.8 8.5 - 10.1 MG/DL   GLUCOSE, POC    Collection Time: 09/20/19  6:32 AM   Result Value Ref Range    Glucose (POC) 115 (H) 65 - 100 mg/dL    Performed by Valdemar Angeles, POC    Collection Time: 09/20/19 9:00 AM   Result Value Ref Range    Glucose (POC) 117 (H) 65 - 100 mg/dL    Performed by Corey Wolf    GLUCOSE, POC    Collection Time: 09/20/19 11:33 AM   Result Value Ref Range    Glucose (POC) 132 (H) 65 - 100 mg/dL    Performed by Nory Chavez           Radiology:        Current Facility-Administered Medications   Medication Dose Route Frequency    heparin 25,000 units in D5W 250 ml infusion  16-36 Units/kg/hr IntraVENous TITRATE    clopidogrel (PLAVIX) tablet 75 mg  75 mg Oral DAILY    insulin lispro (HUMALOG) injection   SubCUTAneous AC&HS    insulin glargine (LANTUS) injection 15 Units  15 Units SubCUTAneous DAILY    ondansetron (ZOFRAN) injection 4 mg  4 mg IntraVENous Q6H PRN    glucose chewable tablet 16 g  4 Tab Oral PRN    glucagon (GLUCAGEN) injection 1 mg  1 mg IntraMUSCular PRN    sodium chloride (NS) flush 5-40 mL  5-40 mL IntraVENous Q8H    sodium chloride (NS) flush 5-40 mL  5-40 mL IntraVENous PRN    acetaminophen (TYLENOL) tablet 650 mg  650 mg Oral Q4H PRN    allopurinol (ZYLOPRIM) tablet 100 mg  100 mg Oral DAILY    aspirin chewable tablet 81 mg  81 mg Oral DAILY    atorvastatin (LIPITOR) tablet 40 mg  40 mg Oral DAILY    metoprolol tartrate (LOPRESSOR) tablet 100 mg  100 mg Oral BID    albuterol-ipratropium (DUO-NEB) 2.5 MG-0.5 MG/3 ML  3 mL Nebulization Q4H PRN       Kaykay Messer. Darryl Parrish M.D.      [facility] [address] [place] [address] 13, [address] [facility] [place] [address]   [facility]   [phone]

## 2019-09-20 NOTE — PERIOP NOTES
FIBRILLAR WAS GIVEN TO THE STERILE FIELD TO BE USED BY MD DURING PROCEDURE  DEBORAH:[id number]  LOT: [id number]  EXP: 01/31/2022

## 2019-09-21 ENCOUNTER — APPOINTMENT (OUTPATIENT)
Dept: VASCULAR SURGERY | Age: 84
DRG: 270 | End: 2019-09-21
Attending: SURGERY
Payer: MEDICARE

## 2019-09-21 ENCOUNTER — ANESTHESIA EVENT (OUTPATIENT)
Dept: SURGERY | Age: 84
DRG: 270 | End: 2019-09-21
Payer: MEDICARE

## 2019-09-21 ENCOUNTER — ANESTHESIA (OUTPATIENT)
Dept: SURGERY | Age: 84
DRG: 270 | End: 2019-09-21
Payer: MEDICARE

## 2019-09-21 LAB
ALBUMIN SERPL-MCNC: 2.3 G/DL (ref 3.5–5)
ANION GAP SERPL CALC-SCNC: 9 MMOL/L (ref 5–15)
APTT PPP: 125 SEC (ref 22.1–32)
APTT PPP: 52.7 SEC (ref 22.1–32)
APTT PPP: 58.9 SEC (ref 22.1–32)
APTT PPP: 82.2 SEC (ref 22.1–32)
BASOPHILS # BLD: 0 K/UL (ref 0–0.1)
BASOPHILS NFR BLD: 0 % (ref 0–1)
BUN SERPL-MCNC: 49 MG/DL (ref 6–20)
BUN/CREAT SERPL: 27 (ref 12–20)
CALCIUM SERPL-MCNC: 8.2 MG/DL (ref 8.5–10.1)
CHLORIDE SERPL-SCNC: 99 MMOL/L (ref 97–108)
CO2 SERPL-SCNC: 26 MMOL/L (ref 21–32)
CREAT SERPL-MCNC: 1.83 MG/DL (ref 0.55–1.02)
DIFFERENTIAL METHOD BLD: ABNORMAL
EOSINOPHIL # BLD: 0.1 K/UL (ref 0–0.4)
EOSINOPHIL NFR BLD: 1 % (ref 0–7)
ERYTHROCYTE [DISTWIDTH] IN BLOOD BY AUTOMATED COUNT: 15.6 % (ref 11.5–14.5)
GLUCOSE BLD STRIP.AUTO-MCNC: 175 MG/DL (ref 65–100)
GLUCOSE BLD STRIP.AUTO-MCNC: 182 MG/DL (ref 65–100)
GLUCOSE BLD STRIP.AUTO-MCNC: 185 MG/DL (ref 65–100)
GLUCOSE BLD STRIP.AUTO-MCNC: 239 MG/DL (ref 65–100)
GLUCOSE SERPL-MCNC: 133 MG/DL (ref 65–100)
HCT VFR BLD AUTO: 30.1 % (ref 35–47)
HGB BLD-MCNC: 9.2 G/DL (ref 11.5–16)
IMM GRANULOCYTES # BLD AUTO: 0.1 K/UL (ref 0–0.04)
IMM GRANULOCYTES NFR BLD AUTO: 1 % (ref 0–0.5)
LYMPHOCYTES # BLD: 0.7 K/UL (ref 0.8–3.5)
LYMPHOCYTES NFR BLD: 5 % (ref 12–49)
MCH RBC QN AUTO: 27.3 PG (ref 26–34)
MCHC RBC AUTO-ENTMCNC: 30.6 G/DL (ref 30–36.5)
MCV RBC AUTO: 89.3 FL (ref 80–99)
MONOCYTES # BLD: 1.2 K/UL (ref 0–1)
MONOCYTES NFR BLD: 8 % (ref 5–13)
NEUTS SEG # BLD: 12.5 K/UL (ref 1.8–8)
NEUTS SEG NFR BLD: 85 % (ref 32–75)
NRBC # BLD: 0 K/UL (ref 0–0.01)
NRBC BLD-RTO: 0 PER 100 WBC
PHOSPHATE SERPL-MCNC: 4.5 MG/DL (ref 2.6–4.7)
PLATELET # BLD AUTO: 270 K/UL (ref 150–400)
PMV BLD AUTO: 10.5 FL (ref 8.9–12.9)
POTASSIUM SERPL-SCNC: 4.5 MMOL/L (ref 3.5–5.1)
RBC # BLD AUTO: 3.37 M/UL (ref 3.8–5.2)
RBC MORPH BLD: ABNORMAL
RBC MORPH BLD: ABNORMAL
SERVICE CMNT-IMP: ABNORMAL
SODIUM SERPL-SCNC: 134 MMOL/L (ref 136–145)
THERAPEUTIC RANGE,PTTT: ABNORMAL SECS (ref 58–77)
WBC # BLD AUTO: 14.6 K/UL (ref 3.6–11)

## 2019-09-21 PROCEDURE — 77030019908 HC STETH ESOPH SIMS -A: Performed by: ANESTHESIOLOGY

## 2019-09-21 PROCEDURE — 77030018836 HC SOL IRR NACL ICUM -A: Performed by: SURGERY

## 2019-09-21 PROCEDURE — 74011636637 HC RX REV CODE- 636/637: Performed by: SURGERY

## 2019-09-21 PROCEDURE — 74011250636 HC RX REV CODE- 250/636: Performed by: SURGERY

## 2019-09-21 PROCEDURE — 77030038269 HC DRN EXT URIN PURWCK BARD -A

## 2019-09-21 PROCEDURE — 88304 TISSUE EXAM BY PATHOLOGIST: CPT

## 2019-09-21 PROCEDURE — 74011000250 HC RX REV CODE- 250

## 2019-09-21 PROCEDURE — 36415 COLL VENOUS BLD VENIPUNCTURE: CPT

## 2019-09-21 PROCEDURE — 85730 THROMBOPLASTIN TIME PARTIAL: CPT

## 2019-09-21 PROCEDURE — 0KNS0ZZ RELEASE RIGHT LOWER LEG MUSCLE, OPEN APPROACH: ICD-10-PCS | Performed by: SURGERY

## 2019-09-21 PROCEDURE — 82962 GLUCOSE BLOOD TEST: CPT

## 2019-09-21 PROCEDURE — 77030008684 HC TU ET CUF COVD -B: Performed by: ANESTHESIOLOGY

## 2019-09-21 PROCEDURE — 76060000035 HC ANESTHESIA 2 TO 2.5 HR: Performed by: SURGERY

## 2019-09-21 PROCEDURE — 74011250636 HC RX REV CODE- 250/636: Performed by: NURSE ANESTHETIST, CERTIFIED REGISTERED

## 2019-09-21 PROCEDURE — 74011000272 HC RX REV CODE- 272: Performed by: SURGERY

## 2019-09-21 PROCEDURE — 76210000017 HC OR PH I REC 1.5 TO 2 HR: Performed by: SURGERY

## 2019-09-21 PROCEDURE — 74011000250 HC RX REV CODE- 250: Performed by: SURGERY

## 2019-09-21 PROCEDURE — 93926 LOWER EXTREMITY STUDY: CPT

## 2019-09-21 PROCEDURE — 77030026438 HC STYL ET INTUB CARD -A: Performed by: ANESTHESIOLOGY

## 2019-09-21 PROCEDURE — 04CM0ZZ EXTIRPATION OF MATTER FROM RIGHT POPLITEAL ARTERY, OPEN APPROACH: ICD-10-PCS | Performed by: SURGERY

## 2019-09-21 PROCEDURE — 77030031139 HC SUT VCRL2 J&J -A: Performed by: SURGERY

## 2019-09-21 PROCEDURE — C1757 CATH, THROMBECTOMY/EMBOLECT: HCPCS | Performed by: SURGERY

## 2019-09-21 PROCEDURE — 74011258636 HC RX REV CODE- 258/636: Performed by: FAMILY MEDICINE

## 2019-09-21 PROCEDURE — 77030011640 HC PAD GRND REM COVD -A: Performed by: SURGERY

## 2019-09-21 PROCEDURE — 74011000250 HC RX REV CODE- 250: Performed by: NURSE ANESTHETIST, CERTIFIED REGISTERED

## 2019-09-21 PROCEDURE — 80069 RENAL FUNCTION PANEL: CPT

## 2019-09-21 PROCEDURE — 85025 COMPLETE CBC W/AUTO DIFF WBC: CPT

## 2019-09-21 PROCEDURE — 74011250637 HC RX REV CODE- 250/637: Performed by: SURGERY

## 2019-09-21 PROCEDURE — 76010000131 HC OR TIME 2 TO 2.5 HR: Performed by: SURGERY

## 2019-09-21 PROCEDURE — 77030020256 HC SOL INJ NACL 0.9%  500ML: Performed by: SURGERY

## 2019-09-21 PROCEDURE — 74011250637 HC RX REV CODE- 250/637: Performed by: NURSE ANESTHETIST, CERTIFIED REGISTERED

## 2019-09-21 PROCEDURE — 77030018846 HC SOL IRR STRL H20 ICUM -A: Performed by: SURGERY

## 2019-09-21 PROCEDURE — 65660000000 HC RM CCU STEPDOWN

## 2019-09-21 PROCEDURE — 77030002986 HC SUT PROL J&J -A: Performed by: SURGERY

## 2019-09-21 RX ORDER — ETOMIDATE 2 MG/ML
INJECTION INTRAVENOUS AS NEEDED
Status: DISCONTINUED | OUTPATIENT
Start: 2019-09-21 | End: 2019-09-21 | Stop reason: HOSPADM

## 2019-09-21 RX ORDER — GLYCOPYRROLATE 0.2 MG/ML
INJECTION INTRAMUSCULAR; INTRAVENOUS AS NEEDED
Status: DISCONTINUED | OUTPATIENT
Start: 2019-09-21 | End: 2019-09-21 | Stop reason: HOSPADM

## 2019-09-21 RX ORDER — METOPROLOL TARTRATE 50 MG/1
50 TABLET ORAL 2 TIMES DAILY
Status: DISCONTINUED | OUTPATIENT
Start: 2019-09-21 | End: 2019-09-22

## 2019-09-21 RX ORDER — PROPOFOL 10 MG/ML
INJECTION, EMULSION INTRAVENOUS AS NEEDED
Status: DISCONTINUED | OUTPATIENT
Start: 2019-09-21 | End: 2019-09-21 | Stop reason: HOSPADM

## 2019-09-21 RX ORDER — SODIUM CHLORIDE 9 MG/ML
INJECTION, SOLUTION INTRAVENOUS
Status: DISCONTINUED | OUTPATIENT
Start: 2019-09-21 | End: 2019-09-21 | Stop reason: HOSPADM

## 2019-09-21 RX ORDER — DEXTROSE MONOHYDRATE AND SODIUM CHLORIDE 5; .9 G/100ML; G/100ML
500 INJECTION, SOLUTION INTRAVENOUS ONCE
Status: COMPLETED | OUTPATIENT
Start: 2019-09-21 | End: 2019-09-21

## 2019-09-21 RX ORDER — ROCURONIUM BROMIDE 10 MG/ML
INJECTION, SOLUTION INTRAVENOUS AS NEEDED
Status: DISCONTINUED | OUTPATIENT
Start: 2019-09-21 | End: 2019-09-21 | Stop reason: HOSPADM

## 2019-09-21 RX ORDER — IPRATROPIUM BROMIDE AND ALBUTEROL SULFATE 2.5; .5 MG/3ML; MG/3ML
SOLUTION RESPIRATORY (INHALATION)
Status: COMPLETED
Start: 2019-09-21 | End: 2019-09-21

## 2019-09-21 RX ORDER — CEFAZOLIN SODIUM 1 G/3ML
INJECTION, POWDER, FOR SOLUTION INTRAMUSCULAR; INTRAVENOUS AS NEEDED
Status: DISCONTINUED | OUTPATIENT
Start: 2019-09-21 | End: 2019-09-21 | Stop reason: HOSPADM

## 2019-09-21 RX ORDER — IPRATROPIUM BROMIDE AND ALBUTEROL SULFATE 2.5; .5 MG/3ML; MG/3ML
3 SOLUTION RESPIRATORY (INHALATION)
Status: COMPLETED | OUTPATIENT
Start: 2019-09-21 | End: 2019-09-21

## 2019-09-21 RX ORDER — FENTANYL CITRATE 50 UG/ML
INJECTION, SOLUTION INTRAMUSCULAR; INTRAVENOUS AS NEEDED
Status: DISCONTINUED | OUTPATIENT
Start: 2019-09-21 | End: 2019-09-21 | Stop reason: HOSPADM

## 2019-09-21 RX ORDER — ONDANSETRON 2 MG/ML
INJECTION INTRAMUSCULAR; INTRAVENOUS AS NEEDED
Status: DISCONTINUED | OUTPATIENT
Start: 2019-09-21 | End: 2019-09-21 | Stop reason: HOSPADM

## 2019-09-21 RX ORDER — DEXAMETHASONE SODIUM PHOSPHATE 4 MG/ML
INJECTION, SOLUTION INTRA-ARTICULAR; INTRALESIONAL; INTRAMUSCULAR; INTRAVENOUS; SOFT TISSUE AS NEEDED
Status: DISCONTINUED | OUTPATIENT
Start: 2019-09-21 | End: 2019-09-21 | Stop reason: HOSPADM

## 2019-09-21 RX ORDER — ALBUTEROL SULFATE 90 UG/1
AEROSOL, METERED RESPIRATORY (INHALATION) AS NEEDED
Status: DISCONTINUED | OUTPATIENT
Start: 2019-09-21 | End: 2019-09-21 | Stop reason: HOSPADM

## 2019-09-21 RX ORDER — SUCCINYLCHOLINE CHLORIDE 20 MG/ML
INJECTION INTRAMUSCULAR; INTRAVENOUS AS NEEDED
Status: DISCONTINUED | OUTPATIENT
Start: 2019-09-21 | End: 2019-09-21 | Stop reason: HOSPADM

## 2019-09-21 RX ORDER — LIDOCAINE HYDROCHLORIDE 20 MG/ML
INJECTION, SOLUTION EPIDURAL; INFILTRATION; INTRACAUDAL; PERINEURAL AS NEEDED
Status: DISCONTINUED | OUTPATIENT
Start: 2019-09-21 | End: 2019-09-21 | Stop reason: HOSPADM

## 2019-09-21 RX ADMIN — FENTANYL CITRATE 25 MCG: 50 INJECTION, SOLUTION INTRAMUSCULAR; INTRAVENOUS at 18:55

## 2019-09-21 RX ADMIN — SODIUM CHLORIDE 75 ML/HR: 900 INJECTION, SOLUTION INTRAVENOUS at 23:58

## 2019-09-21 RX ADMIN — PROPOFOL 20 MG: 10 INJECTION, EMULSION INTRAVENOUS at 18:54

## 2019-09-21 RX ADMIN — SODIUM CHLORIDE 50 ML/HR: 900 INJECTION, SOLUTION INTRAVENOUS at 10:19

## 2019-09-21 RX ADMIN — FENTANYL CITRATE 25 MCG: 50 INJECTION, SOLUTION INTRAMUSCULAR; INTRAVENOUS at 18:48

## 2019-09-21 RX ADMIN — Medication 10 ML: at 13:30

## 2019-09-21 RX ADMIN — PROPOFOL 40 MG: 10 INJECTION, EMULSION INTRAVENOUS at 18:19

## 2019-09-21 RX ADMIN — CEFAZOLIN 2 G: 330 INJECTION, POWDER, FOR SOLUTION INTRAMUSCULAR; INTRAVENOUS at 17:45

## 2019-09-21 RX ADMIN — IPRATROPIUM BROMIDE AND ALBUTEROL SULFATE 3 ML: .5; 3 SOLUTION RESPIRATORY (INHALATION) at 19:54

## 2019-09-21 RX ADMIN — ASPIRIN 81 MG CHEWABLE TABLET 81 MG: 81 TABLET CHEWABLE at 10:03

## 2019-09-21 RX ADMIN — INSULIN GLARGINE 15 UNITS: 100 INJECTION, SOLUTION SUBCUTANEOUS at 10:16

## 2019-09-21 RX ADMIN — ROCURONIUM BROMIDE 5 MG: 10 SOLUTION INTRAVENOUS at 17:40

## 2019-09-21 RX ADMIN — METOPROLOL TARTRATE 100 MG: 50 TABLET ORAL at 10:03

## 2019-09-21 RX ADMIN — PROPOFOL 40 MG: 10 INJECTION, EMULSION INTRAVENOUS at 18:22

## 2019-09-21 RX ADMIN — ONDANSETRON HYDROCHLORIDE 4 MG: 2 INJECTION, SOLUTION INTRAMUSCULAR; INTRAVENOUS at 18:03

## 2019-09-21 RX ADMIN — FENTANYL CITRATE 50 MCG: 50 INJECTION INTRAMUSCULAR; INTRAVENOUS at 01:00

## 2019-09-21 RX ADMIN — TRAMADOL HYDROCHLORIDE 25 MG: 50 TABLET ORAL at 12:08

## 2019-09-21 RX ADMIN — PHENYLEPHRINE HYDROCHLORIDE 20 MCG/MIN: 10 INJECTION INTRAVENOUS at 18:15

## 2019-09-21 RX ADMIN — SODIUM CHLORIDE: 900 INJECTION, SOLUTION INTRAVENOUS at 18:30

## 2019-09-21 RX ADMIN — TRAMADOL HYDROCHLORIDE 25 MG: 50 TABLET ORAL at 04:21

## 2019-09-21 RX ADMIN — FENTANYL CITRATE 50 MCG: 50 INJECTION INTRAMUSCULAR; INTRAVENOUS at 09:55

## 2019-09-21 RX ADMIN — SODIUM CHLORIDE: 900 INJECTION, SOLUTION INTRAVENOUS at 18:26

## 2019-09-21 RX ADMIN — DEXAMETHASONE SODIUM PHOSPHATE 4 MG: 4 INJECTION, SOLUTION INTRAMUSCULAR; INTRAVENOUS at 17:43

## 2019-09-21 RX ADMIN — ALBUTEROL SULFATE 6 PUFF: 90 AEROSOL, METERED RESPIRATORY (INHALATION) at 19:37

## 2019-09-21 RX ADMIN — ETOMIDATE 20 MG: 2 INJECTION INTRAVENOUS at 17:40

## 2019-09-21 RX ADMIN — ALLOPURINOL 100 MG: 100 TABLET ORAL at 10:03

## 2019-09-21 RX ADMIN — SODIUM CHLORIDE: 900 INJECTION, SOLUTION INTRAVENOUS at 17:32

## 2019-09-21 RX ADMIN — HEPARIN SODIUM 13 UNITS/KG/HR: 10000 INJECTION, SOLUTION INTRAVENOUS at 10:13

## 2019-09-21 RX ADMIN — ATORVASTATIN CALCIUM 40 MG: 40 TABLET, FILM COATED ORAL at 10:03

## 2019-09-21 RX ADMIN — DEXTROSE MONOHYDRATE AND SODIUM CHLORIDE 500 ML: 5; .9 INJECTION, SOLUTION INTRAVENOUS at 13:30

## 2019-09-21 RX ADMIN — INSULIN LISPRO 3 UNITS: 100 INJECTION, SOLUTION INTRAVENOUS; SUBCUTANEOUS at 06:30

## 2019-09-21 RX ADMIN — INSULIN LISPRO 3 UNITS: 100 INJECTION, SOLUTION INTRAVENOUS; SUBCUTANEOUS at 12:08

## 2019-09-21 RX ADMIN — FENTANYL CITRATE 25 MCG: 50 INJECTION, SOLUTION INTRAMUSCULAR; INTRAVENOUS at 18:19

## 2019-09-21 RX ADMIN — GLYCOPYRROLATE 0.1 MG: 0.2 INJECTION, SOLUTION INTRAMUSCULAR; INTRAVENOUS at 18:56

## 2019-09-21 RX ADMIN — PROPOFOL 20 MG: 10 INJECTION, EMULSION INTRAVENOUS at 18:34

## 2019-09-21 RX ADMIN — SODIUM CHLORIDE 500 ML: 900 INJECTION, SOLUTION INTRAVENOUS at 14:58

## 2019-09-21 RX ADMIN — FENTANYL CITRATE 25 MCG: 50 INJECTION, SOLUTION INTRAMUSCULAR; INTRAVENOUS at 18:04

## 2019-09-21 RX ADMIN — INSULIN LISPRO 2 UNITS: 100 INJECTION, SOLUTION INTRAVENOUS; SUBCUTANEOUS at 20:24

## 2019-09-21 RX ADMIN — PROPOFOL 50 MG: 10 INJECTION, EMULSION INTRAVENOUS at 17:57

## 2019-09-21 RX ADMIN — IPRATROPIUM BROMIDE AND ALBUTEROL SULFATE 3 ML: 2.5; .5 SOLUTION RESPIRATORY (INHALATION) at 19:54

## 2019-09-21 RX ADMIN — Medication 10 ML: at 06:11

## 2019-09-21 RX ADMIN — LIDOCAINE HYDROCHLORIDE 50 MG: 20 INJECTION, SOLUTION EPIDURAL; INFILTRATION; INTRACAUDAL; PERINEURAL at 17:40

## 2019-09-21 RX ADMIN — PROPOFOL 50 MG: 10 INJECTION, EMULSION INTRAVENOUS at 17:55

## 2019-09-21 RX ADMIN — HEPARIN SODIUM 11 UNITS/KG/HR: 10000 INJECTION, SOLUTION INTRAVENOUS at 22:47

## 2019-09-21 RX ADMIN — CLOPIDOGREL BISULFATE 75 MG: 75 TABLET ORAL at 10:03

## 2019-09-21 RX ADMIN — SUCCINYLCHOLINE CHLORIDE 120 MG: 20 INJECTION, SOLUTION INTRAMUSCULAR; INTRAVENOUS at 17:40

## 2019-09-21 NOTE — PROGRESS NOTES
TRANSFER - OUT REPORT:    Verbal report given to RN(name) on Lexi Echeverria  being transferred to 421(unit) for routine post - op       Report consisted of patients Situation, Background, Assessment and   Recommendations(SBAR). Time Pre op antibiotic given:1705  Anesthesia Stop time: 22462  Mejia Present on Transfer to floor:y  Order for Mejia on Chart:y  Discharge Prescriptions with Chart:n    Information from the following report(s) SBAR, Kardex, OR Summary, Procedure Summary, Intake/Output and MAR was reviewed with the receiving nurse. Opportunity for questions and clarification was provided. Is the patient on 02? YES       L/Min 3       Is the patient on a monitor? YES    Is the nurse transporting with the patient? YES    Surgical Waiting Area notified of patient's transfer from PACU? YES      The following personal items collected during your admission accompanied patient upon transfer:   Dental Appliance: Dental Appliances: None  Vision: Visual Aid: None  Hearing Aid:    Jewelry: Jewelry: None  Clothing: Clothing: (inpatient)  Other Valuables:  Other Valuables: None  Valuables sent to safe:

## 2019-09-21 NOTE — PROGRESS NOTES
Vascular Surgery  --still complaining of right leg pain from knee down as well as continued motor/sensation deficit  --has palpable/dopplerable and equal popliteal pulses bilaterally  --right calf is now warm down to mid/distal calf similar to other leg; it is not severely swollen typical of compartment syndrome but is tender similar to preop;  --both feet are cold and pulseless (left is asymptomatic)  --will recheck right arterial duplex and will decide upon further tx vs. 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

## 2019-09-21 NOTE — OP NOTES
[address]   OPERATIVE REPORT    Name:  Willye Cranker  MR#:  [id number]  :  1931  ACCOUNT #:  [de-identified]  DATE OF SERVICE:  2019    PREOPERATIVE DIAGNOSIS:  Acute right lower extremity ischemia. POSTOPERATIVE DIAGNOSIS:  Acute right lower extremity ischemia. PROCEDURE PERFORMED:  Right leg embolectomy. SURGEON:  Rafael Sloan MD    ASSISTANT:  Kelly Armas. ANESTHESIA:  General.    COMPLICATIONS:  None. SPECIMENS REMOVED:  Right iliac artery thrombus. IMPLANTS:  None. ESTIMATED BLOOD LOSS:  100 mL. INDICATIONS FOR PROCEDURE:  The patient is an 20-year-old female with multiple medical issues, who presented to the hospital with shortness of breath, and right leg weakness and numbness. A workup has revealed right leg ischemia. Based on the lack of a right femoral pulse and the acute nature of her presentation, she presents for emergent thrombectomy. She had some chronic disease of the lower leg. Risks and benefits were discussed for possible fasciotomy as well. Family voiced understanding and wished to proceed. PROCEDURE:  The patient was placed supine on the operating room table. General anesthesia was established. The right leg and the left groin were prepped and draped in the standard surgical fashion. Due to the very irritated skin and the groin where we would normally make a transverse incision, a longitudinal incision had to be made by retracting upon the very large pannus and going to just below the groin crease. This incision was taken down to the skin, subcutaneous tissue, and adipose tissue. The common femoral artery was identified and dissected out, it was soft. Vessel loops were placed around this proximally and distally. The patient had been continued on her heparin drip throughout the procedure. A transverse arteriotomy was made and there was no thrombus in this area.   A 4 Trena was run distally as far as that would go and no thrombus was able to be removed from the site. There was excellent backbleeding, so this area was packed with heparinized saline and clamped. The same Trena catheter was then advanced proximally into the aorta and then thrombectomy yielded fair a amount of thrombus clearly from common iliac area region and this appeared to be organized and fairly thickened. After a complete thrombectomy, there was excellent inflow and a very strong pulse here and a nice soft artery. Based on the patient's renal insufficiency and other medical problems, it was felt that it would be best to expedite this procedure by just removing the thrombus and seeing how she does. At this point, the artery was primarily repaired using running 5-0 Prolene. Upon release of the clamps, there was nice pulse back to this area and a good Doppler signal, and good pulse to the popliteal area. The wound was thoroughly irrigated with antibiotic solution. Fibrillar was used to assist with hemostasis. The groin wound was closed using two layers of 2-0 Vicryl followed by a Monocryl closure of the skin and then Dermabond as a dressing. The counts were correct at the end of the case x2. The patient was in the process of waking up and being transferred to the recovery room.       Arun Dobson MD [id number]  D:  09/20/2019 18:14  T:  09/20/2019 23:46  JOB #:  [id number]  CC:  Debra Mayorga MD

## 2019-09-21 NOTE — PROGRESS NOTES
Chart reviewed, orders acknowledged. Per nursing and chart, patient's BP remains low (99/40, retaken 97/39). Will hold OT evaluation for today and follow up as able and appropriate tomorrow.      Thank you,   Maksim Noble, OTR/L

## 2019-09-21 NOTE — PROGRESS NOTES
Problem: Heart Failure: Day 5  Goal: Medications  Outcome: Progressing Towards Goal     Problem: TIA/CVA Stroke: 0-24 hours  Goal: Treatments/Interventions/Procedures  Outcome: Progressing Towards Goal         1236-BP 99/40, retaken 97/39, pt is asymptomatic. Team 5 MD paged. 1241-Jose IRIZARRY at bedside, notified of low BPs, latest 97/39, 500ml fluid bolus ordered by MD. Also notified about red flecks and sediment in urine with altman, order received to discontinue altman. Will continue to monitor closely. 1249-Team 5 paged. 1253-Meño IRIZARRY notified of pt's low BP, asymptomatic, red flecks and sediment in urine, 200mL output. No new orders received. 1450-BP 86/36, pt asymptomatic, Barry IRIZARRY at bedside, order received for additional 500ml fluid bolus. Leola Panchal MD notified, no new orders received. 1539-/36 after fluid bolus, still asymptomatic. Will continue to monitor. 1645-TRANSFER - OUT REPORT:    Verbal report given to PreOp RN(name) on [facility]  being transferred to OR(unit) for ordered procedure       Report consisted of patients Situation, Background, Assessment and   Recommendations(SBAR). Information from the following report(s) SBAR, Kardex, ED Summary, Intake/Output, MAR, Accordion, Recent Results, Med Rec Status and Cardiac Rhythm NSR was reviewed with the receiving nurse. Opportunity for questions and clarification was provided. Patient transported with:   O2 @ 3 liters  Tech    2000-Bedside shift change report given to Sayra Mead and [address] RN (oncoming nurse) by Hang Meyers RN and Hilton To (offgoing nurse). Report included the following information SBAR, Kardex, ED Summary, OR Summary, MAR, Accordion, Recent Results, Med Rec Status and Cardiac Rhythm NSR. Pt still not back on unit from OR.

## 2019-09-21 NOTE — PERIOP NOTES
FIBRILLAR WAS GIVEN TO THE STERILE FIELD TO BE USED BY MD DURING PROCEDURE  REF: [id number]  LOT: [id number]  EXP: 10-

## 2019-09-21 NOTE — PROGRESS NOTES
Problem: Heart Failure: Day 4  Goal: Medications  Outcome: Progressing Towards Goal  Goal: *Oxygen saturation within defined limits  Outcome: Progressing Towards Goal    Atorvastatin, Metoprolol, Furosemide, and Plavix administered as ordered. Pt weaned from 4L to 3L NC.    0831-Non palpable bilateral pedal pulses, bilateral lower cold extremities. Gloria Pichardo MD notified, order received for STAT Vascular Surgery consult received. Consult called. Will continue to monitor. 1522-TRANSFER - OUT REPORT:    Verbal report given to Kari RN(name) on Vera Chan  being transferred to [facility]) for ordered procedure       Report consisted of patients Situation, Background, Assessment and   Recommendations(SBAR). Information from the following report(s) SBAR, Kardex, ED Summary, OR Summary, Intake/Output, MAR, Accordion, Recent Results, Med Rec Status and Cardiac Rhythm Sinus Rhythm Inverted T wave was reviewed with the receiving nurse. Opportunity for questions and clarification was provided. Patient transported with:   O2 @ 3 liters  Tech    1907-TRANSFER - IN REPORT:    Verbal report received from [facility] RN(name) on Vera Chan  being received from [facility]) for routine progression of care      Report consisted of patients Situation, Background, Assessment and   Recommendations(SBAR). Information from the following report(s)SBAR, Kardex, ED Summary, OR Summary, MAR, Accordion, Recent Results, Med Rec Status and Cardiac Rhythm Sinus Rhythm Inverted T wave was reviewed with the receiving nurse. Opportunity for questions and clarification was provided. Assessment completed upon patients arrival to unit and care assumed. 1945-aPTT drawn late because pt was in the OR.    2000-Bedside shift change report given to Paul Barrett 86 and Tiarra Calderon RN (oncoming nurse) by Delvin Chan RN and Lianna Ignacio (offgoing nurse).  Report included the following information SBAR, Kardex, ED Summary, OR Summary, MAR, Accordion, Recent Results, Med Rec Status and Cardiac Rhythm Sinus Rhythm Inverted T wave.

## 2019-09-21 NOTE — PROGRESS NOTES
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  - volume appears dry  - will increase IVF, Bp is low   - - strict I/Os and daily labs     Probable CKD:  - unclear baseline but suspect she has some component of CKD  - likely from DM and HTN     Acute CVA:  - R cerebellar infarct  - on ASA, plavix and statin  - per neurology     HFpEF:  - hold diuretics today and monitor     DM2:  - on insulin    PAD w/ cold LEs/ RLE Ischemia   - S/p RT leg revascularization in OR 9/20  - Still RT leg cold below knee  - Additional doppler ordered by Dr Jenniffer Arguelles  _ Avoid contrast        Interval History:  Seen and examined. Cr stable. Still having issues with RT leg perfusion  D/w pt and family member at bed side   Current Medications: all current  Medications have been eviewed in EPIC  Review of Systems: Pertinent items are noted in HPI. Objective:  Vitals:    Vitals:    09/21/19 1106 09/21/19 1211 09/21/19 1215 09/21/19 1236   BP: (!) 103/34 (!) 94/33 99/40 (!) 97/39   Pulse: 66 62     Resp: 20 17     Temp: 97.9 °F (36.6 °C)      SpO2: 100% 100%     Weight:       Height:         Intake and Output:  No intake/output data recorded.   09/19 1901 - 09/21 0700  In: 1207.3 [I.V.:1207.3]  Out: 152 [Urine:152]    Physical Examination:  General: NAD,Conversant, frail  Neck:  Supple, no mass  Resp:  Lungs mostly clear b/l  CV:  RRR,  no murmur or rub, no LE edema, no palpable LE pulses, cold extremities  GI:  Soft, NT, + Bowel sounds, no hepatosplenomegaly  Neurologic:  Non focal  Psych:             AAO x 3 appropriate affect   Skin:  No Rash  :  No altman    [x]    High complexity decision making was performed  [x]    Patient is at high-risk of decompensation with multiple organ involvement    Lab Data Personally Reviewed: I have reviewed all the pertinent labs, microbiology data and radiology studies during assessment. Recent Labs     09/21/19  0247 09/20/19  0308 09/19/19  0534   * 135* 136   K 4.5 4.4 4.6   CL 99 99 99   CO2 26 29 29   * 106* 165*   BUN 49* 51* 48*   CREA 1.83* 1.93* 1.86*   CA 8.2* 8.8 8.8   PHOS 4.5  --   --    ALB 2.3*  --   --      Recent Labs     09/20/19  1944   WBC 15.3*   HGB 8.9*   HCT 29.4*        Lab Results   Component Value Date/Time    Specimen Description: NARES 09/27/2011 03:58 AM    Specimen Description: BLOOD 08/15/2011 06:15 AM    Specimen Description: CATH URINE 08/15/2011 06:15 AM     Lab Results   Component Value Date/Time    Culture result: MRSA NOT PRESENT 03/31/2016 12:30 PM    Culture result:  03/31/2016 12:30 PM         Screening of patient nares for MRSA is for surveillance purposes and, if positive, to facilitate isolation considerations in high risk settings. It is not intended for automatic decolonization interventions per se as regimens are not sufficiently effective to warrant routine use. Culture result:  09/27/2011 03:58 AM     MRSA PRESENT      Screening of patient nares for MRSA is for surveillance purposes and, if positive, to facilitate isolation considerations in high risk settings. It is not intended for automatic decolonization interventions per se as regimens are not sufficiently effective to warrant routine use.      Recent Results (from the past 24 hour(s))   GLUCOSE, POC    Collection Time: 09/20/19  4:20 PM   Result Value Ref Range    Glucose (POC) 284 (H) 65 - 100 mg/dL    Performed by Sammie Bacon, POC    Collection Time: 09/20/19  5:51 PM   Result Value Ref Range    Glucose (POC) 131 (H) 65 - 100 mg/dL    Performed by Giovany Granados    GLUCOSE, POC    Collection Time: 09/20/19  6:18 PM   Result Value Ref Range    Glucose (POC) 122 (H) 65 - 100 mg/dL    Performed by Shahzad GHOTRA WITH AUTOMATED DIFF    Collection Time: 09/20/19  7:44 PM   Result Value Ref Range    WBC 15.3 (H) 3.6 - 11.0 K/uL    RBC 3.29 (L) 3.80 - 5.20 M/uL    HGB 8.9 (L) 11.5 - 16.0 g/dL    HCT 29.4 (L) 35.0 - 47.0 %    MCV 89.4 80.0 - 99.0 FL    MCH 27.1 26.0 - 34.0 PG    MCHC 30.3 30.0 - 36.5 g/dL    RDW 15.6 (H) 11.5 - 14.5 %    PLATELET 790 144 - 254 K/uL    MPV 10.2 8.9 - 12.9 FL    NRBC 0.0 0  WBC    ABSOLUTE NRBC 0.00 0.00 - 0.01 K/uL    NEUTROPHILS 90 (H) 32 - 75 %    LYMPHOCYTES 3 (L) 12 - 49 %    MONOCYTES 6 5 - 13 %    EOSINOPHILS 1 0 - 7 %    BASOPHILS 0 0 - 1 %    IMMATURE GRANULOCYTES 0 %    ABS. NEUTROPHILS 13.7 (H) 1.8 - 8.0 K/UL    ABS. LYMPHOCYTES 0.5 (L) 0.8 - 3.5 K/UL    ABS. MONOCYTES 0.9 0.0 - 1.0 K/UL    ABS. EOSINOPHILS 0.2 0.0 - 0.4 K/UL    ABS. BASOPHILS 0.0 0.0 - 0.1 K/UL    ABS. IMM.  GRANS. 0.0 K/UL    DF MANUAL      RBC COMMENTS ANISOCYTOSIS  1+        RBC COMMENTS RONALD CELLS  PRESENT       PTT    Collection Time: 09/20/19  7:44 PM   Result Value Ref Range    aPTT 76.3 (H) 22.1 - 32.0 sec    aPTT, therapeutic range     58.0 - 77.0 SECS   GLUCOSE, POC    Collection Time: 09/20/19  9:54 PM   Result Value Ref Range    Glucose (POC) 139 (H) 65 - 100 mg/dL    Performed by Andre Lei    RENAL FUNCTION PANEL    Collection Time: 09/21/19  2:47 AM   Result Value Ref Range    Sodium 134 (L) 136 - 145 mmol/L    Potassium 4.5 3.5 - 5.1 mmol/L    Chloride 99 97 - 108 mmol/L    CO2 26 21 - 32 mmol/L    Anion gap 9 5 - 15 mmol/L    Glucose 133 (H) 65 - 100 mg/dL    BUN 49 (H) 6 - 20 MG/DL    Creatinine 1.83 (H) 0.55 - 1.02 MG/DL    BUN/Creatinine ratio 27 (H) 12 - 20      GFR est AA 32 (L) >60 ml/min/1.73m2    GFR est non-AA 26 (L) >60 ml/min/1.73m2    Calcium 8.2 (L) 8.5 - 10.1 MG/DL    Phosphorus 4.5 2.6 - 4.7 MG/DL    Albumin 2.3 (L) 3.5 - 5.0 g/dL   PTT    Collection Time: 09/21/19  2:47 AM   Result Value Ref Range    aPTT 125.0 (HH) 22.1 - 32.0 sec    aPTT, therapeutic range     58.0 - 77.0 SECS   PTT    Collection Time: 09/21/19  5:35 AM   Result Value Ref Range    aPTT 52.7 (H) 22.1 - 32.0 sec    aPTT, therapeutic range     58.0 - 77.0 SECS   GLUCOSE, POC    Collection Time: 09/21/19  6:19 AM   Result Value Ref Range    Glucose (POC) 185 (H) 65 - 100 mg/dL    Performed by Prince Askew   PCT    GLUCOSE, POC    Collection Time: 09/21/19 10:13 AM   Result Value Ref Range    Glucose (POC) 182 (H) 65 - 100 mg/dL    Performed by Monisha Keane, POC    Collection Time: 09/21/19 11:05 AM   Result Value Ref Range    Glucose (POC) 175 (H) 65 - 100 mg/dL    Performed by [address] EXT ARTERY RIGHT    Collection Time: 09/21/19 11:14 AM   Result Value Ref Range    Right CFA prox sys PSV 69.5 cm/s    Right Prox PFA A PSV 55.2 cm/s    Right super femoral dist sys PSV 30.8 cm/s    Right super femoral prox sys PSV 66.2 cm/s    Right popliteal mid sys PSV 19.8 cm/s    Right Dist PTA PSV 0.0 cm/s    Right SFA Prox [place] Ratio 1.0            D/c Jackie Cevallos MD  [address]  Phone - [phone]   Fax - [phone]  www. [email or web address]

## 2019-09-21 NOTE — ANESTHESIA PREPROCEDURE EVALUATION
Relevant Problems   No relevant active problems       Anesthetic History   No history of anesthetic complications            Review of Systems / Medical History  Patient summary reviewed, nursing notes reviewed and pertinent labs reviewed    Pulmonary    COPD: moderate            Comments:  - COPD/PHTN and chronic O2 use 2-4 L at home              - Lungs without crackles from edema.  Coarse BS and \"dry\" rales    Neuro/Psych       CVA  Psychiatric history     Cardiovascular    Hypertension          Hyperlipidemia    Exercise tolerance: <4 METS  Comments: Diastolic HF - chronic- compensated  9/19 echo:  LV/RVEF 70%  Mod AS, Mod/Sev TR, Mod MR   GI/Hepatic/Renal  Within defined limits       Renal disease: CRI       Endo/Other  Within defined limits  Diabetes: type 2    Anemia     Other Findings   Comments: . R LE weakness   3. Elevated troponin   4. Chronic respiratory failure              - chronic oxygen use. 2-4L 24 hours              - multifactorial - COPD, PHTN, AS, noted ILD on CTA in 2017  5. COPD  6. PHTN              - moderate on last echo. PAS 60 mmHg  7. AS              -8. Anemia             10. HTN                11. DM              12. HOLLAND on CKD           Physical Exam    Airway  Mallampati: III  TM Distance: 4 - 6 cm  Neck ROM: decreased range of motion   Mouth opening: Normal     Cardiovascular          Murmur: Grade 3, Aortic area     Dental    Dentition: Edentulous     Pulmonary      Decreased breath sounds: bilateral           Abdominal  GI exam deferred       Other Findings            Anesthetic Plan    ASA: 4  Anesthesia type: general    Monitoring Plan: Arterial line      Induction: Intravenous and RSI  Anesthetic plan and risks discussed with: Patient      Pt understands the mod-high cardiopulm risk due to emergent surgery, and that resp failure is a risk requiring vent support. All questions answered.

## 2019-09-21 NOTE — PROGRESS NOTES
Vascular Surgery  --duplex findings suggest possible residual thrombus at popliteal artery  --will d/w patient/family re: another thrombectomy attempt today

## 2019-09-21 NOTE — BRIEF OP NOTE
BRIEF OPERATIVE NOTE    Date of Procedure: 9/21/2019   Preoperative Diagnosis: ischemic right  leg  Postoperative Diagnosis: ischemic right  leg    Procedure(s):  THROMBECTOMY/EMBOLECTOMY LOWER  RIGHT LEG.  FASCIOTOMY  Surgeon(s) and Role:     Evan Galvan MD - Primary         Surgical Assistant: Janeen Ren    Surgical Staff:  Circ-1: Concepcion Sharp RN  Circ-Relief: Alesha Lopez RN  Scrub Tech-1: Tamanna Crooks  Scrub Tech-Relief: Shan Parrish  Surg Asst-1: Smitha Laguna  Surg Asst-Relief: Benedicto BAKER  Event Time In Time Out   Incision Start 09/21/2019 1756    Incision Close 09/21/2019 1909      Anesthesia: General   Estimated Blood Loss: 100cc  Specimens:   ID Type Source Tests Collected by Time Destination   1 : right leg thrombus Fresh Leg, Right  Maribeth MD Araseli 9/21/2019 1851 Pathology      Findings: popliteal artery thrombus; devitalized calf muscle  Complications: none  Implants: * No implants in log *

## 2019-09-21 NOTE — PROGRESS NOTES
Hospitalist Progress Note                               Brandon Monterroso MD                                     Answering service: [phone] OR 36 from in house phone           Date of Service:  2019  NAME:  Ronit Najera  :  1931  MRN:  [id number]      Admission Summary:   70-year-old female with an extensive past medical history including chronic hypoxemic respiratory failure with home oxygen, COPD, chronic kidney disease, type 2 diabetes mellitus, atrial fibrillation, diastolic congestive heart failure, dyslipidemia, primary hypertension, osteoporosis, pulmonary hypertension, depression and anxiety disorder, was brought to the emergency room from home for evaluation of an approximately 2-3 day history of worsening shortness of breath. Per the patient's son, the patient ambulates unaided at baseline; however, in the 2-3 days leading up to the emergency room presentation, noted to have worsening shortness of breath even with easy activity. The patient also complained of some numbness and weakness in the right lower extremity. The patient admitted to being compliant with all her medical therapies. The patient denied associated headaches, dizziness, visual deficits, chest pains, palpitations, nausea, vomiting, cough, sputum production, fevers, chills, abdominal pain, bladder or bowel irregularities. Reason for follow up:       CC: SOB    All events in the past 24 hours reviewed in their entirety. Reviewed with nursing at bedside. No new complains today. BP on low side in 43D systolic, asymptomatic     Assessment & Plan:     Acute on chronic diastolic CHF exarcerbation with a preserved EF. Demand ischemia with mildly elevated troponins due to the CHF exarcerbation. Indeterminable NYHA Functional Class due to the multiple Co-morbidities per Cardiology. Primary Hypertension   Dyslipidemia   - 2D ECHO (19) with EF 70 percent.    - Continue oxygen  - s/p IV diuresis  - statins  - daily weight input/output monitoring  - CHF teaching.   - Cardiology and Heart failure team evals noted and appreciated. - BP on low side - decreased BB dose to 50 mg, monitor, ordered 500 ml NS bolus today for hypotension    HOLLAND on probable CKD stage 2-3 due to medication side effect (Bumex and Spironolactone) POA - -now off lasix  -Renal US negative for obstructive uropathy.   -Nephrology eval noted and appreciated    Multifactorial Hyponatremia POA - stable  Resolved Hyperkalemia without any dysrhythmias. 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 - Continue Aspirin, Plavix, statin, neurochecks. - Neurology eval noted and appreciated. Acute thrombus at the mid and distal right popliteal level RLE  - Heparin drip. - Vascular surgery follows - to OR today    Chronic Hypoxemic respiratory failure due to COPD with home oxygen at 3 l/min. - Nebulizers, inhalers, incentive spirometry. Anemia chronic  - monitor, stable. Uncontrolled IDDM type 2 DM with complications including hyperglycemia.   - Hold metformin. - Accucheck monitoring  - Basal and sliding scale insulins  - diabetic teaching done    Osteoporosis - f/u as OP    Anxiety disorder and Depression without any current behavioral disturbances. Prophylaxis: DVT. Full Code with a guarded prognosis. - Palliative care consult. Plan: Anticipate patient will require Short-term Post-Acute care facility placement for ongoing PT/OT. Patient's son Jose Elias Tilley is at [phone] if needed for updates.      Hospital Problems  Date Reviewed: 9/20/2019          Codes Class Noted POA    CHF exacerbation ([facility] [address] ICD-10-CM: I50.9  ICD-9-CM: 428.0  9/17/2019 Yes        Hyperkalemia ICD-10-CM: E87.5  ICD-9-CM: 276.7  9/17/2019 Yes        Hyponatremia ICD-10-CM: E87.1  ICD-9-CM: 276.1  9/17/2019 Yes        HOLLAND (acute kidney injury) ([facility] [address] ICD-10-CM: N17.9  ICD-9-CM: 584.9  9/17/2019 Yes        Acute hypoxemic respiratory failure [facility]) ICD-10-CM: J96.01  ICD-9-CM: 518.81  8/8/2011 Yes        Debility ICD-10-CM: R53.81  ICD-9-CM: 799.3  6/28/2011 Yes              Physical Examination:      Last 24hrs VS reviewed since prior progress note. Most recent are:  Visit Vitals  BP (!) 92/33 (BP Patient Position: At rest)   Pulse 61   Temp 97.9 °F (36.6 °C)   Resp 17   Ht 5' 5\" (1.651 m)   Wt 87.5 kg (192 lb 14.4 oz)   SpO2 100%   Breastfeeding? No   BMI 32.10 kg/m²           Constitutional:  No acute distress. HEENT: Head is a traumatic,  Un icteric sclera. Pink conjunctiva,no erythema or discharge. Oral mucous moist, oropharynx benign. Neck supple,    Resp:  Decreased air entry B/L.   CV:  S1,S2 present. GI:  Soft, non distended, non tender. normoactive bowel sounds, no hepatosplenomegaly    :  No CVA or suprapubic tenderness   Skin  :  No erythema,rash,bullae,dipigmentation . Cool lower extremities. Musculoskeletal:  RLE slightly bigger than LLE. Absent Dorsalis Pedis Pulse RLE. Power 3/5 RLE, 5/5 other extremities. Neurologic:  Awake, alert and oriented. Decreased sensation RLE.         Intake/Output Summary (Last 24 hours) at 9/21/2019 1340  Last data filed at 9/21/2019 1303  Gross per 24 hour   Intake 1207.31 ml   Output 352 ml   Net 855.31 ml              Labs:     Recent Labs     09/20/19  1944   WBC 15.3*   HGB 8.9*   HCT 29.4*        Recent Labs     09/21/19  0247 09/20/19  0308 09/19/19  0534   * 135* 136   K 4.5 4.4 4.6   CL 99 99 99   CO2 26 29 29   BUN 49* 51* 48*   CREA 1.83* 1.93* 1.86*   * 106* 165*   CA 8.2* 8.8 8.8   PHOS 4.5  --   --      Recent Labs     09/21/19 0247   ALB 2.3*     Recent Labs     09/21/19  0535 09/21/19  0247 09/20/19  1944   APTT 52.7* 125.0* 76.3*     Lab Results   Component Value Date/Time    Cholesterol, total 144 09/17/2019 09:21 AM    HDL Cholesterol 55 09/17/2019 09:21 AM    LDL, calculated 72.6 09/17/2019 09:21 AM    Triglyceride 82 09/17/2019 09:21 AM    CHOL/HDL Ratio 2.6 09/17/2019 09:21 AM     Lab Results   Component Value Date/Time    Glucose (POC) 175 (H) 09/21/2019 11:05 AM    Glucose (POC) 182 (H) 09/21/2019 10:13 AM    Glucose (POC) 185 (H) 09/21/2019 06:19 AM    Glucose (POC) 139 (H) 09/20/2019 09:54 PM    Glucose (POC) 122 (H) 09/20/2019 06:18 PM     Lab Results   Component Value Date/Time    Color YELLOW/STRAW 09/17/2019 12:49 PM    Appearance CLEAR 09/17/2019 12:49 PM    Specific gravity 1.030 09/17/2019 12:49 PM    Specific gravity 1.010 12/18/2011 09:28 AM    pH (UA) 6.0 09/17/2019 12:49 PM    Protein NEGATIVE  09/17/2019 12:49 PM    Glucose NEGATIVE  09/17/2019 12:49 PM    Ketone NEGATIVE  09/17/2019 12:49 PM    Bilirubin NEGATIVE  09/17/2019 12:49 PM    Urobilinogen 0.2 09/17/2019 12:49 PM    Nitrites NEGATIVE  09/17/2019 12:49 PM    Leukocyte Esterase NEGATIVE  09/17/2019 12:49 PM    Epithelial cells FEW 09/17/2019 12:49 PM    Bacteria NEGATIVE  09/17/2019 12:49 PM    WBC 0-4 09/17/2019 12:49 PM    RBC 0-5 09/17/2019 12:49 PM         Medications Reviewed:     Current Facility-Administered Medications   Medication Dose Route Frequency    heparin 25,000 units in D5W 250 ml infusion  16-36 Units/kg/hr IntraVENous TITRATE    traMADol (ULTRAM) tablet 25 mg  25 mg Oral Q8H PRN    0.9% sodium chloride infusion  75 mL/hr IntraVENous CONTINUOUS    fentaNYL citrate (PF) injection 50 mcg  50 mcg IntraVENous Q3H PRN    clopidogrel (PLAVIX) tablet 75 mg  75 mg Oral DAILY    insulin lispro (HUMALOG) injection   SubCUTAneous AC&HS    insulin glargine (LANTUS) injection 15 Units  15 Units SubCUTAneous DAILY    ondansetron (ZOFRAN) injection 4 mg  4 mg IntraVENous Q6H PRN    glucose chewable tablet 16 g  4 Tab Oral PRN    glucagon (GLUCAGEN) injection 1 mg  1 mg IntraMUSCular PRN    sodium chloride (NS) flush 5-40 mL  5-40 mL IntraVENous Q8H    sodium chloride (NS) flush 5-40 mL  5-40 mL IntraVENous PRN    acetaminophen (TYLENOL) tablet 650 mg  650 mg Oral Q4H PRN    allopurinol (ZYLOPRIM) tablet 100 mg  100 mg Oral DAILY    aspirin chewable tablet 81 mg  81 mg Oral DAILY    atorvastatin (LIPITOR) tablet 40 mg  40 mg Oral DAILY    metoprolol tartrate (LOPRESSOR) tablet 100 mg  100 mg Oral BID    albuterol-ipratropium (DUO-NEB) 2.5 MG-0.5 MG/3 ML  3 mL Nebulization Q4H PRN     ______________________________________________________________________  EXPECTED LENGTH OF STAY: 3d 7h  ACTUAL LENGTH OF STAY:          4                 Marcin Franz MD

## 2019-09-21 NOTE — PROGRESS NOTES
0730- Bedside and Verbal shift change report given to Carolyn Franz RN (oncoming nurse) by Bruce Hernández RN (offgoing nurse). Report included the following information SBAR, Kardex, Procedure Summary, Intake/Output, MAR, Accordion, Recent Results and Med Rec Status. Last 3 Recorded Weights in this Encounter    09/19/19 0338 09/20/19 0310 09/21/19 0327   Weight: 86.4 kg (190 lb 7.6 oz) 89.2 kg (196 lb 10.4 oz) 87.5 kg (192 lb 14.4 oz)     Weight variance noted. 2235- Pt coughed up sputum and showed it to nurse. Nurse noticed blood in sputum. Notified the NP. NP stated to continue heparin drip and keep an eye on the pTT value. Pt has call bell within reach and knows to use with any needs.     Problem: Heart Failure: Day 4  Goal: Activity/Safety  Outcome: Progressing Towards Goal

## 2019-09-22 LAB
ALBUMIN SERPL-MCNC: 2 G/DL (ref 3.5–5)
ANION GAP SERPL CALC-SCNC: 8 MMOL/L (ref 5–15)
ANION GAP SERPL CALC-SCNC: 9 MMOL/L (ref 5–15)
APTT PPP: 53.5 SEC (ref 22.1–32)
APTT PPP: 55.2 SEC (ref 22.1–32)
APTT PPP: 66.8 SEC (ref 22.1–32)
BASOPHILS # BLD: 0 K/UL (ref 0–0.1)
BASOPHILS NFR BLD: 0 % (ref 0–1)
BUN SERPL-MCNC: 48 MG/DL (ref 6–20)
BUN SERPL-MCNC: 50 MG/DL (ref 6–20)
BUN/CREAT SERPL: 24 (ref 12–20)
BUN/CREAT SERPL: 26 (ref 12–20)
CALCIUM SERPL-MCNC: 7.5 MG/DL (ref 8.5–10.1)
CALCIUM SERPL-MCNC: 7.6 MG/DL (ref 8.5–10.1)
CHLORIDE SERPL-SCNC: 102 MMOL/L (ref 97–108)
CHLORIDE SERPL-SCNC: 102 MMOL/L (ref 97–108)
CK SERPL-CCNC: ABNORMAL U/L (ref 26–192)
CO2 SERPL-SCNC: 23 MMOL/L (ref 21–32)
CO2 SERPL-SCNC: 24 MMOL/L (ref 21–32)
CREAT SERPL-MCNC: 1.95 MG/DL (ref 0.55–1.02)
CREAT SERPL-MCNC: 1.96 MG/DL (ref 0.55–1.02)
DIFFERENTIAL METHOD BLD: ABNORMAL
EOSINOPHIL # BLD: 0 K/UL (ref 0–0.4)
EOSINOPHIL NFR BLD: 0 % (ref 0–7)
ERYTHROCYTE [DISTWIDTH] IN BLOOD BY AUTOMATED COUNT: 15.7 % (ref 11.5–14.5)
GLUCOSE BLD STRIP.AUTO-MCNC: 159 MG/DL (ref 65–100)
GLUCOSE BLD STRIP.AUTO-MCNC: 203 MG/DL (ref 65–100)
GLUCOSE BLD STRIP.AUTO-MCNC: 231 MG/DL (ref 65–100)
GLUCOSE BLD STRIP.AUTO-MCNC: 301 MG/DL (ref 65–100)
GLUCOSE SERPL-MCNC: 187 MG/DL (ref 65–100)
GLUCOSE SERPL-MCNC: 187 MG/DL (ref 65–100)
HCT VFR BLD AUTO: 24.8 % (ref 35–47)
HGB BLD-MCNC: 7.5 G/DL (ref 11.5–16)
IMM GRANULOCYTES # BLD AUTO: 0.1 K/UL (ref 0–0.04)
IMM GRANULOCYTES NFR BLD AUTO: 1 % (ref 0–0.5)
LYMPHOCYTES # BLD: 0.5 K/UL (ref 0.8–3.5)
LYMPHOCYTES NFR BLD: 3 % (ref 12–49)
MCH RBC QN AUTO: 27.1 PG (ref 26–34)
MCHC RBC AUTO-ENTMCNC: 30.2 G/DL (ref 30–36.5)
MCV RBC AUTO: 89.5 FL (ref 80–99)
MONOCYTES # BLD: 1.5 K/UL (ref 0–1)
MONOCYTES NFR BLD: 9 % (ref 5–13)
NEUTS SEG # BLD: 14.2 K/UL (ref 1.8–8)
NEUTS SEG NFR BLD: 87 % (ref 32–75)
NRBC # BLD: 0 K/UL (ref 0–0.01)
NRBC BLD-RTO: 0 PER 100 WBC
PHOSPHATE SERPL-MCNC: 4 MG/DL (ref 2.6–4.7)
PLATELET # BLD AUTO: 233 K/UL (ref 150–400)
PMV BLD AUTO: 10.5 FL (ref 8.9–12.9)
POTASSIUM SERPL-SCNC: 4.6 MMOL/L (ref 3.5–5.1)
POTASSIUM SERPL-SCNC: 4.8 MMOL/L (ref 3.5–5.1)
RBC # BLD AUTO: 2.77 M/UL (ref 3.8–5.2)
RIGHT CFA PROX SYS PSV: 69.5 CM/S
RIGHT DIST PTA PSV: 0 CM/S
RIGHT POPLITEAL MID SYS PSV: 19.8 CM/S
RIGHT PROX PFA A PSV: 55.2 CM/S
RIGHT SFA PROX VEL RATIO: 1
RIGHT SUPER FEMORAL DIST SYS PSV: 30.8 CM/S
RIGHT SUPER FEMORAL PROX SYS PSV: 66.2 CM/S
SERVICE CMNT-IMP: ABNORMAL
SODIUM SERPL-SCNC: 134 MMOL/L (ref 136–145)
SODIUM SERPL-SCNC: 134 MMOL/L (ref 136–145)
THERAPEUTIC RANGE,PTTT: ABNORMAL SECS (ref 58–77)
WBC # BLD AUTO: 16.3 K/UL (ref 3.6–11)

## 2019-09-22 PROCEDURE — 74011250637 HC RX REV CODE- 250/637: Performed by: SURGERY

## 2019-09-22 PROCEDURE — 74011250636 HC RX REV CODE- 250/636: Performed by: SURGERY

## 2019-09-22 PROCEDURE — 80069 RENAL FUNCTION PANEL: CPT

## 2019-09-22 PROCEDURE — 82962 GLUCOSE BLOOD TEST: CPT

## 2019-09-22 PROCEDURE — 85730 THROMBOPLASTIN TIME PARTIAL: CPT

## 2019-09-22 PROCEDURE — 80048 BASIC METABOLIC PNL TOTAL CA: CPT

## 2019-09-22 PROCEDURE — 82550 ASSAY OF CK (CPK): CPT

## 2019-09-22 PROCEDURE — 36415 COLL VENOUS BLD VENIPUNCTURE: CPT

## 2019-09-22 PROCEDURE — 65660000000 HC RM CCU STEPDOWN

## 2019-09-22 PROCEDURE — 74011250637 HC RX REV CODE- 250/637: Performed by: FAMILY MEDICINE

## 2019-09-22 PROCEDURE — 74011636637 HC RX REV CODE- 636/637: Performed by: SURGERY

## 2019-09-22 PROCEDURE — 85025 COMPLETE CBC W/AUTO DIFF WBC: CPT

## 2019-09-22 PROCEDURE — 74011250636 HC RX REV CODE- 250/636: Performed by: INTERNAL MEDICINE

## 2019-09-22 RX ORDER — SODIUM CHLORIDE 9 MG/ML
50 INJECTION, SOLUTION INTRAVENOUS CONTINUOUS
Status: DISCONTINUED | OUTPATIENT
Start: 2019-09-22 | End: 2019-09-23

## 2019-09-22 RX ORDER — METOPROLOL TARTRATE 25 MG/1
25 TABLET, FILM COATED ORAL 2 TIMES DAILY
Status: DISCONTINUED | OUTPATIENT
Start: 2019-09-22 | End: 2019-10-23 | Stop reason: HOSPADM

## 2019-09-22 RX ADMIN — METOPROLOL TARTRATE 25 MG: 25 TABLET ORAL at 21:41

## 2019-09-22 RX ADMIN — ALLOPURINOL 100 MG: 100 TABLET ORAL at 09:04

## 2019-09-22 RX ADMIN — Medication 10 ML: at 06:00

## 2019-09-22 RX ADMIN — CLOPIDOGREL BISULFATE 75 MG: 75 TABLET ORAL at 09:04

## 2019-09-22 RX ADMIN — Medication 10 ML: at 13:39

## 2019-09-22 RX ADMIN — FENTANYL CITRATE 50 MCG: 50 INJECTION INTRAMUSCULAR; INTRAVENOUS at 17:16

## 2019-09-22 RX ADMIN — INSULIN LISPRO 4 UNITS: 100 INJECTION, SOLUTION INTRAVENOUS; SUBCUTANEOUS at 07:05

## 2019-09-22 RX ADMIN — FENTANYL CITRATE 50 MCG: 50 INJECTION INTRAMUSCULAR; INTRAVENOUS at 23:26

## 2019-09-22 RX ADMIN — METOPROLOL TARTRATE 25 MG: 25 TABLET ORAL at 09:04

## 2019-09-22 RX ADMIN — INSULIN LISPRO 4 UNITS: 100 INJECTION, SOLUTION INTRAVENOUS; SUBCUTANEOUS at 17:16

## 2019-09-22 RX ADMIN — SODIUM CHLORIDE 75 ML/HR: 900 INJECTION, SOLUTION INTRAVENOUS at 06:42

## 2019-09-22 RX ADMIN — TRAMADOL HYDROCHLORIDE 25 MG: 50 TABLET ORAL at 21:42

## 2019-09-22 RX ADMIN — INSULIN LISPRO 10 UNITS: 100 INJECTION, SOLUTION INTRAVENOUS; SUBCUTANEOUS at 12:33

## 2019-09-22 RX ADMIN — SODIUM CHLORIDE 50 ML/HR: 900 INJECTION, SOLUTION INTRAVENOUS at 15:29

## 2019-09-22 RX ADMIN — ASPIRIN 81 MG CHEWABLE TABLET 81 MG: 81 TABLET CHEWABLE at 09:04

## 2019-09-22 RX ADMIN — FENTANYL CITRATE 50 MCG: 50 INJECTION INTRAMUSCULAR; INTRAVENOUS at 07:21

## 2019-09-22 RX ADMIN — INSULIN GLARGINE 15 UNITS: 100 INJECTION, SOLUTION SUBCUTANEOUS at 09:04

## 2019-09-22 RX ADMIN — TRAMADOL HYDROCHLORIDE 25 MG: 50 TABLET ORAL at 05:56

## 2019-09-22 RX ADMIN — HEPARIN SODIUM 12 UNITS/KG/HR: 10000 INJECTION, SOLUTION INTRAVENOUS at 11:28

## 2019-09-22 NOTE — PROGRESS NOTES
Bedside shift change report given to Carlos RN (oncoming nurse) by Elizabet Figueroa RN (offgoing nurse).  Report included the following information SBAR, Intake/Output, MAR, Recent Results and Cardiac Rhythm SR.

## 2019-09-22 NOTE — PROGRESS NOTES
Vascular    Feels better with pain medication but still has some foot pain  Visit Vitals  /40   Pulse 69   Temp 99 °F (37.2 °C)   Resp 20   Ht 5' 5\" (1.651 m)   Wt 92.2 kg (203 lb 4.2 oz)   SpO2 100%   Breastfeeding? No   BMI 33.82 kg/m²     RLE cool, no motor function of the foot  Incisions dressed    81 y/o AAF with multiple medical conditions with RLE ischemia s/p thrombectomy and fasciotomy  - Noted non-viable muscle in the right calf. Likely will not regain function of her foot. Hopefully we can control her pain. Needs a foot drop splint(PRAFO boot) and pain control.  Continue heparin for now

## 2019-09-22 NOTE — PROGRESS NOTES
Problem: Diabetes Self-Management  Goal: *Disease process and treatment process  Description  Define diabetes and identify own type of diabetes; list 3 options for treating diabetes. Outcome: Progressing Towards Goal  Goal: *Incorporating nutritional management into lifestyle  Description  Describe effect of type, amount and timing of food on blood glucose; list 3 methods for planning meals. Outcome: Progressing Towards Goal  Goal: *Incorporating physical activity into lifestyle  Description  State effect of exercise on blood glucose levels. Outcome: Progressing Towards Goal  Goal: *Developing strategies to promote health/change behavior  Description  Define the ABC's of diabetes; identify appropriate screenings, schedule and personal plan for screenings. Outcome: Progressing Towards Goal  Goal: *Using medications safely  Description  State effect of diabetes medications on diabetes; name diabetes medication taking, action and side effects. Outcome: Progressing Towards Goal  Goal: *Monitoring blood glucose, interpreting and using results  Description  Identify recommended blood glucose targets  and personal targets. Outcome: Progressing Towards Goal  Goal: *Prevention, detection, treatment of acute complications  Description  List symptoms of hyper- and hypoglycemia; describe how to treat low blood sugar and actions for lowering  high blood glucose level. Outcome: Progressing Towards Goal  Goal: *Prevention, detection and treatment of chronic complications  Description  Define the natural course of diabetes and describe the relationship of blood glucose levels to long term complications of diabetes.   Outcome: Progressing Towards Goal  Goal: *Developing strategies to address psychosocial issues  Description  Describe feelings about living with diabetes; identify support needed and support network  Outcome: Progressing Towards Goal  Goal: *Insulin pump training  Outcome: Progressing Towards Goal  Goal: *Sick day guidelines  Outcome: Progressing Towards Goal  Goal: *Patient Specific Goal (EDIT GOAL, INSERT TEXT)  Outcome: Progressing Towards Goal     Problem: Heart Failure: Day 5  Goal: Activity/Safety  Outcome: Progressing Towards Goal  Goal: Diagnostic Test/Procedures  Outcome: Progressing Towards Goal  Goal: Nutrition/Diet  Outcome: Progressing Towards Goal  Goal: Discharge Planning  Outcome: Progressing Towards Goal  Goal: Medications  Outcome: Progressing Towards Goal  Goal: Respiratory  Outcome: Progressing Towards Goal  Goal: Treatments/Interventions/Procedures  Outcome: Progressing Towards Goal  Goal: Psychosocial  Outcome: Progressing Towards Goal     Problem: Falls - Risk of  Goal: *Absence of Falls  Description  [facility] and appropriate interventions in the flowsheet. Outcome: Progressing Towards Goal  Note:   Fall Risk Interventions:  Mobility Interventions: Patient to call before getting OOB, PT Consult for mobility concerns         Medication Interventions: Patient to call before getting OOB    Elimination Interventions: Patient to call for help with toileting needs, Elevated toilet seat, Call light in reach    History of Falls Interventions: Door open when patient unattended, Room close to nurse's station, Vital signs minimum Q4HRs X 24 hrs (comment for end date)         Problem: Patient Education: Go to Patient Education Activity  Goal: Patient/Family Education  Outcome: Progressing Towards Goal     Problem: Pressure Injury - Risk of  Goal: *Prevention of pressure injury  Description  Document Jagdeep Scale and appropriate interventions in the flowsheet.   Outcome: Progressing Towards Goal  Note:   Pressure Injury Interventions:  Sensory Interventions: Assess need for specialty bed, Assess changes in LOC    Moisture Interventions: Check for incontinence Q2 hours and as needed, Assess need for specialty bed, Apply protective barrier, creams and emollients, Absorbent underpads    Activity Interventions: Increase time out of bed, Pressure redistribution bed/mattress(bed type), PT/OT evaluation    Mobility Interventions: HOB 30 degrees or less, Pressure redistribution bed/mattress (bed type)    Nutrition Interventions: Document food/fluid/supplement intake    Friction and Shear Interventions: Minimize layers                Problem: Patient Education: Go to Patient Education Activity  Goal: Patient/Family Education  Outcome: Progressing Towards Goal     Problem: Patient Education: Go to Patient Education Activity  Goal: Patient/Family Education  Outcome: Progressing Towards Goal

## 2019-09-22 NOTE — OP NOTES
[address]   OPERATIVE REPORT    Name:  Elizabeth Millard  MR#:  [id number]  :  1931  ACCOUNT #:  [de-identified]  DATE OF SERVICE:  2019      PREOPERATIVE DIAGNOSIS:  Ischemic right leg. POSTOPERATIVE DIAGNOSIS: Ischemic right leg. PROCEDURES PERFORMED:  Thrombectomy of right leg, 4-compartment fasciotomy. SURGEON:  MD Antony Lisa. ANESTHESIA:  General.    COMPLICATIONS:  None. SPECIMENS REMOVED:  Right leg thrombus. IMPLANTS:  None. ESTIMATED BLOOD LOSS:  100 mL. INDICATIONS FOR PROCEDURE:  The patient is an 55-year-old female in relatively poor health, who presented with multiple issues earlier in her hospital stay. She was found to have an ischemic leg. She was taken for right leg thrombectomy yesterday which felt to be adequate; however, she did not feel any better and a repeat Duplex showed some residual clot in her popliteal artery. She presents for one more thrombectomy attempt. She has a dense motor and sensory defect, that is concerning for persistent weakness and nerve damage. I have explained this to the family. We will proceed with further thrombectomy because of the patient's extreme discomfort. PROCEDURE:  The patient was placed supine on the operating room table. The right leg was prepped and draped in the standard surgical fashion. A medial lower leg incision was made to expose the below-the-knee popliteal artery. This was taken down through the skin and subcutaneous tissue. After entering the fascia, a large amount of discharge and dark blood was seen. The muscle appeared to be significantly abnormal in this location almost mushy with difficulty. The below-the-knee popliteal artery was dissected out and healed off both the adjacent vein and nerve. Vessel loops were placed around this for control. I should add that the patient was maintained on her heparin drip. The artery was clamped proximally.   A transverse arteriotomy was made and a 4 Trena was run several times distally and acute clot was obtained followed by some backbleeding. A 3 Trena was then run distally multiple times and more thrombus was obtained followed by excellent backbleeding. Given the nature of the muscle, I felt that this was reasonable attempt to help with her pain. Following this, the distal popliteal artery was infused with heparinized saline and the proximal portion was thrombectomized yielding excellent inflow. The arteriotomy was then closed with running 5-0 Prolene. Hemostasis was achieved. Fasciotomy was done in this site. Following this, the wound was thoroughly irrigated and packed. After this, attention was then turned to the right lateral portion of the lower leg. A 4 cm excision was made here, taken down through the skin and subcutaneous tissue. The fascia was then incised using the Bovie electrocautery somewhat appeared to be adipose and muscle beveled out here a bit, so 4-compartment fasciotomy was done at the anterior and lateral compartments. The muscle here did not stimulate the cautery. This area was irrigated with antibiotic solution and then I felt safe to close the skin here so the tissue would not have a draining wound and this was done with staples. Following this, the medial wound was closed using layer of Vicryl and staples followed by dry sterile dressing and a leg wrap. Counts were correct at the end of the case x2. The patient was then awoken and transported to the recovery room in stable condition.       Alfonso Bee MD [id number]  D:  09/21/2019 22:49  T:  09/22/2019 1:12  JOB #:  [id number]  CC:  Deneen Rizvi MD

## 2019-09-22 NOTE — PERIOP NOTES
Dr. Michael Eckert states ok to take out ART line prior to PACU discharge. Dr. Jc Silva aware unable to find pulses, R pedal and popliteal. No new orders at this time, he expected this post op. TRANSFER - OUT REPORT:    Verbal report given to Shelli RN(name) on Agusto Moncada  being transferred to (unit) for routine post - op       Report consisted of patients Situation, Background, Assessment and   Recommendations(SBAR). Time Pre op antibiotic given:1745  Anesthesia Stop time: 1946    Information from the following report(s) SBAR, OR Summary, Intake/Output and MAR was reviewed with the receiving nurse. Opportunity for questions and clarification was provided. Is the patient on 02? YES       L/Min 2       Other     Is the patient on a monitor? YES      Is the nurse transporting with the patient? YES    Surgical Waiting Area notified of patient's transfer from PACU? YES      The following personal items collected during your admission accompanied patient upon transfer:   Dental Appliance: Dental Appliances: None  Vision: Visual Aid: Glasses, At home  Hearing Aid:    Jewelry: Jewelry: None  Clothing: Clothing: (inpatient)  Other Valuables:  Other Valuables: None  Valuables sent to safe:

## 2019-09-22 NOTE — PROGRESS NOTES
Hospitalist Progress Note                               Merrill Sood MD                                     Answering service: [phone] OR 36 from in house phone           Date of Service:  2019  NAME:  Amy Giordano YOB: 1931  MRN:  [id number]      Admission Summary:   69-year-old female with an extensive past medical history including chronic hypoxemic respiratory failure with home oxygen, COPD, chronic kidney disease, type 2 diabetes mellitus, atrial fibrillation, diastolic congestive heart failure, dyslipidemia, primary hypertension, osteoporosis, pulmonary hypertension, depression and anxiety disorder, was brought to the emergency room from home for evaluation of an approximately 2-3 day history of worsening shortness of breath. Per the patient's son, the patient ambulates unaided at baseline; however, in the 2-3 days leading up to the emergency room presentation, noted to have worsening shortness of breath even with easy activity. The patient also complained of some numbness and weakness in the right lower extremity. The patient admitted to being compliant with all her medical therapies. The patient denied associated headaches, dizziness, visual deficits, chest pains, palpitations, nausea, vomiting, cough, sputum production, fevers, chills, abdominal pain, bladder or bowel irregularities. Reason for follow up:       CC: SOB    All events in the past 24 hours reviewed in their entirety. Reviewed with nursing at bedside.      S/p thrombectomy     Assessment & Plan:     Acute thrombus at the mid and distal right popliteal level RLE  - on Heparin drip.   - S/p RLE thrombectomy , found residual clot in popliteal artery, s/p repeat thrombectomy 19 per vascular surgery    [place] [address] on 19 in setting of ischemic RLE  - IVF - rate increased  - serial daily CPK  - holding statins    Acute on chronic diastolic CHF exarcerbation with a preserved EF. Demand ischemia with mildly elevated troponins due to the CHF exarcerbation. Indeterminable NYHA Functional Class due to the multiple Co-morbidities per Cardiology. Primary Hypertension   Dyslipidemia   Severe TVR  - 2D ECHO (9/12/19) with EF 70 percent. - Continue oxygen  - s/p IV diuresis  - statins on hold for above  - daily weight input/output monitoring  - CHF teaching.   - Cardiology and Heart failure team evals noted and appreciated. - BB dose decreased down to 25 mg secondary to relative hypotension, hold for SBP <100    HOLLAND on probable CKD stage 2-3 due to medication side effect (Bumex and Spironolactone) POA - - - off lasix  -Renal US negative for obstructive uropathy.   -Nephrology eval noted and appreciated    Multifactorial Hyponatremia POA - stable   Resolved Hyperkalemia without any dysrhythmias. 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 - Continue Aspirin, Plavix, statin, neurochecks. - Neurology eval noted and appreciated. Chronic Hypoxemic respiratory failure due to COPD with home oxygen at 3 l/min. - Nebulizers, inhalers, incentive spirometry. Anemia acute on chronic  - monitor  - drop in Hb from 9.2 to 7.5 postsurgery noted, transfuse if indicated,  c/w with current [facility] for now  - discussed with the patient - she agreed for RBC transfusion if needed. Uncontrolled IDDM type 2 DM with complications including hyperglycemia.   - Hold metformin. - Accucheck monitoring  - Basal and sliding scale insulins  - diabetic teaching done    Osteoporosis - f/u as OP    Anxiety disorder and Depression without any current behavioral disturbances. Hypocalcemia  - replace today    Prophylaxis: DVT. Full Code with a guarded prognosis. - Palliative care consult. Plan: Anticipate patient will require Short-term Post-Acute care facility placement for ongoing PT/OT.  Patient's son Jose Elias Tilley is at [phone] if needed for updates. I discussed with him today and updated about pt's condition in details. Hospital Problems  Date Reviewed: 9/20/2019          Codes Class Noted POA    CHF exacerbation ([facility] ICD-10-CM: I50.9  ICD-9-CM: 428.0  9/17/2019 Yes        Hyperkalemia ICD-10-CM: E87.5  ICD-9-CM: 276.7  9/17/2019 Yes        Hyponatremia ICD-10-CM: E87.1  ICD-9-CM: 276.1  9/17/2019 Yes        HOLLAND (acute kidney injury) ([facility] ICD-10-CM: N17.9  ICD-9-CM: 584.9  9/17/2019 Yes        Acute hypoxemic respiratory failure ([facility] ICD-10-CM: J96.01  ICD-9-CM: 518.81  8/8/2011 Yes        Debility ICD-10-CM: R53.81  ICD-9-CM: 799.3  6/28/2011 Yes              Physical Examination:      Last 24hrs VS reviewed since prior progress note. Most recent are:  Visit Vitals  /46 (BP 1 Location: Left arm, BP Patient Position: At rest)   Pulse 68   Temp 98.8 °F (37.1 °C)   Resp 19   Ht 5' 5\" (1.651 m)   Wt 92.2 kg (203 lb 4.2 oz)   SpO2 100%   Breastfeeding? No   BMI 33.82 kg/m²           Constitutional:  No acute distress. HEENT: Head is a traumatic,  Un icteric sclera. Pink conjunctiva,no erythema or discharge. Oral mucous moist, oropharynx benign. Neck supple,    Resp:  Decreased air entry B/L.   CV:  S1,S2 present. (+) murmur TVR    GI:  Soft, non distended, non tender. normoactive bowel sounds, no hepatosplenomegaly    :  No CVA or suprapubic tenderness   Skin  :  No erythema,rash,bullae,dipigmentation . Cool RLE, no pulses below the knee and no sencitivity    Musculoskeletal:  RLE slightly bigger than LLE. Absent Dorsalis Pedis Pulse RLE. Power 0/5 RLE, 5/5 other extremities. Unable to palpate pulses LLE but LLE warm and pink. Neurologic:  Awake, alert and oriented. Absent sensation RLE.         Intake/Output Summary (Last 24 hours) at 9/22/2019 0700  Last data filed at 9/21/2019 2358  Gross per 24 hour   Intake 2698.75 ml   Output 200 ml   Net 2498.75 ml              Labs:     Recent Labs     09/22/19  0536 09/21/19  1230   WBC 16.3* 14.6*   HGB 7.5* 9.2*   HCT 24.8* 30.1*    270     Recent Labs     09/22/19  0536 09/21/19 0247 09/20/19  0308   * 134* 135*   K 4.8 4.5 4.4    99 99   CO2 24 26 29   BUN 50* 49* 51*   CREA 1.95* 1.83* 1.93*   * 133* 106*   CA 7.5* 8.2* 8.8   PHOS  --  4.5  --      Recent Labs     09/21/19  0247   ALB 2.3*     Recent Labs     09/22/19 0536 09/21/19 2017 09/21/19  1230   APTT 53.5* 82.2* 58.9*     Lab Results   Component Value Date/Time    Cholesterol, total 144 09/17/2019 09:21 AM    HDL Cholesterol 55 09/17/2019 09:21 AM    LDL, calculated 72.6 09/17/2019 09:21 AM    Triglyceride 82 09/17/2019 09:21 AM    CHOL/HDL Ratio 2.6 09/17/2019 09:21 AM     Lab Results   Component Value Date/Time    Glucose (POC) 231 (H) 09/22/2019 06:30 AM    Glucose (POC) 239 (H) 09/21/2019 08:20 PM    Glucose (POC) 175 (H) 09/21/2019 11:05 AM    Glucose (POC) 182 (H) 09/21/2019 10:13 AM    Glucose (POC) 185 (H) 09/21/2019 06:19 AM     Lab Results   Component Value Date/Time    Color YELLOW/STRAW 09/17/2019 12:49 PM    Appearance CLEAR 09/17/2019 12:49 PM    Specific gravity 1.030 09/17/2019 12:49 PM    Specific gravity 1.010 12/18/2011 09:28 AM    pH (UA) 6.0 09/17/2019 12:49 PM    Protein NEGATIVE  09/17/2019 12:49 PM    Glucose NEGATIVE  09/17/2019 12:49 PM    Ketone NEGATIVE  09/17/2019 12:49 PM    Bilirubin NEGATIVE  09/17/2019 12:49 PM    Urobilinogen 0.2 09/17/2019 12:49 PM    Nitrites NEGATIVE  09/17/2019 12:49 PM    Leukocyte Esterase NEGATIVE  09/17/2019 12:49 PM    Epithelial cells FEW 09/17/2019 12:49 PM    Bacteria NEGATIVE  09/17/2019 12:49 PM    WBC 0-4 09/17/2019 12:49 PM    RBC 0-5 09/17/2019 12:49 PM         Medications Reviewed:     Current Facility-Administered Medications   Medication Dose Route Frequency    metoprolol tartrate (LOPRESSOR) tablet 50 mg  50 mg Oral BID    heparin 25,000 units in D5W 250 ml infusion  16-36 Units/kg/hr IntraVENous TITRATE    traMADol (ULTRAM) tablet 25 mg  25 mg Oral Q8H PRN    0.9% sodium chloride infusion  75 mL/hr IntraVENous CONTINUOUS    fentaNYL citrate (PF) injection 50 mcg  50 mcg IntraVENous Q3H PRN    clopidogrel (PLAVIX) tablet 75 mg  75 mg Oral DAILY    insulin lispro (HUMALOG) injection   SubCUTAneous AC&HS    insulin glargine (LANTUS) injection 15 Units  15 Units SubCUTAneous DAILY    ondansetron (ZOFRAN) injection 4 mg  4 mg IntraVENous Q6H PRN    glucose chewable tablet 16 g  4 Tab Oral PRN    glucagon (GLUCAGEN) injection 1 mg  1 mg IntraMUSCular PRN    sodium chloride (NS) flush 5-40 mL  5-40 mL IntraVENous Q8H    sodium chloride (NS) flush 5-40 mL  5-40 mL IntraVENous PRN    acetaminophen (TYLENOL) tablet 650 mg  650 mg Oral Q4H PRN    allopurinol (ZYLOPRIM) tablet 100 mg  100 mg Oral DAILY    aspirin chewable tablet 81 mg  81 mg Oral DAILY    atorvastatin (LIPITOR) tablet 40 mg  40 mg Oral DAILY    albuterol-ipratropium (DUO-NEB) 2.5 MG-0.5 MG/3 ML  3 mL Nebulization Q4H PRN     ______________________________________________________________________  EXPECTED LENGTH OF STAY: 3d 7h  ACTUAL LENGTH OF STAY:          5  Time today is 38 min               Wil Garza MD

## 2019-09-22 NOTE — PROGRESS NOTES
Nephrology Progress Note  Saunders Bamberger  Date of Admission : 9/17/2019    CC:  Follow up for HOLLAND       Assessment and Plan     HOLLAND:  - this is likely from volume depletion from diuretics + contrast  - UA bland, unlikely any acute GN  - Renal U/S neg for obstruction, + probable angiomyolipoma on R  - volume appears dry  - continue NS at 50 cc per hr   - - strict I/Os and daily labs     Probable CKD:  - unclear baseline but suspect she has some component of CKD  - likely from DM and HTN     Acute CVA:  - R cerebellar infarct  - on ASA, plavix and statin  - per neurology     HFpEF:  - hold diuretics today and monitor     DM2:  - on insulin    PAD w/ cold LEs/ RLE Ischemia   - S/p RT leg revascularization in OR 9/20  - had Thrombus at RT popliteal artery and had RT leg embolectomy again on 9/22  - Pt was also seen by Dr Daryl Alonzo today, non viable Rt calf muscle noted   _ Avoid contrast        Interval History:  Seen and examined. Cr stable. Still having issues with RT leg perfusion  Repeat thrombectomy 9/22   CPK high , keep on IV F   Current Medications: all current  Medications have been eviewed in EPIC  Review of Systems: Pertinent items are noted in HPI.     Objective:  Vitals:    Vitals:    09/21/19 2311 09/22/19 0433 09/22/19 0748 09/22/19 1100   BP: 103/41 104/46 114/40 107/41   Pulse: 69 68 69 64   Resp: 21 19 20 22   Temp: 98.9 °F (37.2 °C) 98.8 °F (37.1 °C) 99 °F (37.2 °C) 98.4 °F (36.9 °C)   SpO2: 100% 100% 100% 100%   Weight:  92.2 kg (203 lb 4.2 oz)     Height:         Intake and Output:  09/22 0701 - 09/22 1900  In: 731.3 [I.V.:731.3]  Out: 250 [Urine:250]  09/20 1901 - 09/22 0700  In: 3606.1 [I.V.:3606.1]  Out: 200 [Urine:200]    Physical Examination:  General: NAD,Conversant, frail  Neck:  Supple, no mass  Resp:  Lungs mostly clear b/l  CV:  RRR,  no murmur or rub, no LE edema, no palpable LE pulses, cold extremities  GI:  Soft, NT, + Bowel sounds, no hepatosplenomegaly  Neurologic:  Non focal  Psych: AAO x 3 appropriate affect   Skin:  No Rash  :  No altman    [x]    High complexity decision making was performed  [x]    Patient is at high-risk of decompensation with multiple organ involvement    Lab Data Personally Reviewed: I have reviewed all the pertinent labs, microbiology data and radiology studies during assessment. Recent Labs     09/22/19  0538 09/22/19  0536 09/21/19  0247   * 134* 134*   K 4.6 4.8 4.5    102 99   CO2 23 24 26   * 187* 133*   BUN 48* 50* 49*   CREA 1.96* 1.95* 1.83*   CA 7.6* 7.5* 8.2*   PHOS 4.0  --  4.5   ALB 2.0*  --  2.3*     Recent Labs     09/22/19  0536 09/21/19  1230 09/20/19  1944   WBC 16.3* 14.6* 15.3*   HGB 7.5* 9.2* 8.9*   HCT 24.8* 30.1* 29.4*    270 263     Lab Results   Component Value Date/Time    Specimen Description: NARES 09/27/2011 03:58 AM    Specimen Description: BLOOD 08/15/2011 06:15 AM    Specimen Description: CATH URINE 08/15/2011 06:15 AM     Lab Results   Component Value Date/Time    Culture result: MRSA NOT PRESENT 03/31/2016 12:30 PM    Culture result:  03/31/2016 12:30 PM         Screening of patient nares for MRSA is for surveillance purposes and, if positive, to facilitate isolation considerations in high risk settings. It is not intended for automatic decolonization interventions per se as regimens are not sufficiently effective to warrant routine use. Culture result:  09/27/2011 03:58 AM     MRSA PRESENT      Screening of patient nares for MRSA is for surveillance purposes and, if positive, to facilitate isolation considerations in high risk settings. It is not intended for automatic decolonization interventions per se as regimens are not sufficiently effective to warrant routine use.      Recent Results (from the past 24 hour(s))   PTT    Collection Time: 09/21/19  8:17 PM   Result Value Ref Range    aPTT 82.2 (H) 22.1 - 32.0 sec    aPTT, therapeutic range     58.0 - 77.0 SECS   GLUCOSE, POC    Collection Time: 09/21/19  8:20 PM   Result Value Ref Range    Glucose (POC) 239 (H) 65 - 100 mg/dL    Performed by Marcus Avila    PTT    Collection Time: 09/22/19  5:36 AM   Result Value Ref Range    aPTT 53.5 (H) 22.1 - 32.0 sec    aPTT, therapeutic range     58.0 - 77.0 SECS   CBC WITH AUTOMATED DIFF    Collection Time: 09/22/19  5:36 AM   Result Value Ref Range    WBC 16.3 (H) 3.6 - 11.0 K/uL    RBC 2.77 (L) 3.80 - 5.20 M/uL    HGB 7.5 (L) 11.5 - 16.0 g/dL    HCT 24.8 (L) 35.0 - 47.0 %    MCV 89.5 80.0 - 99.0 FL    MCH 27.1 26.0 - 34.0 PG    MCHC 30.2 30.0 - 36.5 g/dL    RDW 15.7 (H) 11.5 - 14.5 %    PLATELET 987 274 - 296 K/uL    MPV 10.5 8.9 - 12.9 FL    NRBC 0.0 0  WBC    ABSOLUTE NRBC 0.00 0.00 - 0.01 K/uL    NEUTROPHILS 87 (H) 32 - 75 %    LYMPHOCYTES 3 (L) 12 - 49 %    MONOCYTES 9 5 - 13 %    EOSINOPHILS 0 0 - 7 %    BASOPHILS 0 0 - 1 %    IMMATURE GRANULOCYTES 1 (H) 0.0 - 0.5 %    ABS. NEUTROPHILS 14.2 (H) 1.8 - 8.0 K/UL    ABS. LYMPHOCYTES 0.5 (L) 0.8 - 3.5 K/UL    ABS. MONOCYTES 1.5 (H) 0.0 - 1.0 K/UL    ABS. EOSINOPHILS 0.0 0.0 - 0.4 K/UL    ABS. BASOPHILS 0.0 0.0 - 0.1 K/UL    ABS. IMM.  GRANS. 0.1 (H) 0.00 - 0.04 K/UL    DF AUTOMATED     METABOLIC PANEL, BASIC    Collection Time: 09/22/19  5:36 AM   Result Value Ref Range    Sodium 134 (L) 136 - 145 mmol/L    Potassium 4.8 3.5 - 5.1 mmol/L    Chloride 102 97 - 108 mmol/L    CO2 24 21 - 32 mmol/L    Anion gap 8 5 - 15 mmol/L    Glucose 187 (H) 65 - 100 mg/dL    BUN 50 (H) 6 - 20 MG/DL    Creatinine 1.95 (H) 0.55 - 1.02 MG/DL    BUN/Creatinine ratio 26 (H) 12 - 20      GFR est AA 29 (L) >60 ml/min/1.73m2    GFR est non-AA 24 (L) >60 ml/min/1.73m2    Calcium 7.5 (L) 8.5 - 10.1 MG/DL   CK    Collection Time: 09/22/19  5:36 AM   Result Value Ref Range    CK 12,564 (HH) 26 - 192 U/L   RENAL FUNCTION PANEL    Collection Time: 09/22/19  5:38 AM   Result Value Ref Range    Sodium 134 (L) 136 - 145 mmol/L    Potassium 4.6 3.5 - 5.1 mmol/L    Chloride 102 97 - 108 mmol/L CO2 23 21 - 32 mmol/L    Anion gap 9 5 - 15 mmol/L    Glucose 187 (H) 65 - 100 mg/dL    BUN 48 (H) 6 - 20 MG/DL    Creatinine 1.96 (H) 0.55 - 1.02 MG/DL    BUN/Creatinine ratio 24 (H) 12 - 20      GFR est AA 29 (L) >60 ml/min/1.73m2    GFR est non-AA 24 (L) >60 ml/min/1.73m2    Calcium 7.6 (L) 8.5 - 10.1 MG/DL    Phosphorus 4.0 2.6 - 4.7 MG/DL    Albumin 2.0 (L) 3.5 - 5.0 g/dL   GLUCOSE, POC    Collection Time: 09/22/19  6:30 AM   Result Value Ref Range    Glucose (POC) 231 (H) 65 - 100 mg/dL    Performed by [address] [place] [address], POC    Collection Time: 09/22/19 11:27 AM   Result Value Ref Range    Glucose (POC) 301 (H) 65 - 100 mg/dL    Performed by Nette LOYA/c [facility]     Brook Elam MD  [address] [place] [address]  Phone - [phone]   Fax - [phone]  [email or web address]

## 2019-09-23 LAB
ALBUMIN SERPL-MCNC: 2 G/DL (ref 3.5–5)
ANION GAP SERPL CALC-SCNC: 6 MMOL/L (ref 5–15)
APTT PPP: 58.9 SEC (ref 22.1–32)
BUN SERPL-MCNC: 46 MG/DL (ref 6–20)
BUN/CREAT SERPL: 26 (ref 12–20)
CALCIUM SERPL-MCNC: 7.8 MG/DL (ref 8.5–10.1)
CHLORIDE SERPL-SCNC: 103 MMOL/L (ref 97–108)
CK SERPL-CCNC: ABNORMAL U/L (ref 26–192)
CO2 SERPL-SCNC: 26 MMOL/L (ref 21–32)
COMMENT, HOLDF: NORMAL
CREAT SERPL-MCNC: 1.77 MG/DL (ref 0.55–1.02)
GLUCOSE BLD STRIP.AUTO-MCNC: 149 MG/DL (ref 65–100)
GLUCOSE BLD STRIP.AUTO-MCNC: 167 MG/DL (ref 65–100)
GLUCOSE BLD STRIP.AUTO-MCNC: 196 MG/DL (ref 65–100)
GLUCOSE BLD STRIP.AUTO-MCNC: 240 MG/DL (ref 65–100)
GLUCOSE SERPL-MCNC: 125 MG/DL (ref 65–100)
HCT VFR BLD AUTO: 23.6 % (ref 35–47)
HGB BLD-MCNC: 7.2 G/DL (ref 11.5–16)
PHOSPHATE SERPL-MCNC: 3.6 MG/DL (ref 2.6–4.7)
POTASSIUM SERPL-SCNC: 4.6 MMOL/L (ref 3.5–5.1)
SAMPLES BEING HELD,HOLD: NORMAL
SERVICE CMNT-IMP: ABNORMAL
SODIUM SERPL-SCNC: 135 MMOL/L (ref 136–145)
THERAPEUTIC RANGE,PTTT: ABNORMAL SECS (ref 58–77)

## 2019-09-23 PROCEDURE — 74011250637 HC RX REV CODE- 250/637: Performed by: SURGERY

## 2019-09-23 PROCEDURE — 85730 THROMBOPLASTIN TIME PARTIAL: CPT

## 2019-09-23 PROCEDURE — 82962 GLUCOSE BLOOD TEST: CPT

## 2019-09-23 PROCEDURE — 85018 HEMOGLOBIN: CPT

## 2019-09-23 PROCEDURE — 97530 THERAPEUTIC ACTIVITIES: CPT

## 2019-09-23 PROCEDURE — 90471 IMMUNIZATION ADMIN: CPT

## 2019-09-23 PROCEDURE — 74011636637 HC RX REV CODE- 636/637: Performed by: SURGERY

## 2019-09-23 PROCEDURE — 65660000000 HC RM CCU STEPDOWN

## 2019-09-23 PROCEDURE — 90686 IIV4 VACC NO PRSV 0.5 ML IM: CPT | Performed by: FAMILY MEDICINE

## 2019-09-23 PROCEDURE — 74011250636 HC RX REV CODE- 250/636: Performed by: INTERNAL MEDICINE

## 2019-09-23 PROCEDURE — 74011250637 HC RX REV CODE- 250/637: Performed by: FAMILY MEDICINE

## 2019-09-23 PROCEDURE — 74011250636 HC RX REV CODE- 250/636: Performed by: SURGERY

## 2019-09-23 PROCEDURE — 77030029684 HC NEB SM VOL KT MONA -A

## 2019-09-23 PROCEDURE — 74011000250 HC RX REV CODE- 250: Performed by: FAMILY MEDICINE

## 2019-09-23 PROCEDURE — 80069 RENAL FUNCTION PANEL: CPT

## 2019-09-23 PROCEDURE — 94640 AIRWAY INHALATION TREATMENT: CPT

## 2019-09-23 PROCEDURE — 77010033678 HC OXYGEN DAILY

## 2019-09-23 PROCEDURE — 74011250636 HC RX REV CODE- 250/636: Performed by: FAMILY MEDICINE

## 2019-09-23 PROCEDURE — 97165 OT EVAL LOW COMPLEX 30 MIN: CPT

## 2019-09-23 PROCEDURE — 36415 COLL VENOUS BLD VENIPUNCTURE: CPT

## 2019-09-23 PROCEDURE — 94760 N-INVAS EAR/PLS OXIMETRY 1: CPT

## 2019-09-23 PROCEDURE — 74011000250 HC RX REV CODE- 250: Performed by: SURGERY

## 2019-09-23 PROCEDURE — 82550 ASSAY OF CK (CPK): CPT

## 2019-09-23 RX ORDER — FAMOTIDINE 20 MG/1
20 TABLET, FILM COATED ORAL EVERY EVENING
Status: DISCONTINUED | OUTPATIENT
Start: 2019-09-23 | End: 2019-10-23 | Stop reason: HOSPADM

## 2019-09-23 RX ORDER — MAG HYDROX/ALUMINUM HYD/SIMETH 200-200-20
30 SUSPENSION, ORAL (FINAL DOSE FORM) ORAL
Status: DISCONTINUED | OUTPATIENT
Start: 2019-09-23 | End: 2019-10-23 | Stop reason: HOSPADM

## 2019-09-23 RX ORDER — FAMOTIDINE 20 MG/1
20 TABLET, FILM COATED ORAL 2 TIMES DAILY
Status: DISCONTINUED | OUTPATIENT
Start: 2019-09-23 | End: 2019-09-23 | Stop reason: DRUGHIGH

## 2019-09-23 RX ORDER — IPRATROPIUM BROMIDE AND ALBUTEROL SULFATE 2.5; .5 MG/3ML; MG/3ML
3 SOLUTION RESPIRATORY (INHALATION)
Status: DISCONTINUED | OUTPATIENT
Start: 2019-09-23 | End: 2019-09-24

## 2019-09-23 RX ADMIN — ALUMINUM HYDROXIDE, MAGNESIUM HYDROXIDE, AND SIMETHICONE 30 ML: 200; 200; 20 SUSPENSION ORAL at 21:27

## 2019-09-23 RX ADMIN — INSULIN LISPRO 4 UNITS: 100 INJECTION, SOLUTION INTRAVENOUS; SUBCUTANEOUS at 22:05

## 2019-09-23 RX ADMIN — INSULIN LISPRO 3 UNITS: 100 INJECTION, SOLUTION INTRAVENOUS; SUBCUTANEOUS at 06:40

## 2019-09-23 RX ADMIN — ALUMINUM HYDROXIDE, MAGNESIUM HYDROXIDE, AND SIMETHICONE 30 ML: 200; 200; 20 SUSPENSION ORAL at 14:40

## 2019-09-23 RX ADMIN — ACETAMINOPHEN 650 MG: 325 TABLET, FILM COATED ORAL at 14:29

## 2019-09-23 RX ADMIN — IPRATROPIUM BROMIDE AND ALBUTEROL SULFATE 3 ML: .5; 3 SOLUTION RESPIRATORY (INHALATION) at 19:08

## 2019-09-23 RX ADMIN — INSULIN LISPRO 3 UNITS: 100 INJECTION, SOLUTION INTRAVENOUS; SUBCUTANEOUS at 17:36

## 2019-09-23 RX ADMIN — ACETAMINOPHEN 650 MG: 325 TABLET, FILM COATED ORAL at 08:21

## 2019-09-23 RX ADMIN — INFLUENZA VIRUS VACCINE 0.5 ML: 15; 15; 15; 15 SUSPENSION INTRAMUSCULAR at 18:39

## 2019-09-23 RX ADMIN — METOPROLOL TARTRATE 25 MG: 25 TABLET ORAL at 21:26

## 2019-09-23 RX ADMIN — SODIUM CHLORIDE 50 ML/HR: 900 INJECTION, SOLUTION INTRAVENOUS at 06:40

## 2019-09-23 RX ADMIN — HEPARIN SODIUM 13 UNITS/KG/HR: 10000 INJECTION, SOLUTION INTRAVENOUS at 08:20

## 2019-09-23 RX ADMIN — FENTANYL CITRATE 50 MCG: 50 INJECTION INTRAMUSCULAR; INTRAVENOUS at 03:58

## 2019-09-23 RX ADMIN — METOPROLOL TARTRATE 25 MG: 25 TABLET ORAL at 08:11

## 2019-09-23 RX ADMIN — FENTANYL CITRATE 50 MCG: 50 INJECTION INTRAMUSCULAR; INTRAVENOUS at 14:37

## 2019-09-23 RX ADMIN — INSULIN GLARGINE 15 UNITS: 100 INJECTION, SOLUTION SUBCUTANEOUS at 10:46

## 2019-09-23 RX ADMIN — IPRATROPIUM BROMIDE AND ALBUTEROL SULFATE 3 ML: .5; 3 SOLUTION RESPIRATORY (INHALATION) at 08:43

## 2019-09-23 RX ADMIN — FAMOTIDINE 20 MG: 20 TABLET ORAL at 17:36

## 2019-09-23 RX ADMIN — CLOPIDOGREL BISULFATE 75 MG: 75 TABLET ORAL at 08:11

## 2019-09-23 RX ADMIN — ASPIRIN 81 MG CHEWABLE TABLET 81 MG: 81 TABLET CHEWABLE at 08:11

## 2019-09-23 RX ADMIN — TRAMADOL HYDROCHLORIDE 25 MG: 50 TABLET ORAL at 08:22

## 2019-09-23 RX ADMIN — ACETAMINOPHEN 650 MG: 325 TABLET, FILM COATED ORAL at 21:26

## 2019-09-23 RX ADMIN — Medication 10 ML: at 22:07

## 2019-09-23 RX ADMIN — ALLOPURINOL 100 MG: 100 TABLET ORAL at 08:11

## 2019-09-23 RX ADMIN — INSULIN LISPRO 3 UNITS: 100 INJECTION, SOLUTION INTRAVENOUS; SUBCUTANEOUS at 11:30

## 2019-09-23 RX ADMIN — Medication 10 ML: at 14:40

## 2019-09-23 NOTE — PROGRESS NOTES
Occupational therapy 7992 -   09.23.2019    Orders acknowledged and chart reviewed in prep for OT evaluation. Noted patient with orders for R AFO 2* foot drop. Recommend nursing secretary call [phone]  [phone]) to have patient fitted for AFO. Thank you. Shelli Echavarria MS, OTR/L

## 2019-09-23 NOTE — PROGRESS NOTES
Palliative Medicine Social Work    Left message for patient son and daughter to set up famiyl meeting. Will continue to try to get in touch to complete AMD              Thank you for the opportunity to be involved in the care of Ms. Cosme and her family.     Jack Manuel LMSW, Supervisee in Social Work  Palliative Medicine   [phone]

## 2019-09-23 NOTE — PROGRESS NOTES
Nephrology Progress Note  Rose Mary Malloy  Date of Admission : 9/17/2019    CC:  Follow up for HOLLAND       Assessment and Plan     HOLLAND:  - this is likely from volume depletion from diuretics + contrast  - UA bland, unlikely any acute GN  - Renal U/S neg for obstruction, + probable angiomyolipoma on R  - d/c IVF today and monitor  - daily labs    Probable CKD:  - unclear baseline but suspect she has some component of CKD  - likely from DM and HTN     Acute CVA:  - R cerebellar infarct  - on ASA, plavix and statin  - per neurology     HFpEF:  - hold diuretics today and monitor     DM2:  - on insulin    PAD w/ cold LEs/ RLE Ischemia   - S/p RT leg revascularization in OR 9/20  - had Thrombus at RT popliteal artery and had RT leg embolectomy + fasciotomy on 9/22       Interval History:  Seen and examined. Cr stable,  No pain in leg. RLE cold w/o palpable pulses. No cp, sob, n/v/d. Current Medications: all current  Medications have been eviewed in EPIC  Review of Systems: Pertinent items are noted in HPI. Objective:  Vitals:    Vitals:    09/23/19 0401 09/23/19 0731 09/23/19 0830 09/23/19 0843   BP: (!) 110/39 110/48 111/46    Pulse: 69 69 71    Resp: 19 17 17    Temp: 99.5 °F (37.5 °C) 98.5 °F (36.9 °C) 98.5 °F (36.9 °C)    SpO2: 100% 100% 100% 100%   Weight:       Height:         Intake and Output:  09/23 0701 - 09/23 1900  In: 1326.2 [P.O.:346; I.V.:980.2]  Out: -   09/21 1901 - 09/23 0700  In: 2549.5 [P.O.:240;  I.V.:2309.5]  Out: 650 [Urine:650]    Physical Examination:  General: NAD,Conversant, frail  Neck:  Supple, no mass  Resp:  Lungs mostly clear b/l  CV:  RRR,  no murmur or rub, no LE edema, no palpable LE pulses, cold extremities  GI:  Soft, NT, + Bowel sounds, no hepatosplenomegaly  Neurologic:  Non focal  Psych:             AAO x 3 appropriate affect   Skin:  No Rash  :  No altman    [x]    High complexity decision making was performed  [x]    Patient is at high-risk of decompensation with multiple organ involvement    Lab Data Personally Reviewed: I have reviewed all the pertinent labs, microbiology data and radiology studies during assessment. Recent Labs     09/23/19  0350 09/22/19  0538 09/22/19  0536 09/21/19  0247   * 134* 134* 134*   K 4.6 4.6 4.8 4.5    102 102 99   CO2 26 23 24 26   * 187* 187* 133*   BUN 46* 48* 50* 49*   CREA 1.77* 1.96* 1.95* 1.83*   CA 7.8* 7.6* 7.5* 8.2*   PHOS 3.6 4.0  --  4.5   ALB 2.0* 2.0*  --  2.3*     Recent Labs     09/23/19  0350 09/22/19  0536 09/21/19  1230 09/20/19  1944   WBC  --  16.3* 14.6* 15.3*   HGB 7.2* 7.5* 9.2* 8.9*   HCT 23.6* 24.8* 30.1* 29.4*   PLT  --  233 270 263     Lab Results   Component Value Date/Time    Specimen Description: NARES 09/27/2011 03:58 AM    Specimen Description: BLOOD 08/15/2011 06:15 AM    Specimen Description: CATH URINE 08/15/2011 06:15 AM     Lab Results   Component Value Date/Time    Culture result: MRSA NOT PRESENT 03/31/2016 12:30 PM    Culture result:  03/31/2016 12:30 PM         Screening of patient nares for MRSA is for surveillance purposes and, if positive, to facilitate isolation considerations in high risk settings. It is not intended for automatic decolonization interventions per se as regimens are not sufficiently effective to warrant routine use. Culture result:  09/27/2011 03:58 AM     MRSA PRESENT      Screening of patient nares for MRSA is for surveillance purposes and, if positive, to facilitate isolation considerations in high risk settings. It is not intended for automatic decolonization interventions per se as regimens are not sufficiently effective to warrant routine use.      Recent Results (from the past 24 hour(s))   GLUCOSE, POC    Collection Time: 09/22/19 11:27 AM   Result Value Ref Range    Glucose (POC) 301 (H) 65 - 100 mg/dL    Performed by Magalys VILLAFUERTE    Collection Time: 09/22/19  1:55 PM   Result Value Ref Range    aPTT 55.2 (H) 22.1 - 32.0 sec    aPTT, therapeutic range     58.0 - 77.0 SECS   GLUCOSE, POC    Collection Time: 09/22/19  4:55 PM   Result Value Ref Range    Glucose (POC) 203 (H) 65 - 100 mg/dL    Performed by Brandon Ware    PTT    Collection Time: 09/22/19 10:05 PM   Result Value Ref Range    aPTT 66.8 (H) 22.1 - 32.0 sec    aPTT, therapeutic range     58.0 - 77.0 SECS   GLUCOSE, POC    Collection Time: 09/22/19 10:07 PM   Result Value Ref Range    Glucose (POC) 159 (H) 65 - 100 mg/dL    Performed by Remedios Nicole    RENAL FUNCTION PANEL    Collection Time: 09/23/19  3:50 AM   Result Value Ref Range    Sodium 135 (L) 136 - 145 mmol/L    Potassium 4.6 3.5 - 5.1 mmol/L    Chloride 103 97 - 108 mmol/L    CO2 26 21 - 32 mmol/L    Anion gap 6 5 - 15 mmol/L    Glucose 125 (H) 65 - 100 mg/dL    BUN 46 (H) 6 - 20 MG/DL    Creatinine 1.77 (H) 0.55 - 1.02 MG/DL    BUN/Creatinine ratio 26 (H) 12 - 20      GFR est AA 33 (L) >60 ml/min/1.73m2    GFR est non-AA 27 (L) >60 ml/min/1.73m2    Calcium 7.8 (L) 8.5 - 10.1 MG/DL    Phosphorus 3.6 2.6 - 4.7 MG/DL    Albumin 2.0 (L) 3.5 - 5.0 g/dL   PTT    Collection Time: 09/23/19  3:50 AM   Result Value Ref Range    aPTT 58.9 (H) 22.1 - 32.0 sec    aPTT, therapeutic range     58.0 - 77.0 SECS   CK    Collection Time: 09/23/19  3:50 AM   Result Value Ref Range    CK 10,278 (HH) 26 - 192 U/L   SAMPLES BEING HELD    Collection Time: 09/23/19  3:50 AM   Result Value Ref Range    SAMPLES BEING HELD 1LAV     COMMENT        Add-on orders for these samples will be processed based on acceptable specimen integrity and analyte stability, which may vary by analyte.    HGB & HCT    Collection Time: 09/23/19  3:50 AM   Result Value Ref Range    HGB 7.2 (L) 11.5 - 16.0 g/dL    HCT 23.6 (L) 35.0 - 47.0 %   GLUCOSE, POC    Collection Time: 09/23/19  5:06 AM   Result Value Ref Range    Glucose (POC) 149 (H) 65 - 100 mg/dL    Performed by MD Didier Aguilarton [facility] [address] [facility]  Phone - [phone]   Fax - [phone]  [email or web address]

## 2019-09-23 NOTE — DIABETES MGMT
[facility]    [facility] Consult Follow Up Note    Recommendations/ Comments: BG variable at this time. BG's 125-203 mg/dl. If appropriate, please consider:  Increase Lantus to 20 units daily  Change lispro correction scale to normal sensitivity given renal status     Current hospital DM medication: lispro correction scale - resistant sensitivity and Lantus 15 units     Chart reviewed on Tom Galdamez. Patient is a 80 y.o. female with known DM on Lantus 30-40 units HS depending on BG and 1000 mg Metformin BID at home. A1c:   Lab Results   Component Value Date/Time    Hemoglobin A1c 8.8 (H) 09/17/2019 09:21 AM    Hemoglobin A1c 7.0 (H) 02/08/2017 09:59 AM       Recent Glucose Results:   Lab Results   Component Value Date/Time     (H) 09/23/2019 03:50 AM    GLUCPOC 149 (H) 09/23/2019 05:06 AM    GLUCPOC 159 (H) 09/22/2019 10:07 PM    GLUCPOC 203 (H) 09/22/2019 04:55 PM        Lab Results   Component Value Date/Time    Creatinine 1.77 (H) 09/23/2019 03:50 AM     Estimated Creatinine Clearance: 24.7 mL/min (A) (based on SCr of 1.77 mg/dL (H)). Active Orders   Diet    DIET DIABETIC CONSISTENT CARB Regular        PO intake:   Patient Vitals for the past 72 hrs:   % Diet Eaten   09/20/19 1028 0 %       Will continue to follow as needed.     Thank you  Maggi Gould RD, CDE    Time spent: 4 minutes

## 2019-09-23 NOTE — PROGRESS NOTES
Orders received, chart reviewed and patient evaluated by occupational therapy. Pending progression with skilled acute occupational therapy, recommend:  Therapy up to 5 days/week in SNF setting     Recommend with nursing patient to complete as able in order to maintain strength, endurance and independence: bed to chair position 3x/day and functional mobility in bd for toileting with 2 assist. Thank you for your assistance. Full evaluation to follow.

## 2019-09-23 NOTE — PROGRESS NOTES
RLE presented serosanguinous drainage around bandage wrap and moderate amount on pillow case. H&H drawn . Pt VSS . Dressing was reinforced and On call MD made aware. Achieved 2 therapeutic PTT;        Will continue to monitor

## 2019-09-23 NOTE — PROGRESS NOTES
Problem: Self Care Deficits Care Plan (Adult)  Goal: *Acute Goals and Plan of Care (Insert Text)  Description    FUNCTIONAL STATUS PRIOR TO ADMISSION: Patient was modified independent for ADLs and does not use DME for functional mobility. At baseline patient uses no supplemental oxygen. HOME SUPPORT: The patient lived with son and MARICRUZ but did not require assist.    Occupational Therapy Goals  Initiated 9/23/2019  1. Patient will perform grooming with supervision/set-up within 7 day(s). 2.  Patient will perform upper body ADLs with supervision/set-up within 7 day(s). 3.  Patient will perform lower body ADLs with moderate assistance  within 7 day(s). 4.  Patient will perform toilet transfers to/from [facility] with moderate assistance  within 7 day(s). 5.  Patient will perform all aspects of toileting with moderate assistance  within 7 day(s). 6.  Patient will participate in upper extremity therapeutic exercise/activities with supervision/set-up for 5 minutes within 7 day(s). 7.  Patient will utilize energy conservation techniques during functional activities with verbal cues within 7 day(s). Outcome: Not Met    OCCUPATIONAL THERAPY EVALUATION  Patient: Luma Cabrera (61 y.o. female)  Date: 9/23/2019  Primary Diagnosis: [facility] exacerbation [facility] [I50.9]  Procedure(s) (LRB):  THROMBECTOMY/EMBOLECTOMY LOWER  RIGHT LEG. FASCIOTOMY (Right) 2 Days Post-Op   Precautions:  Fall    ASSESSMENT  Based on the objective data described below, the patient presents with limited ADL performance 2* impaired balance, generalized weakness/impaired strength, decreased functional activity tolerance, and limited reach to feet s/p admission for CHF exacerbation. During admission, patient required thrombectomy/embolectomy of RLE, now POD #1. Patient noted to have R foot drop. At baseline, patient lives with son and MARICRUZ and is mod I for ADLs and IND with mobility.      Today, patient received sitting up in modified chair position, noted PT attempted to have patient stand, unable to shift weight in standing. Patient required min A for bed mobility and mod Ax2 for sit<>stand. Patient unable to reach feet, required total A for lower body dressing, setup to wash face and max A to brush hair (daughter completed). Patient left sitting up in chair position, call bell in reach, daughter bedside, RN aware. Will continue to follow, recommend SNF rehab once medically cleared for D/C. Current Level of Function Impacting Discharge (ADLs/self-care): up to total A for ADLs, min-mod A for mobility    Functional Outcome Measure: The patient scored 40/100 on the Barthel Index outcome measure which is indicative of moderate deficits in ADL and mobility performance. Other factors to consider for discharge: requiring 2 assist for ADLs/mobility - was IND/mod I PTA; unsure of family can provide 24/7 supervision     Patient will benefit from skilled therapy intervention to address the above noted impairments. PLAN :  Recommendations and Planned Interventions: self care training, functional mobility training, therapeutic exercise, balance training, therapeutic activities, endurance activities, patient education, home safety training and family training/education    Frequency/Duration: Patient will be followed by occupational therapy 5 times a week to address goals. Recommendation for discharge: (in order for the patient to meet his/her long term goals)  Therapy up to 5 days/week in SNF setting    This discharge recommendation:  Has not yet been discussed the attending provider and/or case management    Equipment recommendations for successful discharge (if) home: to be determined by rehab facility       SUBJECTIVE:   Patient stated I can't feel that.  re: sensation testing on RLE    OBJECTIVE DATA SUMMARY:   HISTORY:   Past Medical History:   Diagnosis Date    Anxiety     Breast cancer ([facility] 2005, 2010    right 2005, left 2010    Cancer ([facility] cancer right breast cancer    Cancer ([facility]     cancer left breast/new dx 8/2011 +bx     Chronic obstructive pulmonary disease (HCC)     Diabetes (HCC)     Heart failure (HCC)     Hypercholesterolemia     Hypertension     Other ill-defined conditions(799.89)     osteopoosis    Other ill-defined conditions(799.89)     high cholesterol    Pneumonia     Psychiatric disorder     anxiety     Past Surgical History:   Procedure Laterality Date    BIOPSY OF BREAST, INCISIONAL      left breast 8/2011 positive for cancer cells    BREAST SURGERY PROCEDURE UNLISTED      right mastectomy    HX BREAST LUMPECTOMY Left 2010    HX MASTECTOMY Right 2005       Expanded or extensive additional review of patient history:     Home Situation  Home Environment: Private residence  # Steps to Enter: 1  Rails to Enter: Yes  One/Two Story Residence: Two story, live on 1st floor  Interior Rails: Both  Lift Chair Available: No  Living Alone: No  Support Systems: Family member(s)(son and DIL)  Patient Expects to be Discharged to[de-identified] Private residence  Current DME Used/Available at Home: Shower chair, Safety frame toliet, Oxygen, portable, Grab bars  Tub or Shower Type: Shower    Hand dominance: Right    EXAMINATION OF PERFORMANCE DEFICITS:  Cognitive/Behavioral Status:  Neurologic State: Alert  Orientation Level: Oriented X4  Cognition: Appropriate for age attention/concentration; Follows commands  Perception: Appears intact  Perseveration: No perseveration noted  Safety/Judgement: Awareness of environment; Fall prevention    Skin: Visible skin appears grossly intact    Edema: none noted in BUEs    Hearing: Auditory  Auditory Impairment: None    Vision/Perceptual:    Tracking: Able to track stimulus in all quadrants w/o difficulty    Diplopia: No    Acuity: Able to read clock/calendar on wall without difficulty    Corrective Lenses: Glasses    Range of Motion:  In BUEs  AROM: Generally decreased, functional    Strength:   In BUEs  Strength: Generally decreased, functional    Coordination:  Coordination: Generally decreased, functional  Fine Motor Skills-Upper: Left Intact; Right Intact    Gross Motor Skills-Upper: Left Intact; Right Intact    Tone & Sensation:  In BUEs  Tone: Normal  Sensation: Impaired(In RLE)    Balance:  Sitting: Intact  Standing: Impaired  Standing - Static: Poor  Standing - Dynamic : Poor    Functional Mobility and Transfers for ADLs:  Bed Mobility:  Supine to Sit: Minimum assistance(for right LE)  Sit to Supine: Minimum assistance(For right LE)    Transfers:  Sit to Stand: Moderate assistance;Assist x2  Stand to Sit: Moderate assistance;Assist x2    ADL Assessment:  Feeding: Setup    Oral Facial Hygiene/Grooming: Moderate assistance(setup for oral care, max a to brush hair)    Bathing: Moderate assistance(infer min A upper and total A lower)    Upper Body Dressing: Minimum assistance(Infer 2* func reach, strength)    Lower Body Dressing: Total assistance    Toileting: Total assistance(Infer per obs of func reach, balance, strength)    ADL Intervention and task modifications:  Feeding  Drink to Mouth: Independent    Grooming  Washing Face: Set-up  Brushing/Combing Hair: Maximum assistance(daughter completed)    Lower Body Dressing Assistance  Socks: Total assistance (dependent)  Leg Crossed Method Used: No  Position Performed: Other (comment)(bed in modified chair position)  Cues: Don;Physical assistance;Verbal cues provided     Cognitive Retraining  Safety/Judgement: Awareness of environment; Fall prevention    Functional Measure:  Barthel Index:    Bathin  Bladder: 5  Bowels: 10  Groomin  Dressin  Feeding: 10  Mobility: 0  Stairs: 0  Toilet Use: 0  Transfer (Bed to Chair and Back): 5  Total: 40/100        The Barthel ADL Index: Guidelines  1. The index should be used as a record of what a patient does, not as a record of what a patient could do.   2. The main aim is to establish degree of independence from any help, physical or verbal, however minor and for whatever reason. 3. The need for supervision renders the patient not independent. 4. A patient's performance should be established using the best available evidence. Asking the patient, friends/relatives and nurses are the usual sources, but direct observation and common sense are also important. However direct testing is not needed. 5. Usually the patient's performance over the preceding 24-48 hours is important, but occasionally longer periods will be relevant. 6. Middle categories imply that the patient supplies over 50 per cent of the effort. 7. Use of aids to be independent is allowed. Edson Sparks., Barthel, D.W. (1053). Functional evaluation: the Barthel Index. [address] [facility] (14)2. JAMES Jara, Tami Nguyen., Claribel Brooks., Jose M, [address] [place] [address] Ave (1999). Measuring the change indisability after inpatient rehabilitation; comparison of the responsiveness of the Barthel Index and Functional [place] Measure. Journal of Neurology, Neurosurgery, and Psychiatry, 66(4), 322-063. Brown Valdovinos, N.J.A, BARRY Garcia, & Merary Chavez MJUAN MIGUEL. (2004.) Assessment of post-stroke quality of life in cost-effectiveness studies: The usefulness of the Barthel Index and the EuroQoL-5D. Quality of Life Research, 15, 258-89       Occupational Therapy Evaluation Charge Determination   History Examination Decision-Making   LOW Complexity : Brief history review  MEDIUM Complexity : 3-5 performance deficits relating to physical, cognitive , or psychosocial skils that result in activity limitations and / or participation restrictions HIGH Complexity : Patient presents with comorbidities that affect occupational performance.  Signifigant modification of tasks or assistance (eg, physical or verbal) with assessment (s) is necessary to enable patient to complete evaluation       Based on the above components, the patient evaluation is determined to be of the following complexity level: MEDIUM  Pain Rating:  Patient reporting none at time of evaluation    Activity Tolerance:   Fair  Please refer to the flowsheet for vital signs taken during this treatment. After treatment patient left in no apparent distress:    Call bell within reach, Caregiver / family present, Side rails x 3 and sitting in modified chair position     COMMUNICATION/EDUCATION:   The patients plan of care was discussed with: Physical Therapist and Registered Nurse. Home safety education was provided and the patient/caregiver indicated understanding. and Patient/family have participated as able in goal setting and plan of care. This patients plan of care is appropriate for delegation to [facility].     Thank you for this referral.  Diaz Bennett OT  Time Calculation: 11 mins

## 2019-09-23 NOTE — PROGRESS NOTES
Famotidine - Renal dosing by Pharmacy  Current regimen:  20 mg PO Q 12 hr  Recent Labs     09/23/19  0350 09/22/19  0538 09/22/19  0536   CREA 1.77* 1.96* 1.95*   BUN 46* 48* 50*   Estimated CrCl:  24.7 ml/min    Plan:  Change to 20 mg PO Q 24hrs with respect to indication and renal status (CrCl <50 mL/min) per [facility]/[employer]-approved Renal Dosing Adjustment protocol. Pharmacy will continue to monitor this patient daily for changes in clinical status and renal function.     Marley Benitez, PharmD  Clinical Pharmacist  [facility]  [phone]

## 2019-09-23 NOTE — PROGRESS NOTES
Occupational therapy 1486 -   09.23.2019    Attempted to see patient for OT evaluation, MD bedside and patient on breathing treatment. Will defer and f/u later in AM/PM as able and appropriate. Thank you. Shelli Stringer, MS, OTR/L

## 2019-09-23 NOTE — PROGRESS NOTES
Vascular Surgery  --no complaints of RLE pain; she had some left foot pain  --right foot is cool but viable  --left foot is less cool; motor/sensation intact; does not appear acutely ischemic  --has significant muscle ischemia of right calf and will likely have chronic foot drop on right  --needs right foot drop boot  --can be OOB to chair  --I am continuing heparin for now but will likely need transition to oral meds by tomorrow

## 2019-09-23 NOTE — PROGRESS NOTES
Problem: Diabetes Self-Management  Goal: *Disease process and treatment process  Description  Define diabetes and identify own type of diabetes; list 3 options for treating diabetes. Outcome: Progressing Towards Goal     Problem: Heart Failure: Day 5  Goal: Off Pathway (Use only if patient is Off Pathway)  Outcome: Progressing Towards Goal     Problem: Diabetes Self-Management  Goal: *Disease process and treatment process  Description  Define diabetes and identify own type of diabetes; list 3 options for treating diabetes.   9/23/2019 1728 by Cherie Sears RN  Outcome: Progressing Towards Goal  9/23/2019 1728 by Cherie Sears RN  Outcome: Progressing Towards Goal     Problem: Heart Failure: Day 5  Goal: Off Pathway (Use only if patient is Off Pathway)  9/23/2019 1728 by Cherie Sears RN  Outcome: Progressing Towards Goal  9/23/2019 1728 by Cherie Sears RN  Outcome: Progressing Towards Goal

## 2019-09-23 NOTE — PROGRESS NOTES
Palliative Medicine  [facility]: [phone] ([phone])  [facility]: [phone])        Code Status: Full Code    Advance Care Planning:  Advance Care Planning 9/17/2019   Patient's Healthcare Decision Maker is: -   Primary Decision Maker Name -   Primary Decision Maker Phone Number -   Primary Decision Maker Relationship to Patient -   Secondary Decision Maker Name -   Secondary Decision Maker Relationship to Patient -   Confirm Advance Directive None   Patient Would Like to Complete Advance Directive No   Does the patient have other document types -   LNOK    Supplemental (Other) Decision Maker: Lakhwinder [place] - Son - [phone]    Supplemental (Other) Decision Maker: More [place] - Daughter - [phone]    Patient / Family Encounter Documentation    Participants (names): Patient, son Sandro Bailey LS     Narrative:     Met with patient and son in room. Patient was alert and orientedx4. She was not in pain (foot hurts to the touch) and reported low appetite \"I don't eat much when I don't feel well. \" Overall she did not appear to be in any distress. 1. Introduced palliative service  2. Discussed recent surgeries and understanding of medical issue. Patient understands big picture but admits she gets confused by medical terminology. 3. We reviewed purpose of AMD/MPOA. reviewed blank form. Son and patient would like to discuss with rest of family. 4. Son had questions regarding discharge. I discussed plan as recommended by PT/OT and outlined my CM. Patient said she just wants to go home, but we discussed importance of building up strength and working with impairments. Patient was independent in walking prior to admission and son understands importance of rehab (he has background in sports and injuries). They are wondering how long she might be in rehab - we discussed it would depend on patient progress  5.  Family will discuss d/c options - son would also like to to talk to care manager while he's here. Goals of Care / Plan:     Family will discuss AMD and call to complete    CM updated      Thank you for the opportunity to be involved in the care of Ms. Cosme and her family.     Sandee Kaplan LMSW, Supervisee in Social Work  Palliative Medicine   [phone]

## 2019-09-23 NOTE — PROGRESS NOTES
Hospitalist Progress Note                               Macho Middleton MD                                     Answering service: [phone] from in house phone           Date of Service:  2019  NAME:  Rose Mary Malloy  :  1931  MRN:  [id number]      Admission Summary:   41-year-old female with an extensive past medical history including chronic hypoxemic respiratory failure with home oxygen, COPD, chronic kidney disease, type 2 diabetes mellitus, atrial fibrillation, diastolic congestive heart failure, dyslipidemia, primary hypertension, osteoporosis, pulmonary hypertension, depression and anxiety disorder, was brought to the emergency room from home for evaluation of an approximately 2-3 day history of worsening shortness of breath. Per the patient's son, the patient ambulates unaided at baseline; however, in the 2-3 days leading up to the emergency room presentation, noted to have worsening shortness of breath even with easy activity. The patient also complained of some numbness and weakness in the right lower extremity. The patient admitted to being compliant with all her medical therapies. The patient denied associated headaches, dizziness, visual deficits, chest pains, palpitations, nausea, vomiting, cough, sputum production, fevers, chills, abdominal pain, bladder or bowel irregularities. Reason for follow up:       CC: SOB and leg pain    All events in the past 24 hours reviewed in their entirety. Reviewed with nursing at bedside. Right LE is without significant changes, left great toe discomfort.  She is grateful to have another day, wheezy today, other ROS is negative     Assessment & Plan:     Acute thrombus at the mid and distal right popliteal level RLE  - on Heparin drip, plan to change to oral [facility] tomorrow per vascular surgery  - S/p RLE thrombectomy , found residual clot in popliteal artery, s/p repeat thrombectomy 9/22/19 per vascular surgery, now with significant muscle ischemia right calf and will floridalma have chronic foot drop on right. - R foot drop boot    Low grade fever likely reactive due to ischemic changes: monitor    [place]domy[place] [address]57 on 9/22/19 in setting of ischemic RLE  - IVF  - serial daily CPK - trending down  - holding statins      Acute on chronic diastolic CHF exacerbation with a preserved EF confirmed  Demand ischemia with mildly elevated troponins due to the CHF exarcerbation. Indeterminable NYHA Functional Class due to the multiple Co-morbidities per Cardiology. Primary Hypertension   Dyslipidemia   Severe TVR  - 2D ECHO (9/12/19) with EF 70 percent. - Continue oxygen  - s/p IV diuresis  - statins on hold for above  - daily weight input/output monitoring  - CHF teaching.   - Cardiology and Heart failure team evals noted and appreciated. - BB dose decreased down yesterday to 25 mg secondary to relative hypotension, hold for SBP <100  - nebz    HOLLAND on probable CKD stage 2-3 due to medication side effect (Bumex and Spironolactone) POA - - - off lasix  -Renal US negative for obstructive uropathy.   -Nephrology eval noted and appreciated    Multifactorial Hyponatremia POA - stable   Resolved Hyperkalemia without any dysrhythmias. 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 - Continue Aspirin, Plavix, statin, neurochecks. - Neurology eval noted and appreciated. Chronic Hypoxemic respiratory failure due to COPD with home oxygen at 3 l/min. - Nebulizers, inhalers, incentive spirometry. Anemia acute on chronic  - monitor  - H/H stable, transfuse if indicated,  c/w with current [facility] for now  - patient agreed for RBC transfusion if needed. Uncontrolled IDDM type 2 DM with complications including hyperglycemia.   - Hold metformin.    - Accucheck monitoring  - Basal and sliding scale insulins  - diabetic teaching done    Osteoporosis - f/u as OP    Anxiety disorder and Depression without any current behavioral disturbances. Hypocalcemia  - replace as needed    Prophylaxis: DVT. Full Code with a guarded prognosis. - Palliative care consulted and notes appreciated    Plan: Anticipate patient will require Short-term Post-Acute care facility placement for ongoing PT/OT when stable and cleared by specialists for d/c. Patient's son Amanda Wilson is at [phone] if needed for updates. Hospital Problems  Date Reviewed: 9/20/2019          Codes Class Noted POA    CHF exacerbation ([facility] ICD-10-CM: I50.9  ICD-9-CM: 428.0  9/17/2019 Yes        Hyperkalemia ICD-10-CM: E87.5  ICD-9-CM: 276.7  9/17/2019 Yes        Hyponatremia ICD-10-CM: E87.1  ICD-9-CM: 276.1  9/17/2019 Yes        HOLLAND (acute kidney injury) ([facility].) ICD-10-CM: N17.9  ICD-9-CM: 584.9  9/17/2019 Yes        Acute hypoxemic respiratory failure ([facility] ICD-10-CM: J96.01  ICD-9-CM: 518.81  8/8/2011 Yes        Debility ICD-10-CM: R53.81  ICD-9-CM: 799.3  6/28/2011 Yes              Physical Examination:      Last 24hrs VS reviewed since prior progress note. Most recent are:  Visit Vitals  /46 (BP 1 Location: Left arm)   Pulse 71   Temp 98.5 °F (36.9 °C)   Resp 17   Ht 5' 5\" (1.651 m)   Wt 92.2 kg (203 lb 4.2 oz)   SpO2 100%   Breastfeeding? No   BMI 33.82 kg/m²           Constitutional:  No acute distress. HEENT: Head is a traumatic,  Un icteric sclera. Pink conjunctiva,no erythema or discharge. Oral mucous moist, oropharynx benign. Neck supple,    Resp:  wheezy b/l   CV:  S1,S2 present. (+) murmur TVR    GI:  Soft, non distended, non tender. normoactive bowel sounds, no hepatosplenomegaly    :  No CVA or suprapubic tenderness   Skin  :  No erythema,rash,bullae,dipigmentation . Cool RLE, no pulses below the knee and no sencitivity    Musculoskeletal:  RLE slightly bigger than LLE. Absent Dorsalis Pedis Pulse RLE. Power 0/5 RLE, 5/5 other extremities.  Unable to palpate pulses LLE but LLE warm and pink. Neurologic:  Awake, alert and oriented. Absent sensation RLE.         Intake/Output Summary (Last 24 hours) at 9/23/2019 1051  Last data filed at 9/23/2019 1021  Gross per 24 hour   Intake 1970.7 ml   Output 550 ml   Net 1420.7 ml              Labs:     Recent Labs     09/23/19  0350 09/22/19  0536 09/21/19  1230   WBC  --  16.3* 14.6*   HGB 7.2* 7.5* 9.2*   HCT 23.6* 24.8* 30.1*   PLT  --  233 270     Recent Labs     09/23/19  0350 09/22/19  0538 09/22/19  0536 09/21/19  0247   * 134* 134* 134*   K 4.6 4.6 4.8 4.5    102 102 99   CO2 26 23 24 26   BUN 46* 48* 50* 49*   CREA 1.77* 1.96* 1.95* 1.83*   * 187* 187* 133*   CA 7.8* 7.6* 7.5* 8.2*   PHOS 3.6 4.0  --  4.5     Recent Labs     09/23/19  0350 09/22/19  0538 09/21/19  0247   ALB 2.0* 2.0* 2.3*     Recent Labs     09/23/19  0350 09/22/19  2205 09/22/19  1355   APTT 58.9* 66.8* 55.2*     Lab Results   Component Value Date/Time    Cholesterol, total 144 09/17/2019 09:21 AM    HDL Cholesterol 55 09/17/2019 09:21 AM    LDL, calculated 72.6 09/17/2019 09:21 AM    Triglyceride 82 09/17/2019 09:21 AM    CHOL/HDL Ratio 2.6 09/17/2019 09:21 AM     Lab Results   Component Value Date/Time    Glucose (POC) 149 (H) 09/23/2019 05:06 AM    Glucose (POC) 159 (H) 09/22/2019 10:07 PM    Glucose (POC) 203 (H) 09/22/2019 04:55 PM    Glucose (POC) 301 (H) 09/22/2019 11:27 AM    Glucose (POC) 231 (H) 09/22/2019 06:30 AM     Lab Results   Component Value Date/Time    Color YELLOW/STRAW 09/17/2019 12:49 PM    Appearance CLEAR 09/17/2019 12:49 PM    Specific gravity 1.030 09/17/2019 12:49 PM    Specific gravity 1.010 12/18/2011 09:28 AM    pH (UA) 6.0 09/17/2019 12:49 PM    Protein NEGATIVE  09/17/2019 12:49 PM    Glucose NEGATIVE  09/17/2019 12:49 PM    Ketone NEGATIVE  09/17/2019 12:49 PM    Bilirubin NEGATIVE  09/17/2019 12:49 PM    Urobilinogen 0.2 09/17/2019 12:49 PM    Nitrites NEGATIVE  09/17/2019 12:49 PM Leukocyte Esterase NEGATIVE  09/17/2019 12:49 PM    Epithelial cells FEW 09/17/2019 12:49 PM    Bacteria NEGATIVE  09/17/2019 12:49 PM    WBC 0-4 09/17/2019 12:49 PM    RBC 0-5 09/17/2019 12:49 PM         Medications Reviewed:     Current Facility-Administered Medications   Medication Dose Route Frequency    influenza vaccine 2019-20 (6 mos+)(PF) (FLUARIX/FLULAVAL/FLUZONE QUAD) injection 0.5 mL  0.5 mL IntraMUSCular PRIOR TO DISCHARGE    metoprolol tartrate (LOPRESSOR) tablet 25 mg  25 mg Oral BID    0.9% sodium chloride infusion  50 mL/hr IntraVENous CONTINUOUS    heparin 25,000 units in D5W 250 ml infusion  16-36 Units/kg/hr IntraVENous TITRATE    traMADol (ULTRAM) tablet 25 mg  25 mg Oral Q8H PRN    fentaNYL citrate (PF) injection 50 mcg  50 mcg IntraVENous Q3H PRN    clopidogrel (PLAVIX) tablet 75 mg  75 mg Oral DAILY    insulin lispro (HUMALOG) injection   SubCUTAneous AC&HS    insulin glargine (LANTUS) injection 15 Units  15 Units SubCUTAneous DAILY    ondansetron (ZOFRAN) injection 4 mg  4 mg IntraVENous Q6H PRN    glucose chewable tablet 16 g  4 Tab Oral PRN    glucagon (GLUCAGEN) injection 1 mg  1 mg IntraMUSCular PRN    sodium chloride (NS) flush 5-40 mL  5-40 mL IntraVENous Q8H    sodium chloride (NS) flush 5-40 mL  5-40 mL IntraVENous PRN    acetaminophen (TYLENOL) tablet 650 mg  650 mg Oral Q4H PRN    allopurinol (ZYLOPRIM) tablet 100 mg  100 mg Oral DAILY    aspirin chewable tablet 81 mg  81 mg Oral DAILY    [Held by provider] atorvastatin (LIPITOR) tablet 40 mg  40 mg Oral DAILY    albuterol-ipratropium (DUO-NEB) 2.5 MG-0.5 MG/3 ML  3 mL Nebulization Q4H PRN     ______________________________________________________________________  EXPECTED LENGTH OF STAY: 8d 9h  ACTUAL LENGTH OF STAY:          6  Time today is 38 min               Damaso Mujica MD

## 2019-09-23 NOTE — ROUTINE PROCESS
Bedside and Verbal shift change report given to Luanne Babinski (oncoming nurse) by Parul Oliva (offgoing nurse). Report included the following information SBAR, Kardex, MAR, Accordion and Cardiac Rhythm NSR PJC.

## 2019-09-23 NOTE — PHYSICIAN ADVISORY
Note Created for Dr. Maria G Yoon, Medical Director, [facility] MRI report 9/19/19 Exam Information Status Exam Begun  Exam Ended Final [99] 9/19/2019 10:44 9/19/2019 11:01 Result Information Status: Final result (Exam End: 9/19/2019 11:01) Provider Status: Open Study Result EXAM:  MRI BRAIN W WO CONT 
  
INDICATION:    Stroke; Right leg weakness 
  
COMPARISON:  CTA head neck 9/17/2019. 
  
CONTRAST: 19 ml Dotarem. 
  
TECHNIQUE:   
Multiplanar multisequence acquisition without and with contrast of the brain. 
  
FINDINGS: 
Tiny acute infarct in the right cerebellum. No acute hemorrhage. 
  
Mild generalized parenchymal volume loss with commensurate dilation of the sulci 
and ventricular system. Periventricular deep white matter T2/FLAIR 
hyperintensities, and patchy T2/FLAIR hyperintensity in the raymundo, consistent 
with mild chronic microvascular ischemic disease. Small chronic infarct in the 
right frontal periventricular white matter. There is no cerebellar tonsillar 
herniation. Expected arterial flow-voids are present. No evidence of abnormal 
enhancement. 
  
Paranasal sinuses are clear. Trace bilateral mastoid effusions. The orbital 
contents are within normal limits with bilateral lens implants. No significant 
osseous or scalp lesions are identified. 
  
IMPRESSION IMPRESSION:  
  
1. Tiny acute infarct in the right cerebellum. 2. Mild generalized parenchymal volume loss and mild chronic microvascular 
ischemic disease. Small chronic infarct in the right frontal periventricular 
white matter. 
  
23X Guillaume Flores MD MPH FACP Physician [address] [place] [address] 631 [facility]  [phone]   
 
[phone]

## 2019-09-23 NOTE — PROGRESS NOTES
Faculty or Preceptor Review of Student Work    9/23/2019  - Shift times - [phone] to [phone]    The student documentation of patient care for Olga Webber has been reviewed and approved. All medications have been administered under the direct supervision of the faculty or preceptor.     Mirna Heredia RN

## 2019-09-23 NOTE — PROGRESS NOTES
CM noted order for PT/OT evaluations to determine appropriate disposition. Also noted she is a [facility] patient. CM will offer choice including [facility] and [facility] that are preferred SNFs for [facility]. If patient needs [facility], she will be offered [place]. Awaiting PT/OT evaluations. Stephanie Noble MSA, RN, CRM.    2:30 pm. CM noted PT/OT recommendations for SNF placement, met with patient and daughter to discuss transition of care and to offer a choice. Patient said \" I have caregivers and I don't think I need to go to a Rehab\". She said she needed time to discuss it with her son. This CM told patient that we will revisit it tomorrow. Patient said \"I don't think tomorrow is enough time to think over it\"    Patient may prefer [facility] is [facility]'s preferred home care provider. Stephanie Noble MSA, RN, CRM.

## 2019-09-23 NOTE — PROGRESS NOTES
Problem: Mobility Impaired (Adult and Pediatric)  Goal: *Acute Goals and Plan of Care (Insert Text)  Description  FUNCTIONAL STATUS PRIOR TO ADMISSION: Patient was independent for all functional mobility. Patient uses 2L/min O2 via nasal cannula at baseline. HOME SUPPORT PRIOR TO ADMISSION: The patient lived with her son and daughter but did not require assist.    Physical Therapy Goals  Initiated 9/18/2019  1. Patient will move from supine to sit and sit to supine  in bed with independence within 7 day(s). 2.  Patient will transfer from bed to chair and chair to bed with independence using the least restrictive device within 7 day(s). 3.  Patient will perform sit to stand with independence within 7 day(s). 4.  Patient will ambulate with independence for 150 feet with the least restrictive device within 7 day(s). 5.  Patient will ascend/descend 1 stairs with 1 handrail(s) with modified independence within 7 day(s). Outcome: Progressing Towards Goal    PHYSICAL THERAPY TREATMENT  Patient: Luma Cabrera (81 y.o. female)  Date: 9/23/2019  Diagnosis: CHF exacerbation ([facility] [I50.9] <principal problem not specified>  Procedure(s) (LRB):  THROMBECTOMY/EMBOLECTOMY LOWER  RIGHT LEG. FASCIOTOMY (Right) 2 Days Post-Op  Precautions:    Chart, physical therapy assessment, plan of care and goals were reviewed. ASSESSMENT  Based on the objective data described below, pt was agreeable to sit EOB but required encouragement to attempt stand pt is limited by pain with mobility. Pt had decrease ability to weightbear through right LE to initiate step with left. PRAFO was donned for a resting DF splint. Discussed with RN of 2hr on/off schedule. Current Level of Function Impacting Discharge (mobility/balance): min A for bed mobility. Sit to stand Mod A x2    Other factors to consider for discharge: decrease ability to weightbear on right, Right foot drop, unable to ambulate at this time.          PLAN :  Patient continues to benefit from skilled intervention to address the above impairments. Continue treatment per established plan of care. to address goals. Recommendation for discharge: (in order for the patient to meet his/her long term goals)  Therapy up to 5 days/week in SNF setting    This discharge recommendation:  Has been made in collaboration with the attending provider and/or case management    Equipment recommendations for successful discharge (if) home: to be determined by rehab facility       SUBJECTIVE:   Patient stated It hurt to move.     OBJECTIVE DATA SUMMARY:   Critical Behavior:  Neurologic State: Alert  Orientation Level: Oriented X4  Cognition: Appropriate decision making, Appropriate safety awareness, Follows commands     Functional Mobility Training:  Bed Mobility:     Supine to Sit: Minimum assistance(for right LE)  Sit to Supine: Minimum assistance(For right LE)           Transfers:  Sit to Stand: Moderate assistance;Assist x2  Stand to Sit: Moderate assistance;Assist x2                             Balance:  Sitting: Intact  Standing: Impaired  Standing - Static: Poor  Standing - Dynamic : Poor  Ambulation/Gait Training:                                                        Stairs: Therapeutic Exercises:     Pain Rating:  10/10 right calf      Activity Tolerance:   Limited   Please refer to the flowsheet for vital signs taken during this treatment.     After treatment patient left in no apparent distress:   Call bell within reach and bed in modified chair position      COMMUNICATION/COLLABORATION:   The patients plan of care was discussed with: Registered Nurse    Marcial Zepeda PTA   Time Calculation: 18 mins

## 2019-09-23 NOTE — PROGRESS NOTES
Problem: Treatment of Deep Venous Thrombosis by Embolectomy  Goal: *Absence of infection signs and symptoms  Outcome: Progressing Towards Goal     Problem: Heart Failure: Day 5  Goal: Activity/Safety  Outcome: Not Progressing Towards Goal  Goal: Diagnostic Test/Procedures  Outcome: Not Progressing Towards Goal  Goal: Discharge Planning  Outcome: Not Progressing Towards Goal  Goal: Respiratory  Outcome: Not Progressing Towards Goal     Problem: Treatment of Deep Venous Thrombosis by Embolectomy  Goal: *Absence of embolism and improvement of existing deep venous thrombosis  Outcome: Not Progressing Towards Goal  Goal: *Absence of bleeding  Outcome: Not Progressing Towards Goal  Goal: *Labs within defined limits  Outcome: Not Progressing Towards Goal  Goal: *Skin integrity maintained  Outcome: Not Progressing Towards Goal

## 2019-09-24 ENCOUNTER — APPOINTMENT (OUTPATIENT)
Dept: GENERAL RADIOLOGY | Age: 84
DRG: 270 | End: 2019-09-24
Attending: HOSPITALIST
Payer: MEDICARE

## 2019-09-24 LAB
ALBUMIN SERPL-MCNC: 1.8 G/DL (ref 3.5–5)
ANION GAP SERPL CALC-SCNC: 6 MMOL/L (ref 5–15)
APTT PPP: 42.8 SEC (ref 22.1–32)
BASOPHILS # BLD: 0 K/UL (ref 0–0.1)
BASOPHILS NFR BLD: 0 % (ref 0–1)
BUN SERPL-MCNC: 36 MG/DL (ref 6–20)
BUN/CREAT SERPL: 28 (ref 12–20)
CALCIUM SERPL-MCNC: 8.3 MG/DL (ref 8.5–10.1)
CHLORIDE SERPL-SCNC: 105 MMOL/L (ref 97–108)
CK SERPL-CCNC: 8288 U/L (ref 26–192)
CO2 SERPL-SCNC: 26 MMOL/L (ref 21–32)
CREAT SERPL-MCNC: 1.3 MG/DL (ref 0.55–1.02)
DIFFERENTIAL METHOD BLD: ABNORMAL
EOSINOPHIL # BLD: 0.3 K/UL (ref 0–0.4)
EOSINOPHIL NFR BLD: 2 % (ref 0–7)
ERYTHROCYTE [DISTWIDTH] IN BLOOD BY AUTOMATED COUNT: 15.9 % (ref 11.5–14.5)
GLUCOSE BLD STRIP.AUTO-MCNC: 103 MG/DL (ref 65–100)
GLUCOSE BLD STRIP.AUTO-MCNC: 166 MG/DL (ref 65–100)
GLUCOSE BLD STRIP.AUTO-MCNC: 221 MG/DL (ref 65–100)
GLUCOSE BLD STRIP.AUTO-MCNC: 225 MG/DL (ref 65–100)
GLUCOSE SERPL-MCNC: 147 MG/DL (ref 65–100)
HCT VFR BLD AUTO: 22.5 % (ref 35–47)
HGB BLD-MCNC: 6.8 G/DL (ref 11.5–16)
IMM GRANULOCYTES # BLD AUTO: 0 K/UL
IMM GRANULOCYTES NFR BLD AUTO: 0 %
LYMPHOCYTES # BLD: 0.7 K/UL (ref 0.8–3.5)
LYMPHOCYTES NFR BLD: 5 % (ref 12–49)
MCH RBC QN AUTO: 27.3 PG (ref 26–34)
MCHC RBC AUTO-ENTMCNC: 30.2 G/DL (ref 30–36.5)
MCV RBC AUTO: 90.4 FL (ref 80–99)
MONOCYTES # BLD: 0.6 K/UL (ref 0–1)
MONOCYTES NFR BLD: 4 % (ref 5–13)
NEUTS BAND NFR BLD MANUAL: 1 % (ref 0–6)
NEUTS SEG # BLD: 12.2 K/UL (ref 1.8–8)
NEUTS SEG NFR BLD: 88 % (ref 32–75)
NRBC # BLD: 0.03 K/UL (ref 0–0.01)
NRBC BLD-RTO: 0.2 PER 100 WBC
PHOSPHATE SERPL-MCNC: 2.9 MG/DL (ref 2.6–4.7)
PLATELET # BLD AUTO: 251 K/UL (ref 150–400)
PMV BLD AUTO: 10.2 FL (ref 8.9–12.9)
POTASSIUM SERPL-SCNC: 4.7 MMOL/L (ref 3.5–5.1)
RBC # BLD AUTO: 2.49 M/UL (ref 3.8–5.2)
RBC MORPH BLD: ABNORMAL
RBC MORPH BLD: ABNORMAL
SERVICE CMNT-IMP: ABNORMAL
SODIUM SERPL-SCNC: 137 MMOL/L (ref 136–145)
THERAPEUTIC RANGE,PTTT: ABNORMAL SECS (ref 58–77)
WBC # BLD AUTO: 13.8 K/UL (ref 3.6–11)

## 2019-09-24 PROCEDURE — 36430 TRANSFUSION BLD/BLD COMPNT: CPT

## 2019-09-24 PROCEDURE — 77010033678 HC OXYGEN DAILY

## 2019-09-24 PROCEDURE — 74011636637 HC RX REV CODE- 636/637: Performed by: HOSPITALIST

## 2019-09-24 PROCEDURE — 80069 RENAL FUNCTION PANEL: CPT

## 2019-09-24 PROCEDURE — 74011250637 HC RX REV CODE- 250/637: Performed by: SURGERY

## 2019-09-24 PROCEDURE — 74011250636 HC RX REV CODE- 250/636: Performed by: SURGERY

## 2019-09-24 PROCEDURE — 71045 X-RAY EXAM CHEST 1 VIEW: CPT

## 2019-09-24 PROCEDURE — 74011250636 HC RX REV CODE- 250/636: Performed by: NURSE PRACTITIONER

## 2019-09-24 PROCEDURE — 30233N1 TRANSFUSION OF NONAUTOLOGOUS RED BLOOD CELLS INTO PERIPHERAL VEIN, PERCUTANEOUS APPROACH: ICD-10-PCS | Performed by: SURGERY

## 2019-09-24 PROCEDURE — 94640 AIRWAY INHALATION TREATMENT: CPT

## 2019-09-24 PROCEDURE — 86900 BLOOD TYPING SEROLOGIC ABO: CPT

## 2019-09-24 PROCEDURE — 94760 N-INVAS EAR/PLS OXIMETRY 1: CPT

## 2019-09-24 PROCEDURE — 36415 COLL VENOUS BLD VENIPUNCTURE: CPT

## 2019-09-24 PROCEDURE — 65660000000 HC RM CCU STEPDOWN

## 2019-09-24 PROCEDURE — 82550 ASSAY OF CK (CPK): CPT

## 2019-09-24 PROCEDURE — 82962 GLUCOSE BLOOD TEST: CPT

## 2019-09-24 PROCEDURE — P9016 RBC LEUKOCYTES REDUCED: HCPCS

## 2019-09-24 PROCEDURE — 86923 COMPATIBILITY TEST ELECTRIC: CPT

## 2019-09-24 PROCEDURE — 85730 THROMBOPLASTIN TIME PARTIAL: CPT

## 2019-09-24 PROCEDURE — 85025 COMPLETE CBC W/AUTO DIFF WBC: CPT

## 2019-09-24 PROCEDURE — 74011250637 HC RX REV CODE- 250/637: Performed by: FAMILY MEDICINE

## 2019-09-24 PROCEDURE — 74011250636 HC RX REV CODE- 250/636: Performed by: HOSPITALIST

## 2019-09-24 PROCEDURE — 74011636637 HC RX REV CODE- 636/637: Performed by: SURGERY

## 2019-09-24 PROCEDURE — 94664 DEMO&/EVAL PT USE INHALER: CPT

## 2019-09-24 PROCEDURE — 74011000250 HC RX REV CODE- 250: Performed by: FAMILY MEDICINE

## 2019-09-24 RX ORDER — DEXTROSE MONOHYDRATE 100 MG/ML
0-250 INJECTION, SOLUTION INTRAVENOUS AS NEEDED
Status: DISCONTINUED | OUTPATIENT
Start: 2019-09-24 | End: 2019-10-23 | Stop reason: HOSPADM

## 2019-09-24 RX ORDER — INSULIN LISPRO 100 [IU]/ML
INJECTION, SOLUTION INTRAVENOUS; SUBCUTANEOUS
Status: DISCONTINUED | OUTPATIENT
Start: 2019-09-24 | End: 2019-10-23 | Stop reason: HOSPADM

## 2019-09-24 RX ORDER — IPRATROPIUM BROMIDE AND ALBUTEROL SULFATE 2.5; .5 MG/3ML; MG/3ML
3 SOLUTION RESPIRATORY (INHALATION)
Status: DISCONTINUED | OUTPATIENT
Start: 2019-09-24 | End: 2019-10-17 | Stop reason: SDUPTHER

## 2019-09-24 RX ORDER — SODIUM CHLORIDE 9 MG/ML
250 INJECTION, SOLUTION INTRAVENOUS AS NEEDED
Status: DISCONTINUED | OUTPATIENT
Start: 2019-09-24 | End: 2019-10-17 | Stop reason: ALTCHOICE

## 2019-09-24 RX ORDER — MAGNESIUM SULFATE 100 %
4 CRYSTALS MISCELLANEOUS AS NEEDED
Status: DISCONTINUED | OUTPATIENT
Start: 2019-09-24 | End: 2019-10-23 | Stop reason: HOSPADM

## 2019-09-24 RX ORDER — HEPARIN SODIUM 1000 [USP'U]/ML
3568 INJECTION, SOLUTION INTRAVENOUS; SUBCUTANEOUS ONCE
Status: COMPLETED | OUTPATIENT
Start: 2019-09-24 | End: 2019-09-24

## 2019-09-24 RX ORDER — FUROSEMIDE 10 MG/ML
20 INJECTION INTRAMUSCULAR; INTRAVENOUS ONCE
Status: COMPLETED | OUTPATIENT
Start: 2019-09-24 | End: 2019-09-24

## 2019-09-24 RX ORDER — INSULIN LISPRO 100 [IU]/ML
2 INJECTION, SOLUTION INTRAVENOUS; SUBCUTANEOUS
Status: DISCONTINUED | OUTPATIENT
Start: 2019-09-24 | End: 2019-10-02

## 2019-09-24 RX ADMIN — INSULIN LISPRO 2 UNITS: 100 INJECTION, SOLUTION INTRAVENOUS; SUBCUTANEOUS at 17:24

## 2019-09-24 RX ADMIN — FUROSEMIDE 20 MG: 10 INJECTION, SOLUTION INTRAMUSCULAR; INTRAVENOUS at 20:32

## 2019-09-24 RX ADMIN — INSULIN LISPRO 2 UNITS: 100 INJECTION, SOLUTION INTRAVENOUS; SUBCUTANEOUS at 14:13

## 2019-09-24 RX ADMIN — Medication 10 ML: at 14:00

## 2019-09-24 RX ADMIN — FENTANYL CITRATE 50 MCG: 50 INJECTION INTRAMUSCULAR; INTRAVENOUS at 19:09

## 2019-09-24 RX ADMIN — APIXABAN 5 MG: 5 TABLET, FILM COATED ORAL at 22:55

## 2019-09-24 RX ADMIN — ALUMINUM HYDROXIDE, MAGNESIUM HYDROXIDE, AND SIMETHICONE 30 ML: 200; 200; 20 SUSPENSION ORAL at 06:12

## 2019-09-24 RX ADMIN — Medication 10 ML: at 08:37

## 2019-09-24 RX ADMIN — TRAMADOL HYDROCHLORIDE 25 MG: 50 TABLET ORAL at 20:31

## 2019-09-24 RX ADMIN — FAMOTIDINE 20 MG: 20 TABLET ORAL at 17:46

## 2019-09-24 RX ADMIN — ASPIRIN 81 MG CHEWABLE TABLET 81 MG: 81 TABLET CHEWABLE at 08:57

## 2019-09-24 RX ADMIN — HEPARIN SODIUM 3568 UNITS: 1000 INJECTION INTRAVENOUS; SUBCUTANEOUS at 08:32

## 2019-09-24 RX ADMIN — CLOPIDOGREL BISULFATE 75 MG: 75 TABLET ORAL at 08:57

## 2019-09-24 RX ADMIN — IPRATROPIUM BROMIDE AND ALBUTEROL SULFATE 3 ML: .5; 3 SOLUTION RESPIRATORY (INHALATION) at 00:43

## 2019-09-24 RX ADMIN — IPRATROPIUM BROMIDE AND ALBUTEROL SULFATE 3 ML: .5; 3 SOLUTION RESPIRATORY (INHALATION) at 07:30

## 2019-09-24 RX ADMIN — FENTANYL CITRATE 50 MCG: 50 INJECTION INTRAMUSCULAR; INTRAVENOUS at 08:58

## 2019-09-24 RX ADMIN — INSULIN GLARGINE 15 UNITS: 100 INJECTION, SOLUTION SUBCUTANEOUS at 08:58

## 2019-09-24 RX ADMIN — IPRATROPIUM BROMIDE AND ALBUTEROL SULFATE 3 ML: .5; 3 SOLUTION RESPIRATORY (INHALATION) at 04:11

## 2019-09-24 RX ADMIN — METOPROLOL TARTRATE 25 MG: 25 TABLET ORAL at 21:27

## 2019-09-24 RX ADMIN — ACETAMINOPHEN 650 MG: 325 TABLET, FILM COATED ORAL at 20:18

## 2019-09-24 RX ADMIN — ALLOPURINOL 100 MG: 100 TABLET ORAL at 08:57

## 2019-09-24 RX ADMIN — IPRATROPIUM BROMIDE AND ALBUTEROL SULFATE 3 ML: .5; 3 SOLUTION RESPIRATORY (INHALATION) at 13:42

## 2019-09-24 RX ADMIN — INSULIN LISPRO 2 UNITS: 100 INJECTION, SOLUTION INTRAVENOUS; SUBCUTANEOUS at 17:21

## 2019-09-24 RX ADMIN — APIXABAN 5 MG: 5 TABLET, FILM COATED ORAL at 14:13

## 2019-09-24 RX ADMIN — HEPARIN SODIUM 15 UNITS/KG/HR: 10000 INJECTION, SOLUTION INTRAVENOUS at 07:49

## 2019-09-24 RX ADMIN — Medication 10 ML: at 21:30

## 2019-09-24 NOTE — PROGRESS NOTES
Hospitalist Progress Note                               Jamie Chicas MD                                     Answering service: [phone] or [phone] from in house phone           Date of Service:  2019  NAME:  Cas Castro  :  1931  MRN:  [id number]      Admission Summary:   80-year-old female with an extensive past medical history including chronic hypoxemic respiratory failure with home oxygen, COPD, chronic kidney disease, type 2 diabetes mellitus, atrial fibrillation, diastolic congestive heart failure, dyslipidemia, primary hypertension, osteoporosis, pulmonary hypertension, depression and anxiety disorder, was brought to the emergency room from home for evaluation of an approximately 2-3 day history of worsening shortness of breath. Per the patient's son, the patient ambulates unaided at baseline; however, in the 2-3 days leading up to the emergency room presentation, noted to have worsening shortness of breath even with easy activity. The patient also complained of some numbness and weakness in the right lower extremity. The patient admitted to being compliant with all her medical therapies. The patient denied associated headaches, dizziness, visual deficits, chest pains, palpitations, nausea, vomiting, cough, sputum production, fevers, chills, abdominal pain, bladder or bowel irregularities. Reason for follow up:   Right leg ischemia. She says she has no feeling on her right foot. No other complaints. She gave consent for transfusion. Assessment & Plan:     Acute thrombus at the mid and distal right popliteal level RLE    - S/p RLE thrombectomy , found residual clot in popliteal artery, s/p repeat thrombectomy 19 per vascular surgery, now with significant muscle ischemia right calf and will likley have chronic foot drop on right.   - Switched from  Heparin drip to Apixaban per vascular surgery   - R foot drop boot    Low grade fever likely reactive due to ischemic changes: monitor    [place] [address] on 9/22/19 in setting of ischemic RLE  - IVF  - serial daily CPK - trending down  - holding statins      Acute on chronic diastolic CHF exacerbation with a preserved EF confirmed  Demand ischemia with mildly elevated troponins due to the CHF exarcerbation. Indeterminable NYHA Functional Class due to the multiple Co-morbidities per Cardiology. Primary Hypertension   Dyslipidemia   Severe TVR  - 2D ECHO (9/12/19) with EF 70 percent. - Continue oxygen  - s/p IV diuresis  - statins on hold for above  - daily weight input/output monitoring  - CHF teaching.   - Cardiology and Heart failure team evals noted and appreciated. 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   -Nephrology eval noted and appreciated    Multifactorial Hyponatremia POA - stable   Resolved Hyperkalemia without any dysrhythmias. 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 - Continue Aspirin, Plavix, statin, neurochecks. - Neurology eval noted and appreciated. Chronic Hypoxemic respiratory failure due to COPD with home oxygen at 3 l/min. - Nebulizers, inhalers, incentive spirometry. Anemia acute on chronic  - HB 6.8,no obvious source of blood loss  -Transfuse 2 units,she consented   -Discussed with Dr Dang Coffey is okay to continue with Apixaban. Uncontrolled IDDM type 2 DM with complications including hyperglycemia.   - Hold metformin. - Accucheck monitoring  - Basal and sliding scale insulins)HS) + Humalog 2 units ac tid  - diabetic teaching done    Osteoporosis - f/u as OP    Anxiety disorder and Depression without any current behavioral disturbances.     Hypocalcemia  - replace as needed      Obesity   Body mass index is 34.82 kg/m². Prophylaxis: apixaban    Full Code with a guarded prognosis. - Palliative care consulted and notes appreciated    Plan: Anticipate patient will require Short-term Post-Acute care facility placement for ongoing PT/OT when stable and cleared by specialists for d/c. Patient's son Amarilis Gerber is at [phone] if needed for updates. Hospital Problems  Date Reviewed: 9/20/2019          Codes Class Noted POA    * (Principal) CHF exacerbation ([facility] ICD-10-CM: I50.9  ICD-9-CM: 428.0  9/17/2019 Yes        Hyperkalemia ICD-10-CM: E87.5  ICD-9-CM: 276.7  9/17/2019 Yes        Hyponatremia ICD-10-CM: E87.1  ICD-9-CM: 276.1  9/17/2019 Yes        HOLLAND (acute kidney injury) ([facility].) ICD-10-CM: N17.9  ICD-9-CM: 584.9  9/17/2019 Yes        Acute hypoxemic respiratory failure ([facility] ICD-10-CM: J96.01  ICD-9-CM: 518.81  8/8/2011 Yes        Debility ICD-10-CM: R53.81  ICD-9-CM: 799.3  6/28/2011 Yes              Physical Examination:      Last 24hrs VS reviewed since prior progress note. Most recent are:  Visit Vitals  BP (!) 122/37 (BP 1 Location: Left arm)   Pulse 79   Temp 99.8 °F (37.7 °C)   Resp 22   Ht 5' 5\" (1.651 m)   Wt 94.9 kg (209 lb 3.5 oz)   SpO2 99%   Breastfeeding? No   BMI 34.82 kg/m²           Constitutional:  No acute distress. HEENT: Head is a traumatic,  Un icteric sclera. Pink conjunctiva,no erythema or discharge. Oral mucous moist, oropharynx benign. Neck supple,    Resp:  wheezy b/l   CV:  S1,S2 present. (+) murmur TVR    GI:  Soft, non distended, non tender. normoactive bowel sounds, no hepatosplenomegaly    :  No CVA or suprapubic tenderness   Skin  :  No erythema,rash,bullae,dipigmentation . Cool RLE, no pulses below the knee and no sensitivity    Musculoskeletal:  RLE slightly bigger than LLE. Absent Dorsalis Pedis Pulse RLE. Power 0/5 RLE, 5/5 other extremities. Unable to palpate pulses LLE but LLE warm and pink.      Neurologic: Awake, alert and oriented. Absent sensation RLE.         Intake/Output Summary (Last 24 hours) at 9/24/2019 1201  Last data filed at 9/24/2019 1012  Gross per 24 hour   Intake 614.98 ml   Output 1000 ml   Net -385.02 ml              Labs:     Recent Labs     09/24/19  0842 09/23/19  0350 09/22/19  0536   WBC 13.8*  --  16.3*   HGB 6.8* 7.2* 7.5*   HCT 22.5* 23.6* 24.8*     --  233     Recent Labs     09/24/19  0530 09/23/19  0350 09/22/19  0538    135* 134*   K 4.7 4.6 4.6    103 102   CO2 26 26 23   BUN 36* 46* 48*   CREA 1.30* 1.77* 1.96*   * 125* 187*   CA 8.3* 7.8* 7.6*   PHOS 2.9 3.6 4.0     Recent Labs     09/24/19  0530 09/23/19  0350 09/22/19  0538   ALB 1.8* 2.0* 2.0*     Recent Labs     09/24/19  0530 09/23/19  0350 09/22/19  2205   APTT 42.8* 58.9* 66.8*     Lab Results   Component Value Date/Time    Cholesterol, total 144 09/17/2019 09:21 AM    HDL Cholesterol 55 09/17/2019 09:21 AM    LDL, calculated 72.6 09/17/2019 09:21 AM    Triglyceride 82 09/17/2019 09:21 AM    CHOL/HDL Ratio 2.6 09/17/2019 09:21 AM     Lab Results   Component Value Date/Time    Glucose (POC) 225 (H) 09/24/2019 11:09 AM    Glucose (POC) 103 (H) 09/24/2019 06:14 AM    Glucose (POC) 240 (H) 09/23/2019 09:27 PM    Glucose (POC) 196 (H) 09/23/2019 04:45 PM    Glucose (POC) 167 (H) 09/23/2019 11:42 AM     Lab Results   Component Value Date/Time    Color YELLOW/STRAW 09/17/2019 12:49 PM    Appearance CLEAR 09/17/2019 12:49 PM    Specific gravity 1.030 09/17/2019 12:49 PM    Specific gravity 1.010 12/18/2011 09:28 AM    pH (UA) 6.0 09/17/2019 12:49 PM    Protein NEGATIVE  09/17/2019 12:49 PM    Glucose NEGATIVE  09/17/2019 12:49 PM    Ketone NEGATIVE  09/17/2019 12:49 PM    Bilirubin NEGATIVE  09/17/2019 12:49 PM    Urobilinogen 0.2 09/17/2019 12:49 PM    Nitrites NEGATIVE  09/17/2019 12:49 PM    Leukocyte Esterase NEGATIVE  09/17/2019 12:49 PM    Epithelial cells FEW 09/17/2019 12:49 PM    Bacteria NEGATIVE 09/17/2019 12:49 PM    WBC 0-4 09/17/2019 12:49 PM    RBC 0-5 09/17/2019 12:49 PM         Medications Reviewed:     Current Facility-Administered Medications   Medication Dose Route Frequency    0.9% sodium chloride infusion 250 mL  250 mL IntraVENous PRN    0.9% sodium chloride infusion 250 mL  250 mL IntraVENous PRN    apixaban (ELIQUIS) tablet 5 mg  5 mg Oral Q12H    insulin lispro (HUMALOG) injection   SubCUTAneous AC&HS    glucose chewable tablet 16 g  4 Tab Oral PRN    glucagon (GLUCAGEN) injection 1 mg  1 mg IntraMUSCular PRN    dextrose 10% infusion 0-250 mL  0-250 mL IntraVENous PRN    insulin lispro (HUMALOG) injection 2 Units  2 Units SubCUTAneous TIDAC    albuterol-ipratropium (DUO-NEB) 2.5 MG-0.5 MG/3 ML  3 mL Nebulization Q4H RT    alum-mag hydroxide-simeth (MYLANTA) oral suspension 30 mL  30 mL Oral Q4H PRN    famotidine (PEPCID) tablet 20 mg  20 mg Oral QPM    metoprolol tartrate (LOPRESSOR) tablet 25 mg  25 mg Oral BID    traMADol (ULTRAM) tablet 25 mg  25 mg Oral Q8H PRN    fentaNYL citrate (PF) injection 50 mcg  50 mcg IntraVENous Q3H PRN    clopidogrel (PLAVIX) tablet 75 mg  75 mg Oral DAILY    insulin glargine (LANTUS) injection 15 Units  15 Units SubCUTAneous DAILY    ondansetron (ZOFRAN) injection 4 mg  4 mg IntraVENous Q6H PRN    sodium chloride (NS) flush 5-40 mL  5-40 mL IntraVENous Q8H    sodium chloride (NS) flush 5-40 mL  5-40 mL IntraVENous PRN    acetaminophen (TYLENOL) tablet 650 mg  650 mg Oral Q4H PRN    allopurinol (ZYLOPRIM) tablet 100 mg  100 mg Oral DAILY    aspirin chewable tablet 81 mg  81 mg Oral DAILY    [Held by provider] atorvastatin (LIPITOR) tablet 40 mg  40 mg Oral DAILY     ______________________________________________________________________  EXPECTED LENGTH OF STAY: 8d 9h  ACTUAL LENGTH OF STAY:          7  Time today is 38 min               Sobia Perkins MD

## 2019-09-24 NOTE — ROUTINE PROCESS
Bedside and Verbal shift change report given to Aleah Mccall (oncoming nurse) by Trevor Burgos (offgoing nurse).  Report included the following information SBAR, Kardex, Intake/Output, MAR and Cardiac Rhythm SR.

## 2019-09-24 NOTE — PROGRESS NOTES
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 Cr stable,  hgb 6.8, getting 2 units PRBCs today. No cp, sob, n/v/d. Current Medications: all current  Medications have been eviewed in EPIC  Review of Systems: Pertinent items are noted in HPI. Objective:  Vitals:    Vitals:    09/24/19 1112 09/24/19 1507 09/24/19 1605 09/24/19 1629   BP: (!) 122/37 121/42 125/40 126/46   Pulse: 79 88 86 85   Resp: 22 20 18 18   Temp: 99.8 °F (37.7 °C) 98.6 °F (37 °C) 99.6 °F (37.6 °C) 99.8 °F (37.7 °C)   SpO2: 99% 100% 100% 100%   Weight:       Height:         Intake and Output:  09/24 0701 - 09/24 1900  In: 228 [P.O.:228]  Out: 850 [Urine:850]  09/22 1901 - 09/24 0700  In: 2291.6 [P.O.:1036;  I.V.:1255.6]  Out: 750 [Urine:750]    Physical Examination:  General: NAD,Conversant, frail  Neck:  Supple, no mass  Resp:  Lungs mostly clear b/l  CV:  RRR,  no murmur or rub, no LE edema, no palpable LE pulses, cold extremities  GI:  Soft, NT, + Bowel sounds, no hepatosplenomegaly  Neurologic:  Non focal  Psych:             AAO x 3 appropriate affect   Skin:  No Rash  :  No altman    [x]    High complexity decision making was performed  [x] Patient is at high-risk of decompensation with multiple organ involvement    Lab Data Personally Reviewed: I have reviewed all the pertinent labs, microbiology data and radiology studies during assessment. Recent Labs     09/24/19  0530 09/23/19  0350 09/22/19  0538    135* 134*   K 4.7 4.6 4.6    103 102   CO2 26 26 23   * 125* 187*   BUN 36* 46* 48*   CREA 1.30* 1.77* 1.96*   CA 8.3* 7.8* 7.6*   PHOS 2.9 3.6 4.0   ALB 1.8* 2.0* 2.0*     Recent Labs     09/24/19  0842 09/23/19  0350 09/22/19  0536   WBC 13.8*  --  16.3*   HGB 6.8* 7.2* 7.5*   HCT 22.5* 23.6* 24.8*     --  233     Lab Results   Component Value Date/Time    Specimen Description: NARES 09/27/2011 03:58 AM    Specimen Description: BLOOD 08/15/2011 06:15 AM    Specimen Description: CATH URINE 08/15/2011 06:15 AM     Lab Results   Component Value Date/Time    Culture result: MRSA NOT PRESENT 03/31/2016 12:30 PM    Culture result:  03/31/2016 12:30 PM         Screening of patient nares for MRSA is for surveillance purposes and, if positive, to facilitate isolation considerations in high risk settings. It is not intended for automatic decolonization interventions per se as regimens are not sufficiently effective to warrant routine use. Culture result:  09/27/2011 03:58 AM     MRSA PRESENT      Screening of patient nares for MRSA is for surveillance purposes and, if positive, to facilitate isolation considerations in high risk settings. It is not intended for automatic decolonization interventions per se as regimens are not sufficiently effective to warrant routine use.      Recent Results (from the past 24 hour(s))   GLUCOSE, POC    Collection Time: 09/23/19  4:45 PM   Result Value Ref Range    Glucose (POC) 196 (H) 65 - 100 mg/dL    Performed by Haley DIAZ (CON)    GLUCOSE, POC    Collection Time: 09/23/19  9:27 PM   Result Value Ref Range    Glucose (POC) 240 (H) 65 - 100 mg/dL    Performed by [facility] FUNCTION PANEL    Collection Time: 09/24/19  5:30 AM   Result Value Ref Range    Sodium 137 136 - 145 mmol/L    Potassium 4.7 3.5 - 5.1 mmol/L    Chloride 105 97 - 108 mmol/L    CO2 26 21 - 32 mmol/L    Anion gap 6 5 - 15 mmol/L    Glucose 147 (H) 65 - 100 mg/dL    BUN 36 (H) 6 - 20 MG/DL    Creatinine 1.30 (H) 0.55 - 1.02 MG/DL    BUN/Creatinine ratio 28 (H) 12 - 20      GFR est AA 47 (L) >60 ml/min/1.73m2    GFR est non-AA 39 (L) >60 ml/min/1.73m2    Calcium 8.3 (L) 8.5 - 10.1 MG/DL    Phosphorus 2.9 2.6 - 4.7 MG/DL    Albumin 1.8 (L) 3.5 - 5.0 g/dL   PTT    Collection Time: 09/24/19  5:30 AM   Result Value Ref Range    aPTT 42.8 (H) 22.1 - 32.0 sec    aPTT, therapeutic range     58.0 - 77.0 SECS   CK    Collection Time: 09/24/19  5:30 AM   Result Value Ref Range    CK 8,288 (H) 26 - 192 U/L   GLUCOSE, POC    Collection Time: 09/24/19  6:14 AM   Result Value Ref Range    Glucose (POC) 103 (H) 65 - 100 mg/dL    Performed by Sandi Morse    CBC WITH AUTOMATED DIFF    Collection Time: 09/24/19  8:42 AM   Result Value Ref Range    WBC 13.8 (H) 3.6 - 11.0 K/uL    RBC 2.49 (L) 3.80 - 5.20 M/uL    HGB 6.8 (L) 11.5 - 16.0 g/dL    HCT 22.5 (L) 35.0 - 47.0 %    MCV 90.4 80.0 - 99.0 FL    MCH 27.3 26.0 - 34.0 PG    MCHC 30.2 30.0 - 36.5 g/dL    RDW 15.9 (H) 11.5 - 14.5 %    PLATELET 739 713 - 084 K/uL    MPV 10.2 8.9 - 12.9 FL    NRBC 0.2 (H) 0  WBC    ABSOLUTE NRBC 0.03 (H) 0.00 - 0.01 K/uL    NEUTROPHILS 88 (H) 32 - 75 %    BAND NEUTROPHILS 1 0 - 6 %    LYMPHOCYTES 5 (L) 12 - 49 %    MONOCYTES 4 (L) 5 - 13 %    EOSINOPHILS 2 0 - 7 %    BASOPHILS 0 0 - 1 %    IMMATURE GRANULOCYTES 0 %    ABS. NEUTROPHILS 12.2 (H) 1.8 - 8.0 K/UL    ABS. LYMPHOCYTES 0.7 (L) 0.8 - 3.5 K/UL    ABS. MONOCYTES 0.6 0.0 - 1.0 K/UL    ABS. EOSINOPHILS 0.3 0.0 - 0.4 K/UL    ABS. BASOPHILS 0.0 0.0 - 0.1 K/UL    ABS. IMM.  GRANS. 0.0 K/UL    DF SMEAR SCANNED      RBC COMMENTS HYPOCHROMIA  1+        RBC COMMENTS RONALD CELLS  1+       TYPE + CROSSMATCH    Collection Time: 09/24/19 10:53 AM   Result Value Ref Range    Crossmatch Expiration 09/27/2019     ABO/Rh(D) A POSITIVE     Antibody screen NEG     Unit number [id number]     Blood component type  LR     Unit division 00     Status of unit ISSUED     Crossmatch result Compatible     Unit number [id number]     Blood component type [facility]     Unit division 00     Status of unit ALLOCATED     Crossmatch result Compatible    GLUCOSE, POC    Collection Time: 09/24/19 11:09 AM   Result Value Ref Range    Glucose (POC) 225 (H) 65 - 100 mg/dL    Performed by ArbenNaveruss    GLUCOSE, POC    Collection Time: 09/24/19  3:40 PM   Result Value Ref Range    Glucose (POC) 221 (H) 65 - 100 mg/dL    Performed by Farideh Perez MD  [address]  Phone - [phone]   Fax - [phone]  [email or web address]

## 2019-09-24 NOTE — PROGRESS NOTES
Vascular Surgery  --not much change or new complaints  Patient Vitals for the past 12 hrs:   Temp Pulse Resp BP SpO2   09/24/19 0906     100 %   09/24/19 0818 99.6 °F (37.6 °C) 80 20 (!) 112/37 100 %   09/24/19 0731     99 %   09/24/19 0720 99.4 °F (37.4 °C) 78 16 (!) 123/38 100 %   09/24/19 0413 98.5 °F (36.9 °C) 73 16 121/46 100 %   09/24/19 0411     100 %   09/24/19 0043     100 %   09/23/19 2316 99 °F (37.2 °C) 65 16 105/40 100 %     Left foot remains viable but with likely permanent deficits  Groin flat and calf incisions are clean    Plan:  --Hg is low presumably from acute blood loss anemia  --will transfuse 1 unit prbc; will transition to Eliquis 5 bid because of thrombus  --ok for therapy and D/C planning from my end

## 2019-09-24 NOTE — PROGRESS NOTES
Faculty or Preceptor Review of Student Work    9/24/2019  - Shift times - [phone] to [phone]    The student documentation of patient care for Yolanda Pratt has been reviewed and approved. All medications have been administered under the direct supervision of the faculty or preceptor.     Juanita Ivey RN

## 2019-09-24 NOTE — DIABETES MGMT
[facility]    [facility] Consult Follow Up Note    Recommendations/ Comments: Chart reviewed on Giovanni Ram for hyperglycemia, especially post meals. If appropriate, please consider:   - addition of Lispro pre- meal, 2 units tid ac   - changing lispro correction scale to normal sensitivity given renal status   Message sent to Dr. John Garcia regarding above    Current hospital DM medication: lispro correction scale - resistant sensitivity and Lantus 15 units     Patient is a 80 y.o. female with known DM on Lantus 30-40 units HS depending on BG and 1000 mg Metformin BID at home. A1c:   Lab Results   Component Value Date/Time    Hemoglobin A1c 8.8 (H) 09/17/2019 09:21 AM    Hemoglobin A1c 7.0 (H) 02/08/2017 09:59 AM       Recent Glucose Results:   Lab Results   Component Value Date/Time     (H) 09/24/2019 05:30 AM    GLUCPOC 225 (H) 09/24/2019 11:09 AM    GLUCPOC 103 (H) 09/24/2019 06:14 AM    GLUCPOC 240 (H) 09/23/2019 09:27 PM        Lab Results   Component Value Date/Time    Creatinine 1.30 (H) 09/24/2019 05:30 AM     Estimated Creatinine Clearance: 34.1 mL/min (A) (based on SCr of 1.3 mg/dL (H)). Active Orders   Diet    DIET DIABETIC CONSISTENT CARB Regular        PO intake:   Patient Vitals for the past 72 hrs:   % Diet Eaten   09/24/19 1012 0 %   09/23/19 1724 50 %   09/23/19 1237 20 %   09/23/19 1021 1 %       Will continue to follow as needed.     Thank you  Faina Berumen MS, RN, CDE    Time spent: 4 minutes

## 2019-09-24 NOTE — PROGRESS NOTES
Physical Therapy    Reviewed chart noted HGB 6.8 and is scheduled a transfusion of 2 unit . Will defer until received transfusion. Will continue to follow.

## 2019-09-24 NOTE — PROGRESS NOTES
Problem: Diabetes Self-Management  Goal: *Disease process and treatment process  Description  Define diabetes and identify own type of diabetes; list 3 options for treating diabetes. Outcome: Progressing Towards Goal  Goal: *Incorporating nutritional management into lifestyle  Description  Describe effect of type, amount and timing of food on blood glucose; list 3 methods for planning meals. Outcome: Progressing Towards Goal  Goal: *Incorporating physical activity into lifestyle  Description  State effect of exercise on blood glucose levels.   Outcome: Progressing Towards Goal

## 2019-09-25 LAB
ABO + RH BLD: NORMAL
ANION GAP SERPL CALC-SCNC: 5 MMOL/L (ref 5–15)
BASOPHILS # BLD: 0 K/UL (ref 0–0.1)
BASOPHILS NFR BLD: 0 % (ref 0–1)
BLD PROD TYP BPU: NORMAL
BLD PROD TYP BPU: NORMAL
BLOOD GROUP ANTIBODIES SERPL: NORMAL
BPU ID: NORMAL
BPU ID: NORMAL
BUN SERPL-MCNC: 26 MG/DL (ref 6–20)
BUN/CREAT SERPL: 23 (ref 12–20)
CALCIUM SERPL-MCNC: 9 MG/DL (ref 8.5–10.1)
CHLORIDE SERPL-SCNC: 104 MMOL/L (ref 97–108)
CK SERPL-CCNC: 7435 U/L (ref 26–192)
CO2 SERPL-SCNC: 27 MMOL/L (ref 21–32)
CREAT SERPL-MCNC: 1.13 MG/DL (ref 0.55–1.02)
CROSSMATCH RESULT,%XM: NORMAL
CROSSMATCH RESULT,%XM: NORMAL
DIFFERENTIAL METHOD BLD: ABNORMAL
EOSINOPHIL # BLD: 0 K/UL (ref 0–0.4)
EOSINOPHIL NFR BLD: 0 % (ref 0–7)
ERYTHROCYTE [DISTWIDTH] IN BLOOD BY AUTOMATED COUNT: 15.6 % (ref 11.5–14.5)
GLUCOSE BLD STRIP.AUTO-MCNC: 143 MG/DL (ref 65–100)
GLUCOSE BLD STRIP.AUTO-MCNC: 157 MG/DL (ref 65–100)
GLUCOSE BLD STRIP.AUTO-MCNC: 172 MG/DL (ref 65–100)
GLUCOSE SERPL-MCNC: 165 MG/DL (ref 65–100)
HCT VFR BLD AUTO: 30.5 % (ref 35–47)
HGB BLD-MCNC: 9.6 G/DL (ref 11.5–16)
IMM GRANULOCYTES # BLD AUTO: 0 K/UL
IMM GRANULOCYTES NFR BLD AUTO: 0 %
LYMPHOCYTES # BLD: 0.5 K/UL (ref 0.8–3.5)
LYMPHOCYTES NFR BLD: 3 % (ref 12–49)
MCH RBC QN AUTO: 28 PG (ref 26–34)
MCHC RBC AUTO-ENTMCNC: 31.5 G/DL (ref 30–36.5)
MCV RBC AUTO: 88.9 FL (ref 80–99)
METAMYELOCYTES NFR BLD MANUAL: 1 %
MONOCYTES # BLD: 2 K/UL (ref 0–1)
MONOCYTES NFR BLD: 11 % (ref 5–13)
NEUTS SEG # BLD: 15.1 K/UL (ref 1.8–8)
NEUTS SEG NFR BLD: 85 % (ref 32–75)
NRBC # BLD: 0.04 K/UL (ref 0–0.01)
NRBC BLD-RTO: 0.2 PER 100 WBC
PLATELET # BLD AUTO: 263 K/UL (ref 150–400)
PMV BLD AUTO: 10.1 FL (ref 8.9–12.9)
POTASSIUM SERPL-SCNC: 4.8 MMOL/L (ref 3.5–5.1)
RBC # BLD AUTO: 3.43 M/UL (ref 3.8–5.2)
RBC MORPH BLD: ABNORMAL
SERVICE CMNT-IMP: ABNORMAL
SODIUM SERPL-SCNC: 136 MMOL/L (ref 136–145)
SPECIMEN EXP DATE BLD: NORMAL
STATUS OF UNIT,%ST: NORMAL
STATUS OF UNIT,%ST: NORMAL
UNIT DIVISION, %UDIV: 0
UNIT DIVISION, %UDIV: 0
WBC # BLD AUTO: 17.8 K/UL (ref 3.6–11)

## 2019-09-25 PROCEDURE — 82550 ASSAY OF CK (CPK): CPT

## 2019-09-25 PROCEDURE — 36415 COLL VENOUS BLD VENIPUNCTURE: CPT

## 2019-09-25 PROCEDURE — 65270000029 HC RM PRIVATE

## 2019-09-25 PROCEDURE — 97530 THERAPEUTIC ACTIVITIES: CPT

## 2019-09-25 PROCEDURE — 94664 DEMO&/EVAL PT USE INHALER: CPT

## 2019-09-25 PROCEDURE — 74011250637 HC RX REV CODE- 250/637: Performed by: SURGERY

## 2019-09-25 PROCEDURE — 77010033678 HC OXYGEN DAILY

## 2019-09-25 PROCEDURE — 77030029684 HC NEB SM VOL KT MONA -A

## 2019-09-25 PROCEDURE — 74011000250 HC RX REV CODE- 250: Performed by: HOSPITALIST

## 2019-09-25 PROCEDURE — 74011250637 HC RX REV CODE- 250/637: Performed by: FAMILY MEDICINE

## 2019-09-25 PROCEDURE — 85025 COMPLETE CBC W/AUTO DIFF WBC: CPT

## 2019-09-25 PROCEDURE — 82962 GLUCOSE BLOOD TEST: CPT

## 2019-09-25 PROCEDURE — 80048 BASIC METABOLIC PNL TOTAL CA: CPT

## 2019-09-25 PROCEDURE — 94640 AIRWAY INHALATION TREATMENT: CPT

## 2019-09-25 PROCEDURE — 74011250637 HC RX REV CODE- 250/637: Performed by: INTERNAL MEDICINE

## 2019-09-25 PROCEDURE — 74011636637 HC RX REV CODE- 636/637: Performed by: SURGERY

## 2019-09-25 PROCEDURE — 74011636637 HC RX REV CODE- 636/637: Performed by: HOSPITALIST

## 2019-09-25 RX ORDER — IPRATROPIUM BROMIDE AND ALBUTEROL SULFATE 2.5; .5 MG/3ML; MG/3ML
3 SOLUTION RESPIRATORY (INHALATION)
Status: DISCONTINUED | OUTPATIENT
Start: 2019-09-25 | End: 2019-09-26

## 2019-09-25 RX ORDER — FUROSEMIDE 40 MG/1
40 TABLET ORAL DAILY
Status: DISCONTINUED | OUTPATIENT
Start: 2019-09-25 | End: 2019-10-16

## 2019-09-25 RX ADMIN — APIXABAN 5 MG: 5 TABLET, FILM COATED ORAL at 10:00

## 2019-09-25 RX ADMIN — INSULIN LISPRO 2 UNITS: 100 INJECTION, SOLUTION INTRAVENOUS; SUBCUTANEOUS at 18:00

## 2019-09-25 RX ADMIN — FUROSEMIDE 40 MG: 40 TABLET ORAL at 12:09

## 2019-09-25 RX ADMIN — ALUMINUM HYDROXIDE, MAGNESIUM HYDROXIDE, AND SIMETHICONE 30 ML: 200; 200; 20 SUSPENSION ORAL at 04:31

## 2019-09-25 RX ADMIN — APIXABAN 5 MG: 5 TABLET, FILM COATED ORAL at 22:45

## 2019-09-25 RX ADMIN — IPRATROPIUM BROMIDE AND ALBUTEROL SULFATE 3 ML: .5; 3 SOLUTION RESPIRATORY (INHALATION) at 20:35

## 2019-09-25 RX ADMIN — Medication 10 ML: at 04:31

## 2019-09-25 RX ADMIN — TRAMADOL HYDROCHLORIDE 25 MG: 50 TABLET ORAL at 22:49

## 2019-09-25 RX ADMIN — INSULIN LISPRO 2 UNITS: 100 INJECTION, SOLUTION INTRAVENOUS; SUBCUTANEOUS at 12:15

## 2019-09-25 RX ADMIN — Medication 10 ML: at 22:40

## 2019-09-25 RX ADMIN — INSULIN LISPRO 2 UNITS: 100 INJECTION, SOLUTION INTRAVENOUS; SUBCUTANEOUS at 07:22

## 2019-09-25 RX ADMIN — FAMOTIDINE 20 MG: 20 TABLET ORAL at 18:00

## 2019-09-25 RX ADMIN — ALLOPURINOL 100 MG: 100 TABLET ORAL at 09:48

## 2019-09-25 RX ADMIN — ASPIRIN 81 MG CHEWABLE TABLET 81 MG: 81 TABLET CHEWABLE at 09:49

## 2019-09-25 RX ADMIN — METOPROLOL TARTRATE 25 MG: 25 TABLET ORAL at 09:53

## 2019-09-25 RX ADMIN — CLOPIDOGREL BISULFATE 75 MG: 75 TABLET ORAL at 09:48

## 2019-09-25 RX ADMIN — METOPROLOL TARTRATE 25 MG: 25 TABLET ORAL at 22:45

## 2019-09-25 RX ADMIN — TRAMADOL HYDROCHLORIDE 25 MG: 50 TABLET ORAL at 09:48

## 2019-09-25 RX ADMIN — INSULIN GLARGINE 15 UNITS: 100 INJECTION, SOLUTION SUBCUTANEOUS at 09:58

## 2019-09-25 RX ADMIN — Medication 10 ML: at 18:00

## 2019-09-25 NOTE — PROGRESS NOTES
Physical Therapy Note    Patient declines PT tx at this time. Will re-attempt as able.      Thank you,  Ryan ANTONIOT

## 2019-09-25 NOTE — PROGRESS NOTES
Pt temp 100.0. 2nd unit PRBC to be transfused. Pt appears lethargic w/ little air movement upon auscultation of lungs. Pt w/ hx of chf and copd. Hospitalist paged for any orders. Tylenol given.

## 2019-09-25 NOTE — ANESTHESIA POSTPROCEDURE EVALUATION
Procedure(s):  THROMBECTOMY/EMBOLECTOMY LOWER  RIGHT LEG. FASCIOTOMY.     general    Anesthesia Post Evaluation        Patient location during evaluation: PACU  Patient participation: complete - patient participated  Level of consciousness: awake and alert  Pain management: adequate  Airway patency: patent  Anesthetic complications: no  Cardiovascular status: acceptable  Respiratory status: acceptable  Hydration status: acceptable  Post anesthesia nausea and vomiting:  none      Vitals Value Taken Time   BP 98/38 9/21/2019  9:06 PM   Temp 37 °C (98.6 °F) 9/21/2019  9:06 PM   Pulse 69 9/21/2019  9:06 PM   Resp 18 9/21/2019  9:06 PM   SpO2 100 % 9/21/2019  9:06 PM

## 2019-09-25 NOTE — PROGRESS NOTES
Vascular Surgery  --no changes in complaints or in exam  --right foot is viable but poorly functional   --left foot is ok but she has some toe pain; palpable left femoral/popliteal pulse  --CK's coming down; not sure about why WBC is elevated but may be from ischemic muscle  --transitioned to Eliquis--I don't think needs plavix and ASA-->will d/w Dr. Jose E Rodrigues  --ok for PT/OT/D/C planning from my end

## 2019-09-25 NOTE — PROGRESS NOTES
Problem: Mobility Impaired (Adult and Pediatric)  Goal: *Acute Goals and Plan of Care (Insert Text)  Description  FUNCTIONAL STATUS PRIOR TO ADMISSION: Patient was independent for all functional mobility. Patient uses 2L/min O2 via nasal cannula at baseline. HOME SUPPORT PRIOR TO ADMISSION: The patient lived with her son and daughter but did not require assist.    Physical Therapy Goals  Initiated 9/18/2019  1. Patient will move from supine to sit and sit to supine  in bed with independence within 7 day(s). 2.  Patient will transfer from bed to chair and chair to bed with independence using the least restrictive device within 7 day(s). 3.  Patient will perform sit to stand with independence within 7 day(s). 4.  Patient will ambulate with independence for 150 feet with the least restrictive device within 7 day(s). 5.  Patient will ascend/descend 1 stairs with 1 handrail(s) with modified independence within 7 day(s). Outcome: Progressing Towards Goal   PHYSICAL THERAPY TREATMENT  Patient: Poa Whyte (16 y.o. female)  Date: 9/25/2019  Diagnosis: [facility] exacerbation ([facility] [I50.9] CHF exacerbation (HCC)  Procedure(s) (LRB):  THROMBECTOMY/EMBOLECTOMY LOWER  RIGHT LEG. FASCIOTOMY (Right) 4 Days Post-Op  Precautions: Fall  Chart, physical therapy assessment, plan of care and goals were reviewed. ASSESSMENT  Patient continues with skilled PT services and is progressing towards goals. Patient is unable to perform any R active ankle ROM or SLR. She performs supine to sit with SBA and use of bed rails. Patient is able to scoot her R leg across the bed to achieve supine to sit. She demonstrates excellent sitting balance and performs R hip abd and add actively and knee flexion and extension with assistance. Patient is Min A for supine to sit to clear her lower extremities. She is total assistance to scoot up in bed. Current Level of Function Impacting Discharge (mobility/balance): SBA for supine to sit. Total assistance to scoot in bed    Other factors to consider for discharge: increased need for assistance, high risk for ankle PF contracture, decreased R LE ROM and strength, inability to weight bear and ambulate         PLAN :  Patient continues to benefit from skilled intervention to address the above impairments. Continue treatment per established plan of care. to address goals. Recommendation for discharge: (in order for the patient to meet his/her long term goals)  Therapy up to 5 days/week in SNF setting    This discharge recommendation:  Has been made in collaboration with the attending provider and/or case management    Equipment recommendations for successful discharge (if) home: to be determined by rehab facility       SUBJECTIVE:   Patient stated I can't stand.     OBJECTIVE DATA SUMMARY:   Critical Behavior:  Neurologic State: Alert  Orientation Level: Oriented X4  Cognition: Appropriate decision making, Appropriate for age attention/concentration, Appropriate safety awareness, Follows commands  Safety/Judgement: Awareness of environment, Fall prevention  Functional Mobility Training:  Bed Mobility:     Supine to Sit: Stand-by assistance  Sit to Supine: Stand-by assistance           Transfers:                                   Balance:  Sitting: Intact  Ambulation/Gait Training:                                                        Stairs: Therapeutic Exercises:     Pain Rating:      Activity Tolerance:   Fair  Please refer to the flowsheet for vital signs taken during this treatment.     After treatment patient left in no apparent distress:   Supine in bed, Call bell within reach and Side rails x 3    COMMUNICATION/COLLABORATION:   The patients plan of care was discussed with: Registered Nurse    Temo Alvarado PT   Time Calculation: 21 mins

## 2019-09-25 NOTE — PROGRESS NOTES
Hospitalist Progress Note                               Paulette Rice MD                                     Answering service: [phone] OR [phone] from in house phone           Date of Service:  2019  NAME:  Paulo Waller  :  1931  MRN:  [id number]      Admission Summary:   31-year-old female with an extensive past medical history including chronic hypoxemic respiratory failure with home oxygen, COPD, chronic kidney disease, type 2 diabetes mellitus, atrial fibrillation, diastolic congestive heart failure, dyslipidemia, primary hypertension, osteoporosis, pulmonary hypertension, depression and anxiety disorder, was brought to the emergency room from home for evaluation of an approximately 2-3 day history of worsening shortness of breath. Per the patient's son, the patient ambulates unaided at baseline; however, in the 2-3 days leading up to the emergency room presentation, noted to have worsening shortness of breath even with easy activity. The patient also complained of some numbness and weakness in the right lower extremity. The patient admitted to being compliant with all her medical therapies. The patient denied associated headaches, dizziness, visual deficits, chest pains, palpitations, nausea, vomiting, cough, sputum production, fevers, chills, abdominal pain, bladder or bowel irregularities. Reason for follow up:   Right leg ischemia. She says she has no feeling on her right foot. No other complaints. Assessment & Plan:     Acute thrombus at the mid and distal right popliteal level RLE    - S/p RLE thrombectomy , found residual clot in popliteal artery, s/p repeat thrombectomy 19 per vascular surgery, now with significant muscle ischemia right calf and will likley have chronic foot drop on right.   - Switched from  Heparin drip to Apixaban per vascular surgery   - R foot drop boot    Low grade fever likely reactive due to ischemic changes: monitor    [place]domy[place] [address] on 9/22/19 in setting of ischemic RLE  - IVF  - serial daily CPK - trending down  - holding statins    Leukocytosis:likley from ischemic foot. No source of infection evident. Monitor . Discussed with vascular       Acute on chronic diastolic CHF exacerbation with a preserved EF confirmed  Demand ischemia with mildly elevated troponins due to the CHF exarcerbation. Indeterminable NYHA Functional Class due to the multiple Co-morbidities per Cardiology. Primary Hypertension   Dyslipidemia   Severe TVR  - 2D ECHO (9/12/19) with EF 70 percent. - Continue oxygen  - s/p IV diuresis  - statins on hold for above  - daily weight input/output monitoring  - CHF teaching.   - Cardiology and Heart failure team evals noted and appreciated. 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   -Nephrology eval noted and appreciated    Multifactorial Hyponatremia POA - stable   Resolved Hyperkalemia without any dysrhythmias. 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 - Continue Aspirin, Plavix, statin, neurochecks. - Neurology eval noted and appreciated. Chronic Hypoxemic respiratory failure due to COPD with home oxygen at 3 l/min. - Nebulizers, inhalers, incentive spirometry. Anemia acute on chronic  - HB 6.8,no obvious source of blood loss  -Transfused 2 units 9/24. HB 9.6  -Discussed with Dr Jm Mendoza is okay to continue with Apixaban. Uncontrolled IDDM type 2 DM with complications including hyperglycemia.   - Hold metformin.    - Accucheck monitoring  - Basal and sliding scale insulins)HS) + Humalog 2 units ac tid  - diabetic teaching done    Osteoporosis - f/u as OP    Anxiety disorder and Depression without any current behavioral disturbances. Hypocalcemia  - replace as needed      Obesity   Body mass index is 35.4 kg/m². Prophylaxis: apixaban    Full Code with a guarded prognosis. - Palliative care consulted and notes appreciated    Plan: Anticipate patient will require Short-term Post-Acute care facility placement for ongoing PT/OT when stable and cleared by specialists for d/c. Patient's son Carlin Olson is at [phone] if needed for updates. Hospital Problems  Date Reviewed: 9/20/2019          Codes Class Noted POA    * (Principal) CHF exacerbation ([facility] ICD-10-CM: I50.9  ICD-9-CM: 428.0  9/17/2019 Yes        Hyperkalemia ICD-10-CM: E87.5  ICD-9-CM: 276.7  9/17/2019 Yes        Hyponatremia ICD-10-CM: E87.1  ICD-9-CM: 276.1  9/17/2019 Yes        HOLLAND (acute kidney injury) ([facility] ICD-10-CM: N17.9  ICD-9-CM: 584.9  9/17/2019 Yes        Acute hypoxemic respiratory failure ([facility] ICD-10-CM: J96.01  ICD-9-CM: 518.81  8/8/2011 Yes        Debility ICD-10-CM: R53.81  ICD-9-CM: 799.3  6/28/2011 Yes              Physical Examination:      Last 24hrs VS reviewed since prior progress note. Most recent are:  Visit Vitals  /63 (BP 1 Location: Left arm, BP Patient Position: At rest)   Pulse 77   Temp 99.2 °F (37.3 °C)   Resp 20   Ht 5' 5\" (1.651 m)   Wt 96.5 kg (212 lb 11.9 oz)   SpO2 93%   Breastfeeding? No   BMI 35.40 kg/m²           Constitutional:  No acute distress. HEENT: Head is a traumatic,  Un icteric sclera. Pink conjunctiva,no erythema or discharge. Oral mucous moist, oropharynx benign. Neck supple,    Resp:  wheezy b/l   CV:  S1,S2 present. (+) murmur TVR    GI:  Soft, non distended, non tender. normoactive bowel sounds, no hepatosplenomegaly    :  No CVA or suprapubic tenderness   Skin  :  No erythema,rash,bullae,dipigmentation . Cool RLE, no pulses below the knee and no sensitivity    Musculoskeletal:  RLE slightly bigger than LLE. Absent Dorsalis Pedis Pulse RLE.  Power 0/5 RLE, 5/5 other extremities. Unable to palpate pulses LLE but LLE warm and pink. Neurologic:  Awake, alert and oriented. Absent sensation RLE.         Intake/Output Summary (Last 24 hours) at 9/25/2019 1514  Last data filed at 9/25/2019 0015  Gross per 24 hour   Intake 620 ml   Output 1400 ml   Net -780 ml              Labs:     Recent Labs     09/25/19  0429 09/24/19  0842   WBC 17.8* 13.8*   HGB 9.6* 6.8*   HCT 30.5* 22.5*    251     Recent Labs     09/25/19  0429 09/24/19  0530 09/23/19  0350    137 135*   K 4.8 4.7 4.6    105 103   CO2 27 26 26   BUN 26* 36* 46*   CREA 1.13* 1.30* 1.77*   * 147* 125*   CA 9.0 8.3* 7.8*   PHOS  --  2.9 3.6     Recent Labs     09/24/19  0530 09/23/19  0350   ALB 1.8* 2.0*     Recent Labs     09/24/19  0530 09/23/19  0350 09/22/19  2205   APTT 42.8* 58.9* 66.8*     Lab Results   Component Value Date/Time    Cholesterol, total 144 09/17/2019 09:21 AM    HDL Cholesterol 55 09/17/2019 09:21 AM    LDL, calculated 72.6 09/17/2019 09:21 AM    Triglyceride 82 09/17/2019 09:21 AM    CHOL/HDL Ratio 2.6 09/17/2019 09:21 AM     Lab Results   Component Value Date/Time    Glucose (POC) 143 (H) 09/25/2019 11:54 AM    Glucose (POC) 157 (H) 09/25/2019 07:25 AM    Glucose (POC) 166 (H) 09/24/2019 09:26 PM    Glucose (POC) 221 (H) 09/24/2019 03:40 PM    Glucose (POC) 225 (H) 09/24/2019 11:09 AM     Lab Results   Component Value Date/Time    Color YELLOW/STRAW 09/17/2019 12:49 PM    Appearance CLEAR 09/17/2019 12:49 PM    Specific gravity 1.030 09/17/2019 12:49 PM    Specific gravity 1.010 12/18/2011 09:28 AM    pH (UA) 6.0 09/17/2019 12:49 PM    Protein NEGATIVE  09/17/2019 12:49 PM    Glucose NEGATIVE  09/17/2019 12:49 PM    Ketone NEGATIVE  09/17/2019 12:49 PM    Bilirubin NEGATIVE  09/17/2019 12:49 PM    Urobilinogen 0.2 09/17/2019 12:49 PM    Nitrites NEGATIVE  09/17/2019 12:49 PM    Leukocyte Esterase NEGATIVE  09/17/2019 12:49 PM    Epithelial cells FEW 09/17/2019 12:49 PM    Bacteria NEGATIVE  09/17/2019 12:49 PM    WBC 0-4 09/17/2019 12:49 PM    RBC 0-5 09/17/2019 12:49 PM         Medications Reviewed:     Current Facility-Administered Medications   Medication Dose Route Frequency    furosemide (LASIX) tablet 40 mg  40 mg Oral DAILY    0.9% sodium chloride infusion 250 mL  250 mL IntraVENous PRN    0.9% sodium chloride infusion 250 mL  250 mL IntraVENous PRN    apixaban (ELIQUIS) tablet 5 mg  5 mg Oral Q12H    insulin lispro (HUMALOG) injection   SubCUTAneous AC&HS    glucose chewable tablet 16 g  4 Tab Oral PRN    glucagon (GLUCAGEN) injection 1 mg  1 mg IntraMUSCular PRN    dextrose 10% infusion 0-250 mL  0-250 mL IntraVENous PRN    insulin lispro (HUMALOG) injection 2 Units  2 Units SubCUTAneous TIDAC    albuterol-ipratropium (DUO-NEB) 2.5 MG-0.5 MG/3 ML  3 mL Nebulization Q4H PRN    alum-mag hydroxide-simeth (MYLANTA) oral suspension 30 mL  30 mL Oral Q4H PRN    famotidine (PEPCID) tablet 20 mg  20 mg Oral QPM    metoprolol tartrate (LOPRESSOR) tablet 25 mg  25 mg Oral BID    traMADol (ULTRAM) tablet 25 mg  25 mg Oral Q8H PRN    fentaNYL citrate (PF) injection 50 mcg  50 mcg IntraVENous Q3H PRN    clopidogrel (PLAVIX) tablet 75 mg  75 mg Oral DAILY    insulin glargine (LANTUS) injection 15 Units  15 Units SubCUTAneous DAILY    ondansetron (ZOFRAN) injection 4 mg  4 mg IntraVENous Q6H PRN    sodium chloride (NS) flush 5-40 mL  5-40 mL IntraVENous Q8H    sodium chloride (NS) flush 5-40 mL  5-40 mL IntraVENous PRN    acetaminophen (TYLENOL) tablet 650 mg  650 mg Oral Q4H PRN    allopurinol (ZYLOPRIM) tablet 100 mg  100 mg Oral DAILY    aspirin chewable tablet 81 mg  81 mg Oral DAILY    [Held by provider] atorvastatin (LIPITOR) tablet 40 mg  40 mg Oral DAILY     ______________________________________________________________________  EXPECTED LENGTH OF STAY: 8d 9h  ACTUAL LENGTH OF STAY:          8  Time today is 38 min               Ching Bhakta, MD

## 2019-09-25 NOTE — PROGRESS NOTES
Patient's daughter informed this CM that they have chosen [facility] for their mother. CM sent a referral via [facility]. Also spoke with Milton Munoz. regarding referral. Julito Walker a response from [facility]. Karel Kerr MSA, RN, CRM    11:30 am. Freedom of Choice form signed by patient's son and placed in patient's chart. [facility]  has accepted patient. CM will start the auth process. Karel Kerr MSA, RN, CRM.    1:20 PM. CM faxed clinicals to [address] at [phone] and called [phone]. This CM was told to call [phone] as patient was not in their system. 1:25 -1:50 pm. CM called [employer] [phone] to request for an insurance auth for a SNF placement. CM gave pertinent information over the phone and was asked to fax clinicals to [phone] and to add the followings to the cover letter: provider's name and NPI #, patient's name, , ID # and ref #[id number].    2:00 pm. CM faxed clinicals to [phone] as requested above. CM awaiting insurance auth from [facility].  Kenneth Bernstein RN, CRM

## 2019-09-25 NOTE — DIABETES MGMT
[facility]    [facility] Consult Follow Up Note    Recommendations/ Comments: Chart reviewed on Olga Webber for hyperglycemia, especially post meals. Noted Humalog 2 units sc tid ordered yesterday and correction scale changed to high sensitivity yesterday. Blood sugars 157-221 mg/dl. If appropriate, please consider increasing Lantus to 20 units. Current hospital DM medication: lispro correction scale -high sensitivity and Lantus 15 units and Humalog 2 units ac tid     Patient is a 80 y.o. female with known DM on Lantus 30-40 units HS depending on BG and 1000 mg Metformin BID at home. A1c:   Lab Results   Component Value Date/Time    Hemoglobin A1c 8.8 (H) 09/17/2019 09:21 AM    Hemoglobin A1c 7.0 (H) 02/08/2017 09:59 AM       Recent Glucose Results:   Lab Results   Component Value Date/Time     (H) 09/25/2019 04:29 AM    GLUCPOC 157 (H) 09/25/2019 07:25 AM    GLUCPOC 166 (H) 09/24/2019 09:26 PM    GLUCPOC 221 (H) 09/24/2019 03:40 PM        Lab Results   Component Value Date/Time    Creatinine 1.13 (H) 09/25/2019 04:29 AM     Estimated Creatinine Clearance: 39.5 mL/min (A) (based on SCr of 1.13 mg/dL (H)). Active Orders   Diet    DIET DIABETIC CONSISTENT CARB Regular        PO intake:   Patient Vitals for the past 72 hrs:   % Diet Eaten   09/24/19 1238 25 %   09/24/19 1012 0 %   09/23/19 1724 50 %   09/23/19 1237 20 %   09/23/19 1021 1 %       Will continue to follow as needed.     Thank you  Vlad Riley MS, RN, CDE    Time spent: 4 minutes

## 2019-09-25 NOTE — WOUND CARE
WOCN Note:  
 
New consult placed by RN for right lower extremity / ankle area popped blister. Chart shows: 
Admitted for CHF exacerbation, debility, respiratory failure, HOLLAND, hyperkalemia and hyponatremia.; history of : DM, HTN, Breast Cancer,anemia, vein disorder, bronchitis, anxiety, AS, CHF, SOB. WBC = 17.8 On = 9-25-19 Admitted from home. Assessment:  
Patient is A&O x 3, communicative, incontinent and not mobile with assessment. Need 2 assist to lift up in bed and reposition. Patient with pure wick for incontinence. Bed: Versa Care Bed Diet: DM consistent carb. Patient reports no pain with turning and repositioning. Bilateral heels, buttocks  and sacral skin intact and without erythema. Heels offloaded on pillows. Right posterior lower leg ankle area: blister opened. 4.2cm x 2.5cm x <0.1cm / weepy , wet and pink. Flap of skin covering opened area. Cleaned with NS, applied Mepitel 1 and mepilex border dressing. Recommendations:   
-every 3 days: remove dressing; apply mepitel 1 to skin tea site and mepilex border dressing. Minimize layers of linen/pads under patient to optimize support surface. Turn/reposition approximately every 2 hours and offload heels. Manage incontinence / promote continence; Hydraguard to buttocks and sacrum daily and as needed with incontinence care. Specialty bed: Total Care Bed Discussed above plan with patient and RN. Transition of Care: Plan to follow weekly and as needed while admitted to hospital.  
 
Abigail GALINDON RN Wound Care Department Office: [phone] Pager: [phone]

## 2019-09-26 LAB
ALBUMIN SERPL-MCNC: 1.9 G/DL (ref 3.5–5)
ANION GAP SERPL CALC-SCNC: 4 MMOL/L (ref 5–15)
ANION GAP SERPL CALC-SCNC: 7 MMOL/L (ref 5–15)
BASOPHILS # BLD: 0 K/UL (ref 0–0.1)
BASOPHILS NFR BLD: 0 % (ref 0–1)
BUN SERPL-MCNC: 20 MG/DL (ref 6–20)
BUN SERPL-MCNC: 22 MG/DL (ref 6–20)
BUN/CREAT SERPL: 17 (ref 12–20)
BUN/CREAT SERPL: 21 (ref 12–20)
CALCIUM SERPL-MCNC: 9.1 MG/DL (ref 8.5–10.1)
CALCIUM SERPL-MCNC: 9.5 MG/DL (ref 8.5–10.1)
CHLORIDE SERPL-SCNC: 102 MMOL/L (ref 97–108)
CHLORIDE SERPL-SCNC: 99 MMOL/L (ref 97–108)
CK SERPL-CCNC: 4916 U/L (ref 26–192)
CO2 SERPL-SCNC: 24 MMOL/L (ref 21–32)
CO2 SERPL-SCNC: 33 MMOL/L (ref 21–32)
CREAT SERPL-MCNC: 1.05 MG/DL (ref 0.55–1.02)
CREAT SERPL-MCNC: 1.15 MG/DL (ref 0.55–1.02)
DIFFERENTIAL METHOD BLD: ABNORMAL
EOSINOPHIL # BLD: 0 K/UL (ref 0–0.4)
EOSINOPHIL NFR BLD: 0 % (ref 0–7)
ERYTHROCYTE [DISTWIDTH] IN BLOOD BY AUTOMATED COUNT: 15.8 % (ref 11.5–14.5)
GLUCOSE BLD STRIP.AUTO-MCNC: 150 MG/DL (ref 65–100)
GLUCOSE BLD STRIP.AUTO-MCNC: 176 MG/DL (ref 65–100)
GLUCOSE BLD STRIP.AUTO-MCNC: 193 MG/DL (ref 65–100)
GLUCOSE BLD STRIP.AUTO-MCNC: 223 MG/DL (ref 65–100)
GLUCOSE SERPL-MCNC: 143 MG/DL (ref 65–100)
GLUCOSE SERPL-MCNC: 217 MG/DL (ref 65–100)
HCT VFR BLD AUTO: 29.9 % (ref 35–47)
HGB BLD-MCNC: 9.3 G/DL (ref 11.5–16)
IMM GRANULOCYTES # BLD AUTO: 0 K/UL
IMM GRANULOCYTES NFR BLD AUTO: 0 %
LYMPHOCYTES # BLD: 0.6 K/UL (ref 0.8–3.5)
LYMPHOCYTES NFR BLD: 3 % (ref 12–49)
MCH RBC QN AUTO: 27.8 PG (ref 26–34)
MCHC RBC AUTO-ENTMCNC: 31.1 G/DL (ref 30–36.5)
MCV RBC AUTO: 89.5 FL (ref 80–99)
MONOCYTES # BLD: 1.6 K/UL (ref 0–1)
MONOCYTES NFR BLD: 8 % (ref 5–13)
NEUTS BAND NFR BLD MANUAL: 1 % (ref 0–6)
NEUTS SEG # BLD: 17.6 K/UL (ref 1.8–8)
NEUTS SEG NFR BLD: 88 % (ref 32–75)
NRBC # BLD: 0.03 K/UL (ref 0–0.01)
NRBC BLD-RTO: 0.2 PER 100 WBC
PHOSPHATE SERPL-MCNC: 3.1 MG/DL (ref 2.6–4.7)
PLATELET # BLD AUTO: 282 K/UL (ref 150–400)
PLATELET COMMENTS,PCOM: ABNORMAL
PMV BLD AUTO: 9.9 FL (ref 8.9–12.9)
POTASSIUM SERPL-SCNC: 4.6 MMOL/L (ref 3.5–5.1)
POTASSIUM SERPL-SCNC: 4.8 MMOL/L (ref 3.5–5.1)
RBC # BLD AUTO: 3.34 M/UL (ref 3.8–5.2)
RBC MORPH BLD: ABNORMAL
SERVICE CMNT-IMP: ABNORMAL
SODIUM SERPL-SCNC: 133 MMOL/L (ref 136–145)
SODIUM SERPL-SCNC: 136 MMOL/L (ref 136–145)
WBC # BLD AUTO: 19.8 K/UL (ref 3.6–11)

## 2019-09-26 PROCEDURE — 36415 COLL VENOUS BLD VENIPUNCTURE: CPT

## 2019-09-26 PROCEDURE — 74011000250 HC RX REV CODE- 250: Performed by: INTERNAL MEDICINE

## 2019-09-26 PROCEDURE — 80048 BASIC METABOLIC PNL TOTAL CA: CPT

## 2019-09-26 PROCEDURE — 77010033678 HC OXYGEN DAILY

## 2019-09-26 PROCEDURE — 74011636637 HC RX REV CODE- 636/637: Performed by: SURGERY

## 2019-09-26 PROCEDURE — 80069 RENAL FUNCTION PANEL: CPT

## 2019-09-26 PROCEDURE — 94640 AIRWAY INHALATION TREATMENT: CPT

## 2019-09-26 PROCEDURE — 74011250637 HC RX REV CODE- 250/637: Performed by: SURGERY

## 2019-09-26 PROCEDURE — 82962 GLUCOSE BLOOD TEST: CPT

## 2019-09-26 PROCEDURE — 74011636637 HC RX REV CODE- 636/637: Performed by: HOSPITALIST

## 2019-09-26 PROCEDURE — 74011250636 HC RX REV CODE- 250/636: Performed by: SURGERY

## 2019-09-26 PROCEDURE — 94760 N-INVAS EAR/PLS OXIMETRY 1: CPT

## 2019-09-26 PROCEDURE — 74011250637 HC RX REV CODE- 250/637: Performed by: INTERNAL MEDICINE

## 2019-09-26 PROCEDURE — 74011000250 HC RX REV CODE- 250: Performed by: HOSPITALIST

## 2019-09-26 PROCEDURE — 82550 ASSAY OF CK (CPK): CPT

## 2019-09-26 PROCEDURE — 65270000029 HC RM PRIVATE

## 2019-09-26 PROCEDURE — 85025 COMPLETE CBC W/AUTO DIFF WBC: CPT

## 2019-09-26 PROCEDURE — 74011250637 HC RX REV CODE- 250/637: Performed by: FAMILY MEDICINE

## 2019-09-26 RX ORDER — IPRATROPIUM BROMIDE AND ALBUTEROL SULFATE 2.5; .5 MG/3ML; MG/3ML
3 SOLUTION RESPIRATORY (INHALATION)
Status: DISCONTINUED | OUTPATIENT
Start: 2019-09-26 | End: 2019-10-06

## 2019-09-26 RX ADMIN — ALLOPURINOL 100 MG: 100 TABLET ORAL at 08:51

## 2019-09-26 RX ADMIN — CLOPIDOGREL BISULFATE 75 MG: 75 TABLET ORAL at 08:51

## 2019-09-26 RX ADMIN — FUROSEMIDE 40 MG: 40 TABLET ORAL at 08:52

## 2019-09-26 RX ADMIN — IPRATROPIUM BROMIDE AND ALBUTEROL SULFATE 3 ML: .5; 3 SOLUTION RESPIRATORY (INHALATION) at 08:07

## 2019-09-26 RX ADMIN — ALUMINUM HYDROXIDE, MAGNESIUM HYDROXIDE, AND SIMETHICONE 30 ML: 200; 200; 20 SUSPENSION ORAL at 21:44

## 2019-09-26 RX ADMIN — METOPROLOL TARTRATE 25 MG: 25 TABLET ORAL at 21:23

## 2019-09-26 RX ADMIN — INSULIN GLARGINE 15 UNITS: 100 INJECTION, SOLUTION SUBCUTANEOUS at 08:54

## 2019-09-26 RX ADMIN — Medication 10 ML: at 07:09

## 2019-09-26 RX ADMIN — IPRATROPIUM BROMIDE AND ALBUTEROL SULFATE 3 ML: .5; 3 SOLUTION RESPIRATORY (INHALATION) at 03:00

## 2019-09-26 RX ADMIN — ALUMINUM HYDROXIDE, MAGNESIUM HYDROXIDE, AND SIMETHICONE 30 ML: 200; 200; 20 SUSPENSION ORAL at 04:22

## 2019-09-26 RX ADMIN — INSULIN LISPRO 2 UNITS: 100 INJECTION, SOLUTION INTRAVENOUS; SUBCUTANEOUS at 07:20

## 2019-09-26 RX ADMIN — Medication 10 ML: at 09:04

## 2019-09-26 RX ADMIN — INSULIN LISPRO 2 UNITS: 100 INJECTION, SOLUTION INTRAVENOUS; SUBCUTANEOUS at 11:58

## 2019-09-26 RX ADMIN — Medication 10 ML: at 21:24

## 2019-09-26 RX ADMIN — FENTANYL CITRATE 50 MCG: 50 INJECTION INTRAMUSCULAR; INTRAVENOUS at 09:04

## 2019-09-26 RX ADMIN — ACETAMINOPHEN 650 MG: 325 TABLET, FILM COATED ORAL at 04:01

## 2019-09-26 RX ADMIN — APIXABAN 5 MG: 5 TABLET, FILM COATED ORAL at 23:33

## 2019-09-26 RX ADMIN — APIXABAN 5 MG: 5 TABLET, FILM COATED ORAL at 11:32

## 2019-09-26 RX ADMIN — IPRATROPIUM BROMIDE AND ALBUTEROL SULFATE 3 ML: .5; 3 SOLUTION RESPIRATORY (INHALATION) at 20:19

## 2019-09-26 RX ADMIN — INSULIN LISPRO 2 UNITS: 100 INJECTION, SOLUTION INTRAVENOUS; SUBCUTANEOUS at 16:58

## 2019-09-26 RX ADMIN — FAMOTIDINE 20 MG: 20 TABLET ORAL at 17:07

## 2019-09-26 RX ADMIN — METOPROLOL TARTRATE 25 MG: 25 TABLET ORAL at 08:51

## 2019-09-26 RX ADMIN — FENTANYL CITRATE 50 MCG: 50 INJECTION INTRAMUSCULAR; INTRAVENOUS at 17:05

## 2019-09-26 RX ADMIN — ASPIRIN 81 MG CHEWABLE TABLET 81 MG: 81 TABLET CHEWABLE at 08:51

## 2019-09-26 NOTE — PROGRESS NOTES
MARIANN Plan:    [facility] received [employer] Auth  * [facility] is on \"will call\"    Notified attending of the above. Patient is not medically cleared for rehab to date. Cm to follow and coordinate discharge to SNF. Patient is with [facility] and prior to hospitalization has been home with caregivers. Patient has Medicaid.     Theadora Merlin, [address]

## 2019-09-26 NOTE — PROGRESS NOTES
Occupational Therapy  09/26/19    Chart reviewed. Attempted to see patient for OT treatment, patient declining 2* fatigue despite max encouragement & education, family member present & reporting patient hasn't gotten any sleep today. Encouraged patient & family member to have staff assist with sitting EOB and completing BUE/BLE exercises to maximize strength, agreeable for therapy to follow up tomorrow.     Thank you  Jamie Charles, OTD, OTR/L

## 2019-09-26 NOTE — PROGRESS NOTES
Nephrology Progress Note  Agusto Moncada  Date of Admission : 9/17/2019    CC:  Follow up for HOLLAND       Assessment and Plan     HOLLAND:  - this is likely from volume depletion from diuretics + contrast + Rhabdo  - UA bland, unlikely any acute GN  - Renal U/S neg for obstruction, + probable angiomyolipoma on R  - Cr stable  - cont lasix for now  - daily labs for now    Probable CKD:  - unclear baseline but suspect she has some component of CKD  - likely from DM and HTN     Rhabdomyolysis:  - 2/2 compartment syndrome   - CPK trending down  - off IVF and on loops 2/2 volume overload    Acute CVA:  - R cerebellar infarct  - on ASA, plavix and statin  - per neurology     HFpEF:  - start po diuretics today     DM2:  - on insulin    Blood loss anemia:  - hgb stable  - transfused 2 units PRBCs on 9/24    Leukocytosis:  - worsening  - ? From leg   - r/o infection, no on abx    PAD w/ cold LEs/ RLE Ischemia w/ compartment syndrome:  - S/p RT leg revascularization in OR 9/20  - had Thrombus at RT popliteal artery and had RT leg embolectomy + fasciotomy on 9/22  - per vascular       Interval History:  Seen and examined. Cr stable. On NC O2. Cr 1, no cp or sob reported. Still w/ some RLE pain. Current Medications: all current  Medications have been eviewed in EPIC  Review of Systems: Pertinent items are noted in HPI.     Objective:  Vitals:    Vitals:    09/26/19 0225 09/26/19 0257 09/26/19 0807 09/26/19 0847   BP: 121/67   132/60   Pulse: 75   81   Resp: 18   18   Temp: 98.6 °F (37 °C)   98.1 °F (36.7 °C)   SpO2: 95%  95% 99%   Weight:  96.7 kg (213 lb 3.2 oz)     Height:         Intake and Output:  09/26 0701 - 09/26 1900  In: -   Out: 300 [Urine:300]  09/24 1901 - 09/26 0700  In: 620   Out: 2400 [Urine:2400]    Physical Examination:  General: NAD,Conversant, frail  Neck:  Supple, no mass  Resp:  Lungs mostly clear b/l  CV:  RRR,  no murmur or rub, no LE edema, no palpable LE pulses, cold extremities  GI:  Soft, NT, + Bowel sounds, no hepatosplenomegaly  Neurologic:  Non focal  Psych:             AAO x 3 appropriate affect   Skin:  No Rash  :  No altman    [x]    High complexity decision making was performed  [x]    Patient is at high-risk of decompensation with multiple organ involvement    Lab Data Personally Reviewed: I have reviewed all the pertinent labs, microbiology data and radiology studies during assessment. Recent Labs     09/26/19 0340 09/25/19 0429 09/24/19  0530   * 136 137   K 4.8 4.8 4.7    104 105   CO2 24 27 26   * 165* 147*   BUN 22* 26* 36*   CREA 1.05* 1.13* 1.30*   CA 9.1 9.0 8.3*   PHOS  --   --  2.9   ALB  --   --  1.8*     Recent Labs     09/26/19 0340 09/25/19 0429 09/24/19  0842   WBC 19.8* 17.8* 13.8*   HGB 9.3* 9.6* 6.8*   HCT 29.9* 30.5* 22.5*    263 251     Lab Results   Component Value Date/Time    Specimen Description: NARES 09/27/2011 03:58 AM    Specimen Description: BLOOD 08/15/2011 06:15 AM    Specimen Description: CATH URINE 08/15/2011 06:15 AM     Lab Results   Component Value Date/Time    Culture result: MRSA NOT PRESENT 03/31/2016 12:30 PM    Culture result:  03/31/2016 12:30 PM         Screening of patient nares for MRSA is for surveillance purposes and, if positive, to facilitate isolation considerations in high risk settings. It is not intended for automatic decolonization interventions per se as regimens are not sufficiently effective to warrant routine use. Culture result:  09/27/2011 03:58 AM     MRSA PRESENT      Screening of patient nares for MRSA is for surveillance purposes and, if positive, to facilitate isolation considerations in high risk settings. It is not intended for automatic decolonization interventions per se as regimens are not sufficiently effective to warrant routine use.      Recent Results (from the past 24 hour(s))   GLUCOSE, POC    Collection Time: 09/25/19 11:54 AM   Result Value Ref Range    Glucose (POC) 143 (H) 65 - 100 mg/dL Performed by Reggie Vega, POC    Collection Time: 09/25/19  4:33 PM   Result Value Ref Range    Glucose (POC) 172 (H) 65 - 100 mg/dL    Performed by Suraj Borges (CON)    CK    Collection Time: 09/26/19  3:40 AM   Result Value Ref Range    CK 4,916 (H) 26 - 192 U/L   CBC WITH AUTOMATED DIFF    Collection Time: 09/26/19  3:40 AM   Result Value Ref Range    WBC 19.8 (H) 3.6 - 11.0 K/uL    RBC 3.34 (L) 3.80 - 5.20 M/uL    HGB 9.3 (L) 11.5 - 16.0 g/dL    HCT 29.9 (L) 35.0 - 47.0 %    MCV 89.5 80.0 - 99.0 FL    MCH 27.8 26.0 - 34.0 PG    MCHC 31.1 30.0 - 36.5 g/dL    RDW 15.8 (H) 11.5 - 14.5 %    PLATELET 043 848 - 711 K/uL    MPV 9.9 8.9 - 12.9 FL    NRBC 0.2 (H) 0  WBC    ABSOLUTE NRBC 0.03 (H) 0.00 - 0.01 K/uL    NEUTROPHILS 88 (H) 32 - 75 %    BAND NEUTROPHILS 1 0 - 6 %    LYMPHOCYTES 3 (L) 12 - 49 %    MONOCYTES 8 5 - 13 %    EOSINOPHILS 0 0 - 7 %    BASOPHILS 0 0 - 1 %    IMMATURE GRANULOCYTES 0 %    ABS. NEUTROPHILS 17.6 (H) 1.8 - 8.0 K/UL    ABS. LYMPHOCYTES 0.6 (L) 0.8 - 3.5 K/UL    ABS. MONOCYTES 1.6 (H) 0.0 - 1.0 K/UL    ABS. EOSINOPHILS 0.0 0.0 - 0.4 K/UL    ABS. BASOPHILS 0.0 0.0 - 0.1 K/UL    ABS. IMM.  GRANS. 0.0 K/UL    DF MANUAL      PLATELET COMMENTS Large Platelets      RBC COMMENTS ANISOCYTOSIS  1+        RBC COMMENTS OVALOCYTES  PRESENT        RBC COMMENTS POLYCHROMASIA  1+       METABOLIC PANEL, BASIC    Collection Time: 09/26/19  3:40 AM   Result Value Ref Range    Sodium 133 (L) 136 - 145 mmol/L    Potassium 4.8 3.5 - 5.1 mmol/L    Chloride 102 97 - 108 mmol/L    CO2 24 21 - 32 mmol/L    Anion gap 7 5 - 15 mmol/L    Glucose 143 (H) 65 - 100 mg/dL    BUN 22 (H) 6 - 20 MG/DL    Creatinine 1.05 (H) 0.55 - 1.02 MG/DL    BUN/Creatinine ratio 21 (H) 12 - 20      GFR est AA 60 (L) >60 ml/min/1.73m2    GFR est non-AA 49 (L) >60 ml/min/1.73m2    Calcium 9.1 8.5 - 10.1 MG/DL   GLUCOSE, POC    Collection Time: 09/26/19  7:12 AM   Result Value Ref Range    Glucose (POC) 150 (H) 65 - 100 mg/dL    Performed by Lakeshia Pinto MD  [facility]   [address] [facility] [address]  Phone - [phone]   Fax - [phone]  [email or web address]

## 2019-09-26 NOTE — PROGRESS NOTES
NUTRITION COMPLETE ASSESSMENT    RECOMMENDATIONS:   1. RD to add ONS Glucerna TID with all meals; Boost Pudding BID with lunch and dinner meals  2. Pt/family to request softer foods     Interventions/Plan:   Food/Nutrient Delivery:           modify diet; add supplements  Nutrition Education:     Coordination of Care:      Assessment:   Reason for Assessment: Length of Stay      Diet:  Consistent CHO  Supplements: RD to add as above  Nutritionally Significant Medications: Mylanta, Lipitor, Plavix, Pepcid, Lasix, Lantus, SSI  Meal Intake:   Patient Vitals for the past 100 hrs:   % Diet Eaten   09/24/19 1238 25 %   09/24/19 1012 0 %   09/23/19 1724 50 %   09/23/19 1237 20 %   09/23/19 1021 1 %       Pre-Hospitalization:   good appetite; regular diet    Current Hospitalization:   Fluid Restriction:    Appetite:  poor  PO Ability:  with assistance Average po intake: 25-50%  Average supplements intake:        Subjective:  I'm not eating so good because of pain; like softer foods right now. I think I should do a supplement. Standing weight ~1 week before admission without swelling was 194 lb. Now my legs are all swollen. I like chocolate. I like the soft fruit. I like the pudding.     Objective:  81 yo female admitted with CHF exacerbation ([facility] [I50.9]  Patient Active Problem List   Diagnosis Code    Diabetes ([facility] E11.9    HTN (hypertension), benign I10    Breast cancer ([facility] C50.919    Anemia D64.9    Debility R53.81    Acute hypoxemic respiratory failure (HCC) J96.01    Vein disorder I87.9    Allergic reaction T78.40XA    Multiple allergies Z88.9    Bronchitis J40    Anxiety F41.9    Hypercholesteremia E78.00    Aortic stenosis N46.9    Diastolic CHF, acute on chronic (HCC) I50.33    SOB (shortness of breath) R06.02    CHF (congestive heart failure) (HCC) I50.9    Pulmonary edema cardiac cause (HCC) I50.1    Moderate to severe pulmonary hypertension (HCC) I27.20    Acute respiratory disease J06.9    CHF exacerbation (HCC) I50.9    Hyperkalemia E87.5    Hyponatremia E87.1    HOLLAND (acute kidney injury) ([facility] N17.9     Past Medical History:   Diagnosis Date    Anxiety     Breast cancer ([facility] 2005, 2010    right 2005, left 2010    Cancer [facility])      cancer right breast cancer    [facility]     cancer left breast/new dx 8/2011 +bx     Chronic obstructive pulmonary disease (HCC)     Diabetes ([facility]     Heart failure ([facility]     Hypercholesterolemia     Hypertension     Other ill-defined conditions(799.89)     osteopoosis    Other ill-defined conditions(799.89)     high cholesterol    Pneumonia     Psychiatric disorder     anxiety     Wt Readings from Last 20 Encounters:   09/27/19 92 kg (202 lb 13.2 oz)   09/19/19 86.4 kg (190 lb 7.6 oz)   08/30/19 88 kg (194 lb)   10/30/17 86.9 kg (191 lb 9.6 oz)   09/07/17 85.2 kg (187 lb 12.8 oz)   08/02/17 84.4 kg (186 lb)   05/15/17 80.7 kg (178 lb)   04/03/17 78 kg (172 lb)   03/20/17 80 kg (176 lb 6.4 oz)   02/20/17 77.6 kg (171 lb)   02/13/17 77.2 kg (170 lb 3.1 oz)   02/01/17 82 kg (180 lb 11.2 oz)   01/25/17 81.2 kg (179 lb)   01/11/17 81.3 kg (179 lb 4.8 oz)   12/08/16 81.4 kg (179 lb 6.4 oz)   10/24/16 82.3 kg (181 lb 6.4 oz)   09/14/16 82.6 kg (182 lb)   08/10/16 84.4 kg (186 lb)   07/21/16 84.4 kg (186 lb)   05/10/16 86.2 kg (190 lb)          Cultural, Jehovah's witness and ethnic food preferences identified:      Skin Integrity: intact  Edema: RLE 2+ non-pitting  Last BM: 9/25, soft  Food Allergies: NKFA  Diet Restrictions:       Anthropometrics:    Weight Loss Metrics 9/26/2019 8/30/2019 10/30/2017 9/7/2017 8/2/2017 5/15/2017 4/3/2017   Today's Wt 213 lb 3.2 oz 194 lb 191 lb 9.6 oz 187 lb 12.8 oz 186 lb 178 lb 172 lb   BMI 35.48 kg/m2 32.28 kg/m2 31.88 kg/m2 31.25 kg/m2 30.95 kg/m2 29.62 kg/m2 28.62 kg/m2      Weight Source: Bed  Height: 5' 5\" (165.1 cm),    Body mass index is 35.48 kg/m².       ,     ,      Labs:    Recent Labs     09/26/19  140 09/26/19  0340 09/25/19  0429 09/24/19  0530   * 143* 165* 147*   BUN 20 22* 26* 36*   CREA 1.15* 1.05* 1.13* 1.30*    133* 136 137   K 4.6 4.8 4.8 4.7   CL 99 102 104 105   CO2 33* 24 27 26   CA 9.5 9.1 9.0 8.3*   PHOS 3.1  --   --  2.9       Recent Labs     09/26/19  1135 09/26/19  0712 09/25/19  1633 09/25/19  1154 09/25/19  0725 09/24/19  2126 09/24/19  1540 09/24/19  1109 09/24/19  0614 09/23/19 2127 09/23/19  1645   GLUCPOC 223* 150* 172* 143* 157* 166* 221* 225* 103* 240* 196*       Lab Results   Component Value Date/Time    Hemoglobin A1c 8.8 (H) 09/17/2019 09:21 AM    Hemoglobin A1c (POC) 7.2 11/19/2015 09:40 AM       Estimated Nutrition Needs:   Kcals/day:   1700 (BMR 1300 x 1.3+)  Protein:   105 gm (1.2 gm/kg)  Fluid:   1700 mL (1 mL/kcal); <2 L for CHF  Based On:   MSJ   Weight Used:  ABW - dry weight of 88.1 kg    Pt expected to meet estimated nutrient needs:  []   Yes     []  No [x] Unable to predict at this time  Nutrition Diagnosis:   1.  inadequate oral intake related to  poor appetite; pain as evidenced by  pt and family report of consuming <50% of meals    2.   related to   as evidenced by      3.   related to   as evidenced by      Goals:      pt will consume >50% of meals and/or at least 1 supplement daily within the next 3-4 days     Monitoring & Evaluation:    -  total energy intake; weight pattern; protein intake; glucose profile   -     -      Previous Nutrition Goals Met:    n/a  Previous Recommendations:     n/a    Education & Discharge Needs:   [x] None Identified   [] Identified and addressed    [x] Participated in care plan, discharge planning, and/or interdisciplinary rounds            Aime Lancaster RD

## 2019-09-26 NOTE — PROGRESS NOTES
Physical Therapy  9/26/2019    15:30- Attempted for second time today for PT session. Patient deferred. F/u tomorrow. 13:45-Chart reviewed. PT session attempted. Patient declined mobility/sitting EOB/attempts at standing despite max encouragement and education on importance of mobilizing and building strength to compensate for profound R LE weakness. Patient stated \"I done moved enough today. \" Educated on importance of getting up to chair with assist and that the goal is to complete this with therapy first. Family/visitor present and did not provide any encouragement. Will continue to follow. Nicolette IRIZARRY and Dr. Chelsey Hobson aware. Thank you.     Sarina Espinal, PT, DPT

## 2019-09-26 NOTE — PROGRESS NOTES
Hospitalist Progress Note                               Louise Canas MD                                     Answering service: [phone] or [phone] from in house phone           Date of Service:  2019  NAME:  Ronit Najera  :  1931  MRN:  [id number]      Admission Summary:   44-year-old female with an extensive past medical history including chronic hypoxemic respiratory failure with home oxygen, COPD, chronic kidney disease, type 2 diabetes mellitus, atrial fibrillation, diastolic congestive heart failure, dyslipidemia, primary hypertension, osteoporosis, pulmonary hypertension, depression and anxiety disorder, was brought to the emergency room from home for evaluation of an approximately 2-3 day history of worsening shortness of breath. Per the patient's son, the patient ambulates unaided at baseline; however, in the 2-3 days leading up to the emergency room presentation, noted to have worsening shortness of breath even with easy activity. The patient also complained of some numbness and weakness in the right lower extremity. The patient admitted to being compliant with all her medical therapies. The patient denied associated headaches, dizziness, visual deficits, chest pains, palpitations, nausea, vomiting, cough, sputum production, fevers, chills, abdominal pain, bladder or bowel irregularities. Reason for follow up:   Right leg ischemia. She says she has no feeling on her right foot. Breathing better. Assessment & Plan:     Acute thrombus at the mid and distal right popliteal level RLE    - S/p RLE thrombectomy , found residual clot in popliteal artery, s/p repeat thrombectomy 19 per vascular surgery, now with significant muscle ischemia right calf and will likley have chronic foot drop on right.   - Switched from  Heparin drip to Apixaban per vascular surgery   - R foot drop boot    Low grade fever likely reactive due to ischemic changes: monitor    [place] [address] on 9/22/19 in setting of ischemic RLE  - IVF  - serial daily CPK - trending down  - holding statins    Leukocytosis:likley from ischemic foot. No source of infection evident. Monitor . Discussed with vascular       Acute on chronic diastolic CHF exacerbation with a preserved EF confirmed  Demand ischemia with mildly elevated troponins due to the CHF exarcerbation. Indeterminable NYHA Functional Class due to the multiple Co-morbidities per Cardiology. Primary Hypertension   Dyslipidemia   Severe TVR  - 2D ECHO (9/12/19) with EF 70 percent. - Continue oxygen  - s/p IV diuresis  - statins on hold for above  - daily weight input/output monitoring  - CHF teaching.   - Cardiology and Heart failure team evals noted and appreciated. 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   -Nephrology eval noted and appreciated    Multifactorial Hyponatremia POA - stable   Resolved Hyperkalemia without any dysrhythmias. 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 - Continue Aspirin, Plavix, statin, neurochecks. - Neurology eval noted and appreciated. Chronic Hypoxemic respiratory failure due to COPD with home oxygen at 3 l/min. - Nebulizers, inhalers, incentive spirometry. Anemia acute on chronic  - HB 6.8,no obvious source of blood loss  -Transfused 2 units 9/24. HB 9.6  -Discussed with Dr Gillespie [facility] is okay to continue with Apixaban. Uncontrolled IDDM type 2 DM with complications including hyperglycemia.   - Hold metformin.    - Accucheck monitoring  - Basal and sliding scale insulins)HS) + Humalog 2 units ac tid  - diabetic teaching done    Osteoporosis - f/u as OP    Anxiety disorder and Depression without any current behavioral disturbances. Hypocalcemia  - replace as needed      Obesity   Body mass index is 35.48 kg/m². Prophylaxis: apixaban    Full Code with a guarded prognosis. - Palliative care consulted and notes appreciated    Plan: Anticipate patient will require Short-term Post-Acute care facility placement for ongoing PT/OT when stable and cleared by specialists for d/c. Patient's son Augusto Rivero is at [phone] if needed for updates. Hospital Problems  Date Reviewed: 9/20/2019          Codes Class Noted POA    * (Principal) CHF exacerbation ([facility] ICD-10-CM: I50.9  ICD-9-CM: 428.0  9/17/2019 Yes        Hyperkalemia ICD-10-CM: E87.5  ICD-9-CM: 276.7  9/17/2019 Yes        Hyponatremia ICD-10-CM: E87.1  ICD-9-CM: 276.1  9/17/2019 Yes        HOLLAND (acute kidney injury) ([facility] ICD-10-CM: N17.9  ICD-9-CM: 584.9  9/17/2019 Yes        Acute hypoxemic respiratory failure ([facility] ICD-10-CM: J96.01  ICD-9-CM: 518.81  8/8/2011 Yes        Debility ICD-10-CM: R53.81  ICD-9-CM: 799.3  6/28/2011 Yes              Physical Examination:      Last 24hrs VS reviewed since prior progress note. Most recent are:  Visit Vitals  /65 (BP 1 Location: Left arm, BP Patient Position: At rest)   Pulse 67   Temp 98.1 °F (36.7 °C)   Resp 18   Ht 5' 5\" (1.651 m)   Wt 96.7 kg (213 lb 3.2 oz)   SpO2 99%   Breastfeeding? No   BMI 35.48 kg/m²           Constitutional:  No acute distress. HEENT: Head is a traumatic,  Un icteric sclera. Pink conjunctiva,no erythema or discharge. Oral mucous moist, oropharynx benign. Neck supple,    Resp:  wheezy b/l   CV:  S1,S2 present. (+) murmur TVR    GI:  Soft, non distended, non tender. normoactive bowel sounds, no hepatosplenomegaly    :  No CVA or suprapubic tenderness   Skin  :  No erythema,rash,bullae,dipigmentation . Cool RLE, no pulses below the knee and no sensitivity    Musculoskeletal:  RLE slightly bigger than LLE. Absent Dorsalis Pedis Pulse RLE.  Power 0/5 RLE, 5/5 other extremities. Unable to palpate pulses LLE but LLE warm and pink. Neurologic:  Awake, alert and oriented. Absent sensation RLE.         Intake/Output Summary (Last 24 hours) at 9/26/2019 1738  Last data filed at 9/26/2019 0723  Gross per 24 hour   Intake    Output 300 ml   Net -300 ml              Labs:     Recent Labs     09/26/19  0340 09/25/19  0429   WBC 19.8* 17.8*   HGB 9.3* 9.6*   HCT 29.9* 30.5*    263     Recent Labs     09/26/19  1407 09/26/19  0340 09/25/19  0429 09/24/19  0530    133* 136 137   K 4.6 4.8 4.8 4.7   CL 99 102 104 105   CO2 33* 24 27 26   BUN 20 22* 26* 36*   CREA 1.15* 1.05* 1.13* 1.30*   * 143* 165* 147*   CA 9.5 9.1 9.0 8.3*   PHOS 3.1  --   --  2.9     Recent Labs     09/26/19  1407 09/24/19  0530   ALB 1.9* 1.8*     Recent Labs     09/24/19  0530   APTT 42.8*     Lab Results   Component Value Date/Time    Cholesterol, total 144 09/17/2019 09:21 AM    HDL Cholesterol 55 09/17/2019 09:21 AM    LDL, calculated 72.6 09/17/2019 09:21 AM    Triglyceride 82 09/17/2019 09:21 AM    CHOL/HDL Ratio 2.6 09/17/2019 09:21 AM     Lab Results   Component Value Date/Time    Glucose (POC) 176 (H) 09/26/2019 04:31 PM    Glucose (POC) 223 (H) 09/26/2019 11:35 AM    Glucose (POC) 150 (H) 09/26/2019 07:12 AM    Glucose (POC) 172 (H) 09/25/2019 04:33 PM    Glucose (POC) 143 (H) 09/25/2019 11:54 AM     Lab Results   Component Value Date/Time    Color YELLOW/STRAW 09/17/2019 12:49 PM    Appearance CLEAR 09/17/2019 12:49 PM    Specific gravity 1.030 09/17/2019 12:49 PM    Specific gravity 1.010 12/18/2011 09:28 AM    pH (UA) 6.0 09/17/2019 12:49 PM    Protein NEGATIVE  09/17/2019 12:49 PM    Glucose NEGATIVE  09/17/2019 12:49 PM    Ketone NEGATIVE  09/17/2019 12:49 PM    Bilirubin NEGATIVE  09/17/2019 12:49 PM    Urobilinogen 0.2 09/17/2019 12:49 PM    Nitrites NEGATIVE  09/17/2019 12:49 PM    Leukocyte Esterase NEGATIVE  09/17/2019 12:49 PM    Epithelial cells FEW 09/17/2019 12:49 PM    Bacteria NEGATIVE  09/17/2019 12:49 PM    WBC 0-4 09/17/2019 12:49 PM    RBC 0-5 09/17/2019 12:49 PM         Medications Reviewed:     Current Facility-Administered Medications   Medication Dose Route Frequency    albuterol-ipratropium (DUO-NEB) 2.5 MG-0.5 MG/3 ML  3 mL Nebulization BID RT    furosemide (LASIX) tablet 40 mg  40 mg Oral DAILY    0.9% sodium chloride infusion 250 mL  250 mL IntraVENous PRN    0.9% sodium chloride infusion 250 mL  250 mL IntraVENous PRN    apixaban (ELIQUIS) tablet 5 mg  5 mg Oral Q12H    insulin lispro (HUMALOG) injection   SubCUTAneous AC&HS    glucose chewable tablet 16 g  4 Tab Oral PRN    glucagon (GLUCAGEN) injection 1 mg  1 mg IntraMUSCular PRN    dextrose 10% infusion 0-250 mL  0-250 mL IntraVENous PRN    insulin lispro (HUMALOG) injection 2 Units  2 Units SubCUTAneous TIDAC    albuterol-ipratropium (DUO-NEB) 2.5 MG-0.5 MG/3 ML  3 mL Nebulization Q4H PRN    alum-mag hydroxide-simeth (MYLANTA) oral suspension 30 mL  30 mL Oral Q4H PRN    famotidine (PEPCID) tablet 20 mg  20 mg Oral QPM    metoprolol tartrate (LOPRESSOR) tablet 25 mg  25 mg Oral BID    traMADol (ULTRAM) tablet 25 mg  25 mg Oral Q8H PRN    fentaNYL citrate (PF) injection 50 mcg  50 mcg IntraVENous Q3H PRN    clopidogrel (PLAVIX) tablet 75 mg  75 mg Oral DAILY    insulin glargine (LANTUS) injection 15 Units  15 Units SubCUTAneous DAILY    ondansetron (ZOFRAN) injection 4 mg  4 mg IntraVENous Q6H PRN    sodium chloride (NS) flush 5-40 mL  5-40 mL IntraVENous Q8H    sodium chloride (NS) flush 5-40 mL  5-40 mL IntraVENous PRN    acetaminophen (TYLENOL) tablet 650 mg  650 mg Oral Q4H PRN    allopurinol (ZYLOPRIM) tablet 100 mg  100 mg Oral DAILY    aspirin chewable tablet 81 mg  81 mg Oral DAILY    [Held by provider] atorvastatin (LIPITOR) tablet 40 mg  40 mg Oral DAILY     ______________________________________________________________________  EXPECTED LENGTH OF STAY: 8d 9h  ACTUAL LENGTH OF STAY:          9  Time today is 38 min               Louise Canas MD

## 2019-09-26 NOTE — PALLIATIVE CARE DISCHARGE
Goals of Care/Treatment Preferences The Palliative Medicine team was consulted as part of your/your loved one's care in the hospital. Our team is a supportive service; we strive to relieve suffering and improve quality of life. We reviewed advance care planning information, which includes the following: 
Confirm Advance Directive: None Patient Would Like to Complete Advance Directive: No 
 
Patient/Health Care Proxy Stated Goals: Cure We reviewed / discussed your code status as: Full Code Full Code means perform CPR in the event of cardiac arrest. 
    DNR means do NOT perform CPR in the event of cardiac arrest. 
    Partial Code means you have specific preferences, please discuss with your healthcare team. 
    Cassie Paez means this issue was not addressed / resolved during your stay Medical Interventions: Full interventions Other Instructions:  
 
Dear Ms. Cosme, During this admission, you met with Ben Conklin NP and Debbi Dickerson LMSW on the Palliative Medicine team.  We discussed [address], which includes having conversations about and putting in writing your wishes for future and end of life care. Some possible decisions to consider would be whether you would want a feeding tube placed if you are unable to safely swallow and whether you would want attempts at resuscitation in the event of cardiac/respiratory arrest.  While these can be difficult conversations to have, it's important that your loved ones and your health care team are aware of your wishes and how you as an individual define \"good quality of life. \"  Some possible ways to document your wishes include an Advance Medical Directive, a Durable Do Not Resuscitate Order, and a POST form (Physician Orders for Scope of Treatment). Your Primary Care Physician might be able to assist with completion of these forms.  Should you choose to complete an AMD, it is recommended that you provide a copy to your health care teams to be included in your medical record. It was a privilege to support you. Please call Palliative Medicine at [phone] ([phone]) with any questions or concerns. Sincerely,  
 
Antonieta Mcmullen LMSW, Supervisee in Social Work Palliative Medicine  Because of the importance of this information, we are providing you with a printed copy to share with other healthcare providers after this hospitalization is complete.

## 2019-09-27 LAB
ANION GAP SERPL CALC-SCNC: 6 MMOL/L (ref 5–15)
BASOPHILS # BLD: 0 K/UL (ref 0–0.1)
BASOPHILS NFR BLD: 0 % (ref 0–1)
BUN SERPL-MCNC: 19 MG/DL (ref 6–20)
BUN/CREAT SERPL: 19 (ref 12–20)
CALCIUM SERPL-MCNC: 9.1 MG/DL (ref 8.5–10.1)
CHLORIDE SERPL-SCNC: 99 MMOL/L (ref 97–108)
CK SERPL-CCNC: 3985 U/L (ref 26–192)
CO2 SERPL-SCNC: 32 MMOL/L (ref 21–32)
CREAT SERPL-MCNC: 0.99 MG/DL (ref 0.55–1.02)
DIFFERENTIAL METHOD BLD: ABNORMAL
EOSINOPHIL # BLD: 0.2 K/UL (ref 0–0.4)
EOSINOPHIL NFR BLD: 1 % (ref 0–7)
ERYTHROCYTE [DISTWIDTH] IN BLOOD BY AUTOMATED COUNT: 15.9 % (ref 11.5–14.5)
GLUCOSE BLD STRIP.AUTO-MCNC: 165 MG/DL (ref 65–100)
GLUCOSE BLD STRIP.AUTO-MCNC: 196 MG/DL (ref 65–100)
GLUCOSE BLD STRIP.AUTO-MCNC: 237 MG/DL (ref 65–100)
GLUCOSE BLD STRIP.AUTO-MCNC: 244 MG/DL (ref 65–100)
GLUCOSE SERPL-MCNC: 150 MG/DL (ref 65–100)
HCT VFR BLD AUTO: 32.5 % (ref 35–47)
HGB BLD-MCNC: 10.1 G/DL (ref 11.5–16)
IMM GRANULOCYTES # BLD AUTO: 0.3 K/UL (ref 0–0.04)
IMM GRANULOCYTES NFR BLD AUTO: 2 % (ref 0–0.5)
LYMPHOCYTES # BLD: 0.9 K/UL (ref 0.8–3.5)
LYMPHOCYTES NFR BLD: 5 % (ref 12–49)
MCH RBC QN AUTO: 27.9 PG (ref 26–34)
MCHC RBC AUTO-ENTMCNC: 31.1 G/DL (ref 30–36.5)
MCV RBC AUTO: 89.8 FL (ref 80–99)
MONOCYTES # BLD: 1.6 K/UL (ref 0–1)
MONOCYTES NFR BLD: 9 % (ref 5–13)
NEUTS SEG # BLD: 15.4 K/UL (ref 1.8–8)
NEUTS SEG NFR BLD: 83 % (ref 32–75)
NRBC # BLD: 0.02 K/UL (ref 0–0.01)
NRBC BLD-RTO: 0.1 PER 100 WBC
PLATELET # BLD AUTO: 310 K/UL (ref 150–400)
PMV BLD AUTO: 9.8 FL (ref 8.9–12.9)
POTASSIUM SERPL-SCNC: 4.6 MMOL/L (ref 3.5–5.1)
RBC # BLD AUTO: 3.62 M/UL (ref 3.8–5.2)
SERVICE CMNT-IMP: ABNORMAL
SODIUM SERPL-SCNC: 137 MMOL/L (ref 136–145)
WBC # BLD AUTO: 18.4 K/UL (ref 3.6–11)

## 2019-09-27 PROCEDURE — 65270000029 HC RM PRIVATE

## 2019-09-27 PROCEDURE — 74011000250 HC RX REV CODE- 250: Performed by: HOSPITALIST

## 2019-09-27 PROCEDURE — 97530 THERAPEUTIC ACTIVITIES: CPT

## 2019-09-27 PROCEDURE — 36415 COLL VENOUS BLD VENIPUNCTURE: CPT

## 2019-09-27 PROCEDURE — 82962 GLUCOSE BLOOD TEST: CPT

## 2019-09-27 PROCEDURE — 74011250637 HC RX REV CODE- 250/637: Performed by: SURGERY

## 2019-09-27 PROCEDURE — 80048 BASIC METABOLIC PNL TOTAL CA: CPT

## 2019-09-27 PROCEDURE — 94640 AIRWAY INHALATION TREATMENT: CPT

## 2019-09-27 PROCEDURE — 82550 ASSAY OF CK (CPK): CPT

## 2019-09-27 PROCEDURE — 74011250637 HC RX REV CODE- 250/637: Performed by: INTERNAL MEDICINE

## 2019-09-27 PROCEDURE — 74011636637 HC RX REV CODE- 636/637: Performed by: HOSPITALIST

## 2019-09-27 PROCEDURE — 74011636637 HC RX REV CODE- 636/637: Performed by: SURGERY

## 2019-09-27 PROCEDURE — 85025 COMPLETE CBC W/AUTO DIFF WBC: CPT

## 2019-09-27 PROCEDURE — 74011250636 HC RX REV CODE- 250/636: Performed by: SURGERY

## 2019-09-27 PROCEDURE — 94664 DEMO&/EVAL PT USE INHALER: CPT

## 2019-09-27 PROCEDURE — 74011250637 HC RX REV CODE- 250/637: Performed by: FAMILY MEDICINE

## 2019-09-27 RX ADMIN — INSULIN LISPRO 1 UNITS: 100 INJECTION, SOLUTION INTRAVENOUS; SUBCUTANEOUS at 22:15

## 2019-09-27 RX ADMIN — IPRATROPIUM BROMIDE AND ALBUTEROL SULFATE 3 ML: .5; 3 SOLUTION RESPIRATORY (INHALATION) at 19:17

## 2019-09-27 RX ADMIN — INSULIN LISPRO 2 UNITS: 100 INJECTION, SOLUTION INTRAVENOUS; SUBCUTANEOUS at 07:29

## 2019-09-27 RX ADMIN — FENTANYL CITRATE 50 MCG: 50 INJECTION INTRAMUSCULAR; INTRAVENOUS at 09:56

## 2019-09-27 RX ADMIN — APIXABAN 5 MG: 5 TABLET, FILM COATED ORAL at 22:15

## 2019-09-27 RX ADMIN — INSULIN GLARGINE 15 UNITS: 100 INJECTION, SOLUTION SUBCUTANEOUS at 09:31

## 2019-09-27 RX ADMIN — METOPROLOL TARTRATE 25 MG: 25 TABLET ORAL at 22:18

## 2019-09-27 RX ADMIN — Medication 10 ML: at 22:16

## 2019-09-27 RX ADMIN — Medication 10 ML: at 13:10

## 2019-09-27 RX ADMIN — INSULIN LISPRO 2 UNITS: 100 INJECTION, SOLUTION INTRAVENOUS; SUBCUTANEOUS at 12:34

## 2019-09-27 RX ADMIN — FENTANYL CITRATE 50 MCG: 50 INJECTION INTRAMUSCULAR; INTRAVENOUS at 04:20

## 2019-09-27 RX ADMIN — FUROSEMIDE 40 MG: 40 TABLET ORAL at 09:26

## 2019-09-27 RX ADMIN — APIXABAN 5 MG: 5 TABLET, FILM COATED ORAL at 12:33

## 2019-09-27 RX ADMIN — FENTANYL CITRATE 50 MCG: 50 INJECTION INTRAMUSCULAR; INTRAVENOUS at 13:10

## 2019-09-27 RX ADMIN — METOPROLOL TARTRATE 25 MG: 25 TABLET ORAL at 09:26

## 2019-09-27 RX ADMIN — Medication 10 ML: at 05:25

## 2019-09-27 RX ADMIN — INSULIN LISPRO 2 UNITS: 100 INJECTION, SOLUTION INTRAVENOUS; SUBCUTANEOUS at 17:09

## 2019-09-27 RX ADMIN — ASPIRIN 81 MG CHEWABLE TABLET 81 MG: 81 TABLET CHEWABLE at 09:25

## 2019-09-27 RX ADMIN — CLOPIDOGREL BISULFATE 75 MG: 75 TABLET ORAL at 09:26

## 2019-09-27 RX ADMIN — IPRATROPIUM BROMIDE AND ALBUTEROL SULFATE 3 ML: .5; 3 SOLUTION RESPIRATORY (INHALATION) at 10:15

## 2019-09-27 RX ADMIN — FENTANYL CITRATE 50 MCG: 50 INJECTION INTRAMUSCULAR; INTRAVENOUS at 17:20

## 2019-09-27 RX ADMIN — FAMOTIDINE 20 MG: 20 TABLET ORAL at 18:37

## 2019-09-27 RX ADMIN — ALLOPURINOL 100 MG: 100 TABLET ORAL at 09:25

## 2019-09-27 NOTE — PROGRESS NOTES
MARIANN Plan:     [facility] received THE [facility]- which is good through Sunday 9/29  * [facility] is on \"will call\"     Notified attending of the above. Patient is not medically cleared for rehab to date. Cm to follow and coordinate discharge to SNF. Patient is with [facility] and prior to hospitalization has been home with caregivers.  Patient has Medicaid.     [address]

## 2019-09-27 NOTE — DIABETES MGMT
[facility]    [facility] Consult Follow Up Note    Recommendations/ Comments: Chart reviewed on Agusto Alexandercally for hyperglycemia, especially post meals. . If appropriate, please consider increasing meal time Lispro to 4 unit ac tid. Current hospital DM medication: lispro correction scale -high sensitivity and Lantus 15 units and Humalog 2 units ac tid     Patient is a 80 y.o. female with known DM on Lantus 30-40 units HS depending on BG and 1000 mg Metformin BID at home. A1c:   Lab Results   Component Value Date/Time    Hemoglobin A1c 8.8 (H) 09/17/2019 09:21 AM    Hemoglobin A1c 7.0 (H) 02/08/2017 09:59 AM       Recent Glucose Results:   Lab Results   Component Value Date/Time     (H) 09/27/2019 04:14 AM     (H) 09/26/2019 02:07 PM    GLUCPOC 244 (H) 09/27/2019 11:14 AM    GLUCPOC 165 (H) 09/27/2019 06:14 AM    GLUCPOC 193 (H) 09/26/2019 09:24 PM        Lab Results   Component Value Date/Time    Creatinine 0.99 09/27/2019 04:14 AM     Estimated Creatinine Clearance: 44 mL/min (based on SCr of 0.99 mg/dL). Active Orders   Diet    DIET DIABETIC CONSISTENT CARB Regular        PO intake:   Patient Vitals for the past 72 hrs:   % Diet Eaten   09/27/19 0846 75 %   09/24/19 1238 25 %       Will continue to follow as needed.     Thank you  Nathaly Suarez RD, CDE      Time spent: 4 minutes

## 2019-09-27 NOTE — PROGRESS NOTES
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 From leg   - r/o infection, no on abx    PAD w/ cold LEs/ RLE Ischemia w/ compartment syndrome:  - S/p RT leg revascularization in OR 9/20  - had Thrombus at RT popliteal artery and had RT leg embolectomy + fasciotomy on 9/22  - per vascular      Will sign of case for now. Please call us back with any issues. Interval History:  Seen and examined. Cr stable. On NC O2. Cr below 1. Edema improving. BP stable. No cp or sob. LE pain stable. Current Medications: all current  Medications have been eviewed in EPIC  Review of Systems: Pertinent items are noted in HPI. Objective:  Vitals:    Vitals:    09/26/19 2040 09/27/19 0311 09/27/19 0641 09/27/19 0805   BP: 110/57 132/66  131/60   Pulse: 86 75  (!) 101   Resp: 17 18  18   Temp: 98.6 °F (37 °C) 99 °F (37.2 °C)  98.8 °F (37.1 °C)   SpO2: 98% 100%  98%   Weight:   92 kg (202 lb 13.2 oz)    Height:         Intake and Output:  No intake/output data recorded.   09/25 1901 - 09/27 0700  In: -   Out: 400 [Urine:400]    Physical Examination:  General: NAD,Conversant, frail  Neck:  Supple, no mass  Resp:  Lungs mostly clear b/l  CV:  RRR,  no murmur or rub, no LE edema, no palpable LE pulses, cold extremities  GI:  Soft, NT, + Bowel sounds, no hepatosplenomegaly  Neurologic:  Non focal  Psych:             AAO x 3 appropriate affect   Skin:  No Rash  :  No altman    [x]    High complexity decision making was performed  [x]    Patient is at high-risk of decompensation with multiple organ involvement    Lab Data Personally Reviewed: I have reviewed all the pertinent labs, microbiology data and radiology studies during assessment. Recent Labs     09/27/19 0414 09/26/19  1407 09/26/19  0340    136 133*   K 4.6 4.6 4.8   CL 99 99 102   CO2 32 33* 24   * 217* 143*   BUN 19 20 22*   CREA 0.99 1.15* 1.05*   CA 9.1 9.5 9.1   PHOS  --  3.1  --    ALB  --  1.9*  --      Recent Labs     09/27/19 0414 09/26/19  0340 09/25/19  0429   WBC 18.4* 19.8* 17.8*   HGB 10.1* 9.3* 9.6*   HCT 32.5* 29.9* 30.5*    282 263     Lab Results   Component Value Date/Time    Specimen Description: NARES 09/27/2011 03:58 AM    Specimen Description: BLOOD 08/15/2011 06:15 AM    Specimen Description: CATH URINE 08/15/2011 06:15 AM     Lab Results   Component Value Date/Time    Culture result: MRSA NOT PRESENT 03/31/2016 12:30 PM    Culture result:  03/31/2016 12:30 PM         Screening of patient nares for MRSA is for surveillance purposes and, if positive, to facilitate isolation considerations in high risk settings. It is not intended for automatic decolonization interventions per se as regimens are not sufficiently effective to warrant routine use. Culture result:  09/27/2011 03:58 AM     MRSA PRESENT      Screening of patient nares for MRSA is for surveillance purposes and, if positive, to facilitate isolation considerations in high risk settings. It is not intended for automatic decolonization interventions per se as regimens are not sufficiently effective to warrant routine use.      Recent Results (from the past 24 hour(s))   GLUCOSE, POC    Collection Time: 09/26/19 11:35 AM   Result Value Ref Range    Glucose (POC) 223 (H) 65 - 100 mg/dL    Performed by Darian Wood    RENAL FUNCTION PANEL    Collection Time: 09/26/19  2:07 PM   Result Value Ref Range    Sodium 136 136 - 145 mmol/L    Potassium 4.6 3.5 - 5.1 mmol/L    Chloride 99 97 - 108 mmol/L    CO2 33 (H) 21 - 32 mmol/L    Anion gap 4 (L) 5 - 15 mmol/L    Glucose 217 (H) 65 - 100 mg/dL    BUN 20 6 - 20 MG/DL    Creatinine 1.15 (H) 0.55 - 1.02 MG/DL    BUN/Creatinine ratio 17 12 - 20      GFR est AA 54 (L) >60 ml/min/1.73m2    GFR est non-AA 45 (L) >60 ml/min/1.73m2    Calcium 9.5 8.5 - 10.1 MG/DL    Phosphorus 3.1 2.6 - 4.7 MG/DL    Albumin 1.9 (L) 3.5 - 5.0 g/dL   GLUCOSE, POC    Collection Time: 09/26/19  4:31 PM   Result Value Ref Range    Glucose (POC) 176 (H) 65 - 100 mg/dL    Performed by Wilder Guan, POC    Collection Time: 09/26/19  9:24 PM   Result Value Ref Range    Glucose (POC) 193 (H) 65 - 100 mg/dL    Performed by Keyur Butts    CK    Collection Time: 09/27/19  4:14 AM   Result Value Ref Range    CK 3,985 (H) 26 - 192 U/L   CBC WITH AUTOMATED DIFF    Collection Time: 09/27/19  4:14 AM   Result Value Ref Range    WBC 18.4 (H) 3.6 - 11.0 K/uL    RBC 3.62 (L) 3.80 - 5.20 M/uL    HGB 10.1 (L) 11.5 - 16.0 g/dL    HCT 32.5 (L) 35.0 - 47.0 %    MCV 89.8 80.0 - 99.0 FL    MCH 27.9 26.0 - 34.0 PG    MCHC 31.1 30.0 - 36.5 g/dL    RDW 15.9 (H) 11.5 - 14.5 %    PLATELET 861 751 - 170 K/uL    MPV 9.8 8.9 - 12.9 FL    NRBC 0.1 (H) 0  WBC    ABSOLUTE NRBC 0.02 (H) 0.00 - 0.01 K/uL    NEUTROPHILS 83 (H) 32 - 75 %    LYMPHOCYTES 5 (L) 12 - 49 %    MONOCYTES 9 5 - 13 %    EOSINOPHILS 1 0 - 7 %    BASOPHILS 0 0 - 1 %    IMMATURE GRANULOCYTES 2 (H) 0.0 - 0.5 %    ABS. NEUTROPHILS 15.4 (H) 1.8 - 8.0 K/UL    ABS. LYMPHOCYTES 0.9 0.8 - 3.5 K/UL    ABS. MONOCYTES 1.6 (H) 0.0 - 1.0 K/UL    ABS. EOSINOPHILS 0.2 0.0 - 0.4 K/UL    ABS. BASOPHILS 0.0 0.0 - 0.1 K/UL    ABS. IMM.  GRANS. 0.3 (H) 0.00 - 0.04 K/UL    DF AUTOMATED METABOLIC PANEL, BASIC    Collection Time: 09/27/19  4:14 AM   Result Value Ref Range    Sodium 137 136 - 145 mmol/L    Potassium 4.6 3.5 - 5.1 mmol/L    Chloride 99 97 - 108 mmol/L    CO2 32 21 - 32 mmol/L    Anion gap 6 5 - 15 mmol/L    Glucose 150 (H) 65 - 100 mg/dL    BUN 19 6 - 20 MG/DL    Creatinine 0.99 0.55 - 1.02 MG/DL    BUN/Creatinine ratio 19 12 - 20      GFR est AA >60 >60 ml/min/1.73m2    GFR est non-AA 53 (L) >60 ml/min/1.73m2    Calcium 9.1 8.5 - 10.1 MG/DL   GLUCOSE, POC    Collection Time: 09/27/19  6:14 AM   Result Value Ref Range    Glucose (POC) 165 (H) 65 - 100 mg/dL    Performed by Misty Ramey MD  [address]  Phone - [phone]   Fax - [phone]  [email or web address]

## 2019-09-27 NOTE — PROGRESS NOTES
Problem: Diabetes Self-Management  Goal: *Disease process and treatment process  Description  Define diabetes and identify own type of diabetes; list 3 options for treating diabetes. Outcome: Progressing Towards Goal  Goal: *Incorporating nutritional management into lifestyle  Description  Describe effect of type, amount and timing of food on blood glucose; list 3 methods for planning meals. Outcome: Progressing Towards Goal  Goal: *Incorporating physical activity into lifestyle  Description  State effect of exercise on blood glucose levels. Outcome: Progressing Towards Goal  Goal: *Developing strategies to promote health/change behavior  Description  Define the ABC's of diabetes; identify appropriate screenings, schedule and personal plan for screenings. Outcome: Progressing Towards Goal  Goal: *Using medications safely  Description  State effect of diabetes medications on diabetes; name diabetes medication taking, action and side effects. Outcome: Progressing Towards Goal  Goal: *Monitoring blood glucose, interpreting and using results  Description  Identify recommended blood glucose targets  and personal targets. Outcome: Progressing Towards Goal  Goal: *Prevention, detection, treatment of acute complications  Description  List symptoms of hyper- and hypoglycemia; describe how to treat low blood sugar and actions for lowering  high blood glucose level. Outcome: Progressing Towards Goal  Goal: *Prevention, detection and treatment of chronic complications  Description  Define the natural course of diabetes and describe the relationship of blood glucose levels to long term complications of diabetes.   Outcome: Progressing Towards Goal  Goal: *Developing strategies to address psychosocial issues  Description  Describe feelings about living with diabetes; identify support needed and support network  Outcome: Progressing Towards Goal  Goal: *Insulin pump training  Outcome: Progressing Towards Goal  Goal: *Sick day guidelines  Outcome: Progressing Towards Goal  Goal: *Patient Specific Goal (EDIT GOAL, INSERT TEXT)  Outcome: Progressing Towards Goal     Problem: Patient Education: Go to Patient Education Activity  Goal: Patient/Family Education  Outcome: Progressing Towards Goal     Problem: Patient Education: Go to Patient Education Activity  Goal: Patient/Family Education  Outcome: Progressing Towards Goal     Problem: Heart Failure: Day 1  Goal: Off Pathway (Use only if patient is Off Pathway)  Outcome: Progressing Towards Goal  Goal: Activity/Safety  Outcome: Progressing Towards Goal  Goal: Consults, if ordered  Outcome: Progressing Towards Goal  Goal: Diagnostic Test/Procedures  Outcome: Progressing Towards Goal  Goal: Nutrition/Diet  Outcome: Progressing Towards Goal  Goal: Discharge Planning  Outcome: Progressing Towards Goal  Goal: Medications  Outcome: Progressing Towards Goal  Goal: Respiratory  Outcome: Progressing Towards Goal  Goal: Treatments/Interventions/Procedures  Outcome: Progressing Towards Goal  Goal: Psychosocial  Outcome: Progressing Towards Goal  Goal: *Oxygen saturation within defined limits  Outcome: Progressing Towards Goal  Goal: *Hemodynamically stable  Outcome: Progressing Towards Goal  Goal: *Optimal pain control at patient's stated goal  Outcome: Progressing Towards Goal  Goal: *Anxiety reduced or absent  Outcome: Progressing Towards Goal     Problem: Heart Failure: Day 2  Goal: Off Pathway (Use only if patient is Off Pathway)  Outcome: Progressing Towards Goal  Goal: Activity/Safety  Outcome: Progressing Towards Goal  Goal: Consults, if ordered  Outcome: Progressing Towards Goal  Goal: Diagnostic Test/Procedures  Outcome: Progressing Towards Goal  Goal: Nutrition/Diet  Outcome: Progressing Towards Goal  Goal: Discharge Planning  Outcome: Progressing Towards Goal  Goal: Medications  Outcome: Progressing Towards Goal  Goal: Respiratory  Outcome: Progressing Towards Goal  Goal: Treatments/Interventions/Procedures  Outcome: Progressing Towards Goal  Goal: Psychosocial  Outcome: Progressing Towards Goal  Goal: *Oxygen saturation within defined limits  Outcome: Progressing Towards Goal  Goal: *Hemodynamically stable  Outcome: Progressing Towards Goal  Goal: *Optimal pain control at patient's stated goal  Outcome: Progressing Towards Goal  Goal: *Anxiety reduced or absent  Outcome: Progressing Towards Goal  Goal: *Demonstrates progressive activity  Outcome: Progressing Towards Goal     Problem: Heart Failure: Day 3  Goal: Off Pathway (Use only if patient is Off Pathway)  Outcome: Progressing Towards Goal  Goal: Activity/Safety  Outcome: Progressing Towards Goal  Goal: Diagnostic Test/Procedures  Outcome: Progressing Towards Goal  Goal: Nutrition/Diet  Outcome: Progressing Towards Goal  Goal: Discharge Planning  Outcome: Progressing Towards Goal  Goal: Medications  Outcome: Progressing Towards Goal  Goal: Respiratory  Outcome: Progressing Towards Goal  Goal: Treatments/Interventions/Procedures  Outcome: Progressing Towards Goal  Goal: Psychosocial  Outcome: Progressing Towards Goal  Goal: *Oxygen saturation within defined limits  Outcome: Progressing Towards Goal  Goal: *Hemodynamically stable  Outcome: Progressing Towards Goal  Goal: *Optimal pain control at patient's stated goal  Outcome: Progressing Towards Goal  Goal: *Anxiety reduced or absent  Outcome: Progressing Towards Goal  Goal: *Demonstrates progressive activity  Outcome: Progressing Towards Goal     Problem: Heart Failure: Day 4  Goal: Off Pathway (Use only if patient is Off Pathway)  Outcome: Progressing Towards Goal  Goal: Activity/Safety  Outcome: Progressing Towards Goal  Goal: Diagnostic Test/Procedures  Outcome: Progressing Towards Goal  Goal: Nutrition/Diet  Outcome: Progressing Towards Goal  Goal: Discharge Planning  Outcome: Progressing Towards Goal  Goal: Medications  Outcome: Progressing Towards Goal  Goal: Respiratory  Outcome: Progressing Towards Goal  Goal: Treatments/Interventions/Procedures  Outcome: Progressing Towards Goal  Goal: Psychosocial  Outcome: Progressing Towards Goal  Goal: *Oxygen saturation within defined limits  Outcome: Progressing Towards Goal  Goal: *Hemodynamically stable  Outcome: Progressing Towards Goal  Goal: *Optimal pain control at patient's stated goal  Outcome: Progressing Towards Goal  Goal: *Anxiety reduced or absent  Outcome: Progressing Towards Goal  Goal: *Demonstrates progressive activity  Outcome: Progressing Towards Goal     Problem: Heart Failure: Day 5  Goal: Off Pathway (Use only if patient is Off Pathway)  Outcome: Progressing Towards Goal  Goal: Activity/Safety  Outcome: Progressing Towards Goal  Goal: Diagnostic Test/Procedures  Outcome: Progressing Towards Goal  Goal: Nutrition/Diet  Outcome: Progressing Towards Goal  Goal: Discharge Planning  Outcome: Progressing Towards Goal  Goal: Medications  Outcome: Progressing Towards Goal  Goal: Respiratory  Outcome: Progressing Towards Goal  Goal: Treatments/Interventions/Procedures  Outcome: Progressing Towards Goal  Goal: Psychosocial  Outcome: Progressing Towards Goal     Problem: Heart Failure: Discharge Outcomes  Goal: *Demonstrates ability to perform prescribed activity without shortness of breath or discomfort  Outcome: Progressing Towards Goal  Goal: *Left ventricular function assessment completed prior to or during stay, or planned for post-discharge  Outcome: Progressing Towards Goal  Goal: *ACEI prescribed if LVEF less than 40% and no contraindications or ARB prescribed  Outcome: Progressing Towards Goal  Goal: *Verbalizes understanding and describes prescribed diet  Outcome: Progressing Towards Goal  Goal: *Verbalizes understanding/describes prescribed medications  Outcome: Progressing Towards Goal  Goal: *Describes available resources and support systems  Description  (eg: Home Health, Palliative Care, Advanced Medical Directive)  Outcome: Progressing Towards Goal  Goal: *Describes smoking cessation resources  Outcome: Progressing Towards Goal  Goal: *Understands and describes signs and symptoms to report to providers(Stroke Metric)  Outcome: Progressing Towards Goal  Goal: *Describes/verbalizes understanding of follow-up/return appt  Description  (eg: to physicians, diabetes treatment coordinator, and other resources  Outcome: Progressing Towards Goal  Goal: *Describes importance of continuing daily weights and changes to report to physician  Outcome: Progressing Towards Goal     Problem: Diabetes Self-Management  Goal: *Disease process and treatment process  Description  Define diabetes and identify own type of diabetes; list 3 options for treating diabetes. Outcome: Progressing Towards Goal  Goal: *Incorporating nutritional management into lifestyle  Description  Describe effect of type, amount and timing of food on blood glucose; list 3 methods for planning meals. Outcome: Progressing Towards Goal  Goal: *Incorporating physical activity into lifestyle  Description  State effect of exercise on blood glucose levels. Outcome: Progressing Towards Goal  Goal: *Developing strategies to promote health/change behavior  Description  Define the ABC's of diabetes; identify appropriate screenings, schedule and personal plan for screenings. Outcome: Progressing Towards Goal  Goal: *Using medications safely  Description  State effect of diabetes medications on diabetes; name diabetes medication taking, action and side effects. Outcome: Progressing Towards Goal  Goal: *Monitoring blood glucose, interpreting and using results  Description  Identify recommended blood glucose targets  and personal targets.   Outcome: Progressing Towards Goal  Goal: *Prevention, detection, treatment of acute complications  Description  List symptoms of hyper- and hypoglycemia; describe how to treat low blood sugar and actions for lowering  high blood glucose level. Outcome: Progressing Towards Goal  Goal: *Prevention, detection and treatment of chronic complications  Description  Define the natural course of diabetes and describe the relationship of blood glucose levels to long term complications of diabetes.   Outcome: Progressing Towards Goal  Goal: *Developing strategies to address psychosocial issues  Description  Describe feelings about living with diabetes; identify support needed and support network  Outcome: Progressing Towards Goal  Goal: *Insulin pump training  Outcome: Progressing Towards Goal  Goal: *Sick day guidelines  Outcome: Progressing Towards Goal  Goal: *Patient Specific Goal (EDIT GOAL, INSERT TEXT)  Outcome: Progressing Towards Goal

## 2019-09-27 NOTE — PROGRESS NOTES
Follow up visit with Ms. Carmelina. Pt appeared in good spirits and indicated that she is feeling better today. She spoke of her ray in God and her belief that she is in God's hands. Offered a spoken prayer of assurance. No additional needs expressed at this time.   Pt shared that she expects to go to rehab when she is discharged from the hospital.    Nat Saha, Palliative

## 2019-09-27 NOTE — PROGRESS NOTES
Hospitalist Progress Note                               Savi Mendoza MD                                     Answering service: [phone] OR [phone] from in house phone           Date of Service:  2019  NAME:  Jocelyne Cooper  :  1931  MRN:  [id number]      Admission Summary:   59-year-old female with an extensive past medical history including chronic hypoxemic respiratory failure with home oxygen, COPD, chronic kidney disease, type 2 diabetes mellitus, atrial fibrillation, diastolic congestive heart failure, dyslipidemia, primary hypertension, osteoporosis, pulmonary hypertension, depression and anxiety disorder, was brought to the emergency room from home for evaluation of an approximately 2-3 day history of worsening shortness of breath. Per the patient's son, the patient ambulates unaided at baseline; however, in the 2-3 days leading up to the emergency room presentation, noted to have worsening shortness of breath even with easy activity. The patient also complained of some numbness and weakness in the right lower extremity. The patient admitted to being compliant with all her medical therapies. The patient denied associated headaches, dizziness, visual deficits, chest pains, palpitations, nausea, vomiting, cough, sputum production, fevers, chills, abdominal pain, bladder or bowel irregularities. Reason for follow up:   Right leg ischemia. She says she has no feeling on her right foot. Breathing better. No complaints. Assessment & Plan:     Acute thrombus at the mid and distal right popliteal level RLE    - S/p RLE thrombectomy , found residual clot in popliteal artery, s/p repeat thrombectomy 19 per vascular surgery, now with significant muscle ischemia right calf and will likley have chronic foot drop on right.   - Switched from  Heparin drip to Apixaban per vascular surgery   - R foot drop boot    Low grade fever likely reactive due to ischemic changes:  -afebrile for days. [place]habdomy[place] [address] on 9/22/19 in setting of ischemic RLE  - serial daily CPK - trending down  - holding statins    Leukocytosis:markusley from ischemic foot+rhabdomyolysis. No source of infection evident. Monitor . Discussed with vascular   -While the leukocytosis could be from the ischemia/compartment syndrome and rhabdomyolysis,she is also prone to 2ry infection thus I would like to see a good trend down in the CBC before discharge,      Acute on chronic diastolic CHF exacerbation with a preserved EF confirmed:lasix po   Demand ischemia with mildly elevated troponins due to the CHF exarcerbation. Indeterminable NYHA Functional Class due to the multiple Co-morbidities per Cardiology. Primary Hypertension   Dyslipidemia   Severe TVR  - 2D ECHO (9/12/19) with EF 70 percent. - Continue oxygen  - s/p IV diuresis,now on oral lasix. - statins on hold for above  - daily weight input/output monitoring  - CHF teaching.   - Cardiology and Heart failure team evals noted and appreciated. 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   -Nephrology eval noted and appreciated    Multifactorial Hyponatremia POA - stable   Resolved Hyperkalemia without any dysrhythmias. 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 - Continue Aspirin, Plavix, statin, neurochecks. - Neurology eval noted and appreciated. Chronic Hypoxemic respiratory failure due to COPD with home oxygen at 3 l/min. - Nebulizers, inhalers, incentive spirometry. Anemia acute on chronic  - HB 6.8,no obvious source of blood loss  -Transfused 2 units 9/24. HB 9.6  -Discussed with Dr Tate Vines is okay to continue with Apixaban. Uncontrolled IDDM type 2 DM with complications including hyperglycemia.   - Hold metformin. - Accucheck monitoring  - Basal and sliding scale insulins)HS) + Humalog 2 units ac tid  - diabetic teaching done    Osteoporosis - f/u as OP    Anxiety disorder and Depression without any current behavioral disturbances. Hypocalcemia  - replace as needed      Obesity   Body mass index is 33.75 kg/m². Prophylaxis: apixaban    Full Code with a guarded prognosis. - Palliative care consulted and notes appreciated    Plan: Anticipate patient will require Short-term Post-Acute care facility placement for ongoing PT/OT when stable and cleared by specialists for d/c. Patient's son Ellie Gaston is at [phone] if needed for updates. Hospital Problems  Date Reviewed: 9/20/2019          Codes Class Noted POA    * (Principal) CHF exacerbation ([facility] ICD-10-CM: I50.9  ICD-9-CM: 428.0  9/17/2019 Yes        Hyperkalemia ICD-10-CM: E87.5  ICD-9-CM: 276.7  9/17/2019 Yes        Hyponatremia ICD-10-CM: E87.1  ICD-9-CM: 276.1  9/17/2019 Yes        HOLLAND (acute kidney injury) ([facility] ICD-10-CM: N17.9  ICD-9-CM: 584.9  9/17/2019 Yes        Acute hypoxemic respiratory failure ([facility] ICD-10-CM: J96.01  ICD-9-CM: 518.81  8/8/2011 Yes        Debility ICD-10-CM: R53.81  ICD-9-CM: 799.3  6/28/2011 Yes              Physical Examination:      Last 24hrs VS reviewed since prior progress note. Most recent are:  Visit Vitals  /60 (BP 1 Location: Left arm, BP Patient Position: At rest)   Pulse (!) 101   Temp 98.8 °F (37.1 °C)   Resp 18   Ht 5' 5\" (1.651 m)   Wt 92 kg (202 lb 13.2 oz)   SpO2 98%   Breastfeeding? No   BMI 33.75 kg/m²           Constitutional:  No acute distress. HEENT: Head is a traumatic,  Un icteric sclera. Pink conjunctiva,no erythema or discharge. Oral mucous moist, oropharynx benign. Neck supple,    Resp:  wheezy b/l   CV:  S1,S2 present. (+) murmur TVR    GI:  Soft, non distended, non tender.  normoactive bowel sounds, no hepatosplenomegaly    :  No CVA or suprapubic tenderness   Skin  :  No erythema,rash,bullae,dipigmentation . Cool RLE, no pulses below the knee and no sensitivity    Musculoskeletal:  RLE slightly bigger than LLE. Absent Dorsalis Pedis Pulse RLE. Power 0/5 RLE, 5/5 other extremities. Unable to palpate pulses LLE but LLE warm and pink. Neurologic:  Awake, alert and oriented. Absent sensation RLE. Intake/Output Summary (Last 24 hours) at 9/27/2019 0956  Last data filed at 9/26/2019 2300  Gross per 24 hour   Intake    Output 100 ml   Net -100 ml              Labs:     Recent Labs     09/27/19  0414 09/26/19  0340   WBC 18.4* 19.8*   HGB 10.1* 9.3*   HCT 32.5* 29.9*    282     Recent Labs     09/27/19  0414 09/26/19  1407 09/26/19  0340    136 133*   K 4.6 4.6 4.8   CL 99 99 102   CO2 32 33* 24   BUN 19 20 22*   CREA 0.99 1.15* 1.05*   * 217* 143*   CA 9.1 9.5 9.1   PHOS  --  3.1  --      Recent Labs     09/26/19  1407   ALB 1.9*     No results for input(s): INR, PTP, APTT, INREXT, INREXT in the last 72 hours.   Lab Results   Component Value Date/Time    Cholesterol, total 144 09/17/2019 09:21 AM    HDL Cholesterol 55 09/17/2019 09:21 AM    LDL, calculated 72.6 09/17/2019 09:21 AM    Triglyceride 82 09/17/2019 09:21 AM    CHOL/HDL Ratio 2.6 09/17/2019 09:21 AM     Lab Results   Component Value Date/Time    Glucose (POC) 165 (H) 09/27/2019 06:14 AM    Glucose (POC) 193 (H) 09/26/2019 09:24 PM    Glucose (POC) 176 (H) 09/26/2019 04:31 PM    Glucose (POC) 223 (H) 09/26/2019 11:35 AM    Glucose (POC) 150 (H) 09/26/2019 07:12 AM     Lab Results   Component Value Date/Time    Color YELLOW/STRAW 09/17/2019 12:49 PM    Appearance CLEAR 09/17/2019 12:49 PM    Specific gravity 1.030 09/17/2019 12:49 PM    Specific gravity 1.010 12/18/2011 09:28 AM    pH (UA) 6.0 09/17/2019 12:49 PM    Protein NEGATIVE  09/17/2019 12:49 PM    Glucose NEGATIVE  09/17/2019 12:49 PM    Ketone NEGATIVE 09/17/2019 12:49 PM    Bilirubin NEGATIVE  09/17/2019 12:49 PM    Urobilinogen 0.2 09/17/2019 12:49 PM    Nitrites NEGATIVE  09/17/2019 12:49 PM    Leukocyte Esterase NEGATIVE  09/17/2019 12:49 PM    Epithelial cells FEW 09/17/2019 12:49 PM    Bacteria NEGATIVE  09/17/2019 12:49 PM    WBC 0-4 09/17/2019 12:49 PM    RBC 0-5 09/17/2019 12:49 PM         Medications Reviewed:     Current Facility-Administered Medications   Medication Dose Route Frequency    albuterol-ipratropium (DUO-NEB) 2.5 MG-0.5 MG/3 ML  3 mL Nebulization BID RT    furosemide (LASIX) tablet 40 mg  40 mg Oral DAILY    0.9% sodium chloride infusion 250 mL  250 mL IntraVENous PRN    0.9% sodium chloride infusion 250 mL  250 mL IntraVENous PRN    apixaban (ELIQUIS) tablet 5 mg  5 mg Oral Q12H    insulin lispro (HUMALOG) injection   SubCUTAneous AC&HS    glucose chewable tablet 16 g  4 Tab Oral PRN    glucagon (GLUCAGEN) injection 1 mg  1 mg IntraMUSCular PRN    dextrose 10% infusion 0-250 mL  0-250 mL IntraVENous PRN    insulin lispro (HUMALOG) injection 2 Units  2 Units SubCUTAneous TIDAC    albuterol-ipratropium (DUO-NEB) 2.5 MG-0.5 MG/3 ML  3 mL Nebulization Q4H PRN    alum-mag hydroxide-simeth (MYLANTA) oral suspension 30 mL  30 mL Oral Q4H PRN    famotidine (PEPCID) tablet 20 mg  20 mg Oral QPM    metoprolol tartrate (LOPRESSOR) tablet 25 mg  25 mg Oral BID    traMADol (ULTRAM) tablet 25 mg  25 mg Oral Q8H PRN    fentaNYL citrate (PF) injection 50 mcg  50 mcg IntraVENous Q3H PRN    clopidogrel (PLAVIX) tablet 75 mg  75 mg Oral DAILY    insulin glargine (LANTUS) injection 15 Units  15 Units SubCUTAneous DAILY    ondansetron (ZOFRAN) injection 4 mg  4 mg IntraVENous Q6H PRN    sodium chloride (NS) flush 5-40 mL  5-40 mL IntraVENous Q8H    sodium chloride (NS) flush 5-40 mL  5-40 mL IntraVENous PRN    acetaminophen (TYLENOL) tablet 650 mg  650 mg Oral Q4H PRN    allopurinol (ZYLOPRIM) tablet 100 mg  100 mg Oral DAILY    aspirin chewable tablet 81 mg  81 mg Oral DAILY    [Held by provider] atorvastatin (LIPITOR) tablet 40 mg  40 mg Oral DAILY     ______________________________________________________________________  EXPECTED LENGTH OF STAY: 8d 9h  ACTUAL LENGTH OF STAY:          10  Time today is 38 min               Matt Nathan MD

## 2019-09-27 NOTE — PROGRESS NOTES
Problem: Mobility Impaired (Adult and Pediatric)  Goal: *Acute Goals and Plan of Care (Insert Text)  Description  FUNCTIONAL STATUS PRIOR TO ADMISSION: Patient was independent for all functional mobility. Patient uses 2L/min O2 via nasal cannula at baseline. HOME SUPPORT PRIOR TO ADMISSION: The patient lived with her son and daughter but did not require assist.    Physical Therapy Goals  Reassessed 9/27/19    1. Patient will move from supine to sit and sit to supine  in bed with modified independence within 7 day(s). 2.  Patient will transfer from bed to chair and chair to bed with moderate assistance  using the least restrictive device within 7 day(s). 3.  Patient will perform sit to stand with moderate assistance  within 7 day(s). 4.  Patient will ambulate with moderate assistance  for 15 feet with the least restrictive device within 7 day(s). Initiated 9/18/2019  1. Patient will move from supine to sit and sit to supine  in bed with independence within 7 day(s). 2.  Patient will transfer from bed to chair and chair to bed with independence using the least restrictive device within 7 day(s). 3.  Patient will perform sit to stand with independence within 7 day(s). 4.  Patient will ambulate with independence for 150 feet with the least restrictive device within 7 day(s). 5.  Patient will ascend/descend 1 stairs with 1 handrail(s) with modified independence within 7 day(s). Outcome: Progressing Towards Goal  PHYSICAL THERAPY TREATMENT: WEEKLY REASSESSMENT  Patient: Saunders Bamberger (89 y.o. female)  Date: 9/27/2019  Primary Diagnosis: CHF exacerbation ([facility] [I50.9]  Procedure(s) (LRB):  THROMBECTOMY/EMBOLECTOMY LOWER  RIGHT LEG. FASCIOTOMY (Right) 6 Days Post-Op   Precautions:   Fall      ASSESSMENT  Patient continues with skilled PT services and is progressing towards goals. Pt tolerated session fairly well and needs a moderate amount of encouragement to participate in therapy.  Pt continues to demonstrate generalized weakness and R foot drop. Pt with increased pain in bilateral feet and impaired sensation to light touch to bilateral plantar feet. Pt declining to stand at this time with therapy and believe it is due to fear of falling (pt reported much difficulty getting to the [facility] last night). Pt was able to scoot along the bedside well with good strength. Pt will continue to benefit from PT to progress mobility as tolerated and reach highest level of independence. Pt is functioning well below baseline mobility (independent) status and will benefit from SNF placement upon discharge. Patient's progression toward goals since last assessment: progress has been made towards bed mobility goals, but pt declining OOB transfers--goals downgraded    Current Level of Function Impacting Discharge (mobility/balance): CGA bed mobility for management of RLE; declined standing         PLAN :  Goals have been updated based on progression since last assessment. Patient continues to benefit from skilled intervention to address the above impairments. Recommendations and Planned Interventions: bed mobility training, transfer training, gait training, therapeutic exercises, neuromuscular re-education, edema management/control, patient and family training/education and therapeutic activities      Frequency/Duration: Patient will be followed by physical therapy:  5 times a week to address goals. Recommendation for discharge: (in order for the patient to meet his/her long term goals)  Therapy up to 5 days/week in SNF setting    Equipment recommendations for successful discharge (if) home: to be determined by rehab facility         SUBJECTIVE:   Patient stated You all push too much sometimes.     OBJECTIVE DATA SUMMARY:   HISTORY:    Past Medical History:   Diagnosis Date    Anxiety     Breast cancer ([facility] 2005, 2010    right 2005, left 2010    Cancer [facility])      cancer right breast cancer    Cancer ([facility]     cancer left breast/new dx 8/2011 +bx     Chronic obstructive pulmonary disease ([facility]     Diabetes ([facility]     Heart failure (HCC)     Hypercholesterolemia     Hypertension     Other ill-defined conditions(799.89)     osteopoosis    Other ill-defined conditions(799.89)     high cholesterol    Pneumonia     Psychiatric disorder     anxiety     Past Surgical History:   Procedure Laterality Date    BIOPSY OF BREAST, INCISIONAL      left breast 8/2011 positive for cancer cells    BREAST SURGERY PROCEDURE UNLISTED      right mastectomy    HX BREAST LUMPECTOMY Left 2010    HX MASTECTOMY Right 2005       Home Situation  Home Environment: Private residence  # Steps to Enter: 1  Rails to Enter: Yes  One/Two Story Residence: Two story, live on 1st floor  Interior Rails: Both  Lift Chair Available: No  Living Alone: No  Support Systems: Family member(s)(son and DIL)  Patient Expects to be Discharged to[de-identified] Private residence  Current DME Used/Available at Home: Shower chair, Safety frame toliet, Oxygen, portable, Grab bars  Tub or Shower Type: Shower    EXAMINATION/PRESENTATION/DECISION MAKING:   Critical Behavior:  Neurologic State: Alert  Orientation Level: Oriented X4  Cognition: Follows commands  Safety/Judgement: Awareness of environment, Fall prevention  Hearing:   Auditory  Auditory Impairment: None    Range Of Motion:  AROM: Generally decreased, functional                       Strength:    Strength: Generally decreased, functional(R foot drop, RLE weaker than LLE)                    Tone & Sensation:                  Sensation: Impaired               Coordination:     Vision:      Functional Mobility:  Bed Mobility:     Supine to Sit: Contact guard assistance(to mobilize RLE off edge of bed)  Sit to Supine: Stand-by assistance     Transfers:  Sit to Stand: (pt declined)                          Balance:   Sitting: Intact  Standing: (pt declined)  Ambulation/Gait Training:             Therapeutic Exercises:   Passive ROM to R ankle, LAQ x 5 each LE, seated hip abduction/adduction      Activity Tolerance:   Fair  Please refer to the flowsheet for vital signs taken during this treatment. After treatment patient left in no apparent distress:   Supine in bed, Call bell within reach, Caregiver / family present and Side rails x 3    COMMUNICATION/EDUCATION:   The patients plan of care was discussed with: Registered Nurse. Fall prevention education was provided and the patient/caregiver indicated understanding., Patient/family have participated as able in goal setting and plan of care. and Patient/family agree to work toward stated goals and plan of care.     Thank you for this referral.  Yazmin Ruts, PT, DPT   Time Calculation: 14 mins

## 2019-09-28 LAB
ANION GAP SERPL CALC-SCNC: 6 MMOL/L (ref 5–15)
BASOPHILS # BLD: 0 K/UL (ref 0–0.1)
BASOPHILS NFR BLD: 0 % (ref 0–1)
BUN SERPL-MCNC: 18 MG/DL (ref 6–20)
BUN/CREAT SERPL: 18 (ref 12–20)
CALCIUM SERPL-MCNC: 8.8 MG/DL (ref 8.5–10.1)
CHLORIDE SERPL-SCNC: 97 MMOL/L (ref 97–108)
CK SERPL-CCNC: 3365 U/L (ref 26–192)
CO2 SERPL-SCNC: 32 MMOL/L (ref 21–32)
CREAT SERPL-MCNC: 1.01 MG/DL (ref 0.55–1.02)
DIFFERENTIAL METHOD BLD: ABNORMAL
EOSINOPHIL # BLD: 0.2 K/UL (ref 0–0.4)
EOSINOPHIL NFR BLD: 1 % (ref 0–7)
ERYTHROCYTE [DISTWIDTH] IN BLOOD BY AUTOMATED COUNT: 16 % (ref 11.5–14.5)
GLUCOSE BLD STRIP.AUTO-MCNC: 134 MG/DL (ref 65–100)
GLUCOSE BLD STRIP.AUTO-MCNC: 169 MG/DL (ref 65–100)
GLUCOSE BLD STRIP.AUTO-MCNC: 177 MG/DL (ref 65–100)
GLUCOSE BLD STRIP.AUTO-MCNC: 179 MG/DL (ref 65–100)
GLUCOSE SERPL-MCNC: 162 MG/DL (ref 65–100)
HCT VFR BLD AUTO: 28.4 % (ref 35–47)
HGB BLD-MCNC: 8.8 G/DL (ref 11.5–16)
IMM GRANULOCYTES # BLD AUTO: 0.3 K/UL (ref 0–0.04)
IMM GRANULOCYTES NFR BLD AUTO: 2 % (ref 0–0.5)
LYMPHOCYTES # BLD: 1 K/UL (ref 0.8–3.5)
LYMPHOCYTES NFR BLD: 5 % (ref 12–49)
MCH RBC QN AUTO: 27.8 PG (ref 26–34)
MCHC RBC AUTO-ENTMCNC: 31 G/DL (ref 30–36.5)
MCV RBC AUTO: 89.6 FL (ref 80–99)
MONOCYTES # BLD: 1.8 K/UL (ref 0–1)
MONOCYTES NFR BLD: 9 % (ref 5–13)
NEUTS SEG # BLD: 16.6 K/UL (ref 1.8–8)
NEUTS SEG NFR BLD: 83 % (ref 32–75)
NRBC # BLD: 0 K/UL (ref 0–0.01)
NRBC BLD-RTO: 0 PER 100 WBC
PLATELET # BLD AUTO: 317 K/UL (ref 150–400)
PMV BLD AUTO: 9.6 FL (ref 8.9–12.9)
POTASSIUM SERPL-SCNC: 4.4 MMOL/L (ref 3.5–5.1)
RBC # BLD AUTO: 3.17 M/UL (ref 3.8–5.2)
SERVICE CMNT-IMP: ABNORMAL
SODIUM SERPL-SCNC: 135 MMOL/L (ref 136–145)
WBC # BLD AUTO: 19.9 K/UL (ref 3.6–11)

## 2019-09-28 PROCEDURE — 94640 AIRWAY INHALATION TREATMENT: CPT

## 2019-09-28 PROCEDURE — 74011636637 HC RX REV CODE- 636/637: Performed by: SURGERY

## 2019-09-28 PROCEDURE — 74011636637 HC RX REV CODE- 636/637: Performed by: HOSPITALIST

## 2019-09-28 PROCEDURE — 94664 DEMO&/EVAL PT USE INHALER: CPT

## 2019-09-28 PROCEDURE — 82550 ASSAY OF CK (CPK): CPT

## 2019-09-28 PROCEDURE — 36415 COLL VENOUS BLD VENIPUNCTURE: CPT

## 2019-09-28 PROCEDURE — 74011250637 HC RX REV CODE- 250/637: Performed by: SURGERY

## 2019-09-28 PROCEDURE — 74011250637 HC RX REV CODE- 250/637: Performed by: INTERNAL MEDICINE

## 2019-09-28 PROCEDURE — 74011000250 HC RX REV CODE- 250: Performed by: HOSPITALIST

## 2019-09-28 PROCEDURE — 74011250637 HC RX REV CODE- 250/637: Performed by: FAMILY MEDICINE

## 2019-09-28 PROCEDURE — 82962 GLUCOSE BLOOD TEST: CPT

## 2019-09-28 PROCEDURE — 80048 BASIC METABOLIC PNL TOTAL CA: CPT

## 2019-09-28 PROCEDURE — 74011250636 HC RX REV CODE- 250/636: Performed by: SURGERY

## 2019-09-28 PROCEDURE — 65270000029 HC RM PRIVATE

## 2019-09-28 PROCEDURE — 77010033678 HC OXYGEN DAILY

## 2019-09-28 PROCEDURE — 85025 COMPLETE CBC W/AUTO DIFF WBC: CPT

## 2019-09-28 RX ORDER — OXYMETAZOLINE HCL 0.05 %
2 SPRAY, NON-AEROSOL (ML) NASAL
Status: DISCONTINUED | OUTPATIENT
Start: 2019-09-28 | End: 2019-10-23 | Stop reason: HOSPADM

## 2019-09-28 RX ADMIN — METOPROLOL TARTRATE 25 MG: 25 TABLET ORAL at 09:42

## 2019-09-28 RX ADMIN — INSULIN LISPRO 2 UNITS: 100 INJECTION, SOLUTION INTRAVENOUS; SUBCUTANEOUS at 12:47

## 2019-09-28 RX ADMIN — FENTANYL CITRATE 50 MCG: 50 INJECTION INTRAMUSCULAR; INTRAVENOUS at 05:00

## 2019-09-28 RX ADMIN — Medication 10 ML: at 17:10

## 2019-09-28 RX ADMIN — ALLOPURINOL 100 MG: 100 TABLET ORAL at 09:42

## 2019-09-28 RX ADMIN — INSULIN LISPRO 2 UNITS: 100 INJECTION, SOLUTION INTRAVENOUS; SUBCUTANEOUS at 17:09

## 2019-09-28 RX ADMIN — IPRATROPIUM BROMIDE AND ALBUTEROL SULFATE 3 ML: .5; 3 SOLUTION RESPIRATORY (INHALATION) at 19:25

## 2019-09-28 RX ADMIN — FUROSEMIDE 40 MG: 40 TABLET ORAL at 09:42

## 2019-09-28 RX ADMIN — Medication 10 ML: at 20:38

## 2019-09-28 RX ADMIN — Medication 10 ML: at 06:49

## 2019-09-28 RX ADMIN — INSULIN GLARGINE 15 UNITS: 100 INJECTION, SOLUTION SUBCUTANEOUS at 09:42

## 2019-09-28 RX ADMIN — FAMOTIDINE 20 MG: 20 TABLET ORAL at 17:10

## 2019-09-28 RX ADMIN — INSULIN LISPRO 2 UNITS: 100 INJECTION, SOLUTION INTRAVENOUS; SUBCUTANEOUS at 06:49

## 2019-09-28 RX ADMIN — METOPROLOL TARTRATE 25 MG: 25 TABLET ORAL at 20:42

## 2019-09-28 RX ADMIN — FENTANYL CITRATE 50 MCG: 50 INJECTION INTRAMUSCULAR; INTRAVENOUS at 15:56

## 2019-09-28 RX ADMIN — TRAMADOL HYDROCHLORIDE 25 MG: 50 TABLET ORAL at 19:17

## 2019-09-28 RX ADMIN — IPRATROPIUM BROMIDE AND ALBUTEROL SULFATE 3 ML: .5; 3 SOLUTION RESPIRATORY (INHALATION) at 08:41

## 2019-09-28 NOTE — PROGRESS NOTES
Patient coughing up three medium sized blood clots. Dr Jeanette Ge informed on the phone. Orders received to hold plavix, eliquis, and aspirin. Will continue to monitor.

## 2019-09-28 NOTE — PROGRESS NOTES
Patient continuing to cough up blood clots. Saving blood clots in paper towels by the bedside. Contacted Dr Kevin Olson. He stated he would be up to see the patient in ten minutes.

## 2019-09-28 NOTE — PROGRESS NOTES
Hospitalist Progress Note                               Ra Jean-Baptiste MD                                     Answering service: [phone] OR [phone] from in house phone           Date of Service:  2019  NAME:  Olga Webber  :  1931  MRN:  [id number]      Admission Summary:   80-year-old female with an extensive past medical history including chronic hypoxemic respiratory failure with home oxygen, COPD, chronic kidney disease, type 2 diabetes mellitus, atrial fibrillation, diastolic congestive heart failure, dyslipidemia, primary hypertension, osteoporosis, pulmonary hypertension, depression and anxiety disorder, was brought to the emergency room from home for evaluation of an approximately 2-3 day history of worsening shortness of breath. Per the patient's son, the patient ambulates unaided at baseline; however, in the 2-3 days leading up to the emergency room presentation, noted to have worsening shortness of breath even with easy activity. The patient also complained of some numbness and weakness in the right lower extremity. The patient admitted to being compliant with all her medical therapies. The patient denied associated headaches, dizziness, visual deficits, chest pains, palpitations, nausea, vomiting, cough, sputum production, fevers, chills, abdominal pain, bladder or bowel irregularities. Reason for follow up:   Right leg ischemia.  -She is coughing up dark clotted bloody material,from actually epistaxis. She has fresh blood on both nostril,more on right,but no active epistaxis  Holding this morning dose of aspirin,plavix and apixaban. I have asked RN to page vascular for their input on how to proceed with antiplatelets and anticoagulation in this circumstance. Dressing changed with nursing. Ischemic skin sloughed off        Assessment & Plan:     Acute thrombus at the mid and distal right popliteal level RLE    - S/p RLE thrombectomy 9/21, found residual clot in popliteal artery, s/p repeat thrombectomy 9/22/19 per vascular surgery, now with significant muscle ischemia right calf and will floridalma have chronic foot drop on right. - R foot drop boot  -She is on aspirin,plavix and Apixaban. Holding antiplatelets and apixaban due to epistaxis awaiting input from vascular surgery     Low grade fever likely reactive due to ischemic changes:  -afebrile for days[place] [address] [place] 9/22/19 in setting of ischemic RLE  - serial daily CPK - trending down  - holding statins    Leukocytosis:floridalma from ischemic foot+rhabdomyolysis. No source of infection evident. Monitor . Discussed with vascular   -While the leukocytosis could be from the ischemia/compartment syndrome and rhabdomyolysis,she is also prone to 2ry infection thus I would like to see a good trend down in the CBC before discharge,      Acute on chronic diastolic CHF exacerbation with a preserved EF confirmed:lasix po   Demand ischemia with mildly elevated troponins due to the CHF exarcerbation. Indeterminable NYHA Functional Class due to the multiple Co-morbidities per Cardiology. Primary Hypertension   Dyslipidemia   Severe TVR  - 2D ECHO (9/12/19) with EF 70 percent. - Continue oxygen  - s/p IV diuresis,now on oral lasix. - statins on hold for above  - daily weight input/output monitoring  - CHF teaching.   - Cardiology and Heart failure team evals noted and appreciated. 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   -Nephrology eval noted and appreciated    Multifactorial Hyponatremia POA - stable   Resolved Hyperkalemia without any dysrhythmias. 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    - Holding  Aspirin, Plavix due to epistaxis;statins held  - Neurology eval noted and appreciated. Chronic Hypoxemic respiratory failure due to COPD with home oxygen at 3 l/min. - Nebulizers, inhalers, incentive spirometry. Anemia acute on chronic  - HB 6.8,no obvious source of blood loss  -Transfused 2 units 9/24. HB 9.6  -Discussed with Dr Niki Ivey is okay to continue with Apixaban. Uncontrolled IDDM type 2 DM with complications including hyperglycemia.   - Hold metformin. - Accucheck monitoring  - Basal and sliding scale insulins)HS) + Humalog 2 units ac tid  - diabetic teaching done    Osteoporosis - f/u as OP    Anxiety disorder and Depression without any current behavioral disturbances. Hypocalcemia  - replace as needed    Epistaxis: not active.  -Nasal saline,afrin. If develops profuse epistaxis,will consult ENT  -Antiplatelets/AC held for now while awaiting input from vascular surgery       Obesity   Body mass index is 32.8 kg/m². Prophylaxis: apixaban    Full Code with a guarded prognosis. - Palliative care consulted and notes appreciated    Plan: Anticipate patient will require Short-term Post-Acute care facility placement for ongoing PT/OT when stable and cleared by specialists for d/c. Patient's son Jose Elias Tilley is at [phone] if needed for updates.     Hospital Problems  Date Reviewed: 9/20/2019          Codes Class Noted POA    * (Principal) CHF exacerbation ([facility] ICD-10-CM: I50.9  ICD-9-CM: 428.0  9/17/2019 Yes        Hyperkalemia ICD-10-CM: E87.5  ICD-9-CM: 276.7  9/17/2019 Yes        Hyponatremia ICD-10-CM: E87.1  ICD-9-CM: 276.1  9/17/2019 Yes        [facility]AND (acute kidney injury) ([facility] ICD-10-CM: N17.9  ICD-9-CM: 584.9  9/17/2019 Yes        Acute hypoxemic respiratory failure ([facility] ICD-10-CM: J96.01  ICD-9-CM: 518.81  8/8/2011 Yes        Debility ICD-10-CM: R53.81  ICD-9-CM: 799.3  6/28/2011 Yes              Physical Examination:      Last 24hrs VS reviewed since prior progress note. Most recent are:  Visit Vitals  /54   Pulse 72   Temp 98.4 °F (36.9 °C)   Resp 20   Ht 5' 5\" (1.651 m)   Wt 89.4 kg (197 lb 1.5 oz)   SpO2 95%   Breastfeeding? No   BMI 32.80 kg/m²           Constitutional:  No acute distress. HEENT: Head is a traumatic,  Un icteric sclera. Pink conjunctiva,no erythema or discharge. Oral mucous moist, oropharynx benign. Neck supple,   Fresh blood on both nostrils,no active flow    Resp:  wheezy b/l   CV:  S1,S2 present. (+) murmur TVR    GI:  Soft, non distended, non tender. normoactive bowel sounds, no hepatosplenomegaly    :  No CVA or suprapubic tenderness   Skin  :  No erythema,rash,bullae,dipigmentation . Cool RLE, no pulses below the knee and no sensitivity    Musculoskeletal:  RLE slightly bigger than LLE. Absent Dorsalis Pedis Pulse RLE. Power 0/5 RLE, 5/5 other extremities. Unable to palpate pulses LLE but LLE warm and pink. Neurologic:  Awake, alert and oriented. Absent sensation RLE. No intake or output data in the 24 hours ending 09/28/19 1147           Labs:     Recent Labs     09/28/19  0154 09/27/19  0414   WBC 19.9* 18.4*   HGB 8.8* 10.1*   HCT 28.4* 32.5*    310     Recent Labs     09/28/19  0154 09/27/19  0414 09/26/19  1407   * 137 136   K 4.4 4.6 4.6   CL 97 99 99   CO2 32 32 33*   BUN 18 19 20   CREA 1.01 0.99 1.15*   * 150* 217*   CA 8.8 9.1 9.5   PHOS  --   --  3.1     Recent Labs     09/26/19  1407   ALB 1.9*     No results for input(s): INR, PTP, APTT, INREXT, INREXT in the last 72 hours.   Lab Results   Component Value Date/Time    Cholesterol, total 144 09/17/2019 09:21 AM    HDL Cholesterol 55 09/17/2019 09:21 AM    LDL, calculated 72.6 09/17/2019 09:21 AM    Triglyceride 82 09/17/2019 09:21 AM    CHOL/HDL Ratio 2.6 09/17/2019 09:21 AM     Lab Results   Component Value Date/Time    Glucose (POC) 169 (H) 09/28/2019 11:18 AM    Glucose (POC) 177 (H) 09/28/2019 06:33 AM    Glucose (POC) 237 (H) 09/27/2019 09:30 PM    Glucose (POC) 196 (H) 09/27/2019 04:11 PM    Glucose (POC) 244 (H) 09/27/2019 11:14 AM     Lab Results   Component Value Date/Time    Color YELLOW/STRAW 09/17/2019 12:49 PM    Appearance CLEAR 09/17/2019 12:49 PM    Specific gravity 1.030 09/17/2019 12:49 PM    Specific gravity 1.010 12/18/2011 09:28 AM    pH (UA) 6.0 09/17/2019 12:49 PM    Protein NEGATIVE  09/17/2019 12:49 PM    Glucose NEGATIVE  09/17/2019 12:49 PM    Ketone NEGATIVE  09/17/2019 12:49 PM    Bilirubin NEGATIVE  09/17/2019 12:49 PM    Urobilinogen 0.2 09/17/2019 12:49 PM    Nitrites NEGATIVE  09/17/2019 12:49 PM    Leukocyte Esterase NEGATIVE  09/17/2019 12:49 PM    Epithelial cells FEW 09/17/2019 12:49 PM    Bacteria NEGATIVE  09/17/2019 12:49 PM    WBC 0-4 09/17/2019 12:49 PM    RBC 0-5 09/17/2019 12:49 PM         Medications Reviewed:     Current Facility-Administered Medications   Medication Dose Route Frequency    sodium chloride (OCEAN) 0.65 % nasal squeeze bottle 2 Spray  2 Spray Both Nostrils Q2H PRN    oxymetazoline (AFRIN) 0.05 % nasal spray 2 Spray  2 Spray Both Nostrils BID PRN    albuterol-ipratropium (DUO-NEB) 2.5 MG-0.5 MG/3 ML  3 mL Nebulization BID RT    furosemide (LASIX) tablet 40 mg  40 mg Oral DAILY    0.9% sodium chloride infusion 250 mL  250 mL IntraVENous PRN    0.9% sodium chloride infusion 250 mL  250 mL IntraVENous PRN    apixaban (ELIQUIS) tablet 5 mg  5 mg Oral Q12H    insulin lispro (HUMALOG) injection   SubCUTAneous AC&HS    glucose chewable tablet 16 g  4 Tab Oral PRN    glucagon (GLUCAGEN) injection 1 mg  1 mg IntraMUSCular PRN    dextrose 10% infusion 0-250 mL  0-250 mL IntraVENous PRN    insulin lispro (HUMALOG) injection 2 Units  2 Units SubCUTAneous TIDAC    albuterol-ipratropium (DUO-NEB) 2.5 MG-0.5 MG/3 ML  3 mL Nebulization Q4H PRN    alum-mag hydroxide-simeth (MYLANTA) oral suspension 30 mL  30 mL Oral Q4H PRN    famotidine (PEPCID) tablet 20 mg  20 mg Oral QPM    metoprolol tartrate (LOPRESSOR) tablet 25 mg  25 mg Oral BID    traMADol (ULTRAM) tablet 25 mg  25 mg Oral Q8H PRN    fentaNYL citrate (PF) injection 50 mcg  50 mcg IntraVENous Q3H PRN    clopidogrel (PLAVIX) tablet 75 mg  75 mg Oral DAILY    insulin glargine (LANTUS) injection 15 Units  15 Units SubCUTAneous DAILY    ondansetron (ZOFRAN) injection 4 mg  4 mg IntraVENous Q6H PRN    sodium chloride (NS) flush 5-40 mL  5-40 mL IntraVENous Q8H    sodium chloride (NS) flush 5-40 mL  5-40 mL IntraVENous PRN    acetaminophen (TYLENOL) tablet 650 mg  650 mg Oral Q4H PRN    allopurinol (ZYLOPRIM) tablet 100 mg  100 mg Oral DAILY    aspirin chewable tablet 81 mg  81 mg Oral DAILY    [Held by provider] atorvastatin (LIPITOR) tablet 40 mg  40 mg Oral DAILY     ______________________________________________________________________  EXPECTED LENGTH OF STAY: 8d 9h  ACTUAL LENGTH OF STAY:          11  Time today is 38 min               Curry Montiel MD

## 2019-09-29 LAB
ANION GAP SERPL CALC-SCNC: 5 MMOL/L (ref 5–15)
ANION GAP SERPL CALC-SCNC: 7 MMOL/L (ref 5–15)
BASOPHILS # BLD: 0 K/UL (ref 0–0.1)
BASOPHILS # BLD: 0 K/UL (ref 0–0.1)
BASOPHILS NFR BLD: 0 % (ref 0–1)
BASOPHILS NFR BLD: 0 % (ref 0–1)
BUN SERPL-MCNC: 21 MG/DL (ref 6–20)
BUN SERPL-MCNC: 21 MG/DL (ref 6–20)
BUN/CREAT SERPL: 19 (ref 12–20)
BUN/CREAT SERPL: 22 (ref 12–20)
CALCIUM SERPL-MCNC: 8.8 MG/DL (ref 8.5–10.1)
CALCIUM SERPL-MCNC: 8.8 MG/DL (ref 8.5–10.1)
CHLORIDE SERPL-SCNC: 96 MMOL/L (ref 97–108)
CHLORIDE SERPL-SCNC: 97 MMOL/L (ref 97–108)
CK SERPL-CCNC: 3142 U/L (ref 26–192)
CO2 SERPL-SCNC: 31 MMOL/L (ref 21–32)
CO2 SERPL-SCNC: 32 MMOL/L (ref 21–32)
CREAT SERPL-MCNC: 0.97 MG/DL (ref 0.55–1.02)
CREAT SERPL-MCNC: 1.1 MG/DL (ref 0.55–1.02)
DIFFERENTIAL METHOD BLD: ABNORMAL
DIFFERENTIAL METHOD BLD: ABNORMAL
EOSINOPHIL # BLD: 0 K/UL (ref 0–0.4)
EOSINOPHIL # BLD: 0.1 K/UL (ref 0–0.4)
EOSINOPHIL NFR BLD: 0 % (ref 0–7)
EOSINOPHIL NFR BLD: 1 % (ref 0–7)
ERYTHROCYTE [DISTWIDTH] IN BLOOD BY AUTOMATED COUNT: 15.8 % (ref 11.5–14.5)
ERYTHROCYTE [DISTWIDTH] IN BLOOD BY AUTOMATED COUNT: 15.9 % (ref 11.5–14.5)
GLUCOSE BLD STRIP.AUTO-MCNC: 141 MG/DL (ref 65–100)
GLUCOSE BLD STRIP.AUTO-MCNC: 143 MG/DL (ref 65–100)
GLUCOSE BLD STRIP.AUTO-MCNC: 149 MG/DL (ref 65–100)
GLUCOSE BLD STRIP.AUTO-MCNC: 191 MG/DL (ref 65–100)
GLUCOSE SERPL-MCNC: 129 MG/DL (ref 65–100)
GLUCOSE SERPL-MCNC: 153 MG/DL (ref 65–100)
HCT VFR BLD AUTO: 27 % (ref 35–47)
HCT VFR BLD AUTO: 27.8 % (ref 35–47)
HGB BLD-MCNC: 8.3 G/DL (ref 11.5–16)
HGB BLD-MCNC: 8.5 G/DL (ref 11.5–16)
IMM GRANULOCYTES # BLD AUTO: 0 K/UL
IMM GRANULOCYTES # BLD AUTO: 0.2 K/UL (ref 0–0.04)
IMM GRANULOCYTES NFR BLD AUTO: 0 %
IMM GRANULOCYTES NFR BLD AUTO: 1 % (ref 0–0.5)
LYMPHOCYTES # BLD: 0.8 K/UL (ref 0.8–3.5)
LYMPHOCYTES # BLD: 1.3 K/UL (ref 0.8–3.5)
LYMPHOCYTES NFR BLD: 4 % (ref 12–49)
LYMPHOCYTES NFR BLD: 6 % (ref 12–49)
MCH RBC QN AUTO: 27.3 PG (ref 26–34)
MCH RBC QN AUTO: 27.5 PG (ref 26–34)
MCHC RBC AUTO-ENTMCNC: 30.6 G/DL (ref 30–36.5)
MCHC RBC AUTO-ENTMCNC: 30.7 G/DL (ref 30–36.5)
MCV RBC AUTO: 89.4 FL (ref 80–99)
MCV RBC AUTO: 89.4 FL (ref 80–99)
MONOCYTES # BLD: 1.6 K/UL (ref 0–1)
MONOCYTES # BLD: 2.5 K/UL (ref 0–1)
MONOCYTES NFR BLD: 11 % (ref 5–13)
MONOCYTES NFR BLD: 8 % (ref 5–13)
NEUTS BAND NFR BLD MANUAL: 2 % (ref 0–6)
NEUTS SEG # BLD: 17.6 K/UL (ref 1.8–8)
NEUTS SEG # BLD: 18.5 K/UL (ref 1.8–8)
NEUTS SEG NFR BLD: 81 % (ref 32–75)
NEUTS SEG NFR BLD: 86 % (ref 32–75)
NRBC # BLD: 0 K/UL (ref 0–0.01)
NRBC # BLD: 0 K/UL (ref 0–0.01)
NRBC BLD-RTO: 0 PER 100 WBC
NRBC BLD-RTO: 0 PER 100 WBC
PLATELET # BLD AUTO: 312 K/UL (ref 150–400)
PLATELET # BLD AUTO: 317 K/UL (ref 150–400)
PMV BLD AUTO: 9.7 FL (ref 8.9–12.9)
PMV BLD AUTO: 9.8 FL (ref 8.9–12.9)
POTASSIUM SERPL-SCNC: 3.9 MMOL/L (ref 3.5–5.1)
POTASSIUM SERPL-SCNC: 4.2 MMOL/L (ref 3.5–5.1)
RBC # BLD AUTO: 3.02 M/UL (ref 3.8–5.2)
RBC # BLD AUTO: 3.11 M/UL (ref 3.8–5.2)
RBC MORPH BLD: ABNORMAL
SERVICE CMNT-IMP: ABNORMAL
SODIUM SERPL-SCNC: 134 MMOL/L (ref 136–145)
SODIUM SERPL-SCNC: 134 MMOL/L (ref 136–145)
WBC # BLD AUTO: 20.4 K/UL (ref 3.6–11)
WBC # BLD AUTO: 22.3 K/UL (ref 3.6–11)

## 2019-09-29 PROCEDURE — 82550 ASSAY OF CK (CPK): CPT

## 2019-09-29 PROCEDURE — 94664 DEMO&/EVAL PT USE INHALER: CPT

## 2019-09-29 PROCEDURE — 80048 BASIC METABOLIC PNL TOTAL CA: CPT

## 2019-09-29 PROCEDURE — 74011250637 HC RX REV CODE- 250/637: Performed by: FAMILY MEDICINE

## 2019-09-29 PROCEDURE — 74011250636 HC RX REV CODE- 250/636: Performed by: SURGERY

## 2019-09-29 PROCEDURE — 82962 GLUCOSE BLOOD TEST: CPT

## 2019-09-29 PROCEDURE — 74011250637 HC RX REV CODE- 250/637: Performed by: SURGERY

## 2019-09-29 PROCEDURE — 74011000250 HC RX REV CODE- 250: Performed by: HOSPITALIST

## 2019-09-29 PROCEDURE — 77010033678 HC OXYGEN DAILY

## 2019-09-29 PROCEDURE — 65270000029 HC RM PRIVATE

## 2019-09-29 PROCEDURE — 74011250637 HC RX REV CODE- 250/637: Performed by: INTERNAL MEDICINE

## 2019-09-29 PROCEDURE — 74011636637 HC RX REV CODE- 636/637: Performed by: HOSPITALIST

## 2019-09-29 PROCEDURE — 74011636637 HC RX REV CODE- 636/637: Performed by: SURGERY

## 2019-09-29 PROCEDURE — 36415 COLL VENOUS BLD VENIPUNCTURE: CPT

## 2019-09-29 PROCEDURE — 85025 COMPLETE CBC W/AUTO DIFF WBC: CPT

## 2019-09-29 PROCEDURE — 94640 AIRWAY INHALATION TREATMENT: CPT

## 2019-09-29 RX ADMIN — FUROSEMIDE 40 MG: 40 TABLET ORAL at 08:43

## 2019-09-29 RX ADMIN — Medication 10 ML: at 06:56

## 2019-09-29 RX ADMIN — IPRATROPIUM BROMIDE AND ALBUTEROL SULFATE 3 ML: .5; 3 SOLUTION RESPIRATORY (INHALATION) at 08:25

## 2019-09-29 RX ADMIN — INSULIN LISPRO 2 UNITS: 100 INJECTION, SOLUTION INTRAVENOUS; SUBCUTANEOUS at 06:55

## 2019-09-29 RX ADMIN — ASPIRIN 81 MG CHEWABLE TABLET 81 MG: 81 TABLET CHEWABLE at 08:44

## 2019-09-29 RX ADMIN — INSULIN LISPRO 2 UNITS: 100 INJECTION, SOLUTION INTRAVENOUS; SUBCUTANEOUS at 17:15

## 2019-09-29 RX ADMIN — METOPROLOL TARTRATE 25 MG: 25 TABLET ORAL at 08:44

## 2019-09-29 RX ADMIN — FAMOTIDINE 20 MG: 20 TABLET ORAL at 17:15

## 2019-09-29 RX ADMIN — ALLOPURINOL 100 MG: 100 TABLET ORAL at 08:43

## 2019-09-29 RX ADMIN — FENTANYL CITRATE 50 MCG: 50 INJECTION INTRAMUSCULAR; INTRAVENOUS at 14:41

## 2019-09-29 RX ADMIN — Medication 10 ML: at 14:00

## 2019-09-29 RX ADMIN — INSULIN LISPRO 2 UNITS: 100 INJECTION, SOLUTION INTRAVENOUS; SUBCUTANEOUS at 11:32

## 2019-09-29 RX ADMIN — Medication 10 ML: at 20:11

## 2019-09-29 RX ADMIN — METOPROLOL TARTRATE 25 MG: 25 TABLET ORAL at 20:10

## 2019-09-29 RX ADMIN — FENTANYL CITRATE 50 MCG: 50 INJECTION INTRAMUSCULAR; INTRAVENOUS at 10:53

## 2019-09-29 RX ADMIN — IPRATROPIUM BROMIDE AND ALBUTEROL SULFATE 3 ML: .5; 3 SOLUTION RESPIRATORY (INHALATION) at 20:38

## 2019-09-29 RX ADMIN — INSULIN GLARGINE 15 UNITS: 100 INJECTION, SOLUTION SUBCUTANEOUS at 08:50

## 2019-09-29 RX ADMIN — TRAMADOL HYDROCHLORIDE 25 MG: 50 TABLET ORAL at 04:52

## 2019-09-29 NOTE — PROGRESS NOTES
Vascular Surgery  --events noted  --right foot is the same with some blistering/sloughing of skin in calf (cool but viable foot)  --pain in left foot (intermittent)--this appears viable    Plan:  --my recommendation would be to stop plavix/asa in definitely and start eliquis 5 bid starting tomorrow if no further epistaxix  --will d/w primary team

## 2019-09-29 NOTE — PROGRESS NOTES
Informed vascular surgery about patient's bloody nose and coughing up blood. No new orders received. Will continue to monitor.

## 2019-09-29 NOTE — PROGRESS NOTES
Hospitalist Progress Note                               Yudi Rodriguez MD                                     Answering service: [phone] OR [phone] from in house phone           Date of Service:  2019  NAME:  Amy Giordano YOB: 1931  MRN:  [id number]      Admission Summary:   55-year-old female with an extensive past medical history including chronic hypoxemic respiratory failure with home oxygen, COPD, chronic kidney disease, type 2 diabetes mellitus, atrial fibrillation, diastolic congestive heart failure, dyslipidemia, primary hypertension, osteoporosis, pulmonary hypertension, depression and anxiety disorder, was brought to the emergency room from home for evaluation of an approximately 2-3 day history of worsening shortness of breath. Per the patient's son, the patient ambulates unaided at baseline; however, in the 2-3 days leading up to the emergency room presentation, noted to have worsening shortness of breath even with easy activity. The patient also complained of some numbness and weakness in the right lower extremity. The patient admitted to being compliant with all her medical therapies. The patient denied associated headaches, dizziness, visual deficits, chest pains, palpitations, nausea, vomiting, cough, sputum production, fevers, chills, abdominal pain, bladder or bowel irregularities. Reason for follow up:   Right leg ischemia. -dressing changed with nursing. No epistaxis or coughing of blood.  -intermittent pain to left foot. Assessment & Plan:     Acute thrombus at the mid and distal right popliteal level RLE    - S/p RLE thrombectomy , found residual clot in popliteal artery, s/p repeat thrombectomy 19 per vascular surgery, now with significant muscle ischemia right calf and will likley have chronic foot drop on right. - R foot drop boot  -She developed epistaxis on aspirin,plavix and Apixaban. Dr Rema Boston recommending holding asa and Plavix indefinitely and resuming PAixaban from tomorrow if no epistaxis     Low grade fever likely reactive due to ischemic changes:  -afebrile for days. -WBC worsening,if continue may image the right leg    [place] [address] on 9/22/19 in setting of ischemic RLE  - serial daily CPK - trending down  - holding statins    Leukocytosis:likley from ischemic foot+rhabdomyolysis. No source of infection evident. Monitor . Discussed with vascular   -While the leukocytosis could be from the ischemia/compartment syndrome and rhabdomyolysis,she is also prone to 2ry infection thus I would like to see a good trend down in the CBC before discharge,      Acute on chronic diastolic CHF exacerbation with a preserved EF confirmed:lasix po   Demand ischemia with mildly elevated troponins due to the CHF exarcerbation. Indeterminable NYHA Functional Class due to the multiple Co-morbidities per Cardiology. Primary Hypertension   Dyslipidemia   Severe TVR  - 2D ECHO (9/12/19) with EF 70 percent. - Continue oxygen  - s/p IV diuresis,now on oral lasix. - statins on hold for above  - daily weight input/output monitoring  - CHF teaching.   - Cardiology and Heart failure team evals noted and appreciated. 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   -Nephrology eval noted and appreciated    Multifactorial Hyponatremia POA - stable   Resolved Hyperkalemia without any dysrhythmias. 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 - Holding  Aspirin, Plavix due to epistaxis;statins held  - Neurology eval noted and appreciated. Chronic Hypoxemic respiratory failure due to COPD with home oxygen at 3 l/min.    - Nebulizers, inhalers, incentive spirometry. Anemia acute on chronic  - HB 6.8,no obvious source of blood loss  -Transfused 2 units 9/24. HB 9.6  -Discussed with Dr Tank Rubio is okay to continue with Apixaban. Uncontrolled IDDM type 2 DM with complications including hyperglycemia.   - Hold metformin. - Accucheck monitoring  - Basal and sliding scale insulins)HS) + Humalog 2 units ac tid  - diabetic teaching done    Osteoporosis - f/u as OP    Anxiety disorder and Depression without any current behavioral disturbances. Hypocalcemia  - replace as needed    Epistaxis: not active.  -Nasal saline,afrin. If develops profuse epistaxis,will consult ENT  -Antiplatelets/AC held for now while awaiting input from vascular surgery       Obesity   Body mass index is 32.8 kg/m². Prophylaxis: apixaban    Full Code with a guarded prognosis. - Palliative care consulted and notes appreciated    Plan: Anticipate patient will require Short-term Post-Acute care facility placement for ongoing PT/OT when stable and cleared by specialists for d/c. Patient's son Augusto Rivero is at [phone] if needed for updates. Hospital Problems  Date Reviewed: 9/20/2019          Codes Class Noted POA    * (Principal) [facility] exacerbation ([facility] ICD-10-CM: I50.9  ICD-9-CM: 428.0  9/17/2019 Yes        Hyperkalemia ICD-10-CM: E87.5  ICD-9-CM: 276.7  9/17/2019 Yes        Hyponatremia ICD-10-CM: E87.1  ICD-9-CM: 276.1  9/17/2019 Yes        HOLLAND (acute kidney injury) ([facility] ICD-10-CM: N17.9  ICD-9-CM: 584.9  9/17/2019 Yes        Acute hypoxemic respiratory failure ([facility] ICD-10-CM: J96.01  ICD-9-CM: 518.81  8/8/2011 Yes        Debility ICD-10-CM: R53.81  ICD-9-CM: 799.3  6/28/2011 Yes              Physical Examination:      Last 24hrs VS reviewed since prior progress note.  Most recent are:  Visit Vitals  /67 (BP 1 Location: Left arm, BP Patient Position: At rest)   Pulse 91   Temp 98.8 °F (37.1 °C)   Resp 19   Ht 5' 5\" (1.651 m)   Wt 89.4 kg (197 lb 1.5 oz) SpO2 98%   Breastfeeding? No   BMI 32.80 kg/m²           Constitutional:  No acute distress. HEENT: Head is a traumatic,  Un icteric sclera. Pink conjunctiva,no erythema or discharge. Oral mucous moist, oropharynx benign. Neck supple,   Fresh blood on both nostrils,no active flow    Resp:  wheezy b/l   CV:  S1,S2 present. (+) murmur TVR    GI:  Soft, non distended, non tender. normoactive bowel sounds, no hepatosplenomegaly    :  No CVA or suprapubic tenderness   Skin  :  No erythema,rash,bullae,dipigmentation . Cool RLE, no pulses below the knee and no sensitivity    Musculoskeletal:  RLE slightly bigger than LLE. Absent Dorsalis Pedis Pulse RLE. Power 0/5 RLE, 5/5 other extremities. Unable to palpate pulses LLE but LLE warm and pink. Neurologic:  Awake, alert and oriented. Absent sensation RLE. Intake/Output Summary (Last 24 hours) at 9/29/2019 1701  Last data filed at 9/28/2019 2345  Gross per 24 hour   Intake    Output 0 ml   Net 0 ml              Labs:     Recent Labs     09/29/19  0935 09/29/19 0226   WBC 20.4* 22.3*   HGB 8.5* 8.3*   HCT 27.8* 27.0*    312     Recent Labs     09/29/19  0935 09/29/19 0226 09/28/19  0154   * 134* 135*   K 3.9 4.2 4.4   CL 97 96* 97   CO2 32 31 32   BUN 21* 21* 18   CREA 0.97 1.10* 1.01   * 153* 162*   CA 8.8 8.8 8.8     No results for input(s): SGOT, GPT, ALT, AP, TBIL, TBILI, TP, ALB, GLOB, GGT, AML, LPSE in the last 72 hours. No lab exists for component: AMYP, HLPSE  No results for input(s): INR, PTP, APTT, INREXT, INREXT in the last 72 hours.   Lab Results   Component Value Date/Time    Cholesterol, total 144 09/17/2019 09:21 AM    HDL Cholesterol 55 09/17/2019 09:21 AM    LDL, calculated 72.6 09/17/2019 09:21 AM    Triglyceride 82 09/17/2019 09:21 AM    CHOL/HDL Ratio 2.6 09/17/2019 09:21 AM     Lab Results   Component Value Date/Time    Glucose (POC) 191 (H) 09/29/2019 04:42 PM    Glucose (POC) 143 (H) 09/29/2019 11:18 AM    Glucose (POC) 149 (H) 09/29/2019 06:29 AM    Glucose (POC) 179 (H) 09/28/2019 09:20 PM    Glucose (POC) 134 (H) 09/28/2019 04:19 PM     Lab Results   Component Value Date/Time    Color YELLOW/STRAW 09/17/2019 12:49 PM    Appearance CLEAR 09/17/2019 12:49 PM    Specific gravity 1.030 09/17/2019 12:49 PM    Specific gravity 1.010 12/18/2011 09:28 AM    pH (UA) 6.0 09/17/2019 12:49 PM    Protein NEGATIVE  09/17/2019 12:49 PM    Glucose NEGATIVE  09/17/2019 12:49 PM    Ketone NEGATIVE  09/17/2019 12:49 PM    Bilirubin NEGATIVE  09/17/2019 12:49 PM    Urobilinogen 0.2 09/17/2019 12:49 PM    Nitrites NEGATIVE  09/17/2019 12:49 PM    Leukocyte Esterase NEGATIVE  09/17/2019 12:49 PM    Epithelial cells FEW 09/17/2019 12:49 PM    Bacteria NEGATIVE  09/17/2019 12:49 PM    WBC 0-4 09/17/2019 12:49 PM    RBC 0-5 09/17/2019 12:49 PM         Medications Reviewed:     Current Facility-Administered Medications   Medication Dose Route Frequency    sodium chloride (OCEAN) 0.65 % nasal squeeze bottle 2 Spray  2 Spray Both Nostrils Q2H PRN    oxymetazoline (AFRIN) 0.05 % nasal spray 2 Spray  2 Spray Both Nostrils BID PRN    albuterol-ipratropium (DUO-NEB) 2.5 MG-0.5 MG/3 ML  3 mL Nebulization BID RT    furosemide (LASIX) tablet 40 mg  40 mg Oral DAILY    0.9% sodium chloride infusion 250 mL  250 mL IntraVENous PRN    0.9% sodium chloride infusion 250 mL  250 mL IntraVENous PRN    [Held by provider] apixaban (ELIQUIS) tablet 5 mg  5 mg Oral Q12H    insulin lispro (HUMALOG) injection   SubCUTAneous AC&HS    glucose chewable tablet 16 g  4 Tab Oral PRN    glucagon (GLUCAGEN) injection 1 mg  1 mg IntraMUSCular PRN    dextrose 10% infusion 0-250 mL  0-250 mL IntraVENous PRN    insulin lispro (HUMALOG) injection 2 Units  2 Units SubCUTAneous TIDAC    albuterol-ipratropium (DUO-NEB) 2.5 MG-0.5 MG/3 ML  3 mL Nebulization Q4H PRN    alum-mag hydroxide-simeth (MYLANTA) oral suspension 30 mL  30 mL Oral Q4H PRN    famotidine (PEPCID) tablet 20 mg  20 mg Oral QPM    metoprolol tartrate (LOPRESSOR) tablet 25 mg  25 mg Oral BID    traMADol (ULTRAM) tablet 25 mg  25 mg Oral Q8H PRN    fentaNYL citrate (PF) injection 50 mcg  50 mcg IntraVENous Q3H PRN    [Held by provider] clopidogrel (PLAVIX) tablet 75 mg  75 mg Oral DAILY    insulin glargine (LANTUS) injection 15 Units  15 Units SubCUTAneous DAILY    ondansetron (ZOFRAN) injection 4 mg  4 mg IntraVENous Q6H PRN    sodium chloride (NS) flush 5-40 mL  5-40 mL IntraVENous Q8H    sodium chloride (NS) flush 5-40 mL  5-40 mL IntraVENous PRN    acetaminophen (TYLENOL) tablet 650 mg  650 mg Oral Q4H PRN    allopurinol (ZYLOPRIM) tablet 100 mg  100 mg Oral DAILY    aspirin chewable tablet 81 mg  81 mg Oral DAILY    [Held by provider] atorvastatin (LIPITOR) tablet 40 mg  40 mg Oral DAILY     ______________________________________________________________________  EXPECTED LENGTH OF STAY: 8d 9h  ACTUAL LENGTH OF STAY:          12  Time today is 38 min               Nahomi Aparicio MD

## 2019-09-30 LAB
ANION GAP SERPL CALC-SCNC: 6 MMOL/L (ref 5–15)
BASOPHILS # BLD: 0 K/UL (ref 0–0.1)
BASOPHILS NFR BLD: 0 % (ref 0–1)
BUN SERPL-MCNC: 20 MG/DL (ref 6–20)
BUN/CREAT SERPL: 19 (ref 12–20)
CALCIUM SERPL-MCNC: 8.7 MG/DL (ref 8.5–10.1)
CHLORIDE SERPL-SCNC: 97 MMOL/L (ref 97–108)
CK SERPL-CCNC: 2692 U/L (ref 26–192)
CO2 SERPL-SCNC: 33 MMOL/L (ref 21–32)
CREAT SERPL-MCNC: 1.03 MG/DL (ref 0.55–1.02)
DIFFERENTIAL METHOD BLD: ABNORMAL
EOSINOPHIL # BLD: 0.2 K/UL (ref 0–0.4)
EOSINOPHIL NFR BLD: 1 % (ref 0–7)
ERYTHROCYTE [DISTWIDTH] IN BLOOD BY AUTOMATED COUNT: 16 % (ref 11.5–14.5)
GLUCOSE BLD STRIP.AUTO-MCNC: 127 MG/DL (ref 65–100)
GLUCOSE BLD STRIP.AUTO-MCNC: 176 MG/DL (ref 65–100)
GLUCOSE BLD STRIP.AUTO-MCNC: 191 MG/DL (ref 65–100)
GLUCOSE BLD STRIP.AUTO-MCNC: 275 MG/DL (ref 65–100)
GLUCOSE SERPL-MCNC: 129 MG/DL (ref 65–100)
HCT VFR BLD AUTO: 26.3 % (ref 35–47)
HGB BLD-MCNC: 8 G/DL (ref 11.5–16)
IMM GRANULOCYTES # BLD AUTO: 0 K/UL
IMM GRANULOCYTES NFR BLD AUTO: 0 %
LYMPHOCYTES # BLD: 0.6 K/UL (ref 0.8–3.5)
LYMPHOCYTES NFR BLD: 3 % (ref 12–49)
MCH RBC QN AUTO: 27.4 PG (ref 26–34)
MCHC RBC AUTO-ENTMCNC: 30.4 G/DL (ref 30–36.5)
MCV RBC AUTO: 90.1 FL (ref 80–99)
MONOCYTES # BLD: 1.4 K/UL (ref 0–1)
MONOCYTES NFR BLD: 7 % (ref 5–13)
NEUTS SEG # BLD: 17.7 K/UL (ref 1.8–8)
NEUTS SEG NFR BLD: 89 % (ref 32–75)
NRBC # BLD: 0 K/UL (ref 0–0.01)
NRBC BLD-RTO: 0 PER 100 WBC
PLATELET # BLD AUTO: 308 K/UL (ref 150–400)
PMV BLD AUTO: 9.7 FL (ref 8.9–12.9)
POTASSIUM SERPL-SCNC: 4.1 MMOL/L (ref 3.5–5.1)
RBC # BLD AUTO: 2.92 M/UL (ref 3.8–5.2)
RBC MORPH BLD: ABNORMAL
RBC MORPH BLD: ABNORMAL
SERVICE CMNT-IMP: ABNORMAL
SODIUM SERPL-SCNC: 136 MMOL/L (ref 136–145)
WBC # BLD AUTO: 19.9 K/UL (ref 3.6–11)

## 2019-09-30 PROCEDURE — 65270000029 HC RM PRIVATE

## 2019-09-30 PROCEDURE — 74011636637 HC RX REV CODE- 636/637: Performed by: HOSPITALIST

## 2019-09-30 PROCEDURE — 74011250637 HC RX REV CODE- 250/637: Performed by: SURGERY

## 2019-09-30 PROCEDURE — 74011250637 HC RX REV CODE- 250/637: Performed by: INTERNAL MEDICINE

## 2019-09-30 PROCEDURE — 82550 ASSAY OF CK (CPK): CPT

## 2019-09-30 PROCEDURE — 82962 GLUCOSE BLOOD TEST: CPT

## 2019-09-30 PROCEDURE — 74011250637 HC RX REV CODE- 250/637: Performed by: FAMILY MEDICINE

## 2019-09-30 PROCEDURE — 77010033678 HC OXYGEN DAILY

## 2019-09-30 PROCEDURE — 85025 COMPLETE CBC W/AUTO DIFF WBC: CPT

## 2019-09-30 PROCEDURE — 74011000250 HC RX REV CODE- 250: Performed by: HOSPITALIST

## 2019-09-30 PROCEDURE — 74011636637 HC RX REV CODE- 636/637: Performed by: SURGERY

## 2019-09-30 PROCEDURE — 80048 BASIC METABOLIC PNL TOTAL CA: CPT

## 2019-09-30 PROCEDURE — 97530 THERAPEUTIC ACTIVITIES: CPT

## 2019-09-30 PROCEDURE — 36415 COLL VENOUS BLD VENIPUNCTURE: CPT

## 2019-09-30 PROCEDURE — 94640 AIRWAY INHALATION TREATMENT: CPT

## 2019-09-30 RX ADMIN — TRAMADOL HYDROCHLORIDE 25 MG: 50 TABLET ORAL at 02:05

## 2019-09-30 RX ADMIN — TRAMADOL HYDROCHLORIDE 25 MG: 50 TABLET ORAL at 11:01

## 2019-09-30 RX ADMIN — ALLOPURINOL 100 MG: 100 TABLET ORAL at 09:21

## 2019-09-30 RX ADMIN — ACETAMINOPHEN 650 MG: 325 TABLET, FILM COATED ORAL at 17:07

## 2019-09-30 RX ADMIN — INSULIN LISPRO 2 UNITS: 100 INJECTION, SOLUTION INTRAVENOUS; SUBCUTANEOUS at 12:56

## 2019-09-30 RX ADMIN — FUROSEMIDE 40 MG: 40 TABLET ORAL at 09:21

## 2019-09-30 RX ADMIN — TRAMADOL HYDROCHLORIDE 25 MG: 50 TABLET ORAL at 21:35

## 2019-09-30 RX ADMIN — Medication 10 ML: at 06:59

## 2019-09-30 RX ADMIN — INSULIN LISPRO 2 UNITS: 100 INJECTION, SOLUTION INTRAVENOUS; SUBCUTANEOUS at 06:59

## 2019-09-30 RX ADMIN — APIXABAN 5 MG: 5 TABLET, FILM COATED ORAL at 11:01

## 2019-09-30 RX ADMIN — APIXABAN 5 MG: 5 TABLET, FILM COATED ORAL at 22:42

## 2019-09-30 RX ADMIN — IPRATROPIUM BROMIDE AND ALBUTEROL SULFATE 3 ML: .5; 3 SOLUTION RESPIRATORY (INHALATION) at 09:04

## 2019-09-30 RX ADMIN — METOPROLOL TARTRATE 25 MG: 25 TABLET ORAL at 09:21

## 2019-09-30 RX ADMIN — FAMOTIDINE 20 MG: 20 TABLET ORAL at 17:07

## 2019-09-30 RX ADMIN — INSULIN LISPRO 3 UNITS: 100 INJECTION, SOLUTION INTRAVENOUS; SUBCUTANEOUS at 12:55

## 2019-09-30 RX ADMIN — Medication 10 ML: at 21:35

## 2019-09-30 RX ADMIN — ACETAMINOPHEN 650 MG: 325 TABLET, FILM COATED ORAL at 12:37

## 2019-09-30 RX ADMIN — INSULIN GLARGINE 15 UNITS: 100 INJECTION, SOLUTION SUBCUTANEOUS at 09:21

## 2019-09-30 RX ADMIN — Medication 10 ML: at 16:59

## 2019-09-30 RX ADMIN — METOPROLOL TARTRATE 25 MG: 25 TABLET ORAL at 21:34

## 2019-09-30 RX ADMIN — INSULIN LISPRO 2 UNITS: 100 INJECTION, SOLUTION INTRAVENOUS; SUBCUTANEOUS at 16:57

## 2019-09-30 NOTE — PROGRESS NOTES
Bubba Rich (adm director at [facility] [id number]) here to visit patient. Patient is medically approved for admission. Insurance auth pending with [employer].   BRODIE Daniels, CRM

## 2019-09-30 NOTE — PROGRESS NOTES
PHYSICAL THERAPY TREATMENT  Patient: William Graves (62 y.o. female)  Date: 9/30/2019  Diagnosis: CHF exacerbation ([facility] [I50.9] CHF exacerbation (HCC)  Procedure(s) (LRB):  THROMBECTOMY/EMBOLECTOMY LOWER  RIGHT LEG. FASCIOTOMY (Right) 9 Days Post-Op  Precautions: Fall  Chart, physical therapy assessment, plan of care and goals were reviewed. ASSESSMENT  Patient continues with skilled PT services and is progressing towards goals. Patient tolerated sitting EOB well this date. She declined seated LE exercises, addressing lateral scooting or attempting to stand this date stating too much pain and too tired. Maximal encouragement provided for increased activity but patient continued to decline. Current Level of Function Impacting Discharge (mobility/balance): Min A for bed mobility, declining transfers/OOB activity. Other factors to consider for discharge: requires assist for all mobility, pain management, motivation          PLAN :  Patient continues to benefit from skilled intervention to address the above impairments. Continue treatment per established plan of care. to address goals. Recommendation for discharge: (in order for the patient to meet his/her long term goals)  Therapy up to 5 days/week in SNF setting    This discharge recommendation:  Has been made in collaboration with the attending provider and/or case management    Equipment recommendations for successful discharge (if) home: to be determined by rehab facility       SUBJECTIVE:   Patient stated You can't push me. I am 80years old.     OBJECTIVE DATA SUMMARY:   Critical Behavior:  Neurologic State: Alert  Orientation Level: Oriented X4  Cognition: Follows commands  Safety/Judgement: Awareness of environment, Fall prevention  Functional Mobility Training:  Bed Mobility:     Supine to Sit: Minimum assistance;Contact guard assistance; Additional time(assist with LE)  Sit to Supine: Minimum assistance; Additional time  Scooting: Minimum assistance; Additional time        Transfers:  Sit to Stand: (patient declined to attempt)                                Balance:  Sitting: Intact  Standing: (Patient declined to attempt)    Therapeutic Exercises:   Patient performed assisted ankle pumps in supine but limited ROM due to c/o pain. She declined attempts for continued LE exercises. Pain Rating:  C/o LE pain and soreness, does not quantify. Reports \"better\" since medication     Activity Tolerance:   Good  Please refer to the flowsheet for vital signs taken during this treatment.     After treatment patient left in no apparent distress:   Supine in bed, Call bell within reach and Caregiver / family present    COMMUNICATION/COLLABORATION:   The patients plan of care was discussed with: Registered Nurse    Merlin Em PT, DPT   Time Calculation: 28 mins

## 2019-09-30 NOTE — PROGRESS NOTES
Hospitalist Progress Note                               Matt Nathan MD                                     Answering service: [phone] or [phone] from in house phone           Date of Service:  2019  NAME:  Dorys Sousa  :  1931  MRN:  [id number]      Admission Summary:   80-year-old female with an extensive past medical history including chronic hypoxemic respiratory failure with home oxygen, COPD, chronic kidney disease, type 2 diabetes mellitus, atrial fibrillation, diastolic congestive heart failure, dyslipidemia, primary hypertension, osteoporosis, pulmonary hypertension, depression and anxiety disorder, was brought to the emergency room from home for evaluation of an approximately 2-3 day history of worsening shortness of breath. Per the patient's son, the patient ambulates unaided at baseline; however, in the 2-3 days leading up to the emergency room presentation, noted to have worsening shortness of breath even with easy activity. The patient also complained of some numbness and weakness in the right lower extremity. The patient admitted to being compliant with all her medical therapies. The patient denied associated headaches, dizziness, visual deficits, chest pains, palpitations, nausea, vomiting, cough, sputum production, fevers, chills, abdominal pain, bladder or bowel irregularities. Reason for follow up:   Right leg ischemia.  - No new complains. No more epistaxis. She acknowledges the vascular surgeon talked with her about amputation,she stated \" I don't think my son would agree. \"       Assessment & Plan:     Acute thrombus at the mid and distal right popliteal level RLE    - S/p RLE thrombectomy , found residual clot in popliteal artery, s/p repeat thrombectomy 19 per vascular surgery, now with significant muscle ischemia right calf and will likley have chronic foot drop on right.   - R foot drop boot  -She developed epistaxis on aspirin,plavix and Apixaban. Dr Zuri Sanabria recommending holding asa and Plavix indefinitely and resumed Aixaban  -Discussed with Dr Odilia Ayon may need amputation    Low grade fever likely reactive due to ischemic changes:  -afebrile for days. -WBC likely due to muscle ischemia . Discussed with vascular who also said with on going ischemia the wbc is unlikely to come down. [place] [address] on 9/22/19 in setting of ischemic RLE  - serial daily CPK - trending down  - holding statins    Leukocytosis:likley from ischemic foot+rhabdomyolysis. No source of infection evident. Monitor . Discussed with vascular   -While the leukocytosis could be from the ischemia/compartment syndrome and rhabdomyolysis,she is also prone to 2ry infection thus I would like to see a good trend down in the CBC before discharge,      Acute on chronic diastolic CHF exacerbation with a preserved EF confirmed:lasix po   Demand ischemia with mildly elevated troponins due to the CHF exarcerbation. Indeterminable NYHA Functional Class due to the multiple Co-morbidities per Cardiology. Primary Hypertension   Dyslipidemia   Severe TVR  - 2D ECHO (9/12/19) with EF 70 percent. - Continue oxygen  - s/p IV diuresis,now on oral lasix. - statins on hold for above  - daily weight input/output monitoring  - CHF teaching.   - Cardiology and Heart failure team evals noted and appreciated. 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   -Nephrology eval noted and appreciated    Multifactorial Hyponatremia POA - stable   Resolved Hyperkalemia without any dysrhythmias. 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    - Holding  Aspirin, Plavix due to epistaxis;statins held  - Neurology eval noted and appreciated. Chronic Hypoxemic respiratory failure due to COPD with home oxygen at 3 l/min. - Nebulizers, inhalers, incentive spirometry. Anemia acute on chronic  - HB 6.8,no obvious source of blood loss  -Transfused 2 units 9/24. HB 9.6  -Discussed with Dr Ashley Hale is okay to continue with Apixaban. Uncontrolled IDDM type 2 DM with complications including hyperglycemia.   - Hold metformin. - Accucheck monitoring  - Basal and sliding scale insulins)HS) + Humalog 2 units ac tid  - diabetic teaching done    Osteoporosis - f/u as OP    Anxiety disorder and Depression without any current behavioral disturbances. Hypocalcemia  - replace as needed    Epistaxis: not active.  -Nasal saline,afrin. If develops profuse epistaxis,will consult ENT  -Antiplatelets/AC held for now while awaiting input from vascular surgery       Obesity   Body mass index is 33.82 kg/m². Prophylaxis: apixaban    Full Code with a guarded prognosis. - Palliative care consulted and notes appreciated    Plan: Anticipate patient will require Short-term Post-Acute care facility placement for ongoing PT/OT when stable and cleared by specialists for d/c. Patient's son Cezar Stroud is at [phone] if needed for updates. Hospital Problems  Date Reviewed: 9/20/2019          Codes Class Noted POA    * (Principal) CHF exacerbation ([facility] ICD-10-CM: I50.9  ICD-9-CM: 428.0  9/17/2019 Yes        Hyperkalemia ICD-10-CM: E87.5  ICD-9-CM: 276.7  9/17/2019 Yes        Hyponatremia ICD-10-CM: E87.1  ICD-9-CM: 276.1  9/17/2019 Yes        HOLLAND (acute kidney injury) ([facility] ICD-10-CM: N17.9  ICD-9-CM: 584.9  9/17/2019 Yes        Acute hypoxemic respiratory failure ([facility] ICD-10-CM: J96.01  ICD-9-CM: 518.81  8/8/2011 Yes        Debility ICD-10-CM: R53.81  ICD-9-CM: 799.3  6/28/2011 Yes              Physical Examination:      Last 24hrs VS reviewed since prior progress note.  Most recent are:  Visit Vitals  BP 115/66 (BP 1 Location: Left arm, BP Patient Position: At rest)   Pulse 87   Temp 98.4 °F (36.9 °C)   Resp 18   Ht 5' 5\" (1.651 m)   Wt 92.2 kg (203 lb 4.2 oz)   SpO2 98%   Breastfeeding? No   BMI 33.82 kg/m²           Constitutional:  No acute distress. HEENT: Head is a traumatic,  Un icteric sclera. Pink conjunctiva,no erythema or discharge. Oral mucous moist, oropharynx benign. Neck supple,   Fresh blood on both nostrils,no active flow    Resp:  wheezy b/l   CV:  S1,S2 present. (+) murmur TVR    GI:  Soft, non distended, non tender. normoactive bowel sounds, no hepatosplenomegaly    :  No CVA or suprapubic tenderness   Skin  :  No erythema,rash,bullae,dipigmentation . Cool RLE, no pulses below the knee and no sensitivity    Musculoskeletal:  RLE slightly bigger than LLE. Absent Dorsalis Pedis Pulse RLE. Power 0/5 RLE, 5/5 other extremities. Unable to palpate pulses LLE but LLE warm and pink. Neurologic:  Awake, alert and oriented. Absent sensation RLE. Intake/Output Summary (Last 24 hours) at 9/30/2019 1401  Last data filed at 9/30/2019 0216  Gross per 24 hour   Intake    Output 500 ml   Net -500 ml              Labs:     Recent Labs     09/30/19  0146 09/29/19  0935   WBC 19.9* 20.4*   HGB 8.0* 8.5*   HCT 26.3* 27.8*    317     Recent Labs     09/30/19  0146 09/29/19  0935 09/29/19  0226    134* 134*   K 4.1 3.9 4.2   CL 97 97 96*   CO2 33* 32 31   BUN 20 21* 21*   CREA 1.03* 0.97 1.10*   * 129* 153*   CA 8.7 8.8 8.8     No results for input(s): SGOT, GPT, ALT, AP, TBIL, TBILI, TP, ALB, GLOB, GGT, AML, LPSE in the last 72 hours. No lab exists for component: AMYP, HLPSE  No results for input(s): INR, PTP, APTT, INREXT, INREXT in the last 72 hours.   Lab Results   Component Value Date/Time    Cholesterol, total 144 09/17/2019 09:21 AM    HDL Cholesterol 55 09/17/2019 09:21 AM    LDL, calculated 72.6 09/17/2019 09:21 AM    Triglyceride 82 09/17/2019 09:21 AM    CHOL/HDL Ratio 2.6 09/17/2019 09:21 AM     Lab Results   Component Value Date/Time    Glucose (POC) 275 (H) 09/30/2019 11:38 AM    Glucose (POC) 127 (H) 09/30/2019 06:40 AM    Glucose (POC) 141 (H) 09/29/2019 09:27 PM    Glucose (POC) 191 (H) 09/29/2019 04:42 PM    Glucose (POC) 143 (H) 09/29/2019 11:18 AM     Lab Results   Component Value Date/Time    Color YELLOW/STRAW 09/17/2019 12:49 PM    Appearance CLEAR 09/17/2019 12:49 PM    Specific gravity 1.030 09/17/2019 12:49 PM    Specific gravity 1.010 12/18/2011 09:28 AM    pH (UA) 6.0 09/17/2019 12:49 PM    Protein NEGATIVE  09/17/2019 12:49 PM    Glucose NEGATIVE  09/17/2019 12:49 PM    Ketone NEGATIVE  09/17/2019 12:49 PM    Bilirubin NEGATIVE  09/17/2019 12:49 PM    Urobilinogen 0.2 09/17/2019 12:49 PM    Nitrites NEGATIVE  09/17/2019 12:49 PM    Leukocyte Esterase NEGATIVE  09/17/2019 12:49 PM    Epithelial cells FEW 09/17/2019 12:49 PM    Bacteria NEGATIVE  09/17/2019 12:49 PM    WBC 0-4 09/17/2019 12:49 PM    RBC 0-5 09/17/2019 12:49 PM         Medications Reviewed:     Current Facility-Administered Medications   Medication Dose Route Frequency    sodium chloride (OCEAN) 0.65 % nasal squeeze bottle 2 Spray  2 Spray Both Nostrils Q2H PRN    oxymetazoline (AFRIN) 0.05 % nasal spray 2 Spray  2 Spray Both Nostrils BID PRN    albuterol-ipratropium (DUO-NEB) 2.5 MG-0.5 MG/3 ML  3 mL Nebulization BID RT    furosemide (LASIX) tablet 40 mg  40 mg Oral DAILY    0.9% sodium chloride infusion 250 mL  250 mL IntraVENous PRN    0.9% sodium chloride infusion 250 mL  250 mL IntraVENous PRN    apixaban (ELIQUIS) tablet 5 mg  5 mg Oral Q12H    insulin lispro (HUMALOG) injection   SubCUTAneous AC&HS    glucose chewable tablet 16 g  4 Tab Oral PRN    glucagon (GLUCAGEN) injection 1 mg  1 mg IntraMUSCular PRN    dextrose 10% infusion 0-250 mL  0-250 mL IntraVENous PRN    insulin lispro (HUMALOG) injection 2 Units  2 Units SubCUTAneous TIDAC    albuterol-ipratropium (DUO-NEB) 2.5 MG-0.5 MG/3 ML  3 mL Nebulization Q4H PRN    alum-mag hydroxide-simeth (MYLANTA) oral suspension 30 mL  30 mL Oral Q4H PRN    famotidine (PEPCID) tablet 20 mg  20 mg Oral QPM    metoprolol tartrate (LOPRESSOR) tablet 25 mg  25 mg Oral BID    traMADol (ULTRAM) tablet 25 mg  25 mg Oral Q8H PRN    fentaNYL citrate (PF) injection 50 mcg  50 mcg IntraVENous Q3H PRN    insulin glargine (LANTUS) injection 15 Units  15 Units SubCUTAneous DAILY    ondansetron (ZOFRAN) injection 4 mg  4 mg IntraVENous Q6H PRN    sodium chloride (NS) flush 5-40 mL  5-40 mL IntraVENous Q8H    sodium chloride (NS) flush 5-40 mL  5-40 mL IntraVENous PRN    acetaminophen (TYLENOL) tablet 650 mg  650 mg Oral Q4H PRN    allopurinol (ZYLOPRIM) tablet 100 mg  100 mg Oral DAILY     ______________________________________________________________________  EXPECTED LENGTH OF STAY: 8d 9h  ACTUAL LENGTH OF STAY:          13  Time today is 38 min               Ra Jean-Baptiste MD

## 2019-09-30 NOTE — PROGRESS NOTES
Occupational Therapy  Attempted to see for weekly re-assessment however just finished sitting edge of bed with PT and declined any participation at this time. Of note she reports being on oxygen at home at baseline, however OT initial evaluation reports no oxygen at home. Will continue to follow.    Rodolfo Mauricio, OTR/L

## 2019-09-30 NOTE — PROGRESS NOTES
Physical Therapy  Chart reviewed for updates and attempted to see patient for PT treatment. Patient reports increased pain from \"just being cleaned up. \" Nurse present and provided medication. Will attempt to follow up later today.    Graciela Slaughter, PT, DPT

## 2019-10-01 LAB
GLUCOSE BLD STRIP.AUTO-MCNC: 143 MG/DL (ref 65–100)
GLUCOSE BLD STRIP.AUTO-MCNC: 168 MG/DL (ref 65–100)
GLUCOSE BLD STRIP.AUTO-MCNC: 200 MG/DL (ref 65–100)
GLUCOSE BLD STRIP.AUTO-MCNC: 254 MG/DL (ref 65–100)
SERVICE CMNT-IMP: ABNORMAL

## 2019-10-01 PROCEDURE — 74011250637 HC RX REV CODE- 250/637: Performed by: SURGERY

## 2019-10-01 PROCEDURE — 74011636637 HC RX REV CODE- 636/637: Performed by: SURGERY

## 2019-10-01 PROCEDURE — 74011636637 HC RX REV CODE- 636/637: Performed by: HOSPITALIST

## 2019-10-01 PROCEDURE — 74011250637 HC RX REV CODE- 250/637: Performed by: INTERNAL MEDICINE

## 2019-10-01 PROCEDURE — 65270000029 HC RM PRIVATE

## 2019-10-01 PROCEDURE — 74011250637 HC RX REV CODE- 250/637: Performed by: FAMILY MEDICINE

## 2019-10-01 PROCEDURE — 82962 GLUCOSE BLOOD TEST: CPT

## 2019-10-01 PROCEDURE — 94640 AIRWAY INHALATION TREATMENT: CPT

## 2019-10-01 PROCEDURE — 74011000250 HC RX REV CODE- 250: Performed by: HOSPITALIST

## 2019-10-01 RX ADMIN — Medication 10 ML: at 21:13

## 2019-10-01 RX ADMIN — APIXABAN 5 MG: 5 TABLET, FILM COATED ORAL at 11:31

## 2019-10-01 RX ADMIN — Medication 10 ML: at 06:50

## 2019-10-01 RX ADMIN — IPRATROPIUM BROMIDE AND ALBUTEROL SULFATE 3 ML: .5; 3 SOLUTION RESPIRATORY (INHALATION) at 08:57

## 2019-10-01 RX ADMIN — INSULIN LISPRO 3 UNITS: 100 INJECTION, SOLUTION INTRAVENOUS; SUBCUTANEOUS at 17:15

## 2019-10-01 RX ADMIN — FAMOTIDINE 20 MG: 20 TABLET ORAL at 17:16

## 2019-10-01 RX ADMIN — APIXABAN 5 MG: 5 TABLET, FILM COATED ORAL at 22:08

## 2019-10-01 RX ADMIN — METOPROLOL TARTRATE 25 MG: 25 TABLET ORAL at 11:31

## 2019-10-01 RX ADMIN — METOPROLOL TARTRATE 25 MG: 25 TABLET ORAL at 21:12

## 2019-10-01 RX ADMIN — TRAMADOL HYDROCHLORIDE 25 MG: 50 TABLET ORAL at 20:33

## 2019-10-01 RX ADMIN — INSULIN GLARGINE 15 UNITS: 100 INJECTION, SOLUTION SUBCUTANEOUS at 12:42

## 2019-10-01 RX ADMIN — ALLOPURINOL 100 MG: 100 TABLET ORAL at 11:31

## 2019-10-01 RX ADMIN — INSULIN LISPRO 2 UNITS: 100 INJECTION, SOLUTION INTRAVENOUS; SUBCUTANEOUS at 12:42

## 2019-10-01 RX ADMIN — INSULIN LISPRO 2 UNITS: 100 INJECTION, SOLUTION INTRAVENOUS; SUBCUTANEOUS at 06:50

## 2019-10-01 RX ADMIN — Medication 20 ML: at 14:00

## 2019-10-01 RX ADMIN — INSULIN LISPRO 1 UNITS: 100 INJECTION, SOLUTION INTRAVENOUS; SUBCUTANEOUS at 22:07

## 2019-10-01 RX ADMIN — TRAMADOL HYDROCHLORIDE 25 MG: 50 TABLET ORAL at 12:46

## 2019-10-01 RX ADMIN — FUROSEMIDE 40 MG: 40 TABLET ORAL at 11:31

## 2019-10-01 RX ADMIN — IPRATROPIUM BROMIDE AND ALBUTEROL SULFATE 3 ML: .5; 3 SOLUTION RESPIRATORY (INHALATION) at 20:51

## 2019-10-01 RX ADMIN — INSULIN LISPRO 2 UNITS: 100 INJECTION, SOLUTION INTRAVENOUS; SUBCUTANEOUS at 17:15

## 2019-10-01 NOTE — PROGRESS NOTES
Vascular Surgery  --no changes from yesterday; she denies complaints  --left message for son with his sister  --I think best course of action is discharge to SNF; she can follow up with me as outpt  --if worsens will eventually need and AKA

## 2019-10-01 NOTE — PROGRESS NOTES
Physical therapy:    Attempted PT session, however pt declined therapy at this time. Pt with minimal eye contact during conversation and appeared more withdrawn. Pt educated on benefits from mobility and encouraged some activity, but pt continued to decline. Will defer and continue to follow.  Thank you    Violetta Horn, PT, DPT

## 2019-10-01 NOTE — PROGRESS NOTES
Occupational Therapy: Cleared by nurse. Patient received in bed. Despite encouragement and explanation of why mobility is so important, patient repeatedly declined all services. This is third refusal over the past 5 days. Will make one more attempt, then discontinue if patient not willing to participate. Weekly reassessment due at next visit if patient will participate.   DENZEL Velasquez/LEVON

## 2019-10-02 ENCOUNTER — APPOINTMENT (OUTPATIENT)
Dept: GENERAL RADIOLOGY | Age: 84
DRG: 270 | End: 2019-10-02
Attending: HOSPITALIST
Payer: MEDICARE

## 2019-10-02 LAB
ANION GAP SERPL CALC-SCNC: 7 MMOL/L (ref 5–15)
BASOPHILS # BLD: 0 K/UL (ref 0–0.1)
BASOPHILS NFR BLD: 0 % (ref 0–1)
BUN SERPL-MCNC: 21 MG/DL (ref 6–20)
BUN/CREAT SERPL: 19 (ref 12–20)
CALCIUM SERPL-MCNC: 9 MG/DL (ref 8.5–10.1)
CHLORIDE SERPL-SCNC: 95 MMOL/L (ref 97–108)
CK SERPL-CCNC: 2087 U/L (ref 26–192)
CO2 SERPL-SCNC: 32 MMOL/L (ref 21–32)
CREAT SERPL-MCNC: 1.13 MG/DL (ref 0.55–1.02)
DIFFERENTIAL METHOD BLD: ABNORMAL
EOSINOPHIL # BLD: 0.2 K/UL (ref 0–0.4)
EOSINOPHIL NFR BLD: 1 % (ref 0–7)
ERYTHROCYTE [DISTWIDTH] IN BLOOD BY AUTOMATED COUNT: 16 % (ref 11.5–14.5)
GLUCOSE BLD STRIP.AUTO-MCNC: 144 MG/DL (ref 65–100)
GLUCOSE BLD STRIP.AUTO-MCNC: 206 MG/DL (ref 65–100)
GLUCOSE BLD STRIP.AUTO-MCNC: 238 MG/DL (ref 65–100)
GLUCOSE BLD STRIP.AUTO-MCNC: 289 MG/DL (ref 65–100)
GLUCOSE SERPL-MCNC: 141 MG/DL (ref 65–100)
HCT VFR BLD AUTO: 28.2 % (ref 35–47)
HGB BLD-MCNC: 8.6 G/DL (ref 11.5–16)
IMM GRANULOCYTES # BLD AUTO: 0 K/UL
IMM GRANULOCYTES NFR BLD AUTO: 0 %
LYMPHOCYTES # BLD: 0.8 K/UL (ref 0.8–3.5)
LYMPHOCYTES NFR BLD: 4 % (ref 12–49)
MCH RBC QN AUTO: 27.5 PG (ref 26–34)
MCHC RBC AUTO-ENTMCNC: 30.5 G/DL (ref 30–36.5)
MCV RBC AUTO: 90.1 FL (ref 80–99)
MONOCYTES # BLD: 1.1 K/UL (ref 0–1)
MONOCYTES NFR BLD: 6 % (ref 5–13)
MYELOCYTES NFR BLD MANUAL: 1 %
NEUTS SEG # BLD: 16.7 K/UL (ref 1.8–8)
NEUTS SEG NFR BLD: 88 % (ref 32–75)
NRBC # BLD: 0 K/UL (ref 0–0.01)
NRBC BLD-RTO: 0 PER 100 WBC
PLATELET # BLD AUTO: 385 K/UL (ref 150–400)
PLATELET COMMENTS,PCOM: ABNORMAL
PMV BLD AUTO: 9.8 FL (ref 8.9–12.9)
POTASSIUM SERPL-SCNC: 4.5 MMOL/L (ref 3.5–5.1)
RBC # BLD AUTO: 3.13 M/UL (ref 3.8–5.2)
RBC MORPH BLD: ABNORMAL
RBC MORPH BLD: ABNORMAL
SERVICE CMNT-IMP: ABNORMAL
SODIUM SERPL-SCNC: 134 MMOL/L (ref 136–145)
WBC # BLD AUTO: 19 K/UL (ref 3.6–11)

## 2019-10-02 PROCEDURE — 94640 AIRWAY INHALATION TREATMENT: CPT

## 2019-10-02 PROCEDURE — 74011250637 HC RX REV CODE- 250/637: Performed by: SURGERY

## 2019-10-02 PROCEDURE — 80048 BASIC METABOLIC PNL TOTAL CA: CPT

## 2019-10-02 PROCEDURE — 85025 COMPLETE CBC W/AUTO DIFF WBC: CPT

## 2019-10-02 PROCEDURE — 94664 DEMO&/EVAL PT USE INHALER: CPT

## 2019-10-02 PROCEDURE — 74011636637 HC RX REV CODE- 636/637: Performed by: SURGERY

## 2019-10-02 PROCEDURE — 94760 N-INVAS EAR/PLS OXIMETRY 1: CPT

## 2019-10-02 PROCEDURE — 82550 ASSAY OF CK (CPK): CPT

## 2019-10-02 PROCEDURE — 82962 GLUCOSE BLOOD TEST: CPT

## 2019-10-02 PROCEDURE — 36415 COLL VENOUS BLD VENIPUNCTURE: CPT

## 2019-10-02 PROCEDURE — 74011250637 HC RX REV CODE- 250/637: Performed by: INTERNAL MEDICINE

## 2019-10-02 PROCEDURE — 74011000250 HC RX REV CODE- 250: Performed by: HOSPITALIST

## 2019-10-02 PROCEDURE — 74011250637 HC RX REV CODE- 250/637: Performed by: FAMILY MEDICINE

## 2019-10-02 PROCEDURE — 74011636637 HC RX REV CODE- 636/637: Performed by: HOSPITALIST

## 2019-10-02 PROCEDURE — 77010033678 HC OXYGEN DAILY

## 2019-10-02 PROCEDURE — 73630 X-RAY EXAM OF FOOT: CPT

## 2019-10-02 PROCEDURE — 65270000029 HC RM PRIVATE

## 2019-10-02 RX ORDER — INSULIN LISPRO 100 [IU]/ML
4 INJECTION, SOLUTION INTRAVENOUS; SUBCUTANEOUS
Status: DISCONTINUED | OUTPATIENT
Start: 2019-10-03 | End: 2019-10-23 | Stop reason: HOSPADM

## 2019-10-02 RX ADMIN — METOPROLOL TARTRATE 25 MG: 25 TABLET ORAL at 10:52

## 2019-10-02 RX ADMIN — METOPROLOL TARTRATE 25 MG: 25 TABLET ORAL at 21:45

## 2019-10-02 RX ADMIN — FUROSEMIDE 40 MG: 40 TABLET ORAL at 10:51

## 2019-10-02 RX ADMIN — TRAMADOL HYDROCHLORIDE 25 MG: 50 TABLET ORAL at 22:33

## 2019-10-02 RX ADMIN — APIXABAN 5 MG: 5 TABLET, FILM COATED ORAL at 10:52

## 2019-10-02 RX ADMIN — Medication 10 ML: at 06:00

## 2019-10-02 RX ADMIN — INSULIN LISPRO 3 UNITS: 100 INJECTION, SOLUTION INTRAVENOUS; SUBCUTANEOUS at 12:02

## 2019-10-02 RX ADMIN — IPRATROPIUM BROMIDE AND ALBUTEROL SULFATE 3 ML: .5; 3 SOLUTION RESPIRATORY (INHALATION) at 20:42

## 2019-10-02 RX ADMIN — ALLOPURINOL 100 MG: 100 TABLET ORAL at 10:52

## 2019-10-02 RX ADMIN — INSULIN GLARGINE 15 UNITS: 100 INJECTION, SOLUTION SUBCUTANEOUS at 11:09

## 2019-10-02 RX ADMIN — APIXABAN 5 MG: 5 TABLET, FILM COATED ORAL at 22:33

## 2019-10-02 RX ADMIN — Medication 10 ML: at 21:45

## 2019-10-02 RX ADMIN — FAMOTIDINE 20 MG: 20 TABLET ORAL at 19:19

## 2019-10-02 RX ADMIN — TRAMADOL HYDROCHLORIDE 25 MG: 50 TABLET ORAL at 10:51

## 2019-10-02 RX ADMIN — INSULIN LISPRO 2 UNITS: 100 INJECTION, SOLUTION INTRAVENOUS; SUBCUTANEOUS at 17:27

## 2019-10-02 RX ADMIN — INSULIN LISPRO 1 UNITS: 100 INJECTION, SOLUTION INTRAVENOUS; SUBCUTANEOUS at 21:45

## 2019-10-02 RX ADMIN — Medication 10 ML: at 14:57

## 2019-10-02 RX ADMIN — INSULIN LISPRO 2 UNITS: 100 INJECTION, SOLUTION INTRAVENOUS; SUBCUTANEOUS at 12:01

## 2019-10-02 RX ADMIN — IPRATROPIUM BROMIDE AND ALBUTEROL SULFATE 3 ML: .5; 3 SOLUTION RESPIRATORY (INHALATION) at 07:09

## 2019-10-02 NOTE — WOUND CARE
ROSALIO Note:  
  
Follow up for right lower extremity / ankle area popped blister. 
  
Chart shows: 
Admitted for CHF exacerbation, debility, respiratory failure, HOLLAND, hyperkalemia and hyponatremia.; history of : DM, HTN, Breast Cancer,anemia, vein disorder, bronchitis, anxiety, AS, CHF, SOB. WBC = 17.8 On = 9-25-19 Admitted from home. 
  
Assessment:  
Patient is A&O x 3, family member at bedside, communicative, incontinent and not mobile with assessment. Need 2 assist to lift up in bed and reposition. Patient with pure wick for incontinence. Bed: Versa Care Bed Diet: DM consistent carb. Patient reports no pain with turning and repositioning. 
  
Bilateral heels, buttocks  and sacral skin intact and without erythema. Heels offloaded on pillows. Right posterior lower leg ankle area: blister resolved. 9-21: thrombectomy and embolectomy/ ischemic right leg with 4 compartment fasciotomy. Dr. Severiano Ruffini: today note: no changes, ? Discharge to SNF, follow up out patient and if if leg worsens plan AKA. 
 
 
  
Recommendations:   
-blister resolved. Plan to sign off to Dr. Severiano Ruffini who is managing right lower extremity. . 
  
Minimize layers of linen/pads under patient to optimize support surface. Turn/reposition approximately every 2 hours and offload heels. Manage incontinence / promote continence; Hydraguard to buttocks and sacrum daily and as needed with incontinence care. Specialty bed: Total Care Bed Discussed above plan with patient and RN. 
  
Transition of Care:  sign off re consult if needed. Marina BELTRAN RN Wound Care Department Office: [phone] Pager: [phone]

## 2019-10-02 NOTE — PROGRESS NOTES
Palliative Medicine Social Work    Spoke with patient son on phone. Meeting planned for Friday 11 am              Thank you for the opportunity to be involved in the care of Ms. Cosme and her family.     Felice Scheuermann, LMSW, Supervisee in Social Work  Palliative Medicine   [phone]

## 2019-10-02 NOTE — PROGRESS NOTES
Hospitalist Progress Note                               Elmer Arteaga MD                                     Answering service: [phone] OR 36 from in house phone           Date of Service:  10/1/2019  NAME:  Segundo Thomas  :  1931  MRN:  [id number]      Admission Summary:   80-year-old female with an extensive past medical history including chronic hypoxemic respiratory failure with home oxygen, COPD, chronic kidney disease, type 2 diabetes mellitus, atrial fibrillation, diastolic congestive heart failure, dyslipidemia, primary hypertension, osteoporosis, pulmonary hypertension, depression and anxiety disorder, was brought to the emergency room from home for evaluation of an approximately 2-3 day history of worsening shortness of breath. Per the patient's son, the patient ambulates unaided at baseline; however, in the 2-3 days leading up to the emergency room presentation, noted to have worsening shortness of breath even with easy activity. The patient also complained of some numbness and weakness in the right lower extremity. The patient admitted to being compliant with all her medical therapies. The patient denied associated headaches, dizziness, visual deficits, chest pains, palpitations, nausea, vomiting, cough, sputum production, fevers, chills, abdominal pain, bladder or bowel irregularities. Reason for follow up:   Right leg ischemia. Patient states that she has RLE pain. Reviewed PT/OT note -she is refusing therapy. Assessment & Plan:     Acute thrombus at the mid and distal right popliteal level RLE    - S/p RLE thrombectomy , found residual clot in popliteal artery, s/p repeat thrombectomy 19 per vascular surgery, now with significant muscle ischemia right calf and will likley have chronic foot drop on right. - R foot drop boot  -She developed epistaxis on aspirin,plavix and Apixaban. Dr Conrado Marks recommending holding asa and Plavix indefinitely and resumed Aixaban.  -Per vascular surgery, can hold off amputation for now and recommended OP follow up. Low grade fever likely reactive due to ischemic changes:resolved  -afebrile for days. -WBC likely due to muscle ischemia . Discussed with vascular who also said with on going ischemia the wbc is unlikely to come down. [place] [address] on 9/22/19 in setting of ischemic RLE  - serial daily CPK - trending down  - holding statins    Leukocytosis:likley from ischemic foot+rhabdomyolysis. No source of infection evident. Monitor . She is afebrile        Acute on chronic diastolic CHF exacerbation with a preserved EF confirmed:lasix po   Demand ischemia with mildly elevated troponins due to the CHF exarcerbation. Indeterminable NYHA Functional Class due to the multiple Co-morbidities per Cardiology. Primary Hypertension   Dyslipidemia   Severe TVR  - 2D ECHO (9/12/19) with EF 70 percent. - Continue oxygen  - s/p IV diuresis,now on oral lasix. - statins on hold for above  - daily weight input/output monitoring  - CHF teaching.   - Cardiology and Heart failure team evals noted and appreciated. 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   -Nephrology eval noted and appreciated    Multifactorial Hyponatremia POA-resolved  Resolved Hyperkalemia    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 - Holding  Aspirin, Plavix due to epistaxis;statins held  - Neurology eval noted and appreciated. Chronic Hypoxemic respiratory failure due to COPD with home oxygen at 3 l/min. - Nebulizers, inhalers, incentive spirometry.     Anemia acute on chronic  - HB 6.8,no obvious source of blood loss  -Transfused 2 units 9/24. HB 9.6  -Per previous hospitalist, Dr Mary Echeverria is okay to continue with Apixaban. Uncontrolled IDDM type 2 DM with complications including hyperglycemia.   - Hold metformin. - Accucheck monitoring  - Basal and sliding scale insulins)HS) + Humalog 2 units ac tid  - diabetic teaching done    Osteoporosis - f/u as OP    Anxiety disorder and Depression without any current behavioral disturbances. Hypocalcemia-resolved    Epistaxis: resolved. -Antiplatelets held now      Obesity   Body mass index is 34.16 kg/m². Prophylaxis: apixaban    Full Code with a guarded prognosis. - Palliative care consulted again  today    Plan: TBD    Patient not participating in therapy    . Patient's son Aruna Leonard is at [phone] if needed for updates. Hospital Problems  Date Reviewed: 9/20/2019          Codes Class Noted POA    * (Principal) CHF exacerbation ([facility] ICD-10-CM: I50.9  ICD-9-CM: 428.0  9/17/2019 Yes        Hyperkalemia ICD-10-CM: E87.5  ICD-9-CM: 276.7  9/17/2019 Yes        Hyponatremia ICD-10-CM: E87.1  ICD-9-CM: 276.1  9/17/2019 Yes        HOLLAND (acute kidney injury) ([facility] ICD-10-CM: N17.9  ICD-9-CM: 584.9  9/17/2019 Yes        Acute hypoxemic respiratory failure ([facility] ICD-10-CM: J96.01  ICD-9-CM: 518.81  8/8/2011 Yes        Debility ICD-10-CM: R53.81  ICD-9-CM: 799.3  6/28/2011 Yes              Physical Examination:      Last 24hrs VS reviewed since prior progress note. Most recent are:  Visit Vitals  BP 99/51   Pulse 89   Temp 98.7 °F (37.1 °C)   Resp 18   Ht 5' 5\" (1.651 m)   Wt 93.1 kg (205 lb 4 oz)   SpO2 100%   Breastfeeding? No   BMI 34.16 kg/m²           Constitutional:  No acute distress. HEENT: Head is atraumatic,EOMI  Un icteric sclera. Oral mucous moist, oropharynx benign. Neck supple,      Resp:  CTA b/l   CV:  S1,S2 present. (+) murmur     GI:  Soft, non distended, non tender.  normoactive bowel sounds,    :  No CVA or suprapubic tenderness   Skin  :  Dry dressing and staples on RLE . Cool RLE, no pulses below the knee and dusky great toe    Musculoskeletal:  RLE slightly bigger than LLE. Absent Dorsalis Pedis Pulse RLE. Power 0/5 RLE, 5/5 other extremities. Unable to palpate pulses LLE but LLE warm and pink. Neurologic:  Awake, alert and oriented. Intake/Output Summary (Last 24 hours) at 10/1/2019 2158  Last data filed at 10/1/2019 0651  Gross per 24 hour   Intake    Output 500 ml   Net -500 ml              Labs:     Recent Labs     09/30/19 0146 09/29/19 0935   WBC 19.9* 20.4*   HGB 8.0* 8.5*   HCT 26.3* 27.8*    317     Recent Labs     09/30/19 0146 09/29/19 0935 09/29/19  0226    134* 134*   K 4.1 3.9 4.2   CL 97 97 96*   CO2 33* 32 31   BUN 20 21* 21*   CREA 1.03* 0.97 1.10*   * 129* 153*   CA 8.7 8.8 8.8     No results for input(s): SGOT, GPT, ALT, AP, TBIL, TBILI, TP, ALB, GLOB, GGT, AML, LPSE in the last 72 hours. No lab exists for component: AMYP, HLPSE  No results for input(s): INR, PTP, APTT, INREXT, INREXT in the last 72 hours.   Lab Results   Component Value Date/Time    Cholesterol, total 144 09/17/2019 09:21 AM    HDL Cholesterol 55 09/17/2019 09:21 AM    LDL, calculated 72.6 09/17/2019 09:21 AM    Triglyceride 82 09/17/2019 09:21 AM    CHOL/HDL Ratio 2.6 09/17/2019 09:21 AM     Lab Results   Component Value Date/Time    Glucose (POC) 200 (H) 10/01/2019 08:40 PM    Glucose (POC) 254 (H) 10/01/2019 04:33 PM    Glucose (POC) 168 (H) 10/01/2019 11:11 AM    Glucose (POC) 143 (H) 10/01/2019 06:25 AM    Glucose (POC) 191 (H) 09/30/2019 09:40 PM     Lab Results   Component Value Date/Time    Color YELLOW/STRAW 09/17/2019 12:49 PM    Appearance CLEAR 09/17/2019 12:49 PM    Specific gravity 1.030 09/17/2019 12:49 PM    Specific gravity 1.010 12/18/2011 09:28 AM    pH (UA) 6.0 09/17/2019 12:49 PM    Protein NEGATIVE  09/17/2019 12:49 PM    Glucose NEGATIVE  09/17/2019 12:49 PM    Ketone NEGATIVE  09/17/2019 12:49 PM    Bilirubin NEGATIVE  09/17/2019 12:49 PM    Urobilinogen 0.2 09/17/2019 12:49 PM    Nitrites NEGATIVE  09/17/2019 12:49 PM    Leukocyte Esterase NEGATIVE  09/17/2019 12:49 PM    Epithelial cells FEW 09/17/2019 12:49 PM    Bacteria NEGATIVE  09/17/2019 12:49 PM    WBC 0-4 09/17/2019 12:49 PM    RBC 0-5 09/17/2019 12:49 PM         Medications Reviewed:     Current Facility-Administered Medications   Medication Dose Route Frequency    sodium chloride (OCEAN) 0.65 % nasal squeeze bottle 2 Spray  2 Spray Both Nostrils Q2H PRN    oxymetazoline (AFRIN) 0.05 % nasal spray 2 Spray  2 Spray Both Nostrils BID PRN    albuterol-ipratropium (DUO-NEB) 2.5 MG-0.5 MG/3 ML  3 mL Nebulization BID RT    furosemide (LASIX) tablet 40 mg  40 mg Oral DAILY    0.9% sodium chloride infusion 250 mL  250 mL IntraVENous PRN    0.9% sodium chloride infusion 250 mL  250 mL IntraVENous PRN    apixaban (ELIQUIS) tablet 5 mg  5 mg Oral Q12H    insulin lispro (HUMALOG) injection   SubCUTAneous AC&HS    glucose chewable tablet 16 g  4 Tab Oral PRN    glucagon (GLUCAGEN) injection 1 mg  1 mg IntraMUSCular PRN    dextrose 10% infusion 0-250 mL  0-250 mL IntraVENous PRN    insulin lispro (HUMALOG) injection 2 Units  2 Units SubCUTAneous TIDAC    albuterol-ipratropium (DUO-NEB) 2.5 MG-0.5 MG/3 ML  3 mL Nebulization Q4H PRN    alum-mag hydroxide-simeth (MYLANTA) oral suspension 30 mL  30 mL Oral Q4H PRN    famotidine (PEPCID) tablet 20 mg  20 mg Oral QPM    metoprolol tartrate (LOPRESSOR) tablet 25 mg  25 mg Oral BID    traMADol (ULTRAM) tablet 25 mg  25 mg Oral Q8H PRN    fentaNYL citrate (PF) injection 50 mcg  50 mcg IntraVENous Q3H PRN    insulin glargine (LANTUS) injection 15 Units  15 Units SubCUTAneous DAILY    ondansetron (ZOFRAN) injection 4 mg  4 mg IntraVENous Q6H PRN    sodium chloride (NS) flush 5-40 mL  5-40 mL IntraVENous Q8H    sodium chloride (NS) flush 5-40 mL  5-40 mL IntraVENous PRN    acetaminophen (TYLENOL) tablet 650 mg 650 mg Oral Q4H PRN    allopurinol (ZYLOPRIM) tablet 100 mg  100 mg Oral DAILY     ______________________________________________________________________  EXPECTED LENGTH OF STAY: 8d 9h  ACTUAL LENGTH OF STAY:          14  Time today is 38 min               Kera Causey MD

## 2019-10-02 NOTE — DIABETES MGMT
[facility]    DTC Consult Follow Up Note    Recommendations/ Comments: Chart reviewed on [facility] for hyperglycemia, especially post meals. If appropriate, please consider increasing meal time Lispro to 4 unit ac tid. Current hospital DM medication: lispro correction scale -high sensitivity and Lantus 15 units and Humalog 2 units ac tid     Patient is a 80 y.o. female with known DM on Lantus 30-40 units HS depending on BG and 1000 mg Metformin BID at home. A1c:   Lab Results   Component Value Date/Time    Hemoglobin A1c 8.8 (H) 09/17/2019 09:21 AM    Hemoglobin A1c 7.0 (H) 02/08/2017 09:59 AM       Recent Glucose Results:   Lab Results   Component Value Date/Time     (H) 10/02/2019 07:54 AM    GLUCPOC 289 (H) 10/02/2019 11:20 AM    GLUCPOC 144 (H) 10/02/2019 06:42 AM    GLUCPOC 200 (H) 10/01/2019 08:40 PM        Lab Results   Component Value Date/Time    Creatinine 1.13 (H) 10/02/2019 07:54 AM     Estimated Creatinine Clearance: 38.1 mL/min (A) (based on SCr of 1.13 mg/dL (H)). Active Orders   Diet    DIET DIABETIC CONSISTENT CARB Regular        PO intake:   No data found. Will continue to follow as needed.     Thank you  Audi Ramirez RD, CDE      Time spent: 4 minutes

## 2019-10-02 NOTE — PROGRESS NOTES
Physical Therapy  Second attempt made for participation in skilled PT services,  Pt resting in bed and DTR at bedside. DTR requesting pt be let to sleep at this time. DTR states pt has not been resting well. Informed DTR of attempts earlier to participate. DTR continues to defer services for pt at this time. Will f/u later in week as appropriate.   Thank you,  Kirti Blake, PT, DPT

## 2019-10-02 NOTE — PROGRESS NOTES
[facility]- awaiting auth from [employer]-approval doesn't look favorable per patient has not been participating in PT & OT.  - [facility] is on will call  -UAI to be complete prior to admission to SNF. CM spoke with Gerome Holter at [facility] who advised to send more PT/OT notes if patient participates. CM discussed discharge plan with hospitalist. Per patient is not participating in PT/OT, auth likely to be denied. CM continuing to monitor for updates/disposition and following up with patient and family to discuss the plan.      Willy Sinha, MHA/CRM

## 2019-10-03 LAB
ANION GAP SERPL CALC-SCNC: 7 MMOL/L (ref 5–15)
BASOPHILS # BLD: 0.3 K/UL (ref 0–0.1)
BASOPHILS NFR BLD: 2 % (ref 0–1)
BUN SERPL-MCNC: 21 MG/DL (ref 6–20)
BUN/CREAT SERPL: 20 (ref 12–20)
CALCIUM SERPL-MCNC: 8.3 MG/DL (ref 8.5–10.1)
CHLORIDE SERPL-SCNC: 95 MMOL/L (ref 97–108)
CK SERPL-CCNC: 1805 U/L (ref 26–192)
CO2 SERPL-SCNC: 32 MMOL/L (ref 21–32)
CREAT SERPL-MCNC: 1.05 MG/DL (ref 0.55–1.02)
DIFFERENTIAL METHOD BLD: ABNORMAL
EOSINOPHIL # BLD: 0 K/UL (ref 0–0.4)
EOSINOPHIL NFR BLD: 0 % (ref 0–7)
ERYTHROCYTE [DISTWIDTH] IN BLOOD BY AUTOMATED COUNT: 16.1 % (ref 11.5–14.5)
GLUCOSE BLD STRIP.AUTO-MCNC: 142 MG/DL (ref 65–100)
GLUCOSE BLD STRIP.AUTO-MCNC: 165 MG/DL (ref 65–100)
GLUCOSE BLD STRIP.AUTO-MCNC: 219 MG/DL (ref 65–100)
GLUCOSE BLD STRIP.AUTO-MCNC: 229 MG/DL (ref 65–100)
GLUCOSE SERPL-MCNC: 152 MG/DL (ref 65–100)
HCT VFR BLD AUTO: 25.6 % (ref 35–47)
HGB BLD-MCNC: 7.7 G/DL (ref 11.5–16)
IMM GRANULOCYTES # BLD AUTO: 0 K/UL
IMM GRANULOCYTES NFR BLD AUTO: 0 %
LYMPHOCYTES # BLD: 0.3 K/UL (ref 0.8–3.5)
LYMPHOCYTES NFR BLD: 2 % (ref 12–49)
MCH RBC QN AUTO: 27.1 PG (ref 26–34)
MCHC RBC AUTO-ENTMCNC: 30.1 G/DL (ref 30–36.5)
MCV RBC AUTO: 90.1 FL (ref 80–99)
MONOCYTES # BLD: 1.9 K/UL (ref 0–1)
MONOCYTES NFR BLD: 11 % (ref 5–13)
NEUTS BAND NFR BLD MANUAL: 5 % (ref 0–6)
NEUTS SEG # BLD: 14.9 K/UL (ref 1.8–8)
NEUTS SEG NFR BLD: 80 % (ref 32–75)
NRBC # BLD: 0 K/UL (ref 0–0.01)
NRBC BLD-RTO: 0 PER 100 WBC
PLATELET # BLD AUTO: 384 K/UL (ref 150–400)
PMV BLD AUTO: 9.7 FL (ref 8.9–12.9)
POTASSIUM SERPL-SCNC: 4.6 MMOL/L (ref 3.5–5.1)
RBC # BLD AUTO: 2.84 M/UL (ref 3.8–5.2)
RBC MORPH BLD: ABNORMAL
SERVICE CMNT-IMP: ABNORMAL
SODIUM SERPL-SCNC: 134 MMOL/L (ref 136–145)
WBC # BLD AUTO: 17.4 K/UL (ref 3.6–11)

## 2019-10-03 PROCEDURE — 36415 COLL VENOUS BLD VENIPUNCTURE: CPT

## 2019-10-03 PROCEDURE — 82550 ASSAY OF CK (CPK): CPT

## 2019-10-03 PROCEDURE — 80048 BASIC METABOLIC PNL TOTAL CA: CPT

## 2019-10-03 PROCEDURE — 74011000250 HC RX REV CODE- 250: Performed by: HOSPITALIST

## 2019-10-03 PROCEDURE — 74011250637 HC RX REV CODE- 250/637: Performed by: INTERNAL MEDICINE

## 2019-10-03 PROCEDURE — 74011250637 HC RX REV CODE- 250/637: Performed by: SURGERY

## 2019-10-03 PROCEDURE — 74011636637 HC RX REV CODE- 636/637: Performed by: SURGERY

## 2019-10-03 PROCEDURE — 74011250637 HC RX REV CODE- 250/637: Performed by: FAMILY MEDICINE

## 2019-10-03 PROCEDURE — 65270000029 HC RM PRIVATE

## 2019-10-03 PROCEDURE — 82962 GLUCOSE BLOOD TEST: CPT

## 2019-10-03 PROCEDURE — 94640 AIRWAY INHALATION TREATMENT: CPT

## 2019-10-03 PROCEDURE — 77010033678 HC OXYGEN DAILY

## 2019-10-03 PROCEDURE — 74011636637 HC RX REV CODE- 636/637: Performed by: HOSPITALIST

## 2019-10-03 PROCEDURE — 97530 THERAPEUTIC ACTIVITIES: CPT

## 2019-10-03 PROCEDURE — 85025 COMPLETE CBC W/AUTO DIFF WBC: CPT

## 2019-10-03 RX ADMIN — INSULIN LISPRO 4 UNITS: 100 INJECTION, SOLUTION INTRAVENOUS; SUBCUTANEOUS at 07:12

## 2019-10-03 RX ADMIN — Medication 10 ML: at 21:16

## 2019-10-03 RX ADMIN — INSULIN LISPRO 2 UNITS: 100 INJECTION, SOLUTION INTRAVENOUS; SUBCUTANEOUS at 17:30

## 2019-10-03 RX ADMIN — FUROSEMIDE 40 MG: 40 TABLET ORAL at 09:10

## 2019-10-03 RX ADMIN — INSULIN GLARGINE 15 UNITS: 100 INJECTION, SOLUTION SUBCUTANEOUS at 09:11

## 2019-10-03 RX ADMIN — APIXABAN 5 MG: 5 TABLET, FILM COATED ORAL at 12:20

## 2019-10-03 RX ADMIN — TRAMADOL HYDROCHLORIDE 25 MG: 50 TABLET ORAL at 07:12

## 2019-10-03 RX ADMIN — FAMOTIDINE 20 MG: 20 TABLET ORAL at 17:30

## 2019-10-03 RX ADMIN — ACETAMINOPHEN 650 MG: 325 TABLET, FILM COATED ORAL at 05:39

## 2019-10-03 RX ADMIN — METOPROLOL TARTRATE 25 MG: 25 TABLET ORAL at 09:10

## 2019-10-03 RX ADMIN — ACETAMINOPHEN 650 MG: 325 TABLET, FILM COATED ORAL at 21:08

## 2019-10-03 RX ADMIN — INSULIN LISPRO 4 UNITS: 100 INJECTION, SOLUTION INTRAVENOUS; SUBCUTANEOUS at 17:30

## 2019-10-03 RX ADMIN — IPRATROPIUM BROMIDE AND ALBUTEROL SULFATE 3 ML: .5; 3 SOLUTION RESPIRATORY (INHALATION) at 08:38

## 2019-10-03 RX ADMIN — IPRATROPIUM BROMIDE AND ALBUTEROL SULFATE 3 ML: .5; 3 SOLUTION RESPIRATORY (INHALATION) at 20:26

## 2019-10-03 RX ADMIN — INSULIN LISPRO 2 UNITS: 100 INJECTION, SOLUTION INTRAVENOUS; SUBCUTANEOUS at 12:24

## 2019-10-03 RX ADMIN — METOPROLOL TARTRATE 25 MG: 25 TABLET ORAL at 21:17

## 2019-10-03 RX ADMIN — INSULIN LISPRO 4 UNITS: 100 INJECTION, SOLUTION INTRAVENOUS; SUBCUTANEOUS at 12:24

## 2019-10-03 RX ADMIN — ALLOPURINOL 100 MG: 100 TABLET ORAL at 09:10

## 2019-10-03 RX ADMIN — APIXABAN 5 MG: 5 TABLET, FILM COATED ORAL at 23:34

## 2019-10-03 NOTE — DIABETES MGMT
[facility]    DTC Progress Note    Recommendations/ Comments: Chart reviewed for hyperglycemia. FBG ranging 144-165 mg/dl the past 48 hours. Note AC lispro has been increased from 2 to 4 units. Will follow. Current hospital DM medication: Lantus 15 units  AC lispro 4 units TID  Lispro correction     Chart reviewed on [facility]. Patient is a 80 y.o. female with known DM on 30-40 units of Lantus and Metformin at home. A1c:   Lab Results   Component Value Date/Time    Hemoglobin A1c 8.8 (H) 09/17/2019 09:21 AM    Hemoglobin A1c 7.0 (H) 02/08/2017 09:59 AM       Recent Glucose Results:   Lab Results   Component Value Date/Time     (H) 10/03/2019 02:43 AM    GLUCPOC 219 (H) 10/03/2019 04:17 PM    GLUCPOC 229 (H) 10/03/2019 12:07 PM    GLUCPOC 165 (H) 10/03/2019 06:36 AM        Lab Results   Component Value Date/Time    Creatinine 1.05 (H) 10/03/2019 02:43 AM     Estimated Creatinine Clearance: 40.4 mL/min (A) (based on SCr of 1.05 mg/dL (H)). Active Orders   Diet    DIET DIABETIC CONSISTENT CARB Regular        PO intake: No data found. Will continue to follow as needed.     Thank you  Elijah Mancia RD, Diabetes Clinician         Time spent: 4 minutes

## 2019-10-03 NOTE — PROGRESS NOTES
Problem: Mobility Impaired (Adult and Pediatric)  Goal: *Acute Goals and Plan of Care (Insert Text)  Description  FUNCTIONAL STATUS PRIOR TO ADMISSION: Patient was independent for all functional mobility. Patient uses 2L/min O2 via nasal cannula at baseline. HOME SUPPORT PRIOR TO ADMISSION: The patient lived with her son and daughter but did not require assist.    Physical Therapy Goals  Reassessed 9/27/19    1. Patient will move from supine to sit and sit to supine  in bed with modified independence within 7 day(s). 2.  Patient will transfer from bed to chair and chair to bed with moderate assistance  using the least restrictive device within 7 day(s). 3.  Patient will perform sit to stand with moderate assistance  within 7 day(s). 4.  Patient will ambulate with moderate assistance  for 15 feet with the least restrictive device within 7 day(s). Initiated 9/18/2019  1. Patient will move from supine to sit and sit to supine  in bed with independence within 7 day(s). 2.  Patient will transfer from bed to chair and chair to bed with independence using the least restrictive device within 7 day(s). 3.  Patient will perform sit to stand with independence within 7 day(s). 4.  Patient will ambulate with independence for 150 feet with the least restrictive device within 7 day(s). 5.  Patient will ascend/descend 1 stairs with 1 handrail(s) with modified independence within 7 day(s). Outcome: Progressing Towards Goal   PHYSICAL THERAPY TREATMENT  Patient: Antonino Hale (32 y.o. female)  Date: 10/3/2019  Diagnosis: CHF exacerbation ([facility] [I50.9] CHF exacerbation ([facility]  Procedure(s) (LRB):  THROMBECTOMY/EMBOLECTOMY LOWER  RIGHT LEG. FASCIOTOMY (Right) 12 Days Post-Op  Precautions: Fall  Chart, physical therapy assessment, plan of care and goals were reviewed. ASSESSMENT  Patient continues with skilled PT services and is progressing towards goals.  Today patient cooperative and pleasant participating in session. She did state she needs to go slow and does have pain but with education on use of walker for transfers/gait was able to get up to chair and take a few steps using RW. Practiced sit to stand as she has tendency to want to pull up on walker. Feel she is safe to transfer with staff to use [facility] and get to chair for meals. She will benefit from SNF/rehab as she has been minimally participating up until now and is below her baseline of independent. She states she wants to get better to return home. Current Level of Function Impacting Discharge (mobility/balance): not ambulating functionally at this time, min to mod assist  to stand. Other factors to consider for discharge: was independent prior to admission         PLAN :  Patient continues to benefit from skilled intervention to address the above impairments. Continue treatment per established plan of care. to address goals. Recommendation for discharge: (in order for the patient to meet his/her long term goals)  Therapy up to 5 days/week in SNF setting; if unable to go to SNF, intensive home health     This discharge recommendation:  Has been made in collaboration with the attending provider and/or case management    Equipment recommendations for successful discharge (if) home: to be determined by rehab facility:if home, RW, BSC       SUBJECTIVE:   Patient stated Of course I'd rather be out of bed. Isabela Foster    OBJECTIVE DATA SUMMARY:   Critical Behavior:  Neurologic State: Alert, Appropriate for age  Orientation Level: Oriented X4, Other (Comment)(some confusion about plan)  Cognition: Appropriate decision making, Appropriate for age attention/concentration, Appropriate safety awareness, Follows commands  Safety/Judgement: Awareness of environment, Fall prevention  Functional Mobility Training:  Bed Mobility:     Supine to Sit: Modified independent     Scooting: Stand-by assistance        Transfers:  Sit to Stand: Minimum assistance;Assist x2  Stand to Sit: Minimum assistance        Bed to Chair: Minimum assistance                    Balance:  Sitting: Intact  Standing: Impaired; With support  Standing - Static: Fair  Standing - Dynamic : Fair  Ambulation/Gait Training:  Distance (ft): 2 Feet (ft)  Assistive Device: Gait belt;Walker, rolling  Ambulation - Level of Assistance: Minimal assistance; Additional time        Gait Abnormalities: Antalgic;Decreased step clearance        Base of Support: Widened  Stance: Right decreased  Speed/Chana: Delayed;Pace decreased (<100 feet/min)  Step Length: Right shortened;Left shortened      After treatment patient left in no apparent distress:   Sitting in chair and Call bell within reach    COMMUNICATION/COLLABORATION:   The patients plan of care was discussed with: Registered Nurse    Dmitriy Kuhn, PT   Time Calculation: 19 mins

## 2019-10-03 NOTE — PROGRESS NOTES
NUTRITION  Pt seen for:      [x]           Rescreen         RECOMMENDATIONS:   Continue current diet and supplements    SUBJECTIVE/OBJECTIVE:   Information obtained from:   Pt, EHR    Continues to have poor appetite/ fair PO. Refusing to work with therapy      Recent Labs     10/03/19  0243 10/02/19  0754   * 141*   BUN 21* 21*   CREA 1.05* 1.13*   * 134*   K 4.6 4.5   CL 95* 95*   CO2 32 32   CA 8.3* 9.0       No results for input(s): SGOT, GPT, ALT, AP, TBIL, TBILI, TP, ALB, GLOB, GGT, AML, LPSE in the last 72 hours. No lab exists for component: AMYP, HLPSE    No results for input(s): LAC in the last 72 hours. Recent Labs     10/03/19  0243 10/02/19  0756   WBC 17.4* 19.0*   HGB 7.7* 8.6*   HCT 25.6* 28.2*    385       No results for input(s): PREALB in the last 72 hours. No results for input(s): TRIGL in the last 72 hours. Recent Labs     10/03/19  1617 10/03/19  1207 10/03/19  0636 10/02/19  2055 10/02/19  1714 10/02/19  1120 10/02/19  0642 10/01/19  2040 10/01/19  1633 10/01/19  1111 10/01/19  0625 09/30/19  2140   GLUCPOC 219* 229* 165* 206* 238* 289* 144* 200* 254* 168* 143* 191*             Diet:  Consistent CHO  Supplements: Glucerna TID; Boost Pudding BID  Intake: []           Good     [x]           Fair      []           Poor   No data found.     Weight Changes:   []            Loss  []            Gain  [x]            Stable  Wt Readings from Last 10 Encounters:   10/03/19 87.2 kg (192 lb 3.9 oz)   09/19/19 86.4 kg (190 lb 7.6 oz)   08/30/19 88 kg (194 lb)   10/30/17 86.9 kg (191 lb 9.6 oz)   09/07/17 85.2 kg (187 lb 12.8 oz)   08/02/17 84.4 kg (186 lb)   05/15/17 80.7 kg (178 lb)   04/03/17 78 kg (172 lb)   03/20/17 80 kg (176 lb 6.4 oz)   02/20/17 77.6 kg (171 lb)       Nutrition Problems Identified:  [x]     None  []           Specified food preferences   []           Dislikes supplements              []           Allergies  []           Difficulty chewing or swallowing   []           Poor dentition   []           Nausea/Vomiting  []           Constipation  []           Diarrhea    PLAN:   []           Obtained/adjusted food preferences/tolerances and/or snacks options   []           Dislikes supplements will try a substitution   []           Modify diet for food allergies  []           Adjust texture due to difficulty chewing   [x]   Continue current diet  []           Educated patient  []           Add Supplements    Rescreen:  [x]            At Nutrition Risk           []            Not at Nutrition Risk, rescreen per screening protocol    Monica Kimbrough RD

## 2019-10-03 NOTE — PROGRESS NOTES
Hospitalist Progress Note                               Vineet Hyatt MD                                     Answering service: [phone] OR [phone] from in house phone           Date of Service:  10/3/2019  NAME:  Andrey Moncada  :  1931  MRN:  [id number]      Admission Summary:   80-year-old female with an extensive past medical history including chronic hypoxemic respiratory failure with home oxygen, COPD, chronic kidney disease, type 2 diabetes mellitus, atrial fibrillation, diastolic congestive heart failure, dyslipidemia, primary hypertension, osteoporosis, pulmonary hypertension, depression and anxiety disorder, was brought to the emergency room from home for evaluation of an approximately 2-3 day history of worsening shortness of breath. Per the patient's son, the patient ambulates unaided at baseline; however, in the 2-3 days leading up to the emergency room presentation, noted to have worsening shortness of breath even with easy activity. The patient also complained of some numbness and weakness in the right lower extremity. The patient admitted to being compliant with all her medical therapies. The patient denied associated headaches, dizziness, visual deficits, chest pains, palpitations, nausea, vomiting, cough, sputum production, fevers, chills, abdominal pain, bladder or bowel irregularities. Reason for follow up:   Right leg ischemia. She is poor historian. Her mood is better today, states that she has intermittent RLE pain with movement. Other wise denied any nausea/vomiting,abdominal pain. She agreed to work with therapy today    Plan for palliative care meeting tomorrow with son to discuss overall goals of care.      Assessment & Plan:     Acute thrombus at the mid and distal right popliteal level RLE    - S/p RLE thrombectomy , found residual clot in popliteal artery, s/p repeat thrombectomy 19 per vascular surgery, now with significant muscle ischemia right calf and will floridalma have chronic foot drop on right.  -She developed epistaxis on aspirin,plavix and Apixaban. Dr Mylene Perry recommending holding asa and Plavix indefinitely and resumed Aixaban.  -Per vascular surgery, can hold off amputation for now and recommended OP follow up. [place] [address] on 9/22/19 in setting of ischemic RLE  - serial daily CPK - trending down  - holding statins    Leukocytosis:floridalma from ischemic foot+rhabdomyolysis. No source of infection evident. Monitor . She is afebrile  -afebrile for days. -??? WBC likely due to muscle ischemia   -Rt foot X ray did not show any osteomyelitis  -If WBC persistently elevated,consider obtaining MRI of Rt LE.  -white count now trending down    Acute on chronic diastolic CHF exacerbation with a preserved EF -improved  Indeterminable NYHA Functional Class due to the multiple Co-morbidities per Cardiology. Hypertension   Dyslipidemia -hold statins due to rhabdomyolysis   - 2D ECHO (9/12/19) with EF 70 percent, pulmonary HTN and TR  - s/p IV diuresis,now on oral lasix. - Cardiology and Heart failure team evals noted and appreciated. -on beta blcoker. HOLLAND on probable CKD stage 2-3-improved  -Renal US negative for obstructive uropathy.   -Nephrology eval noted and appreciated    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 - Holding  Aspirin, Plavix due to epistaxis;statins held  - Neurology eval noted and appreciated. Chronic Hypoxemic respiratory failure due to COPD with home oxygen at 3 l/min. - Nebulizers, inhalers, incentive spirometry. Anemia acute on chronic  - HB 6.8,no obvious source of blood loss  -Transfused 2 units PRBC  -Per previous hospitalist, Dr Norma Young is okay to continue with Apixaban.  -Hb 7.7 today, repeat CBC tomorrow    Uncontrolled IDDM type 2 DM with complications including hyperglycemia. - Hold metformin. - Accucheck monitoring  - Basal -15 U lantus+ will increase  Humalog to 6 units ac tid and SSI      Osteoporosis - f/u as OP    Anxiety disorder and Depression without any current behavioral disturbances. Hypocalcemia-resolved  Multifactorial Hyponatremia POA-resolved  Resolved Hyperkalemia    Epistaxis: resolved. -Antiplatelets held now      Obesity   Body mass index is 31.99 kg/m². Prophylaxis: apixaban    Full Code with a guarded prognosis. - Palliative care consulted again ,plan for family meeting on Friday    Spoke to patient's son over phone on 10/2, updated clinical course so far. Plan: TBD    . Patient's son Maggie Smith is at [phone] if needed for updates. Hospital Problems  Date Reviewed: 9/20/2019          Codes Class Noted POA    * (Principal) CHF exacerbation ([facility] ICD-10-CM: I50.9  ICD-9-CM: 428.0  9/17/2019 Yes        Hyperkalemia ICD-10-CM: E87.5  ICD-9-CM: 276.7  9/17/2019 Yes        Hyponatremia ICD-10-CM: E87.1  ICD-9-CM: 276.1  9/17/2019 Yes        HOLLAND (acute kidney injury) ([facility] ICD-10-CM: N17.9  ICD-9-CM: 584.9  9/17/2019 Yes        Acute hypoxemic respiratory failure ([facility] ICD-10-CM: J96.01  ICD-9-CM: 518.81  8/8/2011 Yes        Debility ICD-10-CM: R53.81  ICD-9-CM: 799.3  6/28/2011 Yes              Physical Examination:      Last 24hrs VS reviewed since prior progress note. Most recent are:  Visit Vitals  /66 (BP 1 Location: Left arm, BP Patient Position: At rest)   Pulse 81   Temp 99.4 °F (37.4 °C)   Resp 18   Ht 5' 5\" (1.651 m)   Wt 87.2 kg (192 lb 3.9 oz)   SpO2 100%   Breastfeeding? No   BMI 31.99 kg/m²           Constitutional:  No acute distress. HEENT: Head is atraumatic,EOMI  Un icteric sclera. Oral mucous moist, oropharynx benign. Neck supple,      Resp:  CTA b/l, no wheezing   CV:  S1,S2 wnl    GI:  Soft, non distended, non tender.  normoactive bowel sounds,    :  No CVA or suprapubic tenderness   Skin  :  Dry dressing and staples on RLE . Cool RLE, no pulses below the knee and dusky great toe    Musculoskeletal: RLE weakness, able to move both UE and LLE. Neurologic:  Awake, alert and oriented X 2        Intake/Output Summary (Last 24 hours) at 10/3/2019 1946  Last data filed at 10/3/2019 0449  Gross per 24 hour   Intake    Output 650 ml   Net -650 ml              Labs:     Recent Labs     10/03/19  0243 10/02/19  0756   WBC 17.4* 19.0*   HGB 7.7* 8.6*   HCT 25.6* 28.2*    385     Recent Labs     10/03/19  0243 10/02/19  0754   * 134*   K 4.6 4.5   CL 95* 95*   CO2 32 32   BUN 21* 21*   CREA 1.05* 1.13*   * 141*   CA 8.3* 9.0     No results for input(s): SGOT, GPT, ALT, AP, TBIL, TBILI, TP, ALB, GLOB, GGT, AML, LPSE in the last 72 hours. No lab exists for component: AMYP, HLPSE  No results for input(s): INR, PTP, APTT, INREXT, INREXT in the last 72 hours.   Lab Results   Component Value Date/Time    Cholesterol, total 144 09/17/2019 09:21 AM    HDL Cholesterol 55 09/17/2019 09:21 AM    LDL, calculated 72.6 09/17/2019 09:21 AM    Triglyceride 82 09/17/2019 09:21 AM    CHOL/HDL Ratio 2.6 09/17/2019 09:21 AM     Lab Results   Component Value Date/Time    Glucose (POC) 219 (H) 10/03/2019 04:17 PM    Glucose (POC) 229 (H) 10/03/2019 12:07 PM    Glucose (POC) 165 (H) 10/03/2019 06:36 AM    Glucose (POC) 206 (H) 10/02/2019 08:55 PM    Glucose (POC) 238 (H) 10/02/2019 05:14 PM     Lab Results   Component Value Date/Time    Color YELLOW/STRAW 09/17/2019 12:49 PM    Appearance CLEAR 09/17/2019 12:49 PM    Specific gravity 1.030 09/17/2019 12:49 PM    Specific gravity 1.010 12/18/2011 09:28 AM    pH (UA) 6.0 09/17/2019 12:49 PM    Protein NEGATIVE  09/17/2019 12:49 PM    Glucose NEGATIVE  09/17/2019 12:49 PM    Ketone NEGATIVE  09/17/2019 12:49 PM    Bilirubin NEGATIVE  09/17/2019 12:49 PM    Urobilinogen 0.2 09/17/2019 12:49 PM    Nitrites NEGATIVE  09/17/2019 12:49 PM    Leukocyte Esterase NEGATIVE  09/17/2019 12:49 PM    Epithelial cells FEW 09/17/2019 12:49 PM    Bacteria NEGATIVE  09/17/2019 12:49 PM    WBC 0-4 09/17/2019 12:49 PM    RBC 0-5 09/17/2019 12:49 PM         Medications Reviewed:     Current Facility-Administered Medications   Medication Dose Route Frequency    insulin lispro (HUMALOG) injection 4 Units  4 Units SubCUTAneous TIDAC    sodium chloride (OCEAN) 0.65 % nasal squeeze bottle 2 Spray  2 Spray Both Nostrils Q2H PRN    oxymetazoline (AFRIN) 0.05 % nasal spray 2 Spray  2 Spray Both Nostrils BID PRN    albuterol-ipratropium (DUO-NEB) 2.5 MG-0.5 MG/3 ML  3 mL Nebulization BID RT    furosemide (LASIX) tablet 40 mg  40 mg Oral DAILY    0.9% sodium chloride infusion 250 mL  250 mL IntraVENous PRN    0.9% sodium chloride infusion 250 mL  250 mL IntraVENous PRN    apixaban (ELIQUIS) tablet 5 mg  5 mg Oral Q12H    insulin lispro (HUMALOG) injection   SubCUTAneous AC&HS    glucose chewable tablet 16 g  4 Tab Oral PRN    glucagon (GLUCAGEN) injection 1 mg  1 mg IntraMUSCular PRN    dextrose 10% infusion 0-250 mL  0-250 mL IntraVENous PRN    albuterol-ipratropium (DUO-NEB) 2.5 MG-0.5 MG/3 ML  3 mL Nebulization Q4H PRN    alum-mag hydroxide-simeth (MYLANTA) oral suspension 30 mL  30 mL Oral Q4H PRN    famotidine (PEPCID) tablet 20 mg  20 mg Oral QPM    metoprolol tartrate (LOPRESSOR) tablet 25 mg  25 mg Oral BID    traMADol (ULTRAM) tablet 25 mg  25 mg Oral Q8H PRN    fentaNYL citrate (PF) injection 50 mcg  50 mcg IntraVENous Q3H PRN    insulin glargine (LANTUS) injection 15 Units  15 Units SubCUTAneous DAILY    ondansetron (ZOFRAN) injection 4 mg  4 mg IntraVENous Q6H PRN    sodium chloride (NS) flush 5-40 mL  5-40 mL IntraVENous Q8H    sodium chloride (NS) flush 5-40 mL  5-40 mL IntraVENous PRN    acetaminophen (TYLENOL) tablet 650 mg  650 mg Oral Q4H PRN    allopurinol (ZYLOPRIM) tablet 100 mg  100 mg Oral DAILY ______________________________________________________________________  EXPECTED LENGTH OF STAY: 6d 14h  ACTUAL LENGTH OF STAY:          217 Donna Stewart MD

## 2019-10-03 NOTE — PROGRESS NOTES
Problem: Heart Failure: Day 1  Goal: Off Pathway (Use only if patient is Off Pathway)  Outcome: Progressing Towards Goal     Problem: Falls - Risk of  Goal: *Absence of Falls  Description  Document Kellen Heller Fall Risk and appropriate interventions in the flowsheet. Outcome: Progressing Towards Goal  Note:   Fall Risk Interventions:  Mobility Interventions: Communicate number of staff needed for ambulation/transfer, Patient to call before getting OOB, PT Consult for assist device competence, Strengthening exercises (ROM-active/passive)    Mentation Interventions: Door open when patient unattended, Evaluate medications/consider consulting pharmacy    Medication Interventions: Evaluate medications/consider consulting pharmacy, Patient to call before getting OOB    Elimination Interventions: Call light in reach, Patient to call for help with toileting needs    History of Falls Interventions: Door open when patient unattended, Evaluate medications/consider consulting pharmacy         Problem: Pressure Injury - Risk of  Goal: *Prevention of pressure injury  Description  Document Jagdeep Scale and appropriate interventions in the flowsheet. 9-25-18: turn q2h and float heels.    Outcome: Progressing Towards Goal  Note:   Pressure Injury Interventions:  Sensory Interventions: Assess changes in LOC, Float heels, Keep linens dry and wrinkle-free    Moisture Interventions: Limit adult briefs, Maintain skin hydration (lotion/cream), Check for incontinence Q2 hours and as needed    Activity Interventions: Increase time out of bed, Pressure redistribution bed/mattress(bed type)    Mobility Interventions: Float heels, HOB 30 degrees or less    Nutrition Interventions: Document food/fluid/supplement intake    Friction and Shear Interventions: Apply protective barrier, creams and emollients, Lift team/patient mobility team, Lift sheet

## 2019-10-03 NOTE — PROGRESS NOTES
MARIANN  -Traci Ceballos 20 approved 10/3  - [facility] is on will call for tomorrow 10/4. Irina requested 1pm transport. -[facility]    CM noted patient met with PT today for therapy. CM is monitoring for OT notes then will send progress notes to [facility] to assist with the [address]. Per [employer], if patient is not approved for SNF, patient can go home with home health with [address] [place] [address]. CM will monitor for updates to patients disposition. CM received update from liaison at [address] [place] [address] that patient [facility] was approved and can accept the patient tomorrow 10/4. CM placed transportation on will call tomorrow. [facility] has been completed, awaiting processing.      CM will follow  Aleksandra Fernandez, MHA/CRM

## 2019-10-04 LAB
ANION GAP SERPL CALC-SCNC: 9 MMOL/L (ref 5–15)
BASOPHILS # BLD: 0 K/UL (ref 0–0.1)
BASOPHILS NFR BLD: 0 % (ref 0–1)
BUN SERPL-MCNC: 22 MG/DL (ref 6–20)
BUN/CREAT SERPL: 20 (ref 12–20)
CALCIUM SERPL-MCNC: 8.9 MG/DL (ref 8.5–10.1)
CHLORIDE SERPL-SCNC: 95 MMOL/L (ref 97–108)
CK SERPL-CCNC: 1491 U/L (ref 26–192)
CO2 SERPL-SCNC: 30 MMOL/L (ref 21–32)
COMMENT, HOLDF: NORMAL
CREAT SERPL-MCNC: 1.09 MG/DL (ref 0.55–1.02)
DIFFERENTIAL METHOD BLD: ABNORMAL
EOSINOPHIL # BLD: 0.1 K/UL (ref 0–0.4)
EOSINOPHIL NFR BLD: 1 % (ref 0–7)
ERYTHROCYTE [DISTWIDTH] IN BLOOD BY AUTOMATED COUNT: 16.1 % (ref 11.5–14.5)
GLUCOSE BLD STRIP.AUTO-MCNC: 165 MG/DL (ref 65–100)
GLUCOSE BLD STRIP.AUTO-MCNC: 169 MG/DL (ref 65–100)
GLUCOSE BLD STRIP.AUTO-MCNC: 205 MG/DL (ref 65–100)
GLUCOSE BLD STRIP.AUTO-MCNC: 309 MG/DL (ref 65–100)
GLUCOSE SERPL-MCNC: 138 MG/DL (ref 65–100)
HCT VFR BLD AUTO: 24.5 % (ref 35–47)
HGB BLD-MCNC: 7.5 G/DL (ref 11.5–16)
IMM GRANULOCYTES # BLD AUTO: 0.2 K/UL (ref 0–0.04)
IMM GRANULOCYTES NFR BLD AUTO: 1 % (ref 0–0.5)
LACTATE SERPL-SCNC: 1.4 MMOL/L (ref 0.4–2)
LYMPHOCYTES # BLD: 0.9 K/UL (ref 0.8–3.5)
LYMPHOCYTES NFR BLD: 5 % (ref 12–49)
MCH RBC QN AUTO: 27.6 PG (ref 26–34)
MCHC RBC AUTO-ENTMCNC: 30.6 G/DL (ref 30–36.5)
MCV RBC AUTO: 90.1 FL (ref 80–99)
MONOCYTES # BLD: 1.5 K/UL (ref 0–1)
MONOCYTES NFR BLD: 9 % (ref 5–13)
NEUTS SEG # BLD: 15.1 K/UL (ref 1.8–8)
NEUTS SEG NFR BLD: 84 % (ref 32–75)
NRBC # BLD: 0 K/UL (ref 0–0.01)
NRBC BLD-RTO: 0 PER 100 WBC
PLATELET # BLD AUTO: 392 K/UL (ref 150–400)
PMV BLD AUTO: 10 FL (ref 8.9–12.9)
POTASSIUM SERPL-SCNC: 4.1 MMOL/L (ref 3.5–5.1)
RBC # BLD AUTO: 2.72 M/UL (ref 3.8–5.2)
SAMPLES BEING HELD,HOLD: NORMAL
SERVICE CMNT-IMP: ABNORMAL
SODIUM SERPL-SCNC: 134 MMOL/L (ref 136–145)
WBC # BLD AUTO: 17.9 K/UL (ref 3.6–11)

## 2019-10-04 PROCEDURE — 77010033678 HC OXYGEN DAILY

## 2019-10-04 PROCEDURE — 87040 BLOOD CULTURE FOR BACTERIA: CPT

## 2019-10-04 PROCEDURE — 82550 ASSAY OF CK (CPK): CPT

## 2019-10-04 PROCEDURE — 36415 COLL VENOUS BLD VENIPUNCTURE: CPT

## 2019-10-04 PROCEDURE — 74011250637 HC RX REV CODE- 250/637: Performed by: SURGERY

## 2019-10-04 PROCEDURE — 97530 THERAPEUTIC ACTIVITIES: CPT

## 2019-10-04 PROCEDURE — 97116 GAIT TRAINING THERAPY: CPT

## 2019-10-04 PROCEDURE — 74011250637 HC RX REV CODE- 250/637: Performed by: INTERNAL MEDICINE

## 2019-10-04 PROCEDURE — 74011636637 HC RX REV CODE- 636/637: Performed by: HOSPITALIST

## 2019-10-04 PROCEDURE — 74011250637 HC RX REV CODE- 250/637: Performed by: FAMILY MEDICINE

## 2019-10-04 PROCEDURE — 74011000250 HC RX REV CODE- 250: Performed by: HOSPITALIST

## 2019-10-04 PROCEDURE — 74011636637 HC RX REV CODE- 636/637: Performed by: SURGERY

## 2019-10-04 PROCEDURE — 94640 AIRWAY INHALATION TREATMENT: CPT

## 2019-10-04 PROCEDURE — 85025 COMPLETE CBC W/AUTO DIFF WBC: CPT

## 2019-10-04 PROCEDURE — 97535 SELF CARE MNGMENT TRAINING: CPT

## 2019-10-04 PROCEDURE — 65270000029 HC RM PRIVATE

## 2019-10-04 PROCEDURE — 80048 BASIC METABOLIC PNL TOTAL CA: CPT

## 2019-10-04 PROCEDURE — 82962 GLUCOSE BLOOD TEST: CPT

## 2019-10-04 PROCEDURE — 83605 ASSAY OF LACTIC ACID: CPT

## 2019-10-04 RX ADMIN — APIXABAN 5 MG: 5 TABLET, FILM COATED ORAL at 10:21

## 2019-10-04 RX ADMIN — ACETAMINOPHEN 650 MG: 325 TABLET, FILM COATED ORAL at 19:57

## 2019-10-04 RX ADMIN — APIXABAN 5 MG: 5 TABLET, FILM COATED ORAL at 22:09

## 2019-10-04 RX ADMIN — ALLOPURINOL 100 MG: 100 TABLET ORAL at 10:21

## 2019-10-04 RX ADMIN — INSULIN LISPRO 2 UNITS: 100 INJECTION, SOLUTION INTRAVENOUS; SUBCUTANEOUS at 12:11

## 2019-10-04 RX ADMIN — INSULIN GLARGINE 15 UNITS: 100 INJECTION, SOLUTION SUBCUTANEOUS at 11:03

## 2019-10-04 RX ADMIN — IPRATROPIUM BROMIDE AND ALBUTEROL SULFATE 3 ML: .5; 3 SOLUTION RESPIRATORY (INHALATION) at 19:37

## 2019-10-04 RX ADMIN — INSULIN LISPRO 2 UNITS: 100 INJECTION, SOLUTION INTRAVENOUS; SUBCUTANEOUS at 22:09

## 2019-10-04 RX ADMIN — ACETAMINOPHEN 650 MG: 325 TABLET, FILM COATED ORAL at 14:53

## 2019-10-04 RX ADMIN — METOPROLOL TARTRATE 25 MG: 25 TABLET ORAL at 10:21

## 2019-10-04 RX ADMIN — TRAMADOL HYDROCHLORIDE 25 MG: 50 TABLET ORAL at 23:08

## 2019-10-04 RX ADMIN — FAMOTIDINE 20 MG: 20 TABLET ORAL at 17:07

## 2019-10-04 RX ADMIN — FUROSEMIDE 40 MG: 40 TABLET ORAL at 10:21

## 2019-10-04 RX ADMIN — INSULIN LISPRO 4 UNITS: 100 INJECTION, SOLUTION INTRAVENOUS; SUBCUTANEOUS at 07:53

## 2019-10-04 RX ADMIN — METOPROLOL TARTRATE 25 MG: 25 TABLET ORAL at 22:09

## 2019-10-04 RX ADMIN — IPRATROPIUM BROMIDE AND ALBUTEROL SULFATE 3 ML: .5; 3 SOLUTION RESPIRATORY (INHALATION) at 08:55

## 2019-10-04 RX ADMIN — INSULIN LISPRO 4 UNITS: 100 INJECTION, SOLUTION INTRAVENOUS; SUBCUTANEOUS at 17:02

## 2019-10-04 RX ADMIN — Medication 10 ML: at 14:24

## 2019-10-04 RX ADMIN — TRAMADOL HYDROCHLORIDE 25 MG: 50 TABLET ORAL at 13:25

## 2019-10-04 RX ADMIN — Medication 10 ML: at 22:10

## 2019-10-04 RX ADMIN — INSULIN LISPRO 4 UNITS: 100 INJECTION, SOLUTION INTRAVENOUS; SUBCUTANEOUS at 12:11

## 2019-10-04 RX ADMIN — Medication 10 ML: at 07:54

## 2019-10-04 NOTE — PALLIATIVE CARE DISCHARGE
Goals of Care/Treatment Preferences The Palliative Medicine team was consulted as part of your/your loved one's care in the hospital. Our team is a supportive service; we strive to relieve suffering and improve quality of life. We reviewed advance care planning information, which includes the following: 
Patient's Healthcare Decision Maker is[de-identified] Named in scanned ACP document Confirm Advance Directive: Yes, on file Patient Would Like to Complete Advance Directive: No 
Does the patient have other document types: Do Not Resuscitate Patient/Health Care Proxy Stated Goals: Rehabilitation We reviewed / discussed your code status as: DNR 
   Full Code means perform CPR in the event of cardiac arrest. 
    DNR means do NOT perform CPR in the event of cardiac arrest. 
    Partial Code means you have specific preferences, please discuss with your healthcare team. 
    Indiokati Núñez means this issue was not addressed / resolved during your stay Medical Interventions: Limited additional interventions Artificially Administered Nutrition: No feeding tube Because of the importance of this information, we are providing you with a printed copy to share with other healthcare providers after this hospitalization is complete.

## 2019-10-04 NOTE — PROGRESS NOTES
Problem: Self Care Deficits Care Plan (Adult)  Goal: *Acute Goals and Plan of Care (Insert Text)  Description    FUNCTIONAL STATUS PRIOR TO ADMISSION: Patient was modified independent for ADLs and does not use DME for functional mobility. At baseline patient uses no supplemental oxygen. HOME SUPPORT: The patient lived with son and DIL but did not require assist.    Occupational Therapy Goals  Goals reviewed and continued: 10/4/19  Initiated 9/23/2019  1. Patient will perform grooming with supervision/set-up within 7 day(s). 2.  Patient will perform upper body ADLs with supervision/set-up within 7 day(s). Simulates in bathing with set up 10/4/19. Continue in practice within 7 days. 3.  Patient will perform lower body ADLs with moderate assistance  within 7 day(s). 4.  Patient will perform toilet transfers to/from [facility] with moderate assistance  within 7 day(s). 5.  Patient will perform all aspects of toileting with moderate assistance  within 7 day(s). 6.  Patient will participate in upper extremity therapeutic exercise/activities with supervision/set-up for 5 minutes within 7 day(s). 7.  Patient will utilize energy conservation techniques during functional activities with verbal cues within 7 day(s). Outcome: Not Met   OCCUPATIONAL THERAPY TREATMENT/WEEKLY RE-ASSESSMENT  Patient: William Graves (12 y.o. female)  Date: 10/4/2019  Diagnosis: CHF exacerbation ([facility] [I50.9] CHF exacerbation ([facility]  Procedure(s) (LRB):  THROMBECTOMY/EMBOLECTOMY LOWER  RIGHT LEG. FASCIOTOMY (Right) 13 Days Post-Op  Precautions: Fall  Chart, occupational therapy assessment, plan of care, and goals were reviewed. ASSESSMENT  Patient continues with skilled OT services and is not progressing towards goals, but is now demonstrating increased participation with family encouragement.  Participation impacted by painful right LE, impaired reach to LEs, impaired standing balance, impaired strength and endurance for functional activity. Current Level of Function Impacting Discharge (ADLs): Set up to total assist for self care, min assist of 2 for transfers, bed pan for toileting with progression to [facility]    Other factors to consider for discharge: Impaired circulation to right LE         PLAN :  Goals have been updated based on progression since last assessment. Patient continues to benefit from skilled intervention to address the above impairments. Continue to follow patient 3 times a week to address goals. Recommend with staff: Qian Espinosa in chair for meals, set up for UE bathing, max to total assist for LE self care, progression to use of BSC for toileting. Recommend next OT session: BSC transfer, toileting    Recommendation for discharge: (in order for the patient to meet his/her long term goals)  Therapy up to 5 days/week in SNF setting    This discharge recommendation:  Has been made in collaboration with the attending provider and/or case management    Equipment recommendations for successful discharge (if) home: to be determined by rehab facility       SUBJECTIVE:   Patient stated I don't want to eat.  Lunch tray present. Son and daughter encouraging patient to eat. OBJECTIVE DATA SUMMARY:   Cognitive/Behavioral Status:  Neurologic State: Alert  Orientation Level: Oriented to place;Oriented to person, oriented to situation  Cognition: Follows commands;Decreased attention/concentration  Perception: Appears intact  Perseveration: No perseveration noted  Safety/Judgement: Awareness of environment    Functional Mobility and Transfers for ADLs:    ADL Intervention:     Patient received seated in chair with LEs elevated       Upper Body Bathing  Bathing Assistance: Set-up(in simulation)  Position Performed: Seated in chair    Lower Body Bathing  Lower Body : Maximum assistance(in simulation)  Position Performed: Seated in chair         [address]:  Total assistance(dependent)(inferred from mobility)  Leg Crossed Method Used: No  Position Performed: Bending forward method         Cognitive Retraining  Organizing/Sequencing: Breaking task down  Following Commands: Follows one step commands/directions  Safety/Judgement: Awareness of environment  Cues: Verbal cues provided; Tactile cues provided    Therapeutic Exercises:   Participated in bilateral shoulder AROM for shoulder flex/ext for 1 set of 8 reps. Demonstrates AROM of bilateral UEs WNL for age. Good  bilaterally. Activity Tolerance:   Fair  Please refer to the flowsheet for vital signs taken during this treatment.     After treatment patient left in no apparent distress:   Sitting in chair, Call bell within reach and Caregiver / family present    COMMUNICATION/COLLABORATION:   The patients plan of care was discussed with: Physical Therapist, Occupational Therapist and Registered Nurse    DENZEL Rizzo  Time Calculation: 10 mins

## 2019-10-04 NOTE — PROGRESS NOTES
Problem: Mobility Impaired (Adult and Pediatric)  Goal: *Acute Goals and Plan of Care (Insert Text)  Description  FUNCTIONAL STATUS PRIOR TO ADMISSION: Patient was independent for all functional mobility. Patient uses 2L/min O2 via nasal cannula at baseline. HOME SUPPORT PRIOR TO ADMISSION: The patient lived with her son and daughter but did not require assist.    Physical Therapy Goals  Weekly re-assessment 10/4/2019  1. Patient will move from supine to sit and sit to supine  in bed with modified independence within 7 day(s). 2.  Patient will transfer from bed to chair and chair to bed with contact guard assist consistently  using the least restrictive device within 7 day(s). 3.  Patient will perform sit to stand with contact guard  assistance  within 7 day(s). 4.  Patient will ambulate with minimal assistance  for 15 feet with the least restrictive device within 7 day(s). Reassessed 9/27/19    1. Patient will move from supine to sit and sit to supine  in bed with modified independence within 7 day(s). 2.  Patient will transfer from bed to chair and chair to bed with moderate assistance  using the least restrictive device within 7 day(s). 3.  Patient will perform sit to stand with moderate assistance  within 7 day(s). 4.  Patient will ambulate with moderate assistance  for 15 feet with the least restrictive device within 7 day(s). Initiated 9/18/2019  1. Patient will move from supine to sit and sit to supine  in bed with independence within 7 day(s). 2.  Patient will transfer from bed to chair and chair to bed with independence using the least restrictive device within 7 day(s). 3.  Patient will perform sit to stand with independence within 7 day(s). 4.  Patient will ambulate with independence for 150 feet with the least restrictive device within 7 day(s). 5.  Patient will ascend/descend 1 stairs with 1 handrail(s) with modified independence within 7 day(s).         Outcome: Progressing Towards Goal   PHYSICAL THERAPY TREATMENT  Patient: Alfredo Sandifer (67 y.o. female)  Date: 10/4/2019  Diagnosis: CHF exacerbation ([facility] [I50.9] CHF exacerbation ([facility]  Procedure(s) (LRB):  THROMBECTOMY/EMBOLECTOMY LOWER  RIGHT LEG. FASCIOTOMY (Right) 13 Days Post-Op  Precautions: Fall  Chart, physical therapy assessment, plan of care and goals were reviewed. ASSESSMENT  Patient continues with skilled PT services and is progressing towards goals. Patient has been more cooperative at end of week, particpating in therapy the last two visits and now getting up to the chair. She continues with pain and difficulty with RLE which is cold to touch and lack of ankle /toe motor function. Patient moving well on bed , requiring verbal and tactile cues for safe sit to stand. Able to start ambulation as well with minimal assist but again limited due to RLE. Feel she is appropriate for rehab as she was independent prior to admission. .     Current Level of Function Impacting Discharge (mobility/balance): minimal assist for transfers, decreased gait and limited RLE function. Other factors to consider for discharge: was independent prior to admission, living with family. PLAN :  Patient continues to benefit from skilled intervention to address the above impairments. Continue treatment per established plan of care. to address goals. Recommendation for discharge: (in order for the patient to meet his/her long term goals)  Therapy up to 5 days/week in SNF setting    This discharge recommendation:  Has been made in collaboration with the attending provider and/or case management    Equipment recommendations for successful discharge (if) home: to be determined by rehab facility       SUBJECTIVE:   Patient stated Brian Ugartes me time to do this.     OBJECTIVE DATA SUMMARY:   Critical Behavior:  Neurologic State: Alert  Orientation Level: Oriented to place, Oriented to person  Cognition: Follows commands, Decreased attention/concentration  Safety/Judgement: Awareness of environment  Functional Mobility Training:  Bed Mobility:     Supine to Sit: Additional time;Supervision     Scooting: Stand-by assistance        Transfers:  Sit to Stand: Minimum assistance;Assist x2  Stand to Sit: Minimum assistance        Bed to Chair: Minimum assistance                    Balance:  Sitting: Intact  Standing: Impaired; With support  Standing - Static: Constant support; Fair  Standing - Dynamic : Fair  Ambulation/Gait Training:  Distance (ft): 6 Feet (ft)  Assistive Device: Gait belt;Walker, rolling  Ambulation - Level of Assistance: Minimal assistance        Gait Abnormalities: Antalgic;Decreased step clearance; Step to gait        Base of Support: Narrowed  Stance: Right decreased  Speed/Chana: Delayed;Pace decreased (<100 feet/min)  Step Length: Right shortened;Left shortened      After treatment patient left in no apparent distress:   Sitting in chair, Heels elevated for pressure relief, Call bell within reach, Bed / chair alarm activated and Caregiver / family present    COMMUNICATION/COLLABORATION:   The patients plan of care was discussed with: Certified Occupational Therapy Assistant, Registered Nurse and     Iraida Kohli PT   Time Calculation: 23 mins

## 2019-10-04 NOTE — PROGRESS NOTES
Hospitalist Progress Note                               Yamini Pino MD                                     Answering service: [phone]                               OR 36 from in house phone                                         Date of Service:  10/4/2019  NAME:  Ronit Najera  :  1931  MRN:  [id number]      Admission Summary:   59-year-old female with an extensive past medical history including chronic hypoxemic respiratory failure with home oxygen, COPD, chronic kidney disease, type 2 diabetes mellitus, atrial fibrillation, diastolic congestive heart failure, dyslipidemia, primary hypertension, osteoporosis, pulmonary hypertension, depression and anxiety disorder, was brought to the emergency room from home for evaluation of an approximately 2-3 day history of worsening shortness of breath. Per the patient's son, the patient ambulates unaided at baseline; however, in the 2-3 days leading up to the emergency room presentation, noted to have worsening shortness of breath even with easy activity. The patient also complained of some numbness and weakness in the right lower extremity. The patient admitted to being compliant with all her medical therapies. The patient denied associated headaches, dizziness, visual deficits, chest pains, palpitations, nausea, vomiting, cough, sputum production, fevers, chills, abdominal pain, bladder or bowel irregularities. Reason for follow up:       CC: SOB    Chart reviewed. All events in the past 24 hours reviewed in their entirety. Reviewed with nursing. Patient spiked temperature today. Assessment & Plan:     1) CVS: Acute on chronic diastolic CHF exarcerbation with a preserved EF. Probable Demand ischemia with mildly elevated troponins due to the CHF exarcerbation. Indeterminable NYHA Functional Class due to the multiple Co-morbidities per Cardiology. Primary Hypertension. Dyslipidemia.  2D ECHO (9/12/19) with EF 70 percent. Continue oxygen, gentle diuresis, daily weight input/output monitoring, CHF teaching. Cardiology and Heart failure team F/Us noted and appreciated. 2) Renal: Resolving HOLLAND on probable CKD stage 2-3 due to probable medication side effect (Bumex and Spironolactone) prior to admission. Renal US negative for obstructive uropathy. Nephrology F/U noted and appreciated. 3) Electrolytes: Resolving probable multifactorial Hyponatremia present on admission. Resolved Hyperkalemia without any dysrhythmias. 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 Neurology eval noted and appreciated. D/W Dr Viola Hoffman. 5) Vascular: Ischemic right leg. S/P RLE Thrombectomy 9/21/19 with residual clot in Popliteal Artery. S/P repeat Thrombectomy 9/22/19. Now with significant muscle ischemia right calf with likely right foot drop. Aspirin and plavix discontinued due to Epistaxis. Continue Apixaban. Will likely need R. AKA if no significant clinical improvement. Vascular Surgical F/U noted and appreciated. D/W Dr Cadena. 6) ID: Low threshold for development of sepsis due to ischemic right leg. Tmax 101.6F, leukocytosis, left shift, bandemia. Lactate level and blood cultures. ID consult for antibiotic recommendations. 7) Resp: Chronic Hypoxemic respiratory failure due to COPD with home oxygen. Nebulizers, inhalers, incentive spirometry. 8) Hematology: Anemia of chronicity. 9) Endocrine: Uncontrolled insulin treated type 2 DM with complications including hyperglycemia. Hold metformin. Accucheck monitoring, Basal and sliding scale insulins, diabetic teaching. Diabetic team evaluation with recommendations appreciated. 10) MANNIE: Osteoporosis. 11) Psychiatry: Anxiety disorder and Depression without any current behavioral disturbances. 12) Prophylaxis: DVT.     13) Directives: DDNR with an extremely  guarded prognosis. At increased risk of decompensation. Readdressed with patient and patient's son. Palliative care F/U noted and appreciated. 14) Plan: Patient will require Post-Acute care facility placement for ongoing therapies. D/W patient, patient's son Catia Santos ([phone]), patient's daughter Oscar Garrido, Nursing and caremanagement. Hospital Problems  Date Reviewed: 9/20/2019          Codes Class Noted POA    * (Principal) CHF exacerbation ([facility] ICD-10-CM: I50.9  ICD-9-CM: 428.0  9/17/2019 Yes        Hyperkalemia ICD-10-CM: E87.5  ICD-9-CM: 276.7  9/17/2019 Yes        Hyponatremia ICD-10-CM: E87.1  ICD-9-CM: 276.1  9/17/2019 Yes        HOLLAND (acute kidney injury) ([facility] ICD-10-CM: N17.9  ICD-9-CM: 584.9  9/17/2019 Yes        Acute hypoxemic respiratory failure ([facility] ICD-10-CM: J96.01  ICD-9-CM: 518.81  8/8/2011 Yes        Debility ICD-10-CM: R53.81  ICD-9-CM: 799.3  6/28/2011 Yes                  Physical Examination:      Last 24hrs VS reviewed since prior progress note. Most recent are:  Visit Vitals  /55 (BP 1 Location: Left arm, BP Patient Position: At rest)   Pulse 91   Temp (!) 101.6 °F (38.7 °C)   Resp 18   Ht 5' 5\" (1.651 m)   Wt 89.8 kg (197 lb 15.6 oz)   SpO2 94%   Breastfeeding? No   BMI 32.94 kg/m²           Constitutional:  No acute distress. HEENT: Head is a traumatic,  Un icteric sclera. Pink conjunctiva,no erythema or discharge. Oral mucous moist, oropharynx benign. Neck supple,    Resp:  Decreased air entry B/L.   CV:  S1,S2 present. GI:  Soft, non distended, non tender. normoactive bowel sounds, no hepatosplenomegaly    :  No CVA or suprapubic tenderness   Skin  :  Cool and dark RLE. Absent Dorsalis Pedis pulse. Musculoskeletal:  Dressing insitu to right leg    Neurologic:  Awake, alert and oriented.             No intake or output data in the 24 hours ending 10/04/19 7015           Labs:     Recent Labs     10/04/19  0325 10/03/19  0243   WBC 17.9* 17.4*   HGB 7.5* 7.7*   HCT 24.5* 25.6*    384     Recent Labs     10/04/19  0325 10/03/19  0243 10/02/19  0754   * 134* 134*   K 4.1 4.6 4.5   CL 95* 95* 95*   CO2 30 32 32   BUN 22* 21* 21*   CREA 1.09* 1.05* 1.13*   * 152* 141*   CA 8.9 8.3* 9.0     No results for input(s): SGOT, GPT, ALT, AP, TBIL, TBILI, TP, ALB, GLOB, GGT, AML, LPSE in the last 72 hours. No lab exists for component: AMYP, HLPSE  No results for input(s): INR, PTP, APTT, INREXT, INREXT in the last 72 hours. No results for input(s): FE, TIBC, PSAT, FERR in the last 72 hours. No results found for: FOL, RBCF   No results for input(s): PH, PCO2, PO2 in the last 72 hours.   Recent Labs     10/04/19  0325 10/03/19  0243 10/02/19  0754   CPK 1,491* 1,805* 2,087*     Lab Results   Component Value Date/Time    Cholesterol, total 144 09/17/2019 09:21 AM    HDL Cholesterol 55 09/17/2019 09:21 AM    LDL, calculated 72.6 09/17/2019 09:21 AM    Triglyceride 82 09/17/2019 09:21 AM    CHOL/HDL Ratio 2.6 09/17/2019 09:21 AM     Lab Results   Component Value Date/Time    Glucose (POC) 169 (H) 10/04/2019 04:28 PM    Glucose (POC) 205 (H) 10/04/2019 11:46 AM    Glucose (POC) 165 (H) 10/04/2019 06:29 AM    Glucose (POC) 142 (H) 10/03/2019 09:14 PM    Glucose (POC) 219 (H) 10/03/2019 04:17 PM     Lab Results   Component Value Date/Time    Color YELLOW/STRAW 09/17/2019 12:49 PM    Appearance CLEAR 09/17/2019 12:49 PM    Specific gravity 1.030 09/17/2019 12:49 PM    Specific gravity 1.010 12/18/2011 09:28 AM    pH (UA) 6.0 09/17/2019 12:49 PM    Protein NEGATIVE  09/17/2019 12:49 PM    Glucose NEGATIVE  09/17/2019 12:49 PM    Ketone NEGATIVE  09/17/2019 12:49 PM    Bilirubin NEGATIVE  09/17/2019 12:49 PM    Urobilinogen 0.2 09/17/2019 12:49 PM    Nitrites NEGATIVE  09/17/2019 12:49 PM    Leukocyte Esterase NEGATIVE  09/17/2019 12:49 PM    Epithelial cells FEW 09/17/2019 12:49 PM    Bacteria NEGATIVE  09/17/2019 12:49 PM    WBC 0-4 09/17/2019 12:49 PM    RBC 0-5 09/17/2019 12:49 PM         Medications Reviewed:     Current Facility-Administered Medications   Medication Dose Route Frequency    insulin lispro (HUMALOG) injection 4 Units  4 Units SubCUTAneous TIDAC    sodium chloride (OCEAN) 0.65 % nasal squeeze bottle 2 Spray  2 Spray Both Nostrils Q2H PRN    oxymetazoline (AFRIN) 0.05 % nasal spray 2 Spray  2 Spray Both Nostrils BID PRN    albuterol-ipratropium (DUO-NEB) 2.5 MG-0.5 MG/3 ML  3 mL Nebulization BID RT    furosemide (LASIX) tablet 40 mg  40 mg Oral DAILY    0.9% sodium chloride infusion 250 mL  250 mL IntraVENous PRN    0.9% sodium chloride infusion 250 mL  250 mL IntraVENous PRN    apixaban (ELIQUIS) tablet 5 mg  5 mg Oral Q12H    insulin lispro (HUMALOG) injection   SubCUTAneous AC&HS    glucose chewable tablet 16 g  4 Tab Oral PRN    glucagon (GLUCAGEN) injection 1 mg  1 mg IntraMUSCular PRN    dextrose 10% infusion 0-250 mL  0-250 mL IntraVENous PRN    albuterol-ipratropium (DUO-NEB) 2.5 MG-0.5 MG/3 ML  3 mL Nebulization Q4H PRN    alum-mag hydroxide-simeth (MYLANTA) oral suspension 30 mL  30 mL Oral Q4H PRN    famotidine (PEPCID) tablet 20 mg  20 mg Oral QPM    metoprolol tartrate (LOPRESSOR) tablet 25 mg  25 mg Oral BID    traMADol (ULTRAM) tablet 25 mg  25 mg Oral Q8H PRN    fentaNYL citrate (PF) injection 50 mcg  50 mcg IntraVENous Q3H PRN    insulin glargine (LANTUS) injection 15 Units  15 Units SubCUTAneous DAILY    ondansetron (ZOFRAN) injection 4 mg  4 mg IntraVENous Q6H PRN    sodium chloride (NS) flush 5-40 mL  5-40 mL IntraVENous Q8H    sodium chloride (NS) flush 5-40 mL  5-40 mL IntraVENous PRN    acetaminophen (TYLENOL) tablet 650 mg  650 mg Oral Q4H PRN    allopurinol (ZYLOPRIM) tablet 100 mg  100 mg Oral DAILY     ______________________________________________________________________  EXPECTED LENGTH OF STAY: 6d 14h  ACTUAL LENGTH OF STAY:          Adali Vigil MD

## 2019-10-04 NOTE — PROGRESS NOTES
MARIANN  -Traci Ceballos 20 approved 10/3- Willing to admit today-Patient is not medically ready for discharge per [address] will  on Saturday 10/5  - Canceled transport with [facility] per patient is not medically ready.   - Called liaison at [address] to inform  - [facility] completed processed successfully      CM will follow    RIGO Kruse/KASSY

## 2019-10-04 NOTE — ACP (ADVANCE CARE PLANNING)
[facility] NP and I met with patient and her son in room. Patient was awaiting discharge, alert and orientedx4. \"I feel as best as I can. \"    1. We discussed her plan to discharge to rehab and then get home as soon as she can. We took some time to talk about patient's ongoing challenges including progressive nature of her COPD and CHF. She and son did not have questions regarding the care she received in hospital.     2. Patient agreed to complete an Advance Medical Directive. [facility]    Primary Decision MakerSrylan Frazier - [phone]    Secondary Decision Maker: Paul Leija - [place] - [phone]    Living Will    Ms David Zuniga decided in the event death is imminent and medical treatment will not help with recovery, then she would not want life prolonging interventions. Ms. David Zuniga also decided in the event she is unaware of her surroundings, or others and it is reasonably certain awareness will never be recovered , then  she would not want life prolonging interventions. She said that she thought when people are as old as she is, she didn't know why they would want to be on machines and have family suffer. Organ donation  Ms. Kang does NOT want to be an organ donor. 3. Code status    Discussed code status in context of patient's progressive illness and stated wish to not be on machines. She said she would not want to be resuscitated if her heart stopped. He son struggled with this because he believes if there is hope, they should try, but patient remained firm in her decision to be DDNR. Original AMD and copies of DDNR provided to patient. Copies of AMD and DDNR provided for healthcare agents and scanned into [email or web address]. Original DDNR and copy on chart to go with patient at discharge. Thank you for the opportunity to be involved in the care of Ms. Cosme and her family.     Chris Castro, MANAV, Supervisee in Social Work  Palliative Medicine   [phone]

## 2019-10-04 NOTE — PROGRESS NOTES
10/04/19 1450   Vital Signs   Temp (!) 101.6 °F (38.7 °C)  (notfied RN)   Temp Source Oral   Pulse (Heart Rate) 91   Heart Rate Source Other (comment)   Resp Rate 18   O2 Sat (%) 94 %   Level of Consciousness Alert   /55   MAP (Calculated) 72   BP 1 Method Automatic   BP 1 Location Left arm   BP Patient Position At rest   MEWS Score 3     Informed Dr Jojo Erickson about the patient's temperature of 101.6. Orders received to give Tylenol and to draw lactic acid and blood cultures.

## 2019-10-04 NOTE — PROGRESS NOTES
Palliative Medicine Consult  [place]: [phone] ([phone])    Patient Name: Lexi Echeverria  YOB: 1931    Date of Initial Consult: 9/20/2019  Reason for Consult: Goals of care discussion   Requesting Provider: Dr. Roddy Cardoso  Primary Care Physician: Latoya Dupree MD     SUMMARY:   Lexi Echeverria is a 80 y.o. with a past history of COPD on home O2, hypoxic respiratory failure, DM 2, A. fib, CKD, CHF, HTN, pulmonary hypertension, moderate AV stenosis, echo with EF >70% (9/12/2019), MV with moderate annular calcification, TV with moderate to severe regurgitation depression and anxiety, who was admitted on 9/17/2019 from home with a diagnosis of acute on chronic CHF. Patient presented to the ER with CC of 2-3-day worsening of shortness of breath with numbness and weakness of right lower extremity. In ER labs revealed + hyponatremia with , K5.6 and hypoalbuminemia with albumin 2.9      Current medical issues leading to Palliative Medicine involvement include: Goals of care discussion      Psychosocial assessment: See palliative  Melani Vazquez note, she lives with her daugher and son. Baseline cognitive and functional status: Patient alert and oriented able to ambulate independently no longer cooks because was forgetful and left hands on the stove burning     PALLIATIVE DIAGNOSES:   1. Shortness of breath  2. DNR discussion  3. Physical debility   4. Goals of care discussion  5. Weakness, generalized  6. Debility  7. ACP  8. Hypoalbuminemia      PLAN:   1. Prior to visit completed extensive chart review including results of labs and other diagnostics. 2. Family meeting held with the patient and her son~ Lakhwinder along with Michael Taylor LCSW  3. Pt and son have a clear understanding of the medical issues  4. We discussed the importance of completing an AMD and pt agreed. She appointed her son as primary and daughter as secondary  5.  Pt does not want to be prolonged at end of life  6. Code status discussed and the pt has elected DNR. DDNR completed  7. All questions and concerns addressed  8. Plans for dc today  9. Will sign off    10. Initial consult note routed to primary continuity provider and/or primary health care team members  6. Communicated plan of care with: Palliative [place]  Team     GOALS OF CARE / TREATMENT PREFERENCES:     GOALS OF CARE:  Patient/Health Care Proxy Stated Goals: Rehabilitation    TREATMENT PREFERENCES:   Code Status: DNR    Advance Care Planning:  [x] [facility] Interdisciplinary Team has updated the ACP Navigator with Health Care Decision Maker and Patient Capacity       Primary Decision Maker: Rosaura Calzada - [phone]    Secondary Decision Maker: Johnnie Barnard [facility] - [phone]    Advance Care Planning 10/4/2019   Patient's [address] is: Named in scanned ACP document   Primary Decision Maker Name -   Primary Decision Maker Phone Number -   Primary Decision Maker Relationship to Patient -   Secondary Decision [address] Name -   Secondary Decision Maker Relationship to Patient -   Confirm Advance Directive Yes, on file   Patient Would Like to Complete Advance Directive -   Does the patient have other document types Do Not Resuscitate       Medical Interventions: Limited additional interventions     Other Instructions:   Artificially Administered Nutrition: No feeding tube     Other:    As far as possible, the palliative care team has discussed with patient / health care proxy about goals of care / treatment preferences for patient. HISTORY:     History obtained from: Medical records    CHIEF COMPLAINT: N/A    HPI/SUBJECTIVE:    The patient is:   [x] Verbal and participatory  [] Non-participatory due to:    eating lunch without issues. No pain.  No shortness of breath    Clinical Pain Assessment (nonverbal scale for severity on nonverbal patients):   Clinical Pain Assessment  Severity: 0  Location: right leg  Character: patient unable to report  Duration: patient unable to report  Effect: patient unable to report  Factors: patient unable to report  Frequency: patient unable to report     Activity (Movement): Lying quietly, normal position    Duration: for how long has pt been experiencing pain (e.g., 2 days, 1 month, years)  Frequency: how often pain is an issue (e.g., several times per day, once every few days, constant)     FUNCTIONAL ASSESSMENT:     Palliative Performance Scale (PPS):  PPS: 50       PSYCHOSOCIAL/SPIRITUAL SCREENING:     Palliative IDT has assessed this patient for cultural preferences / practices and a referral made as appropriate to needs (Cultural Services, Patient Advocacy, Ethics, etc.)    Any spiritual / Anabaptist concerns:  [] Yes /  [x] No    Caregiver Burnout:  [] Yes /  [x] No /  [] No Caregiver Present      Anticipatory grief assessment:   [x] Normal  / [] Maladaptive       ESAS Anxiety: Anxiety: 0    ESAS Depression: Depression: 0        REVIEW OF SYSTEMS:     Positive and pertinent negative findings in ROS are noted above in HPI. The following systems were [x] reviewed / [] unable to be reviewed as noted in HPI  Other findings are noted below. Systems: constitutional, ears/nose/mouth/throat, respiratory, gastrointestinal, genitourinary, musculoskeletal, integumentary, neurologic, psychiatric, endocrine. Positive findings noted below. Modified ESAS Completed by: provider   Fatigue: 0 Drowsiness: 0   Depression: 0 Pain: 0   Anxiety: 0 Nausea: 0   Anorexia: 0 Dyspnea: 0           Stool Occurrence(s): 1        PHYSICAL EXAM:     From RN flowsheet:  Wt Readings from Last 3 Encounters:   10/04/19 197 lb 15.6 oz (89.8 kg)   09/19/19 190 lb 7.6 oz (86.4 kg)   08/30/19 194 lb (88 kg)     Blood pressure 116/52, pulse 91, temperature 98.5 °F (36.9 °C), resp. rate 18, height 5' 5\" (1.651 m), weight 197 lb 15.6 oz (89.8 kg), SpO2 98 %, not currently breastfeeding.     Pain Scale 1: Numeric (0 - 10)  Pain Intensity 1: 0  Pain Onset 1: s/p op  Pain Location 1: Foot  Pain Orientation 1: Anterior  Pain Description 1: Aching, Stabbing  Pain Intervention(s) 1: Declines  Last bowel movement, if known:     Constitutional: alert, conversant, NAD  Eyes: pupils equal, anicteric  ENMT: no nasal discharge, moist mucous membranes  Cardiovascular: regular rhythm, distal pulses intact  Respiratory: breathing not labored on RA, symmetric  Gastrointestinal: gross, soft non-tender, +bowel sounds  Musculoskeletal: no deformity, no tenderness to palpation  Skin: warm, dry upper ext.   vasc insufficiencies of Rt foot  Neurologic: following commands, no movement observed in her lower extremities  Psychiatric: neg agitaiton  Other:       HISTORY:     Principal Problem:    CHF exacerbation ([address] (9/17/2019)    Active Problems:    Debility (6/28/2011)      Acute hypoxemic respiratory failure ([address] (8/8/2011)      Hyperkalemia (9/17/2019)      Hyponatremia (9/17/2019)      HOLLAND (acute kidney injury) ([address] (9/17/2019)      Past Medical History:   Diagnosis Date    Anxiety     Breast cancer ([address] 2005, 2010    right 2005, left 2010    Cancer [facility])      cancer right breast cancer    Cancer ([address]     cancer left breast/new dx 8/2011 +bx     Chronic obstructive pulmonary disease (HCC)     Diabetes ([address]     Heart failure ([address]     Hypercholesterolemia     Hypertension     Other ill-defined conditions(799.89)     osteopoosis    Other ill-defined conditions(799.89)     high cholesterol    Pneumonia     Psychiatric disorder     anxiety      Past Surgical History:   Procedure Laterality Date    BIOPSY OF BREAST, INCISIONAL      left breast 8/2011 positive for cancer cells    BREAST SURGERY PROCEDURE UNLISTED      right mastectomy    HX BREAST LUMPECTOMY Left 2010    HX MASTECTOMY Right 2005      Family History   Problem Relation Age of Onset    Hypertension Mother     Stroke Other       History reviewed, no pertinent family history.   Social History     Tobacco Use    Smoking status: Never Smoker    Smokeless tobacco: Never Used   Substance Use Topics    Alcohol use: No     Allergies   Allergen Reactions    Ace Inhibitors Angioedema      Current Facility-Administered Medications   Medication Dose Route Frequency    insulin lispro (HUMALOG) injection 4 Units  4 Units SubCUTAneous TIDAC    sodium chloride (OCEAN) 0.65 % nasal squeeze bottle 2 Spray  2 Spray Both Nostrils Q2H PRN    oxymetazoline (AFRIN) 0.05 % nasal spray 2 Spray  2 Spray Both Nostrils BID PRN    albuterol-ipratropium (DUO-NEB) 2.5 MG-0.5 MG/3 ML  3 mL Nebulization BID RT    furosemide (LASIX) tablet 40 mg  40 mg Oral DAILY    0.9% sodium chloride infusion 250 mL  250 mL IntraVENous PRN    0.9% sodium chloride infusion 250 mL  250 mL IntraVENous PRN    apixaban (ELIQUIS) tablet 5 mg  5 mg Oral Q12H    insulin lispro (HUMALOG) injection   SubCUTAneous AC&HS    glucose chewable tablet 16 g  4 Tab Oral PRN    glucagon (GLUCAGEN) injection 1 mg  1 mg IntraMUSCular PRN    dextrose 10% infusion 0-250 mL  0-250 mL IntraVENous PRN    albuterol-ipratropium (DUO-NEB) 2.5 MG-0.5 MG/3 ML  3 mL Nebulization Q4H PRN    alum-mag hydroxide-simeth (MYLANTA) oral suspension 30 mL  30 mL Oral Q4H PRN    famotidine (PEPCID) tablet 20 mg  20 mg Oral QPM    metoprolol tartrate (LOPRESSOR) tablet 25 mg  25 mg Oral BID    traMADol (ULTRAM) tablet 25 mg  25 mg Oral Q8H PRN    fentaNYL citrate (PF) injection 50 mcg  50 mcg IntraVENous Q3H PRN    insulin glargine (LANTUS) injection 15 Units  15 Units SubCUTAneous DAILY    ondansetron (ZOFRAN) injection 4 mg  4 mg IntraVENous Q6H PRN    sodium chloride (NS) flush 5-40 mL  5-40 mL IntraVENous Q8H    sodium chloride (NS) flush 5-40 mL  5-40 mL IntraVENous PRN    acetaminophen (TYLENOL) tablet 650 mg  650 mg Oral Q4H PRN    allopurinol (ZYLOPRIM) tablet 100 mg  100 mg Oral DAILY          LAB AND IMAGING FINDINGS: Lab Results   Component Value Date/Time    WBC 17.9 (H) 10/04/2019 03:25 AM    HGB 7.5 (L) 10/04/2019 03:25 AM    PLATELET 032 36/98/4947 03:25 AM     Lab Results   Component Value Date/Time    Sodium 134 (L) 10/04/2019 03:25 AM    Potassium 4.1 10/04/2019 03:25 AM    Chloride 95 (L) 10/04/2019 03:25 AM    CO2 30 10/04/2019 03:25 AM    BUN 22 (H) 10/04/2019 03:25 AM    Creatinine 1.09 (H) 10/04/2019 03:25 AM    Calcium 8.9 10/04/2019 03:25 AM    Magnesium 2.1 04/03/2017 12:00 AM    Phosphorus 3.1 09/26/2019 02:07 PM      Lab Results   Component Value Date/Time    AST (SGOT) 18 09/17/2019 09:21 AM    Alk. phosphatase 109 09/17/2019 09:21 AM    Protein, total 8.4 (H) 09/17/2019 09:21 AM    Albumin 1.9 (L) 09/26/2019 02:07 PM    Globulin 5.5 (H) 09/17/2019 09:21 AM     Lab Results   Component Value Date/Time    INR 1.1 01/20/2017 01:24 PM    Prothrombin time 10.9 01/20/2017 01:24 PM    aPTT 42.8 (H) 09/24/2019 05:30 AM      No results found for: IRON, FE, TIBC, IBCT, PSAT, FERR   Lab Results   Component Value Date/Time    pH 7.42 04/20/2011 01:50 AM    PCO2 49 (H) 04/20/2011 01:50 AM    PO2 82 04/20/2011 01:50 AM     No components found for: [place]   Lab Results   Component Value Date/Time    CK 1,491 (H) 10/04/2019 03:25 AM    CK - MB 2.3 01/20/2016 10:30 AM                Total time: 45 min  Counseling / coordination time, spent as noted above: 35  > 50% counseling / coordination?:  Yes    Prolonged service was provided for  []30 min   []75 min in face to face time in the presence of the patient, spent as noted above. Time Start:   Time End:   Note: this can only be billed with 04493 (initial) or 59313 (follow up). If multiple start / stop times, list each separately.

## 2019-10-05 ENCOUNTER — APPOINTMENT (OUTPATIENT)
Dept: GENERAL RADIOLOGY | Age: 84
DRG: 270 | End: 2019-10-05
Attending: INTERNAL MEDICINE
Payer: MEDICARE

## 2019-10-05 LAB
ANION GAP SERPL CALC-SCNC: 6 MMOL/L (ref 5–15)
APPEARANCE UR: ABNORMAL
BACTERIA URNS QL MICRO: NEGATIVE /HPF
BASOPHILS # BLD: 0 K/UL (ref 0–0.1)
BASOPHILS NFR BLD: 0 % (ref 0–1)
BILIRUB UR QL CFM: NEGATIVE
BUN SERPL-MCNC: 23 MG/DL (ref 6–20)
BUN/CREAT SERPL: 19 (ref 12–20)
CALCIUM SERPL-MCNC: 8.7 MG/DL (ref 8.5–10.1)
CHLORIDE SERPL-SCNC: 94 MMOL/L (ref 97–108)
CK SERPL-CCNC: 1294 U/L (ref 26–192)
CO2 SERPL-SCNC: 32 MMOL/L (ref 21–32)
COLOR UR: ABNORMAL
CREAT SERPL-MCNC: 1.18 MG/DL (ref 0.55–1.02)
DIFFERENTIAL METHOD BLD: ABNORMAL
EOSINOPHIL # BLD: 0.2 K/UL (ref 0–0.4)
EOSINOPHIL NFR BLD: 1 % (ref 0–7)
EPITH CASTS URNS QL MICRO: ABNORMAL /LPF
ERYTHROCYTE [DISTWIDTH] IN BLOOD BY AUTOMATED COUNT: 16.3 % (ref 11.5–14.5)
GLUCOSE BLD STRIP.AUTO-MCNC: 111 MG/DL (ref 65–100)
GLUCOSE BLD STRIP.AUTO-MCNC: 162 MG/DL (ref 65–100)
GLUCOSE BLD STRIP.AUTO-MCNC: 171 MG/DL (ref 65–100)
GLUCOSE BLD STRIP.AUTO-MCNC: 86 MG/DL (ref 65–100)
GLUCOSE SERPL-MCNC: 167 MG/DL (ref 65–100)
GLUCOSE UR STRIP.AUTO-MCNC: NEGATIVE MG/DL
HCT VFR BLD AUTO: 23.9 % (ref 35–47)
HGB BLD-MCNC: 7.3 G/DL (ref 11.5–16)
HGB UR QL STRIP: NEGATIVE
IMM GRANULOCYTES # BLD AUTO: 0 K/UL
IMM GRANULOCYTES NFR BLD AUTO: 0 %
KETONES UR QL STRIP.AUTO: NEGATIVE MG/DL
LEUKOCYTE ESTERASE UR QL STRIP.AUTO: ABNORMAL
LYMPHOCYTES # BLD: 0.6 K/UL (ref 0.8–3.5)
LYMPHOCYTES NFR BLD: 3 % (ref 12–49)
MCH RBC QN AUTO: 27.3 PG (ref 26–34)
MCHC RBC AUTO-ENTMCNC: 30.5 G/DL (ref 30–36.5)
MCV RBC AUTO: 89.5 FL (ref 80–99)
MONOCYTES # BLD: 1 K/UL (ref 0–1)
MONOCYTES NFR BLD: 5 % (ref 5–13)
NEUTS BAND NFR BLD MANUAL: 4 % (ref 0–6)
NEUTS SEG # BLD: 17.3 K/UL (ref 1.8–8)
NEUTS SEG NFR BLD: 87 % (ref 32–75)
NITRITE UR QL STRIP.AUTO: NEGATIVE
NRBC # BLD: 0 K/UL (ref 0–0.01)
NRBC BLD-RTO: 0 PER 100 WBC
PH UR STRIP: 5.5 [PH] (ref 5–8)
PLATELET # BLD AUTO: 403 K/UL (ref 150–400)
PMV BLD AUTO: 9.9 FL (ref 8.9–12.9)
POTASSIUM SERPL-SCNC: 4.1 MMOL/L (ref 3.5–5.1)
PROT UR STRIP-MCNC: 30 MG/DL
RBC # BLD AUTO: 2.67 M/UL (ref 3.8–5.2)
RBC #/AREA URNS HPF: ABNORMAL /HPF (ref 0–5)
RBC MORPH BLD: ABNORMAL
SERVICE CMNT-IMP: ABNORMAL
SERVICE CMNT-IMP: NORMAL
SODIUM SERPL-SCNC: 132 MMOL/L (ref 136–145)
SP GR UR REFRACTOMETRY: 1.02 (ref 1–1.03)
UR CULT HOLD, URHOLD: NORMAL
UROBILINOGEN UR QL STRIP.AUTO: >8 EU/DL (ref 0.2–1)
WBC # BLD AUTO: 19.1 K/UL (ref 3.6–11)
WBC URNS QL MICRO: ABNORMAL /HPF (ref 0–4)

## 2019-10-05 PROCEDURE — 94640 AIRWAY INHALATION TREATMENT: CPT

## 2019-10-05 PROCEDURE — 82962 GLUCOSE BLOOD TEST: CPT

## 2019-10-05 PROCEDURE — 74011250636 HC RX REV CODE- 250/636: Performed by: INTERNAL MEDICINE

## 2019-10-05 PROCEDURE — 74011636637 HC RX REV CODE- 636/637: Performed by: HOSPITALIST

## 2019-10-05 PROCEDURE — 94760 N-INVAS EAR/PLS OXIMETRY 1: CPT

## 2019-10-05 PROCEDURE — 71045 X-RAY EXAM CHEST 1 VIEW: CPT

## 2019-10-05 PROCEDURE — 74011636637 HC RX REV CODE- 636/637: Performed by: SURGERY

## 2019-10-05 PROCEDURE — 85025 COMPLETE CBC W/AUTO DIFF WBC: CPT

## 2019-10-05 PROCEDURE — 74011250637 HC RX REV CODE- 250/637: Performed by: SURGERY

## 2019-10-05 PROCEDURE — 36415 COLL VENOUS BLD VENIPUNCTURE: CPT

## 2019-10-05 PROCEDURE — 74011000258 HC RX REV CODE- 258: Performed by: INTERNAL MEDICINE

## 2019-10-05 PROCEDURE — 65270000029 HC RM PRIVATE

## 2019-10-05 PROCEDURE — 77030029684 HC NEB SM VOL KT MONA -A

## 2019-10-05 PROCEDURE — 77010033678 HC OXYGEN DAILY

## 2019-10-05 PROCEDURE — 74011250637 HC RX REV CODE- 250/637: Performed by: FAMILY MEDICINE

## 2019-10-05 PROCEDURE — 80048 BASIC METABOLIC PNL TOTAL CA: CPT

## 2019-10-05 PROCEDURE — 81001 URINALYSIS AUTO W/SCOPE: CPT

## 2019-10-05 PROCEDURE — 82550 ASSAY OF CK (CPK): CPT

## 2019-10-05 PROCEDURE — 74011000250 HC RX REV CODE- 250: Performed by: HOSPITALIST

## 2019-10-05 RX ADMIN — Medication 10 ML: at 07:13

## 2019-10-05 RX ADMIN — PIPERACILLIN AND TAZOBACTAM 3.38 G: 3; .375 INJECTION, POWDER, LYOPHILIZED, FOR SOLUTION INTRAVENOUS at 17:10

## 2019-10-05 RX ADMIN — IPRATROPIUM BROMIDE AND ALBUTEROL SULFATE 3 ML: .5; 3 SOLUTION RESPIRATORY (INHALATION) at 21:00

## 2019-10-05 RX ADMIN — Medication 10 ML: at 20:42

## 2019-10-05 RX ADMIN — PIPERACILLIN AND TAZOBACTAM 3.38 G: 3; .375 INJECTION, POWDER, LYOPHILIZED, FOR SOLUTION INTRAVENOUS at 23:19

## 2019-10-05 RX ADMIN — METOPROLOL TARTRATE 25 MG: 25 TABLET ORAL at 20:41

## 2019-10-05 RX ADMIN — INSULIN GLARGINE 15 UNITS: 100 INJECTION, SOLUTION SUBCUTANEOUS at 09:37

## 2019-10-05 RX ADMIN — Medication 10 ML: at 17:10

## 2019-10-05 RX ADMIN — INSULIN LISPRO 4 UNITS: 100 INJECTION, SOLUTION INTRAVENOUS; SUBCUTANEOUS at 17:10

## 2019-10-05 RX ADMIN — FAMOTIDINE 20 MG: 20 TABLET ORAL at 17:10

## 2019-10-05 RX ADMIN — ALLOPURINOL 100 MG: 100 TABLET ORAL at 08:43

## 2019-10-05 RX ADMIN — APIXABAN 5 MG: 5 TABLET, FILM COATED ORAL at 12:10

## 2019-10-05 RX ADMIN — APIXABAN 5 MG: 5 TABLET, FILM COATED ORAL at 23:19

## 2019-10-05 RX ADMIN — IPRATROPIUM BROMIDE AND ALBUTEROL SULFATE 3 ML: .5; 3 SOLUTION RESPIRATORY (INHALATION) at 09:46

## 2019-10-05 RX ADMIN — TRAMADOL HYDROCHLORIDE 25 MG: 50 TABLET ORAL at 20:45

## 2019-10-05 RX ADMIN — INSULIN LISPRO 4 UNITS: 100 INJECTION, SOLUTION INTRAVENOUS; SUBCUTANEOUS at 07:12

## 2019-10-05 RX ADMIN — INSULIN LISPRO 4 UNITS: 100 INJECTION, SOLUTION INTRAVENOUS; SUBCUTANEOUS at 12:22

## 2019-10-05 NOTE — PROGRESS NOTES
Hospitalist Progress Note                               Anita Cordova MD                                     Answering service: [phone]                               OR 36 from in house phone                                         Date of Service:  10/5/2019  NAME:  Britany Miller  :  1931  MRN:  [id number]      Admission Summary:   80-year-old female with an extensive past medical history including chronic hypoxemic respiratory failure with home oxygen, COPD, chronic kidney disease, type 2 diabetes mellitus, atrial fibrillation, diastolic congestive heart failure, dyslipidemia, primary hypertension, osteoporosis, pulmonary hypertension, depression and anxiety disorder, was brought to the emergency room from home for evaluation of an approximately 2-3 day history of worsening shortness of breath. Per the patient's son, the patient ambulates unaided at baseline; however, in the 2-3 days leading up to the emergency room presentation, noted to have worsening shortness of breath even with easy activity. The patient also complained of some numbness and weakness in the right lower extremity. The patient admitted to being compliant with all her medical therapies. The patient denied associated headaches, dizziness, visual deficits, chest pains, palpitations, nausea, vomiting, cough, sputum production, fevers, chills, abdominal pain, bladder or bowel irregularities. Reason for follow up:       CC: SOB    Chart reviewed. All events in the past 24 hours reviewed in their entirety. Patient febrile in the past 24 hours. No acute distress on am rounds. Assessment & Plan:     1) CVS: Acute on chronic diastolic CHF exarcerbation with a preserved EF. Probable Demand ischemia with mildly elevated troponins due to the CHF exarcerbation. Indeterminable NYHA Functional Class due to the multiple Co-morbidities per Cardiology. Primary Hypertension. Dyslipidemia. 2D ECHO (9/12/19) with EF 70 percent. Continue oxygen, gentle diuresis, daily weight input/output monitoring, CHF teaching. Cardiology and Heart failure team F/Us noted and appreciated. 2) Renal: Resolving HOLLAND on probable CKD stage 2-3 due to probable medication side effect (Bumex and Spironolactone) prior to admission. Renal US negative for obstructive uropathy. Nephrology F/U noted and appreciated. 3) Electrolytes: Resolving probable multifactorial Hyponatremia present on admission. Resolved Hyperkalemia without any dysrhythmias. 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 Neurology eval noted and appreciated. D/W Dr Krystyna Elizabeth. 5) Vascular: Ischemic right leg. S/P RLE Thrombectomy 9/21/19 with residual clot in Popliteal Artery. S/P repeat Thrombectomy 9/22/19. Now with significant muscle ischemia right calf with likely right foot drop. Aspirin and plavix discontinued due to Epistaxis. Continue Apixaban. Will likely need R. AKA if no significant clinical improvement. Vascular Surgical F/U noted and appreciated. D/W Dr Leeann Aguiar. 6) ID: Low threshold for development of sepsis due to ischemic right leg. 24-hr Tmax 101.6F, leukocytosis, left shift, bandemia. Blood cultures in progress. ID consult for antibiotic recommendations. 7) Resp: Chronic Hypoxemic respiratory failure due to COPD with home oxygen. Nebulizers, inhalers, incentive spirometry. 8) Hematology: Anemia of chronicity. 9) Endocrine: Uncontrolled insulin treated type 2 DM with complications including hyperglycemia. Hold metformin. Accucheck monitoring, Basal and sliding scale insulins, diabetic teaching. Diabetic team evaluation with recommendations appreciated. 10) MANNIE: Osteoporosis. Mild Rhabdomyolysis. Trend CPK. 11) Psychiatry: Anxiety disorder and Depression without any current behavioral disturbances. 12) Prophylaxis: DVT.     13) Directives: DDNR with an extremely  guarded prognosis. At increased risk of decompensation. Readdressed with patient and patient's children. Palliative care F/U noted and appreciated. 14) Plan: Patient will require Post-Acute care facility placement for ongoing therapies. D/W patient, patient's son Chandler Whyte ([phone]), patient's daughter Nolberto Job, Nursing and caremanagement. Hospital Problems  Date Reviewed: 9/20/2019          Codes Class Noted POA    * (Principal) CHF exacerbation ([address] ICD-10-CM: I50.9  ICD-9-CM: 428.0  9/17/2019 Yes        Hyperkalemia ICD-10-CM: E87.5  ICD-9-CM: 276.7  9/17/2019 Yes        Hyponatremia ICD-10-CM: E87.1  ICD-9-CM: 276.1  9/17/2019 Yes        HOLLAND (acute kidney injury) ([address] ICD-10-CM: N17.9  ICD-9-CM: 584.9  9/17/2019 Yes        Acute hypoxemic respiratory failure ([address] ICD-10-CM: J96.01  ICD-9-CM: 518.81  8/8/2011 Yes        Debility ICD-10-CM: R53.81  ICD-9-CM: 799.3  6/28/2011 Yes                  Physical Examination:      Last 24hrs VS reviewed since prior progress note. Most recent are:  Visit Vitals  /66 (BP 1 Location: Left arm, BP Patient Position: At rest)   Pulse 86   Temp 99.1 °F (37.3 °C)   Resp 18   Ht 5' 5\" (1.651 m)   Wt 89.2 kg (196 lb 10.4 oz)   SpO2 100%   Breastfeeding? No   BMI 32.72 kg/m²           Constitutional:  No acute distress. HEENT: Head is a traumatic,  Un icteric sclera. Pink conjunctiva,no erythema or discharge. Oral mucous moist, oropharynx benign. Neck supple,    Resp:  Decreased air entry B/L.   CV:  S1,S2 present. GI:  Soft, non distended, non tender. normoactive bowel sounds, no hepatosplenomegaly    :  No CVA or suprapubic tenderness   Skin  :  Cool and dark RLE. Absent Dorsalis Pedis pulse. Slightly cool LLE. Musculoskeletal:  Dressing insitu to right leg    Neurologic:  Awake, alert and oriented.             No intake or output data in the 24 hours ending 10/05/19 1026           Labs:     Recent Labs 10/05/19  0224 10/04/19  0325   WBC 19.1* 17.9*   HGB 7.3* 7.5*   HCT 23.9* 24.5*   * 392     Recent Labs     10/05/19  0224 10/04/19  0325 10/03/19  0243   * 134* 134*   K 4.1 4.1 4.6   CL 94* 95* 95*   CO2 32 30 32   BUN 23* 22* 21*   CREA 1.18* 1.09* 1.05*   * 138* 152*   CA 8.7 8.9 8.3*     No results for input(s): SGOT, GPT, ALT, AP, TBIL, TBILI, TP, ALB, GLOB, GGT, AML, LPSE in the last 72 hours. No lab exists for component: AMYP, HLPSE  No results for input(s): INR, PTP, APTT, INREXT, INREXT in the last 72 hours. No results for input(s): FE, TIBC, PSAT, FERR in the last 72 hours. No results found for: FOL, RBCF   No results for input(s): PH, PCO2, PO2 in the last 72 hours.   Recent Labs     10/05/19  0224 10/04/19  0325 10/03/19  0243   CPK 1,294* 1,491* 1,805*     Lab Results   Component Value Date/Time    Cholesterol, total 144 09/17/2019 09:21 AM    HDL Cholesterol 55 09/17/2019 09:21 AM    LDL, calculated 72.6 09/17/2019 09:21 AM    Triglyceride 82 09/17/2019 09:21 AM    CHOL/HDL Ratio 2.6 09/17/2019 09:21 AM     Lab Results   Component Value Date/Time    Glucose (POC) 162 (H) 10/05/2019 06:56 AM    Glucose (POC) 309 (H) 10/04/2019 09:14 PM    Glucose (POC) 169 (H) 10/04/2019 04:28 PM    Glucose (POC) 205 (H) 10/04/2019 11:46 AM    Glucose (POC) 165 (H) 10/04/2019 06:29 AM     Lab Results   Component Value Date/Time    Color YELLOW/STRAW 09/17/2019 12:49 PM    Appearance CLEAR 09/17/2019 12:49 PM    Specific gravity 1.030 09/17/2019 12:49 PM    Specific gravity 1.010 12/18/2011 09:28 AM    pH (UA) 6.0 09/17/2019 12:49 PM    Protein NEGATIVE  09/17/2019 12:49 PM    Glucose NEGATIVE  09/17/2019 12:49 PM    Ketone NEGATIVE  09/17/2019 12:49 PM    Bilirubin NEGATIVE  09/17/2019 12:49 PM    Urobilinogen 0.2 09/17/2019 12:49 PM    Nitrites NEGATIVE  09/17/2019 12:49 PM    Leukocyte Esterase NEGATIVE  09/17/2019 12:49 PM    Epithelial cells FEW 09/17/2019 12:49 PM    Bacteria NEGATIVE 09/17/2019 12:49 PM    WBC 0-4 09/17/2019 12:49 PM    RBC 0-5 09/17/2019 12:49 PM         Medications Reviewed:     Current Facility-Administered Medications   Medication Dose Route Frequency    insulin lispro (HUMALOG) injection 4 Units  4 Units SubCUTAneous TIDAC    sodium chloride (OCEAN) 0.65 % nasal squeeze bottle 2 Spray  2 Spray Both Nostrils Q2H PRN    oxymetazoline (AFRIN) 0.05 % nasal spray 2 Spray  2 Spray Both Nostrils BID PRN    albuterol-ipratropium (DUO-NEB) 2.5 MG-0.5 MG/3 ML  3 mL Nebulization BID RT    furosemide (LASIX) tablet 40 mg  40 mg Oral DAILY    0.9% sodium chloride infusion 250 mL  250 mL IntraVENous PRN    0.9% sodium chloride infusion 250 mL  250 mL IntraVENous PRN    apixaban (ELIQUIS) tablet 5 mg  5 mg Oral Q12H    insulin lispro (HUMALOG) injection   SubCUTAneous AC&HS    glucose chewable tablet 16 g  4 Tab Oral PRN    glucagon (GLUCAGEN) injection 1 mg  1 mg IntraMUSCular PRN    dextrose 10% infusion 0-250 mL  0-250 mL IntraVENous PRN    albuterol-ipratropium (DUO-NEB) 2.5 MG-0.5 MG/3 ML  3 mL Nebulization Q4H PRN    alum-mag hydroxide-simeth (MYLANTA) oral suspension 30 mL  30 mL Oral Q4H PRN    famotidine (PEPCID) tablet 20 mg  20 mg Oral QPM    metoprolol tartrate (LOPRESSOR) tablet 25 mg  25 mg Oral BID    traMADol (ULTRAM) tablet 25 mg  25 mg Oral Q8H PRN    fentaNYL citrate (PF) injection 50 mcg  50 mcg IntraVENous Q3H PRN    insulin glargine (LANTUS) injection 15 Units  15 Units SubCUTAneous DAILY    ondansetron (ZOFRAN) injection 4 mg  4 mg IntraVENous Q6H PRN    sodium chloride (NS) flush 5-40 mL  5-40 mL IntraVENous Q8H    sodium chloride (NS) flush 5-40 mL  5-40 mL IntraVENous PRN    acetaminophen (TYLENOL) tablet 650 mg  650 mg Oral Q4H PRN    allopurinol (ZYLOPRIM) tablet 100 mg  100 mg Oral DAILY     ______________________________________________________________________  EXPECTED LENGTH OF STAY: 6d 14h  ACTUAL LENGTH OF STAY: Abbie Shannon 178, MD

## 2019-10-05 NOTE — CONSULTS
[address]    Name:  Tye Diaz  MR#:  [id number]  :  1931  ACCOUNT #:  [de-identified]  DATE OF SERVICE:  10/05/2019    CHIEF COMPLAINT:  Shortness of breath. HISTORY OF PRESENT ILLNESS:  Please note the patient is a very poor historian. History was taken from review of chart records and discussion with patient's nurse at bedside. The patient is an 69-year-old female with a past medical history significant for chronic hypoxemic respiratory failure on home oxygen, chronic kidney disease, and diabetes mellitus who was admitted to [facility] on  with complaints of shortness of breath. The patient was seen by Cardiology and was treated for suspected congestive heart failure. The patient also complained of right leg weakness and numbness at the time of admission. She was seen by Neurology and ultimately by Vascular Surgery on  who found her to have acute right lower extremity ischemia. She was taken to the OR that day where she underwent a thrombectomy for a subacute iliac artery thrombosis. There was minimal improvement. She was taken back to the OR the following day where she underwent a thrombectomy and fasciotomy for a popliteal artery thrombus with devitalized calf muscle. She has been relatively stable following this procedure, although her white count has remained elevated. Vascular Surgery has been following her and feels that the foot is viable, but cool and nonfunctional with ongoing ischemic muscle. Plans were underway for discharge to [facility]; however, she has spiked a fever today. She is not currently on any antibiotics. The patient has no specific complaints. The Infectious Diseases Service has been asked to assist with antibiotic management.     PAST MEDICAL HISTORY:  Chronic hypoxemic respiratory failure with home oxygen, COPD, chronic kidney disease, diabetes mellitus, atrial fibrillation, diastolic congestive heart failure, pulmonary hypertension, depression, anxiety disorder. PAST SURGICAL HISTORY:  Lumpectomy, right-sided mastectomy. OUTPATIENT MEDICATIONS:  Please see body of the chart for details. She is not on antibiotics prior to admission. ALLERGIES:  NO ANTIBIOTIC ALLERGIES. SOCIAL HISTORY:  No alcohol, tobacco, or illicit drug use. She lives at home with her son. FAMILY HISTORY:  Hypertension, cerebrovascular accident. REVIEW OF SYSTEMS:  Unobtainable as the patient is very poor historian. LABORATORY DATA:  White blood cell count 19,000. The patient's white blood cell count was 11,000 at the time of admission. Platelet count 440,750. Creatinine is 1.18. CK 1294. Blood cultures from 10/04/2019 are pending. PHYSICAL EXAMINATION:  GENERAL:  Alert, in no acute distress. VITAL SIGNS:  Temperature 98.3, maximum temperature is 101.6 degrees Fahrenheit, heart rate 99 beats per minute, blood pressure 109/57, respiratory rate is 16, oxygen saturation 100% on 3 L nasal canula. HEENT:  Normocephalic, atraumatic. Mucous membranes moist.  NECK:  Supple. HEART:  Regular rate and rhythm. No murmurs, gallops or rubs. ABDOMEN:  Soft, nontender, nondistended. GENITOURINARY:  No CVA or suprapubic tenderness. EXTREMITIES:  The proximal suture site on the right inner thigh is unremarkable. There is ischemia at the distal lateral aspect of the right lower extremity with some tissue necrosis and malodor. No purulence noted. The proximal right lower extremity wound is healthy appearing with no purulence or erythema. PSYCHIATRIC:  Normal affect. ASSESSMENT AND PLAN:  Fever and leukocytosis. No diarhea to suggest cdiff. Peripheral line. No altman. Patient with no specific complaints. I suspect this is due to ischemic right lower extremity with developing infection. We will start the patient on empiric piperacillin-tazobactam to cover the leg.   Recommend re-evaluation by Vascular Surgery. The patient may ultimately need an above-knee amputation. We will check blood cultures, urine culture and portable chest x-ray for completeness. Further recommendations pending results of above. Thank you for allowing me to participate in the care of this patient.       [address] Young Hoffman DO      [id number]  D:  10/05/2019 14:37  T:  10/05/2019 16:54  JOB #:  [id number]

## 2019-10-05 NOTE — PROGRESS NOTES
Wound care dressing was done on the patient: dressing is clean, dry and intact. There is purulent drainage and blood coming from the wound.  Wound also was foul smelling

## 2019-10-05 NOTE — PROGRESS NOTES
Wound care consult added, pt needs orders to continue dressing changes,  current dressing changed: clean, dry, intact, purulent drainage from the wound with a foul odor. Pt was medicated prior to dressing change, tolerated well.

## 2019-10-06 LAB
ANION GAP SERPL CALC-SCNC: 6 MMOL/L (ref 5–15)
BASOPHILS # BLD: 0 K/UL (ref 0–0.1)
BASOPHILS NFR BLD: 0 % (ref 0–1)
BUN SERPL-MCNC: 21 MG/DL (ref 6–20)
BUN/CREAT SERPL: 18 (ref 12–20)
CALCIUM SERPL-MCNC: 8.7 MG/DL (ref 8.5–10.1)
CHLORIDE SERPL-SCNC: 96 MMOL/L (ref 97–108)
CK SERPL-CCNC: 961 U/L (ref 26–192)
CO2 SERPL-SCNC: 31 MMOL/L (ref 21–32)
CREAT SERPL-MCNC: 1.15 MG/DL (ref 0.55–1.02)
DIFFERENTIAL METHOD BLD: ABNORMAL
EOSINOPHIL # BLD: 0 K/UL (ref 0–0.4)
EOSINOPHIL NFR BLD: 0 % (ref 0–7)
ERYTHROCYTE [DISTWIDTH] IN BLOOD BY AUTOMATED COUNT: 16.4 % (ref 11.5–14.5)
GLUCOSE BLD STRIP.AUTO-MCNC: 112 MG/DL (ref 65–100)
GLUCOSE BLD STRIP.AUTO-MCNC: 199 MG/DL (ref 65–100)
GLUCOSE BLD STRIP.AUTO-MCNC: 232 MG/DL (ref 65–100)
GLUCOSE BLD STRIP.AUTO-MCNC: 239 MG/DL (ref 65–100)
GLUCOSE SERPL-MCNC: 92 MG/DL (ref 65–100)
HCT VFR BLD AUTO: 22.7 % (ref 35–47)
HGB BLD-MCNC: 7 G/DL (ref 11.5–16)
IMM GRANULOCYTES # BLD AUTO: 0 K/UL
IMM GRANULOCYTES NFR BLD AUTO: 0 %
LYMPHOCYTES # BLD: 1.1 K/UL (ref 0.8–3.5)
LYMPHOCYTES NFR BLD: 5 % (ref 12–49)
MCH RBC QN AUTO: 27.1 PG (ref 26–34)
MCHC RBC AUTO-ENTMCNC: 30.8 G/DL (ref 30–36.5)
MCV RBC AUTO: 88 FL (ref 80–99)
MONOCYTES # BLD: 1.5 K/UL (ref 0–1)
MONOCYTES NFR BLD: 7 % (ref 5–13)
NEUTS BAND NFR BLD MANUAL: 3 % (ref 0–6)
NEUTS SEG # BLD: 19 K/UL (ref 1.8–8)
NEUTS SEG NFR BLD: 85 % (ref 32–75)
NRBC # BLD: 0 K/UL (ref 0–0.01)
NRBC BLD-RTO: 0 PER 100 WBC
PLATELET # BLD AUTO: 406 K/UL (ref 150–400)
PLATELET COMMENTS,PCOM: ABNORMAL
PMV BLD AUTO: 9.7 FL (ref 8.9–12.9)
POTASSIUM SERPL-SCNC: 4.2 MMOL/L (ref 3.5–5.1)
RBC # BLD AUTO: 2.58 M/UL (ref 3.8–5.2)
RBC MORPH BLD: ABNORMAL
SERVICE CMNT-IMP: ABNORMAL
SODIUM SERPL-SCNC: 133 MMOL/L (ref 136–145)
WBC # BLD AUTO: 21.6 K/UL (ref 3.6–11)

## 2019-10-06 PROCEDURE — 74011636637 HC RX REV CODE- 636/637: Performed by: SURGERY

## 2019-10-06 PROCEDURE — 74011000258 HC RX REV CODE- 258: Performed by: INTERNAL MEDICINE

## 2019-10-06 PROCEDURE — 94640 AIRWAY INHALATION TREATMENT: CPT

## 2019-10-06 PROCEDURE — 82550 ASSAY OF CK (CPK): CPT

## 2019-10-06 PROCEDURE — 94760 N-INVAS EAR/PLS OXIMETRY 1: CPT

## 2019-10-06 PROCEDURE — 36415 COLL VENOUS BLD VENIPUNCTURE: CPT

## 2019-10-06 PROCEDURE — 74011250637 HC RX REV CODE- 250/637: Performed by: SURGERY

## 2019-10-06 PROCEDURE — 65270000029 HC RM PRIVATE

## 2019-10-06 PROCEDURE — 74011636637 HC RX REV CODE- 636/637: Performed by: HOSPITALIST

## 2019-10-06 PROCEDURE — 74011250636 HC RX REV CODE- 250/636: Performed by: INTERNAL MEDICINE

## 2019-10-06 PROCEDURE — 80048 BASIC METABOLIC PNL TOTAL CA: CPT

## 2019-10-06 PROCEDURE — 77010033678 HC OXYGEN DAILY

## 2019-10-06 PROCEDURE — 85025 COMPLETE CBC W/AUTO DIFF WBC: CPT

## 2019-10-06 PROCEDURE — 82962 GLUCOSE BLOOD TEST: CPT

## 2019-10-06 PROCEDURE — 74011250637 HC RX REV CODE- 250/637: Performed by: FAMILY MEDICINE

## 2019-10-06 PROCEDURE — 74011000250 HC RX REV CODE- 250: Performed by: HOSPITALIST

## 2019-10-06 RX ORDER — IPRATROPIUM BROMIDE AND ALBUTEROL SULFATE 2.5; .5 MG/3ML; MG/3ML
3 SOLUTION RESPIRATORY (INHALATION)
Status: DISCONTINUED | OUTPATIENT
Start: 2019-10-06 | End: 2019-10-18 | Stop reason: ALTCHOICE

## 2019-10-06 RX ADMIN — INSULIN LISPRO 1 UNITS: 100 INJECTION, SOLUTION INTRAVENOUS; SUBCUTANEOUS at 23:04

## 2019-10-06 RX ADMIN — TRAMADOL HYDROCHLORIDE 25 MG: 50 TABLET ORAL at 15:54

## 2019-10-06 RX ADMIN — PIPERACILLIN AND TAZOBACTAM 3.38 G: 3; .375 INJECTION, POWDER, LYOPHILIZED, FOR SOLUTION INTRAVENOUS at 06:38

## 2019-10-06 RX ADMIN — APIXABAN 5 MG: 5 TABLET, FILM COATED ORAL at 23:04

## 2019-10-06 RX ADMIN — PIPERACILLIN AND TAZOBACTAM 3.38 G: 3; .375 INJECTION, POWDER, LYOPHILIZED, FOR SOLUTION INTRAVENOUS at 23:03

## 2019-10-06 RX ADMIN — Medication 10 ML: at 06:39

## 2019-10-06 RX ADMIN — IPRATROPIUM BROMIDE AND ALBUTEROL SULFATE 3 ML: .5; 3 SOLUTION RESPIRATORY (INHALATION) at 08:19

## 2019-10-06 RX ADMIN — INSULIN LISPRO 4 UNITS: 100 INJECTION, SOLUTION INTRAVENOUS; SUBCUTANEOUS at 13:52

## 2019-10-06 RX ADMIN — FAMOTIDINE 20 MG: 20 TABLET ORAL at 17:03

## 2019-10-06 RX ADMIN — Medication 10 ML: at 13:52

## 2019-10-06 RX ADMIN — METOPROLOL TARTRATE 25 MG: 25 TABLET ORAL at 21:06

## 2019-10-06 RX ADMIN — INSULIN LISPRO 4 UNITS: 100 INJECTION, SOLUTION INTRAVENOUS; SUBCUTANEOUS at 17:00

## 2019-10-06 RX ADMIN — ALLOPURINOL 100 MG: 100 TABLET ORAL at 10:13

## 2019-10-06 RX ADMIN — TRAMADOL HYDROCHLORIDE 25 MG: 50 TABLET ORAL at 23:04

## 2019-10-06 RX ADMIN — APIXABAN 5 MG: 5 TABLET, FILM COATED ORAL at 10:12

## 2019-10-06 RX ADMIN — PIPERACILLIN AND TAZOBACTAM 3.38 G: 3; .375 INJECTION, POWDER, LYOPHILIZED, FOR SOLUTION INTRAVENOUS at 14:00

## 2019-10-06 RX ADMIN — INSULIN GLARGINE 15 UNITS: 100 INJECTION, SOLUTION SUBCUTANEOUS at 10:12

## 2019-10-06 RX ADMIN — FENTANYL CITRATE 50 MCG: 50 INJECTION INTRAMUSCULAR; INTRAVENOUS at 02:16

## 2019-10-06 RX ADMIN — TRAMADOL HYDROCHLORIDE 25 MG: 50 TABLET ORAL at 07:27

## 2019-10-06 RX ADMIN — Medication 10 ML: at 21:07

## 2019-10-06 RX ADMIN — INSULIN LISPRO 2 UNITS: 100 INJECTION, SOLUTION INTRAVENOUS; SUBCUTANEOUS at 17:00

## 2019-10-06 NOTE — PROGRESS NOTES
Patient RLE wound dressing changed. Dressing foul  smelling and heavily saturated with serosanguinous fluid. Wound bed cleaned with wound cleanser pat dry with sterile gauze. Petroleum gauze applied to wound bed 4x4 gauze and ABD wrapped with Kerlix secured with medipore tape. Labeled and dated. Patient left with bed in lowest position call bell in reach bed rails up x3. Will continue to monitor.

## 2019-10-06 NOTE — ROUTINE PROCESS
ADULT PROTOCOL: JET AEROSOL  REASSESSMENT Patient  Rose Mary Malloy     80 y.o.   female     10/6/2019  8:52 AM 
 
Breath Sounds Pre Procedure: Right Breath Sounds: Clear, Diminished Left Breath Sounds: Clear, Diminished Breath Sounds Post Procedure: Right Breath Sounds: Clear, Diminished Left Breath Sounds: Clear, Diminished Breathing pattern: Pre procedure Breathing Pattern: Regular Post procedure Breathing Pattern: Regular Heart Rate: Pre procedure Pulse: 88 
         Post procedure Pulse: 85 Resp Rate: Pre procedure Respirations: 16 Post procedure Respirations: 18 Incentive Spirometry:  Actual Volume (ml): 750 ml 
   
Cough: Pre procedure Cough: Non-productive Post procedure Cough: Non-productive Oxygen: O2 Device: Nasal cannula   3L uses at home Changed: NO SpO2: Pre procedure SpO2: 100 %   with oxygen Post procedure SpO2: 98 %  with oxygen Nebulizer Therapy: Current medications Aerosolized Medications: DuoNeb Changed: YES yes to a Q4prn Smoking History:  
 
Problem List:  
Patient Active Problem List  
Diagnosis Code  [facility] E11.9  
 HTN (hypertension), benign I10  Breast cancer (HCC) C50.919  
 Anemia D64.9  Debility R53.81  
 Acute hypoxemic respiratory failure (HCC) J96.01  
 Vein disorder I87.9  Allergic reaction T78.40XA  Multiple allergies Z88.9  Bronchitis J40  
 Anxiety F41.9  Hypercholesteremia E78.00  Aortic stenosis I35.0  Diastolic CHF, acute on chronic (HCC) I50.33  
 SOB (shortness of breath) R06.02  
 CHF (congestive heart failure) ([facility]) I50.9  Pulmonary edema cardiac cause ([facility]) I50.1  Moderate to severe pulmonary hypertension (HCC) I27.20  Acute respiratory disease J06.9  CHF exacerbation ([facility]) I50.9  Hyperkalemia E87.5  Hyponatremia E87.1  HOLLAND (acute kidney [facility]) ([facility] N17.9 Respiratory Therapist: Uyen Jane, RT

## 2019-10-06 NOTE — PROGRESS NOTES
Hospitalist Progress Note                               Oscar Kwok MD                                     Answering service: [phone]                               OR 36 from in house phone                                         Date of Service:  10/6/2019  NAME:  Chelsey Blackmon  :  1931  MRN:  [id number]      Admission Summary:   55-year-old female with an extensive past medical history including chronic hypoxemic respiratory failure with home oxygen, COPD, chronic kidney disease, type 2 diabetes mellitus, atrial fibrillation, diastolic congestive heart failure, dyslipidemia, primary hypertension, osteoporosis, pulmonary hypertension, depression and anxiety disorder, was brought to the emergency room from home for evaluation of an approximately 2-3 day history of worsening shortness of breath. Per the patient's son, the patient ambulates unaided at baseline; however, in the 2-3 days leading up to the emergency room presentation, noted to have worsening shortness of breath even with easy activity. The patient also complained of some numbness and weakness in the right lower extremity. The patient admitted to being compliant with all her medical therapies. The patient denied associated headaches, dizziness, visual deficits, chest pains, palpitations, nausea, vomiting, cough, sputum production, fevers, chills, abdominal pain, bladder or bowel irregularities. Reason for follow up:       CC: SOB    Chart reviewed. All events in the past 24 hours reviewed in their entirety. Patient febrile in the past 48 hours. No acute distress on am rounds. Stated that she had a fairly restful night. Assessment & Plan:     1) CVS: Acute on chronic diastolic CHF exarcerbation with a preserved EF. Probable Demand ischemia with mildly elevated troponins due to the CHF exarcerbation.  Indeterminable NYHA Functional Class due to the multiple Co-morbidities per Cardiology. Primary Hypertension. Dyslipidemia. 2D ECHO (9/12/19) with EF 70 percent. Continue oxygen, gentle diuresis, daily weight input/output monitoring, CHF teaching. Cardiology and Heart failure team F/Us noted and appreciated. 2) Renal: Resolving HOLLAND on probable CKD stage 2-3 due to probable medication side effect (Bumex and Spironolactone) prior to admission. Renal US negative for obstructive uropathy. Nephrology F/U noted and appreciated. 3) Electrolytes: Resolving probable multifactorial Hyponatremia present on admission. Resolved Hyperkalemia without any dysrhythmias. 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 Neurology eval noted and appreciated. D/W Dr Fito Zhou. 5) Vascular: Ischemic right leg. S/P RLE Thrombectomy 9/21/19 with residual clot in Popliteal Artery. S/P repeat Thrombectomy 9/22/19. Now with significant muscle ischemia right calf with likely right foot drop. Aspirin and plavix discontinued due to Epistaxis. Continue Apixaban. Will likely need R. AKA if no significant clinical improvement. Vascular Surgical F/U noted and appreciated. D/W Dr Ernie Griffith. 6) ID: Low threshold for development of sepsis due to ischemic right leg. 24-hr Tmax 101.6F, leukocytosis, left shift, bandemia. Blood cultures in progress. Empiric Zosyn. ID eval noted and appreciated. 7) Resp: Chronic Hypoxemic respiratory failure due to COPD with home oxygen. Nebulizers, inhalers, incentive spirometry. 8) Hematology: Anemia of chronicity. 9) Endocrine: Uncontrolled insulin treated type 2 DM with complications including hyperglycemia. Hold metformin. Accucheck monitoring, Basal and sliding scale insulins, diabetic teaching. Diabetic team evaluation with recommendations appreciated. 10) MANNIE: Osteoporosis. Mild Rhabdomyolysis. Trend CPK.     11) Psychiatry: Anxiety disorder and Depression without any current behavioral disturbances. 12) Prophylaxis: DVT. 13) Directives: DDNR with an extremely  guarded prognosis. At increased risk of decompensation. Readdressed with patient and patient's children. Palliative care F/U noted and appreciated. 14) Plan: Patient will require Post-Acute care facility placement for ongoing therapies. D/W patient, patient's son Charlie Davis ([phone]), patient's daughter Mike, Nursing and caremanagement. Hospital Problems  Date Reviewed: 9/20/2019          Codes Class Noted POA    * (Principal) [facility] exacerbation ([facility] ICD-10-CM: I50.9  ICD-9-CM: 428.0  9/17/2019 Yes        Hyperkalemia ICD-10-CM: E87.5  ICD-9-CM: 276.7  9/17/2019 Yes        Hyponatremia ICD-10-CM: E87.1  ICD-9-CM: 276.1  9/17/2019 Yes        HOLLAND (acute kidney injury) ([facility] ICD-10-CM: N17.9  ICD-9-CM: 584.9  9/17/2019 Yes        Acute hypoxemic respiratory failure ([facility] ICD-10-CM: J96.01  ICD-9-CM: 518.81  8/8/2011 Yes        Debility ICD-10-CM: R53.81  ICD-9-CM: 799.3  6/28/2011 Yes                  Physical Examination:      Last 24hrs VS reviewed since prior progress note. Most recent are:  Visit Vitals  /58 (BP 1 Location: Left arm, BP Patient Position: At rest)   Pulse 90   Temp 98.4 °F (36.9 °C)   Resp 18   Ht 5' 5\" (1.651 m)   Wt 88.9 kg (195 lb 15.8 oz)   SpO2 100%   Breastfeeding? No   BMI 32.61 kg/m²           Constitutional:  No acute distress. HEENT: Head is a traumatic,  Un icteric sclera. Pink conjunctiva,no erythema or discharge. Oral mucous moist, oropharynx benign. Neck supple,    Resp:  Decreased air entry B/L.   CV:  S1,S2 present. GI:  Soft, non distended, non tender. normoactive bowel sounds, no hepatosplenomegaly    :  No CVA or suprapubic tenderness   Skin  :  Cool and dark RLE. Absent Dorsalis Pedis pulse. Slightly cool LLE. Musculoskeletal:  Dressing insitu to right leg    Neurologic:  Awake, alert and oriented.             No intake or output data in the 24 hours ending 10/06/19 0851           Labs:     Recent Labs     10/06/19  0147 10/05/19  0224   WBC 21.6* 19.1*   HGB 7.0* 7.3*   HCT 22.7* 23.9*   * 403*     Recent Labs     10/06/19  0147 10/05/19  0224 10/04/19  0325   * 132* 134*   K 4.2 4.1 4.1   CL 96* 94* 95*   CO2 31 32 30   BUN 21* 23* 22*   CREA 1.15* 1.18* 1.09*   GLU 92 167* 138*   CA 8.7 8.7 8.9     No results for input(s): SGOT, GPT, ALT, AP, TBIL, TBILI, TP, ALB, GLOB, GGT, AML, LPSE in the last 72 hours. No lab exists for component: AMYP, HLPSE  No results for input(s): INR, PTP, APTT, INREXT, INREXT in the last 72 hours. No results for input(s): FE, TIBC, PSAT, FERR in the last 72 hours. No results found for: FOL, RBCF   No results for input(s): PH, PCO2, PO2 in the last 72 hours.   Recent Labs     10/06/19  0147 10/05/19  0224 10/04/19  0325   * 1,294* 1,491*     Lab Results   Component Value Date/Time    Cholesterol, total 144 09/17/2019 09:21 AM    HDL Cholesterol 55 09/17/2019 09:21 AM    LDL, calculated 72.6 09/17/2019 09:21 AM    Triglyceride 82 09/17/2019 09:21 AM    CHOL/HDL Ratio 2.6 09/17/2019 09:21 AM     Lab Results   Component Value Date/Time    Glucose (POC) 112 (H) 10/06/2019 06:15 AM    Glucose (POC) 86 10/05/2019 09:36 PM    Glucose (POC) 111 (H) 10/05/2019 04:26 PM    Glucose (POC) 171 (H) 10/05/2019 11:52 AM    Glucose (POC) 162 (H) 10/05/2019 06:56 AM     Lab Results   Component Value Date/Time    Color DARK YELLOW 10/05/2019 06:02 PM    Appearance CLOUDY (A) 10/05/2019 06:02 PM    Specific gravity 1.018 10/05/2019 06:02 PM    Specific gravity 1.010 12/18/2011 09:28 AM    pH (UA) 5.5 10/05/2019 06:02 PM    Protein 30 (A) 10/05/2019 06:02 PM    Glucose NEGATIVE  10/05/2019 06:02 PM    Ketone NEGATIVE  10/05/2019 06:02 PM    Bilirubin NEGATIVE  09/17/2019 12:49 PM    Urobilinogen >8.0 (H) 10/05/2019 06:02 PM    Nitrites NEGATIVE  10/05/2019 06:02 PM    Leukocyte Esterase TRACE (A) 10/05/2019 06:02 PM    Epithelial cells FEW 10/05/2019 06:02 PM    Bacteria NEGATIVE  10/05/2019 06:02 PM    WBC 0-4 10/05/2019 06:02 PM    RBC 0-5 10/05/2019 06:02 PM         Medications Reviewed:     Current Facility-Administered Medications   Medication Dose Route Frequency    piperacillin-tazobactam (ZOSYN) 3.375 g in 0.9% sodium chloride (MBP/ADV) 100 mL  3.375 g IntraVENous Q8H    insulin lispro (HUMALOG) injection 4 Units  4 Units SubCUTAneous TIDAC    sodium chloride (OCEAN) 0.65 % nasal squeeze bottle 2 Spray  2 Spray Both Nostrils Q2H PRN    oxymetazoline (AFRIN) 0.05 % nasal spray 2 Spray  2 Spray Both Nostrils BID PRN    albuterol-ipratropium (DUO-NEB) 2.5 MG-0.5 MG/3 ML  3 mL Nebulization BID RT    furosemide (LASIX) tablet 40 mg  40 mg Oral DAILY    0.9% sodium chloride infusion 250 mL  250 mL IntraVENous PRN    0.9% sodium chloride infusion 250 mL  250 mL IntraVENous PRN    apixaban (ELIQUIS) tablet 5 mg  5 mg Oral Q12H    insulin lispro (HUMALOG) injection   SubCUTAneous AC&HS    glucose chewable tablet 16 g  4 Tab Oral PRN    glucagon (GLUCAGEN) injection 1 mg  1 mg IntraMUSCular PRN    dextrose 10% infusion 0-250 mL  0-250 mL IntraVENous PRN    albuterol-ipratropium (DUO-NEB) 2.5 MG-0.5 MG/3 ML  3 mL Nebulization Q4H PRN    alum-mag hydroxide-simeth (MYLANTA) oral suspension 30 mL  30 mL Oral Q4H PRN    famotidine (PEPCID) tablet 20 mg  20 mg Oral QPM    metoprolol tartrate (LOPRESSOR) tablet 25 mg  25 mg Oral BID    traMADol (ULTRAM) tablet 25 mg  25 mg Oral Q8H PRN    fentaNYL citrate (PF) injection 50 mcg  50 mcg IntraVENous Q3H PRN    insulin glargine (LANTUS) injection 15 Units  15 Units SubCUTAneous DAILY    ondansetron (ZOFRAN) injection 4 mg  4 mg IntraVENous Q6H PRN    sodium chloride (NS) flush 5-40 mL  5-40 mL IntraVENous Q8H    sodium chloride (NS) flush 5-40 mL  5-40 mL IntraVENous PRN    acetaminophen (TYLENOL) tablet 650 mg  650 mg Oral Q4H PRN    allopurinol (ZYLOPRIM) tablet 100 mg  100 mg Oral DAILY     ______________________________________________________________________  EXPECTED LENGTH OF STAY: 6d 14h  ACTUAL LENGTH OF STAY:          Denny Blair MD

## 2019-10-07 LAB
ANION GAP SERPL CALC-SCNC: 7 MMOL/L (ref 5–15)
BASOPHILS # BLD: 0 K/UL (ref 0–0.1)
BASOPHILS NFR BLD: 0 % (ref 0–1)
BUN SERPL-MCNC: 25 MG/DL (ref 6–20)
BUN/CREAT SERPL: 20 (ref 12–20)
CALCIUM SERPL-MCNC: 8.4 MG/DL (ref 8.5–10.1)
CHLORIDE SERPL-SCNC: 95 MMOL/L (ref 97–108)
CK SERPL-CCNC: 782 U/L (ref 26–192)
CO2 SERPL-SCNC: 32 MMOL/L (ref 21–32)
CREAT SERPL-MCNC: 1.25 MG/DL (ref 0.55–1.02)
DIFFERENTIAL METHOD BLD: ABNORMAL
EOSINOPHIL # BLD: 0.4 K/UL (ref 0–0.4)
EOSINOPHIL NFR BLD: 2 % (ref 0–7)
ERYTHROCYTE [DISTWIDTH] IN BLOOD BY AUTOMATED COUNT: 16.5 % (ref 11.5–14.5)
GLUCOSE BLD STRIP.AUTO-MCNC: 105 MG/DL (ref 65–100)
GLUCOSE BLD STRIP.AUTO-MCNC: 140 MG/DL (ref 65–100)
GLUCOSE BLD STRIP.AUTO-MCNC: 158 MG/DL (ref 65–100)
GLUCOSE BLD STRIP.AUTO-MCNC: 168 MG/DL (ref 65–100)
GLUCOSE BLD STRIP.AUTO-MCNC: 78 MG/DL (ref 65–100)
GLUCOSE SERPL-MCNC: 149 MG/DL (ref 65–100)
HCT VFR BLD AUTO: 23.5 % (ref 35–47)
HGB BLD-MCNC: 7.2 G/DL (ref 11.5–16)
IMM GRANULOCYTES # BLD AUTO: 0 K/UL
IMM GRANULOCYTES NFR BLD AUTO: 0 %
LYMPHOCYTES # BLD: 0.9 K/UL (ref 0.8–3.5)
LYMPHOCYTES NFR BLD: 5 % (ref 12–49)
MCH RBC QN AUTO: 27.6 PG (ref 26–34)
MCHC RBC AUTO-ENTMCNC: 30.6 G/DL (ref 30–36.5)
MCV RBC AUTO: 90 FL (ref 80–99)
MONOCYTES # BLD: 0.9 K/UL (ref 0–1)
MONOCYTES NFR BLD: 5 % (ref 5–13)
NEUTS SEG # BLD: 16.6 K/UL (ref 1.8–8)
NEUTS SEG NFR BLD: 88 % (ref 32–75)
NRBC # BLD: 0 K/UL (ref 0–0.01)
NRBC BLD-RTO: 0 PER 100 WBC
PLATELET # BLD AUTO: 414 K/UL (ref 150–400)
PMV BLD AUTO: 9.6 FL (ref 8.9–12.9)
POTASSIUM SERPL-SCNC: 4.1 MMOL/L (ref 3.5–5.1)
RBC # BLD AUTO: 2.61 M/UL (ref 3.8–5.2)
RBC MORPH BLD: ABNORMAL
SERVICE CMNT-IMP: ABNORMAL
SERVICE CMNT-IMP: NORMAL
SODIUM SERPL-SCNC: 134 MMOL/L (ref 136–145)
WBC # BLD AUTO: 18.8 K/UL (ref 3.6–11)

## 2019-10-07 PROCEDURE — 77030011255 HC DSG AQUACEL AG BMS -A

## 2019-10-07 PROCEDURE — 74011250637 HC RX REV CODE- 250/637: Performed by: SURGERY

## 2019-10-07 PROCEDURE — 74011250637 HC RX REV CODE- 250/637: Performed by: FAMILY MEDICINE

## 2019-10-07 PROCEDURE — 80048 BASIC METABOLIC PNL TOTAL CA: CPT

## 2019-10-07 PROCEDURE — 74011250636 HC RX REV CODE- 250/636: Performed by: SURGERY

## 2019-10-07 PROCEDURE — 82550 ASSAY OF CK (CPK): CPT

## 2019-10-07 PROCEDURE — 74011000258 HC RX REV CODE- 258: Performed by: INTERNAL MEDICINE

## 2019-10-07 PROCEDURE — 74011250637 HC RX REV CODE- 250/637: Performed by: INTERNAL MEDICINE

## 2019-10-07 PROCEDURE — 65270000029 HC RM PRIVATE

## 2019-10-07 PROCEDURE — 82962 GLUCOSE BLOOD TEST: CPT

## 2019-10-07 PROCEDURE — 74011636637 HC RX REV CODE- 636/637: Performed by: HOSPITALIST

## 2019-10-07 PROCEDURE — 36415 COLL VENOUS BLD VENIPUNCTURE: CPT

## 2019-10-07 PROCEDURE — 74011250636 HC RX REV CODE- 250/636: Performed by: INTERNAL MEDICINE

## 2019-10-07 PROCEDURE — 74011636637 HC RX REV CODE- 636/637: Performed by: SURGERY

## 2019-10-07 PROCEDURE — 85025 COMPLETE CBC W/AUTO DIFF WBC: CPT

## 2019-10-07 RX ADMIN — INSULIN LISPRO 4 UNITS: 100 INJECTION, SOLUTION INTRAVENOUS; SUBCUTANEOUS at 16:52

## 2019-10-07 RX ADMIN — PIPERACILLIN AND TAZOBACTAM 3.38 G: 3; .375 INJECTION, POWDER, LYOPHILIZED, FOR SOLUTION INTRAVENOUS at 06:22

## 2019-10-07 RX ADMIN — Medication 10 ML: at 14:00

## 2019-10-07 RX ADMIN — APIXABAN 5 MG: 5 TABLET, FILM COATED ORAL at 12:29

## 2019-10-07 RX ADMIN — INSULIN GLARGINE 15 UNITS: 100 INJECTION, SOLUTION SUBCUTANEOUS at 10:08

## 2019-10-07 RX ADMIN — Medication 10 ML: at 21:38

## 2019-10-07 RX ADMIN — FAMOTIDINE 20 MG: 20 TABLET ORAL at 20:03

## 2019-10-07 RX ADMIN — INSULIN LISPRO 4 UNITS: 100 INJECTION, SOLUTION INTRAVENOUS; SUBCUTANEOUS at 12:29

## 2019-10-07 RX ADMIN — TRAMADOL HYDROCHLORIDE 25 MG: 50 TABLET ORAL at 22:10

## 2019-10-07 RX ADMIN — APIXABAN 5 MG: 5 TABLET, FILM COATED ORAL at 22:10

## 2019-10-07 RX ADMIN — FENTANYL CITRATE 50 MCG: 50 INJECTION INTRAMUSCULAR; INTRAVENOUS at 13:59

## 2019-10-07 RX ADMIN — METOPROLOL TARTRATE 25 MG: 25 TABLET ORAL at 21:37

## 2019-10-07 RX ADMIN — INSULIN LISPRO 4 UNITS: 100 INJECTION, SOLUTION INTRAVENOUS; SUBCUTANEOUS at 06:44

## 2019-10-07 RX ADMIN — TRAMADOL HYDROCHLORIDE 25 MG: 50 TABLET ORAL at 10:08

## 2019-10-07 RX ADMIN — ALLOPURINOL 100 MG: 100 TABLET ORAL at 10:08

## 2019-10-07 RX ADMIN — FUROSEMIDE 40 MG: 40 TABLET ORAL at 10:08

## 2019-10-07 RX ADMIN — PIPERACILLIN AND TAZOBACTAM 3.38 G: 3; .375 INJECTION, POWDER, LYOPHILIZED, FOR SOLUTION INTRAVENOUS at 17:12

## 2019-10-07 NOTE — PROGRESS NOTES
Left message with answering service to please call me back in reference to pt needs to be seen for her Right foot per Dr. Arun Mckinley. Dr. Arun Mckinley, stated she tiger text Dr. Tobi Lugo and has not had a return text or call. Please call her back in concerns of pt foot.

## 2019-10-07 NOTE — PROGRESS NOTES
Infectious Disease Progress Note       Subjective:   Ms Deep Armenta seen earlier today  Ms Deep Armenta complained of severe pain in RLE when asked  She denied cough, nausea, vomiting, diarrhea, fever, chills   She was minimally verbal otherwise       ROS: 10 point ROS obtained and pertinent positives as above. All others negative. Objective:    Vitals:   Patient Vitals for the past 24 hrs:   Temp Pulse Resp BP SpO2   10/07/19 1000 98.6 °F (37 °C) 84 18 100/54 100 %   10/07/19 0252 99.8 °F (37.7 °C) 83 18 152/70 100 %   10/06/19 1932 98.5 °F (36.9 °C) 90 18 108/64 100 %       Physical Exam:  Gen: No apparent distress  HEENT:  ic, no scleral icterus, no thrush, no cervical lymphadenopathy, edentulous   CV: S1,2 heard regularly, no lower extremity edema    Lungs: Clear to auscultation bilaterally, no wheezing  Abdomen: soft, non tender, non distended,   Skin: no rash   Neuro: alert, oriented self, situation, verbal, followed commands   Musculoskeletal:   RLE dressing removed and examined with wound care RN  Necrotic areas seen, + odor, + cool foot, unable to palpate good pedal pulses, she denied sensation of foot when touched.        Medications:    Current Facility-Administered Medications:     albuterol-ipratropium (DUO-NEB) 2.5 MG-0.5 MG/3 ML, 3 mL, Nebulization, Q4H PRN, Maia Morales MD    piperacillin-tazobactam (ZOSYN) 3.375 g in 0.9% sodium chloride (MBP/ADV) 100 mL, 3.375 g, IntraVENous, Q8H, Sophia Figueroa DO, Last Rate: 25 mL/hr at 10/07/19 0622, 3.375 g at 10/07/19 0622    insulin lispro (HUMALOG) injection 4 Units, 4 Units, SubCUTAneous, TIDAC, Paul Lopez MD, 4 Units at 10/07/19 1229    sodium chloride (OCEAN) 0.65 % nasal squeeze bottle 2 Spray, 2 Spray, Both Nostrils, Q2H PRN, Ching Bhakta MD    oxymetazoline (AFRIN) 0.05 % nasal spray 2 Spray, 2 Spray, Both Nostrils, BID PRN, DIEGO Bhakta MD    furosemide (LASIX) tablet 40 mg, 40 mg, Oral, DAILY, Umesh Lerner MD, 40 mg at 10/07/19 1008    0.9% sodium chloride infusion 250 mL, 250 mL, IntraVENous, PRN, ILYA Bhakta MD    0.9% sodium chloride infusion 250 mL, 250 mL, IntraVENous, PRN, Silver Bolanos MD    apixaban (ELIQUIS) tablet 5 mg, 5 mg, Oral, Q12H, Silver Bolanos MD, 5 mg at 10/07/19 1229    insulin lispro (HUMALOG) injection, , SubCUTAneous, AC&HS, Mary Willingham MD, Stopped at 10/07/19 0730    glucose chewable tablet 16 g, 4 Tab, Oral, PRN, KYLAH Bhakta MD    glucagon (GLUCAGEN) injection 1 mg, 1 mg, IntraMUSCular, PRN, FLORES Bhakta MD    dextrose 10% infusion 0-250 mL, 0-250 mL, IntraVENous, PRN, LUKE Bhakta MD                albuterol-ipratropium (DUO-NEB) 2.5 MG-0.5 MG/3 ML, 3 mL, Nebulization, Q4H PRN, Tom GUZMAN MD, 3 mL at 09/26/19 2019    alum-mag hydroxide-simeth (MYLANTA) oral suspension 30 mL, 30 mL, Oral, Q4H PRN, Noland Spurling, MD, 30 mL at 09/26/19 2144    famotidine (PEPCID) tablet 20 mg, 20 mg, Oral, QPM, Noland Spurling, MD, 20 mg at 10/06/19 1703    metoprolol tartrate (LOPRESSOR) tablet 25 mg, 25 mg, Oral, BID, Noland Spurling, MD, Stopped at 10/07/19 0900    traMADol (ULTRAM) tablet 25 mg, 25 mg, Oral, Q8H PRN, Silver Bolanos MD, 25 mg at 10/07/19 1008    fentaNYL citrate (PF) injection 50 mcg, 50 mcg, IntraVENous, Q3H PRN, Silver Bolanos MD, 50 mcg at 10/07/19 1359    insulin glargine (LANTUS) injection 15 Units, 15 Units, SubCUTAneous, DAILY, Silver Bolanos MD, 15 Units at 10/07/19 1008    ondansetron (ZOFRAN) injection 4 mg, 4 mg, IntraVENous, Q6H PRN, Silver Bolanos MD    sodium chloride (NS) flush 5-40 mL, 5-40 mL, IntraVENous, Q8H, Silver Bolanos MD, 10 mL at 10/07/19 1400    sodium chloride (NS) flush 5-40 mL, 5-40 mL, IntraVENous, PRN, Silver Bolanos MD, 10 mL at 09/25/19 0431    acetaminophen (TYLENOL) tablet 650 mg, 650 mg, Oral, Q4H PRN, Silver Bolanos MD, 650 mg at 10/04/19 1957    allopurinol (ZYLOPRIM) tablet 100 mg, 100 mg, Oral, DAILY, Hazel Ross MD, 100 mg at 10/07/19 1008      Labs:  Recent Results (from the past 24 hour(s))   GLUCOSE, POC    Collection Time: 10/06/19  9:39 PM   Result Value Ref Range    Glucose (POC) 239 (H) 65 - 100 mg/dL    Performed by Pancho Joaquin    CK    Collection Time: 10/07/19  2:54 AM   Result Value Ref Range     (H) 26 - 192 U/L   CBC WITH AUTOMATED DIFF    Collection Time: 10/07/19  2:54 AM   Result Value Ref Range    WBC 18.8 (H) 3.6 - 11.0 K/uL    RBC 2.61 (L) 3.80 - 5.20 M/uL    HGB 7.2 (L) 11.5 - 16.0 g/dL    HCT 23.5 (L) 35.0 - 47.0 %    MCV 90.0 80.0 - 99.0 FL    MCH 27.6 26.0 - 34.0 PG    MCHC 30.6 30.0 - 36.5 g/dL    RDW 16.5 (H) 11.5 - 14.5 %    PLATELET 295 (H) 315 - 400 K/uL    MPV 9.6 8.9 - 12.9 FL    NRBC 0.0 0  WBC    ABSOLUTE NRBC 0.00 0.00 - 0.01 K/uL    NEUTROPHILS 88 (H) 32 - 75 %    LYMPHOCYTES 5 (L) 12 - 49 %    MONOCYTES 5 5 - 13 %    EOSINOPHILS 2 0 - 7 %    BASOPHILS 0 0 - 1 %    IMMATURE GRANULOCYTES 0 %    ABS. NEUTROPHILS 16.6 (H) 1.8 - 8.0 K/UL    ABS. LYMPHOCYTES 0.9 0.8 - 3.5 K/UL    ABS. MONOCYTES 0.9 0.0 - 1.0 K/UL    ABS. EOSINOPHILS 0.4 0.0 - 0.4 K/UL    ABS. BASOPHILS 0.0 0.0 - 0.1 K/UL    ABS. IMM.  GRANS. 0.0 K/UL    DF MANUAL      RBC COMMENTS ANISOCYTOSIS  1+       METABOLIC PANEL, BASIC    Collection Time: 10/07/19  2:54 AM   Result Value Ref Range    Sodium 134 (L) 136 - 145 mmol/L    Potassium 4.1 3.5 - 5.1 mmol/L    Chloride 95 (L) 97 - 108 mmol/L    CO2 32 21 - 32 mmol/L    Anion gap 7 5 - 15 mmol/L    Glucose 149 (H) 65 - 100 mg/dL    BUN 25 (H) 6 - 20 MG/DL    Creatinine 1.25 (H) 0.55 - 1.02 MG/DL    BUN/Creatinine ratio 20 12 - 20      GFR est AA 49 (L) >60 ml/min/1.73m2    GFR est non-AA 40 (L) >60 ml/min/1.73m2    Calcium 8.4 (L) 8.5 - 10.1 MG/DL   GLUCOSE, POC    Collection Time: 10/07/19  6:32 AM   Result Value Ref Range    Glucose (POC) 158 (H) 65 - 100 mg/dL    Performed by [address] [place] [address] Collection Time: 10/07/19 11:52 AM   Result Value Ref Range    Glucose (POC) 168 (H) 65 - 100 mg/dL    Performed by Meghan Foster    GLUCOSE, POC    Collection Time: 10/07/19  3:57 PM   Result Value Ref Range    Glucose (POC) 140 (H) 65 - 100 mg/dL    Performed by Yoko Norris            Micro:     Blood: 10/4/19  Specimen Information: Blood        Component Value Ref Range & Units Status   Special Requests: NO SPECIAL REQUESTS    Preliminary   Culture result: NO GROWTH 3 DAYS    Preliminary   Result History       Pathology:      Imaging:  RLE   US  Interpretation Summary        · Probable acute thrombus at the mid and distal right popliteal level. · Absent flow at the distal posterior tibial and anterior tibial levels suggesting occlusion. The right common femoral, deep femoral, femoral, popliteal, posterior tibial and anterior tibial arteries were visualized. No significant plaque is identified from groin to distal thigh. Biphasic flow with no increased velocities were noted. The above knee popliteal waveform is monophasic with low velocities. The popliteal fossa and distal popliteal contain absent flow and suggests an acute occlusion although it is suboptimally seen. Low echogenicity is noted in the popliteal.       The right posterior tibial and anterior tibial arteries contain absent flow with no color fill and suggests occlusion.      Lower Extremity Arterial Findings     Right Lower Arterial     The right distal popliteal artery is occluded. The right distal popliteal artery has homogeneous plaque. Monophasic Doppler waveforms in the right proximal popliteal artery. Biphasic Doppler waveforms in the right distal common femoral, profunda femoris, proximal superficial femoral and distal superficial femoral artery. Doppler waveforms are absent in the anterior tibial and posterior tibial artery.  The following arteries are patent: distal common femoral, profunda femoris, proximal superficial femoral and distal superficial femoral.         MRI brain     FINDINGS:  Tiny acute infarct in the right cerebellum. No acute hemorrhage.     Mild generalized parenchymal volume loss with commensurate dilation of the sulci  and ventricular system. Periventricular deep white matter T2/FLAIR  hyperintensities, and patchy T2/FLAIR hyperintensity in the raymundo, consistent  with mild chronic microvascular ischemic disease. Small chronic infarct in the  right frontal periventricular white matter. There is no cerebellar tonsillar  herniation. Expected arterial flow-voids are present. No evidence of abnormal  enhancement.     Paranasal sinuses are clear. Trace bilateral mastoid effusions. The orbital  contents are within normal limits with bilateral lens implants. No significant  osseous or scalp lesions are identified.     IMPRESSION  IMPRESSION:      1. Tiny acute infarct in the right cerebellum. 2. Mild generalized parenchymal volume loss and mild chronic microvascular  ischemic disease. Small chronic infarct in the right frontal periventricular  white matter. Assessment / Plan    Ms Cassia Mireles is a 80year old lady wt hx of copd, afib, DM admitted on on 9/17/19 with dypsnea. Treated for diastolic CHF exacerbation. Subsequently found to have acute infarct R cerebellum and RLE acute thrombus at the mid and distal right popliteal level. Underwent thrombectomy/embolectomy on 9/20/19. Operative report from 9/20/19 indicates \" thrombectomy yielded fair a amount of thrombus clearly from common iliac area region and this appeared to be organized and fairly thickened\". Then on 9/21/19, underwent thrombectomy of right leg for residual clot , 4-compartment fasciotomy. Operative report from 9/21/19 indicates \"  After entering the fascia, a large amount of discharge and dark blood was seen.   The muscle appeared to be significantly abnormal in this location almost mushy with difficulty\"      On labs has had consistent leukocytosis and on 10/4/19 had fever up to 101.6 for which Infectious Disease was consulted. Zosyn started for concerns of infected RLE on 10/5/19. WBC elevated between 17-21. 1) RLE ischemia, necrosis, and popliteal thrombus status post thrombectomy 9/20 and repeat thrombectomy/fasciotomy 9/21/19  Fever curve imporved   On exam has foul odor from RLE wound areas and necrosis seen   No clear superficial cellulitis around the incisions site on the medial side   Blood cx negative so far   Currently on Zosyn IV, renally dosed per pharmacy   If she develops another fever or clinically worsens, add Vancomycin IV renally dosed   I called and spoke to Dr Daniele Angeles who will assess her again to see if the LE is salvagable or not   Antibiotics side effects discussed with patient and her daughter today     2) COPD    3) Afib     4) DM    5) DVT px     Discussed with wound care RN today at bedside as well            Thank you for the opportunity to participate in the care of this patient. Please contact with questions or concerns.       Juana Mireles,    4:38 PM

## 2019-10-07 NOTE — PROGRESS NOTES
Left message with answering service to please call me back in reference to pt needs to be seen for her Right foot per Dr. Evans Cooper. Dr. Evans Cooper, stated she tiger text Dr. Severiano Ruffini and has not had a return text or call. Please call her back in concerns of pt foot. Waiting on a return call from Dr. Pearl Moody.

## 2019-10-07 NOTE — PROGRESS NOTES
Vascular Surgery  --lower leg has worsened: large necrotic patches have developed  --I have had a long conversation with her son re: AKA--I have recommended that she have this done this week  --he will speak to her again; she continues to be reluctant

## 2019-10-07 NOTE — PROGRESS NOTES
Hospitalist Progress Note                               Matt Gilliam MD                                     Answering service: [phone]                               OR 36 from in house phone                                         Date of Service:  10/7/2019  NAME:  Jocelyne Cooper  :  1931  MRN:  [id number]      Admission Summary:   69-year-old female with an extensive past medical history including chronic hypoxemic respiratory failure with home oxygen, COPD, chronic kidney disease, type 2 diabetes mellitus, atrial fibrillation, diastolic congestive heart failure, dyslipidemia, primary hypertension, osteoporosis, pulmonary hypertension, depression and anxiety disorder, was brought to the emergency room from home for evaluation of an approximately 2-3 day history of worsening shortness of breath. Per the patient's son, the patient ambulates unaided at baseline; however, in the 2-3 days leading up to the emergency room presentation, noted to have worsening shortness of breath even with easy activity. The patient also complained of some numbness and weakness in the right lower extremity. The patient admitted to being compliant with all her medical therapies. The patient denied associated headaches, dizziness, visual deficits, chest pains, palpitations, nausea, vomiting, cough, sputum production, fevers, chills, abdominal pain, bladder or bowel irregularities. Reason for follow up:       CC: SOB    Chart reviewed. All events in the past 24 hours reviewed in their entirety. Patient stated that she slept ok overnight. Denied fevers or chills. Assessment & Plan:     1) CVS: Acute on chronic diastolic CHF exarcerbation with a preserved EF. Probable Demand ischemia with mildly elevated troponins due to the CHF exarcerbation. Indeterminable NYHA Functional Class due to the multiple Co-morbidities per Cardiology. Primary Hypertension. Dyslipidemia. 2D ECHO (9/12/19) with EF 70 percent. Continue oxygen, gentle diuresis, daily weight input/output monitoring, CHF teaching. Cardiology and Heart failure team F/Us noted and appreciated. 2) Renal: Resolving HOLLAND on probable CKD stage 2-3 due to probable medication side effect (Bumex and Spironolactone) prior to admission. Renal US negative for obstructive uropathy. Nephrology F/U noted and appreciated. 3) Electrolytes: Resolving probable multifactorial Hyponatremia present on admission. Resolved Hyperkalemia without any dysrhythmias. 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 Neurology eval noted and appreciated. D/W Dr Tamiko Powers. 5) Vascular: Ischemic right leg. S/P RLE Thrombectomy 9/21/19 with residual clot in Popliteal Artery. S/P repeat Thrombectomy 9/22/19. Now with significant muscle ischemia right calf with likely right foot drop. Aspirin and plavix discontinued due to Epistaxis. Continue Apixaban. Will likely need R. AKA if no significant clinical improvement. Vascular Surgical F/U noted and appreciated. D/W Dr Severiano Ruffini. 6) ID: Low threshold for development of sepsis due to ischemic right leg. Afebrile, persistent leukocytosis, left shift, bandemia. Blood cultures in progress. Empiric Zosyn. ID eval noted and appreciated. 7) Resp: Chronic Hypoxemic respiratory failure due to COPD with home oxygen. Nebulizers, inhalers, incentive spirometry. 8) Hematology: Anemia of chronicity. 9) Endocrine: Uncontrolled insulin treated type 2 DM with complications including hyperglycemia. Hold metformin. Accucheck monitoring, Basal and sliding scale insulins, diabetic teaching. Diabetic team evaluation with recommendations appreciated. 10) MANNIE: Osteoporosis. Mild Rhabdomyolysis. Trend CPK. 11) Psychiatry: Anxiety disorder and Depression without any current behavioral disturbances.     12) Prophylaxis: DVT.    13) Directives: DDNR with an extremely  guarded prognosis. At increased risk of decompensation. Readdressed with patient and patient's children. Palliative care F/U noted and appreciated. 14) Plan: Patient will require Post-Acute care facility placement for ongoing therapies. D/W patient, patient's son Chandler Whyte ([phone]), patient's daughter Beatriz Tanner. Hospital Problems  Date Reviewed: 9/20/2019          Codes Class Noted POA    * (Principal) CHF exacerbation ([facility] ICD-10-CM: I50.9  ICD-9-CM: 428.0  9/17/2019 Yes        Hyperkalemia ICD-10-CM: E87.5  ICD-9-CM: 276.7  9/17/2019 Yes        Hyponatremia ICD-10-CM: E87.1  ICD-9-CM: 276.1  9/17/2019 Yes        HOLLAND (acute kidney injury) ([facility] ICD-10-CM: N17.9  ICD-9-CM: 584.9  9/17/2019 Yes        Acute hypoxemic respiratory failure ([facility] ICD-10-CM: J96.01  ICD-9-CM: 518.81  8/8/2011 Yes        Debility ICD-10-CM: R53.81  ICD-9-CM: 799.3  6/28/2011 Yes                  Physical Examination:      Last 24hrs VS reviewed since prior progress note. Most recent are:  Visit Vitals  /70 (BP 1 Location: Left arm, BP Patient Position: At rest)   Pulse 83   Temp 99.8 °F (37.7 °C)   Resp 18   Ht 5' 5\" (1.651 m)   Wt 88 kg (194 lb 0.1 oz)   SpO2 100%   Breastfeeding? No   BMI 32.28 kg/m²           Constitutional:  No acute distress. HEENT: Head is a traumatic,  Un icteric sclera. Pink conjunctiva,no erythema or discharge. Oral mucous moist, oropharynx benign. Neck supple,    Resp:  Decreased air entry B/L.   CV:  S1,S2 present. GI:  Soft, non distended, non tender. normoactive bowel sounds, no hepatosplenomegaly    :  No CVA or suprapubic tenderness   Skin  :  Cool and dark RLE. Absent Dorsalis Pedis pulse. Slightly cool LLE. Musculoskeletal:  Dressing insitu to right leg    Neurologic:  Awake, alert and oriented.             No intake or output data in the 24 hours ending 10/07/19 1000           Labs:     Recent Labs 10/07/19  0254 10/06/19  0147   WBC 18.8* 21.6*   HGB 7.2* 7.0*   HCT 23.5* 22.7*   * 406*     Recent Labs     10/07/19  0254 10/06/19  0147 10/05/19  0224   * 133* 132*   K 4.1 4.2 4.1   CL 95* 96* 94*   CO2 32 31 32   BUN 25* 21* 23*   CREA 1.25* 1.15* 1.18*   * 92 167*   CA 8.4* 8.7 8.7     No results for input(s): SGOT, GPT, ALT, AP, TBIL, TBILI, TP, ALB, GLOB, GGT, AML, LPSE in the last 72 hours. No lab exists for component: AMYP, HLPSE  No results for input(s): INR, PTP, APTT, INREXT, INREXT in the last 72 hours. No results for input(s): FE, TIBC, PSAT, FERR in the last 72 hours. No results found for: FOL, RBCF   No results for input(s): PH, PCO2, PO2 in the last 72 hours.   Recent Labs     10/07/19  0254 10/06/19  0147 10/05/19  0224   * 961* 1,294*     Lab Results   Component Value Date/Time    Cholesterol, total 144 09/17/2019 09:21 AM    HDL Cholesterol 55 09/17/2019 09:21 AM    LDL, calculated 72.6 09/17/2019 09:21 AM    Triglyceride 82 09/17/2019 09:21 AM    CHOL/HDL Ratio 2.6 09/17/2019 09:21 AM     Lab Results   Component Value Date/Time    Glucose (POC) 158 (H) 10/07/2019 06:32 AM    Glucose (POC) 239 (H) 10/06/2019 09:39 PM    Glucose (POC) 232 (H) 10/06/2019 04:04 PM    Glucose (POC) 199 (H) 10/06/2019 11:02 AM    Glucose (POC) 112 (H) 10/06/2019 06:15 AM     Lab Results   Component Value Date/Time    Color DARK YELLOW 10/05/2019 06:02 PM    Appearance CLOUDY (A) 10/05/2019 06:02 PM    Specific gravity 1.018 10/05/2019 06:02 PM    Specific gravity 1.010 12/18/2011 09:28 AM    pH (UA) 5.5 10/05/2019 06:02 PM    Protein 30 (A) 10/05/2019 06:02 PM    Glucose NEGATIVE  10/05/2019 06:02 PM    Ketone NEGATIVE  10/05/2019 06:02 PM    Bilirubin NEGATIVE  09/17/2019 12:49 PM    Urobilinogen >8.0 (H) 10/05/2019 06:02 PM    Nitrites NEGATIVE  10/05/2019 06:02 PM    Leukocyte Esterase TRACE (A) 10/05/2019 06:02 PM    Epithelial cells FEW 10/05/2019 06:02 PM    Bacteria NEGATIVE 10/05/2019 06:02 PM    WBC 0-4 10/05/2019 06:02 PM    RBC 0-5 10/05/2019 06:02 PM         Medications Reviewed:     Current Facility-Administered Medications   Medication Dose Route Frequency    albuterol-ipratropium (DUO-NEB) 2.5 MG-0.5 MG/3 ML  3 mL Nebulization Q4H PRN    piperacillin-tazobactam (ZOSYN) 3.375 g in 0.9% sodium chloride (MBP/ADV) 100 mL  3.375 g IntraVENous Q8H    insulin lispro (HUMALOG) injection 4 Units  4 Units SubCUTAneous TIDAC    sodium chloride (OCEAN) 0.65 % nasal squeeze bottle 2 Spray  2 Spray Both Nostrils Q2H PRN    oxymetazoline (AFRIN) 0.05 % nasal spray 2 Spray  2 Spray Both Nostrils BID PRN    furosemide (LASIX) tablet 40 mg  40 mg Oral DAILY    0.9% sodium chloride infusion 250 mL  250 mL IntraVENous PRN    0.9% sodium chloride infusion 250 mL  250 mL IntraVENous PRN    apixaban (ELIQUIS) tablet 5 mg  5 mg Oral Q12H    insulin lispro (HUMALOG) injection   SubCUTAneous AC&HS    glucose chewable tablet 16 g  4 Tab Oral PRN    glucagon (GLUCAGEN) injection 1 mg  1 mg IntraMUSCular PRN    dextrose 10% infusion 0-250 mL  0-250 mL IntraVENous PRN    albuterol-ipratropium (DUO-NEB) 2.5 MG-0.5 MG/3 ML  3 mL Nebulization Q4H PRN    alum-mag hydroxide-simeth (MYLANTA) oral suspension 30 mL  30 mL Oral Q4H PRN    famotidine (PEPCID) tablet 20 mg  20 mg Oral QPM    metoprolol tartrate (LOPRESSOR) tablet 25 mg  25 mg Oral BID    traMADol (ULTRAM) tablet 25 mg  25 mg Oral Q8H PRN    fentaNYL citrate (PF) injection 50 mcg  50 mcg IntraVENous Q3H PRN    insulin glargine (LANTUS) injection 15 Units  15 Units SubCUTAneous DAILY    ondansetron (ZOFRAN) injection 4 mg  4 mg IntraVENous Q6H PRN    sodium chloride (NS) flush 5-40 mL  5-40 mL IntraVENous Q8H    sodium chloride (NS) flush 5-40 mL  5-40 mL IntraVENous PRN    acetaminophen (TYLENOL) tablet 650 mg  650 mg Oral Q4H PRN    allopurinol (ZYLOPRIM) tablet 100 mg  100 mg Oral DAILY ______________________________________________________________________  EXPECTED LENGTH OF STAY: 6d 14h  ACTUAL LENGTH OF STAY:          Prema Demarco MD

## 2019-10-07 NOTE — PROGRESS NOTES
MARIANN  -[address]  10/5  - Patient is not medically ready for discharge per Hospitalist- CM didn't request that auth be resubmitted per monitoring for medical updates from clinical staff.   - [facility] completed     CM will follow     RIGO Pratt/KASSY

## 2019-10-07 NOTE — PROGRESS NOTES
RLE wound dressing with moderate serosanguinous drainage cleaned and redressed.  Will continue to monitor

## 2019-10-07 NOTE — PROGRESS NOTES
Physical Therapy  Attempted to see patient but she  Politely declined stating too much this morning with dressing change. Also stating \"I probably won't walk again because I will probably have my leg amputated. \"   Will follow up tomorrow.   Nehal Huerta, PT

## 2019-10-07 NOTE — WOUND CARE
Wound Consult:  Re-Consult Visit. Chart reviewed. Consulted for right lower leg - drainage. Patient is resting on a total care bed. She is alert and oriented; ID doctor in to bedside to examine, then Dr. Shirley Cifuentes is to see right leg and Dr. Lyn Mcmullen then in to bedside. Assessment: 
Right lateral to posterior lower leg - 36 x 10 x 0.1 cm, dry to moist necrotic black to waxy white devitalized tissue - incision line on lateral leg within non-viable tissue now; leg has warmth, no significant redness, there is odor to drainage; from arch of foot through toes foot is cold, mottling through sides of heels/ankle through posterior heel - violet to purple; appears related to vascular status. Dr. Lyn Mcmullen expressing to patient that amputation is needed; he will try and talk to patient's son. Patient voiced understanding. Sacrum/buttocks - no discoloration, intact. Right lateral buttock - Mepilex border over small linear excoriations of intact skin, no discoloration. No discoloration to left heel. Treatment: 
Aquacel Ag with dry gauze/ABD pads to leg, kerlex/tape, placed in Prevalon boot. Wound Recommendations: 
Change dressing to right lower leg daily - Xeroform gauze, dry gauze/ABD pads as needed for any drainage, kerlex/tape. Skin Care Recommendations: 1. Minimize friction/shear: minimize layers of linen/pads under patient. Ordered patient Versacare P500 air support bed with confirmation number [id number]. 
2. Off load pressure/reposition: continue to turn and reposition approximately every 2 hours; float heels or use Prevalon boots. 3. Manage Moisture - keep skin folds dry; incontinence skin care as needed; appropriate sized briefs if needed. 4. Continue to monitor nutrition, pain, and skin risk scale, and skin assessment. Plan: 
Spoke with Dr. Shirley Cifuentes at bedside - will update orders for dressing change as needed - AKA most likely needed. We will continue to reassess routinely and as needed. Air mattress ordered for prevention. Kwame Dave RN,[facility] Office [phone] Pager [phone]

## 2019-10-08 LAB
ANION GAP SERPL CALC-SCNC: 5 MMOL/L (ref 5–15)
BUN SERPL-MCNC: 24 MG/DL (ref 6–20)
BUN/CREAT SERPL: 19 (ref 12–20)
CALCIUM SERPL-MCNC: 8.4 MG/DL (ref 8.5–10.1)
CHLORIDE SERPL-SCNC: 96 MMOL/L (ref 97–108)
CK SERPL-CCNC: 643 U/L (ref 26–192)
CO2 SERPL-SCNC: 32 MMOL/L (ref 21–32)
CREAT SERPL-MCNC: 1.27 MG/DL (ref 0.55–1.02)
GLUCOSE BLD STRIP.AUTO-MCNC: 107 MG/DL (ref 65–100)
GLUCOSE BLD STRIP.AUTO-MCNC: 125 MG/DL (ref 65–100)
GLUCOSE BLD STRIP.AUTO-MCNC: 139 MG/DL (ref 65–100)
GLUCOSE BLD STRIP.AUTO-MCNC: 168 MG/DL (ref 65–100)
GLUCOSE SERPL-MCNC: 156 MG/DL (ref 65–100)
POTASSIUM SERPL-SCNC: 4.3 MMOL/L (ref 3.5–5.1)
SERVICE CMNT-IMP: ABNORMAL
SODIUM SERPL-SCNC: 133 MMOL/L (ref 136–145)

## 2019-10-08 PROCEDURE — 74011250636 HC RX REV CODE- 250/636: Performed by: INTERNAL MEDICINE

## 2019-10-08 PROCEDURE — 65270000029 HC RM PRIVATE

## 2019-10-08 PROCEDURE — 74011250637 HC RX REV CODE- 250/637: Performed by: SURGERY

## 2019-10-08 PROCEDURE — 74011636637 HC RX REV CODE- 636/637: Performed by: HOSPITALIST

## 2019-10-08 PROCEDURE — 74011250637 HC RX REV CODE- 250/637: Performed by: FAMILY MEDICINE

## 2019-10-08 PROCEDURE — 97110 THERAPEUTIC EXERCISES: CPT

## 2019-10-08 PROCEDURE — 77010033678 HC OXYGEN DAILY

## 2019-10-08 PROCEDURE — 74011636637 HC RX REV CODE- 636/637: Performed by: SURGERY

## 2019-10-08 PROCEDURE — 74011000258 HC RX REV CODE- 258: Performed by: INTERNAL MEDICINE

## 2019-10-08 PROCEDURE — 94760 N-INVAS EAR/PLS OXIMETRY 1: CPT

## 2019-10-08 PROCEDURE — 36415 COLL VENOUS BLD VENIPUNCTURE: CPT

## 2019-10-08 PROCEDURE — 74011250637 HC RX REV CODE- 250/637: Performed by: INTERNAL MEDICINE

## 2019-10-08 PROCEDURE — 82962 GLUCOSE BLOOD TEST: CPT

## 2019-10-08 PROCEDURE — 82550 ASSAY OF CK (CPK): CPT

## 2019-10-08 PROCEDURE — 80048 BASIC METABOLIC PNL TOTAL CA: CPT

## 2019-10-08 PROCEDURE — 97530 THERAPEUTIC ACTIVITIES: CPT

## 2019-10-08 PROCEDURE — 74011250637 HC RX REV CODE- 250/637: Performed by: NURSE PRACTITIONER

## 2019-10-08 RX ORDER — METRONIDAZOLE 500 MG/100ML
500 INJECTION, SOLUTION INTRAVENOUS EVERY 12 HOURS
Status: DISCONTINUED | OUTPATIENT
Start: 2019-10-08 | End: 2019-10-08 | Stop reason: DRUGHIGH

## 2019-10-08 RX ORDER — METRONIDAZOLE 500 MG/100ML
500 INJECTION, SOLUTION INTRAVENOUS EVERY 8 HOURS
Status: DISCONTINUED | OUTPATIENT
Start: 2019-10-08 | End: 2019-10-17

## 2019-10-08 RX ORDER — METRONIDAZOLE 500 MG/100ML
500 INJECTION, SOLUTION INTRAVENOUS EVERY 8 HOURS
Status: DISCONTINUED | OUTPATIENT
Start: 2019-10-08 | End: 2019-10-08

## 2019-10-08 RX ORDER — ALPRAZOLAM 0.25 MG/1
0.25 TABLET ORAL
Status: DISCONTINUED | OUTPATIENT
Start: 2019-10-08 | End: 2019-10-22

## 2019-10-08 RX ORDER — VANCOMYCIN HYDROCHLORIDE
1250 EVERY 24 HOURS
Status: DISCONTINUED | OUTPATIENT
Start: 2019-10-09 | End: 2019-10-09

## 2019-10-08 RX ORDER — VANCOMYCIN 2 GRAM/500 ML IN 0.9 % SODIUM CHLORIDE INTRAVENOUS
2000 ONCE
Status: COMPLETED | OUTPATIENT
Start: 2019-10-08 | End: 2019-10-09

## 2019-10-08 RX ADMIN — VANCOMYCIN HYDROCHLORIDE 2000 MG: 10 INJECTION, POWDER, LYOPHILIZED, FOR SOLUTION INTRAVENOUS at 21:01

## 2019-10-08 RX ADMIN — PIPERACILLIN AND TAZOBACTAM 3.38 G: 3; .375 INJECTION, POWDER, LYOPHILIZED, FOR SOLUTION INTRAVENOUS at 00:09

## 2019-10-08 RX ADMIN — INSULIN LISPRO 4 UNITS: 100 INJECTION, SOLUTION INTRAVENOUS; SUBCUTANEOUS at 06:54

## 2019-10-08 RX ADMIN — FAMOTIDINE 20 MG: 20 TABLET ORAL at 18:08

## 2019-10-08 RX ADMIN — CEFEPIME HYDROCHLORIDE 2 G: 2 INJECTION, POWDER, FOR SOLUTION INTRAVENOUS at 18:07

## 2019-10-08 RX ADMIN — Medication 10 ML: at 15:12

## 2019-10-08 RX ADMIN — APIXABAN 5 MG: 5 TABLET, FILM COATED ORAL at 23:01

## 2019-10-08 RX ADMIN — METOPROLOL TARTRATE 25 MG: 25 TABLET ORAL at 21:36

## 2019-10-08 RX ADMIN — TRAMADOL HYDROCHLORIDE 25 MG: 50 TABLET ORAL at 12:33

## 2019-10-08 RX ADMIN — METRONIDAZOLE 500 MG: 500 INJECTION, SOLUTION INTRAVENOUS at 20:06

## 2019-10-08 RX ADMIN — ALPRAZOLAM 0.25 MG: 0.25 TABLET ORAL at 10:30

## 2019-10-08 RX ADMIN — INSULIN LISPRO 4 UNITS: 100 INJECTION, SOLUTION INTRAVENOUS; SUBCUTANEOUS at 12:33

## 2019-10-08 RX ADMIN — INSULIN GLARGINE 15 UNITS: 100 INJECTION, SOLUTION SUBCUTANEOUS at 10:30

## 2019-10-08 RX ADMIN — ALLOPURINOL 100 MG: 100 TABLET ORAL at 10:30

## 2019-10-08 RX ADMIN — PIPERACILLIN AND TAZOBACTAM 3.38 G: 3; .375 INJECTION, POWDER, LYOPHILIZED, FOR SOLUTION INTRAVENOUS at 06:54

## 2019-10-08 RX ADMIN — Medication 10 ML: at 06:54

## 2019-10-08 RX ADMIN — Medication 10 ML: at 21:36

## 2019-10-08 RX ADMIN — INSULIN LISPRO 4 UNITS: 100 INJECTION, SOLUTION INTRAVENOUS; SUBCUTANEOUS at 18:06

## 2019-10-08 RX ADMIN — APIXABAN 5 MG: 5 TABLET, FILM COATED ORAL at 10:30

## 2019-10-08 RX ADMIN — Medication 10 ML: at 12:37

## 2019-10-08 RX ADMIN — FUROSEMIDE 40 MG: 40 TABLET ORAL at 10:30

## 2019-10-08 RX ADMIN — METOPROLOL TARTRATE 25 MG: 25 TABLET ORAL at 10:30

## 2019-10-08 RX ADMIN — PIPERACILLIN AND TAZOBACTAM 3.38 G: 3; .375 INJECTION, POWDER, LYOPHILIZED, FOR SOLUTION INTRAVENOUS at 15:12

## 2019-10-08 NOTE — PROGRESS NOTES
Problem: Falls - Risk of  Goal: *Absence of Falls  Description  Document Pablo Sussex Fall Risk and appropriate interventions in the flowsheet. Outcome: Progressing Towards Goal  Note:   Fall Risk Interventions:  Mobility Interventions: Communicate number of staff needed for ambulation/transfer, Bed/chair exit alarm, Strengthening exercises (ROM-active/passive)    Mentation Interventions: Adequate sleep, hydration, pain control, Evaluate medications/consider consulting pharmacy    Medication Interventions: Evaluate medications/consider consulting pharmacy, Patient to call before getting OOB    Elimination Interventions: Call light in reach, Patient to call for help with toileting needs, Toileting schedule/hourly rounds    History of Falls Interventions: Door open when patient unattended, Room close to nurse's station         Problem: Patient Education: Go to Patient Education Activity  Goal: Patient/Family Education  Outcome: Progressing Towards Goal     Problem: Pressure Injury - Risk of  Goal: *Prevention of pressure injury  Description  Document Jagdeep Scale and appropriate interventions in the flowsheet. 9-25-18: turn q2h and float heels. Outcome: Progressing Towards Goal  Note:   Pressure Injury Interventions:  Sensory Interventions: Assess changes in LOC, Assess need for specialty bed, Minimize linen layers    Moisture Interventions: Absorbent underpads, Apply protective barrier, creams and emollients, Maintain skin hydration (lotion/cream)    Activity Interventions: Increase time out of bed    Mobility Interventions: HOB 30 degrees or less, Turn and reposition approx.  every two hours(pillow and wedges)    Nutrition Interventions: Document food/fluid/supplement intake, Offer support with meals,snacks and hydration    Friction and Shear Interventions: Apply protective barrier, creams and emollients, HOB 30 degrees or less, Lift sheet                Problem: Patient Education: Go to Patient Education Activity  Goal: Patient/Family Education  Outcome: Progressing Towards Goal

## 2019-10-08 NOTE — PROGRESS NOTES
Infectious Disease Progress Note       Subjective:   Ms Manju Sanchez seen   D/W RN  Ms Barry Rodas is sleepy right now. Per RN received some pain medications   Not able to obtain much history  Now   + discharge from RLE       ROS: 10 point ROS obtained and pertinent positives as above. All others negative.          Objective:    Vitals:   Patient Vitals for the past 24 hrs:   Temp Pulse Resp BP SpO2   10/08/19 1516 98.4 °F (36.9 °C) 91 18 101/51 99 %   10/08/19 0855 98.5 °F (36.9 °C) 92 19 120/65 100 %   10/08/19 0244 99 °F (37.2 °C) 81 16 111/54 99 %   10/07/19 2034 99.1 °F (37.3 °C) 90 16 111/49 99 %       Physical Exam:  Gen: No apparent distress  HEENT:   no scleral icterus, no thrush ,   CV: S1,2 heard regularly, no lower extremity edema    Lungs: Clear to auscultation anteriorly,  no wheezing  Abdomen: soft, non tender, non distended,   Skin: no rash on visualized areas   Neuro: sleepy , minimally verbal   Musculoskeletal:   RLE dressing ,  Discharge noted on dressing ( yellow-brown color) , foot cool, not able palpate good pedal pulses       Medications:    Current Facility-Administered Medications:     ALPRAZolam (XANAX) tablet 0.25 mg, 0.25 mg, Oral, BID PRN, Alex LOYA, NP, 0.25 mg at 10/08/19 1030    albuterol-ipratropium (DUO-NEB) 2.5 MG-0.5 MG/3 ML, 3 mL, Nebulization, Q4H PRN, Dejon Hernandez MD    piperacillin-tazobactam (ZOSYN) 3.375 g in 0.9% sodium chloride (MBP/ADV) 100 mL, 3.375 g, IntraVENous, Q8H, Toby Islas DO, Last Rate: 25 mL/hr at 10/08/19 1512, 3.375 g at 10/08/19 1512    insulin lispro (HUMALOG) injection 4 Units, 4 Units, SubCUTAneous, TIDAC, Shai Marshall MD, 4 Units at 10/08/19 1233    sodium chloride (OCEAN) 0.65 % nasal squeeze bottle 2 Spray, 2 Spray, Both Nostrils, Q2H PRN, Ching Bhakta MD    oxymetazoline (AFRIN) 0.05 % nasal spray 2 Spray, 2 Spray, Both Nostrils, BID PRN, JARRETT Bhakta MD    furosemide (LASIX) tablet 40 mg, 40 mg, Oral, DAILY, Yann Nicholas Nguyen MD, 40 mg at 10/08/19 1030    0.9% sodium chloride infusion 250 mL, 250 mL, IntraVENous, PRN, Debgilbert, Gabby Romo MD    0.9% sodium chloride infusion 250 mL, 250 mL, IntraVENous, PRN, Nixon David MD    apixaban (ELIQUIS) tablet 5 mg, 5 mg, Oral, Q12H, Nixon David MD, 5 mg at 10/08/19 1030    insulin lispro (HUMALOG) injection, , SubCUTAneous, AC&HS, Ivy Laird MD, Stopped at 10/07/19 0730    glucose chewable tablet 16 g, 4 Tab, Oral, PRN, Livia, AMILCAR GUZMAN MD    glucagon (GLUCAGEN) injection 1 mg, 1 mg, IntraMUSCular, PRN, Debeb, Gabby Romo MD    dextrose 10% infusion 0-250 mL, 0-250 mL, IntraVENous, PRN, Livia, ROBIN GUZMAN MD    albuterol-ipratropium (DUO-NEB) 2.5 MG-0.5 MG/3 ML, 3 mL, Nebulization, Q4H PRN, Graham GUZMAN MD, 3 mL at 09/26/19 2019    alum-mag hydroxide-simeth (MYLANTA) oral suspension 30 mL, 30 mL, Oral, Q4H PRN, Elizabeth Hanna MD, 30 mL at 09/26/19 2144    famotidine (PEPCID) tablet 20 mg, 20 mg, Oral, QPM, Elizabeth Hanna MD, 20 mg at 10/07/19 2003    metoprolol tartrate (LOPRESSOR) tablet 25 mg, 25 mg, Oral, BID, Elizbaeth Hanna MD, 25 mg at 10/08/19 1030    traMADol (ULTRAM) tablet 25 mg, 25 mg, Oral, Q8H PRN, Nixon David MD, 25 mg at 10/08/19 1233    fentaNYL citrate (PF) injection 50 mcg, 50 mcg, IntraVENous, Q3H PRN, Nixon David MD, 50 mcg at 10/07/19 1359    insulin glargine (LANTUS) injection 15 Units, 15 Units, SubCUTAneous, DAILY, Nixon David MD, 15 Units at 10/08/19 1030    ondansetron (ZOFRAN) injection 4 mg, 4 mg, IntraVENous, Q6H PRN, Nixon David MD    sodium chloride (NS) flush 5-40 mL, 5-40 mL, IntraVENous, Q8H, Nixon David MD, 10 mL at 10/08/19 1512    sodium chloride (NS) flush 5-40 mL, 5-40 mL, IntraVENous, PRN, Nixon David MD, 10 mL at 10/08/19 1237    acetaminophen (TYLENOL) tablet 650 mg, 650 mg, Oral, Q4H PRN, Nixon David MD, 650 mg at 10/04/19 1957    allopurinol (ZYLOPRIM) tablet 100 mg, 100 mg, Oral, DAILY, Germaine Gunter MD, 100 mg at 10/08/19 1030      Labs:  Recent Results (from the past 24 hour(s))   GLUCOSE, POC    Collection Time: 10/07/19  3:57 PM   Result Value Ref Range    Glucose (POC) 140 (H) 65 - 100 mg/dL    Performed by [address] [place] [address] St, POC    Collection Time: 10/07/19  9:28 PM   Result Value Ref Range    Glucose (POC) 78 65 - 100 mg/dL    Performed by Ángel Adler    GLUCOSE, POC    Collection Time: 10/07/19  9:50 PM   Result Value Ref Range    Glucose (POC) 105 (H) 65 - 100 mg/dL    Performed by Peter Mora    CK    Collection Time: 10/08/19  2:51 AM   Result Value Ref Range     (H) 26 - 854 U/L   METABOLIC PANEL, BASIC    Collection Time: 10/08/19  2:51 AM   Result Value Ref Range    Sodium 133 (L) 136 - 145 mmol/L    Potassium 4.3 3.5 - 5.1 mmol/L    Chloride 96 (L) 97 - 108 mmol/L    CO2 32 21 - 32 mmol/L    Anion gap 5 5 - 15 mmol/L    Glucose 156 (H) 65 - 100 mg/dL    BUN 24 (H) 6 - 20 MG/DL    Creatinine 1.27 (H) 0.55 - 1.02 MG/DL    BUN/Creatinine ratio 19 12 - 20      GFR est AA 48 (L) >60 ml/min/1.73m2    GFR est non-AA 40 (L) >60 ml/min/1.73m2    Calcium 8.4 (L) 8.5 - 10.1 MG/DL   GLUCOSE, POC    Collection Time: 10/08/19  6:30 AM   Result Value Ref Range    Glucose (POC) 168 (H) 65 - 100 mg/dL    Performed by [address] [place] [address], POC    Collection Time: 10/08/19 11:29 AM   Result Value Ref Range    Glucose (POC) 139 (H) 65 - 100 mg/dL    Performed by Patric Maynard (PCT)            Micro:     Blood: 10/4/19  Specimen Information: Blood        Component Value Ref Range & Units Status   Special Requests: NO SPECIAL REQUESTS    Preliminary   Culture result: NO GROWTH 3 DAYS    Preliminary   Result History       Pathology:      Imaging:  RLE   US  Interpretation Summary        · Probable acute thrombus at the mid and distal right popliteal level.   · Absent flow at the distal posterior tibial and anterior tibial levels suggesting occlusion. The right common femoral, deep femoral, femoral, popliteal, posterior tibial and anterior tibial arteries were visualized. No significant plaque is identified from groin to distal thigh. Biphasic flow with no increased velocities were noted. The above knee popliteal waveform is monophasic with low velocities. The popliteal fossa and distal popliteal contain absent flow and suggests an acute occlusion although it is suboptimally seen. Low echogenicity is noted in the popliteal.       The right posterior tibial and anterior tibial arteries contain absent flow with no color fill and suggests occlusion.      Lower Extremity Arterial Findings     Right Lower Arterial     The right distal popliteal artery is occluded. The right distal popliteal artery has homogeneous plaque. Monophasic Doppler waveforms in the right proximal popliteal artery. Biphasic Doppler waveforms in the right distal common femoral, profunda femoris, proximal superficial femoral and distal superficial femoral artery. Doppler waveforms are absent in the anterior tibial and posterior tibial artery. The following arteries are patent: distal common femoral, profunda femoris, proximal superficial femoral and distal superficial femoral.         MRI brain     FINDINGS:  Tiny acute infarct in the right cerebellum. No acute hemorrhage.     Mild generalized parenchymal volume loss with commensurate dilation of the sulci  and ventricular system. Periventricular deep white matter T2/FLAIR  hyperintensities, and patchy T2/FLAIR hyperintensity in the raymundo, consistent  with mild chronic microvascular ischemic disease. Small chronic infarct in the  right frontal periventricular white matter. There is no cerebellar tonsillar  herniation. Expected arterial flow-voids are present. No evidence of abnormal  enhancement.     Paranasal sinuses are clear. Trace bilateral mastoid effusions.  The orbital  contents are within normal limits with bilateral lens implants. No significant  osseous or scalp lesions are identified.     IMPRESSION  IMPRESSION:      1. Tiny acute infarct in the right cerebellum. 2. Mild generalized parenchymal volume loss and mild chronic microvascular  ischemic disease. Small chronic infarct in the right frontal periventricular  white matter. Assessment / Plan    Ms Joni Parker is a 80year old lady wt hx of copd, afib, DM admitted on on 9/17/19 with dypsnea. Treated for diastolic CHF exacerbation. Subsequently found to have acute infarct R cerebellum and RLE acute thrombus at the mid and distal right popliteal level. Underwent thrombectomy/embolectomy on 9/20/19. Operative report from 9/20/19 indicates \" thrombectomy yielded fair a amount of thrombus clearly from common iliac area region and this appeared to be organized and fairly thickened\". Then on 9/21/19, underwent thrombectomy of right leg for residual clot , 4-compartment fasciotomy. Operative report from 9/21/19 indicates \"  After entering the fascia, a large amount of discharge and dark blood was seen. The muscle appeared to be significantly abnormal in this location almost mushy with difficulty\"      On labs has had consistent leukocytosis and on 10/4/19 had fever up to 101.6 for which Infectious Disease was consulted. Zosyn started for concerns of infected RLE on 10/5/19. WBC elevated between 17-21. 1) RLE ischemia, necrosis, and popliteal thrombus status post thrombectomy 9/20 and repeat thrombectomy/fasciotomy 9/21/19  Fever curve imporved   On exam has foul odor from RLE wound areas and necrosis seen   No clear superficial cellulitis around the incisions site on the medial side   Blood cx negative so far   Currently on Zosyn IV, renally dosed per pharmacy   Discharge from RLE, mild temperatures up to 99.8, BP on lower side . Will add renally dosed Vancomycin IV   Vancomycin + Zosyn has higher risk for nephrotoxicity.  Also concerned that risk for HOLLAND is higher if plans for surgery, not taking much PO etc.Therefore will adjust antibiotics to Vancomycin, Cefepime and Flagyl   Discussed side effects with high risk for nephrotoxicity, CDI with daughter today over phone as well. I was unable to reach her son today when called. Antibiotic delivery to site is challenging if inadequate perfusion and discussed with daughter today   Need to monitor renal function closely   Being followed by vascular surgery   Has CK elevation and therefore avoiding daptomycin   Avoiding Zyvox given QTc levels > 500 on last EKG     2) COPD    3) Afib     4) DM    5) DVT px     D/W with RN today            Thank you for the opportunity to participate in the care of this patient. Please contact with questions or concerns.       Maira Monet DO   4:14 PM

## 2019-10-08 NOTE — PROGRESS NOTES
Problem: Mobility Impaired (Adult and Pediatric)  Goal: *Acute Goals and Plan of Care (Insert Text)  Description  FUNCTIONAL STATUS PRIOR TO ADMISSION: Patient was independent for all functional mobility. Patient uses 2L/min O2 via nasal cannula at baseline. HOME SUPPORT PRIOR TO ADMISSION: The patient lived with her son and daughter but did not require assist.    Physical Therapy Goals  Weekly re-assessment 10/4/2019  1. Patient will move from supine to sit and sit to supine  in bed with modified independence within 7 day(s). 2.  Patient will transfer from bed to chair and chair to bed with contact guard assist consistently  using the least restrictive device within 7 day(s). 3.  Patient will perform sit to stand with contact guard  assistance  within 7 day(s). 4.  Patient will ambulate with minimal assistance  for 15 feet with the least restrictive device within 7 day(s). Reassessed 9/27/19    1. Patient will move from supine to sit and sit to supine  in bed with modified independence within 7 day(s). 2.  Patient will transfer from bed to chair and chair to bed with moderate assistance  using the least restrictive device within 7 day(s). 3.  Patient will perform sit to stand with moderate assistance  within 7 day(s). 4.  Patient will ambulate with moderate assistance  for 15 feet with the least restrictive device within 7 day(s). Initiated 9/18/2019  1. Patient will move from supine to sit and sit to supine  in bed with independence within 7 day(s). 2.  Patient will transfer from bed to chair and chair to bed with independence using the least restrictive device within 7 day(s). 3.  Patient will perform sit to stand with independence within 7 day(s). 4.  Patient will ambulate with independence for 150 feet with the least restrictive device within 7 day(s). 5.  Patient will ascend/descend 1 stairs with 1 handrail(s) with modified independence within 7 day(s).         Outcome: Progressing Towards Goal   PHYSICAL THERAPY TREATMENT  Patient: Dorys Sousa (81 y.o. female)  Date: 10/8/2019  Diagnosis: CHF exacerbation ([facility] [address] [I50.9] CHF exacerbation ([facility] [address]  Procedure(s) (LRB):  THROMBECTOMY/EMBOLECTOMY LOWER  RIGHT LEG. FASCIOTOMY (Right) 17 Days Post-Op  Precautions: Fall  Chart, physical therapy assessment, plan of care and goals were reviewed. ASSESSMENT  Patient continues with skilled PT services and is progressing towards goals. Patient continues to slowly progress in spite of LLE issues with possible upcoming AKA. She requires significant time to complete task due to patient procrastinating initiation of activity but once begins does well and required less assist to stand and able to stand while cleaning post bowel movement as well as to change depends. Did educate patient on UE exercise with theraband to help maintain and build UE strength in preparation for AKA. Was able to get to [facility] and then to chair. Will need rehab post surgery as well if no surgery. Current Level of Function Impacting Discharge (mobility/balance): minimal assist with transfers, gait; Other factors to consider for discharge: awaiting decision on surgery         PLAN :  Patient continues to benefit from skilled intervention to address the above impairments. Continue treatment per established plan of care. to address goals. Recommendation for discharge: (in order for the patient to meet his/her long term goals)  Therapy up to 5 days/week in SNF setting    This discharge recommendation:  Has been made in collaboration with the attending provider and/or case management    Equipment recommendations for successful discharge (if) home: none       SUBJECTIVE:   Patient stated You all are so worrisome.     OBJECTIVE DATA SUMMARY:   Critical Behavior:  Neurologic State: Alert, Confused  Orientation Level: Oriented to person, Oriented to place, Oriented to situation  Cognition: Follows commands, Memory loss  Safety/Judgement: Awareness of environment  Functional Mobility Training:  Bed Mobility:     Supine to Sit: Additional time;Supervision              Transfers:  Sit to Stand: Minimum assistance;Assist x2  Stand to Sit: Minimum assistance        Bed to Chair: Minimum assistance                    Balance:  Sitting: Intact  Standing: Impaired; With support  Standing - Static: Fair  Standing - Dynamic : Fair  Ambulation/Gait Training:   Did take a few steps with walker to get to [facility] and then to chair  Decreased step length, decreased foot clearance      Gait belt  Rolling walker      Minimal assist         Therapeutic Exercises:   UE thera-band exercises      After treatment patient left in no apparent distress:   Sitting in chair and Call bell within reach    COMMUNICATION/COLLABORATION:   The patients plan of care was discussed with: Registered Nurse    Radha Escamilla, PT   Time Calculation: 26 mins

## 2019-10-08 NOTE — PROGRESS NOTES
Hospitalist Progress Note                               Kalin Mathew DO                                     Answering service: [phone]                               OR [phone] from in house phone                                         Date of Service:  10/8/2019  NAME:  Oma Talamantes  :  1931  MRN:  [id number]      Admission Summary:   58-year-old female with an extensive past medical history including chronic hypoxemic respiratory failure with home oxygen, COPD, chronic kidney disease, type 2 diabetes mellitus, atrial fibrillation, diastolic congestive heart failure, dyslipidemia, primary hypertension, osteoporosis, pulmonary hypertension, depression and anxiety disorder, was brought to the emergency room from home for evaluation of an approximately 2-3 day history of worsening shortness of breath. Per the patient's son, the patient ambulates unaided at baseline; however, in the 2-3 days leading up to the emergency room presentation, noted to have worsening shortness of breath even with easy activity. The patient also complained of some numbness and weakness in the right lower extremity. The patient admitted to being compliant with all her medical therapies. The patient denied associated headaches, dizziness, visual deficits, chest pains, palpitations, nausea, vomiting, cough, sputum production, fevers, chills, abdominal pain, bladder or bowel irregularities. Reason for follow up:       CC: SOB    Patient seen and examined. Reviewed notes by Dr. Jenniffer Arguelles, recommended AKA, patient refused. Appreciate palliative care input. Meeting planned tomorrow 10/9 to address goals of care. Assessment & Plan:     1) CVS: Acute on chronic diastolic CHF exarcerbation with a preserved EF. Probable Demand ischemia with mildly elevated troponins due to the CHF exarcerbation.  Indeterminable NYHA Functional Class due to the multiple Co-morbidities per Cardiology. Primary Hypertension. Dyslipidemia. 2D ECHO (9/12/19) with EF 70 percent. Continue oxygen, gentle diuresis, daily weight input/output monitoring, CHF teaching. Cardiology and Heart failure team F/Us noted and appreciated. 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 Renal US negative for obstructive uropathy. Stable. 3) Electrolytes: Probable multifactorial Hyponatremia present on admission. Stable. Resolved Hyperkalemia without any dysrhythmias. 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 Neurology eval noted and appreciated. D/W Dr Suzan Leija. 5) Vascular: Ischemic right leg. S/P RLE Thrombectomy 9/21/19 with residual clot in Popliteal Artery. S/P repeat Thrombectomy 9/22/19. Now with significant muscle ischemia right calf with likely right foot drop. Aspirin and plavix discontinued due to Epistaxis. Continue Apixaban. Recommended AKA. Patient refused. Palliative care meeting. 6) ID: Low threshold for development of sepsis due to ischemic right leg. Afebrile, persistent leukocytosis, left shift, bandemia. Blood cultures in progress. Empiric Zosyn, continue. ID eval noted and appreciated. 7) Resp: Chronic Hypoxemic respiratory failure due to COPD with home oxygen. Nebulizers, inhalers, incentive spirometry. 8) Hematology: Anemia of chronicity. 9) Endocrine: Uncontrolled insulin treated type 2 DM with complications including hyperglycemia. Hold metformin. Accucheck monitoring, Basal and sliding scale insulins, diabetic teaching. Diabetic team evaluation with recommendations appreciated. 10) MANNIE: Osteoporosis. Mild Rhabdomyolysis    11) Psychiatry: Anxiety disorder and Depression without any current behavioral disturbances. 12) Prophylaxis: DVT. 13) Directives: DDNR with an extremely  guarded prognosis.  At increased risk of decompensation. Readdressed with patient and patient's children. Palliative care F/U noted and appreciated. 14) Plan: Palliative care meeting 10/9    Hospital Problems  Date Reviewed: 9/20/2019          Codes Class Noted POA    * (Principal) CHF exacerbation ([facility] ICD-10-CM: I50.9  ICD-9-CM: 428.0  9/17/2019 Yes        Hyperkalemia ICD-10-CM: E87.5  ICD-9-CM: 276.7  9/17/2019 Yes        Hyponatremia ICD-10-CM: E87.1  ICD-9-CM: 276.1  9/17/2019 Yes        HOLLAND (acute kidney injury) ([facility] ICD-10-CM: N17.9  ICD-9-CM: 584.9  9/17/2019 Yes        Acute hypoxemic respiratory failure ([facility] ICD-10-CM: J96.01  ICD-9-CM: 518.81  8/8/2011 Yes        Debility ICD-10-CM: R53.81  ICD-9-CM: 799.3  6/28/2011 Yes                  Physical Examination:      Last 24hrs VS reviewed since prior progress note. Most recent are:  Visit Vitals  /65   Pulse 92   Temp 98.5 °F (36.9 °C)   Resp 19   Ht 5' 5\" (1.651 m)   Wt 88.2 kg (194 lb 7.1 oz)   SpO2 100%   Breastfeeding? No   BMI 32.36 kg/m²           Constitutional:  No acute distress. HEENT: Head is a traumatic,  Un icteric sclera. Pink conjunctiva,no erythema or discharge. Oral mucous moist, oropharynx benign. Neck supple,    Resp:  Decreased air entry B/L.   CV:  S1,S2 present. GI:  Soft, non distended, non tender. normoactive bowel sounds, no hepatosplenomegaly    :  No CVA or suprapubic tenderness   Skin  :  Cool and dark RLE. Musculoskeletal:  Dressing insitu to right leg. Reviewed chart p    Neurologic:  Awake, alert and oriented.             No intake or output data in the 24 hours ending 10/08/19 1405           Labs:     Recent Labs     10/07/19  0254 10/06/19  0147   WBC 18.8* 21.6*   HGB 7.2* 7.0*   HCT 23.5* 22.7*   * 406*     Recent Labs     10/08/19  0251 10/07/19  0254 10/06/19  0147   * 134* 133*   K 4.3 4.1 4.2   CL 96* 95* 96*   CO2 32 32 31   BUN 24* 25* 21*   CREA 1.27* 1.25* 1.15*   * 149* 92   CA 8.4* 8.4* 8.7     No results for input(s): SGOT, GPT, ALT, AP, TBIL, TBILI, TP, ALB, GLOB, GGT, AML, LPSE in the last 72 hours. No lab exists for component: AMYP, HLPSE  No results for input(s): INR, PTP, APTT, INREXT, INREXT in the last 72 hours. No results for input(s): FE, TIBC, PSAT, FERR in the last 72 hours. No results found for: FOL, RBCF   No results for input(s): PH, PCO2, PO2 in the last 72 hours.   Recent Labs     10/08/19  0251 10/07/19  0254 10/06/19  0147   * 782* 961*     Lab Results   Component Value Date/Time    Cholesterol, total 144 09/17/2019 09:21 AM    HDL Cholesterol 55 09/17/2019 09:21 AM    LDL, calculated 72.6 09/17/2019 09:21 AM    Triglyceride 82 09/17/2019 09:21 AM    CHOL/HDL Ratio 2.6 09/17/2019 09:21 AM     Lab Results   Component Value Date/Time    Glucose (POC) 139 (H) 10/08/2019 11:29 AM    Glucose (POC) 168 (H) 10/08/2019 06:30 AM    Glucose (POC) 105 (H) 10/07/2019 09:50 PM    Glucose (POC) 78 10/07/2019 09:28 PM    Glucose (POC) 140 (H) 10/07/2019 03:57 PM     Lab Results   Component Value Date/Time    Color DARK YELLOW 10/05/2019 06:02 PM    Appearance CLOUDY (A) 10/05/2019 06:02 PM    Specific gravity 1.018 10/05/2019 06:02 PM    Specific gravity 1.010 12/18/2011 09:28 AM    pH (UA) 5.5 10/05/2019 06:02 PM    Protein 30 (A) 10/05/2019 06:02 PM    Glucose NEGATIVE  10/05/2019 06:02 PM    Ketone NEGATIVE  10/05/2019 06:02 PM    Bilirubin NEGATIVE  09/17/2019 12:49 PM    Urobilinogen >8.0 (H) 10/05/2019 06:02 PM    Nitrites NEGATIVE  10/05/2019 06:02 PM    Leukocyte Esterase TRACE (A) 10/05/2019 06:02 PM    Epithelial cells FEW 10/05/2019 06:02 PM    Bacteria NEGATIVE  10/05/2019 06:02 PM    WBC 0-4 10/05/2019 06:02 PM    RBC 0-5 10/05/2019 06:02 PM         Medications Reviewed:     Current Facility-Administered Medications   Medication Dose Route Frequency    ALPRAZolam (XANAX) tablet 0.25 mg  0.25 mg Oral BID PRN    albuterol-ipratropium (DUO-NEB) 2.5 MG-0.5 MG/3 ML  3 mL Nebulization Q4H PRN  piperacillin-tazobactam (ZOSYN) 3.375 g in 0.9% sodium chloride (MBP/ADV) 100 mL  3.375 g IntraVENous Q8H    insulin lispro (HUMALOG) injection 4 Units  4 Units SubCUTAneous TIDAC    sodium chloride (OCEAN) 0.65 % nasal squeeze bottle 2 Spray  2 Spray Both Nostrils Q2H PRN    oxymetazoline (AFRIN) 0.05 % nasal spray 2 Spray  2 Spray Both Nostrils BID PRN    furosemide (LASIX) tablet 40 mg  40 mg Oral DAILY    0.9% sodium chloride infusion 250 mL  250 mL IntraVENous PRN    0.9% sodium chloride infusion 250 mL  250 mL IntraVENous PRN    apixaban (ELIQUIS) tablet 5 mg  5 mg Oral Q12H    insulin lispro (HUMALOG) injection   SubCUTAneous AC&HS    glucose chewable tablet 16 g  4 Tab Oral PRN    glucagon (GLUCAGEN) injection 1 mg  1 mg IntraMUSCular PRN    dextrose 10% infusion 0-250 mL  0-250 mL IntraVENous PRN    albuterol-ipratropium (DUO-NEB) 2.5 MG-0.5 MG/3 ML  3 mL Nebulization Q4H PRN    alum-mag hydroxide-simeth (MYLANTA) oral suspension 30 mL  30 mL Oral Q4H PRN    famotidine (PEPCID) tablet 20 mg  20 mg Oral QPM    metoprolol tartrate (LOPRESSOR) tablet 25 mg  25 mg Oral BID    traMADol (ULTRAM) tablet 25 mg  25 mg Oral Q8H PRN    fentaNYL citrate (PF) injection 50 mcg  50 mcg IntraVENous Q3H PRN    insulin glargine (LANTUS) injection 15 Units  15 Units SubCUTAneous DAILY    ondansetron (ZOFRAN) injection 4 mg  4 mg IntraVENous Q6H PRN    sodium chloride (NS) flush 5-40 mL  5-40 mL IntraVENous Q8H    sodium chloride (NS) flush 5-40 mL  5-40 mL IntraVENous PRN    acetaminophen (TYLENOL) tablet 650 mg  650 mg Oral Q4H PRN    allopurinol (ZYLOPRIM) tablet 100 mg  100 mg Oral DAILY     ______________________________________________________________________  EXPECTED LENGTH OF STAY: 6d 14h  ACTUAL LENGTH OF STAY:          Anna 62, DO

## 2019-10-08 NOTE — PROGRESS NOTES
Pharmacist Note - Vancomycin Dosing    Consult provided for this 80 y.o. female for indication of RLE ischemia, necrosis, and popliteal thrombus status post thrombectomy . Antibiotic regimen(s): Vanc + Cefepime + Flagyl    Recent Labs     10/08/19  0251 10/07/19  0254 10/06/19  0147   WBC  --  18.8* 21.6*   CREA 1.27* 1.25* 1.15*   BUN 24* 25* 21*     Frequency of BMP: daily x 3  Height: 165.1 cm  Weight: 88.2 kg  Est CrCl: 33.6 ml/min; UO: -- ml/kg/hr  Temp (24hrs), Av.8 °F (37.1 °C), Min:98.4 °F (36.9 °C), Max:99.1 °F (37.3 °C)    Cultures:  10/4 blood - NGTD    Goal trough = 10 - 15 mcg/mL    Therapy will be initiated with a loading dose of 2000 mg IV x 1 to be followed by a maintenance dose of 1250 mg IV every 24 hours for a predicted trough of 14 - 15 mcg/ml. Pharmacy to follow patient daily and order levels / make dose adjustments as appropriate.

## 2019-10-08 NOTE — PROGRESS NOTES
Vascular Surgery  --no changes from yesterday; doing well with PT  --I have recommended AKA to the patient and son as this is limb appears ultimately non-viable  --they are in the process of deciding

## 2019-10-08 NOTE — PROGRESS NOTES
Palliative Medicine Consult  [place]: [phone] ([phone])    Patient Name: Vikki Crowell  YOB: 1931    Date of Initial Consult: 9/20/2019  Reason for Consult: Goals of care discussion   Requesting Provider: Dr. Lynda Horn  Primary Care Physician: Tate Joseph MD     SUMMARY:   Vikki Crowell is a 80 y.o. with a past history of COPD on home O2, hypoxic respiratory failure, DM 2, A. fib, CKD, CHF, HTN, pulmonary hypertension, moderate AV stenosis, echo with EF >70% (9/12/2019), MV with moderate annular calcification, TV with moderate to severe regurgitation depression and anxiety, who was admitted on 9/17/2019 from home with a diagnosis of acute on chronic CHF. Patient presented to the ER with CC of 2-3-day worsening of shortness of breath with numbness and weakness of right lower extremity. In ER labs revealed + hyponatremia with , K5.6 and hypoalbuminemia with albumin 2.9      Current medical issues leading to Palliative Medicine involvement include: Goals of care discussion      Psychosocial assessment: See palliative  [place] note, she lives with her daugher and son. Baseline cognitive and functional status: Patient alert and oriented able to ambulate independently no longer cooks because was forgetful and left hands on the stove burning     PALLIATIVE DIAGNOSES:   1. Hypoalbuminemia  2. Anxiety   3. Physical debility   4. Goals of care discussion  5. Weakness, generalized  6. Debility  7. ACP  8. Hypoalbuminemia      PLAN:   1. Pt seen and conversation held with son via phone  2. Unfortunate set back with worsening  vascular compromise of rt leg. AKA has been recommended  3. Pt verbalized that she does not want to amputate  4. Son feels otherwise   5. Pt says she has a clear understanding of the risks of refusing amputation  6. We have planned a family meeting tomorrow at 3pm to include son in person  9.  If the pt declines surgery then options are:  1. Hospice at home or facility. Unsure if family able to provide 24 hour care or pay for nursing home. If they cannot then the only other option is snf   2. Skilled facility  with the understanding of risk of sepsis and rapid decline and we would manage end of life care at that time    8. Xanax 0.25mg ordered bid prn  9. Initial consult note routed to primary continuity provider and/or primary health care team members  10. Communicated plan of care with: Palliative [facility] Team     GOALS OF CARE / TREATMENT PREFERENCES:     GOALS OF CARE:  Patient/Health Care Proxy Stated Goals: Rehabilitation    TREATMENT PREFERENCES:   Code Status: DNR    Advance Care Planning:  [x] [facility] Interdisciplinary Team has updated the ACP Navigator with Health Care Decision Maker and Patient Capacity       Primary Decision Maker: Candace Huang - [phone]    Secondary Decision Maker: Aureliano Evie Charanjit Senthil - [phone]    Advance Care Planning 10/4/2019   Patient's [address] is: Named in scanned ACP document   Primary Decision Maker Name -   Primary Decision Maker Phone Number -   Primary Decision Maker Relationship to Patient -   Secondary Decision [address] Name -   Secondary Decision Maker Relationship to Patient -   Confirm Advance Directive Yes, on file   Patient Would Like to Complete Advance Directive -   Does the patient have other document types Do Not Resuscitate       Medical Interventions: Limited additional interventions     Other Instructions:   Artificially Administered Nutrition: No feeding tube     Other:    As far as possible, the palliative care team has discussed with patient / health care proxy about goals of care / treatment preferences for patient. HISTORY:     History obtained from: Medical records    CHIEF COMPLAINT: N/A    HPI/SUBJECTIVE:    The patient is:   [x] Verbal and participatory  [] Non-participatory due to: No pain.  No shortness of breath. Feels anxious. oob in chair and working well with physical therapy    Clinical Pain Assessment (nonverbal scale for severity on nonverbal patients):   Clinical Pain Assessment  Severity: 0  Location: right leg  Character: patient unable to report  Duration: patient unable to report  Effect: patient unable to report  Factors: patient unable to report  Frequency: patient unable to report     Activity (Movement): Lying quietly, normal position    Duration: for how long has pt been experiencing pain (e.g., 2 days, 1 month, years)  Frequency: how often pain is an issue (e.g., several times per day, once every few days, constant)     FUNCTIONAL ASSESSMENT:     Palliative Performance Scale (PPS):  PPS: 50       PSYCHOSOCIAL/SPIRITUAL SCREENING:     Palliative IDT has assessed this patient for cultural preferences / practices and a referral made as appropriate to needs (Cultural Services, Patient Advocacy, Ethics, etc.)    Any spiritual / Synagogue concerns:  [] Yes /  [x] No    Caregiver Burnout:  [] Yes /  [x] No /  [] No Caregiver Present      Anticipatory grief assessment:   [x] Normal  / [] Maladaptive       ESAS Anxiety: Anxiety: 0    ESAS Depression: Depression: 0        REVIEW OF SYSTEMS:     Positive and pertinent negative findings in ROS are noted above in HPI. The following systems were [x] reviewed / [] unable to be reviewed as noted in HPI  Other findings are noted below. Systems: constitutional, ears/nose/mouth/throat, respiratory, gastrointestinal, genitourinary, musculoskeletal, integumentary, neurologic, psychiatric, endocrine. Positive findings noted below.   Modified ESAS Completed by: provider   Fatigue: 0 Drowsiness: 0   Depression: 0 Pain: 0   Anxiety: 0 Nausea: 0   Anorexia: 0 Dyspnea: 0           Stool Occurrence(s): 1        PHYSICAL EXAM:     From RN flowsheet:  Wt Readings from Last 3 Encounters:   10/08/19 194 lb 7.1 oz (88.2 kg)   09/19/19 190 lb 7.6 oz (86.4 kg)   08/30/19 194 lb (88 kg)     Blood pressure 120/65, pulse 92, temperature 98.5 °F (36.9 °C), resp. rate 19, height 5' 5\" (1.651 m), weight 194 lb 7.1 oz (88.2 kg), SpO2 100 %, not currently breastfeeding. Pain Scale 1: Numeric (0 - 10)  Pain Intensity 1: 0  Pain Onset 1: acute  Pain Location 1: Leg  Pain Orientation 1: Right  Pain Description 1: Aching  Pain Intervention(s) 1: Medication (see MAR)  Last bowel movement, if known:     Constitutional: alert, conversant, NAD  Eyes: pupils equal, anicteric  ENMT: no nasal discharge, moist mucous membranes  Cardiovascular: regular rhythm, distal pulses intact  Respiratory: breathing not labored on RA, symmetric  Gastrointestinal: gross, soft non-tender, +bowel sounds  Musculoskeletal: no deformity, no tenderness to palpation  Skin: warm, dry upper ext.   vasc insufficiencies of Rt leg  Neurologic: following commands  Psychiatric: neg agitaiton, +anxiety  Other:       HISTORY:     Principal Problem:    CHF exacerbation ([address] (9/17/2019)    Active Problems:    Debility (6/28/2011)      Acute hypoxemic respiratory failure ([address] (8/8/2011)      Hyperkalemia (9/17/2019)      Hyponatremia (9/17/2019)      HOLLAND (acute kidney injury) ([address] (9/17/2019)      Past Medical History:   Diagnosis Date    Anxiety     Breast cancer ([address] 2005, 2010    right 2005, left 2010    Cancer [facility])      cancer right breast cancer    Cancer ([address]     cancer left breast/new dx 8/2011 +bx     Chronic obstructive pulmonary disease (HCC)     Diabetes ([address]     Heart failure ([address]     Hypercholesterolemia     Hypertension     Other ill-defined conditions(799.89)     osteopoosis    Other ill-defined conditions(799.89)     high cholesterol    Pneumonia     Psychiatric disorder     anxiety      Past Surgical History:   Procedure Laterality Date    BIOPSY OF BREAST, INCISIONAL      left breast 8/2011 positive for cancer cells    BREAST SURGERY PROCEDURE UNLISTED      right mastectomy    HX BREAST LUMPECTOMY Left 2010    HX MASTECTOMY Right 2005      Family History   Problem Relation Age of Onset    Hypertension Mother     Stroke Other       History reviewed, no pertinent family history.   Social History     Tobacco Use    Smoking status: Never Smoker    Smokeless tobacco: Never Used   Substance Use Topics    Alcohol use: No     Allergies   Allergen Reactions    Ace Inhibitors Angioedema      Current Facility-Administered Medications   Medication Dose Route Frequency    ALPRAZolam (XANAX) tablet 0.25 mg  0.25 mg Oral BID PRN    albuterol-ipratropium (DUO-NEB) 2.5 MG-0.5 MG/3 ML  3 mL Nebulization Q4H PRN    piperacillin-tazobactam (ZOSYN) 3.375 g in 0.9% sodium chloride (MBP/ADV) 100 mL  3.375 g IntraVENous Q8H    insulin lispro (HUMALOG) injection 4 Units  4 Units SubCUTAneous TIDAC    sodium chloride (OCEAN) 0.65 % nasal squeeze bottle 2 Spray  2 Spray Both Nostrils Q2H PRN    oxymetazoline (AFRIN) 0.05 % nasal spray 2 Spray  2 Spray Both Nostrils BID PRN    furosemide (LASIX) tablet 40 mg  40 mg Oral DAILY    0.9% sodium chloride infusion 250 mL  250 mL IntraVENous PRN    0.9% sodium chloride infusion 250 mL  250 mL IntraVENous PRN    apixaban (ELIQUIS) tablet 5 mg  5 mg Oral Q12H    insulin lispro (HUMALOG) injection   SubCUTAneous AC&HS    glucose chewable tablet 16 g  4 Tab Oral PRN    glucagon (GLUCAGEN) injection 1 mg  1 mg IntraMUSCular PRN    dextrose 10% infusion 0-250 mL  0-250 mL IntraVENous PRN    albuterol-ipratropium (DUO-NEB) 2.5 MG-0.5 MG/3 ML  3 mL Nebulization Q4H PRN    alum-mag hydroxide-simeth (MYLANTA) oral suspension 30 mL  30 mL Oral Q4H PRN    famotidine (PEPCID) tablet 20 mg  20 mg Oral QPM    metoprolol tartrate (LOPRESSOR) tablet 25 mg  25 mg Oral BID    traMADol (ULTRAM) tablet 25 mg  25 mg Oral Q8H PRN    fentaNYL citrate (PF) injection 50 mcg  50 mcg IntraVENous Q3H PRN    insulin glargine (LANTUS) injection 15 Units  15 Units SubCUTAneous DAILY    ondansetron [facility]) injection 4 mg  4 mg IntraVENous Q6H PRN    sodium chloride (NS) flush 5-40 mL  5-40 mL IntraVENous Q8H    sodium chloride (NS) flush 5-40 mL  5-40 mL IntraVENous PRN    acetaminophen (TYLENOL) tablet 650 mg  650 mg Oral Q4H PRN    allopurinol (ZYLOPRIM) tablet 100 mg  100 mg Oral DAILY          LAB AND IMAGING FINDINGS:     Lab Results   Component Value Date/Time    WBC 18.8 (H) 10/07/2019 02:54 AM    HGB 7.2 (L) 10/07/2019 02:54 AM    PLATELET 279 (H) 88/18/9039 02:54 AM     Lab Results   Component Value Date/Time    Sodium 133 (L) 10/08/2019 02:51 AM    Potassium 4.3 10/08/2019 02:51 AM    Chloride 96 (L) 10/08/2019 02:51 AM    CO2 32 10/08/2019 02:51 AM    BUN 24 (H) 10/08/2019 02:51 AM    Creatinine 1.27 (H) 10/08/2019 02:51 AM    Calcium 8.4 (L) 10/08/2019 02:51 AM    Magnesium 2.1 04/03/2017 12:00 AM    Phosphorus 3.1 09/26/2019 02:07 PM      Lab Results   Component Value Date/Time    AST (SGOT) 18 09/17/2019 09:21 AM    Alk. phosphatase 109 09/17/2019 09:21 AM    Protein, total 8.4 (H) 09/17/2019 09:21 AM    Albumin 1.9 (L) 09/26/2019 02:07 PM    Globulin 5.5 (H) 09/17/2019 09:21 AM     Lab Results   Component Value Date/Time    INR 1.1 01/20/2017 01:24 PM    Prothrombin time 10.9 01/20/2017 01:24 PM    aPTT 42.8 (H) 09/24/2019 05:30 AM      No results found for: IRON, FE, TIBC, IBCT, PSAT, FERR   Lab Results   Component Value Date/Time    pH 7.42 04/20/2011 01:50 AM    PCO2 49 (H) 04/20/2011 01:50 AM    PO2 82 04/20/2011 01:50 AM     No components found for: [place]   Lab Results   Component Value Date/Time     (H) 10/08/2019 02:51 AM    CK - MB 2.3 01/20/2016 10:30 AM                Total time: 45 min  Counseling / coordination time, spent as noted above: 35  > 50% counseling / coordination?:  Yes    Prolonged service was provided for  []30 min   []75 min in face to face time in the presence of the patient, spent as noted above.   Time Start:   Time End:   Note: this can only be billed with 65989 (initial) or 36385 (follow up). If multiple start / stop times, list each separately.

## 2019-10-08 NOTE — PROGRESS NOTES
Day #1 of Cefepime  Indication:  necrotic RLE wound  Current regimen:  2G IV q12h  Abx regimen: Cefepime + Vanc + Flagyl  Recent Labs     10/08/19  0251 10/07/19  0254 10/06/19  0147   WBC  --  18.8* 21.6*   CREA 1.27* 1.25* 1.15*   BUN 24* 25* 21*     Est CrCl: 34 ml/min; UO: -- ml/kg/hr  Temp (24hrs), Av.8 °F (37.1 °C), Min:98.4 °F (36.9 °C), Max:99.1 °F (37.3 °C)    Cultures: 10/4 blood - pending    Plan: Change to 2G IV q24 for patient's CrCl 30 - 60 ml/min  per [facility] P&T Committee Protocol with respect to renal function. Pharmacy will continue to monitor patient daily and will make dosage adjustments based upon changing renal function.

## 2019-10-09 ENCOUNTER — APPOINTMENT (OUTPATIENT)
Dept: CT IMAGING | Age: 84
DRG: 270 | End: 2019-10-09
Attending: INTERNAL MEDICINE
Payer: MEDICARE

## 2019-10-09 LAB
ANION GAP SERPL CALC-SCNC: 8 MMOL/L (ref 5–15)
APTT PPP: 35.1 SEC (ref 22.1–32)
ARTERIAL PATENCY WRIST A: YES
ATRIAL RATE: 39 BPM
BACTERIA SPEC CULT: NORMAL
BASE EXCESS BLD CALC-SCNC: 4 MMOL/L
BASOPHILS # BLD: 0 K/UL (ref 0–0.1)
BASOPHILS NFR BLD: 0 % (ref 0–1)
BDY SITE: ABNORMAL
BUN SERPL-MCNC: 23 MG/DL (ref 6–20)
BUN/CREAT SERPL: 17 (ref 12–20)
CALCIUM SERPL-MCNC: 8.5 MG/DL (ref 8.5–10.1)
CALCULATED R AXIS, ECG10: 32 DEGREES
CALCULATED T AXIS, ECG11: -79 DEGREES
CHLORIDE SERPL-SCNC: 98 MMOL/L (ref 97–108)
CK SERPL-CCNC: 560 U/L (ref 26–192)
CK SERPL-CCNC: 685 U/L (ref 26–192)
CO2 SERPL-SCNC: 29 MMOL/L (ref 21–32)
CREAT SERPL-MCNC: 1.38 MG/DL (ref 0.55–1.02)
DIAGNOSIS, 93000: NORMAL
DIFFERENTIAL METHOD BLD: ABNORMAL
EOSINOPHIL # BLD: 0.2 K/UL (ref 0–0.4)
EOSINOPHIL NFR BLD: 1 % (ref 0–7)
ERYTHROCYTE [DISTWIDTH] IN BLOOD BY AUTOMATED COUNT: 16.5 % (ref 11.5–14.5)
FIBRINOGEN PPP-MCNC: >800 MG/DL (ref 200–475)
GAS FLOW.O2 O2 DELIVERY SYS: ABNORMAL L/MIN
GAS FLOW.O2 SETTING OXYMISER: 3 L/M
GLUCOSE BLD STRIP.AUTO-MCNC: 127 MG/DL (ref 65–100)
GLUCOSE BLD STRIP.AUTO-MCNC: 151 MG/DL (ref 65–100)
GLUCOSE BLD STRIP.AUTO-MCNC: 153 MG/DL (ref 65–100)
GLUCOSE BLD STRIP.AUTO-MCNC: 160 MG/DL (ref 65–100)
GLUCOSE BLD STRIP.AUTO-MCNC: 169 MG/DL (ref 65–100)
GLUCOSE SERPL-MCNC: 132 MG/DL (ref 65–100)
HCO3 BLD-SCNC: 27.8 MMOL/L (ref 22–26)
HCT VFR BLD AUTO: 21.8 % (ref 35–47)
HGB BLD-MCNC: 6.4 G/DL (ref 11.5–16)
IMM GRANULOCYTES # BLD AUTO: 0 K/UL
IMM GRANULOCYTES NFR BLD AUTO: 0 %
INR PPP: 1.6 (ref 0.9–1.1)
LYMPHOCYTES # BLD: 0.6 K/UL (ref 0.8–3.5)
LYMPHOCYTES NFR BLD: 3 % (ref 12–49)
MCH RBC QN AUTO: 26.9 PG (ref 26–34)
MCHC RBC AUTO-ENTMCNC: 29.4 G/DL (ref 30–36.5)
MCV RBC AUTO: 91.6 FL (ref 80–99)
METAMYELOCYTES NFR BLD MANUAL: 1 %
MONOCYTES # BLD: 1 K/UL (ref 0–1)
MONOCYTES NFR BLD: 5 % (ref 5–13)
MYELOCYTES NFR BLD MANUAL: 1 %
NEUTS BAND NFR BLD MANUAL: 5 % (ref 0–6)
NEUTS SEG # BLD: 18.5 K/UL (ref 1.8–8)
NEUTS SEG NFR BLD: 84 % (ref 32–75)
NRBC # BLD: 0 K/UL (ref 0–0.01)
NRBC BLD-RTO: 0 PER 100 WBC
PCO2 BLD: 40.4 MMHG (ref 35–45)
PH BLD: 7.45 [PH] (ref 7.35–7.45)
PLATELET # BLD AUTO: 435 K/UL (ref 150–400)
PLATELET COMMENTS,PCOM: ABNORMAL
PMV BLD AUTO: 9.6 FL (ref 8.9–12.9)
PO2 BLD: 84 MMHG (ref 80–100)
POTASSIUM SERPL-SCNC: 3.8 MMOL/L (ref 3.5–5.1)
PROCALCITONIN SERPL-MCNC: 0.4 NG/ML
PROTHROMBIN TIME: 15.9 SEC (ref 9–11.1)
Q-T INTERVAL, ECG07: 446 MS
QRS DURATION, ECG06: 86 MS
QTC CALCULATION (BEZET), ECG08: 551 MS
RBC # BLD AUTO: 2.38 M/UL (ref 3.8–5.2)
RBC MORPH BLD: ABNORMAL
SAO2 % BLD: 97 % (ref 92–97)
SERVICE CMNT-IMP: ABNORMAL
SERVICE CMNT-IMP: NORMAL
SODIUM SERPL-SCNC: 135 MMOL/L (ref 136–145)
SPECIMEN TYPE: ABNORMAL
THERAPEUTIC RANGE,PTTT: ABNORMAL SECS (ref 58–77)
VENTRICULAR RATE, ECG03: 92 BPM
WBC # BLD AUTO: 20.8 K/UL (ref 3.6–11)

## 2019-10-09 PROCEDURE — 74011250637 HC RX REV CODE- 250/637: Performed by: FAMILY MEDICINE

## 2019-10-09 PROCEDURE — 74011250637 HC RX REV CODE- 250/637: Performed by: INTERNAL MEDICINE

## 2019-10-09 PROCEDURE — 82803 BLOOD GASES ANY COMBINATION: CPT

## 2019-10-09 PROCEDURE — P9016 RBC LEUKOCYTES REDUCED: HCPCS

## 2019-10-09 PROCEDURE — 74011250637 HC RX REV CODE- 250/637: Performed by: SURGERY

## 2019-10-09 PROCEDURE — 94640 AIRWAY INHALATION TREATMENT: CPT

## 2019-10-09 PROCEDURE — 74011636637 HC RX REV CODE- 636/637: Performed by: HOSPITALIST

## 2019-10-09 PROCEDURE — 74011250636 HC RX REV CODE- 250/636: Performed by: INTERNAL MEDICINE

## 2019-10-09 PROCEDURE — 85025 COMPLETE CBC W/AUTO DIFF WBC: CPT

## 2019-10-09 PROCEDURE — 36600 WITHDRAWAL OF ARTERIAL BLOOD: CPT

## 2019-10-09 PROCEDURE — 74011250636 HC RX REV CODE- 250/636

## 2019-10-09 PROCEDURE — 86900 BLOOD TYPING SEROLOGIC ABO: CPT

## 2019-10-09 PROCEDURE — 36415 COLL VENOUS BLD VENIPUNCTURE: CPT

## 2019-10-09 PROCEDURE — 74011636637 HC RX REV CODE- 636/637: Performed by: SURGERY

## 2019-10-09 PROCEDURE — 84145 PROCALCITONIN (PCT): CPT

## 2019-10-09 PROCEDURE — 94760 N-INVAS EAR/PLS OXIMETRY 1: CPT

## 2019-10-09 PROCEDURE — 65270000029 HC RM PRIVATE

## 2019-10-09 PROCEDURE — 74011000250 HC RX REV CODE- 250: Performed by: INTERNAL MEDICINE

## 2019-10-09 PROCEDURE — 82550 ASSAY OF CK (CPK): CPT

## 2019-10-09 PROCEDURE — 86923 COMPATIBILITY TEST ELECTRIC: CPT

## 2019-10-09 PROCEDURE — 82962 GLUCOSE BLOOD TEST: CPT

## 2019-10-09 PROCEDURE — 70450 CT HEAD/BRAIN W/O DYE: CPT

## 2019-10-09 PROCEDURE — 74011000258 HC RX REV CODE- 258: Performed by: INTERNAL MEDICINE

## 2019-10-09 PROCEDURE — 80048 BASIC METABOLIC PNL TOTAL CA: CPT

## 2019-10-09 PROCEDURE — 93005 ELECTROCARDIOGRAM TRACING: CPT

## 2019-10-09 PROCEDURE — 36430 TRANSFUSION BLD/BLD COMPNT: CPT

## 2019-10-09 PROCEDURE — 74011250636 HC RX REV CODE- 250/636: Performed by: SURGERY

## 2019-10-09 PROCEDURE — 85610 PROTHROMBIN TIME: CPT

## 2019-10-09 RX ORDER — NALOXONE HYDROCHLORIDE 0.4 MG/ML
INJECTION, SOLUTION INTRAMUSCULAR; INTRAVENOUS; SUBCUTANEOUS
Status: COMPLETED
Start: 2019-10-09 | End: 2019-10-09

## 2019-10-09 RX ORDER — NALOXONE HYDROCHLORIDE 0.4 MG/ML
0.4 INJECTION, SOLUTION INTRAMUSCULAR; INTRAVENOUS; SUBCUTANEOUS ONCE
Status: COMPLETED | OUTPATIENT
Start: 2019-10-09 | End: 2019-10-09

## 2019-10-09 RX ORDER — VANCOMYCIN HYDROCHLORIDE
1250
Status: DISCONTINUED | OUTPATIENT
Start: 2019-10-10 | End: 2019-10-11

## 2019-10-09 RX ORDER — SODIUM CHLORIDE 9 MG/ML
250 INJECTION, SOLUTION INTRAVENOUS AS NEEDED
Status: DISCONTINUED | OUTPATIENT
Start: 2019-10-09 | End: 2019-10-17 | Stop reason: ALTCHOICE

## 2019-10-09 RX ADMIN — Medication 10 ML: at 06:00

## 2019-10-09 RX ADMIN — IPRATROPIUM BROMIDE AND ALBUTEROL SULFATE 3 ML: .5; 3 SOLUTION RESPIRATORY (INHALATION) at 02:44

## 2019-10-09 RX ADMIN — FAMOTIDINE 20 MG: 20 TABLET ORAL at 17:03

## 2019-10-09 RX ADMIN — TRAMADOL HYDROCHLORIDE 25 MG: 50 TABLET ORAL at 13:39

## 2019-10-09 RX ADMIN — METRONIDAZOLE 500 MG: 500 INJECTION, SOLUTION INTRAVENOUS at 18:12

## 2019-10-09 RX ADMIN — Medication 10 ML: at 13:12

## 2019-10-09 RX ADMIN — METOPROLOL TARTRATE 25 MG: 25 TABLET ORAL at 11:36

## 2019-10-09 RX ADMIN — METRONIDAZOLE 500 MG: 500 INJECTION, SOLUTION INTRAVENOUS at 03:21

## 2019-10-09 RX ADMIN — IPRATROPIUM BROMIDE AND ALBUTEROL SULFATE 3 ML: .5; 3 SOLUTION RESPIRATORY (INHALATION) at 06:01

## 2019-10-09 RX ADMIN — APIXABAN 5 MG: 5 TABLET, FILM COATED ORAL at 11:39

## 2019-10-09 RX ADMIN — NALOXONE HYDROCHLORIDE 0.4 MG: 0.4 INJECTION, SOLUTION INTRAMUSCULAR; INTRAVENOUS; SUBCUTANEOUS at 06:09

## 2019-10-09 RX ADMIN — INSULIN LISPRO 4 UNITS: 100 INJECTION, SOLUTION INTRAVENOUS; SUBCUTANEOUS at 07:37

## 2019-10-09 RX ADMIN — INSULIN GLARGINE 15 UNITS: 100 INJECTION, SOLUTION SUBCUTANEOUS at 11:58

## 2019-10-09 RX ADMIN — FUROSEMIDE 40 MG: 40 TABLET ORAL at 11:36

## 2019-10-09 RX ADMIN — Medication 10 ML: at 22:16

## 2019-10-09 RX ADMIN — INSULIN LISPRO 4 UNITS: 100 INJECTION, SOLUTION INTRAVENOUS; SUBCUTANEOUS at 17:02

## 2019-10-09 RX ADMIN — FENTANYL CITRATE 50 MCG: 50 INJECTION INTRAMUSCULAR; INTRAVENOUS at 17:03

## 2019-10-09 RX ADMIN — ACETAMINOPHEN 650 MG: 325 TABLET, FILM COATED ORAL at 21:34

## 2019-10-09 RX ADMIN — ALLOPURINOL 100 MG: 100 TABLET ORAL at 11:32

## 2019-10-09 RX ADMIN — NALOXONE HYDROCHLORIDE: 0.4 INJECTION, SOLUTION INTRAMUSCULAR; INTRAVENOUS; SUBCUTANEOUS at 06:08

## 2019-10-09 RX ADMIN — METOPROLOL TARTRATE 25 MG: 25 TABLET ORAL at 20:44

## 2019-10-09 RX ADMIN — CEFEPIME HYDROCHLORIDE 2 G: 2 INJECTION, POWDER, FOR SOLUTION INTRAVENOUS at 17:03

## 2019-10-09 RX ADMIN — APIXABAN 5 MG: 5 TABLET, FILM COATED ORAL at 22:16

## 2019-10-09 RX ADMIN — INSULIN LISPRO 4 UNITS: 100 INJECTION, SOLUTION INTRAVENOUS; SUBCUTANEOUS at 13:11

## 2019-10-09 NOTE — PROGRESS NOTES
Responded to Code RRT in 611. Consulted with nurse. No family present. Provided support and spiritual presence. RRT changed to Code stroke. Advised of  Availability. Chaplains will follow as able and/or needed.     Stalin Aburto,  Intern, MDiv  [phone])

## 2019-10-09 NOTE — PROGRESS NOTES
Day #2 of Vancomycin  Indication:  skin & soft tissue infection - infected right lower extremity     Current regimen:  2000 mg load, then 1250 mg Q 24 hours  Abx regimen:  Vancomycin, Cefepime, Flagyl  ID Following ?: YES  Concomitant nephrotoxic drugs (requires more frequent monitoring): Loop diuretics, plus HOLLAND/CKD. Frequency of BMP?: daily thru 10/11    Recent Labs     10/09/19  0613 10/09/19  0209 10/08/19  0251 10/07/19  0254   WBC 20.8*  --   --  18.8*   CREA  --  1.38* 1.27* 1.25*   BUN  --  23* 24* 25*     Est CrCl: 30 ml/min; UO: [not documented] ml/kg/hr  Temp (24hrs), Av.4 °F (36.9 °C), Min:98.1 °F (36.7 °C), Max:98.5 °F (36.9 °C)    Cultures:   10/4 blood: NG x 5  10/5 urine: NG    Goal trough = 10 - 15 mcg/mL (possibly higher end due to severe PVD)    Recent trough history (date/time/level/dose/action taken):  --    Plan: Will change maintenance regimen due to decline in renal function - 1250 mg Q 36 hours. Pharmacy to follow patient daily and order levels / make dose adjustments as appropriate.

## 2019-10-09 NOTE — PROGRESS NOTES
Vascular Surgery  --no changes that I can appreciate  --I have recommended AKA; awaiting family decision (they voiced reluctance)

## 2019-10-09 NOTE — PROGRESS NOTES
Physical Therapy  Patient with decline in medical status at this time. Will defer and follow up to tomorrow.   Suraj Castañeda, PT

## 2019-10-09 NOTE — PROGRESS NOTES
Hospitalist Progress Note                               Kalin Mathew DO                                     Answering service: [phone]                               OR [phone] from in house phone                                         Date of Service:  10/9/2019  NAME:  Oma Talamantes  :  1931  MRN:  [id number]      Admission Summary:   80-year-old female with an extensive past medical history including chronic hypoxemic respiratory failure with home oxygen, COPD, chronic kidney disease, type 2 diabetes mellitus, atrial fibrillation, diastolic congestive heart failure, dyslipidemia, primary hypertension, osteoporosis, pulmonary hypertension, depression and anxiety disorder, was brought to the emergency room from home for evaluation of an approximately 2-3 day history of worsening shortness of breath. Per the patient's son, the patient ambulates unaided at baseline; however, in the 2-3 days leading up to the emergency room presentation, noted to have worsening shortness of breath even with easy activity. The patient also complained of some numbness and weakness in the right lower extremity. The patient admitted to being compliant with all her medical therapies. The patient denied associated headaches, dizziness, visual deficits, chest pains, palpitations, nausea, vomiting, cough, sputum production, fevers, chills, abdominal pain, bladder or bowel irregularities. Reason for follow up:       CC: SOB    Patient seen and examined. Overnight, CODE STROKE initiated due to patient's mental status change. Lethargic this AM, improving later in day. Patient appears to be declining overall. Discussed in palliative care meeting. Assessment & Plan:     1) CVS: Acute on chronic diastolic CHF exarcerbation with a preserved EF. Probable Demand ischemia with mildly elevated troponins due to the CHF exarcerbation.  Indeterminable NYHA Functional Class due to the multiple Co-morbidities per Cardiology. Primary Hypertension. Dyslipidemia. 2D ECHO (9/12/19) with EF 70 percent. Continue oxygen, gentle diuresis, daily weight input/output monitoring, CHF teaching. Cardiology and Heart failure team F/Us noted and appreciated. 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 Renal US negative for obstructive uropathy. Stable. 3) Electrolytes: Probable multifactorial Hyponatremia present on admission. Stable. Resolved Hyperkalemia     4) CNS: 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 Neurology eval noted and appreciated. 5) Vascular: Ischemic right leg. S/P RLE Thrombectomy 9/21/19 with residual clot in Popliteal Artery. S/P repeat Thrombectomy 9/22/19. Now with significant muscle ischemia right calf with likely right foot drop. Aspirin and plavix discontinued due to Epistaxis. Continue Apixaban. Recommended AKA. Patient refused. Palliative care meeting. Patient declines surgery and has voiced understanding of sepsis and possible death if she declines. 6) ID: Sepsis secondary to right lower extremity necrotic leg. ID following. On cefepime, flagyl. Vancomycin added. 7) Resp: Chronic Hypoxemic respiratory failure due to COPD with home oxygen. Nebulizers, inhalers, incentive spirometry. 8) Hematology: Anemia of chronicity. 9) Endocrine: Uncontrolled insulin treated type 2 DM with complications including hyperglycemia. Hold metformin. Accucheck monitoring, Basal and sliding scale insulins, diabetic teaching. Diabetic team evaluation with recommendations appreciated. 10) MANNIE: Osteoporosis. Mild Rhabdomyolysis    11) Psychiatry: Anxiety disorder and Depression without any current behavioral disturbances. 12) Prophylaxis: DVT. 13) Directives: DDNR with an extremely  guarded prognosis.  At increased risk of decompensation. Readdressed with patient and patient's children. Family to continue discussion     15) Plan: pending patient decisions     Hospital Problems  Date Reviewed: 9/20/2019          Codes Class Noted POA    * (Principal) CHF exacerbation ([facility] ICD-10-CM: I50.9  ICD-9-CM: 428.0  9/17/2019 Yes        Hyperkalemia ICD-10-CM: E87.5  ICD-9-CM: 276.7  9/17/2019 Yes        Hyponatremia ICD-10-CM: E87.1  ICD-9-CM: 276.1  9/17/2019 Yes        HOLLAND (acute kidney injury) ([facility] ICD-10-CM: N17.9  ICD-9-CM: 584.9  9/17/2019 Yes        Acute hypoxemic respiratory failure ([facility] ICD-10-CM: J96.01  ICD-9-CM: 518.81  8/8/2011 Yes        Debility ICD-10-CM: R53.81  ICD-9-CM: 799.3  6/28/2011 Yes                  Physical Examination:      Last 24hrs VS reviewed since prior progress note. Most recent are:  Visit Vitals  /67 (BP 1 Location: Left arm, BP Patient Position: At rest)   Pulse 82   Temp 98.3 °F (36.8 °C)   Resp 18   Ht 5' 5\" (1.651 m)   Wt 84.7 kg (186 lb 11.2 oz)   SpO2 99%   Breastfeeding? No   BMI 31.07 kg/m²           Constitutional:  No acute distress. HEENT: Head is a traumatic,  Un icteric sclera. Pink conjunctiva,no erythema or discharge. Oral mucous moist, oropharynx benign. Neck supple,    Resp:  Decreased air entry B/L.   CV:  S1,S2 present. GI:  Soft, non distended, non tender. normoactive bowel sounds, no hepatosplenomegaly    :  No CVA or suprapubic tenderness   Skin  :  Cool and dark RLE. Musculoskeletal:  Dressing insitu to right leg. Reviewed chart p    Neurologic:  Awake, alert and oriented.               Intake/Output Summary (Last 24 hours) at 10/9/2019 1741  Last data filed at 10/9/2019 1700  Gross per 24 hour   Intake 370.8 ml   Output    Net 370.8 ml              Labs:     Recent Labs     10/09/19  0613 10/07/19  0254   WBC 20.8* 18.8*   HGB 6.4* 7.2*   HCT 21.8* 23.5*   * 414*     Recent Labs     10/09/19  0209 10/08/19  0251 10/07/19  0254   * 133* 134*   K 3.8 4.3 4.1   CL 98 96* 95*   CO2 29 32 32   BUN 23* 24* 25*   CREA 1.38* 1.27* 1.25*   * 156* 149*   CA 8.5 8.4* 8.4*     No results for input(s): SGOT, GPT, ALT, AP, TBIL, TBILI, TP, ALB, GLOB, GGT, AML, LPSE in the last 72 hours. No lab exists for component: AMYP, HLPSE  Recent Labs     10/09/19  0613   INR 1.6*   PTP 15.9*   APTT 35.1*      No results for input(s): FE, TIBC, PSAT, FERR in the last 72 hours. No results found for: FOL, RBCF   No results for input(s): PH, PCO2, PO2 in the last 72 hours.   Recent Labs     10/09/19  0849 10/09/19  0209 10/08/19  0251   * 685* 643*     Lab Results   Component Value Date/Time    Cholesterol, total 144 09/17/2019 09:21 AM    HDL Cholesterol 55 09/17/2019 09:21 AM    LDL, calculated 72.6 09/17/2019 09:21 AM    Triglyceride 82 09/17/2019 09:21 AM    CHOL/HDL Ratio 2.6 09/17/2019 09:21 AM     Lab Results   Component Value Date/Time    Glucose (POC) 153 (H) 10/09/2019 03:57 PM    Glucose (POC) 169 (H) 10/09/2019 11:45 AM    Glucose (POC) 160 (H) 10/09/2019 07:29 AM    Glucose (POC) 151 (H) 10/09/2019 05:47 AM    Glucose (POC) 125 (H) 10/08/2019 09:19 PM     Lab Results   Component Value Date/Time    Color DARK YELLOW 10/05/2019 06:02 PM    Appearance CLOUDY (A) 10/05/2019 06:02 PM    Specific gravity 1.018 10/05/2019 06:02 PM    Specific gravity 1.010 12/18/2011 09:28 AM    pH (UA) 5.5 10/05/2019 06:02 PM    Protein 30 (A) 10/05/2019 06:02 PM    Glucose NEGATIVE  10/05/2019 06:02 PM    Ketone NEGATIVE  10/05/2019 06:02 PM    Bilirubin NEGATIVE  09/17/2019 12:49 PM    Urobilinogen >8.0 (H) 10/05/2019 06:02 PM    Nitrites NEGATIVE  10/05/2019 06:02 PM    Leukocyte Esterase TRACE (A) 10/05/2019 06:02 PM    Epithelial cells FEW 10/05/2019 06:02 PM    Bacteria NEGATIVE  10/05/2019 06:02 PM    WBC 0-4 10/05/2019 06:02 PM    RBC 0-5 10/05/2019 06:02 PM         Medications Reviewed:     Current Facility-Administered Medications   Medication Dose Route Frequency    0.9% sodium chloride infusion 250 mL  250 mL IntraVENous PRN    [START ON 10/10/2019] vancomycin (VANCOCIN) 1250 mg in  ml infusion  1,250 mg IntraVENous Q36H    ALPRAZolam (XANAX) tablet 0.25 mg  0.25 mg Oral BID PRN    cefepime (MAXIPIME) 2 g in 0.9% sodium chloride (MBP/ADV) 100 mL  2 g IntraVENous Q24H    metroNIDAZOLE (FLAGYL) IVPB premix 500 mg  500 mg IntraVENous Q8H    Vancomycin - pharmacy to dose   Other Rx Dosing/Monitoring    albuterol-ipratropium (DUO-NEB) 2.5 MG-0.5 MG/3 ML  3 mL Nebulization Q4H PRN    insulin lispro (HUMALOG) injection 4 Units  4 Units SubCUTAneous TIDAC    sodium chloride (OCEAN) 0.65 % nasal squeeze bottle 2 Spray  2 Spray Both Nostrils Q2H PRN    oxymetazoline (AFRIN) 0.05 % nasal spray 2 Spray  2 Spray Both Nostrils BID PRN    furosemide (LASIX) tablet 40 mg  40 mg Oral DAILY    0.9% sodium chloride infusion 250 mL  250 mL IntraVENous PRN    0.9% sodium chloride infusion 250 mL  250 mL IntraVENous PRN    apixaban (ELIQUIS) tablet 5 mg  5 mg Oral Q12H    insulin lispro (HUMALOG) injection   SubCUTAneous AC&HS    glucose chewable tablet 16 g  4 Tab Oral PRN    glucagon (GLUCAGEN) injection 1 mg  1 mg IntraMUSCular PRN    dextrose 10% infusion 0-250 mL  0-250 mL IntraVENous PRN    albuterol-ipratropium (DUO-NEB) 2.5 MG-0.5 MG/3 ML  3 mL Nebulization Q4H PRN    alum-mag hydroxide-simeth (MYLANTA) oral suspension 30 mL  30 mL Oral Q4H PRN    famotidine (PEPCID) tablet 20 mg  20 mg Oral QPM    metoprolol tartrate (LOPRESSOR) tablet 25 mg  25 mg Oral BID    traMADol (ULTRAM) tablet 25 mg  25 mg Oral Q8H PRN    fentaNYL citrate (PF) injection 50 mcg  50 mcg IntraVENous Q3H PRN    insulin glargine (LANTUS) injection 15 Units  15 Units SubCUTAneous DAILY    ondansetron (ZOFRAN) injection 4 mg  4 mg IntraVENous Q6H PRN    sodium chloride (NS) flush 5-40 mL  5-40 mL IntraVENous Q8H    sodium chloride (NS) flush 5-40 mL  5-40 mL IntraVENous PRN    acetaminophen (TYLENOL) tablet 650 mg  650 mg Oral Q4H PRN    allopurinol (ZYLOPRIM) tablet 100 mg  100 mg Oral DAILY     ______________________________________________________________________  EXPECTED LENGTH OF STAY: 6d 14h  ACTUAL LENGTH OF STAY:          1924 Emerita So DO

## 2019-10-09 NOTE — PROGRESS NOTES
Palliative Medicine  [place]: [phone] ([phone]  [facility]: [phone])        Code Status: DNR    Advance Care Planning:  Advance Care Planning 10/4/2019   Patient's Healthcare Decision Maker is: Named in scanned ACP document   Primary Decision Maker Name -   Primary Decision Maker Phone Number -   Primary Decision Maker Relationship to Patient -   Secondary Decision Maker Name -   Secondary Decision Maker Relationship to Patient -   Confirm Advance Directive Yes, on file   Patient Would Like to Complete Advance Directive -   Does the patient have other document types Do Not Resuscitate   [facility]    Primary Decision MakerGradie Calico - [phone]    Secondary Decision Maker: Ubaldo Driver - Daughter - [phone]    Patient / Family Encounter Documentation    Participants (names): Patient, son/MPOBARBI Keane, daughter/MPOJUSTINA Go; Palliative Medicine (Joshua Haji NP)    Narrative:     Per chart:    Giovanni Ram is a 80 y.o. with a past history of COPD on home O2, hypoxic respiratory failure, DM 2, A. fib, CKD, CHF, HTN, pulmonary hypertension, moderate AV stenosis, echo with EF >70% (9/12/2019), MV with moderate annular calcification, TV with moderate to severe regurgitation depression and anxiety, who was admitted on 9/17/2019 from home with a diagnosis of acute on chronic CHF. Patient presented to the ER with CC of 2-3-day worsening of shortness of breath with numbness and weakness of right lower extremity. Current medical issues leading to Palliative Medicine involvement include: Goals of care discussion    Jason Otero NP and I met with patient and her family (son Joy Rucker and daughter Nolberto Go) in room. Patient was initially sleepy but perked up toward end of meeting. She was not in pain: \"I am as good as can be expected. \"    The meeting was to provide family with more information about what an amputation would look like and discuss among patient and adult children.  Unfortunately, patient was groggy initially and couldn't actively participate. Son and daughter stated their wish to talk to all of patient's doctors to get a better understanding of what amputation would mean for patient. Their hope is to gather this information in order to \"encourage\" their mother to get amputation. Family's goal is for patient to live longer. We discussed that patient had stated previously (and while decisional) that she did NOT want to have her leg amputated and she understood that she would die if she did not have surgery. This decision is in alignment with her statements from ACP conversation last week when she expressed not understanding why people at her age would go through extreme medical interventions. Family's goal for patient is for her to live longer, and son said that they don't feel she has enough information to make the decision. Son and daughter appeared distrustful of team and again reiterated they wanted to to talk to previous hospitalist, surgeon. We took some time to review what amputation would look like, rehab, risk of infection after surgery. ID Dr. Marilee Oliva met with patient and family. Patient was becoming more awake at this time. See her note. Family very grateful for her helpful explanations    Dr. Christina Alves also spoke with family to address concerns and stress importance of deciding sooner than later. See her note. At one point patient attempted to talk about her wishes for this amputation but family cut her off and said the family needed to talk alone and they would get back to team with a decision about amputation. Goals of Care / Plan:     Son and daughter want to have private family-only meeting to help patient make final decision about amputation and will let us know. It will be important to confirm this with patient since patient and son have not been aligned regarding other decisions (DDNR, AMD).            Thank you for the opportunity to be involved in the care of Ms. Braulio Mu and her family.     Marshall Knapp LMSW, Supervisee in Social Work  Palliative Medicine   [phone]

## 2019-10-09 NOTE — ACP (ADVANCE CARE PLANNING)
Advance Care Planning Note    Name: Tom Galdamez  YOB: 1931  MRN: [id number]  Admission Date: 9/17/2019  8:47 AM    Date of discussion: 10/9/2019    Active Diagnoses:    Hospital Problems  Date Reviewed: 9/20/2019          Codes Class Noted POA    * (Principal) CHF exacerbation ([facility] ICD-10-CM: I50.9  ICD-9-CM: 428.0  9/17/2019 Yes        Hyperkalemia ICD-10-CM: E87.5  ICD-9-CM: 276.7  9/17/2019 Yes        Hyponatremia ICD-10-CM: E87.1  ICD-9-CM: 276.1  9/17/2019 Yes        HOLLAND (acute kidney injury) [facility]) ICD-10-CM: N17.9  ICD-9-CM: 584.9  9/17/2019 Yes        Acute hypoxemic respiratory failure [facility]) ICD-10-CM: J96.01  ICD-9-CM: 518.81  8/8/2011 Yes        Debility ICD-10-CM: R53.81  ICD-9-CM: 799.3  6/28/2011 Yes               These active diagnoses are of sufficient risk that focused discussion on advance care planning is indicated in order to allow the patient to thoughtfully consider personal goals of care, and if situations arise that prevent the ability to personally give input, to ensure appropriate representation of their personal desires for different levels and aggressiveness of care. Discussion:     Persons present and participating in discussion: Tom Galdamez, Devi Burrell DO, Nishant Rogers, son [address] [facility] and daughter Aram Brooks    Discussion:   Patient was evaluated earlier this morning and voiced that she would not want amputation. She voiced understanding that worsening sepsis could lead to death. Meeting held with palliative care later in the day with son and daughter present. I came to meeting later on. Patient was awake and conversational during my encounter. She voiced concern that surgery would not cure her leg. We discussed need for surgery due to ischemic leg, necrotic muscle, and sepsis. Patient was hesitant and not agreeable to surgery.  Family (son) requested that they speak together and that definitive decision about AKA was made at a later time. I emphasized need for decision due to concerns that patient for further decline. Time Spent:     Total time spent face-to-face in education and discussion: >15 minutes.      Lyudmila Fu DO  10/9/2019  5:46 PM

## 2019-10-09 NOTE — PROGRESS NOTES
Occupational Therapy: Patient with decline in medical status at this time. Will defer and follow.    DENZEL Rueda/LEVON

## 2019-10-09 NOTE — PROGRESS NOTES
Infectious Disease Progress Note       Subjective:   Ms Anahi Webster seen   She says pain is better now that not being moved   Denied nausea, vomiting, diarrhea  Had confusion over night that has improved       ROS: 10 point ROS obtained and pertinent positives as above. All others negative.          Objective:    Vitals:   Patient Vitals for the past 24 hrs:   Temp Pulse Resp BP SpO2   10/09/19 1442 97.6 °F (36.4 °C) 77 19 137/69 100 %   10/09/19 1341 97.7 °F (36.5 °C) 78 22 136/67 99 %   10/09/19 1325 98.1 °F (36.7 °C) 72 22 108/64 99 %   10/09/19 1241 98.5 °F (36.9 °C) 84 18 106/60 99 %   10/09/19 1135 98.6 °F (37 °C) 97 18 118/66 98 %   10/09/19 0855 98.3 °F (36.8 °C) 96 19 120/65 97 %   10/09/19 0619  96 16 113/55    10/09/19 0559  87      10/09/19 0557  89   98 %   10/09/19 0556  95      10/09/19 0553  (!) 109 22 129/76 97 %   10/09/19 0324  76   98 %   10/09/19 0205 98.1 °F (36.7 °C) (!) 105 26 167/89 96 %   10/08/19 2032 98.5 °F (36.9 °C) 94 16 107/54 100 %       Physical Exam:  Gen: No apparent distress  HEENT:   no scleral icterus, no thrush ,   CV: S1,2 heard regularly, no lower extremity edema    Lungs: Clear to auscultation anteriorly,  no wheezing  Abdomen: soft, non tender, non distended,   Skin: no rash on visualized areas   Neuro: sleepy , minimally verbal   Musculoskeletal:   RLE dressing recently dressed, no erythema surrounding dressing, cool foot, she denied sensation to gross touch , unable to palpate pedal pulses       Medications:    Current Facility-Administered Medications:     0.9% sodium chloride infusion 250 mL, 250 mL, IntraVENous, PRN, Kennieth Davis, [facility], DO    [START ON 10/10/2019] vancomycin (VANCOCIN) 1250 mg in  ml infusion, 1,250 mg, IntraVENous, Q36H, Gomadam, Jackie R,     ALPRAZolam (XANAX) tablet 0.25 mg, 0.25 mg, Oral, BID PRN, Ewelina Hui NP, 0.25 mg at 10/08/19 1030    cefepime (MAXIPIME) 2 g in 0.9% sodium chloride (MBP/ADV) 100 mL, 2 g, IntraVENous, Q24H, Jorge Coyleitha R, DO, Last Rate: 200 mL/hr at 10/08/19 1807, 2 g at 10/08/19 1807    metroNIDAZOLE (FLAGYL) IVPB premix 500 mg, 500 mg, IntraVENous, Q8H, Jorge Coyleitha R, DO, Last Rate: 100 mL/hr at 10/09/19 0321, 500 mg at 10/09/19 0321    Vancomycin - pharmacy to dose, , Other, Rx Dosing/Monitoring, Jackie Coyle R, DO    albuterol-ipratropium (DUO-NEB) 2.5 MG-0.5 MG/3 ML, 3 mL, Nebulization, Q4H PRN, Robert GUZMAN MD, 3 mL at 10/09/19 0601    insulin lispro (HUMALOG) injection 4 Units, 4 Units, SubCUTAneous, TIDAC, Josephine Chun MD, 4 Units at 10/09/19 1311    sodium chloride (OCEAN) 0.65 % nasal squeeze bottle 2 Spray, 2 Spray, Both Nostrils, Q2H PRN, JULIAN Bhakta MD    oxymetazoline (AFRIN) 0.05 % nasal spray 2 Spray, 2 Spray, Both Nostrils, BID PRN, DIANA Bhakta MD    furosemide (LASIX) tablet 40 mg, 40 mg, Oral, DAILY, HartCarrie menchaca MD, 40 mg at 10/09/19 1136    0.9% sodium chloride infusion 250 mL, 250 mL, IntraVENous, PRN, Lyric Bhakta MD    0.9% sodium chloride infusion 250 mL, 250 mL, IntraVENous, PRN, Vikki Goff MD    apixaban (ELIQUIS) tablet 5 mg, 5 mg, Oral, Q12H, Vikki Goff MD, 5 mg at 10/09/19 1139    insulin lispro (HUMALOG) injection, , SubCUTAneous, AC&HS, Lyric Bhakta MD, Stopped at 10/07/19 0730    glucose chewable tablet 16 g, 4 Tab, Oral, PRN, Livia AQLEATHA GUZMAN MD    glucagon (GLUCAGEN) injection 1 mg, 1 mg, IntraMUSCular, PRN, ACNDY Bhakta MD    dextrose 10% infusion 0-250 mL, 0-250 mL, IntraVENous, PRN, ANGELA Bhakta MD    albuterol-ipratropium (DUO-NEB) 2.5 MG-0.5 MG/3 ML, 3 mL, Nebulization, Q4H PRN, Robert GUZMAN MD, 3 mL at 09/26/19 2019    alum-mag hydroxide-simeth (MYLANTA) oral suspension 30 mL, 30 mL, Oral, Q4H PRN, Maribel Asher MD, 30 mL at 09/26/19 2144    famotidine (PEPCID) tablet 20 mg, 20 mg, Oral, QPM, Maribel Asher MD, 20 mg at 10/08/19 1808    metoprolol tartrate (LOPRESSOR) tablet 25 mg, 25 mg, Oral, BID, Marino Slaughter MD, 25 mg at 10/09/19 1136    traMADol (ULTRAM) tablet 25 mg, 25 mg, Oral, Q8H PRN, Tito Crump MD, 25 mg at 10/09/19 1339    fentaNYL citrate (PF) injection 50 mcg, 50 mcg, IntraVENous, Q3H PRN, Tito Crump MD, 50 mcg at 10/07/19 1359    insulin glargine (LANTUS) injection 15 Units, 15 Units, SubCUTAneous, DAILY, Tito Crump MD, 15 Units at 10/09/19 1158    ondansetron (ZOFRAN) injection 4 mg, 4 mg, IntraVENous, Q6H PRN, Tito Crump MD    sodium chloride (NS) flush 5-40 mL, 5-40 mL, IntraVENous, Q8H, Tito Crump MD, 10 mL at 10/09/19 1312    sodium chloride (NS) flush 5-40 mL, 5-40 mL, IntraVENous, PRN, Tito Crump MD, 10 mL at 10/08/19 1237    acetaminophen (TYLENOL) tablet 650 mg, 650 mg, Oral, Q4H PRN, Tito Crump MD, 650 mg at 10/04/19 1957    allopurinol (ZYLOPRIM) tablet 100 mg, 100 mg, Oral, DAILY, Tito Crump MD, 100 mg at 10/09/19 1132      Labs:  Recent Results (from the past 24 hour(s))   GLUCOSE, POC    Collection Time: 10/08/19  5:11 PM   Result Value Ref Range    Glucose (POC) 107 (H) 65 - 100 mg/dL    Performed by Jose Garcia (PCT)    GLUCOSE, POC    Collection Time: 10/08/19  9:19 PM   Result Value Ref Range    Glucose (POC) 125 (H) 65 - 100 mg/dL    Performed by Eliana Ch    CK    Collection Time: 10/09/19  2:09 AM   Result Value Ref Range     (H) 26 - 539 U/L   METABOLIC PANEL, BASIC    Collection Time: 10/09/19  2:09 AM   Result Value Ref Range    Sodium 135 (L) 136 - 145 mmol/L    Potassium 3.8 3.5 - 5.1 mmol/L    Chloride 98 97 - 108 mmol/L    CO2 29 21 - 32 mmol/L    Anion gap 8 5 - 15 mmol/L    Glucose 132 (H) 65 - 100 mg/dL    BUN 23 (H) 6 - 20 MG/DL    Creatinine 1.38 (H) 0.55 - 1.02 MG/DL    BUN/Creatinine ratio 17 12 - 20      GFR est AA 44 (L) >60 ml/min/1.73m2    GFR est non-AA 36 (L) >60 ml/min/1.73m2    Calcium 8.5 8.5 - 10.1 MG/DL   GLUCOSE, POC    Collection Time: 10/09/19  5:47 AM   Result Value Ref Range    Glucose (POC) 151 (H) 65 - 100 mg/dL    Performed by Corwin Medley    POC G3 - PUL    Collection Time: 10/09/19  5:59 AM   Result Value Ref Range    pH (POC) 7.446 7.35 - 7.45      pCO2 (POC) 40.4 35.0 - 45.0 MMHG    pO2 (POC) 84 80 - 100 MMHG    HCO3 (POC) 27.8 (H) 22 - 26 MMOL/L    sO2 (POC) 97 92 - 97 %    Base excess (POC) 4 mmol/L    Site RIGHT RADIAL      Device: NASAL CANNULA      Flow rate (POC) 3 L/M    Allens test (POC) YES      Specimen type (POC) ARTERIAL     EKG, 12 LEAD, INITIAL    Collection Time: 10/09/19  6:10 AM   Result Value Ref Range    Ventricular Rate 92 BPM    Atrial Rate 39 BPM    QRS Duration 86 ms    Q-T Interval 446 ms    QTC Calculation (Bezet) 551 ms    Calculated R Axis 32 degrees    Calculated T Axis -79 degrees    Diagnosis       Atrial fibrillation with premature ventricular or aberrantly conducted   complexes  ST & T wave abnormality, consider inferior ischemia or digitalis effect  ST & T wave abnormality, consider anterolateral ischemia or digitalis effect  Prolonged QT  Abnormal ECG  When compared with ECG of 18-SEP-2019 02:20,  Atrial fibrillation has replaced Sinus rhythm  Vent.  rate has increased BY  32 BPM  T wave inversion more evident in Lateral leads     COAGULATION SCREEN    Collection Time: 10/09/19  6:13 AM   Result Value Ref Range    INR 1.6 (H) 0.9 - 1.1      Prothrombin time 15.9 (H) 9.0 - 11.1 sec    aPTT 35.1 (H) 22.1 - 32.0 sec    aPTT, therapeutic range     58.0 - 77.0 SECS    Fibrinogen >800 (H) 200 - 475 mg/dL   CBC WITH AUTOMATED DIFF    Collection Time: 10/09/19  6:13 AM   Result Value Ref Range    WBC 20.8 (H) 3.6 - 11.0 K/uL    RBC 2.38 (L) 3.80 - 5.20 M/uL    HGB 6.4 (L) 11.5 - 16.0 g/dL    HCT 21.8 (L) 35.0 - 47.0 %    MCV 91.6 80.0 - 99.0 FL    MCH 26.9 26.0 - 34.0 PG    MCHC 29.4 (L) 30.0 - 36.5 g/dL    RDW 16.5 (H) 11.5 - 14.5 %    PLATELET 041 (H) 754 - 400 K/uL    MPV 9.6 8.9 - 12.9 FL    NRBC 0.0 0  WBC    ABSOLUTE NRBC 0.00 0.00 - 0.01 K/uL    NEUTROPHILS 84 (H) 32 - 75 %    BAND NEUTROPHILS 5 0 - 6 %    LYMPHOCYTES 3 (L) 12 - 49 %    MONOCYTES 5 5 - 13 %    EOSINOPHILS 1 0 - 7 %    BASOPHILS 0 0 - 1 %    METAMYELOCYTES 1 (H) 0 %    MYELOCYTES 1 (H) 0 %    IMMATURE GRANULOCYTES 0 %    ABS. NEUTROPHILS 18.5 (H) 1.8 - 8.0 K/UL    ABS. LYMPHOCYTES 0.6 (L) 0.8 - 3.5 K/UL    ABS. MONOCYTES 1.0 0.0 - 1.0 K/UL    ABS. EOSINOPHILS 0.2 0.0 - 0.4 K/UL    ABS. BASOPHILS 0.0 0.0 - 0.1 K/UL    ABS. IMM. GRANS. 0.0 K/UL    DF MANUAL      PLATELET COMMENTS Large Platelets      RBC COMMENTS ANISOCYTOSIS  1+        RBC COMMENTS OVALOCYTES  PRESENT        RBC COMMENTS POLYCHROMASIA  1+       GLUCOSE, POC    Collection Time: 10/09/19  7:29 AM   Result Value Ref Range    Glucose (POC) 160 (H) 65 - 100 mg/dL    Performed by Brain Phalen    TYPE + CROSSMATCH    Collection Time: 10/09/19  8:45 AM   Result Value Ref Range    Crossmatch Expiration 10/12/2019     ABO/Rh(D) A POSITIVE     Antibody screen NEG     Unit number [id number]     Blood component type RC LR     Unit division 00     Status of unit ISSUED     Crossmatch result Compatible    CK    Collection Time: 10/09/19  8:49 AM   Result Value Ref Range     (H) 26 - 192 U/L   GLUCOSE, POC    Collection Time: 10/09/19 11:45 AM   Result Value Ref Range    Glucose (POC) 169 (H) 65 - 100 mg/dL    Performed by Gloria Jim    PROCALCITONIN    Collection Time: 10/09/19 12:13 PM   Result Value Ref Range    Procalcitonin 0.4 ng/mL           Micro:     Blood: 10/4/19  Specimen Information: Blood        Component Value Ref Range & Units Status   Special Requests: NO SPECIAL REQUESTS    Preliminary   Culture result: NO GROWTH 3 DAYS    Preliminary   Result History       Pathology:      Imaging:  RLE   US  Interpretation Summary        · Probable acute thrombus at the mid and distal right popliteal level.   · Absent flow at the distal posterior tibial and anterior tibial levels suggesting occlusion. The right common femoral, deep femoral, femoral, popliteal, posterior tibial and anterior tibial arteries were visualized. No significant plaque is identified from groin to distal thigh. Biphasic flow with no increased velocities were noted. The above knee popliteal waveform is monophasic with low velocities. The popliteal fossa and distal popliteal contain absent flow and suggests an acute occlusion although it is suboptimally seen. Low echogenicity is noted in the popliteal.       The right posterior tibial and anterior tibial arteries contain absent flow with no color fill and suggests occlusion.      Lower Extremity Arterial Findings     Right Lower Arterial     The right distal popliteal artery is occluded. The right distal popliteal artery has homogeneous plaque. Monophasic Doppler waveforms in the right proximal popliteal artery. Biphasic Doppler waveforms in the right distal common femoral, profunda femoris, proximal superficial femoral and distal superficial femoral artery. Doppler waveforms are absent in the anterior tibial and posterior tibial artery. The following arteries are patent: distal common femoral, profunda femoris, proximal superficial femoral and distal superficial femoral.         MRI brain     FINDINGS:  Tiny acute infarct in the right cerebellum. No acute hemorrhage.     Mild generalized parenchymal volume loss with commensurate dilation of the sulci  and ventricular system. Periventricular deep white matter T2/FLAIR  hyperintensities, and patchy T2/FLAIR hyperintensity in the raymundo, consistent  with mild chronic microvascular ischemic disease. Small chronic infarct in the  right frontal periventricular white matter. There is no cerebellar tonsillar  herniation. Expected arterial flow-voids are present. No evidence of abnormal  enhancement.     Paranasal sinuses are clear. Trace bilateral mastoid effusions.  The orbital  contents are within normal limits with bilateral lens implants. No significant  osseous or scalp lesions are identified.     IMPRESSION  IMPRESSION:      1. Tiny acute infarct in the right cerebellum. 2. Mild generalized parenchymal volume loss and mild chronic microvascular  ischemic disease. Small chronic infarct in the right frontal periventricular  white matter. Assessment / Plan    Ms Krissy Fry is a 80year old lady wt hx of copd, afib, DM admitted on on 9/17/19 with dypsnea. Treated for diastolic CHF exacerbation. Subsequently found to have acute infarct R cerebellum and RLE acute thrombus at the mid and distal right popliteal level. Underwent thrombectomy/embolectomy on 9/20/19. Operative report from 9/20/19 indicates \" thrombectomy yielded fair a amount of thrombus clearly from common iliac area region and this appeared to be organized and fairly thickened\". Then on 9/21/19, underwent thrombectomy of right leg for residual clot , 4-compartment fasciotomy. Operative report from 9/21/19 indicates \"  After entering the fascia, a large amount of discharge and dark blood was seen. The muscle appeared to be significantly abnormal in this location almost mushy with difficulty\"      On labs has had consistent leukocytosis and on 10/4/19 had fever up to 101.6 for which Infectious Disease was consulted. Zosyn started for concerns of infected RLE on 10/5/19. WBC elevated between 17-21.     1) RLE ischemia, necrosis, and popliteal thrombus status post thrombectomy 9/20 and repeat thrombectomy/fasciotomy 9/21/19  Fever curve imporved   On exam has foul odor from RLE wound areas and necrosis seen   No clear superficial cellulitis around the incisions site on the medial side   Blood cx negative so far   Renally dosed Vancomycin, Cefepime and Flagyl   Discussed side effects with high risk for nephrotoxicity, CDI and other antibiotic adverse effects including bone marrow suppression, lowering seizure threshold  Antibiotic delivery to site is challenging if inadequate perfusion and discussed with her children today   Need to monitor renal function closely   Being followed by vascular surgery and awaiting decision for amputation. Has CK elevation and therefore avoiding daptomycin   Avoiding Zyvox given QTc levels > 500 on last EKG     2) COPD    3) Afib     4) DM    5) DVT px     D/W with palliative care and primary teams today              Thank you for the opportunity to participate in the care of this patient. Please contact with questions or concerns.       Heide Knight,    3:42 PM

## 2019-10-09 NOTE — PROGRESS NOTES
Palliative Medicine Consult  [place]: [phone] ([phone])    Patient Name: William Graves  YOB: 1931    Date of Initial Consult: 9/20/2019  Reason for Consult: Goals of care discussion   Requesting Provider: Dr. Samreen Yepez  Primary Care Physician: Chanda Kolb MD     SUMMARY:   William Graves is a 80 y.o. with a past history of COPD on home O2, hypoxic respiratory failure, DM 2, A. fib, CKD, CHF, HTN, pulmonary hypertension, moderate AV stenosis, echo with EF >70% (9/12/2019), MV with moderate annular calcification, TV with moderate to severe regurgitation depression and anxiety, who was admitted on 9/17/2019 from home with a diagnosis of acute on chronic CHF. Patient presented to the ER with CC of 2-3-day worsening of shortness of breath with numbness and weakness of right lower extremity. In ER labs revealed + hyponatremia with , K5.6 and hypoalbuminemia with albumin 2.9      Current medical issues leading to Palliative Medicine involvement include: Goals of care discussion      Psychosocial assessment: See palliative  Rekha Carlin note, she lives with her daugher and son. Baseline cognitive and functional status: Patient alert and oriented able to ambulate independently no longer cooks because was forgetful and left pans on the stove burning    Interval History:   21/8/34: admission complicated by worsening ischemia of rt lower leg. AKA recommended by vascular but the pt has declined     PALLIATIVE DIAGNOSES:   1. Hypoalbuminemia  2. Ischemic rt leg  3. Anxiety   4. Physical debility   5. Goals of care discussion  6. Weakness, generalized  7. Debility  8. ACP  9. Hypoalbuminemia      PLAN:   1. Family meeting held with son and daughter along with Shellia Essex. LCSW. Doctors Isrrael Soriano and Yaz also present in part  2. Pt not fully engaged in discussion today as she is very drowsy.   She was more alert later in the meeting and was able to share some of her thoughts about the surgery~ feels she will still have issues even with the surgery  3. Family had the opportunity to ask and obtain all of the information they were seeking from the doctors and our team  4. Son does not agree with his mother's decision not to have the surgery  5. Son feels it is a family decision not hers alone and he wants to discuss privately his feelings with the patient before any final decisions are made  6. We honored his request and he will call us once the decision is made. Dr. Bhavin Duval informed him of how important it is to make this decision soon as she could clinically decline at anytime. 7. Will follow up  8. Initial consult note routed to primary continuity provider and/or primary health care team members  9.  Communicated plan of care with: Palliative [facility] Team     GOALS OF CARE / TREATMENT PREFERENCES:     GOALS OF CARE:  Patient/Health Care Proxy Stated Goals: Rehabilitation    TREATMENT PREFERENCES:   Code Status: DNR    Advance Care Planning:  [x] The [facility] Interdisciplinary Team has updated the ACP Navigator with Health Care Decision Maker and Patient Capacity       Primary Decision Maker: Patito Juárez - [phone]    Secondary Decision Maker: Towanda Kocher - Daughter - [phone]    Advance Care Planning 10/4/2019   Patient's [address] is: Named in scanned ACP document   Primary Decision Maker Name -   Primary Decision Maker Phone Number -   Primary Decision Maker Relationship to Patient -   Secondary Decision [address] Name -   Secondary Decision Maker Relationship to Patient -   Confirm Advance Directive Yes, on file   Patient Would Like to Complete Advance Directive -   Does the patient have other document types Do Not Resuscitate       Medical Interventions: Limited additional interventions     Other Instructions:   Artificially Administered Nutrition: No feeding tube     Other:    As far as possible, the palliative care team has discussed with patient / health care proxy about goals of care / treatment preferences for patient. HISTORY:     History obtained from: Medical records    CHIEF COMPLAINT: N/A    HPI/SUBJECTIVE:    The patient is:   [x] Verbal and participatory  [] Non-participatory due to: No pain. No shortness of breath. Drowsy today    Clinical Pain Assessment (nonverbal scale for severity on nonverbal patients):   Clinical Pain Assessment  Severity: 0  Location: right leg  Character: patient unable to report  Duration: patient unable to report  Effect: patient unable to report  Factors: patient unable to report  Frequency: patient unable to report     Activity (Movement): Lying quietly, normal position    Duration: for how long has pt been experiencing pain (e.g., 2 days, 1 month, years)  Frequency: how often pain is an issue (e.g., several times per day, once every few days, constant)     FUNCTIONAL ASSESSMENT:     Palliative Performance Scale (PPS):  PPS: 40       PSYCHOSOCIAL/SPIRITUAL SCREENING:     Palliative IDT has assessed this patient for cultural preferences / practices and a referral made as appropriate to needs (Cultural Services, Patient Advocacy, Ethics, etc.)    Any spiritual / Confucianism concerns:  [] Yes /  [x] No    Caregiver Burnout:  [] Yes /  [x] No /  [] No Caregiver Present      Anticipatory grief assessment:   [x] Normal  / [] Maladaptive       ESAS Anxiety: Anxiety: 0    ESAS Depression: Depression: 0        REVIEW OF SYSTEMS:     Positive and pertinent negative findings in ROS are noted above in HPI. The following systems were [x] reviewed / [] unable to be reviewed as noted in HPI  Other findings are noted below. Systems: constitutional, ears/nose/mouth/throat, respiratory, gastrointestinal, genitourinary, musculoskeletal, integumentary, neurologic, psychiatric, endocrine. Positive findings noted below.   Modified ESAS Completed by: provider   Fatigue: 5 Drowsiness: 1   Depression: 0 Pain: 0 Anxiety: 0 Nausea: 0   Anorexia: 0 Dyspnea: 0           Stool Occurrence(s): 1        PHYSICAL EXAM:     From RN flowsheet:  Wt Readings from Last 3 Encounters:   10/09/19 186 lb 11.2 oz (84.7 kg)   09/19/19 190 lb 7.6 oz (86.4 kg)   08/30/19 194 lb (88 kg)     Blood pressure 137/69, pulse 77, temperature 97.6 °F (36.4 °C), resp. rate 19, height 5' 5\" (1.651 m), weight 186 lb 11.2 oz (84.7 kg), SpO2 100 %, not currently breastfeeding. Pain Scale 1: Numeric (0 - 10)  Pain Intensity 1: 10  Pain Onset 1: acute  Pain Location 1: Foot  Pain Orientation 1: Right, Left  Pain Description 1: Sore  Pain Intervention(s) 1: Medication (see MAR)  Last bowel movement, if known:     Constitutional: drowsy, conversant, NAD  Eyes: pupils equal, anicteric  ENMT: no nasal discharge, moist mucous membranes  Cardiovascular: regular rhythm, distal pulses intact  Respiratory: breathing not labored on RA, symmetric  Gastrointestinal: gross, soft non-tender, +bowel sounds  Musculoskeletal: no deformity, no tenderness to palpation  Skin: warm, dry upper ext.   necrotic Rt leg  Neurologic: following commands despite drowsiness   Psychiatric: neg agitaiton, +anxiety  Other:       HISTORY:     Principal Problem:    CHF exacerbation ([address] (9/17/2019)    Active Problems:    Debility (6/28/2011)      Acute hypoxemic respiratory failure ([address] (8/8/2011)      Hyperkalemia (9/17/2019)      Hyponatremia (9/17/2019)      HOLLAND (acute kidney injury) ([address] (9/17/2019)      Past Medical History:   Diagnosis Date    Anxiety     Breast cancer ([address] 2005, 2010    right 2005, left 2010    Cancer [facility])      cancer right breast cancer    Cancer ([address]     cancer left breast/new dx 8/2011 +bx     Chronic obstructive pulmonary disease (HCC)     Diabetes ([address]     Heart failure ([address]     Hypercholesterolemia     Hypertension     Other ill-defined conditions(799.89)     osteopoosis    Other ill-defined conditions(799.89)     high cholesterol    Pneumonia     Psychiatric disorder     anxiety      Past Surgical History:   Procedure Laterality Date    BIOPSY OF BREAST, INCISIONAL      left breast 8/2011 positive for cancer cells    BREAST SURGERY PROCEDURE UNLISTED      right mastectomy    HX BREAST LUMPECTOMY Left 2010    HX MASTECTOMY Right 2005      Family History   Problem Relation Age of Onset    Hypertension Mother     Stroke Other       History reviewed, no pertinent family history.   Social History     Tobacco Use    Smoking status: Never Smoker    Smokeless tobacco: Never Used   Substance Use Topics    Alcohol use: No     Allergies   Allergen Reactions    Ace Inhibitors Angioedema      Current Facility-Administered Medications   Medication Dose Route Frequency    0.9% sodium chloride infusion 250 mL  250 mL IntraVENous PRN    [START ON 10/10/2019] vancomycin (VANCOCIN) 1250 mg in  ml infusion  1,250 mg IntraVENous Q36H    ALPRAZolam (XANAX) tablet 0.25 mg  0.25 mg Oral BID PRN    cefepime (MAXIPIME) 2 g in 0.9% sodium chloride (MBP/ADV) 100 mL  2 g IntraVENous Q24H    metroNIDAZOLE (FLAGYL) IVPB premix 500 mg  500 mg IntraVENous Q8H    Vancomycin - pharmacy to dose   Other Rx Dosing/Monitoring    albuterol-ipratropium (DUO-NEB) 2.5 MG-0.5 MG/3 ML  3 mL Nebulization Q4H PRN    insulin lispro (HUMALOG) injection 4 Units  4 Units SubCUTAneous TIDAC    sodium chloride (OCEAN) 0.65 % nasal squeeze bottle 2 Spray  2 Spray Both Nostrils Q2H PRN    oxymetazoline (AFRIN) 0.05 % nasal spray 2 Spray  2 Spray Both Nostrils BID PRN    furosemide (LASIX) tablet 40 mg  40 mg Oral DAILY    0.9% sodium chloride infusion 250 mL  250 mL IntraVENous PRN    0.9% sodium chloride infusion 250 mL  250 mL IntraVENous PRN    apixaban (ELIQUIS) tablet 5 mg  5 mg Oral Q12H    insulin lispro (HUMALOG) injection   SubCUTAneous AC&HS    glucose chewable tablet 16 g  4 Tab Oral PRN    glucagon (GLUCAGEN) injection 1 mg  1 mg IntraMUSCular PRN    dextrose 10% infusion 0-250 mL  0-250 mL IntraVENous PRN    albuterol-ipratropium (DUO-NEB) 2.5 MG-0.5 MG/3 ML  3 mL Nebulization Q4H PRN    alum-mag hydroxide-simeth (MYLANTA) oral suspension 30 mL  30 mL Oral Q4H PRN    famotidine (PEPCID) tablet 20 mg  20 mg Oral QPM    metoprolol tartrate (LOPRESSOR) tablet 25 mg  25 mg Oral BID    traMADol (ULTRAM) tablet 25 mg  25 mg Oral Q8H PRN    fentaNYL citrate (PF) injection 50 mcg  50 mcg IntraVENous Q3H PRN    insulin glargine (LANTUS) injection 15 Units  15 Units SubCUTAneous DAILY    ondansetron (ZOFRAN) injection 4 mg  4 mg IntraVENous Q6H PRN    sodium chloride (NS) flush 5-40 mL  5-40 mL IntraVENous Q8H    sodium chloride (NS) flush 5-40 mL  5-40 mL IntraVENous PRN    acetaminophen (TYLENOL) tablet 650 mg  650 mg Oral Q4H PRN    allopurinol (ZYLOPRIM) tablet 100 mg  100 mg Oral DAILY          LAB AND IMAGING FINDINGS:     Lab Results   Component Value Date/Time    WBC 20.8 (H) 10/09/2019 06:13 AM    HGB 6.4 (L) 10/09/2019 06:13 AM    PLATELET 774 (H) 33/40/2029 06:13 AM     Lab Results   Component Value Date/Time    Sodium 135 (L) 10/09/2019 02:09 AM    Potassium 3.8 10/09/2019 02:09 AM    Chloride 98 10/09/2019 02:09 AM    CO2 29 10/09/2019 02:09 AM    BUN 23 (H) 10/09/2019 02:09 AM    Creatinine 1.38 (H) 10/09/2019 02:09 AM    Calcium 8.5 10/09/2019 02:09 AM    Magnesium 2.1 04/03/2017 12:00 AM    Phosphorus 3.1 09/26/2019 02:07 PM      Lab Results   Component Value Date/Time    AST (SGOT) 18 09/17/2019 09:21 AM    Alk.  phosphatase 109 09/17/2019 09:21 AM    Protein, total 8.4 (H) 09/17/2019 09:21 AM    Albumin 1.9 (L) 09/26/2019 02:07 PM    Globulin 5.5 (H) 09/17/2019 09:21 AM     Lab Results   Component Value Date/Time    INR 1.6 (H) 10/09/2019 06:13 AM    Prothrombin time 15.9 (H) 10/09/2019 06:13 AM    aPTT 35.1 (H) 10/09/2019 06:13 AM      No results found for: IRON, FE, TIBC, IBCT, PSAT, FERR   Lab Results   Component Value Date/Time    pH 7.42 04/20/2011 01:50 AM    PCO2 49 (H) 04/20/2011 01:50 AM    PO2 82 04/20/2011 01:50 AM     No components found for: [place]   Lab Results   Component Value Date/Time     (H) 10/09/2019 08:49 AM    CK - MB 2.3 01/20/2016 10:30 AM                Total time: 65 min  Counseling / coordination time, spent as noted above: 55  > 50% counseling / coordination?:  Yes    Prolonged service was provided for  [x]30 min   []75 min in face to face time in the presence of the patient, spent as noted above. Time Start:  4681-1077  Time End:   Note: this can only be billed with 92564 (initial) or 02583 (follow up). If multiple start / stop times, list each separately.

## 2019-10-09 NOTE — PROGRESS NOTES
MARIANN  -[address] [facility] accepted- Auth  10/5  - Patient is not medically ready for discharge   - AKA is tentative plan once patient agrees   -UAI completed   -Palliative meeting today     CM will follow     RIGO Fenton/KASSY

## 2019-10-10 LAB
ABO + RH BLD: NORMAL
ANION GAP SERPL CALC-SCNC: 8 MMOL/L (ref 5–15)
BLD PROD TYP BPU: NORMAL
BLOOD GROUP ANTIBODIES SERPL: NORMAL
BPU ID: NORMAL
BUN SERPL-MCNC: 24 MG/DL (ref 6–20)
BUN/CREAT SERPL: 24 (ref 12–20)
CALCIUM SERPL-MCNC: 8.6 MG/DL (ref 8.5–10.1)
CHLORIDE SERPL-SCNC: 101 MMOL/L (ref 97–108)
CK SERPL-CCNC: 493 U/L (ref 26–192)
CO2 SERPL-SCNC: 28 MMOL/L (ref 21–32)
CREAT SERPL-MCNC: 1.02 MG/DL (ref 0.55–1.02)
CROSSMATCH RESULT,%XM: NORMAL
ERYTHROCYTE [DISTWIDTH] IN BLOOD BY AUTOMATED COUNT: 15.9 % (ref 11.5–14.5)
GLUCOSE BLD STRIP.AUTO-MCNC: 109 MG/DL (ref 65–100)
GLUCOSE BLD STRIP.AUTO-MCNC: 169 MG/DL (ref 65–100)
GLUCOSE BLD STRIP.AUTO-MCNC: 180 MG/DL (ref 65–100)
GLUCOSE BLD STRIP.AUTO-MCNC: 194 MG/DL (ref 65–100)
GLUCOSE SERPL-MCNC: 128 MG/DL (ref 65–100)
HCT VFR BLD AUTO: 25.9 % (ref 35–47)
HGB BLD-MCNC: 7.9 G/DL (ref 11.5–16)
MCH RBC QN AUTO: 27.2 PG (ref 26–34)
MCHC RBC AUTO-ENTMCNC: 30.5 G/DL (ref 30–36.5)
MCV RBC AUTO: 89.3 FL (ref 80–99)
NRBC # BLD: 0 K/UL (ref 0–0.01)
NRBC BLD-RTO: 0 PER 100 WBC
PLATELET # BLD AUTO: 436 K/UL (ref 150–400)
PMV BLD AUTO: 9.5 FL (ref 8.9–12.9)
POTASSIUM SERPL-SCNC: 3.6 MMOL/L (ref 3.5–5.1)
RBC # BLD AUTO: 2.9 M/UL (ref 3.8–5.2)
SERVICE CMNT-IMP: ABNORMAL
SODIUM SERPL-SCNC: 137 MMOL/L (ref 136–145)
SPECIMEN EXP DATE BLD: NORMAL
STATUS OF UNIT,%ST: NORMAL
UNIT DIVISION, %UDIV: 0
WBC # BLD AUTO: 17.6 K/UL (ref 3.6–11)

## 2019-10-10 PROCEDURE — 74011250637 HC RX REV CODE- 250/637: Performed by: FAMILY MEDICINE

## 2019-10-10 PROCEDURE — 77010033678 HC OXYGEN DAILY

## 2019-10-10 PROCEDURE — 74011250637 HC RX REV CODE- 250/637: Performed by: INTERNAL MEDICINE

## 2019-10-10 PROCEDURE — 80048 BASIC METABOLIC PNL TOTAL CA: CPT

## 2019-10-10 PROCEDURE — 74011636637 HC RX REV CODE- 636/637: Performed by: HOSPITALIST

## 2019-10-10 PROCEDURE — 74011250637 HC RX REV CODE- 250/637: Performed by: SURGERY

## 2019-10-10 PROCEDURE — 82962 GLUCOSE BLOOD TEST: CPT

## 2019-10-10 PROCEDURE — 85027 COMPLETE CBC AUTOMATED: CPT

## 2019-10-10 PROCEDURE — 74011636637 HC RX REV CODE- 636/637: Performed by: SURGERY

## 2019-10-10 PROCEDURE — 94760 N-INVAS EAR/PLS OXIMETRY 1: CPT

## 2019-10-10 PROCEDURE — 36415 COLL VENOUS BLD VENIPUNCTURE: CPT

## 2019-10-10 PROCEDURE — 74011250637 HC RX REV CODE- 250/637: Performed by: NURSE PRACTITIONER

## 2019-10-10 PROCEDURE — 65270000029 HC RM PRIVATE

## 2019-10-10 PROCEDURE — 82550 ASSAY OF CK (CPK): CPT

## 2019-10-10 PROCEDURE — 74011000258 HC RX REV CODE- 258: Performed by: INTERNAL MEDICINE

## 2019-10-10 PROCEDURE — 74011250636 HC RX REV CODE- 250/636: Performed by: INTERNAL MEDICINE

## 2019-10-10 RX ADMIN — METOPROLOL TARTRATE 25 MG: 25 TABLET ORAL at 09:35

## 2019-10-10 RX ADMIN — Medication 10 ML: at 06:43

## 2019-10-10 RX ADMIN — VANCOMYCIN HYDROCHLORIDE 1250 MG: 10 INJECTION, POWDER, LYOPHILIZED, FOR SOLUTION INTRAVENOUS at 11:05

## 2019-10-10 RX ADMIN — FUROSEMIDE 40 MG: 40 TABLET ORAL at 09:35

## 2019-10-10 RX ADMIN — FAMOTIDINE 20 MG: 20 TABLET ORAL at 17:13

## 2019-10-10 RX ADMIN — ALPRAZOLAM 0.25 MG: 0.25 TABLET ORAL at 13:57

## 2019-10-10 RX ADMIN — APIXABAN 5 MG: 5 TABLET, FILM COATED ORAL at 11:05

## 2019-10-10 RX ADMIN — ACETAMINOPHEN 650 MG: 325 TABLET, FILM COATED ORAL at 04:18

## 2019-10-10 RX ADMIN — CEFEPIME HYDROCHLORIDE 2 G: 2 INJECTION, POWDER, FOR SOLUTION INTRAVENOUS at 17:13

## 2019-10-10 RX ADMIN — INSULIN LISPRO 4 UNITS: 100 INJECTION, SOLUTION INTRAVENOUS; SUBCUTANEOUS at 17:13

## 2019-10-10 RX ADMIN — METOPROLOL TARTRATE 25 MG: 25 TABLET ORAL at 20:23

## 2019-10-10 RX ADMIN — INSULIN GLARGINE 15 UNITS: 100 INJECTION, SOLUTION SUBCUTANEOUS at 09:35

## 2019-10-10 RX ADMIN — TRAMADOL HYDROCHLORIDE 25 MG: 50 TABLET ORAL at 18:51

## 2019-10-10 RX ADMIN — INSULIN LISPRO 4 UNITS: 100 INJECTION, SOLUTION INTRAVENOUS; SUBCUTANEOUS at 06:42

## 2019-10-10 RX ADMIN — METRONIDAZOLE 500 MG: 500 INJECTION, SOLUTION INTRAVENOUS at 17:53

## 2019-10-10 RX ADMIN — ALLOPURINOL 100 MG: 100 TABLET ORAL at 09:35

## 2019-10-10 RX ADMIN — METRONIDAZOLE 500 MG: 500 INJECTION, SOLUTION INTRAVENOUS at 09:39

## 2019-10-10 RX ADMIN — TRAMADOL HYDROCHLORIDE 25 MG: 50 TABLET ORAL at 07:14

## 2019-10-10 RX ADMIN — METRONIDAZOLE 500 MG: 500 INJECTION, SOLUTION INTRAVENOUS at 01:35

## 2019-10-10 RX ADMIN — Medication 10 ML: at 20:24

## 2019-10-10 RX ADMIN — INSULIN LISPRO 4 UNITS: 100 INJECTION, SOLUTION INTRAVENOUS; SUBCUTANEOUS at 12:16

## 2019-10-10 RX ADMIN — Medication 10 ML: at 13:48

## 2019-10-10 NOTE — WOUND CARE
Wound Note: chart reviewed; noting patient's desire not to have amputation with right lower leg with necrotic tissue through to muscle per Dr. Andrzej Iniguez. She is resting on [employer]  Mattress. Will follow as needed; wound care orders for maintenance dressings to legs for drainage. Ofelia Holland RN,[facility] Wound Healing Office [phone] Pager [phone]

## 2019-10-10 NOTE — PROGRESS NOTES
Palliative Medicine  [place]: [phone] ([phone])  [facility]: [phone])        Code Status: DNR    Advance Care Planning:  Advance Care Planning 10/4/2019   Patient's Healthcare Decision Maker is: Named in scanned ACP document   Primary Decision Maker Name -   Primary Decision Maker Phone Number -   Primary Decision Maker Relationship to Patient -   Secondary Decision Maker Name -   Secondary Decision Maker Relationship to Patient -   Confirm Advance Directive Yes, on file   Patient Would Like to Complete Advance Directive -   Does the patient have other document types Do Not Resuscitate   [facility]    Primary Decision MakerHayden Boulder - [phone]    Secondary Decision Maker: Render Body - Daughter - [phone]    Patient / Family Encounter Documentation    Participants (names): Patient, son/MPOA1 Lakhwinder, daughter/MPOA2 Issac Soni; Palliative Medicine (Giovany Shafer NP)    Narrative:     Per chart:    Digna Hatch is a 80 y.o. with a past history of COPD on home O2, hypoxic respiratory failure, DM 2, A. fib, CKD, CHF, HTN, pulmonary hypertension, moderate AV stenosis, echo with EF >70% (9/12/2019), MV with moderate annular calcification, TV with moderate to severe regurgitation depression and anxiety, who was admitted on 9/17/2019 from home with a diagnosis of acute on chronic CHF. Patient presented to the ER with CC of 2-3-day worsening of shortness of breath with numbness and weakness of right lower extremity. Current medical issues leading to Palliative Medicine involvement include: Goals of care discussion    Alyson Huang NP and I met with patient and her daughter Issac Soni in room. Patient was more awake today, alert and oriented and able to make decisions about her medical care. Discussed patient anxiety. See Ewelina's note. We touched base regarding the looming decision of amputation/no amputation. Patient said she had already made her decision.  Daughter said family is still discussing the \"pros and cons. \" Sounds like they are in the same place as yesterday with patient NOT wanting amputation and kids wanting her to have it. Though neither patient nor daughter elaborated. Goals of Care / Plan:     Son and daughter want to keep discussing amputation with patient even though she said she has made up her mind. Patient may need support in keeping her voice in this decision heard. Follow for support          Thank you for the opportunity to be involved in the care of Ms. Cosme and her family.     Margret Hightower, MANAV, Supervisee in Social Work  Palliative Medicine   [phone]

## 2019-10-10 NOTE — PROGRESS NOTES
Vascular Surgery  --patient's desires noted  --perhaps goal of comfort would be appropriate given her overall condition

## 2019-10-10 NOTE — PROGRESS NOTES
NUTRITION COMPLETE ASSESSMENT    RECOMMENDATIONS:   1. Continue with current diet. 2. Assistance with all meals to promote best PO intake  3. Concerned about nutrition status if pursues AKA ? if would need some short-term nutrition support    Interventions/Plan:   Food/Nutrient Delivery:  Modify diet/texture/consistency/nutrients Commercial supplement      modify diet; add supplements  Nutrition Education:     Coordination of Care:      Assessment:   Reason for Assessment: Length of Stay      Diet: Consistent carb  Supplements: Glucerna TID with meals; Boost Pudding BID with lunch and dinner  Nutritionally Significant Medications: Reviewed  Meal Intake:   No data found. Pre-Hospitalization:  Usual Appetite: Good  Diet at Home: regular    Current Hospitalization:   Fluid Restriction:    Appetite: Poor  PO Ability:  with assistance Average po intake:  Average supplements intake:        Subjective:  I'm not eating much    Objective:  81 yo female admitted with CHF exacerbation ([facility] [I50.9]    Pt very sleepy today. Didn't open eyes, but responded when I asked about how she was eating. Unsure if taking supplements.         Wt Readings from Last 20 Encounters:   10/10/19 93.2 kg (205 lb 6.4 oz)   09/19/19 86.4 kg (190 lb 7.6 oz)   08/30/19 88 kg (194 lb)   10/30/17 86.9 kg (191 lb 9.6 oz)   09/07/17 85.2 kg (187 lb 12.8 oz)   08/02/17 84.4 kg (186 lb)   05/15/17 80.7 kg (178 lb)   04/03/17 78 kg (172 lb)   03/20/17 80 kg (176 lb 6.4 oz)   02/20/17 77.6 kg (171 lb)   02/13/17 77.2 kg (170 lb 3.1 oz)   02/01/17 82 kg (180 lb 11.2 oz)   01/25/17 81.2 kg (179 lb)   01/11/17 81.3 kg (179 lb 4.8 oz)   12/08/16 81.4 kg (179 lb 6.4 oz)   10/24/16 82.3 kg (181 lb 6.4 oz)   09/14/16 82.6 kg (182 lb)   08/10/16 84.4 kg (186 lb)   07/21/16 84.4 kg (186 lb)   05/10/16 86.2 kg (190 lb)          Cultural, Denominational and ethnic food preferences identified: None    Skin Integrity: Ischemic right leg - AKA recommended  Edema: RLE 2+ non-pitting  Last BM: 10/8 - soft  Food Allergies: NKFA  Diet Restrictions: Cultural/Holiness Preference(s): None     Anthropometrics:    Weight Loss Metrics 10/10/2019 8/30/2019 10/30/2017 9/7/2017 8/2/2017 5/15/2017 4/3/2017   Today's Wt 205 lb 6.4 oz 194 lb 191 lb 9.6 oz 187 lb 12.8 oz 186 lb 178 lb 172 lb   BMI 34.18 kg/m2 32.28 kg/m2 31.88 kg/m2 31.25 kg/m2 30.95 kg/m2 29.62 kg/m2 28.62 kg/m2      Weight Source: Bed  Height: 5' 5\" (165.1 cm),    Body mass index is 34.18 kg/m². IBW : 56.7 kg (125 lb), % IBW (Calculated): 155.2 %   ,      Labs:    Recent Labs     10/10/19  0255 10/09/19  0209 10/08/19  0251   * 132* 156*   BUN 24* 23* 24*   CREA 1.02 1.38* 1.27*    135* 133*   K 3.6 3.8 4.3    98 96*   CO2 28 29 32   CA 8.6 8.5 8.4*       Recent Labs     10/10/19  1604 10/10/19  1058 10/10/19  0619 10/09/19  2135 10/09/19  1557 10/09/19  1145 10/09/19  0729 10/09/19  0547 10/08/19  2119 10/08/19  1711 10/08/19  1129 10/08/19  0630 10/07/19  2150 10/07/19  2128   GLUCPOC 180* 194* 169* 127* 153* 169* 160* 151* 125* 107* 139* 168* 105* 78       Lab Results   Component Value Date/Time    Hemoglobin A1c 8.8 (H) 09/17/2019 09:21 AM    Hemoglobin A1c (POC) 7.2 11/19/2015 09:40 AM       Estimated Nutrition Needs:   Kcals/day: 1700 Kcals/day(BMR 1300 x 1.3+)   Protein: 105 g(1.2 gm/kg)   Fluid: 1700 ml(1 mL/kcal; <2000 mL/day for CHF)   Based On: Smaantha Aguilar   Weight Used: Actual wt- dry weight of 88.1 kg    Pt expected to meet estimated nutrient needs:  []   Yes     [x]  No  Nutrition Diagnosis:   1.  Inadequate oral intake related to decreased appetite, pain as evidenced by pt and family report consuming <50% of meals    2.   related to   as evidenced by      3.   related to   as evidenced by      Goals:     pt will consume >50% of meals and/or at least 1 supplement daily in the next 3-4 days     Monitoring & Evaluation:    - Total energy intake, Liquid meal replacement, Oral fluids amount   - Weight/weight change, Glucose profile   -      Previous Nutrition Goals Met:   no  Previous Recommendations:    yes    Education & Discharge Needs:   [] None Identified   [x] Identified and addressed    [x] Participated in care plan, discharge planning, and/or interdisciplinary rounds            Monica Kimbrough RD

## 2019-10-10 NOTE — PROGRESS NOTES
Palliative Medicine Consult  [place]: [phone] ([phone])    Patient Name: Milton Ramos  YOB: 1931    Date of Initial Consult: 9/20/2019  Reason for Consult: Goals of care discussion   Requesting Provider: Dr. Yola Maria  Primary Care Physician: Darren Conklin MD     SUMMARY:   Milton Ramos is a 80 y.o. with a past history of COPD on home O2, hypoxic respiratory failure, DM 2, A. fib, CKD, CHF, HTN, pulmonary hypertension, moderate AV stenosis, echo with EF >70% (9/12/2019), MV with moderate annular calcification, TV with moderate to severe regurgitation depression and anxiety, who was admitted on 9/17/2019 from home with a diagnosis of acute on chronic CHF. Patient presented to the ER with CC of 2-3-day worsening of shortness of breath with numbness and weakness of right lower extremity. In ER labs revealed + hyponatremia with , K5.6 and hypoalbuminemia with albumin 2.9      Current medical issues leading to Palliative Medicine involvement include: Goals of care discussion      Psychosocial assessment: See palliative  Rosa M Dural note, she lives with her daugher and son. Baseline cognitive and functional status: Patient alert and oriented able to ambulate independently no longer cooks because was forgetful and left pans on the stove burning    Interval History:   33/5/20: admission complicated by worsening ischemia of rt lower leg. AKA recommended by vascular but the pt has declined     PALLIATIVE DIAGNOSES:   1. Hypoalbuminemia  2. Ischemic rt leg  3. Anxiety   4. Physical debility   5. Goals of care discussion  6. Weakness, generalized  7. Debility  8. ACP  9. Hypoalbuminemia      PLAN:   1. Pt seen with daughter at the bedside. Daughter still working on trying to change the patient's mind regarding surgery  2. Ethically we are bound to honor the patient's wishes (Ethical principle of autonomy) even if family or providers disagree  3.  Pt is decisional today and remains consistent in her desire not to have surgery  4. Son will be arriving to the hospital late this evening  5. If her decision remains the same tomorrow, it may be prudent to discuss next steps and disposition. 6. I don't feel that further discussion is indicated as this is becoming burdensome to the pt and family   7. Will follow up  8. Initial consult note routed to primary continuity provider and/or primary health care team members  9. Communicated plan of care with: Palliative [facility] Team     GOALS OF CARE / TREATMENT PREFERENCES:     GOALS OF CARE:  Patient/Health Care Proxy Stated Goals: Rehabilitation    TREATMENT PREFERENCES:   Code Status: DNR    Advance Care Planning:  [x] [facility] Interdisciplinary Team has updated the ACP Navigator with Health Care Decision Maker and Patient Capacity       Primary Decision Maker: Rosaura Calzada - [phone]    Secondary Decision Maker: Johnnie Akil - Senth[place] - [phone]    Advance Care Planning 10/4/2019   Patient's [address] is: Named in scanned ACP document   Primary Decision Maker Name -   Primary Decision Maker Phone Number -   Primary Decision Maker Relationship to Patient -   Secondary Decision [address] Name -   Secondary Decision Maker Relationship to Patient -   Confirm Advance Directive Yes, on file   Patient Would Like to Complete Advance Directive -   Does the patient have other document types Do Not Resuscitate       Medical Interventions: Limited additional interventions     Other Instructions:   Artificially Administered Nutrition: No feeding tube     Other:    As far as possible, the palliative care team has discussed with patient / health care proxy about goals of care / treatment preferences for patient. HISTORY:     History obtained from: Medical records    CHIEF COMPLAINT: N/A    HPI/SUBJECTIVE:    The patient is:   [x] Verbal and participatory  [] Non-participatory due to:      No pain. No shortness of breath. Feeling anxious     Clinical Pain Assessment (nonverbal scale for severity on nonverbal patients):   Clinical Pain Assessment  Severity: 0  Location: right leg  Character: patient unable to report  Duration: patient unable to report  Effect: patient unable to report  Factors: patient unable to report  Frequency: patient unable to report     Activity (Movement): Lying quietly, normal position    Duration: for how long has pt been experiencing pain (e.g., 2 days, 1 month, years)  Frequency: how often pain is an issue (e.g., several times per day, once every few days, constant)     FUNCTIONAL ASSESSMENT:     Palliative Performance Scale (PPS):  PPS: 40       PSYCHOSOCIAL/SPIRITUAL SCREENING:     Palliative IDT has assessed this patient for cultural preferences / practices and a referral made as appropriate to needs (Cultural Services, Patient Advocacy, Ethics, etc.)    Any spiritual / Synagogue concerns:  [] Yes /  [x] No    Caregiver Burnout:  [] Yes /  [x] No /  [] No Caregiver Present      Anticipatory grief assessment:   [x] Normal  / [] Maladaptive       ESAS Anxiety: Anxiety: 2    ESAS Depression: Depression: 0        REVIEW OF SYSTEMS:     Positive and pertinent negative findings in ROS are noted above in HPI. The following systems were [x] reviewed / [] unable to be reviewed as noted in HPI  Other findings are noted below. Systems: constitutional, ears/nose/mouth/throat, respiratory, gastrointestinal, genitourinary, musculoskeletal, integumentary, neurologic, psychiatric, endocrine. Positive findings noted below.   Modified ESAS Completed by: provider   Fatigue: 0 Drowsiness: 0   Depression: 0 Pain: 0   Anxiety: 2 Nausea: 0   Anorexia: 0 Dyspnea: 0           Stool Occurrence(s): 1        PHYSICAL EXAM:     From RN flowsheet:  Wt Readings from Last 3 Encounters:   10/10/19 205 lb 6.4 oz (93.2 kg)   09/19/19 190 lb 7.6 oz (86.4 kg)   08/30/19 194 lb (88 kg)     Blood pressure 133/75, pulse 95, temperature 97.7 °F (36.5 °C), resp. rate 18, height 5' 5\" (1.651 m), weight 205 lb 6.4 oz (93.2 kg), SpO2 100 %, not currently breastfeeding. Pain Scale 1: Numeric (0 - 10)  Pain Intensity 1: 7  Pain Onset 1: acute  Pain Location 1: Foot  Pain Orientation 1: Right, Left  Pain Description 1: Aching  Pain Intervention(s) 1: Medication (see MAR)  Last bowel movement, if known:     Constitutional: drowsy, conversant, NAD  Eyes: pupils equal, anicteric  ENMT: no nasal discharge, moist mucous membranes  Cardiovascular: regular rhythm, distal pulses intact  Respiratory: breathing not labored on RA, symmetric  Gastrointestinal: gross, soft non-tender, +bowel sounds  Musculoskeletal: no deformity, no tenderness to palpation  Skin: warm, dry upper ext.   necrotic Rt leg  Neurologic: following commands    Psychiatric: neg agitaiton, +anxiety  Other:       HISTORY:     Principal Problem:    CHF exacerbation ([address] (9/17/2019)    Active Problems:    Debility (6/28/2011)      Acute hypoxemic respiratory failure ([address] (8/8/2011)      Hyperkalemia (9/17/2019)      Hyponatremia (9/17/2019)      HOLLAND (acute kidney injury) ([address] (9/17/2019)      Past Medical History:   Diagnosis Date    Anxiety     Breast cancer ([address] 2005, 2010    right 2005, left 2010    Cancer [facility])      cancer right breast cancer    Cancer ([address]     cancer left breast/new dx 8/2011 +bx     Chronic obstructive pulmonary disease (HCC)     Diabetes ([address]     Heart failure ([address]     Hypercholesterolemia     Hypertension     Other ill-defined conditions(799.89)     osteopoosis    Other ill-defined conditions(799.89)     high cholesterol    Pneumonia     Psychiatric disorder     anxiety      Past Surgical History:   Procedure Laterality Date    BIOPSY OF BREAST, INCISIONAL      left breast 8/2011 positive for cancer cells    BREAST SURGERY PROCEDURE UNLISTED      right mastectomy    HX BREAST LUMPECTOMY Left 2010    HX MASTECTOMY Right 2005      Family History   Problem Relation Age of Onset    Hypertension Mother     Stroke Other       History reviewed, no pertinent family history.   Social History     Tobacco Use    Smoking status: Never Smoker    Smokeless tobacco: Never Used   Substance Use Topics    Alcohol use: No     Allergies   Allergen Reactions    Ace Inhibitors Angioedema      Current Facility-Administered Medications   Medication Dose Route Frequency    0.9% sodium chloride infusion 250 mL  250 mL IntraVENous PRN    vancomycin (VANCOCIN) 1250 mg in  ml infusion  1,250 mg IntraVENous Q36H    ALPRAZolam (XANAX) tablet 0.25 mg  0.25 mg Oral BID PRN    cefepime (MAXIPIME) 2 g in 0.9% sodium chloride (MBP/ADV) 100 mL  2 g IntraVENous Q24H    metroNIDAZOLE (FLAGYL) IVPB premix 500 mg  500 mg IntraVENous Q8H    Vancomycin - pharmacy to dose   Other Rx Dosing/Monitoring    albuterol-ipratropium (DUO-NEB) 2.5 MG-0.5 MG/3 ML  3 mL Nebulization Q4H PRN    insulin lispro (HUMALOG) injection 4 Units  4 Units SubCUTAneous TIDAC    sodium chloride (OCEAN) 0.65 % nasal squeeze bottle 2 Spray  2 Spray Both Nostrils Q2H PRN    oxymetazoline (AFRIN) 0.05 % nasal spray 2 Spray  2 Spray Both Nostrils BID PRN    furosemide (LASIX) tablet 40 mg  40 mg Oral DAILY    0.9% sodium chloride infusion 250 mL  250 mL IntraVENous PRN    0.9% sodium chloride infusion 250 mL  250 mL IntraVENous PRN    apixaban (ELIQUIS) tablet 5 mg  5 mg Oral Q12H    insulin lispro (HUMALOG) injection   SubCUTAneous AC&HS    glucose chewable tablet 16 g  4 Tab Oral PRN    glucagon (GLUCAGEN) injection 1 mg  1 mg IntraMUSCular PRN    dextrose 10% infusion 0-250 mL  0-250 mL IntraVENous PRN    albuterol-ipratropium (DUO-NEB) 2.5 MG-0.5 MG/3 ML  3 mL Nebulization Q4H PRN    alum-mag hydroxide-simeth (MYLANTA) oral suspension 30 mL  30 mL Oral Q4H PRN    famotidine (PEPCID) tablet 20 mg  20 mg Oral QPM    metoprolol tartrate (LOPRESSOR) tablet 25 mg 25 mg Oral BID    traMADol (ULTRAM) tablet 25 mg  25 mg Oral Q8H PRN    fentaNYL citrate (PF) injection 50 mcg  50 mcg IntraVENous Q3H PRN    insulin glargine (LANTUS) injection 15 Units  15 Units SubCUTAneous DAILY    ondansetron (ZOFRAN) injection 4 mg  4 mg IntraVENous Q6H PRN    sodium chloride (NS) flush 5-40 mL  5-40 mL IntraVENous Q8H    sodium chloride (NS) flush 5-40 mL  5-40 mL IntraVENous PRN    acetaminophen (TYLENOL) tablet 650 mg  650 mg Oral Q4H PRN    allopurinol (ZYLOPRIM) tablet 100 mg  100 mg Oral DAILY          LAB AND IMAGING FINDINGS:     Lab Results   Component Value Date/Time    WBC 17.6 (H) 10/10/2019 02:55 AM    HGB 7.9 (L) 10/10/2019 02:55 AM    PLATELET 902 (H) 21/19/6452 02:55 AM     Lab Results   Component Value Date/Time    Sodium 137 10/10/2019 02:55 AM    Potassium 3.6 10/10/2019 02:55 AM    Chloride 101 10/10/2019 02:55 AM    CO2 28 10/10/2019 02:55 AM    BUN 24 (H) 10/10/2019 02:55 AM    Creatinine 1.02 10/10/2019 02:55 AM    Calcium 8.6 10/10/2019 02:55 AM    Magnesium 2.1 04/03/2017 12:00 AM    Phosphorus 3.1 09/26/2019 02:07 PM      Lab Results   Component Value Date/Time    AST (SGOT) 18 09/17/2019 09:21 AM    Alk.  phosphatase 109 09/17/2019 09:21 AM    Protein, total 8.4 (H) 09/17/2019 09:21 AM    Albumin 1.9 (L) 09/26/2019 02:07 PM    Globulin 5.5 (H) 09/17/2019 09:21 AM     Lab Results   Component Value Date/Time    INR 1.6 (H) 10/09/2019 06:13 AM    Prothrombin time 15.9 (H) 10/09/2019 06:13 AM    aPTT 35.1 (H) 10/09/2019 06:13 AM      No results found for: IRON, FE, TIBC, IBCT, PSAT, FERR   Lab Results   Component Value Date/Time    pH 7.42 04/20/2011 01:50 AM    PCO2 49 (H) 04/20/2011 01:50 AM    PO2 82 04/20/2011 01:50 AM     No components found for: [place]   Lab Results   Component Value Date/Time     (H) 10/10/2019 02:55 AM    CK - MB 2.3 01/20/2016 10:30 AM                Total time: 35 min  Counseling / coordination time, spent as noted above: 30  > 50% counseling / coordination?:  Yes    Prolonged service was provided for  []30 min   []75 min in face to face time in the presence of the patient, spent as noted above. Time Start:    Time End:   Note: this can only be billed with 76829 (initial) or 78371 (follow up). If multiple start / stop times, list each separately.

## 2019-10-10 NOTE — PROGRESS NOTES
Hospitalist Progress Note                               Kel Hernandez DO                                     Answering service: [phone]                               OR [phone] from in house phone                                         Date of Service:  10/10/2019  NAME:  Bob Ziegler  :  1931  MRN:  [id number]      Admission Summary:   80-year-old female with an extensive past medical history including chronic hypoxemic respiratory failure with home oxygen, COPD, chronic kidney disease, type 2 diabetes mellitus, atrial fibrillation, diastolic congestive heart failure, dyslipidemia, primary hypertension, osteoporosis, pulmonary hypertension, depression and anxiety disorder, was brought to the emergency room from home for evaluation of an approximately 2-3 day history of worsening shortness of breath. Per the patient's son, the patient ambulates unaided at baseline; however, in the 2-3 days leading up to the emergency room presentation, noted to have worsening shortness of breath even with easy activity. The patient also complained of some numbness and weakness in the right lower extremity. The patient admitted to being compliant with all her medical therapies. The patient denied associated headaches, dizziness, visual deficits, chest pains, palpitations, nausea, vomiting, cough, sputum production, fevers, chills, abdominal pain, bladder or bowel irregularities. Reason for follow up:       CC: SOB    Patient seen and examined. No acute events overnight. Patient more alert compared to day prior. Later on in day, was contacted by daughter that patient agreeable to AKA. Will notify vascular surgery. Assessment & Plan:     1) CVS: Acute on chronic diastolic CHF exarcerbation with a preserved EF. Probable Demand ischemia with mildly elevated troponins due to the CHF exarcerbation.  Indeterminable NYHA Functional Class due to the multiple Co-morbidities per Cardiology. Primary Hypertension. Dyslipidemia. 2D ECHO (9/12/19) with EF 70 percent. Continue oxygen, gentle diuresis, daily weight input/output monitoring, CHF teaching. Cardiology and Heart failure team F/Us noted and appreciated. 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 Renal US negative for obstructive uropathy. Stable. 3) Electrolytes: Probable multifactorial Hyponatremia present on admission. Stable. Resolved Hyperkalemia     4) CNS: 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 Neurology eval noted and appreciated. Acute metabolic encephalopathy secondary to underlying ischemic leg: improving     5) Vascular: Ischemic right leg. S/P RLE Thrombectomy 9/21/19 with residual clot in Popliteal Artery. S/P repeat Thrombectomy 9/22/19. Now with significant muscle ischemia right calf with likely right foot drop. Aspirin and plavix discontinued due to Epistaxis. Continue Apixaban. Recommended AKA. Patient refused. Palliative care meeting. Patient now agreeable to surgery. 6) ID: Sepsis secondary to right lower extremity necrotic leg. ID following. On cefepime, flagyl, vancomycin. 7) Resp: Chronic Hypoxemic respiratory failure due to COPD with home oxygen. Nebulizers, inhalers, incentive spirometry. 8) Hematology: Anemia of chronicity. 9) Endocrine: Uncontrolled insulin treated type 2 DM with complications including hyperglycemia. Hold metformin. Accucheck monitoring, Basal and sliding scale insulins, diabetic teaching. Diabetic team evaluation with recommendations appreciated. 10) MANNIE: Osteoporosis. Mild Rhabdomyolysis    11) Psychiatry: Anxiety disorder and Depression without any current behavioral disturbances. 12) Prophylaxis: DVT. 13) Directives: DDNR with an extremely  guarded prognosis.  At increased risk of decompensation. Readdressed with patient and patient's children. Family to continue discussion     15) Plan: pending patient decisions     Hospital Problems  Date Reviewed: 9/20/2019          Codes Class Noted POA    * (Principal) CHF exacerbation ([facility] ICD-10-CM: I50.9  ICD-9-CM: 428.0  9/17/2019 Yes        Hyperkalemia ICD-10-CM: E87.5  ICD-9-CM: 276.7  9/17/2019 Yes        Hyponatremia ICD-10-CM: E87.1  ICD-9-CM: 276.1  9/17/2019 Yes        HOLLAND (acute kidney injury) ([facility] ICD-10-CM: N17.9  ICD-9-CM: 584.9  9/17/2019 Yes        Acute hypoxemic respiratory failure ([facility] ICD-10-CM: J96.01  ICD-9-CM: 518.81  8/8/2011 Yes        Debility ICD-10-CM: R53.81  ICD-9-CM: 799.3  6/28/2011 Yes                  Physical Examination:      Last 24hrs VS reviewed since prior progress note. Most recent are:  Visit Vitals  /69 (BP 1 Location: Left arm, BP Patient Position: At rest)   Pulse 84   Temp 97.5 °F (36.4 °C)   Resp 18   Ht 5' 5\" (1.651 m)   Wt 93.2 kg (205 lb 6.4 oz)   SpO2 100%   Breastfeeding? No   BMI 34.18 kg/m²           Constitutional:  No acute distress. HEENT: Head is a traumatic,  Un icteric sclera. Pink conjunctiva,no erythema or discharge. Oral mucous moist, oropharynx benign. Neck supple,    Resp:  Decreased air entry B/L.   CV:  S1,S2 present. GI:  Soft, non distended, non tender. normoactive bowel sounds, no hepatosplenomegaly    :  No CVA or suprapubic tenderness   Skin  :  Cool and dark RLE. Musculoskeletal:  Dressing insitu to right leg. Reviewed chart p    Neurologic:  Awake, alert and oriented.             No intake or output data in the 24 hours ending 10/10/19 2101           Labs:     Recent Labs     10/10/19  0255 10/09/19  0613   WBC 17.6* 20.8*   HGB 7.9* 6.4*   HCT 25.9* 21.8*   * 435*     Recent Labs     10/10/19  0255 10/09/19  0209 10/08/19  0251    135* 133*   K 3.6 3.8 4.3    98 96*   CO2 28 29 32   BUN 24* 23* 24*   CREA 1.02 1.38* 1.27*   * 132* 156* CA 8.6 8.5 8.4*     No results for input(s): SGOT, GPT, ALT, AP, TBIL, TBILI, TP, ALB, GLOB, GGT, AML, LPSE in the last 72 hours. No lab exists for component: AMYP, HLPSE  Recent Labs     10/09/19  0613   INR 1.6*   PTP 15.9*   APTT 35.1*      No results for input(s): FE, TIBC, PSAT, FERR in the last 72 hours. No results found for: FOL, RBCF   No results for input(s): PH, PCO2, PO2 in the last 72 hours.   Recent Labs     10/10/19  0255 10/09/19  0849 10/09/19  0209   * 560* 685*     Lab Results   Component Value Date/Time    Cholesterol, total 144 09/17/2019 09:21 AM    HDL Cholesterol 55 09/17/2019 09:21 AM    LDL, calculated 72.6 09/17/2019 09:21 AM    Triglyceride 82 09/17/2019 09:21 AM    CHOL/HDL Ratio 2.6 09/17/2019 09:21 AM     Lab Results   Component Value Date/Time    Glucose (POC) 180 (H) 10/10/2019 04:04 PM    Glucose (POC) 194 (H) 10/10/2019 10:58 AM    Glucose (POC) 169 (H) 10/10/2019 06:19 AM    Glucose (POC) 127 (H) 10/09/2019 09:35 PM    Glucose (POC) 153 (H) 10/09/2019 03:57 PM     Lab Results   Component Value Date/Time    Color DARK YELLOW 10/05/2019 06:02 PM    Appearance CLOUDY (A) 10/05/2019 06:02 PM    Specific gravity 1.018 10/05/2019 06:02 PM    Specific gravity 1.010 12/18/2011 09:28 AM    pH (UA) 5.5 10/05/2019 06:02 PM    Protein 30 (A) 10/05/2019 06:02 PM    Glucose NEGATIVE  10/05/2019 06:02 PM    Ketone NEGATIVE  10/05/2019 06:02 PM    Bilirubin NEGATIVE  09/17/2019 12:49 PM    Urobilinogen >8.0 (H) 10/05/2019 06:02 PM    Nitrites NEGATIVE  10/05/2019 06:02 PM    Leukocyte Esterase TRACE (A) 10/05/2019 06:02 PM    Epithelial cells FEW 10/05/2019 06:02 PM    Bacteria NEGATIVE  10/05/2019 06:02 PM    WBC 0-4 10/05/2019 06:02 PM    RBC 0-5 10/05/2019 06:02 PM         Medications Reviewed:     Current Facility-Administered Medications   Medication Dose Route Frequency    0.9% sodium chloride infusion 250 mL  250 mL IntraVENous PRN    vancomycin (VANCOCIN) 1250 mg in  ml infusion  1,250 mg IntraVENous Q36H    ALPRAZolam (XANAX) tablet 0.25 mg  0.25 mg Oral BID PRN    cefepime (MAXIPIME) 2 g in 0.9% sodium chloride (MBP/ADV) 100 mL  2 g IntraVENous Q24H    metroNIDAZOLE (FLAGYL) IVPB premix 500 mg  500 mg IntraVENous Q8H    Vancomycin - pharmacy to dose   Other Rx Dosing/Monitoring    albuterol-ipratropium (DUO-NEB) 2.5 MG-0.5 MG/3 ML  3 mL Nebulization Q4H PRN    insulin lispro (HUMALOG) injection 4 Units  4 Units SubCUTAneous TIDAC    sodium chloride (OCEAN) 0.65 % nasal squeeze bottle 2 Spray  2 Spray Both Nostrils Q2H PRN    oxymetazoline (AFRIN) 0.05 % nasal spray 2 Spray  2 Spray Both Nostrils BID PRN    furosemide (LASIX) tablet 40 mg  40 mg Oral DAILY    0.9% sodium chloride infusion 250 mL  250 mL IntraVENous PRN    0.9% sodium chloride infusion 250 mL  250 mL IntraVENous PRN    apixaban (ELIQUIS) tablet 5 mg  5 mg Oral Q12H    insulin lispro (HUMALOG) injection   SubCUTAneous AC&HS    glucose chewable tablet 16 g  4 Tab Oral PRN    glucagon (GLUCAGEN) injection 1 mg  1 mg IntraMUSCular PRN    dextrose 10% infusion 0-250 mL  0-250 mL IntraVENous PRN    albuterol-ipratropium (DUO-NEB) 2.5 MG-0.5 MG/3 ML  3 mL Nebulization Q4H PRN    alum-mag hydroxide-simeth (MYLANTA) oral suspension 30 mL  30 mL Oral Q4H PRN    famotidine (PEPCID) tablet 20 mg  20 mg Oral QPM    metoprolol tartrate (LOPRESSOR) tablet 25 mg  25 mg Oral BID    traMADol (ULTRAM) tablet 25 mg  25 mg Oral Q8H PRN    fentaNYL citrate (PF) injection 50 mcg  50 mcg IntraVENous Q3H PRN    insulin glargine (LANTUS) injection 15 Units  15 Units SubCUTAneous DAILY    ondansetron (ZOFRAN) injection 4 mg  4 mg IntraVENous Q6H PRN    sodium chloride (NS) flush 5-40 mL  5-40 mL IntraVENous Q8H    sodium chloride (NS) flush 5-40 mL  5-40 mL IntraVENous PRN    acetaminophen (TYLENOL) tablet 650 mg  650 mg Oral Q4H PRN    allopurinol (ZYLOPRIM) tablet 100 mg  100 mg Oral DAILY ______________________________________________________________________  EXPECTED LENGTH OF STAY: 6d 14h  ACTUAL LENGTH OF STAY:          [address], DO

## 2019-10-11 LAB
ANION GAP SERPL CALC-SCNC: 6 MMOL/L (ref 5–15)
BUN SERPL-MCNC: 21 MG/DL (ref 6–20)
BUN/CREAT SERPL: 23 (ref 12–20)
CALCIUM SERPL-MCNC: 8.3 MG/DL (ref 8.5–10.1)
CHLORIDE SERPL-SCNC: 101 MMOL/L (ref 97–108)
CK SERPL-CCNC: 383 U/L (ref 26–192)
CO2 SERPL-SCNC: 29 MMOL/L (ref 21–32)
CREAT SERPL-MCNC: 0.91 MG/DL (ref 0.55–1.02)
ERYTHROCYTE [DISTWIDTH] IN BLOOD BY AUTOMATED COUNT: 16 % (ref 11.5–14.5)
GLUCOSE BLD STRIP.AUTO-MCNC: 118 MG/DL (ref 65–100)
GLUCOSE BLD STRIP.AUTO-MCNC: 119 MG/DL (ref 65–100)
GLUCOSE BLD STRIP.AUTO-MCNC: 146 MG/DL (ref 65–100)
GLUCOSE BLD STRIP.AUTO-MCNC: 85 MG/DL (ref 65–100)
GLUCOSE SERPL-MCNC: 94 MG/DL (ref 65–100)
HCT VFR BLD AUTO: 25 % (ref 35–47)
HGB BLD-MCNC: 7.7 G/DL (ref 11.5–16)
MCH RBC QN AUTO: 27.7 PG (ref 26–34)
MCHC RBC AUTO-ENTMCNC: 30.8 G/DL (ref 30–36.5)
MCV RBC AUTO: 89.9 FL (ref 80–99)
NRBC # BLD: 0 K/UL (ref 0–0.01)
NRBC BLD-RTO: 0 PER 100 WBC
PLATELET # BLD AUTO: 421 K/UL (ref 150–400)
PMV BLD AUTO: 9.5 FL (ref 8.9–12.9)
POTASSIUM SERPL-SCNC: 3.4 MMOL/L (ref 3.5–5.1)
RBC # BLD AUTO: 2.78 M/UL (ref 3.8–5.2)
SERVICE CMNT-IMP: ABNORMAL
SERVICE CMNT-IMP: NORMAL
SODIUM SERPL-SCNC: 136 MMOL/L (ref 136–145)
WBC # BLD AUTO: 16.8 K/UL (ref 3.6–11)

## 2019-10-11 PROCEDURE — 74011250636 HC RX REV CODE- 250/636: Performed by: INTERNAL MEDICINE

## 2019-10-11 PROCEDURE — 36415 COLL VENOUS BLD VENIPUNCTURE: CPT

## 2019-10-11 PROCEDURE — 82962 GLUCOSE BLOOD TEST: CPT

## 2019-10-11 PROCEDURE — 65270000029 HC RM PRIVATE

## 2019-10-11 PROCEDURE — 74011000258 HC RX REV CODE- 258: Performed by: INTERNAL MEDICINE

## 2019-10-11 PROCEDURE — 74011250637 HC RX REV CODE- 250/637: Performed by: INTERNAL MEDICINE

## 2019-10-11 PROCEDURE — 74011636637 HC RX REV CODE- 636/637: Performed by: SURGERY

## 2019-10-11 PROCEDURE — 85027 COMPLETE CBC AUTOMATED: CPT

## 2019-10-11 PROCEDURE — 80048 BASIC METABOLIC PNL TOTAL CA: CPT

## 2019-10-11 PROCEDURE — 82550 ASSAY OF CK (CPK): CPT

## 2019-10-11 PROCEDURE — 74011250637 HC RX REV CODE- 250/637: Performed by: FAMILY MEDICINE

## 2019-10-11 PROCEDURE — 94760 N-INVAS EAR/PLS OXIMETRY 1: CPT

## 2019-10-11 PROCEDURE — 74011250637 HC RX REV CODE- 250/637: Performed by: SURGERY

## 2019-10-11 PROCEDURE — 74011636637 HC RX REV CODE- 636/637: Performed by: HOSPITALIST

## 2019-10-11 RX ORDER — POTASSIUM CHLORIDE 750 MG/1
40 TABLET, FILM COATED, EXTENDED RELEASE ORAL
Status: COMPLETED | OUTPATIENT
Start: 2019-10-11 | End: 2019-10-11

## 2019-10-11 RX ORDER — VANCOMYCIN/0.9 % SOD CHLORIDE 1.5G/250ML
1500 PLASTIC BAG, INJECTION (ML) INTRAVENOUS EVERY 24 HOURS
Status: DISCONTINUED | OUTPATIENT
Start: 2019-10-11 | End: 2019-10-18 | Stop reason: DRUGHIGH

## 2019-10-11 RX ADMIN — METRONIDAZOLE 500 MG: 500 INJECTION, SOLUTION INTRAVENOUS at 10:03

## 2019-10-11 RX ADMIN — INSULIN LISPRO 4 UNITS: 100 INJECTION, SOLUTION INTRAVENOUS; SUBCUTANEOUS at 07:02

## 2019-10-11 RX ADMIN — FAMOTIDINE 20 MG: 20 TABLET ORAL at 17:11

## 2019-10-11 RX ADMIN — METOPROLOL TARTRATE 25 MG: 25 TABLET ORAL at 21:24

## 2019-10-11 RX ADMIN — INSULIN GLARGINE 15 UNITS: 100 INJECTION, SOLUTION SUBCUTANEOUS at 10:07

## 2019-10-11 RX ADMIN — CEFEPIME HYDROCHLORIDE 2 G: 2 INJECTION, POWDER, FOR SOLUTION INTRAVENOUS at 17:11

## 2019-10-11 RX ADMIN — Medication 10 ML: at 21:25

## 2019-10-11 RX ADMIN — Medication 10 ML: at 05:39

## 2019-10-11 RX ADMIN — METRONIDAZOLE 500 MG: 500 INJECTION, SOLUTION INTRAVENOUS at 02:40

## 2019-10-11 RX ADMIN — VANCOMYCIN HYDROCHLORIDE 1500 MG: 10 INJECTION, POWDER, LYOPHILIZED, FOR SOLUTION INTRAVENOUS at 10:07

## 2019-10-11 RX ADMIN — FUROSEMIDE 40 MG: 40 TABLET ORAL at 10:02

## 2019-10-11 RX ADMIN — ALLOPURINOL 100 MG: 100 TABLET ORAL at 10:02

## 2019-10-11 RX ADMIN — TRAMADOL HYDROCHLORIDE 25 MG: 50 TABLET ORAL at 05:38

## 2019-10-11 RX ADMIN — POTASSIUM CHLORIDE 40 MEQ: 750 TABLET, EXTENDED RELEASE ORAL at 10:02

## 2019-10-11 RX ADMIN — METRONIDAZOLE 500 MG: 500 INJECTION, SOLUTION INTRAVENOUS at 17:11

## 2019-10-11 NOTE — DIABETES MGMT
[facility]    [facility] Progress Note    Recommendations/ Comments: Chart reviewed for hyperglycemia. At least two POC BGs above 180 mg/dl yesterday (pre lunch, pre dinner). FBG looks good. Note 0200 check was 94 mg/dl. If PPG >180 mg/dl today, consider increase AC lispro at breakfast and lunch to 6 units. Maintain 4 units at dinner. Current hospital DM medication: Lantus 15 units  AC lispro 4 units TID  Lispro correction scale - high sensitivity    Chart reviewed on [facility]. Patient is a 80 y.o. female with known DM on 30-40 units of Lantus and Metformin at home. A1c:   Lab Results   Component Value Date/Time    Hemoglobin A1c 8.8 (H) 09/17/2019 09:21 AM    Hemoglobin A1c 7.0 (H) 02/08/2017 09:59 AM       Recent Glucose Results:   Lab Results   Component Value Date/Time    GLU 94 10/11/2019 02:19 AM    GLUCPOC 119 (H) 10/11/2019 06:32 AM    GLUCPOC 109 (H) 10/10/2019 09:00 PM    GLUCPOC 180 (H) 10/10/2019 04:04 PM        Lab Results   Component Value Date/Time    Creatinine 0.91 10/11/2019 02:19 AM     Estimated Creatinine Clearance: 48.3 mL/min (based on SCr of 0.91 mg/dL). Active Orders   Diet    DIET DIABETIC CONSISTENT CARB Regular        PO intake: No data found. Will continue to follow as needed.     Thank you  Betty Shetty RD, Diabetes Clinician         Time spent: 4 minutes

## 2019-10-11 NOTE — PROGRESS NOTES
Physical Therapy  Attempted to see patient but declining at this time. Offered getting up to chair for her lunch but again declined. Still awaiting final decision about management of right leg. Will follow up Monday.   Katya Layton, PT

## 2019-10-11 NOTE — PROGRESS NOTES
Dr. Diaz Silence requested for me to call Dr. Nicole Vinson and advise him that pt is agreeable to the AKA. Call made and spoke with Dr. Krish Sawyer nurse.

## 2019-10-11 NOTE — PROGRESS NOTES
Vascular Surgery  --patient and family aggreeable for AKA surgery  --have added on for Monday at 7:30am

## 2019-10-11 NOTE — PROGRESS NOTES
Occupational Therapy: Patient received in bed with eyes closed. Awakened to voice, but closed eyes again. Responds to questions with one word responses, but keeps eyes closed. Lunch tray arrived. Lunch placed in front of patient. Encouraged her to eat with or without assistance. Patient declined. (untouched breakfast tray also in room). Encouraged patient to participate in exercises or self care activities. She declined all. Left in bed with eyes closed and head of bed elevated. Will follow. Weekly reassessment due at next session.   DENZEL Campbell/LEVON

## 2019-10-11 NOTE — PROGRESS NOTES
Hospitalist Progress Note                               DO Mary Jo Mitchell service: [phone]                               OR [phone] from in house phone                                         Date of Service:  10/11/2019  NAME:  Francie Ulloa  :  1931  MRN:  [id number]      Admission Summary:   72-year-old female with an extensive past medical history including chronic hypoxemic respiratory failure with home oxygen, COPD, chronic kidney disease, type 2 diabetes mellitus, atrial fibrillation, diastolic congestive heart failure, dyslipidemia, primary hypertension, osteoporosis, pulmonary hypertension, depression and anxiety disorder, was brought to the emergency room from home for evaluation of an approximately 2-3 day history of worsening shortness of breath. Per the patient's son, the patient ambulates unaided at baseline; however, in the 2-3 days leading up to the emergency room presentation, noted to have worsening shortness of breath even with easy activity. The patient also complained of some numbness and weakness in the right lower extremity. The patient admitted to being compliant with all her medical therapies. The patient denied associated headaches, dizziness, visual deficits, chest pains, palpitations, nausea, vomiting, cough, sputum production, fevers, chills, abdominal pain, bladder or bowel irregularities. Reason for follow up:       CC: SOB    Patient seen and examined. No acute events. Agreeable to surgery. Assessment & Plan:     1) CVS: Acute on chronic diastolic CHF exarcerbation with a preserved EF. Probable Demand ischemia with mildly elevated troponins due to the CHF exarcerbation. Indeterminable NYHA Functional Class due to the multiple Co-morbidities per Cardiology. Primary Hypertension. Dyslipidemia. 2D ECHO (19) with EF 70 percent.  Continue oxygen, gentle diuresis, daily weight input/output monitoring, CHF teaching. Cardiology and Heart failure team F/Us noted and appreciated. 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 Renal US negative for obstructive uropathy. Stable. 3) Electrolytes: Probable multifactorial Hyponatremia present on admission. Stable. Resolved Hyperkalemia     4) CNS: 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 Neurology eval noted and appreciated. Acute metabolic encephalopathy secondary to underlying ischemic leg: improved, intermittent     5) Vascular: Ischemic right leg. S/P RLE Thrombectomy 9/21/19 with residual clot in Popliteal Artery. S/P repeat Thrombectomy 9/22/19. Now with significant muscle ischemia right calf with likely right foot drop. Aspirin and plavix discontinued due to Epistaxis. Continue Apixaban. Recommended AKA. Patient refused. Palliative care meeting. Patient now agreeable to surgery. Plans for Monday, 10/14    6) ID: Sepsis secondary to right lower extremity necrotic leg. ID following. On cefepime, flagyl, vancomycin. 7) Resp: Chronic Hypoxemic respiratory failure due to COPD with home oxygen. Nebulizers, inhalers, incentive spirometry. 8) Hematology: Anemia of chronicity. 9) Endocrine: Uncontrolled insulin treated type 2 DM with complications including hyperglycemia. Hold metformin. Accucheck monitoring, Basal and sliding scale insulins, diabetic teaching. Diabetic team evaluation with recommendations appreciated. 10) MANNIE: Osteoporosis. Mild Rhabdomyolysis    11) Psychiatry: Anxiety disorder and Depression without any current behavioral disturbances. 12) Prophylaxis: DVT. 13) Directives: DDNR with an extremely  guarded prognosis. At increased risk of decompensation. Readdressed with patient and patient's children.  Family to continue discussion     15) Plan: pending patient decisions     Hospital Problems  Date Reviewed: 9/20/2019          Codes Class Noted POA    * (Principal) CHF exacerbation ([facility] ICD-10-CM: I50.9  ICD-9-CM: 428.0  9/17/2019 Yes        Hyperkalemia ICD-10-CM: E87.5  ICD-9-CM: 276.7  9/17/2019 Yes        Hyponatremia ICD-10-CM: E87.1  ICD-9-CM: 276.1  9/17/2019 Yes        HOLLAND (acute kidney injury) ([facility] ICD-10-CM: N17.9  ICD-9-CM: 584.9  9/17/2019 Yes        Acute hypoxemic respiratory failure ([facility] ICD-10-CM: J96.01  ICD-9-CM: 518.81  8/8/2011 Yes        Debility ICD-10-CM: R53.81  ICD-9-CM: 799.3  6/28/2011 Yes                  Physical Examination:      Last 24hrs VS reviewed since prior progress note. Most recent are:  Visit Vitals  /69 (BP 1 Location: Left arm, BP Patient Position: At rest)   Pulse 99   Temp 98.7 °F (37.1 °C)   Resp 18   Ht 5' 5\" (1.651 m)   Wt 93.5 kg (206 lb 1.6 oz)   SpO2 98%   Breastfeeding? No   BMI 34.30 kg/m²           Constitutional:  No acute distress. HEENT: Head is a traumatic,  Un icteric sclera. Pink conjunctiva,no erythema or discharge. Oral mucous moist, oropharynx benign. Neck supple,    Resp:  Decreased air entry B/L.   CV:  S1,S2 present. GI:  Soft, non distended, non tender. normoactive bowel sounds, no hepatosplenomegaly    :  No CVA or suprapubic tenderness   Skin  :  Cool and dark RLE. Musculoskeletal:  Dressing insitu to right leg. Reviewed chart p    Neurologic:  Awake, alert and oriented.             No intake or output data in the 24 hours ending 10/11/19 1834           Labs:     Recent Labs     10/11/19  0225 10/10/19  0255   WBC 16.8* 17.6*   HGB 7.7* 7.9*   HCT 25.0* 25.9*   * 436*     Recent Labs     10/11/19  0219 10/10/19  0255 10/09/19  0209    137 135*   K 3.4* 3.6 3.8    101 98   CO2 29 28 29   BUN 21* 24* 23*   CREA 0.91 1.02 1.38*   GLU 94 128* 132*   CA 8.3* 8.6 8.5     No results for input(s): SGOT, GPT, ALT, AP, TBIL, TBILI, TP, ALB, GLOB, GGT, AML, LPSE in the last 72 hours. No lab exists for component: AMYP, HLPSE  Recent Labs     10/09/19  0613   INR 1.6*   PTP 15.9*   APTT 35.1*      No results for input(s): FE, TIBC, PSAT, FERR in the last 72 hours. No results found for: FOL, RBCF   No results for input(s): PH, PCO2, PO2 in the last 72 hours.   Recent Labs     10/11/19  0225 10/10/19  0255 10/09/19  0849   * 493* 560*     Lab Results   Component Value Date/Time    Cholesterol, total 144 09/17/2019 09:21 AM    HDL Cholesterol 55 09/17/2019 09:21 AM    LDL, calculated 72.6 09/17/2019 09:21 AM    Triglyceride 82 09/17/2019 09:21 AM    CHOL/HDL Ratio 2.6 09/17/2019 09:21 AM     Lab Results   Component Value Date/Time    Glucose (POC) 118 (H) 10/11/2019 04:05 PM    Glucose (POC) 85 10/11/2019 11:31 AM    Glucose (POC) 119 (H) 10/11/2019 06:32 AM    Glucose (POC) 109 (H) 10/10/2019 09:00 PM    Glucose (POC) 180 (H) 10/10/2019 04:04 PM     Lab Results   Component Value Date/Time    Color DARK YELLOW 10/05/2019 06:02 PM    Appearance CLOUDY (A) 10/05/2019 06:02 PM    Specific gravity 1.018 10/05/2019 06:02 PM    Specific gravity 1.010 12/18/2011 09:28 AM    pH (UA) 5.5 10/05/2019 06:02 PM    Protein 30 (A) 10/05/2019 06:02 PM    Glucose NEGATIVE  10/05/2019 06:02 PM    Ketone NEGATIVE  10/05/2019 06:02 PM    Bilirubin NEGATIVE  09/17/2019 12:49 PM    Urobilinogen >8.0 (H) 10/05/2019 06:02 PM    Nitrites NEGATIVE  10/05/2019 06:02 PM    Leukocyte Esterase TRACE (A) 10/05/2019 06:02 PM    Epithelial cells FEW 10/05/2019 06:02 PM    Bacteria NEGATIVE  10/05/2019 06:02 PM    WBC 0-4 10/05/2019 06:02 PM    RBC 0-5 10/05/2019 06:02 PM         Medications Reviewed:     Current Facility-Administered Medications   Medication Dose Route Frequency    vancomycin (VANCOCIN) 1500 mg in  ml infusion  1,500 mg IntraVENous Q24H    0.9% sodium chloride infusion 250 mL  250 mL IntraVENous PRN    ALPRAZolam (XANAX) tablet 0.25 mg  0.25 mg Oral BID PRN    cefepime (MAXIPIME) 2 g in 0.9% sodium chloride (MBP/ADV) 100 mL  2 g IntraVENous Q24H    metroNIDAZOLE (FLAGYL) IVPB premix 500 mg  500 mg IntraVENous Q8H    Vancomycin - pharmacy to dose   Other Rx Dosing/Monitoring    albuterol-ipratropium (DUO-NEB) 2.5 MG-0.5 MG/3 ML  3 mL Nebulization Q4H PRN    insulin lispro (HUMALOG) injection 4 Units  4 Units SubCUTAneous TIDAC    sodium chloride (OCEAN) 0.65 % nasal squeeze bottle 2 Spray  2 Spray Both Nostrils Q2H PRN    oxymetazoline (AFRIN) 0.05 % nasal spray 2 Spray  2 Spray Both Nostrils BID PRN    furosemide (LASIX) tablet 40 mg  40 mg Oral DAILY    0.9% sodium chloride infusion 250 mL  250 mL IntraVENous PRN    0.9% sodium chloride infusion 250 mL  250 mL IntraVENous PRN    apixaban (ELIQUIS) tablet 5 mg  5 mg Oral Q12H    insulin lispro (HUMALOG) injection   SubCUTAneous AC&HS    glucose chewable tablet 16 g  4 Tab Oral PRN    glucagon (GLUCAGEN) injection 1 mg  1 mg IntraMUSCular PRN    dextrose 10% infusion 0-250 mL  0-250 mL IntraVENous PRN    albuterol-ipratropium (DUO-NEB) 2.5 MG-0.5 MG/3 ML  3 mL Nebulization Q4H PRN    alum-mag hydroxide-simeth (MYLANTA) oral suspension 30 mL  30 mL Oral Q4H PRN    famotidine (PEPCID) tablet 20 mg  20 mg Oral QPM    metoprolol tartrate (LOPRESSOR) tablet 25 mg  25 mg Oral BID    traMADol (ULTRAM) tablet 25 mg  25 mg Oral Q8H PRN    fentaNYL citrate (PF) injection 50 mcg  50 mcg IntraVENous Q3H PRN    insulin glargine (LANTUS) injection 15 Units  15 Units SubCUTAneous DAILY    ondansetron (ZOFRAN) injection 4 mg  4 mg IntraVENous Q6H PRN    sodium chloride (NS) flush 5-40 mL  5-40 mL IntraVENous Q8H    sodium chloride (NS) flush 5-40 mL  5-40 mL IntraVENous PRN    acetaminophen (TYLENOL) tablet 650 mg  650 mg Oral Q4H PRN    allopurinol (ZYLOPRIM) tablet 100 mg  100 mg Oral DAILY     ______________________________________________________________________  EXPECTED LENGTH OF STAY: 6d 14h  ACTUAL LENGTH OF STAY: [address] Jose Royal, DO

## 2019-10-11 NOTE — PROGRESS NOTES
Infectious Disease Progress Note       Subjective:   Ms Sole Rawls seen earlier today  Was more sleepy this am when I saw her   D/W RN today   + discharge with foul odor from RLE   Denied nausea, vomiting, diarrhea  Did not complain of pain when asked         ROS: 10 point ROS obtained and pertinent positives as above. All others negative.          Objective:    Vitals:   Patient Vitals for the past 24 hrs:   Temp Pulse Resp BP SpO2   10/11/19 0806 98.3 °F (36.8 °C) (!) 105 18 109/73 98 %   10/11/19 0214 98 °F (36.7 °C) 87 18 125/66 100 %   10/10/19 2001 99.1 °F (37.3 °C) 83 18 126/63 96 %   10/10/19 1516 97.5 °F (36.4 °C) 84 18 126/69 100 %       Physical Exam:  Gen: No apparent distress  HEENT:   no scleral icterus, no thrush ,   CV: S1,2 heard regularly, no lower extremity edema    Lungs: Clear to auscultation anteriorly,  no wheezing  Abdomen: soft, non tender, non distended,   Skin: no rash on visualized areas   Neuro: sleepy , minimally verbal   Musculoskeletal:   RLE  With foul discharge from necrotic area  , cool foot       Medications:    Current Facility-Administered Medications:     vancomycin (VANCOCIN) 1500 mg in  ml infusion, 1,500 mg, IntraVENous, Q24H, Gomadam, Jackie R, DO, Last Rate: 250 mL/hr at 10/11/19 1007, 1,500 mg at 10/11/19 1007    0.9% sodium chloride infusion 250 mL, 250 mL, IntraVENous, PRN, EARL [facility] [employer] M, DO    ALPRAZolam Rosales Ghee) tablet 0.25 mg, 0.25 mg, Oral, BID PRN, Ewelina Sanchez, NP, 0.25 mg at 10/10/19 1357    cefepime (MAXIPIME) 2 g in 0.9% sodium chloride (MBP/ADV) 100 mL, 2 g, IntraVENous, Q24H, Gomadam, Jackie R, DO, Last Rate: 200 mL/hr at 10/10/19 1713, 2 g at 10/10/19 1713    metroNIDAZOLE (FLAGYL) IVPB premix 500 mg, 500 mg, IntraVENous, Q8H, Jackie Coyle R, , Last Rate: 100 mL/hr at 10/11/19 1003, 500 mg at 10/11/19 1003    Vancomycin - pharmacy to dose, , Other, Rx Dosing/Monitoring, Jackie Coyle RDO    albuterol-ipratropium (DUO-NEB) 2.5 MG-0.5 MG/3 ML, 3 mL, Nebulization, Q4H PRN, Kayla GUZMAN MD, 3 mL at 10/09/19 0601    insulin lispro (HUMALOG) injection 4 Units, 4 Units, SubCUTAneous, TIDAC, Creig Barthel, MD, Stopped at 10/11/19 1130    sodium chloride (OCEAN) 0.65 % nasal squeeze bottle 2 Spray, 2 Spray, Both Nostrils, Q2H PRN, Livia SFADAMS GUZMAN MD    oxymetazoline (AFRIN) 0.05 % nasal spray 2 Spray, 2 Spray, Both Nostrils, BID PRN, Livia FCSDGGD MD MEGAN    furosemide (LASIX) tablet 40 mg, 40 mg, Oral, DAILY, HartleLiz MD, 40 mg at 10/11/19 1002    0.9% sodium chloride infusion 250 mL, 250 mL, IntraVENous, PRN, KRIS Bhakta MD    0.9% sodium chloride infusion 250 mL, 250 mL, IntraVENous, PRN, Germaine Gunter MD    apixaban (ELIQUIS) tablet 5 mg, 5 mg, Oral, Q12H, Germaine Gunter MD, Stopped at 10/10/19 2300    insulin lispro (HUMALOG) injection, , SubCUTAneous, AC&HS, Karel Richardson MD, Stopped at 10/07/19 0730    glucose chewable tablet 16 g, 4 Tab, Oral, PRN, Livia ILTXVPO MD MEGAN    glucagon (GLUCAGEN) injection 1 mg, 1 mg, IntraMUSCular, PRN, COLLIN Bhakta MD    dextrose 10% infusion 0-250 mL, 0-250 mL, IntraVENous, PRN, Livia HLJHFQZ MD MEGAN    albuterol-ipratropium (DUO-NEB) 2.5 MG-0.5 MG/3 ML, 3 mL, Nebulization, Q4H PRN, Kayla GUZMAN MD, 3 mL at 09/26/19 2019    alum-mag hydroxide-simeth (MYLANTA) oral suspension 30 mL, 30 mL, Oral, Q4H PRN, Marito Portillo MD, 30 mL at 09/26/19 2144    famotidine (PEPCID) tablet 20 mg, 20 mg, Oral, QPM, Marito Portillo MD, 20 mg at 10/10/19 1713    metoprolol tartrate (LOPRESSOR) tablet 25 mg, 25 mg, Oral, BID, Marito Portillo MD, Stopped at 10/11/19 1002    traMADol (ULTRAM) tablet 25 mg, 25 mg, Oral, Q8H PRN, Germaine Gunter MD, 25 mg at 10/11/19 0538    fentaNYL citrate (PF) injection 50 mcg, 50 mcg, IntraVENous, Q3H PRN, Germaine Gunter MD, 50 mcg at 10/09/19 1703    insulin glargine (LANTUS) injection 15 Units, 15 Units, SubCUTAneous, DAILY, Maribeth Marx MD, 15 Units at 10/11/19 1007    ondansetron (ZOFRAN) injection 4 mg, 4 mg, IntraVENous, Q6H PRN, Maribeth Marx MD    sodium chloride (NS) flush 5-40 mL, 5-40 mL, IntraVENous, Q8H, Maribeth Marx MD, 10 mL at 10/11/19 0539    sodium chloride (NS) flush 5-40 mL, 5-40 mL, IntraVENous, PRN, Maribeth Marx MD, 10 mL at 10/08/19 1237    acetaminophen (TYLENOL) tablet 650 mg, 650 mg, Oral, Q4H PRN, Maribeth Marx MD, 650 mg at 10/10/19 0418    allopurinol (ZYLOPRIM) tablet 100 mg, 100 mg, Oral, DAILY, Maribeth Marx MD, 100 mg at 10/11/19 1002      Labs:  Recent Results (from the past 24 hour(s))   GLUCOSE, POC    Collection Time: 10/10/19  4:04 PM   Result Value Ref Range    Glucose (POC) 180 (H) 65 - 100 mg/dL    Performed by Yossi Charles    GLUCOSE, POC    Collection Time: 10/10/19  9:00 PM   Result Value Ref Range    Glucose (POC) 109 (H) 65 - 100 mg/dL    Performed by Austen Wade    METABOLIC PANEL, BASIC    Collection Time: 10/11/19  2:19 AM   Result Value Ref Range    Sodium 136 136 - 145 mmol/L    Potassium 3.4 (L) 3.5 - 5.1 mmol/L    Chloride 101 97 - 108 mmol/L    CO2 29 21 - 32 mmol/L    Anion gap 6 5 - 15 mmol/L    Glucose 94 65 - 100 mg/dL    BUN 21 (H) 6 - 20 MG/DL    Creatinine 0.91 0.55 - 1.02 MG/DL    BUN/Creatinine ratio 23 (H) 12 - 20      GFR est AA >60 >60 ml/min/1.73m2    GFR est non-AA 58 (L) >60 ml/min/1.73m2    Calcium 8.3 (L) 8.5 - 10.1 MG/DL   CK    Collection Time: 10/11/19  2:25 AM   Result Value Ref Range     (H) 26 - 192 U/L   CBC W/O DIFF    Collection Time: 10/11/19  2:25 AM   Result Value Ref Range    WBC 16.8 (H) 3.6 - 11.0 K/uL    RBC 2.78 (L) 3.80 - 5.20 M/uL    HGB 7.7 (L) 11.5 - 16.0 g/dL    HCT 25.0 (L) 35.0 - 47.0 %    MCV 89.9 80.0 - 99.0 FL    MCH 27.7 26.0 - 34.0 PG    MCHC 30.8 30.0 - 36.5 g/dL    RDW 16.0 (H) 11.5 - 14.5 %    PLATELET 513 (H) 330 - 400 K/uL    MPV 9.5 8.9 - 12.9 FL    NRBC 0.0 0  WBC ABSOLUTE NRBC 0.00 0.00 - 0.01 K/uL   GLUCOSE, POC    Collection Time: 10/11/19  6:32 AM   Result Value Ref Range    Glucose (POC) 119 (H) 65 - 100 mg/dL    Performed by Radames Donahue    GLUCOSE, POC    Collection Time: 10/11/19 11:31 AM   Result Value Ref Range    Glucose (POC) 85 65 - 100 mg/dL    Performed by Jacqueline Jovel            Micro:     Blood: 10/4/19  Specimen Information: Blood        Component Value Ref Range & Units Status   Special Requests: NO SPECIAL REQUESTS    Preliminary   Culture result: NO GROWTH 3 DAYS    Preliminary   Result History       Pathology:      Imaging:  RLE   US  Interpretation Summary        · Probable acute thrombus at the mid and distal right popliteal level. · Absent flow at the distal posterior tibial and anterior tibial levels suggesting occlusion. The right common femoral, deep femoral, femoral, popliteal, posterior tibial and anterior tibial arteries were visualized. No significant plaque is identified from groin to distal thigh. Biphasic flow with no increased velocities were noted. The above knee popliteal waveform is monophasic with low velocities. The popliteal fossa and distal popliteal contain absent flow and suggests an acute occlusion although it is suboptimally seen. Low echogenicity is noted in the popliteal.       The right posterior tibial and anterior tibial arteries contain absent flow with no color fill and suggests occlusion.      Lower Extremity Arterial Findings     Right Lower Arterial     The right distal popliteal artery is occluded. The right distal popliteal artery has homogeneous plaque. Monophasic Doppler waveforms in the right proximal popliteal artery. Biphasic Doppler waveforms in the right distal common femoral, profunda femoris, proximal superficial femoral and distal superficial femoral artery. Doppler waveforms are absent in the anterior tibial and posterior tibial artery.  The following arteries are patent: distal common femoral, profunda femoris, proximal superficial femoral and distal superficial femoral.         MRI brain     FINDINGS:  Tiny acute infarct in the right cerebellum. No acute hemorrhage.     Mild generalized parenchymal volume loss with commensurate dilation of the sulci  and ventricular system. Periventricular deep white matter T2/FLAIR  hyperintensities, and patchy T2/FLAIR hyperintensity in the raymundo, consistent  with mild chronic microvascular ischemic disease. Small chronic infarct in the  right frontal periventricular white matter. There is no cerebellar tonsillar  herniation. Expected arterial flow-voids are present. No evidence of abnormal  enhancement.     Paranasal sinuses are clear. Trace bilateral mastoid effusions. The orbital  contents are within normal limits with bilateral lens implants. No significant  osseous or scalp lesions are identified.     IMPRESSION  IMPRESSION:      1. Tiny acute infarct in the right cerebellum. 2. Mild generalized parenchymal volume loss and mild chronic microvascular  ischemic disease. Small chronic infarct in the right frontal periventricular  white matter. Assessment / Plan    Ms Loree Jiang is a 80year old lady wt hx of copd, afib, DM admitted on on 9/17/19 with dypsnea. Treated for diastolic CHF exacerbation. Subsequently found to have acute infarct R cerebellum and RLE acute thrombus at the mid and distal right popliteal level. Underwent thrombectomy/embolectomy on 9/20/19. Operative report from 9/20/19 indicates \" thrombectomy yielded fair a amount of thrombus clearly from common iliac area region and this appeared to be organized and fairly thickened\". Then on 9/21/19, underwent thrombectomy of right leg for residual clot , 4-compartment fasciotomy. Operative report from 9/21/19 indicates \"  After entering the fascia, a large amount of discharge and dark blood was seen.   The muscle appeared to be significantly abnormal in this location almost mushy with difficulty\"      On labs has had consistent leukocytosis and on 10/4/19 had fever up to 101.6 for which Infectious Disease was consulted. Zosyn started for concerns of infected RLE on 10/5/19. WBC elevated between 17-21. 1) RLE ischemia, necrosis, and popliteal thrombus status post thrombectomy 9/20 and repeat thrombectomy/fasciotomy 9/21/19  Fever curve imporved   On exam has foul odor from RLE wound areas and necrosis seen   No clear superficial cellulitis around the incisions site on the medial side   Blood cx negative so far   Renally dosed Vancomycin, Cefepime and Flagyl for now  Antibiotic delivery to site is challenging if inadequate perfusion   Being seen by palliative care and vascular surgery   Awaiting plans for possible surgery, family and patient  still discussing per discussion with RN today   Need to monitor renal function closely   Has CK elevation and therefore avoiding daptomycin   Avoiding Zyvox given QTc levels > 500 on last EKG   Discussed side effects with high risk for nephrotoxicity, CDI and other antibiotic adverse effects including bone marrow suppression, lowering seizure threshold    2) COPD    3) Afib     4) DM    5) DVT px  Per primary team     6) Hypokalemia: per primary team            Thank you for the opportunity to participate in the care of this patient. Please contact with questions or concerns.       Jackie Coyle,    1:15 PM

## 2019-10-12 LAB
ANION GAP SERPL CALC-SCNC: 5 MMOL/L (ref 5–15)
ANION GAP SERPL CALC-SCNC: 6 MMOL/L (ref 5–15)
BUN SERPL-MCNC: 17 MG/DL (ref 6–20)
BUN SERPL-MCNC: 17 MG/DL (ref 6–20)
BUN/CREAT SERPL: 19 (ref 12–20)
BUN/CREAT SERPL: 20 (ref 12–20)
CALCIUM SERPL-MCNC: 8.4 MG/DL (ref 8.5–10.1)
CALCIUM SERPL-MCNC: 8.6 MG/DL (ref 8.5–10.1)
CHLORIDE SERPL-SCNC: 102 MMOL/L (ref 97–108)
CHLORIDE SERPL-SCNC: 104 MMOL/L (ref 97–108)
CK SERPL-CCNC: 273 U/L (ref 26–192)
CK SERPL-CCNC: 296 U/L (ref 26–192)
CO2 SERPL-SCNC: 29 MMOL/L (ref 21–32)
CO2 SERPL-SCNC: 29 MMOL/L (ref 21–32)
CREAT SERPL-MCNC: 0.86 MG/DL (ref 0.55–1.02)
CREAT SERPL-MCNC: 0.88 MG/DL (ref 0.55–1.02)
DATE LAST DOSE: ABNORMAL
ERYTHROCYTE [DISTWIDTH] IN BLOOD BY AUTOMATED COUNT: 16.3 % (ref 11.5–14.5)
GLUCOSE BLD STRIP.AUTO-MCNC: 118 MG/DL (ref 65–100)
GLUCOSE BLD STRIP.AUTO-MCNC: 121 MG/DL (ref 65–100)
GLUCOSE BLD STRIP.AUTO-MCNC: 133 MG/DL (ref 65–100)
GLUCOSE BLD STRIP.AUTO-MCNC: 145 MG/DL (ref 65–100)
GLUCOSE SERPL-MCNC: 118 MG/DL (ref 65–100)
GLUCOSE SERPL-MCNC: 161 MG/DL (ref 65–100)
HCT VFR BLD AUTO: 25.9 % (ref 35–47)
HGB BLD-MCNC: 7.8 G/DL (ref 11.5–16)
MCH RBC QN AUTO: 27.5 PG (ref 26–34)
MCHC RBC AUTO-ENTMCNC: 30.1 G/DL (ref 30–36.5)
MCV RBC AUTO: 91.2 FL (ref 80–99)
NRBC # BLD: 0.02 K/UL (ref 0–0.01)
NRBC BLD-RTO: 0.1 PER 100 WBC
PLATELET # BLD AUTO: 436 K/UL (ref 150–400)
PMV BLD AUTO: 9.5 FL (ref 8.9–12.9)
POTASSIUM SERPL-SCNC: 3.9 MMOL/L (ref 3.5–5.1)
POTASSIUM SERPL-SCNC: 4 MMOL/L (ref 3.5–5.1)
RBC # BLD AUTO: 2.84 M/UL (ref 3.8–5.2)
REPORTED DOSE,DOSE: ABNORMAL UNITS
REPORTED DOSE/TIME,TMG: ABNORMAL
SERVICE CMNT-IMP: ABNORMAL
SODIUM SERPL-SCNC: 137 MMOL/L (ref 136–145)
SODIUM SERPL-SCNC: 138 MMOL/L (ref 136–145)
VANCOMYCIN TROUGH SERPL-MCNC: 13.7 UG/ML (ref 5–10)
WBC # BLD AUTO: 16.3 K/UL (ref 3.6–11)

## 2019-10-12 PROCEDURE — 74011250637 HC RX REV CODE- 250/637: Performed by: SURGERY

## 2019-10-12 PROCEDURE — 80202 ASSAY OF VANCOMYCIN: CPT

## 2019-10-12 PROCEDURE — 74011250637 HC RX REV CODE- 250/637: Performed by: NURSE PRACTITIONER

## 2019-10-12 PROCEDURE — 74011636637 HC RX REV CODE- 636/637: Performed by: SURGERY

## 2019-10-12 PROCEDURE — 82962 GLUCOSE BLOOD TEST: CPT

## 2019-10-12 PROCEDURE — 36415 COLL VENOUS BLD VENIPUNCTURE: CPT

## 2019-10-12 PROCEDURE — 74011250637 HC RX REV CODE- 250/637: Performed by: FAMILY MEDICINE

## 2019-10-12 PROCEDURE — 94760 N-INVAS EAR/PLS OXIMETRY 1: CPT

## 2019-10-12 PROCEDURE — 74011250637 HC RX REV CODE- 250/637: Performed by: INTERNAL MEDICINE

## 2019-10-12 PROCEDURE — 80048 BASIC METABOLIC PNL TOTAL CA: CPT

## 2019-10-12 PROCEDURE — 77010033678 HC OXYGEN DAILY

## 2019-10-12 PROCEDURE — 65270000029 HC RM PRIVATE

## 2019-10-12 PROCEDURE — 74011000258 HC RX REV CODE- 258: Performed by: INTERNAL MEDICINE

## 2019-10-12 PROCEDURE — 74011636637 HC RX REV CODE- 636/637: Performed by: HOSPITALIST

## 2019-10-12 PROCEDURE — 74011250636 HC RX REV CODE- 250/636: Performed by: SURGERY

## 2019-10-12 PROCEDURE — 74011250636 HC RX REV CODE- 250/636: Performed by: INTERNAL MEDICINE

## 2019-10-12 PROCEDURE — 85027 COMPLETE CBC AUTOMATED: CPT

## 2019-10-12 PROCEDURE — 82550 ASSAY OF CK (CPK): CPT

## 2019-10-12 RX ADMIN — TRAMADOL HYDROCHLORIDE 25 MG: 50 TABLET ORAL at 16:19

## 2019-10-12 RX ADMIN — METRONIDAZOLE 500 MG: 500 INJECTION, SOLUTION INTRAVENOUS at 17:32

## 2019-10-12 RX ADMIN — ALPRAZOLAM 0.25 MG: 0.25 TABLET ORAL at 11:40

## 2019-10-12 RX ADMIN — INSULIN LISPRO 4 UNITS: 100 INJECTION, SOLUTION INTRAVENOUS; SUBCUTANEOUS at 07:08

## 2019-10-12 RX ADMIN — FUROSEMIDE 40 MG: 40 TABLET ORAL at 08:38

## 2019-10-12 RX ADMIN — INSULIN GLARGINE 15 UNITS: 100 INJECTION, SOLUTION SUBCUTANEOUS at 08:42

## 2019-10-12 RX ADMIN — INSULIN LISPRO 4 UNITS: 100 INJECTION, SOLUTION INTRAVENOUS; SUBCUTANEOUS at 11:49

## 2019-10-12 RX ADMIN — ALLOPURINOL 100 MG: 100 TABLET ORAL at 08:38

## 2019-10-12 RX ADMIN — Medication 10 ML: at 21:27

## 2019-10-12 RX ADMIN — FENTANYL CITRATE 50 MCG: 50 INJECTION INTRAMUSCULAR; INTRAVENOUS at 21:39

## 2019-10-12 RX ADMIN — FENTANYL CITRATE 50 MCG: 50 INJECTION INTRAMUSCULAR; INTRAVENOUS at 11:55

## 2019-10-12 RX ADMIN — METOPROLOL TARTRATE 25 MG: 25 TABLET ORAL at 08:37

## 2019-10-12 RX ADMIN — INSULIN LISPRO 4 UNITS: 100 INJECTION, SOLUTION INTRAVENOUS; SUBCUTANEOUS at 16:19

## 2019-10-12 RX ADMIN — CEFEPIME HYDROCHLORIDE 2 G: 2 INJECTION, POWDER, FOR SOLUTION INTRAVENOUS at 17:31

## 2019-10-12 RX ADMIN — FENTANYL CITRATE 50 MCG: 50 INJECTION INTRAMUSCULAR; INTRAVENOUS at 17:41

## 2019-10-12 RX ADMIN — FENTANYL CITRATE 50 MCG: 50 INJECTION INTRAMUSCULAR; INTRAVENOUS at 21:33

## 2019-10-12 RX ADMIN — METOPROLOL TARTRATE 25 MG: 25 TABLET ORAL at 21:32

## 2019-10-12 RX ADMIN — FAMOTIDINE 20 MG: 20 TABLET ORAL at 17:32

## 2019-10-12 RX ADMIN — VANCOMYCIN HYDROCHLORIDE 1500 MG: 10 INJECTION, POWDER, LYOPHILIZED, FOR SOLUTION INTRAVENOUS at 11:42

## 2019-10-12 RX ADMIN — Medication 10 ML: at 05:50

## 2019-10-12 RX ADMIN — METRONIDAZOLE 500 MG: 500 INJECTION, SOLUTION INTRAVENOUS at 10:24

## 2019-10-12 RX ADMIN — METRONIDAZOLE 500 MG: 500 INJECTION, SOLUTION INTRAVENOUS at 01:44

## 2019-10-12 RX ADMIN — TRAMADOL HYDROCHLORIDE 25 MG: 50 TABLET ORAL at 08:37

## 2019-10-12 RX ADMIN — FENTANYL CITRATE 50 MCG: 50 INJECTION INTRAMUSCULAR; INTRAVENOUS at 14:45

## 2019-10-12 NOTE — PROGRESS NOTES
A Spiritual Care Partner Volunteer visited patient in  611 on 10/12/2019.   Documented by:  Chaplain Kwong MDiv, MS, [address] ([id number])

## 2019-10-12 NOTE — PROGRESS NOTES
Hospitalist Progress Note  Dennis Husbands, DO  Answering service: [phone] OR [phone] from in house phone        Date of Service:  10/12/2019  NAME:  Andrey Moncada  :  1931  MRN:  [id number]      Admission Summary:   19-year-old female with an extensive past medical history including chronic hypoxemic respiratory failure with home oxygen, COPD, chronic kidney disease, type 2 diabetes mellitus, atrial fibrillation, diastolic congestive heart failure, dyslipidemia, primary hypertension, osteoporosis, pulmonary hypertension, depression and anxiety disorder, was brought to the emergency room from home for evaluation of an approximately 2-3 day history of worsening shortness of breath    Interval history / Subjective:   Patient seen and examined. No acute events overnight. Plan for AKA 10/14. Assessment & Plan:     Ischemic right leg:   -s/p RLE Thrombectomy 19 with residual clot in Popliteal Artery. -S/P repeat Thrombectomy 19.    -significant muscle ischemia right calf with likely right foot drop.   -Aspirin and plavix discontinued due to Epistaxis. Continue Apixaban.   -vascular surgery consulted, Recommended AKA  -Plans for Monday, 10/14  -ID following.  Continue cefepime, flagyl, vancomycin     Acute renal insufficiency: resolved   Hyponatremia: resolved   Hyperkalemia: resolved  Acute on chronic diastolic CHF exarcerbation: NYHA class unknown  -continue lasix daily   Elevated troponin: suspect demand ischemia  Hypertension: controlled, continue metoprolol  Chronic respiratory failure: home 02  Diabetes mellitus Type II: lantus 15 units qhs, 4 units TID, SSI  Anemia, chronic: monitor   Rhabdomyolysis: resolved  Anxiety, depression      Code status: dnr  DVT prophylaxis: eliquis    Care Plan discussed with: Patient/Family  Disposition: TBD     Hospital Problems  Date Reviewed: 2019          Codes Class Noted POA    * (Principal) CHF exacerbation ([facility] ICD-10-CM: I50.9  ICD-9-CM: 428.0  9/17/2019 Yes        Hyperkalemia ICD-10-CM: E87.5  ICD-9-CM: 276.7  9/17/2019 Yes        Hyponatremia ICD-10-CM: E87.1  ICD-9-CM: 276.1  9/17/2019 Yes        HOLLAND (acute kidney injury) ([facility] ICD-10-CM: N17.9  ICD-9-CM: 584.9  9/17/2019 Yes        Acute hypoxemic respiratory failure ([facility] ICD-10-CM: J96.01  ICD-9-CM: 518.81  8/8/2011 Yes        Debility ICD-10-CM: R53.81  ICD-9-CM: 799.3  6/28/2011 Yes                Review of Systems:   Negative unless stated above      Vital Signs:    Last 24hrs VS reviewed since prior progress note. Most recent are:  Visit Vitals  /59 (BP 1 Location: Left arm, BP Patient Position: At rest)   Pulse 86   Temp 97.7 °F (36.5 °C)   Resp 18   Ht 5' 5\" (1.651 m)   Wt 94.1 kg (207 lb 6.4 oz)   SpO2 99%   Breastfeeding? No   BMI 34.51 kg/m²       No intake or output data in the 24 hours ending 10/12/19 1637     Physical Examination:             Constitutional:  No acute distress, cooperative, pleasant, elderly    ENT:  Oral mucous moist, oropharynx benign. Resp:  CTA bilaterally. No wheezing/rhonchi/rales. No accessory muscle use   CV:  Regular rhythm, normal rate, no murmurs, gallops, rubs    GI:  Soft, non distended, non tender. normoactive bowel sounds, no hepatosplenomegaly     Musculoskeletal:  right leg lateral ischemia, left leg with palpable distal pulses, darken 3rd toe    Neurologic:  Moves all extremities.              Data Review:    Review and/or order of clinical lab test      Labs:     Recent Labs     10/12/19  0255 10/11/19  0225   WBC 16.3* 16.8*   HGB 7.8* 7.7*   HCT 25.9* 25.0*   * 421*     Recent Labs     10/12/19  1053 10/12/19  0255 10/11/19  0219    138 136   K 3.9 4.0 3.4*    104 101   CO2 29 29 29   BUN 17 17 21*   CREA 0.86 0.88 0.91   * 161* 94   CA 8.4* 8.6 8.3*     No results for input(s): SGOT, GPT, ALT, AP, TBIL, TBILI, TP, ALB, GLOB, GGT, AML, LPSE in the last 72 hours. No lab exists for component: AMYP, HLPSE  No results for input(s): INR, PTP, APTT, INREXT in the last 72 hours. No results for input(s): FE, TIBC, PSAT, FERR in the last 72 hours. No results found for: FOL, RBCF   No results for input(s): PH, PCO2, PO2 in the last 72 hours.   Recent Labs     10/12/19  1051 10/12/19  0255 10/11/19  0225   * 296* 383*     Lab Results   Component Value Date/Time    Cholesterol, total 144 09/17/2019 09:21 AM    HDL Cholesterol 55 09/17/2019 09:21 AM    LDL, calculated 72.6 09/17/2019 09:21 AM    Triglyceride 82 09/17/2019 09:21 AM    CHOL/HDL Ratio 2.6 09/17/2019 09:21 AM     Lab Results   Component Value Date/Time    Glucose (POC) 133 (H) 10/12/2019 03:52 PM    Glucose (POC) 118 (H) 10/12/2019 11:40 AM    Glucose (POC) 145 (H) 10/12/2019 06:49 AM    Glucose (POC) 146 (H) 10/11/2019 09:20 PM    Glucose (POC) 118 (H) 10/11/2019 04:05 PM     Lab Results   Component Value Date/Time    Color DARK YELLOW 10/05/2019 06:02 PM    Appearance CLOUDY (A) 10/05/2019 06:02 PM    Specific gravity 1.018 10/05/2019 06:02 PM    Specific gravity 1.010 12/18/2011 09:28 AM    pH (UA) 5.5 10/05/2019 06:02 PM    Protein 30 (A) 10/05/2019 06:02 PM    Glucose NEGATIVE  10/05/2019 06:02 PM    Ketone NEGATIVE  10/05/2019 06:02 PM    Bilirubin NEGATIVE  09/17/2019 12:49 PM    Urobilinogen >8.0 (H) 10/05/2019 06:02 PM    Nitrites NEGATIVE  10/05/2019 06:02 PM    Leukocyte Esterase TRACE (A) 10/05/2019 06:02 PM    Epithelial cells FEW 10/05/2019 06:02 PM    Bacteria NEGATIVE  10/05/2019 06:02 PM    WBC 0-4 10/05/2019 06:02 PM    RBC 0-5 10/05/2019 06:02 PM         Medications Reviewed:     Current Facility-Administered Medications   Medication Dose Route Frequency    vancomycin (VANCOCIN) 1500 mg in  ml infusion  1,500 mg IntraVENous Q24H    0.9% sodium chloride infusion 250 mL  250 mL IntraVENous PRN    ALPRAZolam (XANAX) tablet 0.25 mg  0.25 mg Oral BID PRN    cefepime (MAXIPIME) 2 g in 0.9% sodium chloride (MBP/ADV) 100 mL  2 g IntraVENous Q24H    metroNIDAZOLE (FLAGYL) IVPB premix 500 mg  500 mg IntraVENous Q8H    Vancomycin - pharmacy to dose   Other Rx Dosing/Monitoring    albuterol-ipratropium (DUO-NEB) 2.5 MG-0.5 MG/3 ML  3 mL Nebulization Q4H PRN    insulin lispro (HUMALOG) injection 4 Units  4 Units SubCUTAneous TIDAC    sodium chloride (OCEAN) 0.65 % nasal squeeze bottle 2 Spray  2 Spray Both Nostrils Q2H PRN    oxymetazoline (AFRIN) 0.05 % nasal spray 2 Spray  2 Spray Both Nostrils BID PRN    furosemide (LASIX) tablet 40 mg  40 mg Oral DAILY    0.9% sodium chloride infusion 250 mL  250 mL IntraVENous PRN    0.9% sodium chloride infusion 250 mL  250 mL IntraVENous PRN    apixaban (ELIQUIS) tablet 5 mg  5 mg Oral Q12H    insulin lispro (HUMALOG) injection   SubCUTAneous AC&HS    glucose chewable tablet 16 g  4 Tab Oral PRN    glucagon (GLUCAGEN) injection 1 mg  1 mg IntraMUSCular PRN    dextrose 10% infusion 0-250 mL  0-250 mL IntraVENous PRN    albuterol-ipratropium (DUO-NEB) 2.5 MG-0.5 MG/3 ML  3 mL Nebulization Q4H PRN    alum-mag hydroxide-simeth (MYLANTA) oral suspension 30 mL  30 mL Oral Q4H PRN    famotidine (PEPCID) tablet 20 mg  20 mg Oral QPM    metoprolol tartrate (LOPRESSOR) tablet 25 mg  25 mg Oral BID    traMADol (ULTRAM) tablet 25 mg  25 mg Oral Q8H PRN    fentaNYL citrate (PF) injection 50 mcg  50 mcg IntraVENous Q3H PRN    insulin glargine (LANTUS) injection 15 Units  15 Units SubCUTAneous DAILY    ondansetron (ZOFRAN) injection 4 mg  4 mg IntraVENous Q6H PRN    sodium chloride (NS) flush 5-40 mL  5-40 mL IntraVENous Q8H    sodium chloride (NS) flush 5-40 mL  5-40 mL IntraVENous PRN    acetaminophen (TYLENOL) tablet 650 mg  650 mg Oral Q4H PRN    allopurinol (ZYLOPRIM) tablet 100 mg  100 mg Oral DAILY     ______________________________________________________________________  EXPECTED LENGTH OF STAY: 6d 14h  ACTUAL LENGTH OF STAY: [address] [facility] Zo Brito, DO

## 2019-10-12 NOTE — PROGRESS NOTES
Infectious Disease Progress Note       Subjective:   Ms Olesya Montemayor seen  complains of pain B/l LE  Denied nausea, vomiting, diarrhea          ROS: 10 point ROS obtained and pertinent positives as above. All others negative.          Objective:    Vitals:   Patient Vitals for the past 24 hrs:   Temp Pulse Resp BP SpO2   10/12/19 0814 98.2 °F (36.8 °C) 97 18 133/60 100 %   10/12/19 0244 98.3 °F (36.8 °C) 94 18 133/77 93 %   10/11/19 2102 99.4 °F (37.4 °C) 100 18 139/78 99 %   10/11/19 1415 98.7 °F (37.1 °C) 99 18 121/69 98 %   10/11/19 1316     99 %       Physical Exam:  Gen: No apparent distress  HEENT:   no scleral icterus, no thrush ,   CV: S1,2 heard regularly, no lower extremity edema    Lungs: Clear to auscultation anteriorly,  no wheezing  Abdomen: soft, non tender, non distended,   Skin: no rash on visualized areas   Neuro: sleepy , minimally verbal   Musculoskeletal:   RLE  dressing  , cool foot , L LLE no open wounds or erythema       Medications:    Current Facility-Administered Medications:     vancomycin (VANCOCIN) 1500 mg in  ml infusion, 1,500 mg, IntraVENous, Q24H, Jackie Coyle R, DO, Last Rate: 250 mL/hr at 10/11/19 1007, 1,500 mg at 10/11/19 1007    0.9% sodium chloride infusion 250 mL, 250 mL, IntraVENous, PRN, EARL [facility] M, DO    ALPRAZolam Consuelonell Skiff) tablet 0.25 mg, 0.25 mg, Oral, BID PRN, Ewelina Talbot, ESTEFANY, 0.25 mg at 10/10/19 1357    cefepime (MAXIPIME) 2 g in 0.9% sodium chloride (MBP/ADV) 100 mL, 2 g, IntraVENous, Q24H, Simone Coylea R, DO, Last Rate: 200 mL/hr at 10/11/19 1711, 2 g at 10/11/19 1711    metroNIDAZOLE (FLAGYL) IVPB premix 500 mg, 500 mg, IntraVENous, Q8H, Simone Coylea R, DO, Last Rate: 100 mL/hr at 10/12/19 0144, 500 mg at 10/12/19 0144    Vancomycin - pharmacy to dose, , Other, Rx Dosing/Monitoring, Jackie Coyle DO    albuterol-ipratropium (DUO-NEB) 2.5 MG-0.5 MG/3 ML, 3 mL, Nebulization, Q4H PRN, Graham GUZMAN MD, 3 mL at 10/09/19 0601    insulin lispro (HUMALOG) injection 4 Units, 4 Units, SubCUTAneous, TIDAC, Darren Pereira MD, 4 Units at 10/12/19 0708    sodium chloride (OCEAN) 0.65 % nasal squeeze bottle 2 Spray, 2 Spray, Both Nostrils, Q2H PRN, Livia [id number] MD MEGAN    oxymetazoline (AFRIN) 0.05 % nasal spray 2 Spray, 2 Spray, Both Nostrils, BID PRN, Livia [id number] MD MEGAN    furosemide (LASIX) tablet 40 mg, 40 mg, Oral, DAILY, Cris Lerner MD, 40 mg at 10/12/19 0838    0.9% sodium chloride infusion 250 mL, 250 mL, IntraVENous, PRN, Livia [id number] GUZMAN MD    0.9% sodium chloride infusion 250 mL, 250 mL, IntraVENous, PRN, Diaz Prince MD    apixaban (ELIQUIS) tablet 5 mg, 5 mg, Oral, Q12H, Diaz Prince MD, Stopped at 10/10/19 2300    insulin lispro (HUMALOG) injection, , SubCUTAneous, AC&HS, Joseluis Galdamez MD, Stopped at 10/07/19 0730    glucose chewable tablet 16 g, 4 Tab, Oral, PRN, DINA Bhakta MD    glucagon (GLUCAGEN) injection 1 mg, 1 mg, IntraMUSCular, PRN, Livia, Isabella Hinson MD    dextrose 10% infusion 0-250 mL, 0-250 mL, IntraVENous, PRN, BILL Bhakta MD    albuterol-ipratropium (DUO-NEB) 2.5 MG-0.5 MG/3 ML, 3 mL, Nebulization, Q4H PRN, Matt GUZMAN MD, 3 mL at 09/26/19 2019    alum-mag hydroxide-simeth (MYLANTA) oral suspension 30 mL, 30 mL, Oral, Q4H PRN, Kelsea Thompson MD, 30 mL at 09/26/19 2144    famotidine (PEPCID) tablet 20 mg, 20 mg, Oral, QPM, Kelsea Thompson MD, 20 mg at 10/11/19 1711    metoprolol tartrate (LOPRESSOR) tablet 25 mg, 25 mg, Oral, BID, Kelsea Thompson MD, 25 mg at 10/12/19 0837    traMADol (ULTRAM) tablet 25 mg, 25 mg, Oral, Q8H PRN, Diaz Prince MD, 25 mg at 10/12/19 0837    fentaNYL citrate (PF) injection 50 mcg, 50 mcg, IntraVENous, Q3H PRN, Diaz Prince MD, 50 mcg at 10/09/19 1703    insulin glargine (LANTUS) injection 15 Units, 15 Units, SubCUTAneous, DAILY, Diaz Prince MD, 15 Units at 10/12/19 0842    ondansetron (Rohan Shed) injection 4 mg, 4 mg, IntraVENous, Q6H PRN, Xiomara Gaona MD    sodium chloride (NS) flush 5-40 mL, 5-40 mL, IntraVENous, Q8H, Xiomara Gaona MD, 10 mL at 10/12/19 0550    sodium chloride (NS) flush 5-40 mL, 5-40 mL, IntraVENous, PRN, Xiomara Gaona MD, 10 mL at 10/08/19 1237    acetaminophen (TYLENOL) tablet 650 mg, 650 mg, Oral, Q4H PRN, Xiomara Gaona MD, 650 mg at 10/10/19 0418    allopurinol (ZYLOPRIM) tablet 100 mg, 100 mg, Oral, DAILY, Xiomara Gaona MD, 100 mg at 10/12/19 7905      Labs:  Recent Results (from the past 24 hour(s))   GLUCOSE, POC    Collection Time: 10/11/19 11:31 AM   Result Value Ref Range    Glucose (POC) 85 65 - 100 mg/dL    Performed by [address] [place] [address], POC    Collection Time: 10/11/19  4:05 PM   Result Value Ref Range    Glucose (POC) 118 (H) 65 - 100 mg/dL    Performed by [address] [place] [address], POC    Collection Time: 10/11/19  9:20 PM   Result Value Ref Range    Glucose (POC) 146 (H) 65 - 100 mg/dL    Performed by Mica Sellers    CK    Collection Time: 10/12/19  2:55 AM   Result Value Ref Range     (H) 26 - 035 U/L   METABOLIC PANEL, BASIC    Collection Time: 10/12/19  2:55 AM   Result Value Ref Range    Sodium 138 136 - 145 mmol/L    Potassium 4.0 3.5 - 5.1 mmol/L    Chloride 104 97 - 108 mmol/L    CO2 29 21 - 32 mmol/L    Anion gap 5 5 - 15 mmol/L    Glucose 161 (H) 65 - 100 mg/dL    BUN 17 6 - 20 MG/DL    Creatinine 0.88 0.55 - 1.02 MG/DL    BUN/Creatinine ratio 19 12 - 20      GFR est AA >60 >60 ml/min/1.73m2    GFR est non-AA >60 >60 ml/min/1.73m2    Calcium 8.6 8.5 - 10.1 MG/DL   CBC W/O DIFF    Collection Time: 10/12/19  2:55 AM   Result Value Ref Range    WBC 16.3 (H) 3.6 - 11.0 K/uL    RBC 2.84 (L) 3.80 - 5.20 M/uL    HGB 7.8 (L) 11.5 - 16.0 g/dL    HCT 25.9 (L) 35.0 - 47.0 %    MCV 91.2 80.0 - 99.0 FL    MCH 27.5 26.0 - 34.0 PG    MCHC 30.1 30.0 - 36.5 g/dL    RDW 16.3 (H) 11.5 - 14.5 %    PLATELET 800 (H) 933 - 400 K/uL    MPV 9.5 8.9 - 12.9 FL    NRBC 0.1 (H) 0  WBC    ABSOLUTE NRBC 0.02 (H) 0.00 - 0.01 K/uL   GLUCOSE, POC    Collection Time: 10/12/19  6:49 AM   Result Value Ref Range    Glucose (POC) 145 (H) 65 - 100 mg/dL    Performed by Rafael Andujar            Micro:     Blood: 10/4/19  Specimen Information: Blood        Component Value Ref Range & Units Status   Special Requests: NO SPECIAL REQUESTS    Preliminary   Culture result: NO GROWTH 3 DAYS    Preliminary   Result History       Pathology:      Imaging:  RLE   US  Interpretation Summary        · Probable acute thrombus at the mid and distal right popliteal level. · Absent flow at the distal posterior tibial and anterior tibial levels suggesting occlusion. The right common femoral, deep femoral, femoral, popliteal, posterior tibial and anterior tibial arteries were visualized. No significant plaque is identified from groin to distal thigh. Biphasic flow with no increased velocities were noted. The above knee popliteal waveform is monophasic with low velocities. The popliteal fossa and distal popliteal contain absent flow and suggests an acute occlusion although it is suboptimally seen. Low echogenicity is noted in the popliteal.       The right posterior tibial and anterior tibial arteries contain absent flow with no color fill and suggests occlusion.      Lower Extremity Arterial Findings     Right Lower Arterial     The right distal popliteal artery is occluded. The right distal popliteal artery has homogeneous plaque. Monophasic Doppler waveforms in the right proximal popliteal artery. Biphasic Doppler waveforms in the right distal common femoral, profunda femoris, proximal superficial femoral and distal superficial femoral artery. Doppler waveforms are absent in the anterior tibial and posterior tibial artery.  The following arteries are patent: distal common femoral, profunda femoris, proximal superficial femoral and distal superficial femoral.         MRI brain FINDINGS:  Tiny acute infarct in the right cerebellum. No acute hemorrhage.     Mild generalized parenchymal volume loss with commensurate dilation of the sulci  and ventricular system. Periventricular deep white matter T2/FLAIR  hyperintensities, and patchy T2/FLAIR hyperintensity in the raymundo, consistent  with mild chronic microvascular ischemic disease. Small chronic infarct in the  right frontal periventricular white matter. There is no cerebellar tonsillar  herniation. Expected arterial flow-voids are present. No evidence of abnormal  enhancement.     Paranasal sinuses are clear. Trace bilateral mastoid effusions. The orbital  contents are within normal limits with bilateral lens implants. No significant  osseous or scalp lesions are identified.     IMPRESSION  IMPRESSION:      1. Tiny acute infarct in the right cerebellum. 2. Mild generalized parenchymal volume loss and mild chronic microvascular  ischemic disease. Small chronic infarct in the right frontal periventricular  white matter. Assessment / Plan    Ms Doug Solano is a 80year old lady wt hx of copd, afib, DM admitted on on 9/17/19 with dypsnea. Treated for diastolic CHF exacerbation. Subsequently found to have acute infarct R cerebellum and RLE acute thrombus at the mid and distal right popliteal level. Underwent thrombectomy/embolectomy on 9/20/19. Operative report from 9/20/19 indicates \" thrombectomy yielded fair a amount of thrombus clearly from common iliac area region and this appeared to be organized and fairly thickened\". Then on 9/21/19, underwent thrombectomy of right leg for residual clot , 4-compartment fasciotomy. Operative report from 9/21/19 indicates \"  After entering the fascia, a large amount of discharge and dark blood was seen.   The muscle appeared to be significantly abnormal in this location almost mushy with difficulty\"      On labs has had consistent leukocytosis and on 10/4/19 had fever up to 101.6 for which Infectious Disease was consulted. Zosyn started for concerns of infected RLE on 10/5/19. WBC elevated between 17-21. 1) RLE ischemia, necrosis, and popliteal thrombus status post thrombectomy 9/20 and repeat thrombectomy/fasciotomy 9/21/19  Fever curve imporved   On exam has foul odor from RLE wound areas and necrosis seen   No clear superficial cellulitis around the incisions site on the medial side   Blood cx negative so far   Renally dosed Vancomycin, Cefepime and Flagyl for now  D/W with pharmacist and awaiting Vancomycin trough   Noted plans for AKA Monday   Has CK elevation and therefore avoiding daptomycin   Avoiding Zyvox given QTc levels > 500 on last EKG   Discussed side effects with high risk for nephrotoxicity, CDI and other antibiotic adverse effects including bone marrow suppression, lowering seizure threshold    2) COPD    3) Afib     4) DM    5) DVT px  Per primary team     6) Hypokalemia: per primary team            Thank you for the opportunity to participate in the care of this patient. Please contact with questions or concerns.       Janelle Coyle DO   10:19 AM

## 2019-10-12 NOTE — PROGRESS NOTES
Pharmacist Note - Vancomycin Dosing  Therapy day 4  Indication: skin & soft tissue infection - infected right lower extremity   - PVD w/ischemia, necrosis, s/p thrombectomy. Current regimen: 1500 mg IV Q 24hrs    A Trough Level resulted at 13.7 mcg/mL which was obtained ~24.75 hrs post-dose. The extrapolated \"true\" trough is approximately 14.17 mcg/mL based on the patient's known kinetics. Goal trough: 15 - 20 mcg/mL     Plan: Trough slightly below goal range however, given patient's age and body habitus, she will likely start to accumulate drug. Continue current regimen for now. Pharmacy will continue to monitor this patient daily for changes in clinical status and renal function.     Grey Ardon, PharmD  Clinical Pharmacist  [facility]  [phone]

## 2019-10-12 NOTE — PROGRESS NOTES
Problem: Pressure Injury - Risk of  Goal: *Prevention of pressure injury  Description  Document Jagdeep Scale and appropriate interventions in the flowsheet.   Outcome: Progressing Towards Goal  Note:   Pressure Injury Interventions:  Sensory Interventions: Assess need for specialty bed    Moisture Interventions: Absorbent underpads    Activity Interventions: Increase time out of bed    Mobility Interventions: HOB 30 degrees or less    Nutrition Interventions: Document food/fluid/supplement intake    Friction and Shear Interventions: HOB 30 degrees or less

## 2019-10-13 LAB
ANION GAP SERPL CALC-SCNC: 5 MMOL/L (ref 5–15)
BUN SERPL-MCNC: 17 MG/DL (ref 6–20)
BUN/CREAT SERPL: 21 (ref 12–20)
CALCIUM SERPL-MCNC: 8.3 MG/DL (ref 8.5–10.1)
CHLORIDE SERPL-SCNC: 100 MMOL/L (ref 97–108)
CK SERPL-CCNC: 250 U/L (ref 26–192)
CO2 SERPL-SCNC: 29 MMOL/L (ref 21–32)
CREAT SERPL-MCNC: 0.82 MG/DL (ref 0.55–1.02)
ERYTHROCYTE [DISTWIDTH] IN BLOOD BY AUTOMATED COUNT: 16.5 % (ref 11.5–14.5)
GLUCOSE BLD STRIP.AUTO-MCNC: 123 MG/DL (ref 65–100)
GLUCOSE BLD STRIP.AUTO-MCNC: 170 MG/DL (ref 65–100)
GLUCOSE BLD STRIP.AUTO-MCNC: 182 MG/DL (ref 65–100)
GLUCOSE BLD STRIP.AUTO-MCNC: 96 MG/DL (ref 65–100)
GLUCOSE BLD STRIP.AUTO-MCNC: 99 MG/DL (ref 65–100)
GLUCOSE SERPL-MCNC: 109 MG/DL (ref 65–100)
HCT VFR BLD AUTO: 25.3 % (ref 35–47)
HGB BLD-MCNC: 7.7 G/DL (ref 11.5–16)
MCH RBC QN AUTO: 27.3 PG (ref 26–34)
MCHC RBC AUTO-ENTMCNC: 30.4 G/DL (ref 30–36.5)
MCV RBC AUTO: 89.7 FL (ref 80–99)
NRBC # BLD: 0 K/UL (ref 0–0.01)
NRBC BLD-RTO: 0 PER 100 WBC
PLATELET # BLD AUTO: 412 K/UL (ref 150–400)
PMV BLD AUTO: 9.2 FL (ref 8.9–12.9)
POTASSIUM SERPL-SCNC: 3.8 MMOL/L (ref 3.5–5.1)
RBC # BLD AUTO: 2.82 M/UL (ref 3.8–5.2)
SERVICE CMNT-IMP: ABNORMAL
SERVICE CMNT-IMP: NORMAL
SERVICE CMNT-IMP: NORMAL
SODIUM SERPL-SCNC: 134 MMOL/L (ref 136–145)
WBC # BLD AUTO: 15.7 K/UL (ref 3.6–11)

## 2019-10-13 PROCEDURE — 74011250636 HC RX REV CODE- 250/636: Performed by: INTERNAL MEDICINE

## 2019-10-13 PROCEDURE — 85027 COMPLETE CBC AUTOMATED: CPT

## 2019-10-13 PROCEDURE — 74011250637 HC RX REV CODE- 250/637: Performed by: INTERNAL MEDICINE

## 2019-10-13 PROCEDURE — 82962 GLUCOSE BLOOD TEST: CPT

## 2019-10-13 PROCEDURE — 80048 BASIC METABOLIC PNL TOTAL CA: CPT

## 2019-10-13 PROCEDURE — 74011636637 HC RX REV CODE- 636/637: Performed by: SURGERY

## 2019-10-13 PROCEDURE — 82550 ASSAY OF CK (CPK): CPT

## 2019-10-13 PROCEDURE — 74011636637 HC RX REV CODE- 636/637: Performed by: HOSPITALIST

## 2019-10-13 PROCEDURE — 74011000258 HC RX REV CODE- 258: Performed by: INTERNAL MEDICINE

## 2019-10-13 PROCEDURE — 65270000029 HC RM PRIVATE

## 2019-10-13 PROCEDURE — 94760 N-INVAS EAR/PLS OXIMETRY 1: CPT

## 2019-10-13 PROCEDURE — 74011250637 HC RX REV CODE- 250/637: Performed by: SURGERY

## 2019-10-13 PROCEDURE — 74011250637 HC RX REV CODE- 250/637: Performed by: FAMILY MEDICINE

## 2019-10-13 PROCEDURE — 36415 COLL VENOUS BLD VENIPUNCTURE: CPT

## 2019-10-13 PROCEDURE — 74011250637 HC RX REV CODE- 250/637: Performed by: NURSE PRACTITIONER

## 2019-10-13 PROCEDURE — 74011250636 HC RX REV CODE- 250/636: Performed by: SURGERY

## 2019-10-13 PROCEDURE — 77010033678 HC OXYGEN DAILY

## 2019-10-13 RX ADMIN — INSULIN LISPRO 4 UNITS: 100 INJECTION, SOLUTION INTRAVENOUS; SUBCUTANEOUS at 11:37

## 2019-10-13 RX ADMIN — TRAMADOL HYDROCHLORIDE 25 MG: 50 TABLET ORAL at 00:20

## 2019-10-13 RX ADMIN — METOPROLOL TARTRATE 25 MG: 25 TABLET ORAL at 09:08

## 2019-10-13 RX ADMIN — Medication 10 ML: at 21:08

## 2019-10-13 RX ADMIN — FENTANYL CITRATE 50 MCG: 50 INJECTION INTRAMUSCULAR; INTRAVENOUS at 19:06

## 2019-10-13 RX ADMIN — METOPROLOL TARTRATE 25 MG: 25 TABLET ORAL at 21:07

## 2019-10-13 RX ADMIN — FUROSEMIDE 40 MG: 40 TABLET ORAL at 09:08

## 2019-10-13 RX ADMIN — METRONIDAZOLE 500 MG: 500 INJECTION, SOLUTION INTRAVENOUS at 09:07

## 2019-10-13 RX ADMIN — FAMOTIDINE 20 MG: 20 TABLET ORAL at 17:50

## 2019-10-13 RX ADMIN — ALLOPURINOL 100 MG: 100 TABLET ORAL at 09:08

## 2019-10-13 RX ADMIN — METRONIDAZOLE 500 MG: 500 INJECTION, SOLUTION INTRAVENOUS at 01:40

## 2019-10-13 RX ADMIN — VANCOMYCIN HYDROCHLORIDE 1500 MG: 10 INJECTION, POWDER, LYOPHILIZED, FOR SOLUTION INTRAVENOUS at 10:57

## 2019-10-13 RX ADMIN — METRONIDAZOLE 500 MG: 500 INJECTION, SOLUTION INTRAVENOUS at 17:50

## 2019-10-13 RX ADMIN — ACETAMINOPHEN 650 MG: 325 TABLET, FILM COATED ORAL at 21:36

## 2019-10-13 RX ADMIN — INSULIN LISPRO 4 UNITS: 100 INJECTION, SOLUTION INTRAVENOUS; SUBCUTANEOUS at 16:47

## 2019-10-13 RX ADMIN — CEFEPIME HYDROCHLORIDE 2 G: 2 INJECTION, POWDER, FOR SOLUTION INTRAVENOUS at 16:48

## 2019-10-13 RX ADMIN — Medication 10 ML: at 13:55

## 2019-10-13 RX ADMIN — INSULIN GLARGINE 15 UNITS: 100 INJECTION, SOLUTION SUBCUTANEOUS at 09:12

## 2019-10-13 RX ADMIN — INSULIN LISPRO 4 UNITS: 100 INJECTION, SOLUTION INTRAVENOUS; SUBCUTANEOUS at 07:34

## 2019-10-13 RX ADMIN — Medication 10 ML: at 07:35

## 2019-10-13 NOTE — PROGRESS NOTES
Hospitalist Progress Note  Zheng Copeland DO  Answering service: [phone] OR [phone] from in house phone        Date of Service:  10/13/2019  NAME:  Adams Weathers  :  1931  MRN:  [id number]      Admission Summary:   69-year-old female with an extensive past medical history including chronic hypoxemic respiratory failure with home oxygen, COPD, chronic kidney disease, type 2 diabetes mellitus, atrial fibrillation, diastolic congestive heart failure, dyslipidemia, primary hypertension, osteoporosis, pulmonary hypertension, depression and anxiety disorder, was brought to the emergency room from home for evaluation of an approximately 2-3 day history of worsening shortness of breath    Interval history / Subjective:   Patient seen and examined. No acute events overnight. Alert this AM and conversational. Does have intermittent confusion during hospitalization. Assessment & Plan:     Ischemic right leg:   -s/p RLE Thrombectomy 19 with residual clot in Popliteal Artery. -S/P repeat Thrombectomy 19.    -significant muscle ischemia right calf with likely right foot drop.   -Aspirin and plavix discontinued due to Epistaxis. Continue Apixaban.   -vascular surgery consulted, Recommended AKA  -Plans for Monday, 10/14. Patient agreeable. -ID following. Continue cefepime, flagyl, vancomycin     Acute renal insufficiency: resolved   Hyponatremia: continue to monitor   Hyperkalemia: resolved  Acute on chronic diastolic CHF exarcerbation: NYHA class unknown  -continue lasix daily   Elevated troponin: suspect demand ischemia  Hypertension: controlled, continue metoprolol  Chronic respiratory failure: home 02  Diabetes mellitus Type II: lantus 15 units qhs, 4 units TID, SSI.  Monitor   Anemia, chronic: monitor   Rhabdomyolysis: resolved  Anxiety, depression      Code status: dnr  DVT prophylaxis: eliquis, marie 10/14    Care Plan discussed with: Patient/Family  Disposition: TBD     Hospital Problems  Date Reviewed: 9/20/2019          Codes Class Noted POA    * (Principal) CHF exacerbation ([facility].) ICD-10-CM: I50.9  ICD-9-CM: 428.0  9/17/2019 Yes        Hyperkalemia ICD-10-CM: E87.5  ICD-9-CM: 276.7  9/17/2019 Yes        Hyponatremia ICD-10-CM: E87.1  ICD-9-CM: 276.1  9/17/2019 Yes        HOLLAND (acute kidney injury) ([facility].) ICD-10-CM: N17.9  ICD-9-CM: 584.9  9/17/2019 Yes        Acute hypoxemic respiratory failure ([facility].) ICD-10-CM: J96.01  ICD-9-CM: 518.81  8/8/2011 Yes        Debility ICD-10-CM: R53.81  ICD-9-CM: 799.3  6/28/2011 Yes                Review of Systems:   Negative unless stated above      Vital Signs:    Last 24hrs VS reviewed since prior progress note. Most recent are:  Visit Vitals  /63   Pulse 84   Temp 97.9 °F (36.6 °C)   Resp 18   Ht 5' 5\" (1.651 m)   Wt 95 kg (209 lb 8 oz)   SpO2 95%   Breastfeeding? No   BMI 34.86 kg/m²       No intake or output data in the 24 hours ending 10/13/19 1414     Physical Examination:             Constitutional:  No acute distress, cooperative, pleasant, elderly    ENT:  Oral mucous moist, oropharynx benign. Resp:  CTA bilaterally. No wheezing/rhonchi/rales. No accessory muscle use   CV:  Regular rhythm, normal rate, no murmurs, gallops, rubs    GI:  Soft, non distended, non tender. normoactive bowel sounds, no hepatosplenomegaly     Musculoskeletal:  right leg lateral ischemia, left leg with palpable distal pulses, darken 3rd toe    Neurologic:  Moves all extremities.              Data Review:    Review and/or order of clinical lab test      Labs:     Recent Labs     10/13/19  0022 10/12/19  0255   WBC 15.7* 16.3*   HGB 7.7* 7.8*   HCT 25.3* 25.9*   * 436*     Recent Labs     10/13/19  0022 10/12/19  1053 10/12/19  0255   * 137 138   K 3.8 3.9 4.0    102 104   CO2 29 29 29   BUN 17 17 17   CREA 0.82 0.86 0.88   * 118* 161*   CA 8.3* 8.4* 8.6     No results for input(s): SGOT, GPT, ALT, AP, TBIL, TBILI, TP, ALB, GLOB, GGT, AML, LPSE in the last 72 hours. No lab exists for component: AMYP, HLPSE  No results for input(s): INR, PTP, APTT, INREXT, INREXT in the last 72 hours. No results for input(s): FE, TIBC, PSAT, FERR in the last 72 hours. No results found for: FOL, RBCF   No results for input(s): PH, PCO2, PO2 in the last 72 hours.   Recent Labs     10/13/19  0022 10/12/19  1051 10/12/19  0255   * 273* 296*     Lab Results   Component Value Date/Time    Cholesterol, total 144 09/17/2019 09:21 AM    HDL Cholesterol 55 09/17/2019 09:21 AM    LDL, calculated 72.6 09/17/2019 09:21 AM    Triglyceride 82 09/17/2019 09:21 AM    CHOL/HDL Ratio 2.6 09/17/2019 09:21 AM     Lab Results   Component Value Date/Time    Glucose (POC) 170 (H) 10/13/2019 11:16 AM    Glucose (POC) 182 (H) 10/13/2019 11:13 AM    Glucose (POC) 99 10/13/2019 06:32 AM    Glucose (POC) 121 (H) 10/12/2019 09:39 PM    Glucose (POC) 133 (H) 10/12/2019 03:52 PM     Lab Results   Component Value Date/Time    Color DARK YELLOW 10/05/2019 06:02 PM    Appearance CLOUDY (A) 10/05/2019 06:02 PM    Specific gravity 1.018 10/05/2019 06:02 PM    Specific gravity 1.010 12/18/2011 09:28 AM    pH (UA) 5.5 10/05/2019 06:02 PM    Protein 30 (A) 10/05/2019 06:02 PM    Glucose NEGATIVE  10/05/2019 06:02 PM    Ketone NEGATIVE  10/05/2019 06:02 PM    Bilirubin NEGATIVE  09/17/2019 12:49 PM    Urobilinogen >8.0 (H) 10/05/2019 06:02 PM    Nitrites NEGATIVE  10/05/2019 06:02 PM    Leukocyte Esterase TRACE (A) 10/05/2019 06:02 PM    Epithelial cells FEW 10/05/2019 06:02 PM    Bacteria NEGATIVE  10/05/2019 06:02 PM    WBC 0-4 10/05/2019 06:02 PM    RBC 0-5 10/05/2019 06:02 PM         Medications Reviewed:     Current Facility-Administered Medications   Medication Dose Route Frequency    vancomycin (VANCOCIN) 1500 mg in  ml infusion  1,500 mg IntraVENous Q24H    0.9% sodium chloride infusion 250 mL  250 mL IntraVENous PRN    ALPRAZolam Pavithra Avers) tablet 0.25 mg  0.25 mg Oral BID PRN    cefepime (MAXIPIME) 2 g in 0.9% sodium chloride (MBP/ADV) 100 mL  2 g IntraVENous Q24H    metroNIDAZOLE (FLAGYL) IVPB premix 500 mg  500 mg IntraVENous Q8H    Vancomycin - pharmacy to dose   Other Rx Dosing/Monitoring    albuterol-ipratropium (DUO-NEB) 2.5 MG-0.5 MG/3 ML  3 mL Nebulization Q4H PRN    insulin lispro (HUMALOG) injection 4 Units  4 Units SubCUTAneous TIDAC    sodium chloride (OCEAN) 0.65 % nasal squeeze bottle 2 Spray  2 Spray Both Nostrils Q2H PRN    oxymetazoline (AFRIN) 0.05 % nasal spray 2 Spray  2 Spray Both Nostrils BID PRN    furosemide (LASIX) tablet 40 mg  40 mg Oral DAILY    0.9% sodium chloride infusion 250 mL  250 mL IntraVENous PRN    0.9% sodium chloride infusion 250 mL  250 mL IntraVENous PRN    apixaban (ELIQUIS) tablet 5 mg  5 mg Oral Q12H    insulin lispro (HUMALOG) injection   SubCUTAneous AC&HS    glucose chewable tablet 16 g  4 Tab Oral PRN    glucagon (GLUCAGEN) injection 1 mg  1 mg IntraMUSCular PRN    dextrose 10% infusion 0-250 mL  0-250 mL IntraVENous PRN    albuterol-ipratropium (DUO-NEB) 2.5 MG-0.5 MG/3 ML  3 mL Nebulization Q4H PRN    alum-mag hydroxide-simeth (MYLANTA) oral suspension 30 mL  30 mL Oral Q4H PRN    famotidine (PEPCID) tablet 20 mg  20 mg Oral QPM    metoprolol tartrate (LOPRESSOR) tablet 25 mg  25 mg Oral BID    traMADol (ULTRAM) tablet 25 mg  25 mg Oral Q8H PRN    fentaNYL citrate (PF) injection 50 mcg  50 mcg IntraVENous Q3H PRN    insulin glargine (LANTUS) injection 15 Units  15 Units SubCUTAneous DAILY    ondansetron (ZOFRAN) injection 4 mg  4 mg IntraVENous Q6H PRN    sodium chloride (NS) flush 5-40 mL  5-40 mL IntraVENous Q8H    sodium chloride (NS) flush 5-40 mL  5-40 mL IntraVENous PRN    acetaminophen (TYLENOL) tablet 650 mg  650 mg Oral Q4H PRN    allopurinol (ZYLOPRIM) tablet 100 mg  100 mg Oral DAILY ______________________________________________________________________  EXPECTED LENGTH OF STAY: 6d 14h  ACTUAL LENGTH OF STAY:          [facility] DO [place]

## 2019-10-13 NOTE — PROGRESS NOTES
Pt seems more confused this am. She asked, \"why am I in here\". Pt is unsure of surroundings. Pt experiencing more periodic confusion which was not her baseline.

## 2019-10-14 ENCOUNTER — DOCUMENTATION ONLY (OUTPATIENT)
Dept: CARDIOLOGY CLINIC | Age: 84
End: 2019-10-14

## 2019-10-14 ENCOUNTER — ANESTHESIA EVENT (OUTPATIENT)
Dept: SURGERY | Age: 84
DRG: 270 | End: 2019-10-14
Payer: MEDICARE

## 2019-10-14 ENCOUNTER — ANESTHESIA (OUTPATIENT)
Dept: SURGERY | Age: 84
DRG: 270 | End: 2019-10-14
Payer: MEDICARE

## 2019-10-14 LAB
ABO + RH BLD: NORMAL
ANION GAP SERPL CALC-SCNC: 7 MMOL/L (ref 5–15)
BLOOD GROUP ANTIBODIES SERPL: NORMAL
BUN SERPL-MCNC: 14 MG/DL (ref 6–20)
BUN/CREAT SERPL: 18 (ref 12–20)
CALCIUM SERPL-MCNC: 8.6 MG/DL (ref 8.5–10.1)
CHLORIDE SERPL-SCNC: 102 MMOL/L (ref 97–108)
CK SERPL-CCNC: 226 U/L (ref 26–192)
CO2 SERPL-SCNC: 28 MMOL/L (ref 21–32)
CREAT SERPL-MCNC: 0.79 MG/DL (ref 0.55–1.02)
ERYTHROCYTE [DISTWIDTH] IN BLOOD BY AUTOMATED COUNT: 16.7 % (ref 11.5–14.5)
GLUCOSE BLD STRIP.AUTO-MCNC: 103 MG/DL (ref 65–100)
GLUCOSE BLD STRIP.AUTO-MCNC: 125 MG/DL (ref 65–100)
GLUCOSE BLD STRIP.AUTO-MCNC: 181 MG/DL (ref 65–100)
GLUCOSE BLD STRIP.AUTO-MCNC: 220 MG/DL (ref 65–100)
GLUCOSE BLD STRIP.AUTO-MCNC: 221 MG/DL (ref 65–100)
GLUCOSE BLD STRIP.AUTO-MCNC: 268 MG/DL (ref 65–100)
GLUCOSE SERPL-MCNC: 96 MG/DL (ref 65–100)
HCT VFR BLD AUTO: 26.9 % (ref 35–47)
HGB BLD-MCNC: 8.2 G/DL (ref 11.5–16)
MCH RBC QN AUTO: 27.4 PG (ref 26–34)
MCHC RBC AUTO-ENTMCNC: 30.5 G/DL (ref 30–36.5)
MCV RBC AUTO: 90 FL (ref 80–99)
NRBC # BLD: 0 K/UL (ref 0–0.01)
NRBC BLD-RTO: 0 PER 100 WBC
PLATELET # BLD AUTO: 419 K/UL (ref 150–400)
PMV BLD AUTO: 9.3 FL (ref 8.9–12.9)
POTASSIUM SERPL-SCNC: 3.6 MMOL/L (ref 3.5–5.1)
RBC # BLD AUTO: 2.99 M/UL (ref 3.8–5.2)
SERVICE CMNT-IMP: ABNORMAL
SODIUM SERPL-SCNC: 137 MMOL/L (ref 136–145)
SPECIMEN EXP DATE BLD: NORMAL
WBC # BLD AUTO: 16.2 K/UL (ref 3.6–11)

## 2019-10-14 PROCEDURE — 77030031139 HC SUT VCRL2 J&J -A: Performed by: SURGERY

## 2019-10-14 PROCEDURE — 74011000250 HC RX REV CODE- 250: Performed by: NURSE ANESTHETIST, CERTIFIED REGISTERED

## 2019-10-14 PROCEDURE — 86900 BLOOD TYPING SEROLOGIC ABO: CPT

## 2019-10-14 PROCEDURE — 93005 ELECTROCARDIOGRAM TRACING: CPT

## 2019-10-14 PROCEDURE — 74011000250 HC RX REV CODE- 250: Performed by: INTERNAL MEDICINE

## 2019-10-14 PROCEDURE — 74011250637 HC RX REV CODE- 250/637: Performed by: SURGERY

## 2019-10-14 PROCEDURE — 77030028224 HC PDNG CST BSNM -A: Performed by: SURGERY

## 2019-10-14 PROCEDURE — 88311 DECALCIFY TISSUE: CPT

## 2019-10-14 PROCEDURE — 80048 BASIC METABOLIC PNL TOTAL CA: CPT

## 2019-10-14 PROCEDURE — P9045 ALBUMIN (HUMAN), 5%, 250 ML: HCPCS | Performed by: NURSE ANESTHETIST, CERTIFIED REGISTERED

## 2019-10-14 PROCEDURE — 76210000016 HC OR PH I REC 1 TO 1.5 HR: Performed by: SURGERY

## 2019-10-14 PROCEDURE — 74011250636 HC RX REV CODE- 250/636: Performed by: INTERNAL MEDICINE

## 2019-10-14 PROCEDURE — 76060000034 HC ANESTHESIA 1.5 TO 2 HR: Performed by: SURGERY

## 2019-10-14 PROCEDURE — 74011250636 HC RX REV CODE- 250/636: Performed by: NURSE ANESTHETIST, CERTIFIED REGISTERED

## 2019-10-14 PROCEDURE — 77030008684 HC TU ET CUF COVD -B: Performed by: ANESTHESIOLOGY

## 2019-10-14 PROCEDURE — 82550 ASSAY OF CK (CPK): CPT

## 2019-10-14 PROCEDURE — 77030026438 HC STYL ET INTUB CARD -A: Performed by: ANESTHESIOLOGY

## 2019-10-14 PROCEDURE — 85027 COMPLETE CBC AUTOMATED: CPT

## 2019-10-14 PROCEDURE — 77030006835 HC BLD SAW SAG STRY -B: Performed by: SURGERY

## 2019-10-14 PROCEDURE — 74011000250 HC RX REV CODE- 250: Performed by: SURGERY

## 2019-10-14 PROCEDURE — 77030008462 HC STPLR SKN PROX J&J -A: Performed by: SURGERY

## 2019-10-14 PROCEDURE — 74011250636 HC RX REV CODE- 250/636: Performed by: SURGERY

## 2019-10-14 PROCEDURE — 77030018836 HC SOL IRR NACL ICUM -A: Performed by: SURGERY

## 2019-10-14 PROCEDURE — 74011000272 HC RX REV CODE- 272: Performed by: SURGERY

## 2019-10-14 PROCEDURE — 74011636637 HC RX REV CODE- 636/637: Performed by: SURGERY

## 2019-10-14 PROCEDURE — 74011250636 HC RX REV CODE- 250/636: Performed by: ANESTHESIOLOGY

## 2019-10-14 PROCEDURE — 77030002996 HC SUT SLK J&J -A: Performed by: SURGERY

## 2019-10-14 PROCEDURE — 0Y6C0Z3 DETACHMENT AT RIGHT UPPER LEG, LOW, OPEN APPROACH: ICD-10-PCS | Performed by: SURGERY

## 2019-10-14 PROCEDURE — 65660000000 HC RM CCU STEPDOWN

## 2019-10-14 PROCEDURE — 82962 GLUCOSE BLOOD TEST: CPT

## 2019-10-14 PROCEDURE — 88307 TISSUE EXAM BY PATHOLOGIST: CPT

## 2019-10-14 PROCEDURE — 77010033678 HC OXYGEN DAILY

## 2019-10-14 PROCEDURE — 77030011640 HC PAD GRND REM COVD -A: Performed by: SURGERY

## 2019-10-14 PROCEDURE — 76010000153 HC OR TIME 1.5 TO 2 HR: Performed by: SURGERY

## 2019-10-14 PROCEDURE — 36415 COLL VENOUS BLD VENIPUNCTURE: CPT

## 2019-10-14 PROCEDURE — 74011000258 HC RX REV CODE- 258: Performed by: SURGERY

## 2019-10-14 PROCEDURE — 74011636637 HC RX REV CODE- 636/637: Performed by: HOSPITALIST

## 2019-10-14 RX ORDER — FENTANYL CITRATE 50 UG/ML
50 INJECTION, SOLUTION INTRAMUSCULAR; INTRAVENOUS AS NEEDED
Status: DISCONTINUED | OUTPATIENT
Start: 2019-10-14 | End: 2019-10-14 | Stop reason: HOSPADM

## 2019-10-14 RX ORDER — MIDAZOLAM HYDROCHLORIDE 1 MG/ML
1 INJECTION, SOLUTION INTRAMUSCULAR; INTRAVENOUS AS NEEDED
Status: DISCONTINUED | OUTPATIENT
Start: 2019-10-14 | End: 2019-10-14 | Stop reason: HOSPADM

## 2019-10-14 RX ORDER — SODIUM CHLORIDE 9 MG/ML
25 INJECTION, SOLUTION INTRAVENOUS CONTINUOUS
Status: DISCONTINUED | OUTPATIENT
Start: 2019-10-14 | End: 2019-10-14 | Stop reason: HOSPADM

## 2019-10-14 RX ORDER — SODIUM CHLORIDE, SODIUM LACTATE, POTASSIUM CHLORIDE, CALCIUM CHLORIDE 600; 310; 30; 20 MG/100ML; MG/100ML; MG/100ML; MG/100ML
125 INJECTION, SOLUTION INTRAVENOUS CONTINUOUS
Status: DISCONTINUED | OUTPATIENT
Start: 2019-10-14 | End: 2019-10-14 | Stop reason: HOSPADM

## 2019-10-14 RX ORDER — ALBUMIN HUMAN 50 G/1000ML
SOLUTION INTRAVENOUS AS NEEDED
Status: DISCONTINUED | OUTPATIENT
Start: 2019-10-14 | End: 2019-10-14 | Stop reason: HOSPADM

## 2019-10-14 RX ORDER — FENTANYL CITRATE 50 UG/ML
INJECTION, SOLUTION INTRAMUSCULAR; INTRAVENOUS AS NEEDED
Status: DISCONTINUED | OUTPATIENT
Start: 2019-10-14 | End: 2019-10-14 | Stop reason: HOSPADM

## 2019-10-14 RX ORDER — DEXAMETHASONE SODIUM PHOSPHATE 4 MG/ML
INJECTION, SOLUTION INTRA-ARTICULAR; INTRALESIONAL; INTRAMUSCULAR; INTRAVENOUS; SOFT TISSUE AS NEEDED
Status: DISCONTINUED | OUTPATIENT
Start: 2019-10-14 | End: 2019-10-14 | Stop reason: HOSPADM

## 2019-10-14 RX ORDER — FENTANYL CITRATE 50 UG/ML
50 INJECTION, SOLUTION INTRAMUSCULAR; INTRAVENOUS
Status: DISCONTINUED | OUTPATIENT
Start: 2019-10-14 | End: 2019-10-17

## 2019-10-14 RX ORDER — EPHEDRINE SULFATE/0.9% NACL/PF 50 MG/5 ML
SYRINGE (ML) INTRAVENOUS AS NEEDED
Status: DISCONTINUED | OUTPATIENT
Start: 2019-10-14 | End: 2019-10-14 | Stop reason: HOSPADM

## 2019-10-14 RX ORDER — ONDANSETRON 2 MG/ML
INJECTION INTRAMUSCULAR; INTRAVENOUS AS NEEDED
Status: DISCONTINUED | OUTPATIENT
Start: 2019-10-14 | End: 2019-10-14 | Stop reason: HOSPADM

## 2019-10-14 RX ORDER — SUCCINYLCHOLINE CHLORIDE 20 MG/ML
INJECTION INTRAMUSCULAR; INTRAVENOUS AS NEEDED
Status: DISCONTINUED | OUTPATIENT
Start: 2019-10-14 | End: 2019-10-14 | Stop reason: HOSPADM

## 2019-10-14 RX ORDER — MORPHINE SULFATE 10 MG/ML
2 INJECTION, SOLUTION INTRAMUSCULAR; INTRAVENOUS
Status: DISCONTINUED | OUTPATIENT
Start: 2019-10-14 | End: 2019-10-14 | Stop reason: HOSPADM

## 2019-10-14 RX ORDER — ROCURONIUM BROMIDE 10 MG/ML
INJECTION, SOLUTION INTRAVENOUS AS NEEDED
Status: DISCONTINUED | OUTPATIENT
Start: 2019-10-14 | End: 2019-10-14 | Stop reason: HOSPADM

## 2019-10-14 RX ORDER — MIDAZOLAM HYDROCHLORIDE 1 MG/ML
0.5 INJECTION, SOLUTION INTRAMUSCULAR; INTRAVENOUS
Status: DISCONTINUED | OUTPATIENT
Start: 2019-10-14 | End: 2019-10-14 | Stop reason: HOSPADM

## 2019-10-14 RX ORDER — SODIUM CHLORIDE, SODIUM LACTATE, POTASSIUM CHLORIDE, CALCIUM CHLORIDE 600; 310; 30; 20 MG/100ML; MG/100ML; MG/100ML; MG/100ML
25 INJECTION, SOLUTION INTRAVENOUS CONTINUOUS
Status: DISCONTINUED | OUTPATIENT
Start: 2019-10-14 | End: 2019-10-14 | Stop reason: HOSPADM

## 2019-10-14 RX ORDER — SODIUM CHLORIDE 0.9 % (FLUSH) 0.9 %
5-40 SYRINGE (ML) INJECTION AS NEEDED
Status: DISCONTINUED | OUTPATIENT
Start: 2019-10-14 | End: 2019-10-14 | Stop reason: HOSPADM

## 2019-10-14 RX ORDER — PHENYLEPHRINE HCL IN 0.9% NACL 0.4MG/10ML
SYRINGE (ML) INTRAVENOUS AS NEEDED
Status: DISCONTINUED | OUTPATIENT
Start: 2019-10-14 | End: 2019-10-14 | Stop reason: HOSPADM

## 2019-10-14 RX ORDER — SODIUM CHLORIDE 0.9 % (FLUSH) 0.9 %
5-40 SYRINGE (ML) INJECTION EVERY 8 HOURS
Status: DISCONTINUED | OUTPATIENT
Start: 2019-10-14 | End: 2019-10-14 | Stop reason: HOSPADM

## 2019-10-14 RX ORDER — OXYCODONE HYDROCHLORIDE 5 MG/1
5 TABLET ORAL AS NEEDED
Status: DISCONTINUED | OUTPATIENT
Start: 2019-10-14 | End: 2019-10-14 | Stop reason: HOSPADM

## 2019-10-14 RX ORDER — FENTANYL CITRATE 50 UG/ML
25 INJECTION, SOLUTION INTRAMUSCULAR; INTRAVENOUS
Status: DISCONTINUED | OUTPATIENT
Start: 2019-10-14 | End: 2019-10-14 | Stop reason: HOSPADM

## 2019-10-14 RX ORDER — LIDOCAINE HYDROCHLORIDE 20 MG/ML
INJECTION, SOLUTION EPIDURAL; INFILTRATION; INTRACAUDAL; PERINEURAL AS NEEDED
Status: DISCONTINUED | OUTPATIENT
Start: 2019-10-14 | End: 2019-10-14 | Stop reason: HOSPADM

## 2019-10-14 RX ORDER — ONDANSETRON 2 MG/ML
4 INJECTION INTRAMUSCULAR; INTRAVENOUS AS NEEDED
Status: DISCONTINUED | OUTPATIENT
Start: 2019-10-14 | End: 2019-10-14 | Stop reason: HOSPADM

## 2019-10-14 RX ORDER — ACETAMINOPHEN 10 MG/ML
INJECTION, SOLUTION INTRAVENOUS AS NEEDED
Status: DISCONTINUED | OUTPATIENT
Start: 2019-10-14 | End: 2019-10-14 | Stop reason: HOSPADM

## 2019-10-14 RX ORDER — DIPHENHYDRAMINE HYDROCHLORIDE 50 MG/ML
12.5 INJECTION, SOLUTION INTRAMUSCULAR; INTRAVENOUS AS NEEDED
Status: DISCONTINUED | OUTPATIENT
Start: 2019-10-14 | End: 2019-10-14 | Stop reason: HOSPADM

## 2019-10-14 RX ORDER — ACETAMINOPHEN 325 MG/1
650 TABLET ORAL ONCE
Status: DISCONTINUED | OUTPATIENT
Start: 2019-10-14 | End: 2019-10-14 | Stop reason: HOSPADM

## 2019-10-14 RX ORDER — PROPOFOL 10 MG/ML
INJECTION, EMULSION INTRAVENOUS AS NEEDED
Status: DISCONTINUED | OUTPATIENT
Start: 2019-10-14 | End: 2019-10-14 | Stop reason: HOSPADM

## 2019-10-14 RX ORDER — HYDROMORPHONE HYDROCHLORIDE 1 MG/ML
0.2 INJECTION, SOLUTION INTRAMUSCULAR; INTRAVENOUS; SUBCUTANEOUS
Status: DISCONTINUED | OUTPATIENT
Start: 2019-10-14 | End: 2019-10-14 | Stop reason: HOSPADM

## 2019-10-14 RX ORDER — LIDOCAINE HYDROCHLORIDE 10 MG/ML
0.1 INJECTION, SOLUTION EPIDURAL; INFILTRATION; INTRACAUDAL; PERINEURAL AS NEEDED
Status: DISCONTINUED | OUTPATIENT
Start: 2019-10-14 | End: 2019-10-14 | Stop reason: HOSPADM

## 2019-10-14 RX ADMIN — METRONIDAZOLE 500 MG: 500 INJECTION, SOLUTION INTRAVENOUS at 02:10

## 2019-10-14 RX ADMIN — METOPROLOL TARTRATE 25 MG: 25 TABLET ORAL at 21:36

## 2019-10-14 RX ADMIN — ALBUMIN (HUMAN) 250 ML: 12.5 INJECTION, SOLUTION INTRAVENOUS at 08:36

## 2019-10-14 RX ADMIN — PROPOFOL 30 MG: 10 INJECTION, EMULSION INTRAVENOUS at 08:21

## 2019-10-14 RX ADMIN — ALBUMIN (HUMAN) 250 ML: 12.5 INJECTION, SOLUTION INTRAVENOUS at 07:51

## 2019-10-14 RX ADMIN — INSULIN LISPRO 4 UNITS: 100 INJECTION, SOLUTION INTRAVENOUS; SUBCUTANEOUS at 12:40

## 2019-10-14 RX ADMIN — Medication 120 MCG: at 07:54

## 2019-10-14 RX ADMIN — Medication 120 MCG: at 08:03

## 2019-10-14 RX ADMIN — Medication 10 ML: at 13:45

## 2019-10-14 RX ADMIN — FAMOTIDINE 20 MG: 20 TABLET ORAL at 17:28

## 2019-10-14 RX ADMIN — Medication 120 MCG: at 07:51

## 2019-10-14 RX ADMIN — ACETAMINOPHEN 650 MG: 325 TABLET, FILM COATED ORAL at 23:19

## 2019-10-14 RX ADMIN — Medication 120 MCG: at 07:57

## 2019-10-14 RX ADMIN — FENTANYL CITRATE 50 MCG: 50 INJECTION INTRAMUSCULAR; INTRAVENOUS at 21:36

## 2019-10-14 RX ADMIN — INSULIN LISPRO 4 UNITS: 100 INJECTION, SOLUTION INTRAVENOUS; SUBCUTANEOUS at 16:03

## 2019-10-14 RX ADMIN — Medication 5 MG: at 08:21

## 2019-10-14 RX ADMIN — METRONIDAZOLE 500 MG: 500 INJECTION, SOLUTION INTRAVENOUS at 09:17

## 2019-10-14 RX ADMIN — Medication 120 MCG: at 08:00

## 2019-10-14 RX ADMIN — SUCCINYLCHOLINE CHLORIDE 140 MG: 20 INJECTION, SOLUTION INTRAMUSCULAR; INTRAVENOUS at 07:41

## 2019-10-14 RX ADMIN — Medication 5 MG: at 08:33

## 2019-10-14 RX ADMIN — LIDOCAINE HYDROCHLORIDE 100 MG: 20 INJECTION, SOLUTION EPIDURAL; INFILTRATION; INTRACAUDAL; PERINEURAL at 07:40

## 2019-10-14 RX ADMIN — DEXAMETHASONE SODIUM PHOSPHATE 4 MG: 4 INJECTION, SOLUTION INTRAMUSCULAR; INTRAVENOUS at 08:13

## 2019-10-14 RX ADMIN — FENTANYL CITRATE 50 MCG: 50 INJECTION, SOLUTION INTRAMUSCULAR; INTRAVENOUS at 07:40

## 2019-10-14 RX ADMIN — ACETAMINOPHEN 1000 MG: 10 INJECTION, SOLUTION INTRAVENOUS at 07:54

## 2019-10-14 RX ADMIN — Medication 10 ML: at 22:06

## 2019-10-14 RX ADMIN — ROCURONIUM BROMIDE 5 MG: 10 SOLUTION INTRAVENOUS at 07:40

## 2019-10-14 RX ADMIN — ALLOPURINOL 100 MG: 100 TABLET ORAL at 09:00

## 2019-10-14 RX ADMIN — PHENYLEPHRINE HYDROCHLORIDE 40 MCG/MIN: 10 INJECTION INTRAVENOUS at 07:48

## 2019-10-14 RX ADMIN — Medication 120 MCG: at 08:09

## 2019-10-14 RX ADMIN — IPRATROPIUM BROMIDE AND ALBUTEROL SULFATE 3 ML: .5; 3 SOLUTION RESPIRATORY (INHALATION) at 09:27

## 2019-10-14 RX ADMIN — INSULIN LISPRO 2 UNITS: 100 INJECTION, SOLUTION INTRAVENOUS; SUBCUTANEOUS at 16:03

## 2019-10-14 RX ADMIN — APIXABAN 5 MG: 5 TABLET, FILM COATED ORAL at 22:04

## 2019-10-14 RX ADMIN — TRAMADOL HYDROCHLORIDE 25 MG: 50 TABLET ORAL at 19:52

## 2019-10-14 RX ADMIN — METRONIDAZOLE 500 MG: 500 INJECTION, SOLUTION INTRAVENOUS at 18:33

## 2019-10-14 RX ADMIN — VANCOMYCIN HYDROCHLORIDE 1500 MG: 10 INJECTION, POWDER, LYOPHILIZED, FOR SOLUTION INTRAVENOUS at 12:41

## 2019-10-14 RX ADMIN — SODIUM CHLORIDE 1000 ML: 900 INJECTION, SOLUTION INTRAVENOUS at 10:21

## 2019-10-14 RX ADMIN — ONDANSETRON HYDROCHLORIDE 4 MG: 2 INJECTION, SOLUTION INTRAMUSCULAR; INTRAVENOUS at 08:13

## 2019-10-14 RX ADMIN — Medication 120 MCG: at 08:06

## 2019-10-14 RX ADMIN — Medication 120 MCG: at 07:48

## 2019-10-14 RX ADMIN — CEFEPIME HYDROCHLORIDE 2 G: 2 INJECTION, POWDER, FOR SOLUTION INTRAVENOUS at 17:29

## 2019-10-14 RX ADMIN — Medication 10 MG: at 08:07

## 2019-10-14 RX ADMIN — SODIUM CHLORIDE, POTASSIUM CHLORIDE, SODIUM LACTATE AND CALCIUM CHLORIDE: 600; 310; 30; 20 INJECTION, SOLUTION INTRAVENOUS at 07:10

## 2019-10-14 RX ADMIN — PROPOFOL 100 MG: 10 INJECTION, EMULSION INTRAVENOUS at 07:40

## 2019-10-14 RX ADMIN — INSULIN LISPRO 2 UNITS: 100 INJECTION, SOLUTION INTRAVENOUS; SUBCUTANEOUS at 23:19

## 2019-10-14 RX ADMIN — FENTANYL CITRATE 25 MCG: 50 INJECTION, SOLUTION INTRAMUSCULAR; INTRAVENOUS at 08:37

## 2019-10-14 NOTE — PROGRESS NOTES
Problem: Falls - Risk of  Goal: *Absence of Falls  Description  Document Mya Welsh Fall Risk and appropriate interventions in the flowsheet. Outcome: Progressing Towards Goal  Note:   Fall Risk Interventions:  Mobility Interventions: Bed/chair exit alarm    Mentation Interventions: Adequate sleep, hydration, pain control, Door open when patient unattended, Bed/chair exit alarm    Medication Interventions: Bed/chair exit alarm, Evaluate medications/consider consulting pharmacy    Elimination Interventions: Call light in reach, Toileting schedule/hourly rounds    History of Falls Interventions: Bed/chair exit alarm, Door open when patient unattended         Problem: Pressure Injury - Risk of  Goal: *Prevention of pressure injury  Description  Document Jagdeep Scale and appropriate interventions in the flowsheet. 9-25-18: turn q2h and float heels.    Outcome: Progressing Towards Goal  Note:   Pressure Injury Interventions:  Sensory Interventions: Assess need for specialty bed    Moisture Interventions: Absorbent underpads, Check for incontinence Q2 hours and as needed    Activity Interventions: Increase time out of bed, Pressure redistribution bed/mattress(bed type)    Mobility Interventions: HOB 30 degrees or less, Pressure redistribution bed/mattress (bed type)    Nutrition Interventions: Document food/fluid/supplement intake, Offer support with meals,snacks and hydration    Friction and Shear Interventions: HOB 30 degrees or less

## 2019-10-14 NOTE — PROGRESS NOTES
Hospitalist Progress Note  DO Mary Jo Chaney service: [phone] OR [phone] from in house phone        Date of Service:  10/14/2019  NAME:  Dolores Goetz  :  1931  MRN:  [id number]      Admission Summary:   12-year-old female with an extensive past medical history including chronic hypoxemic respiratory failure with home oxygen, COPD, chronic kidney disease, type 2 diabetes mellitus, atrial fibrillation, diastolic congestive heart failure, dyslipidemia, primary hypertension, osteoporosis, pulmonary hypertension, depression and anxiety disorder, was brought to the emergency room from home for evaluation of an approximately 2-3 day history of worsening shortness of breath    Interval history / Subjective:   Patient seen and examined. Underwent AKA today. Post-operatively, patient alert and oriented when aroused. Nursing staff noted multiple times that patient had elevated heart rate. Patient in no distress, denies pain. HR intermittent, unable to get EKG. Will transfer to telemetry to further evaluate. Assessment & Plan:     Ischemic right leg:   -s/p RLE Thrombectomy 19 with residual clot in Popliteal Artery. -S/P repeat Thrombectomy 19.    -s/p AKA right lower extremity 10/14  -significant muscle ischemia right calf with likely right foot drop.   -Aspirin and plavix discontinued due to Epistaxis. Continue Apixaban. -ID following. Continue cefepime, flagyl, vancomycin     Tachycardia: intermittent, transfer to telemetry   Acute renal insufficiency: resolved   Hyponatremia: continue to monitor   Hyperkalemia: resolved  Acute on chronic diastolic CHF exarcerbation: NYHA class unknown  -continue lasix daily   Elevated troponin: suspect demand ischemia  Hypertension: controlled, continue metoprolol  Chronic respiratory failure: home 02  Diabetes mellitus Type II: lantus 15 units qhs, 4 units TID, SSI.  Monitor   Anemia, chronic: monitor   Rhabdomyolysis: resolved  Anxiety, depression      Code status: dnr  DVT prophylaxis: eliquis    Care Plan discussed with: Patient/Family  Disposition: TBD     Hospital Problems  Date Reviewed: 10/14/2019          Codes Class Noted POA    * (Principal) CHF exacerbation ([facility].) ICD-10-CM: I50.9  ICD-9-CM: 428.0  9/17/2019 Yes        Hyperkalemia ICD-10-CM: E87.5  ICD-9-CM: 276.7  9/17/2019 Yes        Hyponatremia ICD-10-CM: E87.1  ICD-9-CM: 276.1  9/17/2019 Yes        HLOLAND (acute kidney injury) ([facility].) ICD-10-CM: N17.9  ICD-9-CM: 584.9  9/17/2019 Yes        Acute hypoxemic respiratory failure ([facility] ICD-10-CM: J96.01  ICD-9-CM: 518.81  8/8/2011 Yes        Debility ICD-10-CM: R53.81  ICD-9-CM: 799.3  6/28/2011 Yes                Review of Systems:   Negative unless stated above      Vital Signs:    Last 24hrs VS reviewed since prior progress note. Most recent are:  Visit Vitals  /66 (BP 1 Location: Left arm, BP Patient Position: At rest)   Pulse 98   Temp 97.7 °F (36.5 °C)   Resp 18   Ht 5' 5\" (1.651 m)   Wt 94.8 kg (209 lb)   SpO2 100%   Breastfeeding? No   BMI 34.78 kg/m²         Intake/Output Summary (Last 24 hours) at 10/14/2019 1828  Last data filed at 10/14/2019 1000  Gross per 24 hour   Intake 400 ml   Output 150 ml   Net 250 ml        Physical Examination:             Constitutional:  No acute distress, cooperative, pleasant, elderly    ENT:  Oral mucous moist, oropharynx benign. Resp:  CTA bilaterally. No wheezing/rhonchi/rales. No accessory muscle use   CV:  Regular rhythm, normal rate, no murmurs, gallops, rubs    GI:  Soft, non distended, non tender. normoactive bowel sounds, no hepatosplenomegaly     Musculoskeletal:  right leg lateral ischemia, left leg with palpable distal pulses, darken 3rd toe    Neurologic:  Moves all extremities.              Data Review:    Review and/or order of clinical lab test      Labs:     Recent Labs     10/14/19  0256 10/13/19  0022   WBC 16.2* 15.7* HGB 8.2* 7.7*   HCT 26.9* 25.3*   * 412*     Recent Labs     10/14/19  0256 10/13/19  0022 10/12/19  1053    134* 137   K 3.6 3.8 3.9    100 102   CO2 28 29 29   BUN 14 17 17   CREA 0.79 0.82 0.86   GLU 96 109* 118*   CA 8.6 8.3* 8.4*     No results for input(s): SGOT, GPT, ALT, AP, TBIL, TBILI, TP, ALB, GLOB, GGT, AML, LPSE in the last 72 hours. No lab exists for component: AMYP, HLPSE  No results for input(s): INR, PTP, APTT, INREXT, INREXT in the last 72 hours. No results for input(s): FE, TIBC, PSAT, FERR in the last 72 hours. No results found for: FOL, RBCF   No results for input(s): PH, PCO2, PO2 in the last 72 hours.   Recent Labs     10/14/19  0256 10/13/19  0022 10/12/19  1051   * 250* 273*     Lab Results   Component Value Date/Time    Cholesterol, total 144 09/17/2019 09:21 AM    HDL Cholesterol 55 09/17/2019 09:21 AM    LDL, calculated 72.6 09/17/2019 09:21 AM    Triglyceride 82 09/17/2019 09:21 AM    CHOL/HDL Ratio 2.6 09/17/2019 09:21 AM     Lab Results   Component Value Date/Time    Glucose (POC) 221 (H) 10/14/2019 04:38 PM    Glucose (POC) 220 (H) 10/14/2019 03:55 PM    Glucose (POC) 181 (H) 10/14/2019 12:04 PM    Glucose (POC) 125 (H) 10/14/2019 09:18 AM    Glucose (POC) 103 (H) 10/14/2019 06:12 AM     Lab Results   Component Value Date/Time    Color DARK YELLOW 10/05/2019 06:02 PM    Appearance CLOUDY (A) 10/05/2019 06:02 PM    Specific gravity 1.018 10/05/2019 06:02 PM    Specific gravity 1.010 12/18/2011 09:28 AM    pH (UA) 5.5 10/05/2019 06:02 PM    Protein 30 (A) 10/05/2019 06:02 PM    Glucose NEGATIVE  10/05/2019 06:02 PM    Ketone NEGATIVE  10/05/2019 06:02 PM    Bilirubin NEGATIVE  09/17/2019 12:49 PM    Urobilinogen >8.0 (H) 10/05/2019 06:02 PM    Nitrites NEGATIVE  10/05/2019 06:02 PM    Leukocyte Esterase TRACE (A) 10/05/2019 06:02 PM    Epithelial cells FEW 10/05/2019 06:02 PM    Bacteria NEGATIVE  10/05/2019 06:02 PM    WBC 0-4 10/05/2019 06:02 PM RBC 0-5 10/05/2019 06:02 PM         Medications Reviewed:     Current Facility-Administered Medications   Medication Dose Route Frequency    fentaNYL citrate (PF) injection 50 mcg  50 mcg IntraVENous Q3H PRN    [START ON 10/15/2019] Vancomycin trough 10/15 @ 1200 prior to dose   Other ONCE    vancomycin (VANCOCIN) 1500 mg in  ml infusion  1,500 mg IntraVENous Q24H    0.9% sodium chloride infusion 250 mL  250 mL IntraVENous PRN    ALPRAZolam (XANAX) tablet 0.25 mg  0.25 mg Oral BID PRN    cefepime (MAXIPIME) 2 g in 0.9% sodium chloride (MBP/ADV) 100 mL  2 g IntraVENous Q24H    metroNIDAZOLE (FLAGYL) IVPB premix 500 mg  500 mg IntraVENous Q8H    Vancomycin - pharmacy to dose   Other Rx Dosing/Monitoring    albuterol-ipratropium (DUO-NEB) 2.5 MG-0.5 MG/3 ML  3 mL Nebulization Q4H PRN    insulin lispro (HUMALOG) injection 4 Units  4 Units SubCUTAneous TIDAC    sodium chloride (OCEAN) 0.65 % nasal squeeze bottle 2 Spray  2 Spray Both Nostrils Q2H PRN    oxymetazoline (AFRIN) 0.05 % nasal spray 2 Spray  2 Spray Both Nostrils BID PRN    furosemide (LASIX) tablet 40 mg  40 mg Oral DAILY    0.9% sodium chloride infusion 250 mL  250 mL IntraVENous PRN    0.9% sodium chloride infusion 250 mL  250 mL IntraVENous PRN    [Held by provider] apixaban (ELIQUIS) tablet 5 mg  5 mg Oral Q12H    insulin lispro (HUMALOG) injection   SubCUTAneous AC&HS    glucose chewable tablet 16 g  4 Tab Oral PRN    glucagon (GLUCAGEN) injection 1 mg  1 mg IntraMUSCular PRN    dextrose 10% infusion 0-250 mL  0-250 mL IntraVENous PRN    albuterol-ipratropium (DUO-NEB) 2.5 MG-0.5 MG/3 ML  3 mL Nebulization Q4H PRN    alum-mag hydroxide-simeth (MYLANTA) oral suspension 30 mL  30 mL Oral Q4H PRN    famotidine (PEPCID) tablet 20 mg  20 mg Oral QPM    metoprolol tartrate (LOPRESSOR) tablet 25 mg  25 mg Oral BID    traMADol (ULTRAM) tablet 25 mg  25 mg Oral Q8H PRN    fentaNYL citrate (PF) injection 50 mcg  50 mcg IntraVENous Q3H PRN    insulin glargine (LANTUS) injection 15 Units  15 Units SubCUTAneous DAILY    ondansetron (ZOFRAN) injection 4 mg  4 mg IntraVENous Q6H PRN    sodium chloride (NS) flush 5-40 mL  5-40 mL IntraVENous Q8H    sodium chloride (NS) flush 5-40 mL  5-40 mL IntraVENous PRN    acetaminophen (TYLENOL) tablet 650 mg  650 mg Oral Q4H PRN    allopurinol (ZYLOPRIM) tablet 100 mg  100 mg Oral DAILY     ______________________________________________________________________  EXPECTED LENGTH OF STAY: 6d 14h  ACTUAL LENGTH OF STAY:          Karthik Feliciano 42, DO

## 2019-10-14 NOTE — ANESTHESIA POSTPROCEDURE EVALUATION
Post-Anesthesia Evaluation and Assessment    Patient: Lexi Echeverria MRN: [id number]  SSN: [id number]    YOB: 1931  Age: 80 y.o. Sex: female       Cardiovascular Function/Vital Signs  Visit Vitals  /51   Pulse 96   Temp 36.7 °C (98 °F)   Resp 22   Ht 5' 5\" (1.651 m)   Wt 94.8 kg (209 lb)   SpO2 97%   Breastfeeding? No   BMI 34.78 kg/m²       Patient is status post General anesthesia for Procedure(s):  RIGHT ABOVE KNEE AMPUTATION. Nausea/Vomiting: None    Postoperative hydration reviewed and adequate. Pain:  Pain Scale 1: Numeric (0 - 10) (10/14/19 0945)  Pain Intensity 1: 0 (10/14/19 0945)   Managed    Neurological Status:   Neuro (WDL): Exceptions to WDL (10/14/19 0945)  Neuro  Neurologic State: Drowsy (10/14/19 0945)  Orientation Level: (shakes and nods head appropriately to questions) (10/14/19 0945)  Cognition: Follows commands (10/13/19 2212)  Speech: Clear (10/13/19 2212)  Assessment L Pupil: Brisk (09/24/19 0906)  Size L Pupil (mm): 3 (09/24/19 0906)  Assessment R Pupil: Brisk (09/24/19 0906)  Size R Pupil (mm): 3 (09/24/19 0906)  LUE Motor Response: Purposeful (10/14/19 0945)  LLE Motor Response: Purposeful;Weak (10/13/19 2212)  RUE Motor Response: Purposeful (10/14/19 0945)  RLE Motor Response: Purposeful;Weak (10/13/19 2212)   At baseline    Mental Status and Level of Consciousness: Alert and oriented to person, place, and time    Pulmonary Status:   O2 Device: Nasal cannula (10/14/19 0945)   Adequate oxygenation and airway patent    Complications related to anesthesia: None    Post-anesthesia assessment completed. No concerns    Signed By: Sue Thorpe MD     October 14, 2019              Procedure(s):  RIGHT ABOVE KNEE AMPUTATION.     general    <BSHSIANPOST>    Vitals Value Taken Time   /51 10/14/2019 10:15 AM   Temp 36.7 °C (98 °F) 10/14/2019  9:45 AM   Pulse 96 10/14/2019 10:26 AM   Resp 23 10/14/2019 10:26 AM   SpO2 96 % 10/14/2019 10:26 AM   Vitals shown include unvalidated device data.

## 2019-10-14 NOTE — PROGRESS NOTES
Since the last office visit, Mrs. Nidia Méndez underwent Holter monitoring on September 12, 2019. She demonstrated predominant atrial fibrillation with intermittent sinus bradycardia. During any form of rapid rhythm, sinus node recovery is subsequently abnormally prolonged. She also manifests unstable AV conduction with occasional Mobitz type II second-degree AV block when she is in sinus rhythm. There is simple ventricular ectopy with no complex forms. She appeared in the [address] [place] [address] emergency room on September 17, 2019 with severe shortness of breath. She also at that time manifested right lower extremity numbness and decreased movement. She was seen in consultation by Dr. Maddy Jaramillo. Echo obtained after admission showed an LV ejection fraction of 70% with normal cavity size and significant LVH. The aortic valve was judged to be moderately stenotic no comment was made at the time about current pulmonary artery pressure. She was seen by Dr. Rebeca Swartz from nephrology but because of acute kidney injury and diuretics were reduced. CTA was negative for acute CVA, MRI showed a small cerebellar infarction and she was started on Plavix. The right leg was cold to the touch and pulseless after several days in the hospital and vascular surgery was consulted. Troponin elevation was interpreted as supply/demand mismatch. On September 20 she underwent thrombectomy or embolectomy to the right leg with satisfactory restoration of circulation at that time. Despite that she continued to demonstrate severe circulatory deprivation and ultimately on October 14, 2019 she underwent right above-knee amputation by Dr. Luma Garcia. She is back in sinus rhythm with sinus tachycardia and trans-precordial T wave inversion, but she has been in atrial fibrillation most of the time since admission. She has been started on Eliquis. Kidney function has been fairly stable with creatinine today of 0.79.   CK has been chronically elevated most likely related to the ischemic leg. This patient has a chronic behavioral health disorder that includes a tendency towards severe depression and she may well require special attention in the aftermath of a limb amputation. This note is composed for the benefit of future ambulatory follow-up.     Merari Bacon MD, [facility]

## 2019-10-14 NOTE — PROGRESS NOTES
ID follow up    Patient not back in room at this time    S/P R AKA     Chart reviewed     Continue perioperative antibiotics for now    On renally dosed Vancomycin, Cefepime and Flagyl     Will follow along    Clau Lara DO  10:42 AM

## 2019-10-14 NOTE — OP NOTES
[facility]  OPERATIVE REPORT    Name:  Felton Ramos  MR#:  [id number]  :  1931  ACCOUNT #:  [de-identified]  DATE OF SERVICE:  10/14/2019      PREOPERATIVE DIAGNOSIS:  Ischemic right leg. POSTOPERATIVE DIAGNOSIS:  Ischemic right leg. PROCEDURE PERFORMED:  Right above-the-knee amputation. SURGEON:  Blayne Montoya MD    ASSISTANT:  Lisa Marcelo. ANESTHESIA:  General.    COMPLICATIONS:  None. SPECIMENS REMOVED:  Right above-the-knee segment. IMPLANTS:  None. ESTIMATED BLOOD LOSS:  150 mL. INDICATIONS FOR PROCEDURE:  The patient is an 80-year-old female who had a prolonged hospital course related to the ischemic right leg. She has undergone thrombectomies and fasciotomy, however, due to prolonged ischemia had significant muscle necrosis of the calf. This has developed into an unsalvageable limb. Due to the ischemia, she presents for an above-the-knee amputation. Risks and benefits of the procedure were discussed repeatedly with the patient and her family and they voiced understanding. PROCEDURE:  The patient was placed supine on the operating room table. The right leg was prepped and draped in the standard surgical fashion. A fish-mouth incision was made above the knee and taken down through the skin and subcutaneous tissue and fascia. The neurovascular bundle was identified, divided, and ligated with 0 silk ties. Likewise, other vessels were cauterized or ligated with silk ties for hemostasis. The femur was cut with a power saw and the edges were smoothed with a bone rasp. Following this, the wounds were thoroughly irrigated, cleaned, and dried. Hemostasis was achieved with the Bovie electrocautery. The fascia was then closed with interrupted 2-0 Vicryl followed by staple closure of the skin. Dry sterile dressings were then applied. The patient tolerated the procedure without apparent complication.   She is being transferred to the recovery room in stable condition.         Le Oneil MD AM/[email or web address]/  D:  10/14/2019 9:04  T:  10/14/2019 9:46  JOB #:  [id number]  CC:  Iam Woody MD

## 2019-10-14 NOTE — ANESTHESIA PREPROCEDURE EVALUATION
Relevant Problems   No relevant active problems       Anesthetic History   No history of anesthetic complications            Review of Systems / Medical History  Patient summary reviewed, nursing notes reviewed and pertinent labs reviewed    Pulmonary    COPD: moderate            Comments:  - COPD/PHTN and chronic O2 use 2-4 L at home              - Lungs without crackles from edema.  Coarse BS and \"dry\" rales    Neuro/Psych       CVA  Psychiatric history     Cardiovascular    Hypertension          Hyperlipidemia    Exercise tolerance: <4 METS  Comments: Diastolic HF - chronic- compensated  9/19 echo:  LV/RVEF 70%  Mod AS, Mod/Sev TR, Mod MR   GI/Hepatic/Renal  Within defined limits       Renal disease: CRI       Endo/Other  Within defined limits  Diabetes: type 2    Anemia     Other Findings   Comments: . R LE weakness   3. Elevated troponin   4. Chronic respiratory failure              - chronic oxygen use. 2-4L 24 hours              - multifactorial - COPD, PHTN, AS, noted ILD on CTA in 2017  5. COPD  6. PHTN              - moderate on last echo. PAS 60 mmHg  7. AS              -8. Anemia             10. HTN                11. DM              12.  HOLLAND on CKD           Physical Exam    Airway  Mallampati: III  TM Distance: 4 - 6 cm  Neck ROM: decreased range of motion   Mouth opening: Normal     Cardiovascular          Murmur: Grade 3, Aortic area     Dental    Dentition: Edentulous     Pulmonary      Decreased breath sounds: bilateral           Abdominal  GI exam deferred       Other Findings            Anesthetic Plan    ASA: 4  Anesthesia type: general          Induction: Intravenous  Anesthetic plan and risks discussed with: Patient

## 2019-10-14 NOTE — PROGRESS NOTES
MARIANN Plan:    Disposition- Anticipate SNF    Patient for R AKA today. CM will F/U tomorrow on disposition plans as anticipate need for SNF level rehab. Short and LTC plan to be determined with patient/family.     Jose E Lara 58

## 2019-10-14 NOTE — PROGRESS NOTES
Occupational Therapy    Completed chart review. Attempted to see patient for OT. Patient off the floor for R AKA. Will continue to follow for OT re-eval as medically appropriate.       Thank you,    Chuy Rater OTR/LEVON

## 2019-10-14 NOTE — ROUTINE PROCESS
TRANSFER - IN REPORT: 
 
Verbal report received from Chris(name) on Rice Alexia  being received from [facility](unit) for ordered procedure Report consisted of patients Situation, Background, Assessment and  
Recommendations(SBAR). Information from the following report(s) SBAR and Kardex was reviewed with the receiving nurse. Opportunity for questions and clarification was provided. Assessment completed upon patients arrival to unit and care assumed.

## 2019-10-14 NOTE — PROGRESS NOTES
10/14/19 1054   Vital Signs   Temp 97.8 °F (36.6 °C)   Temp Source Oral   Pulse (Heart Rate) (!) 133   Heart Rate Source Monitor   Resp Rate 26   O2 Sat (%) 92 %   Level of Consciousness Alert   /62   MAP (Calculated) 76   MEWS Score 5   Oxygen Therapy   O2 Device Nasal cannula   O2 Flow Rate (L/min) 3 l/min   MD notified and with patient.  EKG ordered

## 2019-10-14 NOTE — PERIOP NOTES
TRANSFER - OUT REPORT:    Verbal report given to Cristian Bella 44 on Andrey Moncada  being transferred to room 611 for routine post - op       Report consisted of patients Situation, Background, Assessment and   Recommendations(SBAR). Time Pre op antibiotic given:on vanc and flagyl given  Anesthesia Stop time: 1879  Mejia Present on Transfer to floor:no  Order for Mejia on Chart:  Discharge Prescriptions with Chart:    Information from the following report(s) SBAR, OR Summary, Intake/Output and MAR was reviewed with the receiving nurse. Opportunity for questions and clarification was provided. Is the patient on 02? YES       L/Min 3       Other     Is the patient on a monitor? NO    Is the nurse transporting with the patient? NO    Surgical Waiting Area notified of patient's transfer from PACU? YES      The following personal items collected during your admission accompanied patient upon transfer:   Dental Appliance: Dental Appliances: None  Vision: Visual Aid: Glasses  Hearing Aid: Hearing Aid: None  Jewelry: Jewelry: None  Clothing: Clothing: (inpt valuables in rm)  Other Valuables:  Other Valuables: None  Valuables sent to safe:

## 2019-10-14 NOTE — PROGRESS NOTES
Palliative Medicine Social Work    Chart reviewed. Patient is s/p R AKA this morning. Pain is managed. Plan is d/c to SNF. Please call team for further support if needed              Thank you for the opportunity to be involved in the care of Ms. Cosme and her family.     Craig Boyle LMSW, Supervisee in Social Work  Palliative Medicine   [phone]

## 2019-10-14 NOTE — BRIEF OP NOTE
BRIEF OPERATIVE NOTE    Date of Procedure: 10/14/2019   Preoperative Diagnosis: ischemic leg  Postoperative Diagnosis: same    Procedure(s):  RIGHT ABOVE KNEE AMPUTATION  Surgeon(s) and Role:     Graham Ness MD - Primary         Surgical Assistant: Marcus Joseph    Surgical Staff:  Circ-1: Mindy Webber RN-1: Norm Paul RN  Surg Asst-1: [facility]  Event Time In Time Out   Incision Start 10/14/2019 0802    Incision Close 10/14/2019 0855      Anesthesia: General   Estimated Blood Loss: 150  Specimens:   ID Type Source Tests Collected by Time Destination   1 : Right above knee amputation Fresh Leg, Right  Tito Crump MD 10/14/2019 6533 Pathology      Findings: well perfused tissue above knee  Complications: none  Implants: * No implants in log *

## 2019-10-14 NOTE — PERIOP NOTES
Patient: Luma Cabrera MRN: [id number]  SSN: [id number]   YOB: 1931  Age: 80 y.o. Sex: female     Patient is status post Procedure(s):  RIGHT ABOVE KNEE AMPUTATION. Surgeon(s) and Role:     Anup Wilkerson MD - Primary    Local/Dose/Irrigation:                    Peripheral IV 10/12/19 Left Antecubital (Active)   Site Assessment Clean, dry, & intact 10/14/2019  2:10 AM   Phlebitis Assessment 0 10/14/2019  2:10 AM   Infiltration Assessment 0 10/14/2019  2:10 AM   Dressing Status Clean, dry, & intact 10/14/2019  2:10 AM   Dressing Type Tape;Transparent 10/14/2019  2:10 AM   Hub Color/Line Status Flushed;Patent;Capped 10/14/2019  2:10 AM   Action Taken Open ports on tubing capped 10/14/2019  2:10 AM   Alcohol Cap Used Yes 10/14/2019  2:10 AM       Peripheral IV 10/14/19 Left Hand (Active)            Airway - Endotracheal Tube 10/14/19 Oral (Active)                   Dressing/Packing:  Wound Groin Right-Dressing Type: Topical skin adhesive/glue (09/24/19 1623)  Wound Leg Right-Dressing Type: 4 x 4;ABD pad;Xeroform;Gauze wrap (mary ann)(tape) (10/13/19 1936)  Wound Leg lower Right;Posterior-Dressing Type: 4 x 4;ABD pad;Gauze wrap (mary ann); Xeroform(tape) (10/13/19 1936)  Wound Leg Right Upper-Dressing Type: 4 x 4;ABD pad;Elastic bandage; Oil emulsion; Staples; Other (Comment)(Kerlix) (10/14/19 0698)    Splint/Cast:  ]    Other:

## 2019-10-14 NOTE — PROGRESS NOTES
Physical Therapy  Patient currently in the OR for AKA today. Will continue to follow for re-eval when medically stable.   Pancho Henry, PT

## 2019-10-15 LAB
ANION GAP SERPL CALC-SCNC: 5 MMOL/L (ref 5–15)
ATRIAL RATE: 98 BPM
BASOPHILS # BLD: 0 K/UL (ref 0–0.1)
BASOPHILS NFR BLD: 0 % (ref 0–1)
BUN SERPL-MCNC: 17 MG/DL (ref 6–20)
BUN/CREAT SERPL: 19 (ref 12–20)
CALCIUM SERPL-MCNC: 8 MG/DL (ref 8.5–10.1)
CALCULATED P AXIS, ECG09: 63 DEGREES
CALCULATED R AXIS, ECG10: 46 DEGREES
CALCULATED T AXIS, ECG11: -115 DEGREES
CHLORIDE SERPL-SCNC: 102 MMOL/L (ref 97–108)
CK SERPL-CCNC: 276 U/L (ref 26–192)
CO2 SERPL-SCNC: 29 MMOL/L (ref 21–32)
CREAT SERPL-MCNC: 0.89 MG/DL (ref 0.55–1.02)
DATE LAST DOSE: ABNORMAL
DIAGNOSIS, 93000: NORMAL
DIFFERENTIAL METHOD BLD: ABNORMAL
EOSINOPHIL # BLD: 0 K/UL (ref 0–0.4)
EOSINOPHIL NFR BLD: 0 % (ref 0–7)
ERYTHROCYTE [DISTWIDTH] IN BLOOD BY AUTOMATED COUNT: 17.1 % (ref 11.5–14.5)
GLUCOSE BLD STRIP.AUTO-MCNC: 120 MG/DL (ref 65–100)
GLUCOSE BLD STRIP.AUTO-MCNC: 169 MG/DL (ref 65–100)
GLUCOSE BLD STRIP.AUTO-MCNC: 205 MG/DL (ref 65–100)
GLUCOSE BLD STRIP.AUTO-MCNC: 252 MG/DL (ref 65–100)
GLUCOSE SERPL-MCNC: 208 MG/DL (ref 65–100)
HCT VFR BLD AUTO: 23.7 % (ref 35–47)
HGB BLD-MCNC: 7.1 G/DL (ref 11.5–16)
IMM GRANULOCYTES # BLD AUTO: 0.3 K/UL (ref 0–0.04)
IMM GRANULOCYTES NFR BLD AUTO: 2 % (ref 0–0.5)
LYMPHOCYTES # BLD: 0.8 K/UL (ref 0.8–3.5)
LYMPHOCYTES NFR BLD: 5 % (ref 12–49)
MCH RBC QN AUTO: 27.2 PG (ref 26–34)
MCHC RBC AUTO-ENTMCNC: 30 G/DL (ref 30–36.5)
MCV RBC AUTO: 90.8 FL (ref 80–99)
MONOCYTES # BLD: 1.2 K/UL (ref 0–1)
MONOCYTES NFR BLD: 7 % (ref 5–13)
NEUTS SEG # BLD: 14.2 K/UL (ref 1.8–8)
NEUTS SEG NFR BLD: 86 % (ref 32–75)
NRBC # BLD: 0 K/UL (ref 0–0.01)
NRBC BLD-RTO: 0 PER 100 WBC
P-R INTERVAL, ECG05: 190 MS
PLATELET # BLD AUTO: 415 K/UL (ref 150–400)
PMV BLD AUTO: 9.7 FL (ref 8.9–12.9)
POTASSIUM SERPL-SCNC: 4 MMOL/L (ref 3.5–5.1)
Q-T INTERVAL, ECG07: 404 MS
QRS DURATION, ECG06: 88 MS
QTC CALCULATION (BEZET), ECG08: 515 MS
RBC # BLD AUTO: 2.61 M/UL (ref 3.8–5.2)
RBC MORPH BLD: ABNORMAL
RBC MORPH BLD: ABNORMAL
REPORTED DOSE,DOSE: ABNORMAL UNITS
REPORTED DOSE/TIME,TMG: ABNORMAL
SERVICE CMNT-IMP: ABNORMAL
SODIUM SERPL-SCNC: 136 MMOL/L (ref 136–145)
VANCOMYCIN TROUGH SERPL-MCNC: 16.6 UG/ML (ref 5–10)
VENTRICULAR RATE, ECG03: 98 BPM
WBC # BLD AUTO: 16.5 K/UL (ref 3.6–11)

## 2019-10-15 PROCEDURE — 74011636637 HC RX REV CODE- 636/637: Performed by: SURGERY

## 2019-10-15 PROCEDURE — 82962 GLUCOSE BLOOD TEST: CPT

## 2019-10-15 PROCEDURE — 97161 PT EVAL LOW COMPLEX 20 MIN: CPT

## 2019-10-15 PROCEDURE — 80202 ASSAY OF VANCOMYCIN: CPT

## 2019-10-15 PROCEDURE — 77030038269 HC DRN EXT URIN PURWCK BARD -A

## 2019-10-15 PROCEDURE — 65660000000 HC RM CCU STEPDOWN

## 2019-10-15 PROCEDURE — 36415 COLL VENOUS BLD VENIPUNCTURE: CPT

## 2019-10-15 PROCEDURE — 74011250637 HC RX REV CODE- 250/637: Performed by: SURGERY

## 2019-10-15 PROCEDURE — 85025 COMPLETE CBC W/AUTO DIFF WBC: CPT

## 2019-10-15 PROCEDURE — 74011250636 HC RX REV CODE- 250/636: Performed by: SURGERY

## 2019-10-15 PROCEDURE — 97110 THERAPEUTIC EXERCISES: CPT

## 2019-10-15 PROCEDURE — 80048 BASIC METABOLIC PNL TOTAL CA: CPT

## 2019-10-15 PROCEDURE — 74011000258 HC RX REV CODE- 258: Performed by: SURGERY

## 2019-10-15 PROCEDURE — 82550 ASSAY OF CK (CPK): CPT

## 2019-10-15 RX ADMIN — Medication 10 ML: at 22:27

## 2019-10-15 RX ADMIN — FENTANYL CITRATE 50 MCG: 50 INJECTION INTRAMUSCULAR; INTRAVENOUS at 01:57

## 2019-10-15 RX ADMIN — METRONIDAZOLE 500 MG: 500 INJECTION, SOLUTION INTRAVENOUS at 01:58

## 2019-10-15 RX ADMIN — FENTANYL CITRATE 50 MCG: 50 INJECTION INTRAMUSCULAR; INTRAVENOUS at 11:48

## 2019-10-15 RX ADMIN — INSULIN LISPRO 4 UNITS: 100 INJECTION, SOLUTION INTRAVENOUS; SUBCUTANEOUS at 11:49

## 2019-10-15 RX ADMIN — FENTANYL CITRATE 50 MCG: 50 INJECTION INTRAMUSCULAR; INTRAVENOUS at 16:58

## 2019-10-15 RX ADMIN — METOPROLOL TARTRATE 25 MG: 25 TABLET ORAL at 09:31

## 2019-10-15 RX ADMIN — FENTANYL CITRATE 50 MCG: 50 INJECTION INTRAMUSCULAR; INTRAVENOUS at 20:57

## 2019-10-15 RX ADMIN — INSULIN LISPRO 2 UNITS: 100 INJECTION, SOLUTION INTRAVENOUS; SUBCUTANEOUS at 07:35

## 2019-10-15 RX ADMIN — INSULIN GLARGINE 15 UNITS: 100 INJECTION, SOLUTION SUBCUTANEOUS at 09:36

## 2019-10-15 RX ADMIN — CEFEPIME HYDROCHLORIDE 2 G: 2 INJECTION, POWDER, FOR SOLUTION INTRAVENOUS at 18:42

## 2019-10-15 RX ADMIN — Medication 10 ML: at 06:02

## 2019-10-15 RX ADMIN — ALLOPURINOL 100 MG: 100 TABLET ORAL at 09:31

## 2019-10-15 RX ADMIN — INSULIN LISPRO 3 UNITS: 100 INJECTION, SOLUTION INTRAVENOUS; SUBCUTANEOUS at 11:49

## 2019-10-15 RX ADMIN — VANCOMYCIN HYDROCHLORIDE 1500 MG: 10 INJECTION, POWDER, LYOPHILIZED, FOR SOLUTION INTRAVENOUS at 13:00

## 2019-10-15 RX ADMIN — INSULIN LISPRO 4 UNITS: 100 INJECTION, SOLUTION INTRAVENOUS; SUBCUTANEOUS at 18:41

## 2019-10-15 RX ADMIN — ACETAMINOPHEN 650 MG: 325 TABLET, FILM COATED ORAL at 09:30

## 2019-10-15 RX ADMIN — APIXABAN 5 MG: 5 TABLET, FILM COATED ORAL at 11:48

## 2019-10-15 RX ADMIN — FENTANYL CITRATE 50 MCG: 50 INJECTION INTRAMUSCULAR; INTRAVENOUS at 06:00

## 2019-10-15 RX ADMIN — INSULIN LISPRO 4 UNITS: 100 INJECTION, SOLUTION INTRAVENOUS; SUBCUTANEOUS at 07:36

## 2019-10-15 RX ADMIN — TRAMADOL HYDROCHLORIDE 25 MG: 50 TABLET ORAL at 09:31

## 2019-10-15 RX ADMIN — APIXABAN 5 MG: 5 TABLET, FILM COATED ORAL at 22:27

## 2019-10-15 RX ADMIN — METRONIDAZOLE 500 MG: 500 INJECTION, SOLUTION INTRAVENOUS at 09:54

## 2019-10-15 RX ADMIN — METOPROLOL TARTRATE 25 MG: 25 TABLET ORAL at 22:27

## 2019-10-15 RX ADMIN — FAMOTIDINE 20 MG: 20 TABLET ORAL at 18:41

## 2019-10-15 RX ADMIN — METRONIDAZOLE 500 MG: 500 INJECTION, SOLUTION INTRAVENOUS at 18:42

## 2019-10-15 RX ADMIN — FUROSEMIDE 40 MG: 40 TABLET ORAL at 09:32

## 2019-10-15 NOTE — PROGRESS NOTES
Hospitalist Progress Note  Lilly Rodgers MD  Answering service: [phone] or [phone] from in house phone        Date of Service:  10/15/2019  NAME:  Chelsey Blackmon  :  1931  MRN:  [id number]      Admission Summary:   60-year-old female with an extensive past medical history including chronic hypoxemic respiratory failure with home oxygen, COPD, chronic kidney disease, type 2 diabetes mellitus, atrial fibrillation, diastolic congestive heart failure, dyslipidemia, primary hypertension, osteoporosis, pulmonary hypertension, depression and anxiety disorder, was brought to the emergency room from home for evaluation of an approximately 2-3 day history of worsening shortness of breath    Interval history / Subjective:   Patient seen and examined. She is alert and oriented PP,confused with other details. She denied pain. Assessment & Plan:     Ischemic right leg:   -s/p RLE Thrombectomy 19 with residual clot in Popliteal Artery. -S/P repeat Thrombectomy 19.    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 Continue Apixaban. -ID following on cefepime, flagyl, vancomycin. Will discuss with ID,we may be able to d/c abx. Acute on chronic anemia,acute blood loss anemia due to R AKA 10/14  -Hb 7.1 ,recheck and if <=7,she needs transfusion  -She has received 3 units since admission(two units  and one unit 10/9)    Left foot ischemia with blister on the lateral aspect of the dorsum of the foot. monitor     Tachycardia: intermittent,canm be watched in remote tele   Acute renal insufficiency: resolved   Hyponatremia:resolved. Continue to monitor   Hyperkalemia: resolved  Acute on chronic diastolic CHF exarcerbation: NYHA class unknown  -continue lasix daily   Elevated troponin: suspect demand ischemia  Hypertension: controlled, continue metoprolol  Chronic respiratory failure: home 02  Diabetes mellitus Type II: lantus 15 units qhs, 4 units TID, SSI. Monitor     Rhabdomyolysis: resolved  Anxiety, depression      Code status: dnr  DVT prophylaxis: eliquis    Care Plan discussed with: Patient/Family  Disposition: TBD     Hospital Problems  Date Reviewed: 10/14/2019          Codes Class Noted POA    * (Principal) CHF exacerbation ([facility] ICD-10-CM: I50.9  ICD-9-CM: 428.0  9/17/2019 Yes        Hyperkalemia ICD-10-CM: E87.5  ICD-9-CM: 276.7  9/17/2019 Yes        Hyponatremia ICD-10-CM: E87.1  ICD-9-CM: 276.1  9/17/2019 Yes        HOLLAND (acute kidney injury) ([facility] ICD-10-CM: N17.9  ICD-9-CM: 584.9  9/17/2019 Yes        Acute hypoxemic respiratory failure ([facility] ICD-10-CM: J96.01  ICD-9-CM: 518.81  8/8/2011 Yes        Debility ICD-10-CM: R53.81  ICD-9-CM: 799.3  6/28/2011 Yes                Review of Systems:   Negative unless stated above      Vital Signs:    Last 24hrs VS reviewed since prior progress note. Most recent are:  Visit Vitals  /56 (BP 1 Location: Left arm, BP Patient Position: At rest)   Pulse 78   Temp 97.8 °F (36.6 °C)   Resp 20   Ht 5' 5\" (1.651 m)   Wt 95.3 kg (210 lb)   SpO2 100%   Breastfeeding? No   BMI 34.95 kg/m²         Intake/Output Summary (Last 24 hours) at 10/15/2019 1258  Last data filed at 10/15/2019 0932  Gross per 24 hour   Intake 2137.75 ml   Output 400 ml   Net 1737.75 ml        Physical Examination:             Constitutional:  No acute distress, cooperative,elderly    ENT:  Oral mucous moist, oropharynx benign. Resp:  CTA bilaterally. No wheezing/rhonchi/rales. No accessory muscle use   CV:  Regular rhythm, normal rate, no murmurs, gallops, rubs    GI:  Soft, non distended, non tender. normoactive bowel sounds, no hepatosplenomegaly     Musculoskeletal:  R AKA. Left foot ischemia/gangrene          Neurologic: Alert and oriented,confused about details           Data Review:    Review and/or order of clinical lab test      Labs:     Recent Labs     10/15/19  6751 10/14/19  0256   WBC 16.5* 16.2*   HGB 7.1* 8.2*   HCT 23.7* 26.9*   * 419*     Recent Labs     10/15/19  0324 10/14/19  0256 10/13/19  0022    137 134*   K 4.0 3.6 3.8    102 100   CO2 29 28 29   BUN 17 14 17   CREA 0.89 0.79 0.82   * 96 109*   CA 8.0* 8.6 8.3*     No results for input(s): SGOT, GPT, ALT, AP, TBIL, TBILI, TP, ALB, GLOB, GGT, AML, LPSE in the last 72 hours. No lab exists for component: AMYP, HLPSE  No results for input(s): INR, PTP, APTT, INREXT, INREXT in the last 72 hours. No results for input(s): FE, TIBC, PSAT, FERR in the last 72 hours. No results found for: FOL, RBCF   No results for input(s): PH, PCO2, PO2 in the last 72 hours.   Recent Labs     10/15/19  0324 10/14/19  0256 10/13/19  0022   * 226* 250*     Lab Results   Component Value Date/Time    Cholesterol, total 144 09/17/2019 09:21 AM    HDL Cholesterol 55 09/17/2019 09:21 AM    LDL, calculated 72.6 09/17/2019 09:21 AM    Triglyceride 82 09/17/2019 09:21 AM    CHOL/HDL Ratio 2.6 09/17/2019 09:21 AM     Lab Results   Component Value Date/Time    Glucose (POC) 252 (H) 10/15/2019 11:16 AM    Glucose (POC) 205 (H) 10/15/2019 06:39 AM    Glucose (POC) 268 (H) 10/14/2019 10:10 PM    Glucose (POC) 221 (H) 10/14/2019 04:38 PM    Glucose (POC) 220 (H) 10/14/2019 03:55 PM     Lab Results   Component Value Date/Time    Color DARK YELLOW 10/05/2019 06:02 PM    Appearance CLOUDY (A) 10/05/2019 06:02 PM    Specific gravity 1.018 10/05/2019 06:02 PM    Specific gravity 1.010 12/18/2011 09:28 AM    pH (UA) 5.5 10/05/2019 06:02 PM    Protein 30 (A) 10/05/2019 06:02 PM    Glucose NEGATIVE  10/05/2019 06:02 PM    Ketone NEGATIVE  10/05/2019 06:02 PM    Bilirubin NEGATIVE  09/17/2019 12:49 PM    Urobilinogen >8.0 (H) 10/05/2019 06:02 PM    Nitrites NEGATIVE  10/05/2019 06:02 PM    Leukocyte Esterase TRACE (A) 10/05/2019 06:02 PM    Epithelial cells FEW 10/05/2019 06:02 PM    Bacteria NEGATIVE  10/05/2019 06:02 PM WBC 0-4 10/05/2019 06:02 PM    RBC 0-5 10/05/2019 06:02 PM         Medications Reviewed:     Current Facility-Administered Medications   Medication Dose Route Frequency    fentaNYL citrate (PF) injection 50 mcg  50 mcg IntraVENous Q3H PRN    Vancomycin trough 10/15 @ 1200 prior to dose   Other ONCE    vancomycin (VANCOCIN) 1500 mg in  ml infusion  1,500 mg IntraVENous Q24H    0.9% sodium chloride infusion 250 mL  250 mL IntraVENous PRN    ALPRAZolam (XANAX) tablet 0.25 mg  0.25 mg Oral BID PRN    cefepime (MAXIPIME) 2 g in 0.9% sodium chloride (MBP/ADV) 100 mL  2 g IntraVENous Q24H    metroNIDAZOLE (FLAGYL) IVPB premix 500 mg  500 mg IntraVENous Q8H    Vancomycin - pharmacy to dose   Other Rx Dosing/Monitoring    albuterol-ipratropium (DUO-NEB) 2.5 MG-0.5 MG/3 ML  3 mL Nebulization Q4H PRN    insulin lispro (HUMALOG) injection 4 Units  4 Units SubCUTAneous TIDAC    sodium chloride (OCEAN) 0.65 % nasal squeeze bottle 2 Spray  2 Spray Both Nostrils Q2H PRN    oxymetazoline (AFRIN) 0.05 % nasal spray 2 Spray  2 Spray Both Nostrils BID PRN    furosemide (LASIX) tablet 40 mg  40 mg Oral DAILY    0.9% sodium chloride infusion 250 mL  250 mL IntraVENous PRN    0.9% sodium chloride infusion 250 mL  250 mL IntraVENous PRN    apixaban (ELIQUIS) tablet 5 mg  5 mg Oral Q12H    insulin lispro (HUMALOG) injection   SubCUTAneous AC&HS    glucose chewable tablet 16 g  4 Tab Oral PRN    glucagon (GLUCAGEN) injection 1 mg  1 mg IntraMUSCular PRN    dextrose 10% infusion 0-250 mL  0-250 mL IntraVENous PRN    albuterol-ipratropium (DUO-NEB) 2.5 MG-0.5 MG/3 ML  3 mL Nebulization Q4H PRN    alum-mag hydroxide-simeth (MYLANTA) oral suspension 30 mL  30 mL Oral Q4H PRN    famotidine (PEPCID) tablet 20 mg  20 mg Oral QPM    metoprolol tartrate (LOPRESSOR) tablet 25 mg  25 mg Oral BID    traMADol (ULTRAM) tablet 25 mg  25 mg Oral Q8H PRN    fentaNYL citrate (PF) injection 50 mcg  50 mcg IntraVENous Q3H PRN    insulin glargine (LANTUS) injection 15 Units  15 Units SubCUTAneous DAILY    ondansetron (ZOFRAN) injection 4 mg  4 mg IntraVENous Q6H PRN    sodium chloride (NS) flush 5-40 mL  5-40 mL IntraVENous Q8H    sodium chloride (NS) flush 5-40 mL  5-40 mL IntraVENous PRN    acetaminophen (TYLENOL) tablet 650 mg  650 mg Oral Q4H PRN    allopurinol (ZYLOPRIM) tablet 100 mg  100 mg Oral DAILY     ______________________________________________________________________  EXPECTED LENGTH OF STAY: 6d 14h  ACTUAL LENGTH OF STAY:          28                 Kristopher Andres MD

## 2019-10-15 NOTE — ROUTINE PROCESS
Bedside shift change report given to [address] [place] [address] (oncoming nurse) by Bina Zepeda (offgoing nurse). Report included the following information SBAR, Procedure Summary, Intake/Output, MAR, Recent Results and Cardiac Rhythm Afib.

## 2019-10-15 NOTE — PROGRESS NOTES
Pharmacist Note - Vancomycin Dosing  Therapy day 6  Indication: skin & soft tissue infection - infected right lower extremity   Current regimen:  1500 mg Q 24 hrs    A Trough Level resulted at 16.6 mcg/mL which was obtained ~24 hrs post-dose. Goal trough: 15 - 20 mcg/mL  (ID concerned about adequate abx delivery to site given severity of poor perfusion)    Plan: Continue current regimen. Pharmacy will continue to monitor this patient daily for changes in clinical status and renal function.

## 2019-10-15 NOTE — PROGRESS NOTES
Problem: Mobility Impaired (Adult and Pediatric)  Goal: *Acute Goals and Plan of Care (Insert Text)  Description  FUNCTIONAL STATUS PRIOR TO ADMISSION: Patient was independent for all functional mobility. Patient uses 2L/min O2 via nasal cannula at baseline. HOME SUPPORT PRIOR TO ADMISSION: The patient lived with her son and daughter but did not require assist.    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 Patient will move from supine to sit and sit to supine , scoot up and down and roll side to side in bed with moderate assistance within 7 day(s). 2.  Patient will transfer from bed to chair and chair to bed with maximal assistance using the least restrictive device within 7 day(s). 3.  Patient will perform sit to stand with maximal assistance within 7 day(s). 4.  Patient will participate in residual limb exercise program with max verbal cues for technique and min assist for limb management within 7 days. 5.  Patient will be able to name and perform one technique for assisting with management of phantom limb pain within 7 days. 6.  Patient will be independent with L LE and UE HEP for global strengthening in prep for transfers within 7 days. Weekly re-assessment 10/4/2019  1. Patient will move from supine to sit and sit to supine  in bed with modified independence within 7 day(s). 2.  Patient will transfer from bed to chair and chair to bed with contact guard assist consistently  using the least restrictive device within 7 day(s). 3.  Patient will perform sit to stand with contact guard  assistance  within 7 day(s). 4.  Patient will ambulate with minimal assistance  for 15 feet with the least restrictive device within 7 day(s). Reassessed 9/27/19  1. Patient will move from supine to sit and sit to supine  in bed with modified independence within 7 day(s).     2.  Patient will transfer from bed to chair and chair to bed with moderate assistance  using the least restrictive device within 7 day(s). 3.  Patient will perform sit to stand with moderate assistance  within 7 day(s). 4.  Patient will ambulate with moderate assistance  for 15 feet with the least restrictive device within 7 day(s). Initiated 9/18/2019  1. Patient will move from supine to sit and sit to supine  in bed with independence within 7 day(s). 2.  Patient will transfer from bed to chair and chair to bed with independence using the least restrictive device within 7 day(s). 3.  Patient will perform sit to stand with independence within 7 day(s). 4.  Patient will ambulate with independence for 150 feet with the least restrictive device within 7 day(s). 5.  Patient will ascend/descend 1 stairs with 1 handrail(s) with modified independence within 7 day(s). Outcome: Progressing Towards Goal  PHYSICAL THERAPY REEVALUATION  Patient: Pao Whyte (20 y.o. female)  Date: 10/15/2019  Primary Diagnosis: CHF exacerbation ([facility] [address] [I50.9]  Procedure(s) (LRB):  RIGHT ABOVE KNEE AMPUTATION (Right) 1 Day Post-Op   Precautions:   Fall, DNR      ASSESSMENT  Based on the objective data described below, the patient presents with significant decline in functional mobility following R AKA POD 1. Prior to surgery, patient was minimally participatory with therapy and continues to attempt to put off therapy requiring max education on role and importance of early mobility for recovery after amputation. Performed in bed assessment and treatment this session as patient was preparing to transfer to a different room. Discussed importance of touching limb for neuro re-education, idea of phantom limb pain, importance of continuing to exercise other extremities in prep for compensatory strategies with mobility, and she participated in exercises below. Session cut short as transport arrived for patient to leave floor.     Anticipate it will be a challenge to get buy in from patient for mobility as she was adamant that she wasn't \"going to rush it\" and her daughter who is usually present rarely encourages patient to participate with therapy but is supportive to the patient. Anticipate extensive rehab needs for strengthening, balance, and eventual transfers. Current Level of Function Impacting Discharge (mobility/balance): max A for bed mobility and total assist for transfers OOB at this time    Functional Outcome Measure: The patient scored 0/28 on the Tinetti outcome measure which is indicative of high fall risk. Other factors to consider for discharge: new AKA, extensive education and equipment needs, support at home? Patient will benefit from skilled therapy intervention to address the above noted impairments. PLAN :  Recommendations and Planned Interventions: bed mobility training, transfer training, therapeutic exercises, neuromuscular re-education, orthotic/prosthetic training, edema management/control and patient and family training/education      Frequency/Duration: Patient will be followed by physical therapy:  5 times a week to address goals. Recommendation for discharge: (in order for the patient to meet his/her long term goals)  Therapy up to 5 days/week in SNF setting    This discharge recommendation:  Has not yet been discussed the attending provider and/or case management    Equipment recommendations for successful discharge (if) home: TBD at SNF          SUBJECTIVE:   Patient stated Y'all just push and push. I'm not gonna do nothing that'll make this worse.     OBJECTIVE DATA SUMMARY:   HISTORY:    Past Medical History:   Diagnosis Date    Anxiety     Breast cancer ([facility] [address] 2005, 2010    right 2005, left 2010    Cancer [facility])      cancer right breast cancer    Cancer ([facility] [address]     cancer left breast/new dx 8/2011 +bx     Chronic obstructive pulmonary disease ([address]     Diabetes ([facility] [address]     Heart failure ([facility] [address]     Hypercholesterolemia     Hypertension     Other ill-defined conditions(799.89)     osteopoosis    Other ill-defined conditions(799.89)     high cholesterol    Pneumonia     Psychiatric disorder     anxiety     Past Surgical History:   Procedure Laterality Date    BIOPSY OF BREAST, INCISIONAL      left breast 8/2011 positive for cancer cells    BREAST SURGERY PROCEDURE UNLISTED      right mastectomy    HX BREAST LUMPECTOMY Left 2010    HX MASTECTOMY Right [facility] course since last seen and reason for reevaluation: R AKA POD 1    Personal factors and/or comorbidities impacting plan of care: PMH, buy-in to therapy    Home Situation  Home Environment: Private residence  # Steps to Enter: 1  Rails to Enter: Yes  One/Two Story Residence: Two story, live on 1st floor  Interior Rails: Both  Lift Chair Available: No  Living Alone: No  Support Systems: Family member(s)(son and DIL)  Patient Expects to be Discharged to[de-identified] Private residence  Current DME Used/Available at Home: Shower chair, Safety frame toliet, Oxygen, portable, Grab bars  Tub or Shower Type: Shower    EXAMINATION/PRESENTATION/DECISION MAKING:   Critical Behavior:  Neurologic State: Alert  Orientation Level: Oriented X4  Cognition: Appropriate decision making, Appropriate for age attention/concentration  Safety/Judgement: Awareness of environment  Hearing:   Auditory  Auditory Impairment: None  Skin: R AKA dressing c/d/i  Range Of Motion:  AROM: Within functional limits(UEs and L LE)  Strength:    Strength: Generally decreased, functional  Tone & Sensation:   Tone: Normal  Coordination:  Coordination: Within functional limits  Therapeutic Exercises:   L LE SLR x5  Shoulder flexion x10  Tricep isometric x10    Functional Measure:  Tinetti test:    Sitting Balance: 0  Arises: 0  Attempts to Rise: 0  Immediate Standing Balance: 0  Standing Balance: 0  Nudged: 0  Eyes Closed: 0  Turn 360 Degrees - Continuous/Discontinuous: 0  Turn 360 Degrees - Steady/Unsteady: 0  Sitting Down: 0  Balance Score: 0 Balance total score  Indication of Gait: 0  R Step Length/Height: 0  L Step Length/Height: 0  R Foot Clearance: 0  L Foot Clearance: 0  Step Symmetry: 0  Step Continuity: 0  Path: 0  Trunk: 0  Walking Time: 0  Gait Score: 0 Gait total score  Total Score: 0/28 Overall total score         Tinetti Tool Score Risk of Falls  <19 = High Fall Risk  19-24 = Moderate Fall Risk  25-28 = Low Fall Risk  Tinetti ME. Performance-Oriented Assessment of Mobility Problems in Elderly Patients. [facility]; [id number]. (Scoring Description: PT Bulletin Feb. 10, 1993)    Older adults: Leatha Lew et al, 2009; n = 1000 [facility] elderly evaluated with ABC, ETIENNE, ADL, and IADL)  · Mean ETIENNE score for males aged 69-68 years = 26.21(3.40)  · Mean ETIENNE score for females age 69-68 years = 25.16(4.30)  · Mean ETIENNE score for males over 80 years = 23.29(6.02)  · Mean ETIENNE score for females over 80 years = 17.20(8.32)              Pain Rating:  \"better\" than this morning, did not rate    Activity Tolerance:   Fair  Please refer to the flowsheet for vital signs taken during this treatment. After treatment patient left in no apparent distress:   Supine in bed and Caregiver / family present    COMMUNICATION/EDUCATION:   The patients plan of care was discussed with: Registered Nurse. Fall prevention education was provided and the patient/caregiver indicated understanding., Patient/family have participated as able in goal setting and plan of care. and Patient/family agree to work toward stated goals and plan of care.     Thank you for this referral.  Michael Greene, PT, DPT   Time Calculation: 14 mins

## 2019-10-15 NOTE — DIABETES MGMT
[facility]    [facility] Progress Note    Recommendations/ Comments: Chart reviewed for hyperglycemia. Note basal insulin held yesterday due to NPO? Subsequently BG trend >200 mg/dl at this time. 15 units Lantus resumed this morning. Will follow. Current hospital DM medication: Lantus 15 units  AC lispro 4 units TID  Lispro correction scale - high sensitivity    Chart reviewed on Lexi Echeverria. Patient is a 80 y.o. female with known DM on 30-40 units of Lantus and Metformin at home. A1c:   Lab Results   Component Value Date/Time    Hemoglobin A1c 8.8 (H) 09/17/2019 09:21 AM    Hemoglobin A1c 7.0 (H) 02/08/2017 09:59 AM       Recent Glucose Results:   Lab Results   Component Value Date/Time     (H) 10/15/2019 03:24 AM    GLUCPOC 252 (H) 10/15/2019 11:16 AM    GLUCPOC 205 (H) 10/15/2019 06:39 AM    GLUCPOC 268 (H) 10/14/2019 10:10 PM        Lab Results   Component Value Date/Time    Creatinine 0.89 10/15/2019 03:24 AM     Estimated Creatinine Clearance: 49.9 mL/min (based on SCr of 0.89 mg/dL). Active Orders   Diet    DIET DIABETIC CONSISTENT CARB Regular        PO intake:   Patient Vitals for the past 72 hrs:   % Diet Eaten   10/15/19 0932 100 %       Will continue to follow as needed.     Thank you  Mariia Guillen RD, Diabetes Clinician         Time spent: 4 minutes

## 2019-10-15 NOTE — PROGRESS NOTES
TRANSFER - IN REPORT:    Verbal report received from Chanda(name) on Kulwant Hightower  being received from [facility](unit) for routine progression of care      Report consisted of patients Situation, Background, Assessment and   Recommendations(SBAR). Information from the following report(s) SBAR, Kardex and MAR was reviewed with the receiving nurse. Opportunity for questions and clarification was provided. Assessment completed upon patients arrival to unit and care assumed.

## 2019-10-15 NOTE — PROGRESS NOTES
Occupational Therapy Note  10/15/2019    OT followed up with pt regarding re-evaluation POD 1 s/p R AKA. Deferred due to pt pain level. OT to follow up with pt at later time as able/ appropriate.     Thank you,  TERESSA NajeraR/L

## 2019-10-15 NOTE — PROGRESS NOTES
TRANSFER - IN REPORT:    Verbal report received from Maru(name) on Lidya Raw  being received from [address] [place] [address](unit) for routine progression of care      Report consisted of patients Situation, Background, Assessment and   Recommendations(SBAR). Information from the following report(s) SBAR, ED Summary, OR Summary, Procedure Summary, MAR, Med Rec Status and Cardiac Rhythm Chronic A fib was reviewed with the receiving nurse. Opportunity for questions and clarification was provided. Assessment completed upon patients arrival to unit and care assumed.

## 2019-10-15 NOTE — PROGRESS NOTES
Infectious Disease Progress Note       Subjective:   Ms Kassy Nick seen  S/P R AKA  Upset that she was moved to a different room and wants a private room  Denied nausea, vomiting, diarrhea, fever chills  Has a new blister on L foot   Ate well earlier per daughter          ROS: 10 point ROS obtained and pertinent positives as above. All others negative.          Objective:    Vitals:   Patient Vitals for the past 24 hrs:   Temp Pulse Resp BP SpO2   10/15/19 1523 97.9 °F (36.6 °C) 84 18 124/64 98 %   10/15/19 1151  78      10/15/19 1104 97.8 °F (36.6 °C) 92 20 118/56 100 %   10/15/19 0932  81 18  100 %   10/15/19 0750 97.5 °F (36.4 °C) 82 19 145/51 100 %   10/15/19 0320 97.6 °F (36.4 °C) 85 18 128/44 100 %   10/15/19 0156  77  119/44    10/14/19 2302 97.7 °F (36.5 °C) 75 16 93/41 93 %   10/14/19 2259    (!) 88/38    10/14/19 2125  (!) 113  113/70    10/14/19 1914 97.6 °F (36.4 °C) (!) 128 18 133/56 100 %   10/14/19 1635 97.7 °F (36.5 °C) 98 18 136/66 100 %       Physical Exam:  Gen: No apparent distress  HEENT:   no scleral icterus, no thrush ,   CV: S1,2 heard regularly, no lower extremity edema    Lungs: Clear to auscultation anteriorly,  no wheezing  Abdomen: soft, non tender, non distended,   Skin: no rash on visualized areas   Neuro: sleepy , minimally verbal   Musculoskeletal:   RLE  AKA dressing , + L foot blister intact, no erythema around site, no open wounds noted       Medications:    Current Facility-Administered Medications:     fentaNYL citrate (PF) injection 50 mcg, 50 mcg, IntraVENous, Q3H PRN, Tito Crump MD, 50 mcg at 10/15/19 1148    vancomycin (VANCOCIN) 1500 mg in  ml infusion, 1,500 mg, IntraVENous, Q24H, Tito Crump MD, Last Rate: 250 mL/hr at 10/15/19 1300, 1,500 mg at 10/15/19 1300    0.9% sodium chloride infusion 250 mL, 250 mL, IntraVENous, PRN, Tito Crump MD, Last Rate: 15 mL/hr at 10/14/19 1021, 1,000 mL at 10/14/19 1021    ALPRAZomarcin Wiley tablet 0.25 mg, 0.25 mg, Oral, BID PRN, Naima Ulrich MD, 0.25 mg at 10/12/19 1140    cefepime (MAXIPIME) 2 g in 0.9% sodium chloride (MBP/ADV) 100 mL, 2 g, IntraVENous, Q24H, Naima Ulrich MD, Last Rate: 200 mL/hr at 10/14/19 1729, 2 g at 10/14/19 1729    metroNIDAZOLE (FLAGYL) IVPB premix 500 mg, 500 mg, IntraVENous, Q8H, Naima Ulrich MD, Last Rate: 100 mL/hr at 10/15/19 0954, 500 mg at 10/15/19 0954    Vancomycin - pharmacy to dose, , Other, Rx Dosing/Monitoring, Naima Ulrich MD    albuterol-ipratropium (DUO-NEB) 2.5 MG-0.5 MG/3 ML, 3 mL, Nebulization, Q4H PRN, Naima Ulrich MD, 3 mL at 10/14/19 0927    insulin lispro (HUMALOG) injection 4 Units, 4 Units, SubCUTAneous, TIDAC, Naima Ulrich MD, 4 Units at 10/15/19 1149    sodium chloride (OCEAN) 0.65 % nasal squeeze bottle 2 Spray, 2 Spray, Both Nostrils, Q2H PRN, Naima Ulrich MD    oxymetazoline (AFRIN) 0.05 % nasal spray 2 Spray, 2 Spray, Both Nostrils, BID PRN, Naima Ulrich MD    furosemide (LASIX) tablet 40 mg, 40 mg, Oral, DAILY, Naima Ulrich MD, 40 mg at 10/15/19 0932    0.9% sodium chloride infusion 250 mL, 250 mL, IntraVENous, PRN, Naima Ulrich MD    0.9% sodium chloride infusion 250 mL, 250 mL, IntraVENous, PRN, Naima Ulrich MD    apixaban (ELIQUIS) tablet 5 mg, 5 mg, Oral, Q12H, Naima Ulrich MD, 5 mg at 10/15/19 1148    insulin lispro (HUMALOG) injection, , SubCUTAneous, AC&HS, Naima Ulrich MD, 3 Units at 10/15/19 1149    glucose chewable tablet 16 g, 4 Tab, Oral, PRN, Naima Ulrich MD    glucagon (GLUCAGEN) injection 1 mg, 1 mg, IntraMUSCular, PRN, Naima Ulrich MD    dextrose 10% infusion 0-250 mL, 0-250 mL, IntraVENous, PRN, Naima Ulrich MD    albuterol-ipratropium (DUO-NEB) 2.5 MG-0.5 MG/3 ML, 3 mL, Nebulization, Q4H PRN, Naima Ulrich MD, 3 mL at 09/26/19 2019    alum-mag hydroxide-simeth (MYLANTA) oral suspension 30 mL, 30 mL, Oral, Q4H PRN, Naima Ulrich MD, 30 mL at 09/26/19 2144    famotidine (PEPCID) tablet 20 mg, 20 mg, Oral, QPM, Facundo Moreno MD, 20 mg at 10/14/19 1728    metoprolol tartrate (LOPRESSOR) tablet 25 mg, 25 mg, Oral, BID, Facundo Moreno MD, 25 mg at 10/15/19 0931    traMADol (ULTRAM) tablet 25 mg, 25 mg, Oral, Q8H PRN, Facundo Moreno MD, 25 mg at 10/15/19 0931    fentaNYL citrate (PF) injection 50 mcg, 50 mcg, IntraVENous, Q3H PRN, Facundo Moreno MD, 50 mcg at 10/13/19 1906    insulin glargine (LANTUS) injection 15 Units, 15 Units, SubCUTAneous, DAILY, Facundo Moreno MD, 15 Units at 10/15/19 0936    ondansetron (ZOFRAN) injection 4 mg, 4 mg, IntraVENous, Q6H PRN, Facundo Moreno MD    sodium chloride (NS) flush 5-40 mL, 5-40 mL, IntraVENous, Q8H, Facundo Moreno MD, 10 mL at 10/15/19 0602    sodium chloride (NS) flush 5-40 mL, 5-40 mL, IntraVENous, PRN, Facundo Moreno MD, 10 mL at 10/08/19 1237    acetaminophen (TYLENOL) tablet 650 mg, 650 mg, Oral, Q4H PRN, Facundo Moreno MD, 650 mg at 10/15/19 0930    allopurinol (ZYLOPRIM) tablet 100 mg, 100 mg, Oral, DAILY, Facundo Moreno MD, 100 mg at 10/15/19 9416      Labs:  Recent Results (from the past 24 hour(s))   GLUCOSE, POC    Collection Time: 10/14/19  3:55 PM   Result Value Ref Range    Glucose (POC) 220 (H) 65 - 100 mg/dL    Performed by Jigar Muniz    GLUCOSE, POC    Collection Time: 10/14/19  4:38 PM   Result Value Ref Range    Glucose (POC) 221 (H) 65 - 100 mg/dL    Performed by Claiborne Denver    GLUCOSE, POC    Collection Time: 10/14/19 10:10 PM   Result Value Ref Range    Glucose (POC) 268 (H) 65 - 100 mg/dL    Performed by Candis DIAZ (CON)    CK    Collection Time: 10/15/19  3:24 AM   Result Value Ref Range     (H) 26 - 192 U/L   CBC WITH AUTOMATED DIFF    Collection Time: 10/15/19  3:24 AM   Result Value Ref Range    WBC 16.5 (H) 3.6 - 11.0 K/uL    RBC 2.61 (L) 3.80 - 5.20 M/uL    HGB 7.1 (L) 11.5 - 16.0 g/dL    HCT 23.7 (L) 35.0 - 47.0 %    MCV 90.8 80.0 - 99.0 FL    MCH 27.2 26.0 - 34.0 PG    MCHC 30.0 30.0 - 36.5 g/dL    RDW 17.1 (H) 11.5 - 14.5 %    PLATELET 257 (H) 319 - 400 K/uL    MPV 9.7 8.9 - 12.9 FL    NRBC 0.0 0  WBC    ABSOLUTE NRBC 0.00 0.00 - 0.01 K/uL    NEUTROPHILS 86 (H) 32 - 75 %    LYMPHOCYTES 5 (L) 12 - 49 %    MONOCYTES 7 5 - 13 %    EOSINOPHILS 0 0 - 7 %    BASOPHILS 0 0 - 1 %    IMMATURE GRANULOCYTES 2 (H) 0.0 - 0.5 %    ABS. NEUTROPHILS 14.2 (H) 1.8 - 8.0 K/UL    ABS. LYMPHOCYTES 0.8 0.8 - 3.5 K/UL    ABS. MONOCYTES 1.2 (H) 0.0 - 1.0 K/UL    ABS. EOSINOPHILS 0.0 0.0 - 0.4 K/UL    ABS. BASOPHILS 0.0 0.0 - 0.1 K/UL    ABS. IMM. GRANS. 0.3 (H) 0.00 - 0.04 K/UL    DF SMEAR SCANNED      RBC COMMENTS HYPOCHROMIA  1+        RBC COMMENTS ANISOCYTOSIS  1+       METABOLIC PANEL, BASIC    Collection Time: 10/15/19  3:24 AM   Result Value Ref Range    Sodium 136 136 - 145 mmol/L    Potassium 4.0 3.5 - 5.1 mmol/L    Chloride 102 97 - 108 mmol/L    CO2 29 21 - 32 mmol/L    Anion gap 5 5 - 15 mmol/L    Glucose 208 (H) 65 - 100 mg/dL    BUN 17 6 - 20 MG/DL    Creatinine 0.89 0.55 - 1.02 MG/DL    BUN/Creatinine ratio 19 12 - 20      GFR est AA >60 >60 ml/min/1.73m2    GFR est non-AA 60 (L) >60 ml/min/1.73m2    Calcium 8.0 (L) 8.5 - 10.1 MG/DL   GLUCOSE, POC    Collection Time: 10/15/19  6:39 AM   Result Value Ref Range    Glucose (POC) 205 (H) 65 - 100 mg/dL    Performed by [address] [place] [address], POC    Collection Time: 10/15/19 11:16 AM   Result Value Ref Range    Glucose (POC) 252 (H) 65 - 100 mg/dL    Performed by Sera Simmons            Micro:     Blood: 10/4/19  Specimen Information: Blood        Component Value Ref Range & Units Status   Special Requests: NO SPECIAL REQUESTS    Preliminary   Culture result: NO GROWTH 3 DAYS    Preliminary   Result History       Pathology:      Imaging:  RLE   US  Interpretation Summary        · Probable acute thrombus at the mid and distal right popliteal level.   · Absent flow at the distal posterior tibial and anterior tibial levels suggesting occlusion. The right common femoral, deep femoral, femoral, popliteal, posterior tibial and anterior tibial arteries were visualized. No significant plaque is identified from groin to distal thigh. Biphasic flow with no increased velocities were noted. The above knee popliteal waveform is monophasic with low velocities. The popliteal fossa and distal popliteal contain absent flow and suggests an acute occlusion although it is suboptimally seen. Low echogenicity is noted in the popliteal.       The right posterior tibial and anterior tibial arteries contain absent flow with no color fill and suggests occlusion.      Lower Extremity Arterial Findings     Right Lower Arterial     The right distal popliteal artery is occluded. The right distal popliteal artery has homogeneous plaque. Monophasic Doppler waveforms in the right proximal popliteal artery. Biphasic Doppler waveforms in the right distal common femoral, profunda femoris, proximal superficial femoral and distal superficial femoral artery. Doppler waveforms are absent in the anterior tibial and posterior tibial artery. The following arteries are patent: distal common femoral, profunda femoris, proximal superficial femoral and distal superficial femoral.         MRI brain     FINDINGS:  Tiny acute infarct in the right cerebellum. No acute hemorrhage.     Mild generalized parenchymal volume loss with commensurate dilation of the sulci  and ventricular system. Periventricular deep white matter T2/FLAIR  hyperintensities, and patchy T2/FLAIR hyperintensity in the raymundo, consistent  with mild chronic microvascular ischemic disease. Small chronic infarct in the  right frontal periventricular white matter. There is no cerebellar tonsillar  herniation. Expected arterial flow-voids are present. No evidence of abnormal  enhancement.     Paranasal sinuses are clear. Trace bilateral mastoid effusions.  The orbital  contents are within normal limits with bilateral lens implants. No significant  osseous or scalp lesions are identified.     IMPRESSION  IMPRESSION:      1. Tiny acute infarct in the right cerebellum. 2. Mild generalized parenchymal volume loss and mild chronic microvascular  ischemic disease. Small chronic infarct in the right frontal periventricular  white matter. 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 Treated for diastolic CHF exacerbation. Subsequently found to have acute infarct R cerebellum and RLE acute thrombus at the mid and distal right popliteal level. Underwent thrombectomy/embolectomy on 9/20/19. Operative report from 9/20/19 indicates \" thrombectomy yielded fair a amount of thrombus clearly from common iliac area region and this appeared to be organized and fairly thickened\". Then on 9/21/19, underwent thrombectomy of right leg for residual clot , 4-compartment fasciotomy. Operative report from 9/21/19 indicates \"  After entering the fascia, a large amount of discharge and dark blood was seen.   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 Nephrology consult per primary team     4) COPD    5) Afib     6) DM    6) DVT px  Per primary team                Thank you for the opportunity to participate in the care of this patient. Please contact with questions or concerns.       Melinda rPescott, DO   3:39 PM

## 2019-10-15 NOTE — PROGRESS NOTES
Bedside shift change report given to [facility] (oncoming nurse) by April/Pamela (offgoing nurse). Report included the following information SBAR, Kardex, MAR and Cardiac Rhythm A-fib.

## 2019-10-15 NOTE — PROGRESS NOTES
Faculty or Preceptor Review of Student Work    10/15/2019  - Shift times - [phone] to [phone]    The student documentation of patient care for Segundo Thomas has been reviewed and approved. All medications have been administered under the direct supervision of the faculty or preceptor.     Fabiana Trevino RN

## 2019-10-15 NOTE — PROGRESS NOTES
Vascular Surgery  --complaining of incisional pain; ok for fentanyl  --stump wrapped/ dry  --labs ok; recheck cbc in am  --will change dressing tomorrow  --resume therapy; OOB to chair tomorrow

## 2019-10-15 NOTE — PROGRESS NOTES
Problem: Diabetes Self-Management  Goal: *Monitoring blood glucose, interpreting and using results  Description  Identify recommended blood glucose targets  and personal targets. Outcome: Progressing Towards Goal     Problem: Breathing Pattern - Ineffective  Goal: *Absence of hypoxia  Outcome: Progressing Towards Goal  Goal: *Use of effective breathing techniques  Outcome: Progressing Towards Goal     Problem: Falls - Risk of  Goal: *Absence of Falls  Description  Document Baljinder J Luis Fall Risk and appropriate interventions in the flowsheet. Outcome: Progressing Towards Goal  Note:   Fall Risk Interventions:  Mobility Interventions: Mechanical lift    Mentation Interventions: Adequate sleep, hydration, pain control    Medication Interventions: Evaluate medications/consider consulting pharmacy    Elimination Interventions: Call light in reach    History of Falls Interventions: Door open when patient unattended, Room close to nurse's station         Problem: Pressure Injury - Risk of  Goal: *Prevention of pressure injury  Description  Document Jagdeep Scale and appropriate interventions in the flowsheet. 9-25-18: turn q2h and float heels.    Outcome: Progressing Towards Goal  Note:   Pressure Injury Interventions:  Sensory Interventions: Assess changes in LOC    Moisture Interventions: Absorbent underpads    Activity Interventions: Increase time out of bed    Mobility Interventions: Pressure redistribution bed/mattress (bed type)    Nutrition Interventions: Document food/fluid/supplement intake    Friction and Shear Interventions: HOB 30 degrees or less                Problem: Treatment of Deep Venous Thrombosis by Embolectomy  Goal: *Labs within defined limits  Outcome: Progressing Towards Goal  Goal: *Skin integrity maintained  Outcome: Progressing Towards Goal  Goal: *Absence of infection signs and symptoms  Outcome: Progressing Towards Goal

## 2019-10-16 ENCOUNTER — APPOINTMENT (OUTPATIENT)
Dept: GENERAL RADIOLOGY | Age: 84
DRG: 270 | End: 2019-10-16
Attending: HOSPITALIST
Payer: MEDICARE

## 2019-10-16 LAB
ANION GAP SERPL CALC-SCNC: 3 MMOL/L (ref 5–15)
ATRIAL RATE: 54 BPM
ATRIAL RATE: 68 BPM
BASOPHILS # BLD: 0 K/UL (ref 0–0.1)
BASOPHILS NFR BLD: 0 % (ref 0–1)
BUN SERPL-MCNC: 16 MG/DL (ref 6–20)
BUN/CREAT SERPL: 21 (ref 12–20)
CALCIUM SERPL-MCNC: 8.2 MG/DL (ref 8.5–10.1)
CALCULATED P AXIS, ECG09: 69 DEGREES
CALCULATED P AXIS, ECG09: 84 DEGREES
CALCULATED R AXIS, ECG10: 21 DEGREES
CALCULATED R AXIS, ECG10: 34 DEGREES
CALCULATED T AXIS, ECG11: -62 DEGREES
CALCULATED T AXIS, ECG11: 90 DEGREES
CHLORIDE SERPL-SCNC: 107 MMOL/L (ref 97–108)
CK MB CFR SERPL CALC: 1.6 % (ref 0–2.5)
CK MB SERPL-MCNC: 3.5 NG/ML (ref 5–25)
CK SERPL-CCNC: 213 U/L (ref 26–192)
CK SERPL-CCNC: 214 U/L (ref 26–192)
CK SERPL-CCNC: 219 U/L (ref 26–192)
CO2 SERPL-SCNC: 27 MMOL/L (ref 21–32)
CREAT SERPL-MCNC: 0.75 MG/DL (ref 0.55–1.02)
DIAGNOSIS, 93000: NORMAL
DIAGNOSIS, 93000: NORMAL
DIFFERENTIAL METHOD BLD: ABNORMAL
EOSINOPHIL # BLD: 0.2 K/UL (ref 0–0.4)
EOSINOPHIL NFR BLD: 1 % (ref 0–7)
ERYTHROCYTE [DISTWIDTH] IN BLOOD BY AUTOMATED COUNT: 17.3 % (ref 11.5–14.5)
GLUCOSE BLD STRIP.AUTO-MCNC: 117 MG/DL (ref 65–100)
GLUCOSE BLD STRIP.AUTO-MCNC: 152 MG/DL (ref 65–100)
GLUCOSE BLD STRIP.AUTO-MCNC: 160 MG/DL (ref 65–100)
GLUCOSE BLD STRIP.AUTO-MCNC: 85 MG/DL (ref 65–100)
GLUCOSE SERPL-MCNC: 84 MG/DL (ref 65–100)
HCT VFR BLD AUTO: 26.1 % (ref 35–47)
HGB BLD-MCNC: 7.7 G/DL (ref 11.5–16)
IMM GRANULOCYTES # BLD AUTO: 0.3 K/UL (ref 0–0.04)
IMM GRANULOCYTES NFR BLD AUTO: 2 % (ref 0–0.5)
LYMPHOCYTES # BLD: 0.8 K/UL (ref 0.8–3.5)
LYMPHOCYTES NFR BLD: 5 % (ref 12–49)
MAGNESIUM SERPL-MCNC: 2 MG/DL (ref 1.6–2.4)
MCH RBC QN AUTO: 27.4 PG (ref 26–34)
MCHC RBC AUTO-ENTMCNC: 29.5 G/DL (ref 30–36.5)
MCV RBC AUTO: 92.9 FL (ref 80–99)
MONOCYTES # BLD: 1.1 K/UL (ref 0–1)
MONOCYTES NFR BLD: 7 % (ref 5–13)
NEUTS SEG # BLD: 12.6 K/UL (ref 1.8–8)
NEUTS SEG NFR BLD: 85 % (ref 32–75)
NRBC # BLD: 0 K/UL (ref 0–0.01)
NRBC BLD-RTO: 0 PER 100 WBC
P-R INTERVAL, ECG05: 188 MS
P-R INTERVAL, ECG05: 204 MS
PLATELET # BLD AUTO: 422 K/UL (ref 150–400)
PMV BLD AUTO: 9.6 FL (ref 8.9–12.9)
POTASSIUM SERPL-SCNC: 3.9 MMOL/L (ref 3.5–5.1)
Q-T INTERVAL, ECG07: 464 MS
Q-T INTERVAL, ECG07: 480 MS
QRS DURATION, ECG06: 90 MS
QRS DURATION, ECG06: 90 MS
QTC CALCULATION (BEZET), ECG08: 510 MS
QTC CALCULATION (BEZET), ECG08: 539 MS
RBC # BLD AUTO: 2.81 M/UL (ref 3.8–5.2)
RBC MORPH BLD: ABNORMAL
SERVICE CMNT-IMP: ABNORMAL
SERVICE CMNT-IMP: NORMAL
SODIUM SERPL-SCNC: 137 MMOL/L (ref 136–145)
TROPONIN I SERPL-MCNC: <0.05 NG/ML
VENTRICULAR RATE, ECG03: 68 BPM
VENTRICULAR RATE, ECG03: 81 BPM
WBC # BLD AUTO: 15 K/UL (ref 3.6–11)

## 2019-10-16 PROCEDURE — 93005 ELECTROCARDIOGRAM TRACING: CPT

## 2019-10-16 PROCEDURE — 80048 BASIC METABOLIC PNL TOTAL CA: CPT

## 2019-10-16 PROCEDURE — 65660000000 HC RM CCU STEPDOWN

## 2019-10-16 PROCEDURE — 74011250637 HC RX REV CODE- 250/637: Performed by: SURGERY

## 2019-10-16 PROCEDURE — 74011250637 HC RX REV CODE- 250/637: Performed by: HOSPITALIST

## 2019-10-16 PROCEDURE — 85025 COMPLETE CBC W/AUTO DIFF WBC: CPT

## 2019-10-16 PROCEDURE — 36415 COLL VENOUS BLD VENIPUNCTURE: CPT

## 2019-10-16 PROCEDURE — 82962 GLUCOSE BLOOD TEST: CPT

## 2019-10-16 PROCEDURE — 74011000258 HC RX REV CODE- 258: Performed by: SURGERY

## 2019-10-16 PROCEDURE — 74011250636 HC RX REV CODE- 250/636: Performed by: HOSPITALIST

## 2019-10-16 PROCEDURE — 74011636637 HC RX REV CODE- 636/637: Performed by: SURGERY

## 2019-10-16 PROCEDURE — 83735 ASSAY OF MAGNESIUM: CPT

## 2019-10-16 PROCEDURE — 82553 CREATINE MB FRACTION: CPT

## 2019-10-16 PROCEDURE — 71045 X-RAY EXAM CHEST 1 VIEW: CPT

## 2019-10-16 PROCEDURE — 84484 ASSAY OF TROPONIN QUANT: CPT

## 2019-10-16 PROCEDURE — 82550 ASSAY OF CK (CPK): CPT

## 2019-10-16 PROCEDURE — 74011250636 HC RX REV CODE- 250/636: Performed by: SURGERY

## 2019-10-16 RX ORDER — FUROSEMIDE 10 MG/ML
40 INJECTION INTRAMUSCULAR; INTRAVENOUS 2 TIMES DAILY
Status: DISCONTINUED | OUTPATIENT
Start: 2019-10-16 | End: 2019-10-17

## 2019-10-16 RX ORDER — ADHESIVE BANDAGE
30 BANDAGE TOPICAL ONCE
Status: COMPLETED | OUTPATIENT
Start: 2019-10-16 | End: 2019-10-16

## 2019-10-16 RX ORDER — POLYETHYLENE GLYCOL 3350 17 G/17G
17 POWDER, FOR SOLUTION ORAL DAILY
Status: DISCONTINUED | OUTPATIENT
Start: 2019-10-16 | End: 2019-10-23 | Stop reason: HOSPADM

## 2019-10-16 RX ADMIN — VANCOMYCIN HYDROCHLORIDE 1500 MG: 10 INJECTION, POWDER, LYOPHILIZED, FOR SOLUTION INTRAVENOUS at 12:30

## 2019-10-16 RX ADMIN — FENTANYL CITRATE 50 MCG: 50 INJECTION INTRAMUSCULAR; INTRAVENOUS at 14:13

## 2019-10-16 RX ADMIN — METOPROLOL TARTRATE 25 MG: 25 TABLET ORAL at 09:35

## 2019-10-16 RX ADMIN — METRONIDAZOLE 500 MG: 500 INJECTION, SOLUTION INTRAVENOUS at 01:00

## 2019-10-16 RX ADMIN — APIXABAN 5 MG: 5 TABLET, FILM COATED ORAL at 22:58

## 2019-10-16 RX ADMIN — FUROSEMIDE 40 MG: 40 TABLET ORAL at 09:35

## 2019-10-16 RX ADMIN — Medication 10 ML: at 21:29

## 2019-10-16 RX ADMIN — METOPROLOL TARTRATE 25 MG: 25 TABLET ORAL at 21:27

## 2019-10-16 RX ADMIN — METRONIDAZOLE 500 MG: 500 INJECTION, SOLUTION INTRAVENOUS at 09:42

## 2019-10-16 RX ADMIN — APIXABAN 5 MG: 5 TABLET, FILM COATED ORAL at 11:38

## 2019-10-16 RX ADMIN — Medication 10 ML: at 14:13

## 2019-10-16 RX ADMIN — SODIUM CHLORIDE 250 ML: 900 INJECTION, SOLUTION INTRAVENOUS at 21:27

## 2019-10-16 RX ADMIN — CEFEPIME HYDROCHLORIDE 2 G: 2 INJECTION, POWDER, FOR SOLUTION INTRAVENOUS at 17:30

## 2019-10-16 RX ADMIN — TRAMADOL HYDROCHLORIDE 25 MG: 50 TABLET ORAL at 00:22

## 2019-10-16 RX ADMIN — FENTANYL CITRATE 50 MCG: 50 INJECTION INTRAMUSCULAR; INTRAVENOUS at 21:27

## 2019-10-16 RX ADMIN — FENTANYL CITRATE 50 MCG: 50 INJECTION INTRAMUSCULAR; INTRAVENOUS at 18:38

## 2019-10-16 RX ADMIN — METRONIDAZOLE 500 MG: 500 INJECTION, SOLUTION INTRAVENOUS at 18:30

## 2019-10-16 RX ADMIN — ALLOPURINOL 100 MG: 100 TABLET ORAL at 09:35

## 2019-10-16 RX ADMIN — FUROSEMIDE 40 MG: 10 INJECTION, SOLUTION INTRAMUSCULAR; INTRAVENOUS at 17:37

## 2019-10-16 RX ADMIN — Medication 10 ML: at 06:00

## 2019-10-16 RX ADMIN — INSULIN GLARGINE 15 UNITS: 100 INJECTION, SOLUTION SUBCUTANEOUS at 09:40

## 2019-10-16 RX ADMIN — INSULIN LISPRO 4 UNITS: 100 INJECTION, SOLUTION INTRAVENOUS; SUBCUTANEOUS at 12:30

## 2019-10-16 RX ADMIN — FAMOTIDINE 20 MG: 20 TABLET ORAL at 17:37

## 2019-10-16 RX ADMIN — INSULIN LISPRO 4 UNITS: 100 INJECTION, SOLUTION INTRAVENOUS; SUBCUTANEOUS at 17:37

## 2019-10-16 RX ADMIN — MAGNESIUM HYDROXIDE 30 ML: 400 SUSPENSION ORAL at 09:35

## 2019-10-16 RX ADMIN — POLYETHYLENE GLYCOL 3350 17 G: 17 POWDER, FOR SOLUTION ORAL at 09:35

## 2019-10-16 NOTE — PROGRESS NOTES
Infectious Disease Progress Note       Subjective:   Ms Claudeen Primus seen  S/P R AKA  Tired today   Denied pain   Denied nausea, vomiting, diarrhea, fever chills              ROS: 10 point ROS obtained and pertinent positives as above. All others negative.          Objective:    Vitals:   Patient Vitals for the past 24 hrs:   Temp Pulse Resp BP SpO2   10/16/19 1446 97.9 °F (36.6 °C) 75 18 136/68 100 %   10/16/19 1058 97.5 °F (36.4 °C) 88 20 145/72 100 %   10/16/19 0828 97.9 °F (36.6 °C) 98 20 150/70 100 %   10/16/19 0009 97.7 °F (36.5 °C)  18 127/72 100 %   10/15/19 2039 98.1 °F (36.7 °C) 78 17 135/79 98 %       Physical Exam:  Gen: No apparent distress  HEENT:   no scleral icterus, no thrush ,   CV: S1,2 heard regularly, no lower extremity edema    Lungs: Clear to auscultation anteriorly,  no wheezing  Abdomen: soft, non tender, non distended,   Skin: no rash on visualized areas   Neuro: sleepy , minimally verbal   Musculoskeletal:   RLE  AKA dressing , + L foot blister intact, no erythema around site, no open wounds noted , + dark 2nd toe and cool foot, denied sensation to tips of toes       Medications:    Current Facility-Administered Medications:     polyethylene glycol (MIRALAX) packet 17 g, 17 g, Oral, DAILY, Livia HVVGSYC MD MEGAN, 17 g at 10/16/19 0935    furosemide (LASIX) injection 40 mg, 40 mg, IntraVENous, BID, Livia SYXVPMT MD MEGAN    fentaNYL citrate (PF) injection 50 mcg, 50 mcg, IntraVENous, Q3H PRN, Ting Fox MD, 50 mcg at 10/16/19 1413    vancomycin (VANCOCIN) 1500 mg in  ml infusion, 1,500 mg, IntraVENous, Q24H, Ting Fox MD, Last Rate: 250 mL/hr at 10/16/19 1230, 1,500 mg at 10/16/19 1230    0.9% sodium chloride infusion 250 mL, 250 mL, IntraVENous, PRN, Ting Fox MD, Last Rate: 15 mL/hr at 10/14/19 1021, 1,000 mL at 10/14/19 1021    ALPRAZolam (XANAX) tablet 0.25 mg, 0.25 mg, Oral, BID PRN, Ting Fox MD, 0.25 mg at 10/12/19 1140    cefepime (MAXIPIME) 2 g in 0.9% sodium chloride (MBP/ADV) 100 mL, 2 g, IntraVENous, Q24H, Gabriel Simmons MD, Last Rate: 200 mL/hr at 10/15/19 1842, 2 g at 10/15/19 1842    metroNIDAZOLE (FLAGYL) IVPB premix 500 mg, 500 mg, IntraVENous, Q8H, Gabriel Simmons MD, Last Rate: 100 mL/hr at 10/16/19 0942, 500 mg at 10/16/19 0942    Vancomycin - pharmacy to dose, , Other, Rx Dosing/Monitoring, Gabriel Simmons MD    albuterol-ipratropium (DUO-NEB) 2.5 MG-0.5 MG/3 ML, 3 mL, Nebulization, Q4H PRN, Gabriel Simmons MD, 3 mL at 10/14/19 0927    insulin lispro (HUMALOG) injection 4 Units, 4 Units, SubCUTAneous, TIDAC, Gabriel Simmons MD, 4 Units at 10/16/19 1230    sodium chloride (OCEAN) 0.65 % nasal squeeze bottle 2 Spray, 2 Spray, Both Nostrils, Q2H PRN, Gabriel Simmons MD    oxymetazoline (AFRIN) 0.05 % nasal spray 2 Spray, 2 Spray, Both Nostrils, BID PRN, Gabriel Simmons MD    0.9% sodium chloride infusion 250 mL, 250 mL, IntraVENous, PRN, Gabriel Simmons MD    0.9% sodium chloride infusion 250 mL, 250 mL, IntraVENous, PRN, Gabriel Simmons MD    apixaban (ELIQUIS) tablet 5 mg, 5 mg, Oral, Q12H, Gabriel Simmons MD, 5 mg at 10/16/19 1138    insulin lispro (HUMALOG) injection, , SubCUTAneous, AC&HS, Gabriel Simmons MD, Stopped at 10/15/19 2200    glucose chewable tablet 16 g, 4 Tab, Oral, PRN, Gabriel Simmons MD    glucagon (GLUCAGEN) injection 1 mg, 1 mg, IntraMUSCular, PRN, Gabriel Simmons MD    dextrose 10% infusion 0-250 mL, 0-250 mL, IntraVENous, PRN, Gabriel Simmons MD    albuterol-ipratropium (DUO-NEB) 2.5 MG-0.5 MG/3 ML, 3 mL, Nebulization, Q4H PRN, Gabriel Simmons MD, 3 mL at 09/26/19 2019    alum-mag hydroxide-simeth (MYLANTA) oral suspension 30 mL, 30 mL, Oral, Q4H PRN, Gabriel Simmons MD, 30 mL at 09/26/19 2144    famotidine (PEPCID) tablet 20 mg, 20 mg, Oral, QPM, Gabriel Simmons MD, 20 mg at 10/15/19 1841    metoprolol tartrate (LOPRESSOR) tablet 25 mg, 25 mg, Oral, BID, Gabriel Simmons MD, 25 mg at 10/16/19 0935    traMADol (ULTRAM) tablet 25 mg, 25 mg, Oral, Q8H PRN, Xiomara Gaona MD, 25 mg at 10/16/19 0022    fentaNYL citrate (PF) injection 50 mcg, 50 mcg, IntraVENous, Q3H PRN, Xiomara Gaona MD, 50 mcg at 10/13/19 1906    insulin glargine (LANTUS) injection 15 Units, 15 Units, SubCUTAneous, DAILY, Xiomara Gaona MD, 15 Units at 10/16/19 0940    ondansetron (ZOFRAN) injection 4 mg, 4 mg, IntraVENous, Q6H PRN, Xiomara Gaona MD    sodium chloride (NS) flush 5-40 mL, 5-40 mL, IntraVENous, Q8H, Xiomara Gaona MD, 10 mL at 10/16/19 1413    sodium chloride (NS) flush 5-40 mL, 5-40 mL, IntraVENous, PRN, Xiomara Gaona MD, 10 mL at 10/08/19 1237    acetaminophen (TYLENOL) tablet 650 mg, 650 mg, Oral, Q4H PRN, Xiomara Gaona MD, 650 mg at 10/15/19 0930    allopurinol (ZYLOPRIM) tablet 100 mg, 100 mg, Oral, DAILY, Xiomara Gaona MD, 100 mg at 10/16/19 0935      Labs:  Recent Results (from the past 24 hour(s))   GLUCOSE, POC    Collection Time: 10/15/19  4:07 PM   Result Value Ref Range    Glucose (POC) 169 (H) 65 - 100 mg/dL    Performed by Feliberto Castle, POC    Collection Time: 10/15/19  9:13 PM   Result Value Ref Range    Glucose (POC) 120 (H) 65 - 100 mg/dL    Performed by Jessica An    CK    Collection Time: 10/16/19 12:48 AM   Result Value Ref Range     (H) 26 - 186 U/L   METABOLIC PANEL, BASIC    Collection Time: 10/16/19 12:48 AM   Result Value Ref Range    Sodium 137 136 - 145 mmol/L    Potassium 3.9 3.5 - 5.1 mmol/L    Chloride 107 97 - 108 mmol/L    CO2 27 21 - 32 mmol/L    Anion gap 3 (L) 5 - 15 mmol/L    Glucose 84 65 - 100 mg/dL    BUN 16 6 - 20 MG/DL    Creatinine 0.75 0.55 - 1.02 MG/DL    BUN/Creatinine ratio 21 (H) 12 - 20      GFR est AA >60 >60 ml/min/1.73m2    GFR est non-AA >60 >60 ml/min/1.73m2    Calcium 8.2 (L) 8.5 - 10.1 MG/DL   TROPONIN I    Collection Time: 10/16/19 12:48 AM   Result Value Ref Range    Troponin-I, Qt. <0.05 <0.05 ng/mL   CK W/ CKMB & INDEX    Collection Time: 10/16/19 12:48 AM   Result Value Ref Range     (H) 26 - 192 U/L    CK - MB 3.5 <3.6 NG/ML    CK-MB Index 1.6 0.0 - 2.5     MAGNESIUM    Collection Time: 10/16/19 12:48 AM   Result Value Ref Range    Magnesium 2.0 1.6 - 2.4 mg/dL   GLUCOSE, POC    Collection Time: 10/16/19  6:02 AM   Result Value Ref Range    Glucose (POC) 85 65 - 100 mg/dL    Performed by Maggy Alvarez    CK    Collection Time: 10/16/19  8:48 AM   Result Value Ref Range     (H) 26 - 192 U/L   GLUCOSE, POC    Collection Time: 10/16/19 11:19 AM   Result Value Ref Range    Glucose (POC) 152 (H) 65 - 100 mg/dL    Performed by ASHLY REID    EKG, 12 LEAD, INITIAL    Collection Time: 10/16/19 11:26 AM   Result Value Ref Range    Ventricular Rate 81 BPM    Atrial Rate 54 BPM    P-R Interval 188 ms    QRS Duration 90 ms    Q-T Interval 464 ms    QTC Calculation (Bezet) 539 ms    Calculated P Axis 84 degrees    Calculated R Axis 34 degrees    Calculated T Axis 90 degrees    Diagnosis       ** Poor data quality, interpretation may be adversely affected Possible   Atrial fibrillation  ST & T wave abnormality, consider anterolateral ischemia  Prolonged QT  Abnormal ECG  When compared with ECG of 14-OCT-2019 11:18,  No significant change was found  Confirmed by Frantz Baez M.D., Doreen Torrez ([id number]) on 10/16/2019 1:41:01 PM     CBC WITH AUTOMATED DIFF    Collection Time: 10/16/19 12:02 PM   Result Value Ref Range    WBC 15.0 (H) 3.6 - 11.0 K/uL    RBC 2.81 (L) 3.80 - 5.20 M/uL    HGB 7.7 (L) 11.5 - 16.0 g/dL    HCT 26.1 (L) 35.0 - 47.0 %    MCV 92.9 80.0 - 99.0 FL    MCH 27.4 26.0 - 34.0 PG    MCHC 29.5 (L) 30.0 - 36.5 g/dL    RDW 17.3 (H) 11.5 - 14.5 %    PLATELET 517 (H) 239 - 400 K/uL    MPV 9.6 8.9 - 12.9 FL    NRBC 0.0 0  WBC    ABSOLUTE NRBC 0.00 0.00 - 0.01 K/uL    NEUTROPHILS 85 (H) 32 - 75 %    LYMPHOCYTES 5 (L) 12 - 49 %    MONOCYTES 7 5 - 13 %    EOSINOPHILS 1 0 - 7 %    BASOPHILS 0 0 - 1 %    IMMATURE GRANULOCYTES 2 (H) 0.0 - 0.5 %    ABS. NEUTROPHILS 12.6 (H) 1.8 - 8.0 K/UL    ABS. LYMPHOCYTES 0.8 0.8 - 3.5 K/UL    ABS. MONOCYTES 1.1 (H) 0.0 - 1.0 K/UL    ABS. EOSINOPHILS 0.2 0.0 - 0.4 K/UL    ABS. BASOPHILS 0.0 0.0 - 0.1 K/UL    ABS. IMM. GRANS. 0.3 (H) 0.00 - 0.04 K/UL    DF SMEAR SCANNED      RBC COMMENTS HYPOCHROMIA  1+        RBC COMMENTS RONALD CELLS  1+        RBC COMMENTS OVALOCYTES  1+       EKG, 12 LEAD, INITIAL    Collection Time: 10/16/19  1:03 PM   Result Value Ref Range    Ventricular Rate 68 BPM    Atrial Rate 68 BPM    P-R Interval 204 ms    QRS Duration 90 ms    Q-T Interval 480 ms    QTC Calculation (Bezet) 510 ms    Calculated P Axis 69 degrees    Calculated R Axis 21 degrees    Calculated T Axis -62 degrees    Diagnosis       Normal sinus rhythm with 1st degree AV block  ST & T wave abnormality, consider inferior ischemia  ST & T wave abnormality, consider anterolateral ischemia  Prolonged QT  Abnormal ECG  When compared with ECG of 16-OCT-2019 11:26,  Normal sinus rhythm has replaced Atrial fibrillation  Confirmed by Piper Rainey M.D., Lili Minot ([id number]) on 10/16/2019 1:45:18 PM             Micro:     Blood: 10/4/19  Specimen Information: Blood        Component Value Ref Range & Units Status   Special Requests: NO SPECIAL REQUESTS    Preliminary   Culture result: NO GROWTH 3 DAYS    Preliminary   Result History       Pathology:      Imaging:  RLE   US  Interpretation Summary        · Probable acute thrombus at the mid and distal right popliteal level. · Absent flow at the distal posterior tibial and anterior tibial levels suggesting occlusion. The right common femoral, deep femoral, femoral, popliteal, posterior tibial and anterior tibial arteries were visualized. No significant plaque is identified from groin to distal thigh. Biphasic flow with no increased velocities were noted. The above knee popliteal waveform is monophasic with low velocities.   The popliteal fossa and distal popliteal contain absent flow and suggests an acute occlusion although it is suboptimally seen. Low echogenicity is noted in the popliteal.       The right posterior tibial and anterior tibial arteries contain absent flow with no color fill and suggests occlusion.      Lower Extremity Arterial Findings     Right Lower Arterial     The right distal popliteal artery is occluded. The right distal popliteal artery has homogeneous plaque. Monophasic Doppler waveforms in the right proximal popliteal artery. Biphasic Doppler waveforms in the right distal common femoral, profunda femoris, proximal superficial femoral and distal superficial femoral artery. Doppler waveforms are absent in the anterior tibial and posterior tibial artery. The following arteries are patent: distal common femoral, profunda femoris, proximal superficial femoral and distal superficial femoral.         MRI brain     FINDINGS:  Tiny acute infarct in the right cerebellum. No acute hemorrhage.     Mild generalized parenchymal volume loss with commensurate dilation of the sulci  and ventricular system. Periventricular deep white matter T2/FLAIR  hyperintensities, and patchy T2/FLAIR hyperintensity in the raymundo, consistent  with mild chronic microvascular ischemic disease. Small chronic infarct in the  right frontal periventricular white matter. There is no cerebellar tonsillar  herniation. Expected arterial flow-voids are present. No evidence of abnormal  enhancement.     Paranasal sinuses are clear. Trace bilateral mastoid effusions. The orbital  contents are within normal limits with bilateral lens implants. No significant  osseous or scalp lesions are identified.     IMPRESSION  IMPRESSION:      1. Tiny acute infarct in the right cerebellum. 2. Mild generalized parenchymal volume loss and mild chronic microvascular  ischemic disease. Small chronic infarct in the right frontal periventricular  white matter.       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 Treated for diastolic CHF exacerbation. Subsequently found to have acute infarct R cerebellum and RLE acute thrombus at the mid and distal right popliteal level. Underwent thrombectomy/embolectomy on 9/20/19. Operative report from 9/20/19 indicates \" thrombectomy yielded fair a amount of thrombus clearly from common iliac area region and this appeared to be organized and fairly thickened\". Then on 9/21/19, underwent thrombectomy of right leg for residual clot , 4-compartment fasciotomy. Operative report from 9/21/19 indicates \"  After entering the fascia, a large amount of discharge and dark blood was seen. 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 WBC trending down           2) L foot ischemia :    This L foot may very well progress to become infected but at present no signs of open wounds or cellulitis currently   Monitor closely   Surgical intervention per vascular surgery based on goals of care   Currently on Vancomycin, Cefepime and Flagyl   Renally dosed by pharmacy with closer Vancomycin trough level monitoring and renal function   WBC trending down slowly       3) Pathology suggestive of calciphylaxis: plans per primary team and or nephrology. Nephrology consult per primary team     4) COPD    5) Afib     6) DM    6) DVT px  Per primary team         Dr Tracy Palacios is covering 10/17-10/18/19. Thank you for the opportunity to participate in the care of this patient. Please contact with questions or concerns.       Candelaria Peabody,    3:48 PM

## 2019-10-16 NOTE — PROGRESS NOTES
Bedside shift change report given to Abelardo Arguelles (oncoming nurse) by Enoch Rosas (offgoing nurse). Report included the following information SBAR, Kardex and MAR.

## 2019-10-16 NOTE — PROGRESS NOTES
Hospitalist Progress Note  Matt Nathan MD  Answering service: [phone] OR [phone] from in house phone        Date of Service:  10/16/2019  NAME:  Dorys Sousa  :  1931  MRN:  [id number]      Admission Summary:   80-year-old female with an extensive past medical history including chronic hypoxemic respiratory failure with home oxygen, COPD, chronic kidney disease, type 2 diabetes mellitus, atrial fibrillation, diastolic congestive heart failure, dyslipidemia, primary hypertension, osteoporosis, pulmonary hypertension, depression and anxiety disorder, was brought to the emergency room from home for evaluation of an approximately 2-3 day history of worsening shortness of breath    Interval history / Subjective:   Patient seen and examined. She is alert and awake,appears slightly tachypnea  Denied chest pain,fever  She said she did not have bowel movement in quite a while      Assessment & Plan:     Ischemic right leg:   -s/p RLE Thrombectomy 19 with residual clot in Popliteal Artery. -S/P repeat Thrombectomy 19.    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 Continue Apixaban. -ID following on cefepime, flagyl, vancomycin. Leukocytosis persisting and its partly from ischemia. 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  -I have discussed with Dr Mikhail Nava will monitor for now     Acute on chronic anemia,acute blood loss anemia due to R AKA 10/14  -Hb 7.1   -No Hb for this am,discussed with RN to obtain H&H. Will transfuse for Hb>=7      Paroxysmal atrial fibrillation with intermittent RVR:   -On lopressor,currenty HR<90  -Monitor on remote tele    -Echocardiogram in 2019: hyperdynamic EF,multiple valve pathologies(moderate AS,moderate MR,moderate to severe TR) and moderate PAH  -She is on apixaban. Antiplatelets stopped after discussing with vascular surgery due to epistaxis    Acute renal insufficiency: resolved     Hyponatremia:resolved. Continue to monitor     Hyperkalemia: resolved    Acute on chronic diastolic CHF exarcerbation: NYHA class unknown  -Continue lasix daily   -CXR as she seems short of breath     Elevated troponin: suspect demand ischemia    Hypertension: controlled, continue metoprolol    Chronic respiratory failure: home 02,spo2 100% on 3 l/min via nasal canula     Diabetes mellitus Type II: lantus 15 units qhs, 4 units TID, SSI. Monitor     Rhabdomyolysis: resolved    Anxiety, depression      Code status:DNR  DVT prophylaxis: eliquis    Care Plan discussed with: Patient,nurse. 10/16:called and updated her son and Cristine Luna (phone [phone])           :Discussed about transfusion if Hb  <=7,he gave permission for transfusion. I also made him aware of the gangrenous changes on the left toes and dorsum of foot,we are closely monitoring and she may potentially need intervention down the road. Disposition: Needs rehab on discharge. Hospital Problems  Date Reviewed: 10/14/2019          Codes Class Noted POA    * (Principal) CHF exacerbation ([facility] ICD-10-CM: I50.9  ICD-9-CM: 428.0  9/17/2019 Yes        Hyperkalemia ICD-10-CM: E87.5  ICD-9-CM: 276.7  9/17/2019 Yes        Hyponatremia ICD-10-CM: E87.1  ICD-9-CM: 276.1  9/17/2019 Yes        HOLLAND (acute kidney injury) ([facility] ICD-10-CM: N17.9  ICD-9-CM: 584.9  9/17/2019 Yes        Acute hypoxemic respiratory failure ([facility] ICD-10-CM: J96.01  ICD-9-CM: 518.81  8/8/2011 Yes        Debility ICD-10-CM: R53.81  ICD-9-CM: 799.3  6/28/2011 Yes                Review of Systems:   Negative unless stated above      Vital Signs:    Last 24hrs VS reviewed since prior progress note.  Most recent are:  Visit Vitals  /70 (BP 1 Location: Left arm, BP Patient Position: At rest)   Pulse 98   Temp 97.9 °F (36.6 °C)   Resp 20 Ht 5' 5\" (1.651 m)   Wt 94.1 kg (207 lb 6.4 oz)   SpO2 100%   Breastfeeding? No   BMI 34.51 kg/m²         Intake/Output Summary (Last 24 hours) at 10/16/2019 0847  Last data filed at 10/15/2019 2337  Gross per 24 hour   Intake 440.5 ml   Output 660 ml   Net -219.5 ml        Physical Examination:             Constitutional:  No acute distress, cooperative,elderly    ENT:  Oral mucous moist, oropharynx benign. Resp:  Diminished air entry basally. NO wheezing    CV:  Regular rhythm, normal rate, no murmurs, gallops, rubs    GI:  Soft, non distended, non tender. normoactive bowel sounds, no hepatosplenomegaly     Musculoskeletal:  R AKA. Left foot ischemia/gangrene toes. Neurologic: Alert and oriented,confused about details           Data Review:    Review and/or order of clinical lab test      Labs:     Recent Labs     10/15/19  0324 10/14/19  0256   WBC 16.5* 16.2*   HGB 7.1* 8.2*   HCT 23.7* 26.9*   * 419*     Recent Labs     10/16/19  0048 10/15/19  0324 10/14/19  0256    136 137   K 3.9 4.0 3.6    102 102   CO2 27 29 28   BUN 16 17 14   CREA 0.75 0.89 0.79   GLU 84 208* 96   CA 8.2* 8.0* 8.6     No results for input(s): SGOT, GPT, ALT, AP, TBIL, TBILI, TP, ALB, GLOB, GGT, AML, LPSE in the last 72 hours. No lab exists for component: AMYP, HLPSE  No results for input(s): INR, PTP, APTT, INREXT, INREXT in the last 72 hours. No results for input(s): FE, TIBC, PSAT, FERR in the last 72 hours. No results found for: FOL, RBCF   No results for input(s): PH, PCO2, PO2 in the last 72 hours.   Recent Labs     10/16/19  0048 10/15/19  0324 10/14/19  0256   * 276* 226*     Lab Results   Component Value Date/Time    Cholesterol, total 144 09/17/2019 09:21 AM    HDL Cholesterol 55 09/17/2019 09:21 AM    LDL, calculated 72.6 09/17/2019 09:21 AM    Triglyceride 82 09/17/2019 09:21 AM    CHOL/HDL Ratio 2.6 09/17/2019 09:21 AM     Lab Results   Component Value Date/Time    Glucose (POC) 85 10/16/2019 06:02 AM    Glucose (POC) 120 (H) 10/15/2019 09:13 PM    Glucose (POC) 169 (H) 10/15/2019 04:07 PM    Glucose (POC) 252 (H) 10/15/2019 11:16 AM    Glucose (POC) 205 (H) 10/15/2019 06:39 AM     Lab Results   Component Value Date/Time    Color DARK YELLOW 10/05/2019 06:02 PM    Appearance CLOUDY (A) 10/05/2019 06:02 PM    Specific gravity 1.018 10/05/2019 06:02 PM    Specific gravity 1.010 12/18/2011 09:28 AM    pH (UA) 5.5 10/05/2019 06:02 PM    Protein 30 (A) 10/05/2019 06:02 PM    Glucose NEGATIVE  10/05/2019 06:02 PM    Ketone NEGATIVE  10/05/2019 06:02 PM    Bilirubin NEGATIVE  09/17/2019 12:49 PM    Urobilinogen >8.0 (H) 10/05/2019 06:02 PM    Nitrites NEGATIVE  10/05/2019 06:02 PM    Leukocyte Esterase TRACE (A) 10/05/2019 06:02 PM    Epithelial cells FEW 10/05/2019 06:02 PM    Bacteria NEGATIVE  10/05/2019 06:02 PM    WBC 0-4 10/05/2019 06:02 PM    RBC 0-5 10/05/2019 06:02 PM         Medications Reviewed:     Current Facility-Administered Medications   Medication Dose Route Frequency    polyethylene glycol (MIRALAX) packet 17 g  17 g Oral DAILY    magnesium hydroxide (MILK OF MAGNESIA) 400 mg/5 mL oral suspension 30 mL  30 mL Oral ONCE    fentaNYL citrate (PF) injection 50 mcg  50 mcg IntraVENous Q3H PRN    vancomycin (VANCOCIN) 1500 mg in  ml infusion  1,500 mg IntraVENous Q24H    0.9% sodium chloride infusion 250 mL  250 mL IntraVENous PRN    ALPRAZolam (XANAX) tablet 0.25 mg  0.25 mg Oral BID PRN    cefepime (MAXIPIME) 2 g in 0.9% sodium chloride (MBP/ADV) 100 mL  2 g IntraVENous Q24H    metroNIDAZOLE (FLAGYL) IVPB premix 500 mg  500 mg IntraVENous Q8H    Vancomycin - pharmacy to dose   Other Rx Dosing/Monitoring    albuterol-ipratropium (DUO-NEB) 2.5 MG-0.5 MG/3 ML  3 mL Nebulization Q4H PRN    insulin lispro (HUMALOG) injection 4 Units  4 Units SubCUTAneous TIDAC    sodium chloride (OCEAN) 0.65 % nasal squeeze bottle 2 Spray  2 Spray Both Nostrils Q2H PRN    oxymetazoline (AFRIN) 0.05 % nasal spray 2 Spray  2 Spray Both Nostrils BID PRN    furosemide (LASIX) tablet 40 mg  40 mg Oral DAILY    0.9% sodium chloride infusion 250 mL  250 mL IntraVENous PRN    0.9% sodium chloride infusion 250 mL  250 mL IntraVENous PRN    apixaban (ELIQUIS) tablet 5 mg  5 mg Oral Q12H    insulin lispro (HUMALOG) injection   SubCUTAneous AC&HS    glucose chewable tablet 16 g  4 Tab Oral PRN    glucagon (GLUCAGEN) injection 1 mg  1 mg IntraMUSCular PRN    dextrose 10% infusion 0-250 mL  0-250 mL IntraVENous PRN    albuterol-ipratropium (DUO-NEB) 2.5 MG-0.5 MG/3 ML  3 mL Nebulization Q4H PRN    alum-mag hydroxide-simeth (MYLANTA) oral suspension 30 mL  30 mL Oral Q4H PRN    famotidine (PEPCID) tablet 20 mg  20 mg Oral QPM    metoprolol tartrate (LOPRESSOR) tablet 25 mg  25 mg Oral BID    traMADol (ULTRAM) tablet 25 mg  25 mg Oral Q8H PRN    fentaNYL citrate (PF) injection 50 mcg  50 mcg IntraVENous Q3H PRN    insulin glargine (LANTUS) injection 15 Units  15 Units SubCUTAneous DAILY    ondansetron (ZOFRAN) injection 4 mg  4 mg IntraVENous Q6H PRN    sodium chloride (NS) flush 5-40 mL  5-40 mL IntraVENous Q8H    sodium chloride (NS) flush 5-40 mL  5-40 mL IntraVENous PRN    acetaminophen (TYLENOL) tablet 650 mg  650 mg Oral Q4H PRN    allopurinol (ZYLOPRIM) tablet 100 mg  100 mg Oral DAILY     ______________________________________________________________________  EXPECTED LENGTH OF STAY: 6d 14h  ACTUAL LENGTH OF STAY:          29                 Marcus Garvey MD

## 2019-10-16 NOTE — PROGRESS NOTES
Vascular Surgery  --c/o breathing difficulty (chronic) O2 sat is ok  Patient Vitals for the past 12 hrs:   Temp Pulse Resp BP SpO2   10/16/19 0828 97.9 °F (36.6 °C) 98 20 150/70 100 %   10/16/19 0009 97.7 °F (36.5 °C)  18 127/72 100 %     Right AKA site looks ok  Left foot changes noted    Plan:  --needs CBC today  --left foot has worsened significantly--eventually will need amputation on this side unless they decide upon more comfort care (which I think would be appropriate)

## 2019-10-17 LAB
ANION GAP SERPL CALC-SCNC: 5 MMOL/L (ref 5–15)
BASOPHILS # BLD: 0 K/UL (ref 0–0.1)
BASOPHILS NFR BLD: 0 % (ref 0–1)
BUN SERPL-MCNC: 16 MG/DL (ref 6–20)
BUN/CREAT SERPL: 21 (ref 12–20)
CALCIUM SERPL-MCNC: 8.4 MG/DL (ref 8.5–10.1)
CHLORIDE SERPL-SCNC: 106 MMOL/L (ref 97–108)
CK MB CFR SERPL CALC: 1.8 % (ref 0–2.5)
CK MB SERPL-MCNC: 2.6 NG/ML (ref 5–25)
CK SERPL-CCNC: 145 U/L (ref 26–192)
CK SERPL-CCNC: 158 U/L (ref 26–192)
CO2 SERPL-SCNC: 27 MMOL/L (ref 21–32)
CREAT SERPL-MCNC: 0.78 MG/DL (ref 0.55–1.02)
DIFFERENTIAL METHOD BLD: ABNORMAL
EOSINOPHIL # BLD: 0.1 K/UL (ref 0–0.4)
EOSINOPHIL NFR BLD: 1 % (ref 0–7)
ERYTHROCYTE [DISTWIDTH] IN BLOOD BY AUTOMATED COUNT: 17.5 % (ref 11.5–14.5)
GLUCOSE BLD STRIP.AUTO-MCNC: 121 MG/DL (ref 65–100)
GLUCOSE BLD STRIP.AUTO-MCNC: 165 MG/DL (ref 65–100)
GLUCOSE BLD STRIP.AUTO-MCNC: 179 MG/DL (ref 65–100)
GLUCOSE BLD STRIP.AUTO-MCNC: 217 MG/DL (ref 65–100)
GLUCOSE SERPL-MCNC: 110 MG/DL (ref 65–100)
HCT VFR BLD AUTO: 26.7 % (ref 35–47)
HGB BLD-MCNC: 7.9 G/DL (ref 11.5–16)
IMM GRANULOCYTES # BLD AUTO: 0.2 K/UL (ref 0–0.04)
IMM GRANULOCYTES NFR BLD AUTO: 2 % (ref 0–0.5)
LYMPHOCYTES # BLD: 1.2 K/UL (ref 0.8–3.5)
LYMPHOCYTES NFR BLD: 8 % (ref 12–49)
MCH RBC QN AUTO: 27.2 PG (ref 26–34)
MCHC RBC AUTO-ENTMCNC: 29.6 G/DL (ref 30–36.5)
MCV RBC AUTO: 92.1 FL (ref 80–99)
MONOCYTES # BLD: 1.1 K/UL (ref 0–1)
MONOCYTES NFR BLD: 7 % (ref 5–13)
NEUTS SEG # BLD: 12.9 K/UL (ref 1.8–8)
NEUTS SEG NFR BLD: 82 % (ref 32–75)
NRBC # BLD: 0 K/UL (ref 0–0.01)
NRBC BLD-RTO: 0 PER 100 WBC
PLATELET # BLD AUTO: 457 K/UL (ref 150–400)
PMV BLD AUTO: 9.3 FL (ref 8.9–12.9)
POTASSIUM SERPL-SCNC: 4 MMOL/L (ref 3.5–5.1)
RBC # BLD AUTO: 2.9 M/UL (ref 3.8–5.2)
SERVICE CMNT-IMP: ABNORMAL
SODIUM SERPL-SCNC: 138 MMOL/L (ref 136–145)
TROPONIN I SERPL-MCNC: <0.05 NG/ML
WBC # BLD AUTO: 15.6 K/UL (ref 3.6–11)

## 2019-10-17 PROCEDURE — 74011250636 HC RX REV CODE- 250/636: Performed by: SURGERY

## 2019-10-17 PROCEDURE — 82553 CREATINE MB FRACTION: CPT

## 2019-10-17 PROCEDURE — 97168 OT RE-EVAL EST PLAN CARE: CPT

## 2019-10-17 PROCEDURE — 97530 THERAPEUTIC ACTIVITIES: CPT

## 2019-10-17 PROCEDURE — 82550 ASSAY OF CK (CPK): CPT

## 2019-10-17 PROCEDURE — 74011250637 HC RX REV CODE- 250/637: Performed by: HOSPITALIST

## 2019-10-17 PROCEDURE — 74011636637 HC RX REV CODE- 636/637: Performed by: SURGERY

## 2019-10-17 PROCEDURE — 74011000250 HC RX REV CODE- 250: Performed by: HOSPITALIST

## 2019-10-17 PROCEDURE — 94640 AIRWAY INHALATION TREATMENT: CPT

## 2019-10-17 PROCEDURE — 74011000258 HC RX REV CODE- 258: Performed by: SURGERY

## 2019-10-17 PROCEDURE — 85025 COMPLETE CBC W/AUTO DIFF WBC: CPT

## 2019-10-17 PROCEDURE — 74011250637 HC RX REV CODE- 250/637: Performed by: SURGERY

## 2019-10-17 PROCEDURE — 80048 BASIC METABOLIC PNL TOTAL CA: CPT

## 2019-10-17 PROCEDURE — 97535 SELF CARE MNGMENT TRAINING: CPT

## 2019-10-17 PROCEDURE — 36415 COLL VENOUS BLD VENIPUNCTURE: CPT

## 2019-10-17 PROCEDURE — 84484 ASSAY OF TROPONIN QUANT: CPT

## 2019-10-17 PROCEDURE — 77030029684 HC NEB SM VOL KT MONA -A

## 2019-10-17 PROCEDURE — 82962 GLUCOSE BLOOD TEST: CPT

## 2019-10-17 PROCEDURE — 97110 THERAPEUTIC EXERCISES: CPT

## 2019-10-17 PROCEDURE — 65660000000 HC RM CCU STEPDOWN

## 2019-10-17 PROCEDURE — 74011250636 HC RX REV CODE- 250/636: Performed by: HOSPITALIST

## 2019-10-17 RX ORDER — METRONIDAZOLE 500 MG/100ML
500 INJECTION, SOLUTION INTRAVENOUS EVERY 12 HOURS
Status: DISCONTINUED | OUTPATIENT
Start: 2019-10-17 | End: 2019-10-21

## 2019-10-17 RX ORDER — GABAPENTIN 100 MG/1
100 CAPSULE ORAL 2 TIMES DAILY
Status: DISCONTINUED | OUTPATIENT
Start: 2019-10-17 | End: 2019-10-18

## 2019-10-17 RX ORDER — OXYCODONE HYDROCHLORIDE 5 MG/1
5 TABLET ORAL
Status: DISCONTINUED | OUTPATIENT
Start: 2019-10-17 | End: 2019-10-17

## 2019-10-17 RX ORDER — IPRATROPIUM BROMIDE AND ALBUTEROL SULFATE 2.5; .5 MG/3ML; MG/3ML
3 SOLUTION RESPIRATORY (INHALATION)
Status: DISCONTINUED | OUTPATIENT
Start: 2019-10-17 | End: 2019-10-18

## 2019-10-17 RX ORDER — OXYCODONE HYDROCHLORIDE 5 MG/1
5 TABLET ORAL
Status: DISCONTINUED | OUTPATIENT
Start: 2019-10-17 | End: 2019-10-22

## 2019-10-17 RX ORDER — BUMETANIDE 0.25 MG/ML
1 INJECTION INTRAMUSCULAR; INTRAVENOUS 2 TIMES DAILY
Status: DISCONTINUED | OUTPATIENT
Start: 2019-10-17 | End: 2019-10-18

## 2019-10-17 RX ORDER — IPRATROPIUM BROMIDE AND ALBUTEROL SULFATE 2.5; .5 MG/3ML; MG/3ML
3 SOLUTION RESPIRATORY (INHALATION)
Status: DISCONTINUED | OUTPATIENT
Start: 2019-10-17 | End: 2019-10-17

## 2019-10-17 RX ORDER — ACETAMINOPHEN 500 MG
1000 TABLET ORAL 2 TIMES DAILY
Status: DISCONTINUED | OUTPATIENT
Start: 2019-10-17 | End: 2019-10-23 | Stop reason: HOSPADM

## 2019-10-17 RX ADMIN — METRONIDAZOLE 500 MG: 500 INJECTION, SOLUTION INTRAVENOUS at 09:45

## 2019-10-17 RX ADMIN — FENTANYL CITRATE 50 MCG: 50 INJECTION INTRAMUSCULAR; INTRAVENOUS at 16:39

## 2019-10-17 RX ADMIN — FENTANYL CITRATE 50 MCG: 50 INJECTION INTRAMUSCULAR; INTRAVENOUS at 09:18

## 2019-10-17 RX ADMIN — POLYETHYLENE GLYCOL 3350 17 G: 17 POWDER, FOR SOLUTION ORAL at 09:20

## 2019-10-17 RX ADMIN — ALPRAZOLAM 0.25 MG: 0.25 TABLET ORAL at 22:21

## 2019-10-17 RX ADMIN — Medication 10 ML: at 07:31

## 2019-10-17 RX ADMIN — APIXABAN 5 MG: 5 TABLET, FILM COATED ORAL at 22:21

## 2019-10-17 RX ADMIN — CEFEPIME HYDROCHLORIDE 2 G: 2 INJECTION, POWDER, FOR SOLUTION INTRAVENOUS at 16:38

## 2019-10-17 RX ADMIN — TRAMADOL HYDROCHLORIDE 25 MG: 50 TABLET ORAL at 22:21

## 2019-10-17 RX ADMIN — INSULIN GLARGINE 15 UNITS: 100 INJECTION, SOLUTION SUBCUTANEOUS at 09:27

## 2019-10-17 RX ADMIN — INSULIN LISPRO 4 UNITS: 100 INJECTION, SOLUTION INTRAVENOUS; SUBCUTANEOUS at 12:29

## 2019-10-17 RX ADMIN — METRONIDAZOLE 500 MG: 500 INJECTION, SOLUTION INTRAVENOUS at 22:24

## 2019-10-17 RX ADMIN — VANCOMYCIN HYDROCHLORIDE 1500 MG: 10 INJECTION, POWDER, LYOPHILIZED, FOR SOLUTION INTRAVENOUS at 12:39

## 2019-10-17 RX ADMIN — METRONIDAZOLE 500 MG: 500 INJECTION, SOLUTION INTRAVENOUS at 02:01

## 2019-10-17 RX ADMIN — IPRATROPIUM BROMIDE AND ALBUTEROL SULFATE 3 ML: .5; 3 SOLUTION RESPIRATORY (INHALATION) at 14:46

## 2019-10-17 RX ADMIN — INSULIN LISPRO 1 UNITS: 100 INJECTION, SOLUTION INTRAVENOUS; SUBCUTANEOUS at 22:22

## 2019-10-17 RX ADMIN — FUROSEMIDE 40 MG: 10 INJECTION, SOLUTION INTRAMUSCULAR; INTRAVENOUS at 09:18

## 2019-10-17 RX ADMIN — Medication 10 ML: at 22:22

## 2019-10-17 RX ADMIN — OXYCODONE HYDROCHLORIDE 5 MG: 5 TABLET ORAL at 18:10

## 2019-10-17 RX ADMIN — Medication 10 ML: at 13:07

## 2019-10-17 RX ADMIN — Medication 10 ML: at 16:40

## 2019-10-17 RX ADMIN — APIXABAN 5 MG: 5 TABLET, FILM COATED ORAL at 11:14

## 2019-10-17 RX ADMIN — INSULIN LISPRO 4 UNITS: 100 INJECTION, SOLUTION INTRAVENOUS; SUBCUTANEOUS at 17:47

## 2019-10-17 RX ADMIN — FENTANYL CITRATE 50 MCG: 50 INJECTION INTRAMUSCULAR; INTRAVENOUS at 13:04

## 2019-10-17 RX ADMIN — METOPROLOL TARTRATE 25 MG: 25 TABLET ORAL at 22:21

## 2019-10-17 RX ADMIN — METOPROLOL TARTRATE 25 MG: 25 TABLET ORAL at 09:17

## 2019-10-17 RX ADMIN — BUMETANIDE 1 MG: 0.25 INJECTION INTRAMUSCULAR; INTRAVENOUS at 18:10

## 2019-10-17 RX ADMIN — FAMOTIDINE 20 MG: 20 TABLET ORAL at 18:10

## 2019-10-17 RX ADMIN — ACETAMINOPHEN 1000 MG: 500 TABLET ORAL at 18:10

## 2019-10-17 RX ADMIN — IPRATROPIUM BROMIDE AND ALBUTEROL SULFATE 3 ML: .5; 3 SOLUTION RESPIRATORY (INHALATION) at 22:33

## 2019-10-17 RX ADMIN — ALLOPURINOL 100 MG: 100 TABLET ORAL at 09:17

## 2019-10-17 RX ADMIN — GABAPENTIN 100 MG: 100 CAPSULE ORAL at 18:10

## 2019-10-17 NOTE — PROGRESS NOTES
Problem: Self Care Deficits Care Plan (Adult)  Goal: *Acute Goals and Plan of Care (Insert Text)  Description    FUNCTIONAL STATUS PRIOR TO ADMISSION: Patient was modified independent for ADLs and does not use DME for functional mobility. At baseline patient uses no supplemental oxygen. HOME SUPPORT: The patient lived with son and DIL but did not require assist.    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 Patient will perform self-feeding at least 50% of meals with supervision/set-up within 7 day(s). 2.  Patient will perform upper body dressing with min assistance  within 7 day(s). 3.  Patient will perform lower body dressing with maximal assistance within 7 day(s). 4.  Patient will perform toilet transfers with maximal assistance x 2 within 7 day(s). 5.  Patient will perform all aspects of toileting with maximal assistance within 7 day(s). 6.  Patient will participate in upper extremity therapeutic exercise/activities with supervision/set-up for 5 minutes within 7 day(s). 7.  Patient will utilize energy conservation, fall prevention, pain management and desensitization with AKA techniques during functional activities with verbal, visual and tactile cues within 7 day(s). Goals reviewed and continued: 10/4/19  Initiated 9/23/2019  1. Patient will perform grooming with supervision/set-up within 7 day(s). 2.  Patient will perform upper body ADLs with supervision/set-up within 7 day(s). Simulates in bathing with set up 10/4/19. Continue in practice within 7 days. 3.  Patient will perform lower body ADLs with moderate assistance  within 7 day(s). 4.  Patient will perform toilet transfers to/from [facility] with moderate assistance  within 7 day(s). 5.  Patient will perform all aspects of toileting with moderate assistance  within 7 day(s).   6.  Patient will participate in upper extremity therapeutic exercise/activities with supervision/set-up for 5 minutes within 7 day(s). 7.  Patient will utilize energy conservation techniques during functional activities with verbal cues within 7 day(s). Outcome: Progressing Towards Goal     Problem: Patient Education: Go to Patient Education Activity  Goal: Patient/Family Education  Outcome: Progressing Towards Goal   OCCUPATIONAL THERAPY RE-EVALUATION  Patient: Adams Weathers (60 y.o. female)  Date: 10/17/2019  Diagnosis: CHF exacerbation ([facility] [address] [I50.9] CHF exacerbation (HCC)  Procedure(s) (LRB):  RIGHT ABOVE KNEE AMPUTATION (Right) 3 Days Post-Op  Precautions: Fall, DNR(AKA 10-14; Hosp admit 9-17-19; 2L O2 PTA)  Chart, occupational therapy assessment, plan of care, and goals were reviewed. 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 General debility limiting factor as well as anxiety regarding new AKA; doing well with initial desensitization tasks despite 10/10 pain reported. Tachy HR up to 124 this session with activity; O2 100% 3L. Current Level of Function Impacting Discharge (ADLs): max A UE ADLs, D LE ADLs, D functional mobility    Other factors to consider for discharge: did not need any assist self care upon admission 9-17 1 month ago         PLAN :  Recommendations and Planned Interventions: self care training, functional mobility training, therapeutic exercise, balance training, therapeutic activities, cognitive retraining, endurance activities, patient education, home safety training, family training/education and other: desensitization with R AKA with pain management    Frequency/Duration: Patient will be followed by occupational therapy 5 times a week to address goals.     Recommend with staff: encourage desensitization    Recommend next OT session: c/o with PT to assist patient into chair with most safe technique    Recommendation for discharge: (in order for the patient to meet his/her long term goals)  Therapy up to 5 days/week in SNF setting    This discharge recommendation:  Has been made in collaboration with the attending provider and/or case management    Equipment recommendations for successful discharge (if) home: bedside commode, hospital bed, mechanical lift, transfer bench, wheelchair and sliding/transfer board       SUBJECTIVE:   Patient stated I can do these exercises; I didn't need any help before.     OBJECTIVE DATA SUMMARY:   Hospital course since last seen and reason for reevaluation: multiple surgeries with subsequent R AKA 10-14    Cognitive/Behavioral Status:  Neurologic State: Alert; Appropriate for age  Orientation Level: Oriented X4  Cognition: Follows commands(not formally assessed; MoCA needed for accurate assessment)  Perception: Appears intact  Perseveration: No perseveration noted  Safety/Judgement: Fall prevention;Decreased insight into deficits(but learning and willing and verbalizing understanding)    Functional Mobility and Transfers for ADLs:  Bed Mobility:  Rolling: Maximum assistance(better participation, smiling, not much moving 10/10P!)  Supine to Sit: Total assistance  Sit to Supine: Maximum assistance  Scooting: (D-inferred; )    Transfers:  Sit to Stand: Total assistance(inferred)  Functional Transfers  Toilet Transfer : (reports Bowel not going and urine not stopping)  Bed to Chair: Total assistance;Assist x2; Additional time; Adaptive equipment(inferred; recommend slide board or lift PRN)    Balance:  Sitting: Impaired; Without support  Sitting - Static: Poor (constant support)  Standing: (not tolerating standing L LE, low tolerance toedge of bed )    ADL Assessment:  Feeding: Maximum assistance(poor appetite; did drink juice and eat grape with max cajoel)    Oral Facial Hygiene/Grooming: Maximum assistance; Moderate assistance(willing to wash face and hands set up, no oral or hair care )    Bathing: Maximum assistance; Total assistance(simulated bed bath, willing to move UEs over body)    Upper Body Dressing: Maximum assistance; Moderate assistance    Lower Body Dressing: Total assistance    Toileting: Total assistance                ADL Intervention and task modifications:       desensitization and pain management training with R AKA with good participation; trained benefit of upright position, mobility and exercise as shes c/o bowels not moving  Cognitive Retraining  Safety/Judgement: Fall prevention;Decreased insight into deficits(but learning and willing and verbalizing understanding)      Functional Measure:  Barthel Index:  Barthel Index:    Bathin  Bladder: 0  Bowels: 0  Groomin  Dressin  Feedin  Mobility: 0  Stairs: 0  Toilet Use: 0  Transfer (Bed to Chair and Back): 0  Total: 5/100        The Barthel ADL Index: Guidelines  1. The index should be used as a record of what a patient does, not as a record of what a patient could do. 2. The main aim is to establish degree of independence from any help, physical or verbal, however minor and for whatever reason. 3. The need for supervision renders the patient not independent. 4. A patient's performance should be established using the best available evidence. Asking the patient, friends/relatives and nurses are the usual sources, but direct observation and common sense are also important. However direct testing is not needed. 5. Usually the patient's performance over the preceding 24-48 hours is important, but occasionally longer periods will be relevant. 6. Middle categories imply that the patient supplies over 50 per cent of the effort. 7. Use of aids to be independent is allowed. Nish Mcmahon., Barthel, D.W. ([id number]). Functional evaluation: the Barthel Index. [address] [facility] (14)2. GISELA ParkJCLAUDIA, Morena Dobson., Charlie Solitario., Jose M, [address] ().  Measuring the change indisability after inpatient rehabilitation; comparison of the responsiveness of the Barthel Index and Functional [place] Measure. Journal of Neurology, Neurosurgery, and Psychiatry, 66(4), 388-491. MARCELLA Macias, BARRY Garcia, & Anushka Davis M.A. (2004.) Assessment of post-stroke quality of life in cost-effectiveness studies: The usefulness of the Barthel Index and the EuroQoL-5D. Quality of Life Research, 13, 427-43                          Pain:  10/10; nsg notified of R AKA pain; medicated at end of session    Activity Tolerance:   Poor, desaturates with exertion and requires oxygen, requires frequent rest breaks and observed SOB with activity  Please refer to the flowsheet for vital signs taken during this treatment.     After treatment patient left in no apparent distress:   Supine in bed, Heels elevated for pressure relief, Call bell within reach, Side rails x 3 and head of bed elevated to simulate sitting position/begin increasing endurance/tolerance for positional changes     COMMUNICATION/COLLABORATION:   The patients plan of care was discussed with: Registered Nurse and Certified Nursing Assistant/Patient Care Technician    Fay HERNANDEZ/L  Time Calculation: 34 mins

## 2019-10-17 NOTE — PROGRESS NOTES
Clinical Pharmacy Note: Metronidazole Dosing    Please note that the metronidazole dose for Rose Mary Malloy has been changed to 500 mg IV q12h per [facility]-approved protocol. Please contact the pharmacy with any questions.     Catie Brewer, JAIROND

## 2019-10-17 NOTE — PROGRESS NOTES
NUTRITION COMPLETE ASSESSMENT    RECOMMENDATIONS:   1. Assistance with all meals   - use supplements to give medications   - document % meals consumed in I/O flowsheet  2. Add daily MVI for wound healing  3. Consider trial of appetite stimulant if intake remains poor     Interventions/Plan:   Food/Nutrient Delivery:  Modify diet/texture/consistency/nutrients Commercial supplement          Assessment:   Reason for Assessment: Reassessment    Diet: consistent CHO  Supplements: Glucerna 3x/day and Boost Pudding 2x/day  Nutritionally Significant Medications: [x] Reviewed & Includes: allopurinol, bumex, cefepime, pepcid, lantus (15 units) SSI, Humalog (4 units), flagyl, miralax, vanco; PRN: zofran  Meal Intake:   Patient Vitals for the past 100 hrs:   % Diet Eaten   10/17/19 0953 0 %   10/15/19 0932 100 %     Pre-Hospitalization:  Usual Appetite: Good  Diet at Home: regular    Current Hospitalization:   Appetite: Poor  PO Ability:   Average po intake:25-50%  Average supplements intake:        Subjective: Pt unavailable x 2 attempts. Objective:  Pt admitted for CHF. PMHx: DM, breast CA, CHF, HTN, anxiety. S/p right AKA on 10/14. ID following for abx. Left foot wounds also noted but plans for monitoring. Pt with poor appetite. Requires assistance with feeding at all meals. Per RN refused breakfast this morning but was drinking some of Glucerna shakes yesterday. Will continue shakes and supplements as ordered for now for added protein: 1140kcal, 44g protein. Add daily MVI for wound healing. If appetite remains poor could consider appetite stimulant trial, likely that more aggressive intervention would not be appropriate with advanced age. Estimated Nutrition Needs:   Kcals/day: 1700 Kcals/day(BMR 1300 x 1.3+)  Protein: 105 g(1.2 gm/kg)  Fluid: 1700 ml(1 mL/kcal; <2000 mL/day for CHF)  Based On:  [address] [facility]  Weight Used: Actual wt    Pt expected to meet estimated nutrient needs:  []   Yes     []  No [x] Unable to predict at this time  Nutrition Diagnosis:   1. Inadequate oral intake related to decreased appetite, pain as evidenced by pt and family report consuming <50% of meals  Goals:     pt will consume >50% of meals and/or at least 1 supplement daily in the next 3-4 days     Monitoring & Evaluation:    - Total energy intake, Liquid meal replacement, Oral fluids amount   - Weight/weight change, Glucose profile    Previous Nutrition Goals Met:   N/A  Previous Recommendations:    N/A    Education & Discharge Needs:   [x] None Identified   [] Identified and addressed    [x] Participated in care plan, discharge planning, and/or interdisciplinary rounds        Cultural, Cheondoism and ethnic food preferences identified: None    Skin Integrity: []Intact  [x]Other: right AKA, left foot wounds  Edema: []None [x]Other: 2+ LLE  Last BM: 10/16  Food Allergies: [x]None []Other  Diet Restrictions: Cultural/Yarsani Preference(s): None     Anthropometrics:    Weight Loss Metrics 10/17/2019 8/30/2019 10/30/2017 9/7/2017 8/2/2017 5/15/2017 4/3/2017   Today's Wt 196 lb 3.4 oz 194 lb 191 lb 9.6 oz 187 lb 12.8 oz 186 lb 178 lb 172 lb   BMI 32.65 kg/m2 32.28 kg/m2 31.88 kg/m2 31.25 kg/m2 30.95 kg/m2 29.62 kg/m2 28.62 kg/m2      Weight Source: Bed  Height: 5' 5\" (165.1 cm),    Body mass index is 32.65 kg/m².      IBW : 56.7 kg (125 lb), % IBW (Calculated): 155.2 %   ,      Labs:    Lab Results   Component Value Date/Time    Sodium 138 10/17/2019 04:22 AM    Potassium 4.0 10/17/2019 04:22 AM    Chloride 106 10/17/2019 04:22 AM    CO2 27 10/17/2019 04:22 AM    Glucose 110 (H) 10/17/2019 04:22 AM    BUN 16 10/17/2019 04:22 AM    Creatinine 0.78 10/17/2019 04:22 AM    Calcium 8.4 (L) 10/17/2019 04:22 AM    Magnesium 2.0 10/16/2019 12:48 AM    Phosphorus 3.1 09/26/2019 02:07 PM    Albumin 1.9 (L) 09/26/2019 02:07 PM     Lab Results   Component Value Date/Time    Hemoglobin A1c 8.8 (H) 09/17/2019 09:21 AM    Hemoglobin A1c (POC) 7.2 11/19/2015 09:40 AM     Abdirahman Boyle [address] , Pager #[phone] or [phone]

## 2019-10-17 NOTE — PROGRESS NOTES
Bedside shift change report given to Ga Granger (oncoming nurse) by Cole Sy (offgoing nurse). Report included the following information SBAR, Kardex and MAR.

## 2019-10-17 NOTE — PROGRESS NOTES
Vascular Surgery  --not feeling well (trouble breathing, bilateral leg pain)  Patient Vitals for the past 12 hrs:   Temp Pulse Resp BP SpO2   10/17/19 0900  (!) 116   100 %   10/17/19 0845 98.5 °F (36.9 °C) (!) 101 19 149/82 100 %     --right AKA site wrapped--dry  --left foot is demarcating  Labs ok    A/P  --she is opposed to further procedures  --I think goal of comfort/palliation is appropriate

## 2019-10-17 NOTE — PROGRESS NOTES
Problem: Mobility Impaired (Adult and Pediatric)  Goal: *Acute Goals and Plan of Care (Insert Text)  Description  FUNCTIONAL STATUS PRIOR TO ADMISSION: Patient was independent for all functional mobility. Patient uses 2L/min O2 via nasal cannula at baseline. HOME SUPPORT PRIOR TO ADMISSION: The patient lived with her son and daughter but did not require assist.    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 Patient will move from supine to sit and sit to supine , scoot up and down and roll side to side in bed with moderate assistance within 7 day(s). 2.  Patient will transfer from bed to chair and chair to bed with maximal assistance using the least restrictive device within 7 day(s). 3.  Patient will perform sit to stand with maximal assistance within 7 day(s). 4.  Patient will participate in residual limb exercise program with max verbal cues for technique and min assist for limb management within 7 days. 5.  Patient will be able to name and perform one technique for assisting with management of phantom limb pain within 7 days. 6.  Patient will be independent with L LE and UE HEP for global strengthening in prep for transfers within 7 days. Weekly re-assessment 10/4/2019  1. Patient will move from supine to sit and sit to supine  in bed with modified independence within 7 day(s). 2.  Patient will transfer from bed to chair and chair to bed with contact guard assist consistently  using the least restrictive device within 7 day(s). 3.  Patient will perform sit to stand with contact guard  assistance  within 7 day(s). 4.  Patient will ambulate with minimal assistance  for 15 feet with the least restrictive device within 7 day(s). Reassessed 9/27/19  1. Patient will move from supine to sit and sit to supine  in bed with modified independence within 7 day(s).     2.  Patient will transfer from bed to chair and chair to bed with moderate assistance  using the least restrictive device within 7 day(s). 3.  Patient will perform sit to stand with moderate assistance  within 7 day(s). 4.  Patient will ambulate with moderate assistance  for 15 feet with the least restrictive device within 7 day(s). Initiated 9/18/2019  1. Patient will move from supine to sit and sit to supine  in bed with independence within 7 day(s). 2.  Patient will transfer from bed to chair and chair to bed with independence using the least restrictive device within 7 day(s). 3.  Patient will perform sit to stand with independence within 7 day(s). 4.  Patient will ambulate with independence for 150 feet with the least restrictive device within 7 day(s). 5.  Patient will ascend/descend 1 stairs with 1 handrail(s) with modified independence within 7 day(s). Outcome: Progressing Towards Goal  PHYSICAL THERAPY TREATMENT  Patient: Segundo Thomas (41 y.o. female)  Date: 10/17/2019  Diagnosis: CHF exacerbation (HCC) [I50.9] CHF exacerbation (HCC)  Procedure(s) (LRB):  RIGHT ABOVE KNEE AMPUTATION (Right) 3 Days Post-Op  Precautions: Fall, DNR(AKA 10-14; Hosp admit 9-17-19; 2L O2 PTA)  Chart, physical therapy assessment, plan of care and goals were reviewed. ASSESSMENT  Patient continues with skilled PT services and is slowly progressing towards goals. Pt more cooperative today, pt demonstrating desensitization techniques, increased active right hip movements in supine and sidelying. Continues with low tolerance for sitting - pt appears fearful - sitting edge of bed - ? Fear of falling - recommend 2 person assist to re-assure pt and increase confidence.   Pt also continues to report anxiety - nursing aware and will discuss with MD.    Current Level of Function Impacting Discharge (mobility/balance): Min assist with extra time for rolling right and left; total assist for supine to/from sit, limited sitting tolerance secondary to anxiety/fear    Other factors to consider for discharge: per chart worsening of left foot/toes - ? Amputation in future         PLAN :  Patient continues to benefit from skilled intervention to address the above impairments. Continue treatment per established plan of care. to address goals. Recommendation for discharge: (in order for the patient to meet his/her long term goals)  Therapy up to 5 days/week in SNF setting    This discharge recommendation:  Has been made in collaboration with the attending provider and/or case management    Equipment recommendations for successful discharge (if) home: to be determined in rehab        SUBJECTIVE:   Patient stated I take a anxiety pill at home - why can't I have one here? Brandi Gallegos    OBJECTIVE DATA SUMMARY:   Critical Behavior:  Neurologic State: Alert  Orientation Level: Oriented X4  Cognition: Follows commands  Safety/Judgement: Fall prevention, Decreased insight into deficits(but learning and willing and verbalizing understanding)  Functional Mobility Training:  Bed Mobility:  Rolling: Minimum assistance; Additional time(verbal cues to use bed rails)  Supine to Sit: Total assistance  Sit to Supine: Maximum assistance  Scooting: Total assistance      Worked on rolling right and left - performed x 3 - pt required cues and encouragement - improved performance with practiced. Balance:  Sitting: Impaired; With support  Sitting - Static: Poor (constant support)(pt declined immediately - ? fear of falling)   Sat x 10 seconds with max assist.                                Therapeutic Exercises:   Supine - pt performed 5 reps right hip flexion and abduction; left leg - heel slides; left sidelying - active assist for right hip abduction. Initially patient stating \" I don't have a leg to move - but with repetition - pt able to visualize and feel movement with right hand. \"    Pain Rating:  10//10 - bilateral legs - pt reports both leg (right phantom limb pain) and left leg equal; nursing aware of severe pain    Activity Tolerance:   Poor  Please refer to the flowsheet for vital signs taken during this treatment.     After treatment patient left in no apparent distress:   Supine in bed and Call bell within reach    COMMUNICATION/COLLABORATION:   The patients plan of care was discussed with: Registered Nurse    Neto Davis, PT   Time Calculation: 25 mins

## 2019-10-17 NOTE — PROGRESS NOTES
MARIANN Plan:    [facility] -Nuzhat Copeland [phone]   * Family has requested [facility] for rehab- Kathy Best will be required  * UAI was successfully completed    Have sent medical updates to [facility]. Patient is POD 3 of R AKA. Per IDR's and review there are  gangrenous changes on L toes and additional intervention may be required. CM to follow for transition of care needs.     Kimmie Perezej 58

## 2019-10-17 NOTE — WOUND CARE
Wound Consult: Follow Up Visit. Chart reviewed. Consulted for right leg originally which patient has had AKA; continue to follow for pressure injury prevention and note left foot starting to have vascular issues and documentation of Dr. Medina Both. Spoke with patients nurse,  Heather Duron as bedside and we provided care to patient. Patient is resting on a Saavn P500 air support bed. She voices she still has pain in legs - residual limb and left now. Assessment: 
Sacral/gluteal cleft - small 0.4 x 0.4 cm pink denuded area in cleft, no surrounding discoloration; appears moisture related. Right lateral buttocks - abrasions resolved, no injury of discoloration. No discoloration to gluteal folds or left buttock. No discoloration to left heel however, dorsal foot has dark discoloration with dark blistering beginning. Vascular related. She is using brief; had bright red blood in small amount in brief, Heather Duron reports staff noted and discontinued Pure wick to rule out any trauma causing - we looked in vaginal area/labia but could see no outward injury. Perineal area / inner thighs intact, no rash, some chronic dyschromia secondary to moisture. Treatment: 
Z guard to gluteal cleft/sacrum. Off loading heel boot to left foot. Turned and repositioned. Hydroguard to perineal area / inner thighs. Wound Recommendations: 
R AKA per vascular. Left foot also followed by Vascular - if opens or weeps would use dry dressing to protect. Z guard for buttocks/gluteal cleft to keep lightly coated. Skin Care Recommendations: 1. Minimize friction/shear: minimize layers of linen/pads under patient. 2. Off load pressure/reposition: continue to turn and reposition approximately every 2 hours; float heel/or use Heel Medix boot.  
3. Manage Moisture - keep skin folds dry; incontinence skin care; appropriate sized briefs if needed; avoiding Pure wick as some bleeding noted - if continues without Pure wick would notify MD for GYN consult. 4. Continue to monitor nutrition, pain, and skin risk scale, and skin assessment. Plan: Will hand off to MD. We will continue to reassess routinely and as needed. Dorina Umanzor RN,[facility] Wound Healing Office [phone] Pager [phone]

## 2019-10-17 NOTE — PROGRESS NOTES
10/17/19 1641   Vital Signs   Temp 97.7 °F (36.5 °C)   Temp Source Axillary   Pulse (Heart Rate) (!) 114   Heart Rate Source Monitor   Resp Rate 19   O2 Sat (%) 97 %   Level of Consciousness Alert   /75   MAP (Calculated) 87   BP 1 Method Automatic   BP 1 Location Left arm   BP Patient Position At rest   MEWS Score 3   Oxygen Therapy   O2 Device Nasal cannula   O2 Flow Rate (L/min) 3 l/min

## 2019-10-17 NOTE — PROGRESS NOTES
Bedside shift change report given to Abelardo Arguelles (oncoming nurse) by Agata Beaz (offgoing nurse). Report included the following information SBAR, Kardex and MAR.

## 2019-10-17 NOTE — PROGRESS NOTES
Follow-up visit with Ms. Cosme. Pt shared of events of this hospitalization and her desire to get home. She shared some concerns;  offered emotional support as pt narendra with her amputation and her desire to return home. Pt indicated that she is not really able to put into words how she is feeling spiritually at this time. Spiritual support offered. Offered spoken blessing and assurance of prayers.  placed request for visit from music therapist who will visit as able.     Luma Grant, Palliative

## 2019-10-17 NOTE — PROGRESS NOTES
Hospitalist Progress Note  Patito Roper MD  Answering service: [phone] OR [phone] from in house phone        Date of Service:  10/17/2019  NAME:  Bob Ziegler  :  1931  MRN:  [id number]      Admission Summary:   41-year-old female with an extensive past medical history including chronic hypoxemic respiratory failure with home oxygen, COPD, chronic kidney disease, type 2 diabetes mellitus, atrial fibrillation, diastolic congestive heart failure, dyslipidemia, primary hypertension, osteoporosis, pulmonary hypertension, depression and anxiety disorder, was brought to the emergency room from home for evaluation of an approximately 2-3 day history of worsening shortness of breath    Interval history / Subjective:   Patient seen and examined,sheadmits shortness of breath. No chest pain. She is alert and oriented. Her daughter present. We talked about the evolving gangrene on the left foot and that she may need surgery/amputation. She stated she would not want another surgery. Assessment & Plan:     Ischemic right leg:   -s/p RLE Thrombectomy 19 with residual clot in Popliteal Artery. -S/P repeat Thrombectomy 19.    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 Continue Apixaban. -ID following on cefepime, flagyl, vancomycin. Leukocytosis persisting and its partly from ischemia.   -Dr Rohan Dexter called me on 10/16 and said antibiotics could be discharged in 2-3 days,would not advise antibiotics on discharge. 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  -I have discussed with Dr Tobi Lugo who suggested patient could be discharged when medically stable and follow as out patient,thinks she may ultimately need amputation but would not advise surgery at this time.     Acute on chronic diastolic CHF exarcerbation: NYHA class unknown  -Continue lasix daily   -CXR +interstitial edema  -Bumex 1 mg iv bid.  -Troponin negative    Acute on chronic anemia,acute blood loss anemia due to R AKA 10/14  -Hb 7.1   -No Hb for this am,discussed with RN to obtain H&H. Will transfuse for Hb>=7      Paroxysmal atrial fibrillation with intermittent RVR:   -On lopressor,currenty HR<90  -Monitor on remote tele    -Echocardiogram in September 2019: hyperdynamic EF,multiple valve pathologies(moderate AS,moderate MR,moderate to severe TR) and moderate PAH  -She is on apixaban. Antiplatelets stopped after discussing with vascular surgery due to epistaxis    Acute renal insufficiency: resolved     Hyponatremia:resolved. Continue to monitor     Hyperkalemia: resolved        Elevated troponin: suspect demand ischemia    Hypertension: controlled, continue metoprolol    Chronic respiratory failure: home 02,spo2 100% on 3 l/min via nasal canula     Diabetes mellitus Type II: lantus 15 units qhs, 4 units TID, SSI. Monitor     Rhabdomyolysis: resolved    Anxiety, depression    Chronic pain,likely ischemic   -fentanyl is not helping and its short lived. Start low dose gabapentin,scheduled tylenol and prn Roxicodone and continue to reassess. Code status:DNR  DVT prophylaxis: eliquis    Care Plan discussed with: Patient,nurse. 10/16:called and updated her son and Cristine Carreno (phone [phone])           :Discussed about transfusion if Hb  <=7,he gave permission for transfusion. I also made him aware of the gangrenous changes on the left toes and dorsum of foot,we are closely monitoring and she may potentially need intervention down the road. Disposition: Needs rehab on discharge.      Hospital Problems  Date Reviewed: 10/14/2019          Codes Class Noted POA    * (Principal) [facility] exacerbation ([facility] ICD-10-CM: I50.9  ICD-9-CM: 428.0  9/17/2019 Yes        Hyperkalemia ICD-10-CM: E87.5  ICD-9-CM: 276.7  9/17/2019 Yes        Hyponatremia ICD-10-CM: E87.1  ICD-9-CM: 276.1  9/17/2019 Yes        HOLLAND (acute kidney injury) ([facility] ICD-10-CM: N17.9  ICD-9-CM: 584.9  9/17/2019 Yes        Acute hypoxemic respiratory failure ([facility] ICD-10-CM: J96.01  ICD-9-CM: 518.81  8/8/2011 Yes        Debility ICD-10-CM: R53.81  ICD-9-CM: 799.3  6/28/2011 Yes                Review of Systems:   Negative unless stated above      Vital Signs:    Last 24hrs VS reviewed since prior progress note. Most recent are:  Visit Vitals  /75 (BP 1 Location: Left arm, BP Patient Position: At rest)   Pulse (!) 114   Temp 97.7 °F (36.5 °C)   Resp 19   Ht 5' 5\" (1.651 m)   Wt 89 kg (196 lb 3.4 oz)   SpO2 97%   Breastfeeding? No   BMI 32.65 kg/m²         Intake/Output Summary (Last 24 hours) at 10/17/2019 1718  Last data filed at 10/17/2019 0953  Gross per 24 hour   Intake 120 ml   Output 350 ml   Net -230 ml        Physical Examination:             Constitutional:  No acute distress, cooperative,elderly    ENT:  Oral mucous moist, oropharynx benign. Resp:  Diminished air entry basally. NO wheezing    CV:  Regular rhythm, normal rate, no murmurs, gallops, rubs    GI:  Soft, non distended, non tender. normoactive bowel sounds, no hepatosplenomegaly     Musculoskeletal:  R AKA. Left foot ischemia/gangrene toes. Neurologic: Alert and oriented,confused about details           Data Review:    Review and/or order of clinical lab test      Labs:     Recent Labs     10/17/19  0912 10/16/19  1202   WBC 15.6* 15.0*   HGB 7.9* 7.7*   HCT 26.7* 26.1*   * 422*     Recent Labs     10/17/19  0422 10/16/19  0048 10/15/19  0324    137 136   K 4.0 3.9 4.0    107 102   CO2 27 27 29   BUN 16 16 17   CREA 0.78 0.75 0.89   * 84 208*   CA 8.4* 8.2* 8.0*   MG  --  2.0  --      No results for input(s): SGOT, GPT, ALT, AP, TBIL, TBILI, TP, ALB, GLOB, GGT, AML, LPSE in the last 72 hours.     No lab exists for component: AMYP, HLPSE  No results for input(s): INR, PTP, APTT, INREXT, INREXT in the last 72 hours. No results for input(s): FE, TIBC, PSAT, FERR in the last 72 hours. No results found for: FOL, RBCF   No results for input(s): PH, PCO2, PO2 in the last 72 hours.   Recent Labs     10/17/19  1529 10/17/19  0422 10/16/19  0848 10/16/19  0048    158 213* 219*  214*   CKNDX 1.8  --   --  1.6   TROIQ <0.05  --   --  <0.05     Lab Results   Component Value Date/Time    Cholesterol, total 144 09/17/2019 09:21 AM    HDL Cholesterol 55 09/17/2019 09:21 AM    LDL, calculated 72.6 09/17/2019 09:21 AM    Triglyceride 82 09/17/2019 09:21 AM    CHOL/HDL Ratio 2.6 09/17/2019 09:21 AM     Lab Results   Component Value Date/Time    Glucose (POC) 165 (H) 10/17/2019 04:38 PM    Glucose (POC) 179 (H) 10/17/2019 11:42 AM    Glucose (POC) 121 (H) 10/17/2019 06:46 AM    Glucose (POC) 160 (H) 10/16/2019 09:53 PM    Glucose (POC) 117 (H) 10/16/2019 04:13 PM     Lab Results   Component Value Date/Time    Color DARK YELLOW 10/05/2019 06:02 PM    Appearance CLOUDY (A) 10/05/2019 06:02 PM    Specific gravity 1.018 10/05/2019 06:02 PM    Specific gravity 1.010 12/18/2011 09:28 AM    pH (UA) 5.5 10/05/2019 06:02 PM    Protein 30 (A) 10/05/2019 06:02 PM    Glucose NEGATIVE  10/05/2019 06:02 PM    Ketone NEGATIVE  10/05/2019 06:02 PM    Bilirubin NEGATIVE  09/17/2019 12:49 PM    Urobilinogen >8.0 (H) 10/05/2019 06:02 PM    Nitrites NEGATIVE  10/05/2019 06:02 PM    Leukocyte Esterase TRACE (A) 10/05/2019 06:02 PM    Epithelial cells FEW 10/05/2019 06:02 PM    Bacteria NEGATIVE  10/05/2019 06:02 PM    WBC 0-4 10/05/2019 06:02 PM    RBC 0-5 10/05/2019 06:02 PM         Medications Reviewed:     Current Facility-Administered Medications   Medication Dose Route Frequency    bumetanide (BUMEX) injection 1 mg  1 mg IntraVENous BID    albuterol-ipratropium (DUO-NEB) 2.5 MG-0.5 MG/3 ML  3 mL Nebulization Q6H RT    [START ON 10/18/2019] vancomycin trough 10/18 @ 12:00 - thanks!    Other ONCE    metroNIDAZOLE (FLAGYL) IVPB premix 500 mg  500 mg IntraVENous Q12H    polyethylene glycol (MIRALAX) packet 17 g  17 g Oral DAILY    fentaNYL citrate (PF) injection 50 mcg  50 mcg IntraVENous Q3H PRN    vancomycin (VANCOCIN) 1500 mg in  ml infusion  1,500 mg IntraVENous Q24H    ALPRAZolam (XANAX) tablet 0.25 mg  0.25 mg Oral BID PRN    cefepime (MAXIPIME) 2 g in 0.9% sodium chloride (MBP/ADV) 100 mL  2 g IntraVENous Q24H    Vancomycin - pharmacy to dose   Other Rx Dosing/Monitoring    albuterol-ipratropium (DUO-NEB) 2.5 MG-0.5 MG/3 ML  3 mL Nebulization Q4H PRN    insulin lispro (HUMALOG) injection 4 Units  4 Units SubCUTAneous TIDAC    sodium chloride (OCEAN) 0.65 % nasal squeeze bottle 2 Spray  2 Spray Both Nostrils Q2H PRN    oxymetazoline (AFRIN) 0.05 % nasal spray 2 Spray  2 Spray Both Nostrils BID PRN    apixaban (ELIQUIS) tablet 5 mg  5 mg Oral Q12H    insulin lispro (HUMALOG) injection   SubCUTAneous AC&HS    glucose chewable tablet 16 g  4 Tab Oral PRN    glucagon (GLUCAGEN) injection 1 mg  1 mg IntraMUSCular PRN    dextrose 10% infusion 0-250 mL  0-250 mL IntraVENous PRN    alum-mag hydroxide-simeth (MYLANTA) oral suspension 30 mL  30 mL Oral Q4H PRN    famotidine (PEPCID) tablet 20 mg  20 mg Oral QPM    metoprolol tartrate (LOPRESSOR) tablet 25 mg  25 mg Oral BID    traMADol (ULTRAM) tablet 25 mg  25 mg Oral Q8H PRN    insulin glargine (LANTUS) injection 15 Units  15 Units SubCUTAneous DAILY    ondansetron (ZOFRAN) injection 4 mg  4 mg IntraVENous Q6H PRN    sodium chloride (NS) flush 5-40 mL  5-40 mL IntraVENous Q8H    sodium chloride (NS) flush 5-40 mL  5-40 mL IntraVENous PRN    acetaminophen (TYLENOL) tablet 650 mg  650 mg Oral Q4H PRN    allopurinol (ZYLOPRIM) tablet 100 mg  100 mg Oral DAILY     ______________________________________________________________________  EXPECTED LENGTH OF STAY: 6d 14h  ACTUAL LENGTH OF STAY:          30 Karlene Perez MD

## 2019-10-18 LAB
ANION GAP SERPL CALC-SCNC: 10 MMOL/L (ref 5–15)
ANION GAP SERPL CALC-SCNC: 5 MMOL/L (ref 5–15)
BASOPHILS # BLD: 0.1 K/UL (ref 0–0.1)
BASOPHILS NFR BLD: 1 % (ref 0–1)
BUN SERPL-MCNC: 16 MG/DL (ref 6–20)
BUN SERPL-MCNC: 17 MG/DL (ref 6–20)
BUN/CREAT SERPL: 16 (ref 12–20)
BUN/CREAT SERPL: 17 (ref 12–20)
CALCIUM SERPL-MCNC: 8 MG/DL (ref 8.5–10.1)
CALCIUM SERPL-MCNC: 8.1 MG/DL (ref 8.5–10.1)
CHLORIDE SERPL-SCNC: 101 MMOL/L (ref 97–108)
CHLORIDE SERPL-SCNC: 103 MMOL/L (ref 97–108)
CK SERPL-CCNC: 133 U/L (ref 26–192)
CO2 SERPL-SCNC: 23 MMOL/L (ref 21–32)
CO2 SERPL-SCNC: 32 MMOL/L (ref 21–32)
CREAT SERPL-MCNC: 0.99 MG/DL (ref 0.55–1.02)
CREAT SERPL-MCNC: 1.03 MG/DL (ref 0.55–1.02)
DATE LAST DOSE: ABNORMAL
DIFFERENTIAL METHOD BLD: ABNORMAL
EOSINOPHIL # BLD: 0.2 K/UL (ref 0–0.4)
EOSINOPHIL NFR BLD: 2 % (ref 0–7)
ERYTHROCYTE [DISTWIDTH] IN BLOOD BY AUTOMATED COUNT: 17.7 % (ref 11.5–14.5)
FERRITIN SERPL-MCNC: 512 NG/ML (ref 26–388)
FOLATE SERPL-MCNC: 6.3 NG/ML (ref 5–21)
GLUCOSE BLD STRIP.AUTO-MCNC: 149 MG/DL (ref 65–100)
GLUCOSE BLD STRIP.AUTO-MCNC: 179 MG/DL (ref 65–100)
GLUCOSE BLD STRIP.AUTO-MCNC: 183 MG/DL (ref 65–100)
GLUCOSE BLD STRIP.AUTO-MCNC: 222 MG/DL (ref 65–100)
GLUCOSE SERPL-MCNC: 204 MG/DL (ref 65–100)
GLUCOSE SERPL-MCNC: 217 MG/DL (ref 65–100)
HCT VFR BLD AUTO: 26.7 % (ref 35–47)
HGB BLD-MCNC: 7.6 G/DL (ref 11.5–16)
IMM GRANULOCYTES # BLD AUTO: 0.2 K/UL (ref 0–0.04)
IMM GRANULOCYTES NFR BLD AUTO: 2 % (ref 0–0.5)
IRON SATN MFR SERPL: 20 % (ref 20–50)
IRON SERPL-MCNC: 60 UG/DL (ref 35–150)
LYMPHOCYTES # BLD: 0.9 K/UL (ref 0.8–3.5)
LYMPHOCYTES NFR BLD: 10 % (ref 12–49)
MCH RBC QN AUTO: 27.3 PG (ref 26–34)
MCHC RBC AUTO-ENTMCNC: 28.5 G/DL (ref 30–36.5)
MCV RBC AUTO: 96 FL (ref 80–99)
MONOCYTES # BLD: 0.8 K/UL (ref 0–1)
MONOCYTES NFR BLD: 8 % (ref 5–13)
NEUTS SEG # BLD: 7.2 K/UL (ref 1.8–8)
NEUTS SEG NFR BLD: 77 % (ref 32–75)
NRBC # BLD: 0 K/UL (ref 0–0.01)
NRBC BLD-RTO: 0 PER 100 WBC
PLATELET # BLD AUTO: 330 K/UL (ref 150–400)
PMV BLD AUTO: 10 FL (ref 8.9–12.9)
POTASSIUM SERPL-SCNC: 3.4 MMOL/L (ref 3.5–5.1)
POTASSIUM SERPL-SCNC: 4 MMOL/L (ref 3.5–5.1)
RBC # BLD AUTO: 2.78 M/UL (ref 3.8–5.2)
RBC MORPH BLD: ABNORMAL
RBC MORPH BLD: ABNORMAL
REPORTED DOSE,DOSE: ABNORMAL UNITS
REPORTED DOSE/TIME,TMG: ABNORMAL
SERVICE CMNT-IMP: ABNORMAL
SODIUM SERPL-SCNC: 136 MMOL/L (ref 136–145)
SODIUM SERPL-SCNC: 138 MMOL/L (ref 136–145)
TIBC SERPL-MCNC: 299 UG/DL (ref 250–450)
VANCOMYCIN TROUGH SERPL-MCNC: 22 UG/ML (ref 5–10)
VIT B12 SERPL-MCNC: 649 PG/ML (ref 193–986)
WBC # BLD AUTO: 9.4 K/UL (ref 3.6–11)

## 2019-10-18 PROCEDURE — 82607 VITAMIN B-12: CPT

## 2019-10-18 PROCEDURE — 97530 THERAPEUTIC ACTIVITIES: CPT

## 2019-10-18 PROCEDURE — 77010033678 HC OXYGEN DAILY

## 2019-10-18 PROCEDURE — 82962 GLUCOSE BLOOD TEST: CPT

## 2019-10-18 PROCEDURE — 74011000258 HC RX REV CODE- 258: Performed by: SURGERY

## 2019-10-18 PROCEDURE — 74011250637 HC RX REV CODE- 250/637: Performed by: SURGERY

## 2019-10-18 PROCEDURE — 80048 BASIC METABOLIC PNL TOTAL CA: CPT

## 2019-10-18 PROCEDURE — 74011250637 HC RX REV CODE- 250/637: Performed by: INTERNAL MEDICINE

## 2019-10-18 PROCEDURE — 94640 AIRWAY INHALATION TREATMENT: CPT

## 2019-10-18 PROCEDURE — 74011250637 HC RX REV CODE- 250/637: Performed by: HOSPITALIST

## 2019-10-18 PROCEDURE — 74011636637 HC RX REV CODE- 636/637: Performed by: SURGERY

## 2019-10-18 PROCEDURE — 74011250636 HC RX REV CODE- 250/636: Performed by: SURGERY

## 2019-10-18 PROCEDURE — 36415 COLL VENOUS BLD VENIPUNCTURE: CPT

## 2019-10-18 PROCEDURE — 97116 GAIT TRAINING THERAPY: CPT

## 2019-10-18 PROCEDURE — 82746 ASSAY OF FOLIC ACID SERUM: CPT

## 2019-10-18 PROCEDURE — 83540 ASSAY OF IRON: CPT

## 2019-10-18 PROCEDURE — 97535 SELF CARE MNGMENT TRAINING: CPT

## 2019-10-18 PROCEDURE — 80202 ASSAY OF VANCOMYCIN: CPT

## 2019-10-18 PROCEDURE — 74011250636 HC RX REV CODE- 250/636: Performed by: HOSPITALIST

## 2019-10-18 PROCEDURE — 74011000250 HC RX REV CODE- 250: Performed by: HOSPITALIST

## 2019-10-18 PROCEDURE — 82728 ASSAY OF FERRITIN: CPT

## 2019-10-18 PROCEDURE — 82550 ASSAY OF CK (CPK): CPT

## 2019-10-18 PROCEDURE — 65660000000 HC RM CCU STEPDOWN

## 2019-10-18 PROCEDURE — 85025 COMPLETE CBC W/AUTO DIFF WBC: CPT

## 2019-10-18 RX ORDER — POTASSIUM CHLORIDE 750 MG/1
40 TABLET, FILM COATED, EXTENDED RELEASE ORAL
Status: COMPLETED | OUTPATIENT
Start: 2019-10-18 | End: 2019-10-18

## 2019-10-18 RX ORDER — BUMETANIDE 0.25 MG/ML
1 INJECTION INTRAMUSCULAR; INTRAVENOUS DAILY
Status: DISCONTINUED | OUTPATIENT
Start: 2019-10-19 | End: 2019-10-20

## 2019-10-18 RX ORDER — INSULIN GLARGINE 100 [IU]/ML
18 INJECTION, SOLUTION SUBCUTANEOUS DAILY
Status: DISCONTINUED | OUTPATIENT
Start: 2019-10-19 | End: 2019-10-23 | Stop reason: HOSPADM

## 2019-10-18 RX ORDER — IPRATROPIUM BROMIDE AND ALBUTEROL SULFATE 2.5; .5 MG/3ML; MG/3ML
3 SOLUTION RESPIRATORY (INHALATION)
Status: DISCONTINUED | OUTPATIENT
Start: 2019-10-18 | End: 2019-10-23 | Stop reason: HOSPADM

## 2019-10-18 RX ORDER — VANCOMYCIN HYDROCHLORIDE
1250 EVERY 24 HOURS
Status: DISCONTINUED | OUTPATIENT
Start: 2019-10-19 | End: 2019-10-21

## 2019-10-18 RX ORDER — GABAPENTIN 100 MG/1
100 CAPSULE ORAL 3 TIMES DAILY
Status: DISCONTINUED | OUTPATIENT
Start: 2019-10-18 | End: 2019-10-22

## 2019-10-18 RX ORDER — GABAPENTIN 100 MG/1
200 CAPSULE ORAL 3 TIMES DAILY
Status: DISCONTINUED | OUTPATIENT
Start: 2019-10-18 | End: 2019-10-18

## 2019-10-18 RX ADMIN — IPRATROPIUM BROMIDE AND ALBUTEROL SULFATE 3 ML: .5; 3 SOLUTION RESPIRATORY (INHALATION) at 08:02

## 2019-10-18 RX ADMIN — METOPROLOL TARTRATE 25 MG: 25 TABLET ORAL at 21:51

## 2019-10-18 RX ADMIN — APIXABAN 5 MG: 5 TABLET, FILM COATED ORAL at 21:51

## 2019-10-18 RX ADMIN — IPRATROPIUM BROMIDE AND ALBUTEROL SULFATE 3 ML: .5; 3 SOLUTION RESPIRATORY (INHALATION) at 02:37

## 2019-10-18 RX ADMIN — INSULIN LISPRO 4 UNITS: 100 INJECTION, SOLUTION INTRAVENOUS; SUBCUTANEOUS at 12:36

## 2019-10-18 RX ADMIN — Medication 10 ML: at 21:52

## 2019-10-18 RX ADMIN — ALLOPURINOL 100 MG: 100 TABLET ORAL at 09:54

## 2019-10-18 RX ADMIN — OXYCODONE HYDROCHLORIDE 5 MG: 5 TABLET ORAL at 14:36

## 2019-10-18 RX ADMIN — INSULIN LISPRO 4 UNITS: 100 INJECTION, SOLUTION INTRAVENOUS; SUBCUTANEOUS at 06:58

## 2019-10-18 RX ADMIN — APIXABAN 5 MG: 5 TABLET, FILM COATED ORAL at 10:15

## 2019-10-18 RX ADMIN — BUMETANIDE 1 MG: 0.25 INJECTION INTRAMUSCULAR; INTRAVENOUS at 09:57

## 2019-10-18 RX ADMIN — METOPROLOL TARTRATE 25 MG: 25 TABLET ORAL at 09:59

## 2019-10-18 RX ADMIN — INSULIN LISPRO 4 UNITS: 100 INJECTION, SOLUTION INTRAVENOUS; SUBCUTANEOUS at 17:25

## 2019-10-18 RX ADMIN — ACETAMINOPHEN 1000 MG: 500 TABLET ORAL at 09:53

## 2019-10-18 RX ADMIN — GABAPENTIN 100 MG: 100 CAPSULE ORAL at 21:51

## 2019-10-18 RX ADMIN — VANCOMYCIN HYDROCHLORIDE 1500 MG: 10 INJECTION, POWDER, LYOPHILIZED, FOR SOLUTION INTRAVENOUS at 12:37

## 2019-10-18 RX ADMIN — INSULIN GLARGINE 15 UNITS: 100 INJECTION, SOLUTION SUBCUTANEOUS at 09:58

## 2019-10-18 RX ADMIN — POLYETHYLENE GLYCOL 3350 17 G: 17 POWDER, FOR SOLUTION ORAL at 10:00

## 2019-10-18 RX ADMIN — ACETAMINOPHEN 1000 MG: 500 TABLET ORAL at 17:24

## 2019-10-18 RX ADMIN — FAMOTIDINE 20 MG: 20 TABLET ORAL at 17:24

## 2019-10-18 RX ADMIN — Medication 10 ML: at 13:54

## 2019-10-18 RX ADMIN — OXYCODONE HYDROCHLORIDE 5 MG: 5 TABLET ORAL at 07:00

## 2019-10-18 RX ADMIN — METRONIDAZOLE 500 MG: 500 INJECTION, SOLUTION INTRAVENOUS at 10:15

## 2019-10-18 RX ADMIN — Medication 10 ML: at 06:00

## 2019-10-18 RX ADMIN — GABAPENTIN 100 MG: 100 CAPSULE ORAL at 17:24

## 2019-10-18 RX ADMIN — INSULIN LISPRO 2 UNITS: 100 INJECTION, SOLUTION INTRAVENOUS; SUBCUTANEOUS at 17:25

## 2019-10-18 RX ADMIN — METRONIDAZOLE 500 MG: 500 INJECTION, SOLUTION INTRAVENOUS at 21:51

## 2019-10-18 RX ADMIN — CEFEPIME HYDROCHLORIDE 2 G: 2 INJECTION, POWDER, FOR SOLUTION INTRAVENOUS at 17:26

## 2019-10-18 RX ADMIN — POTASSIUM CHLORIDE 40 MEQ: 750 TABLET, FILM COATED, EXTENDED RELEASE ORAL at 07:38

## 2019-10-18 RX ADMIN — GABAPENTIN 100 MG: 100 CAPSULE ORAL at 09:57

## 2019-10-18 NOTE — PROGRESS NOTES
Pharmacist Note - Vancomycin Dosing  Therapy day 11  Indication: RLE ischemia and necrosis; S/P AKA  Current regimen:     A trough resulted at 22 mcg/mL which was obtained 12:12 hrs post-dose. This represents a \"true\" trough based on the patient's known kinetics. Goal trough: ~15 mcg/mL   Plan: Will change to 1250mg IV [id number] with the first new dose not due until 16:00 on Saturday, 10/19/2019. Pharmacy will continue to monitor this patient daily for changes in clinical status and renal function.

## 2019-10-18 NOTE — PROGRESS NOTES
Problem: Self Care Deficits Care Plan (Adult)  Goal: *Acute Goals and Plan of Care (Insert Text)  Description    FUNCTIONAL STATUS PRIOR TO ADMISSION: Patient was modified independent for ADLs and does not use DME for functional mobility. At baseline patient uses no supplemental oxygen. HOME SUPPORT: The patient lived with son and DIL but did not require assist.    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 Patient will perform self-feeding at least 50% of meals with supervision/set-up within 7 day(s). 2.  Patient will perform upper body dressing with min assistance  within 7 day(s). 3.  Patient will perform lower body dressing with maximal assistance within 7 day(s). 4.  Patient will perform toilet transfers with maximal assistance x 2 within 7 day(s). MET to drop arm bedside commode 10/18/19  5. Patient will perform all aspects of toileting with maximal assistance within 7 day(s). 6.  Patient will participate in upper extremity therapeutic exercise/activities with supervision/set-up for 5 minutes within 7 day(s). 7.  Patient will utilize energy conservation, fall prevention, pain management and desensitization with AKA techniques during functional activities with verbal, visual and tactile cues within 7 day(s). Goals reviewed and continued: 10/4/19  Initiated 9/23/2019  1. Patient will perform grooming with supervision/set-up within 7 day(s). 2.  Patient will perform upper body ADLs with supervision/set-up within 7 day(s). Simulates in bathing with set up 10/4/19. Continue in practice within 7 days. 3.  Patient will perform lower body ADLs with moderate assistance  within 7 day(s). 4.  Patient will perform toilet transfers to/from [facility] with moderate assistance  within 7 day(s). 5.  Patient will perform all aspects of toileting with moderate assistance  within 7 day(s).   6.  Patient will participate in upper extremity therapeutic exercise/activities with supervision/set-up for 5 minutes within 7 day(s). 7.  Patient will utilize energy conservation techniques during functional activities with verbal cues within 7 day(s). Outcome: Progressing Towards Goal   OCCUPATIONAL THERAPY TREATMENT  Patient: Bob Ziegler (89 y.o. female)  Date: 10/18/2019  Diagnosis: CHF exacerbation (HCC) [I50.9] CHF exacerbation (HCC)  Procedure(s) (LRB):  RIGHT ABOVE KNEE AMPUTATION (Right) 4 Days Post-Op  Precautions: Fall, DNR(AKA 10-14; Hosp admit 9-17-19; 2L O2 PTA)  Chart, occupational therapy assessment, plan of care, and goals were reviewed. ASSESSMENT  Patient continues with skilled OT services and is progressing towards goals. Participation impacted by generalized weakness, left LE weakness and impaired circulation, body habitus, impaired reach to left LE. Patient demonstrating increased level of alertness, smiling, conversive. Son present. Patient requesting to use BSC. Participated in transfer to drop arm bedside commode with max assist of 2. Current Level of Function Impacting Discharge (ADLs): UE self care with set up to min assist, LE self care with max to total assist; toileting on BSC with max assist of 2, toileting hygiene and clothing with total assist though patient did attempt to reach buttocks to clean herself without success. Other factors to consider for discharge: recent right LE amputation; left LE impaired circulation         PLAN :  Patient continues to benefit from skilled intervention to address the above impairments. Continue treatment per established plan of care. to address goals.     Recommend with staff: Encourage to participate in bathing and dressing in long sit, use of bedpan for toileting at this time    Recommend next OT session: self care seated EOB    Recommendation for discharge: (in order for the patient to meet his/her long term goals)  Therapy up to 5 days/week in SNF setting    This discharge recommendation:  Has been made in collaboration with the attending provider and/or case management    Equipment recommendations for successful discharge (if) home: hospital bed, mechanical lift and wheelchair       SUBJECTIVE:   Patient stated I want to try to use the commode.     OBJECTIVE DATA SUMMARY:   Cognitive/Behavioral Status:  Neurologic State: Alert  Orientation Level: Oriented to situation;Oriented to person;Oriented to place  Cognition: Follows commands; Appropriate for age attention/concentration  Perception: Appears intact  Perseveration: No perseveration noted  Safety/Judgement: Awareness of environment    Functional Mobility and Transfers for ADLs:  Bed Mobility:  Rolling: Moderate assistance;Assist x1  Sit to Supine: Minimum assistance;Assist x2    Transfers:     Functional Transfers  Toilet Transfer : Maximum assistance;Assist x2  Adaptive Equipment: Drop arm Bedside commode       Balance:  Sitting: Intact; Without support  Sitting - Static: Good (unsupported)    ADL Intervention:                 Lower Body Bathing  Perineal  : Total assistance (dependent)  Position Performed: Seated on toilet(BSC: inferred from toileting)         Lower Body Dressing Assistance  Protective Undergarmet: Total assistance (dependent)  Socks: Moderate assistance(left sock)  Position Performed: Long sitting on bed    Toileting  Toileting Assistance: Total assistance(dependent)  Bladder Hygiene: Total assistance (dependent)  Bowel Hygiene: Total assistance (dependent)  Clothing Management: Total assistance (dependent)(doffed seated on DABSC and donned in bed)    Cognitive Retraining  Organizing/Sequencing: Breaking task down  Following Commands: Follows one step commands/directions  Safety/Judgement: Awareness of environment  Cues: Verbal cues provided; Tactile cues provided      Activity Tolerance:   Fair  Please refer to the flowsheet for vital signs taken during this treatment.     After treatment patient left in no apparent distress:   Supine in bed, Heels elevated for pressure relief, Call bell within reach, Caregiver / family present and Side rails x 3    COMMUNICATION/COLLABORATION:   The patients plan of care was discussed with: Physical Therapist and Registered Nurse    DENZEL Pollard  Time Calculation: 39 mins

## 2019-10-18 NOTE — PROGRESS NOTES
At 18:30 Pt tachycardic, HR in 120s, pt is asymptomatic, not in any distress, no pain, talking to her son at bedside and watching television, MD paged to notify about elevated HR. 20 min later, still no call return by MD, Paged him again, Pt HR still in 120s fluctuating from 120-130s,   Paged him again, unable to get a hold of him. Pt continue to be pleasant with no complaint.

## 2019-10-18 NOTE — PROGRESS NOTES
Problem: Mobility Impaired (Adult and Pediatric)  Goal: *Acute Goals and Plan of Care (Insert Text)  Description  FUNCTIONAL STATUS PRIOR TO ADMISSION: Patient was independent for all functional mobility. Patient uses 2L/min O2 via nasal cannula at baseline. HOME SUPPORT PRIOR TO ADMISSION: The patient lived with her son and daughter but did not require assist.    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 Patient will move from supine to sit and sit to supine , scoot up and down and roll side to side in bed with moderate assistance within 7 day(s). 2.  Patient will transfer from bed to chair and chair to bed with maximal assistance using the least restrictive device within 7 day(s). 3.  Patient will perform sit to stand with maximal assistance within 7 day(s). 4.  Patient will participate in residual limb exercise program with max verbal cues for technique and min assist for limb management within 7 days. 5.  Patient will be able to name and perform one technique for assisting with management of phantom limb pain within 7 days. 6.  Patient will be independent with L LE and UE HEP for global strengthening in prep for transfers within 7 days. Weekly re-assessment 10/4/2019  1. Patient will move from supine to sit and sit to supine  in bed with modified independence within 7 day(s). 2.  Patient will transfer from bed to chair and chair to bed with contact guard assist consistently  using the least restrictive device within 7 day(s). 3.  Patient will perform sit to stand with contact guard  assistance  within 7 day(s). 4.  Patient will ambulate with minimal assistance  for 15 feet with the least restrictive device within 7 day(s). Reassessed 9/27/19  1. Patient will move from supine to sit and sit to supine  in bed with modified independence within 7 day(s).     2.  Patient will transfer from bed to chair and chair to bed with moderate assistance  using the least restrictive device within 7 day(s). 3.  Patient will perform sit to stand with moderate assistance  within 7 day(s). 4.  Patient will ambulate with moderate assistance  for 15 feet with the least restrictive device within 7 day(s). Initiated 9/18/2019  1. Patient will move from supine to sit and sit to supine  in bed with independence within 7 day(s). 2.  Patient will transfer from bed to chair and chair to bed with independence using the least restrictive device within 7 day(s). 3.  Patient will perform sit to stand with independence within 7 day(s). 4.  Patient will ambulate with independence for 150 feet with the least restrictive device within 7 day(s). 5.  Patient will ascend/descend 1 stairs with 1 handrail(s) with modified independence within 7 day(s). Outcome: Progressing Towards Goal    PHYSICAL THERAPY TREATMENT  Patient: Rose Mary Malloy (48 y.o. female)  Date: 10/18/2019  Diagnosis: CHF exacerbation (HCC) [I50.9] CHF exacerbation (HCC)  Procedure(s) (LRB):  RIGHT ABOVE KNEE AMPUTATION (Right) 4 Days Post-Op  Precautions: Fall, DNR(AKA 10-14; Hosp admit 9-17-19; 2L O2 PTA)  Chart, physical therapy assessment, plan of care and goals were reviewed. ASSESSMENT  Patient continues with skilled PT services and is progressing towards goals. Much improved patient affect today with no overt anxiety and great participation in session. Provide reassurance in fall prevention and blocked her LLE on the floor to mitigate her fear of falling. Completed a lateral transfer to a drop arm [facility] with heavy assistance x2. Cued multiple times to limit quick weight shifts to her right following her AKA d/t concern for falls. Good patient effort and transfer to her left with mod-maximum assist x2 but return to bed on her surgical side required max-total assist x2.  Transfers remain difficult d/t patient difficulty with hand placement, weight shifts, and insufficient strength to support body weight on her left side. Recommend discharge to a rehab setting when medically stable. Current Level of Function Impacting Discharge (mobility/balance): max x2 for lateral transfers            PLAN :  Patient continues to benefit from skilled intervention to address the above impairments. Continue treatment per established plan of care. to address goals. Recommendation for discharge: (in order for the patient to meet his/her long term goals)  Therapy up to 5 days/week in SNF setting        Equipment recommendations for successful discharge (if) home: shower chair and wheelchair       SUBJECTIVE:   Patient stated I can try.     OBJECTIVE DATA SUMMARY:   Critical Behavior:  Neurologic State: Alert  Orientation Level: Oriented to situation, Oriented to person, Oriented to place  Cognition: Follows commands, Appropriate for age attention/concentration  Safety/Judgement: Awareness of environment  Functional Mobility Training:  Bed Mobility:  Rolling: Moderate assistance;Assist x1     Sit to Supine: Minimum assistance;Assist x2           Transfers:              Bed to Chair: Moderate assistance;Maximum assistance;Assist x2                    Balance:  Sitting: Intact; Without support  Sitting - Static: Good (unsupported)  Standing - Static: Fair  Standing - Dynamic : Fair  Ambulation/Gait Training:    Therapeutic Exercises:     Pain Rating:      Activity Tolerance:   Fair  Please refer to the flowsheet for vital signs taken during this treatment.     After treatment patient left in no apparent distress:   Supine in bed, Call bell within reach and Caregiver / family present    COMMUNICATION/COLLABORATION:   The patients plan of care was discussed with: Registered Nurse    Juan Hall, PT, DPT   Time Calculation: 33 mins

## 2019-10-18 NOTE — PROGRESS NOTES
Bedside shift change report given to William Quintanilla (oncoming nurse) by Rosetta Norris (offgoing nurse). Report included the following information SBAR, Kardex and MAR.

## 2019-10-18 NOTE — PROGRESS NOTES
Vascular Surgery  --Dr. Beryle Brady findings noted  --group will be available over weekend as needed

## 2019-10-18 NOTE — PROGRESS NOTES
Critical Vancomycin trough level, of 22, received from lab. Spoke with pharmacy. Stated to let current dosage continue to run. Pharmacy will adjust next dose approprietly.

## 2019-10-18 NOTE — PROGRESS NOTES
Bedside shift change report given to sammie (oncoming nurse) by Minal Sawyer (offgoing nurse). Report included the following information SBAR and Kardex.

## 2019-10-18 NOTE — PROGRESS NOTES
MARIANN Plan:    [facility] has accepted referral- Auth requested today 10/18  * [facility] has been successfully completed  * [facility] on \"will Call\"  * [facility] Jing Juan [phone]      Spoke with Sundeep Marquez who confirmed the short term plan at discharge is for [facility]. Have updated SNF as anticipate discharge next week. Have requested SNF start Auth with [facility].       Lyla Sanchez, [id number]

## 2019-10-18 NOTE — ROUTINE PROCESS
Bedside shift change report given to rika brady rn (oncoming nurse) by [facility] (offgoing nurse). Report included the following information SBAR and Kardex.

## 2019-10-18 NOTE — PROGRESS NOTES
Bedside shift change report given to Abelardo Arguelles (oncoming nurse) by Jas Eduardo RN (offgoing nurse). Report included the following information SBAR, Kardex and MAR.

## 2019-10-18 NOTE — PROGRESS NOTES
ADULT PROTOCOL: JET AEROSOL  REASSESSMENT    Patient  Alfredo Sandifer     80 y.o.   female     10/18/2019  9:34 AM    Breath Sounds Pre Procedure: Right Breath Sounds: Diminished                               Left Breath Sounds: Diminished    Breath Sounds Post Procedure: Right Breath Sounds: Diminished                                 Left Breath Sounds: Diminished    Breathing pattern: Pre procedure Breathing Pattern: Regular          Post procedure Breathing Pattern: Regular    Heart Rate: Pre procedure Pulse: 89           Post procedure Pulse: 80    Resp Rate: Pre procedure Respirations: 22           Post procedure Respirations: 16        Incentive Spirometry:  Actual Volume (ml): 750 ml          Cough: Pre procedure Cough: Non-productive               Post procedure Cough: Non-productive    Suctioned: NO    Sputum: Pre procedure                   Post procedure      Oxygen: O2 Device: Nasal cannula   Flow rate (L/min) 3     Changed: NO    SpO2: Pre procedure SpO2: 98 %   with oxygen              Post procedure SpO2: 96 %  with oxygen    Nebulizer Therapy: Current medications Aerosolized Medications: DuoNeb      Changed: YES    Smoking History: never    Problem List:   Patient Active Problem List   Diagnosis Code    Diabetes [facility] E11.9    HTN (hypertension), benign I10    Breast cancer ([email or web address] C50.919    Anemia D64.9    Debility R53.81    Acute hypoxemic respiratory failure (HCC) J96.01    Vein disorder I87.9    Allergic reaction T78.40XA    Multiple allergies Z88.9    Bronchitis J40    Anxiety F41.9    Hypercholesteremia E78.00    Aortic stenosis B90.0    Diastolic CHF, acute on chronic (HCC) I50.33    SOB (shortness of breath) R06.02    CHF (congestive heart failure) (HCC) I50.9    Pulmonary edema cardiac cause (HCC) I50.1    Moderate to severe pulmonary hypertension (HCC) I27.20    Acute respiratory disease J06.9    CHF exacerbation (HCC) I50.9    Hyperkalemia E87.5    Hyponatremia E87.1    HOLLAND (acute kidney injury) ([facility] N17.9       Respiratory Therapist: Leonardo Diallo

## 2019-10-18 NOTE — PROGRESS NOTES
Hospitalist Progress Note  Pat Molina MD  Answering service: [phone] OR [phone] from in house phone        Date of Service:  10/18/2019  NAME:  Kulwant Hightower  :  1931  MRN:  [id number]      Admission Summary:   80-year-old female with an extensive past medical history including chronic hypoxemic respiratory failure with home oxygen, COPD, chronic kidney disease, type 2 diabetes mellitus, atrial fibrillation, diastolic congestive heart failure, dyslipidemia, primary hypertension, osteoporosis, pulmonary hypertension, depression and anxiety disorder, was brought to the emergency room from home for evaluation of an approximately 2-3 day history of worsening shortness of breath    Interval history / Subjective:   Patient seen and examined at bedside. She feels better. Sob has improved. She again confirmed that she does not want any more surgery. Explained that she is medically stable and can discharge if bed available. She expressed to talk to her son. Tried to call son to update, but no response    Discussed with CM, will need insurance auth. Assessment & Plan:     Ischemic right leg:   -s/p RLE Thrombectomy 19 with residual clot in Popliteal Artery. -S/P repeat Thrombectomy 19.    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 Continue Apixaban. -ID following   - currently on IV cefepime, flagyl, vancomycin.   - Leukocytosis persisting and its partly from ischemia.   -Dr Fabiano López called on 10/16 and said antibiotics could be discharged in 2-3 days,would not advise antibiotics on discharge. 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   - discussed with Dr Rosette Ignacio who suggested patient could be discharged when medically stable and follow as out patient,thinks she may ultimately need amputation but would not advise surgery at this time. Acute on chronic diastolic CHF exarcerbation: NYHA class unknown - improving   -CXR 10/16 +interstitial edema  -Bumex 1 mg iv changed to daily today .  -Troponin negative    Acute on chronic anemia  Acute blood loss anemia due to R AKA 10/14  -Hb low stable. - normal iron levels   - transfuse for Hb < 7    Paroxysmal atrial fibrillation with intermittent RVR:   -On lopressor  -Monitor on remote tele    -Echocardiogram in September 2019: hyperdynamic EF,multiple valve pathologies(moderate AS,moderate MR,moderate to severe TR) and moderate PAH  - on apixaban. Antiplatelets stopped after discussing with vascular surgery due to epistaxis    Hypokalemia - replaced, will monitor     Acute renal insufficiency: resolved   Hyponatremia:resolved. Hyperkalemia: resolved  Rhabdomyolysis: resolved  Elevated troponin: suspect demand ischemia    Hypertension: controlled, continue metoprolol  Chronic respiratory failure: home 02,spo2 100% on 3 l/min via nasal canula   Diabetes mellitus Type II: BG high. Increased lantus 18 units qhs, 4 units TID, SSI. Monitor   Anxiety, depression    Chronic pain,likely ischemic   -fentanyl is not helping and its short lived. Increased gabapentin to tid ,scheduled tylenol and prn Roxicodone and continue to reassess. Code status:DNR. Cristine Leonard (phone [phone])  DVT prophylaxis: eliquis  Care Plan discussed with: Alena Ruff. Disposition: [facility] [place] [address].  CM working on [employer] Problems  Date Reviewed: 10/14/2019          Codes Class Noted POA    * (Principal) CHF exacerbation ([facility] ICD-10-CM: I50.9  ICD-9-CM: 428.0  9/17/2019 Yes        Hyperkalemia ICD-10-CM: E87.5  ICD-9-CM: 276.7  9/17/2019 Yes        Hyponatremia ICD-10-CM: E87.1  ICD-9-CM: 276.1  9/17/2019 Yes        HOLLAND (acute kidney injury) ([facility] ICD-10-CM: N17.9  ICD-9-CM: 584.9  9/17/2019 Yes        Acute hypoxemic respiratory failure ([facility] ICD-10-CM: J96.01  ICD-9-CM: 518.81  8/8/2011 Yes        Debility ICD-10-CM: R53.81  ICD-9-CM: 799.3  6/28/2011 Yes                Review of Systems:   Negative unless stated above      Vital Signs:    Last 24hrs VS reviewed since prior progress note. Most recent are:  Visit Vitals  /65 (BP 1 Location: Left arm, BP Patient Position: At rest)   Pulse (!) 108   Temp 97.6 °F (36.4 °C)   Resp 18   Ht 5' 5\" (1.651 m)   Wt 89.1 kg (196 lb 6.9 oz)   SpO2 100%   Breastfeeding? No   BMI 32.69 kg/m²         Intake/Output Summary (Last 24 hours) at 10/18/2019 1134  Last data filed at 10/18/2019 1045  Gross per 24 hour   Intake 1120 ml   Output    Net 1120 ml        Physical Examination:             Constitutional:  No acute distress, cooperative,elderly    ENT:  Oral mucous moist, oropharynx benign. Resp:  Diminished air entry basally. NO wheezing    CV:  Regular rhythm, normal rate, no murmurs, gallops, rubs    GI:  Soft, non distended, non tender. normoactive bowel sounds, no hepatosplenomegaly     Musculoskeletal:  R AKA. Left foot ischemia/gangrene toes. Neurologic: Alert and oriented,confused about details           Data Review:    Review and/or order of clinical lab test      Labs:     Recent Labs     10/18/19  0125 10/17/19  0912   WBC 9.4 15.6*   HGB 7.6* 7.9*   HCT 26.7* 26.7*    457*     Recent Labs     10/18/19  0400 10/18/19  0125 10/17/19  0422 10/16/19  0048    138 138 137   K 4.0 3.4* 4.0 3.9    101 106 107   CO2 23 32 27 27   BUN 16 17 16 16   CREA 1.03* 0.99 0.78 0.75   * 217* 110* 84   CA 8.1* 8.0* 8.4* 8.2*   MG  --   --   --  2.0     No results for input(s): SGOT, GPT, ALT, AP, TBIL, TBILI, TP, ALB, GLOB, GGT, AML, LPSE in the last 72 hours. No lab exists for component: AMYP, HLPSE  No results for input(s): INR, PTP, APTT, INREXT, INREXT in the last 72 hours.    Recent Labs     10/18/19  0125   TIBC 299   PSAT 20   FERR 512* Lab Results   Component Value Date/Time    Folate 6.3 10/18/2019 01:25 AM      No results for input(s): PH, PCO2, PO2 in the last 72 hours. Recent Labs     10/18/19  0125 10/17/19  1529 10/17/19  0422  10/16/19  0048    145 158   < > 219*  214*   CKNDX  --  1.8  --   --  1.6   TROIQ  --  <0.05  --   --  <0.05    < > = values in this interval not displayed. Lab Results   Component Value Date/Time    Cholesterol, total 144 09/17/2019 09:21 AM    HDL Cholesterol 55 09/17/2019 09:21 AM    LDL, calculated 72.6 09/17/2019 09:21 AM    Triglyceride 82 09/17/2019 09:21 AM    CHOL/HDL Ratio 2.6 09/17/2019 09:21 AM     Lab Results   Component Value Date/Time    Glucose (POC) 183 (H) 10/18/2019 11:18 AM    Glucose (POC) 179 (H) 10/18/2019 06:37 AM    Glucose (POC) 217 (H) 10/17/2019 10:04 PM    Glucose (POC) 165 (H) 10/17/2019 04:38 PM    Glucose (POC) 179 (H) 10/17/2019 11:42 AM     Lab Results   Component Value Date/Time    Color DARK YELLOW 10/05/2019 06:02 PM    Appearance CLOUDY (A) 10/05/2019 06:02 PM    Specific gravity 1.018 10/05/2019 06:02 PM    Specific gravity 1.010 12/18/2011 09:28 AM    pH (UA) 5.5 10/05/2019 06:02 PM    Protein 30 (A) 10/05/2019 06:02 PM    Glucose NEGATIVE  10/05/2019 06:02 PM    Ketone NEGATIVE  10/05/2019 06:02 PM    Bilirubin NEGATIVE  09/17/2019 12:49 PM    Urobilinogen >8.0 (H) 10/05/2019 06:02 PM    Nitrites NEGATIVE  10/05/2019 06:02 PM    Leukocyte Esterase TRACE (A) 10/05/2019 06:02 PM    Epithelial cells FEW 10/05/2019 06:02 PM    Bacteria NEGATIVE  10/05/2019 06:02 PM    WBC 0-4 10/05/2019 06:02 PM    RBC 0-5 10/05/2019 06:02 PM         Medications Reviewed:     Current Facility-Administered Medications   Medication Dose Route Frequency    albuterol-ipratropium (DUO-NEB) 2.5 MG-0.5 MG/3 ML  3 mL Nebulization Q6H PRN    bumetanide (BUMEX) injection 1 mg  1 mg IntraVENous BID    vancomycin trough 10/18 @ 12:00 - thanks!    Other ONCE    metroNIDAZOLE (FLAGYL) IVPB premix 500 mg  500 mg IntraVENous Q12H    acetaminophen (TYLENOL) tablet 1,000 mg  1,000 mg Oral BID    gabapentin (NEURONTIN) capsule 100 mg  100 mg Oral BID    oxyCODONE IR (ROXICODONE) tablet 5 mg  5 mg Oral Q6H PRN    polyethylene glycol (MIRALAX) packet 17 g  17 g Oral DAILY    vancomycin (VANCOCIN) 1500 mg in  ml infusion  1,500 mg IntraVENous Q24H    ALPRAZolam (XANAX) tablet 0.25 mg  0.25 mg Oral BID PRN    cefepime (MAXIPIME) 2 g in 0.9% sodium chloride (MBP/ADV) 100 mL  2 g IntraVENous Q24H    Vancomycin - pharmacy to dose   Other Rx Dosing/Monitoring    insulin lispro (HUMALOG) injection 4 Units  4 Units SubCUTAneous TIDAC    sodium chloride (OCEAN) 0.65 % nasal squeeze bottle 2 Spray  2 Spray Both Nostrils Q2H PRN    oxymetazoline (AFRIN) 0.05 % nasal spray 2 Spray  2 Spray Both Nostrils BID PRN    apixaban (ELIQUIS) tablet 5 mg  5 mg Oral Q12H    insulin lispro (HUMALOG) injection   SubCUTAneous AC&HS    glucose chewable tablet 16 g  4 Tab Oral PRN    glucagon (GLUCAGEN) injection 1 mg  1 mg IntraMUSCular PRN    dextrose 10% infusion 0-250 mL  0-250 mL IntraVENous PRN    alum-mag hydroxide-simeth (MYLANTA) oral suspension 30 mL  30 mL Oral Q4H PRN    famotidine (PEPCID) tablet 20 mg  20 mg Oral QPM    metoprolol tartrate (LOPRESSOR) tablet 25 mg  25 mg Oral BID    traMADol (ULTRAM) tablet 25 mg  25 mg Oral Q8H PRN    insulin glargine (LANTUS) injection 15 Units  15 Units SubCUTAneous DAILY    ondansetron (ZOFRAN) injection 4 mg  4 mg IntraVENous Q6H PRN    sodium chloride (NS) flush 5-40 mL  5-40 mL IntraVENous Q8H    sodium chloride (NS) flush 5-40 mL  5-40 mL IntraVENous PRN    allopurinol (ZYLOPRIM) tablet 100 mg  100 mg Oral DAILY     ______________________________________________________________________  EXPECTED LENGTH OF STAY: 6d 14h  ACTUAL LENGTH OF STAY:          31                 Erna Chen MD

## 2019-10-19 ENCOUNTER — APPOINTMENT (OUTPATIENT)
Dept: CT IMAGING | Age: 84
DRG: 270 | End: 2019-10-19
Attending: NURSE PRACTITIONER
Payer: MEDICARE

## 2019-10-19 LAB
ANION GAP SERPL CALC-SCNC: 4 MMOL/L (ref 5–15)
BASOPHILS # BLD: 0.1 K/UL (ref 0–0.1)
BASOPHILS NFR BLD: 1 % (ref 0–1)
BUN SERPL-MCNC: 21 MG/DL (ref 6–20)
BUN/CREAT SERPL: 21 (ref 12–20)
CALCIUM SERPL-MCNC: 8.3 MG/DL (ref 8.5–10.1)
CHLORIDE SERPL-SCNC: 105 MMOL/L (ref 97–108)
CO2 SERPL-SCNC: 29 MMOL/L (ref 21–32)
CREAT SERPL-MCNC: 1.01 MG/DL (ref 0.55–1.02)
DIFFERENTIAL METHOD BLD: ABNORMAL
EOSINOPHIL # BLD: 0.2 K/UL (ref 0–0.4)
EOSINOPHIL NFR BLD: 2 % (ref 0–7)
ERYTHROCYTE [DISTWIDTH] IN BLOOD BY AUTOMATED COUNT: 18.3 % (ref 11.5–14.5)
GLUCOSE BLD STRIP.AUTO-MCNC: 165 MG/DL (ref 65–100)
GLUCOSE BLD STRIP.AUTO-MCNC: 183 MG/DL (ref 65–100)
GLUCOSE BLD STRIP.AUTO-MCNC: 228 MG/DL (ref 65–100)
GLUCOSE BLD STRIP.AUTO-MCNC: 89 MG/DL (ref 65–100)
GLUCOSE BLD STRIP.AUTO-MCNC: 89 MG/DL (ref 65–100)
GLUCOSE SERPL-MCNC: 80 MG/DL (ref 65–100)
HCT VFR BLD AUTO: 26.4 % (ref 35–47)
HGB BLD-MCNC: 7.4 G/DL (ref 11.5–16)
IMM GRANULOCYTES # BLD AUTO: 0.4 K/UL (ref 0–0.04)
IMM GRANULOCYTES NFR BLD AUTO: 3 % (ref 0–0.5)
LYMPHOCYTES # BLD: 1 K/UL (ref 0.8–3.5)
LYMPHOCYTES NFR BLD: 8 % (ref 12–49)
MCH RBC QN AUTO: 27.7 PG (ref 26–34)
MCHC RBC AUTO-ENTMCNC: 28 G/DL (ref 30–36.5)
MCV RBC AUTO: 98.9 FL (ref 80–99)
MONOCYTES # BLD: 1 K/UL (ref 0–1)
MONOCYTES NFR BLD: 8 % (ref 5–13)
NEUTS BAND NFR BLD MANUAL: 1 %
NEUTS SEG # BLD: 9.5 K/UL (ref 1.8–8)
NEUTS SEG NFR BLD: 77 % (ref 32–75)
NRBC # BLD: 0 K/UL (ref 0–0.01)
NRBC BLD-RTO: 0 PER 100 WBC
PLATELET # BLD AUTO: 322 K/UL (ref 150–400)
PMV BLD AUTO: 10.3 FL (ref 8.9–12.9)
POTASSIUM SERPL-SCNC: 4.6 MMOL/L (ref 3.5–5.1)
RBC # BLD AUTO: 2.67 M/UL (ref 3.8–5.2)
RBC MORPH BLD: ABNORMAL
SERVICE CMNT-IMP: ABNORMAL
SERVICE CMNT-IMP: NORMAL
SERVICE CMNT-IMP: NORMAL
SODIUM SERPL-SCNC: 138 MMOL/L (ref 136–145)
WBC # BLD AUTO: 12.2 K/UL (ref 3.6–11)

## 2019-10-19 PROCEDURE — 74011250636 HC RX REV CODE- 250/636: Performed by: HOSPITALIST

## 2019-10-19 PROCEDURE — 74011250637 HC RX REV CODE- 250/637: Performed by: SURGERY

## 2019-10-19 PROCEDURE — 74011250636 HC RX REV CODE- 250/636: Performed by: INTERNAL MEDICINE

## 2019-10-19 PROCEDURE — 36415 COLL VENOUS BLD VENIPUNCTURE: CPT

## 2019-10-19 PROCEDURE — 74011000250 HC RX REV CODE- 250: Performed by: INTERNAL MEDICINE

## 2019-10-19 PROCEDURE — 74011636637 HC RX REV CODE- 636/637: Performed by: SURGERY

## 2019-10-19 PROCEDURE — 74011250637 HC RX REV CODE- 250/637: Performed by: HOSPITALIST

## 2019-10-19 PROCEDURE — 82962 GLUCOSE BLOOD TEST: CPT

## 2019-10-19 PROCEDURE — 65660000000 HC RM CCU STEPDOWN

## 2019-10-19 PROCEDURE — 74011250636 HC RX REV CODE- 250/636: Performed by: SURGERY

## 2019-10-19 PROCEDURE — 74011250637 HC RX REV CODE- 250/637: Performed by: INTERNAL MEDICINE

## 2019-10-19 PROCEDURE — 85025 COMPLETE CBC W/AUTO DIFF WBC: CPT

## 2019-10-19 PROCEDURE — 74011000258 HC RX REV CODE- 258: Performed by: SURGERY

## 2019-10-19 PROCEDURE — 70450 CT HEAD/BRAIN W/O DYE: CPT

## 2019-10-19 PROCEDURE — 80048 BASIC METABOLIC PNL TOTAL CA: CPT

## 2019-10-19 RX ADMIN — CEFEPIME HYDROCHLORIDE 2 G: 2 INJECTION, POWDER, FOR SOLUTION INTRAVENOUS at 17:34

## 2019-10-19 RX ADMIN — POLYETHYLENE GLYCOL 3350 17 G: 17 POWDER, FOR SOLUTION ORAL at 09:58

## 2019-10-19 RX ADMIN — VANCOMYCIN HYDROCHLORIDE 1250 MG: 10 INJECTION, POWDER, LYOPHILIZED, FOR SOLUTION INTRAVENOUS at 15:01

## 2019-10-19 RX ADMIN — Medication 10 ML: at 23:11

## 2019-10-19 RX ADMIN — FAMOTIDINE 20 MG: 20 TABLET ORAL at 17:34

## 2019-10-19 RX ADMIN — ALLOPURINOL 100 MG: 100 TABLET ORAL at 09:59

## 2019-10-19 RX ADMIN — BUMETANIDE 1 MG: 0.25 INJECTION INTRAMUSCULAR; INTRAVENOUS at 09:58

## 2019-10-19 RX ADMIN — ACETAMINOPHEN 1000 MG: 500 TABLET ORAL at 09:59

## 2019-10-19 RX ADMIN — ACETAMINOPHEN 1000 MG: 500 TABLET ORAL at 17:34

## 2019-10-19 RX ADMIN — INSULIN LISPRO 4 UNITS: 100 INJECTION, SOLUTION INTRAVENOUS; SUBCUTANEOUS at 17:34

## 2019-10-19 RX ADMIN — GABAPENTIN 100 MG: 100 CAPSULE ORAL at 23:08

## 2019-10-19 RX ADMIN — METRONIDAZOLE 500 MG: 500 INJECTION, SOLUTION INTRAVENOUS at 10:02

## 2019-10-19 RX ADMIN — APIXABAN 5 MG: 5 TABLET, FILM COATED ORAL at 23:08

## 2019-10-19 RX ADMIN — Medication 10 ML: at 15:00

## 2019-10-19 RX ADMIN — APIXABAN 5 MG: 5 TABLET, FILM COATED ORAL at 11:56

## 2019-10-19 RX ADMIN — METOPROLOL TARTRATE 25 MG: 25 TABLET ORAL at 09:59

## 2019-10-19 RX ADMIN — Medication 10 ML: at 06:00

## 2019-10-19 RX ADMIN — TRAMADOL HYDROCHLORIDE 25 MG: 50 TABLET ORAL at 20:29

## 2019-10-19 RX ADMIN — METOPROLOL TARTRATE 25 MG: 25 TABLET ORAL at 20:29

## 2019-10-19 RX ADMIN — METRONIDAZOLE 500 MG: 500 INJECTION, SOLUTION INTRAVENOUS at 23:09

## 2019-10-19 RX ADMIN — INSULIN LISPRO 1 UNITS: 100 INJECTION, SOLUTION INTRAVENOUS; SUBCUTANEOUS at 23:09

## 2019-10-19 RX ADMIN — GABAPENTIN 100 MG: 100 CAPSULE ORAL at 15:00

## 2019-10-19 NOTE — PROGRESS NOTES
Hospitalist Progress Note  Leo Rios MD  Answering service: [phone] OR [phone] from in house phone        Date of Service:  10/19/2019  NAME:  Bob Ziegler  :  1931  MRN:  [id number]      Admission Summary:   51-year-old female with an extensive past medical history including chronic hypoxemic respiratory failure with home oxygen, COPD, chronic kidney disease, type 2 diabetes mellitus, atrial fibrillation, diastolic congestive heart failure, dyslipidemia, primary hypertension, osteoporosis, pulmonary hypertension, depression and anxiety disorder, was brought to the emergency room from home for evaluation of an approximately 2-3 day history of worsening shortness of breath    Interval history / Subjective: The pt feels better. Sitting in bed. Does not want to eat  Was drowsy earlier likely from Neurontin so it was held  No new complaints        Assessment & Plan:     Ischemic right leg:   -s/p RLE Thrombectomy 19 with residual clot in Popliteal Artery. -S/P repeat Thrombectomy 19.    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 Continue Apixaban. -ID following   -was put on IV cefepime, flagyl, vancomycin.   -Leukocytosis persisting and its partly from ischemia.   -Dr Rohan Dexter called on 10/16 and said antibiotics could be discharged in 2-3 days,would not advise antibiotics on discharge. 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   - discussed with Dr Tobi Lugo who suggested patient could be discharged when medically stable and follow as out patient,thinks she may ultimately need amputation but would not advise surgery at this time.     Acute on chronic diastolic CHF exarcerbation: NYHA class unknown - improving   -CXR 10/16 +interstitial edema  -on  Bumex -Troponin negative     Acute on chronic anemia  Acute blood loss anemia due to R AKA 10/14  -Hb low stable. - normal iron levels   - transfuse for Hb < 7     Paroxysmal atrial fibrillation with intermittent RVR:   -On lopressor  -Monitor on remote tele    -Echocardiogram in September 2019: hyperdynamic EF,multiple valve pathologies(moderate AS,moderate MR,moderate to severe TR) and moderate PAH  - on apixaban. Antiplatelets stopped after discussing with vascular surgery due to epistaxis     Hypokalemia - replaced, will monitor     Lethargy: will hold Neurontin      Acute renal insufficiency: resolved   Hyponatremia:resolved. Hyperkalemia: resolved  Rhabdomyolysis: resolved  Elevated troponin: suspect demand ischemia     Hypertension: controlled, continue metoprolol  Chronic respiratory failure: home 02,spo2 100% on 3 l/min via nasal canula   Diabetes mellitus Type II: BG 80 this am. Contin meds at same doses and cont SSI. Monitor   Anxiety, depression     Chronic pain,likely ischemic   -fentanyl is not helping and its short lived. Increased gabapentin to tid but backed off given lethargy ,scheduled tylenol and prn Roxicodone and continue to reassess.      Code status:DNR. Cristine Bhakta (phone [phone])  DVT prophylaxis: eliquis  Care Plan discussed with: Patient,nurse.          Disposition: [facility].  CM working on [employer] Problems  Date Reviewed: 10/14/2019          Codes Class Noted POA    * (Principal) CHF exacerbation ([facility] ICD-10-CM: I50.9  ICD-9-CM: 428.0  9/17/2019 Yes        Hyperkalemia ICD-10-CM: E87.5  ICD-9-CM: 276.7  9/17/2019 Yes        Hyponatremia ICD-10-CM: E87.1  ICD-9-CM: 276.1  9/17/2019 Yes        HOLLAND (acute kidney injury) ([facility] ICD-10-CM: N17.9  ICD-9-CM: 584.9  9/17/2019 Yes        Acute hypoxemic respiratory failure ([facility] ICD-10-CM: J96.01  ICD-9-CM: 518.81  8/8/2011 Yes        Debility ICD-10-CM: R53.81  ICD-9-CM: 799.3  6/28/2011 Yes Review of Systems:   10 systems were All negative except as mentioned above       Vital Signs:    Last 24hrs VS reviewed since prior progress note. Most recent are:  Visit Vitals  /58 (BP 1 Location: Left arm, BP Patient Position: At rest)   Pulse 70   Temp 98.4 °F (36.9 °C)   Resp 18   Ht 5' 5\" (1.651 m)   Wt 90.4 kg (199 lb 3.2 oz)   SpO2 100%   Breastfeeding? No   BMI 33.15 kg/m²         Intake/Output Summary (Last 24 hours) at 10/19/2019 1438  Last data filed at 10/19/2019 1002  Gross per 24 hour   Intake 380 ml   Output    Net 380 ml        Physical Examination:             Constitutional:  No acute distress, cooperative, pleasant    ENT:  Oral mucous moist, oropharynx benign. Resp:  No wheezing/rhonchi/rales. No accessory muscle use   CV:  Regular rhythm, normal rate, no murmurs, gallops, rubs    GI:  Soft, non distended, non tender. normoactive bowel sounds, no hepatosplenomegaly     Musculoskeletal:  R AKA. Left foot ischemia/gangrene toes    Neurologic:  Moves all extremities. AAOx3,            Data Review:    Review and/or order of clinical lab test    Labs:     Recent Labs     10/19/19  0401 10/18/19  0125   WBC 12.2* 9.4   HGB 7.4* 7.6*   HCT 26.4* 26.7*    330     Recent Labs     10/19/19  0401 10/18/19  0400 10/18/19  0125    136 138   K 4.6 4.0 3.4*    103 101   CO2 29 23 32   BUN 21* 16 17   CREA 1.01 1.03* 0.99   GLU 80 204* 217*   CA 8.3* 8.1* 8.0*     No results for input(s): SGOT, GPT, ALT, AP, TBIL, TBILI, TP, ALB, GLOB, GGT, AML, LPSE in the last 72 hours. No lab exists for component: AMYP, HLPSE  No results for input(s): INR, PTP, APTT, INREXT in the last 72 hours. Recent Labs     10/18/19  0125   TIBC 299   PSAT 20   FERR 512*      Lab Results   Component Value Date/Time    Folate 6.3 10/18/2019 01:25 AM      No results for input(s): PH, PCO2, PO2 in the last 72 hours.   Recent Labs     10/18/19  0125 10/17/19  1529 10/17/19  0422    145 158 CKNDX  --  1.8  --    TROIQ  --  <0.05  --      Lab Results   Component Value Date/Time    Cholesterol, total 144 09/17/2019 09:21 AM    HDL Cholesterol 55 09/17/2019 09:21 AM    LDL, calculated 72.6 09/17/2019 09:21 AM    Triglyceride 82 09/17/2019 09:21 AM    CHOL/HDL Ratio 2.6 09/17/2019 09:21 AM     Lab Results   Component Value Date/Time    Glucose (POC) 183 (H) 10/19/2019 11:46 AM    Glucose (POC) 89 10/19/2019 06:00 AM    Glucose (POC) 89 10/19/2019 03:59 AM    Glucose (POC) 149 (H) 10/18/2019 09:18 PM    Glucose (POC) 222 (H) 10/18/2019 04:25 PM     Lab Results   Component Value Date/Time    Color DARK YELLOW 10/05/2019 06:02 PM    Appearance CLOUDY (A) 10/05/2019 06:02 PM    Specific gravity 1.018 10/05/2019 06:02 PM    Specific gravity 1.010 12/18/2011 09:28 AM    pH (UA) 5.5 10/05/2019 06:02 PM    Protein 30 (A) 10/05/2019 06:02 PM    Glucose NEGATIVE  10/05/2019 06:02 PM    Ketone NEGATIVE  10/05/2019 06:02 PM    Bilirubin NEGATIVE  09/17/2019 12:49 PM    Urobilinogen >8.0 (H) 10/05/2019 06:02 PM    Nitrites NEGATIVE  10/05/2019 06:02 PM    Leukocyte Esterase TRACE (A) 10/05/2019 06:02 PM    Epithelial cells FEW 10/05/2019 06:02 PM    Bacteria NEGATIVE  10/05/2019 06:02 PM    WBC 0-4 10/05/2019 06:02 PM    RBC 0-5 10/05/2019 06:02 PM         Medications Reviewed:     Current Facility-Administered Medications   Medication Dose Route Frequency    albuterol-ipratropium (DUO-NEB) 2.5 MG-0.5 MG/3 ML  3 mL Nebulization Q6H PRN    bumetanide (BUMEX) injection 1 mg  1 mg IntraVENous DAILY    insulin glargine (LANTUS) injection 18 Units  18 Units SubCUTAneous DAILY    gabapentin (NEURONTIN) capsule 100 mg  100 mg Oral TID    vancomycin (VANCOCIN) 1250 mg in  ml infusion  1,250 mg IntraVENous Q24H    metroNIDAZOLE (FLAGYL) IVPB premix 500 mg  500 mg IntraVENous Q12H    acetaminophen (TYLENOL) tablet 1,000 mg  1,000 mg Oral BID    oxyCODONE IR (ROXICODONE) tablet 5 mg  5 mg Oral Q6H PRN    polyethylene glycol (MIRALAX) packet 17 g  17 g Oral DAILY    ALPRAZolam (XANAX) tablet 0.25 mg  0.25 mg Oral BID PRN    cefepime (MAXIPIME) 2 g in 0.9% sodium chloride (MBP/ADV) 100 mL  2 g IntraVENous Q24H    Vancomycin - pharmacy to dose   Other Rx Dosing/Monitoring    insulin lispro (HUMALOG) injection 4 Units  4 Units SubCUTAneous TIDAC    sodium chloride (OCEAN) 0.65 % nasal squeeze bottle 2 Spray  2 Spray Both Nostrils Q2H PRN    oxymetazoline (AFRIN) 0.05 % nasal spray 2 Spray  2 Spray Both Nostrils BID PRN    apixaban (ELIQUIS) tablet 5 mg  5 mg Oral Q12H    insulin lispro (HUMALOG) injection   SubCUTAneous AC&HS    glucose chewable tablet 16 g  4 Tab Oral PRN    glucagon (GLUCAGEN) injection 1 mg  1 mg IntraMUSCular PRN    dextrose 10% infusion 0-250 mL  0-250 mL IntraVENous PRN    alum-mag hydroxide-simeth (MYLANTA) oral suspension 30 mL  30 mL Oral Q4H PRN    famotidine (PEPCID) tablet 20 mg  20 mg Oral QPM    metoprolol tartrate (LOPRESSOR) tablet 25 mg  25 mg Oral BID    traMADol (ULTRAM) tablet 25 mg  25 mg Oral Q8H PRN    ondansetron (ZOFRAN) injection 4 mg  4 mg IntraVENous Q6H PRN    sodium chloride (NS) flush 5-40 mL  5-40 mL IntraVENous Q8H    sodium chloride (NS) flush 5-40 mL  5-40 mL IntraVENous PRN    allopurinol (ZYLOPRIM) tablet 100 mg  100 mg Oral DAILY     ______________________________________________________________________  EXPECTED LENGTH OF STAY: 6d 14h  ACTUAL LENGTH OF STAY:          28                 Eagle Fox MD

## 2019-10-19 NOTE — PROGRESS NOTES
Bedside shift change report given to [employer] (oncoming nurse) by Natalie Pinto (offgoing nurse). Report included the following information SBAR and Kardex.

## 2019-10-19 NOTE — PROGRESS NOTES
Day #12 of Vancomycin  Indication:  RLE ischemia and necrosis; S/P AKA    Current regimen:  1250 mg IV Q24H  Abx regimen:  vancomycin + cefepime + metronidazole     ID Following ?: YES  Frequency of BMP?: daily   Recent Labs     10/19/19  0401 10/18/19  0400 10/18/19  0125 10/17/19  0912   WBC 12.2*  --  9.4 15.6*   CREA 1.01 1.03* 0.99  --    BUN 21* 16 17  --      Est CrCl: 40-45ml/min;  UO:  currently being recorded as unmeasured occurrences. Temp (24hrs), Av.1 °F (36.7 °C), Min:97.3 °F (36.3 °C), Max:98.8 °F (37.1 °C)    Cultures:   10/4 blood - NG, final     Goal trough = 15 mcg/mL    Trough(s)  - 10/12 = 13.7 mcg/mL (24.75 hour level) on 1500 mg Q24H  [continue]  - 10/15 = 16.6 mcg/mL (24 hour level) on 1500 mg Q24H  [continue]  - 10/18 = 22.0 mcg/mL (12 hr post-dose) on 1.5g q24 [change to 1.25g q24h]    Plan: Continue current regimen. Will assess new regimen once at steady state.     Mary Blas

## 2019-10-20 LAB
ANION GAP SERPL CALC-SCNC: 3 MMOL/L (ref 5–15)
APPEARANCE UR: ABNORMAL
BACTERIA URNS QL MICRO: NEGATIVE /HPF
BILIRUB UR QL: NEGATIVE
BUN SERPL-MCNC: 23 MG/DL (ref 6–20)
BUN/CREAT SERPL: 20 (ref 12–20)
CALCIUM SERPL-MCNC: 8.6 MG/DL (ref 8.5–10.1)
CHLORIDE SERPL-SCNC: 102 MMOL/L (ref 97–108)
CK SERPL-CCNC: 133 U/L (ref 26–192)
CO2 SERPL-SCNC: 33 MMOL/L (ref 21–32)
COLOR UR: ABNORMAL
CREAT SERPL-MCNC: 1.16 MG/DL (ref 0.55–1.02)
EPITH CASTS URNS QL MICRO: ABNORMAL /LPF
GLUCOSE BLD STRIP.AUTO-MCNC: 174 MG/DL (ref 65–100)
GLUCOSE BLD STRIP.AUTO-MCNC: 178 MG/DL (ref 65–100)
GLUCOSE BLD STRIP.AUTO-MCNC: 215 MG/DL (ref 65–100)
GLUCOSE BLD STRIP.AUTO-MCNC: 295 MG/DL (ref 65–100)
GLUCOSE SERPL-MCNC: 160 MG/DL (ref 65–100)
GLUCOSE UR STRIP.AUTO-MCNC: 100 MG/DL
HGB UR QL STRIP: ABNORMAL
HYALINE CASTS URNS QL MICRO: ABNORMAL /LPF (ref 0–5)
KETONES UR QL STRIP.AUTO: NEGATIVE MG/DL
LEUKOCYTE ESTERASE UR QL STRIP.AUTO: NEGATIVE
NITRITE UR QL STRIP.AUTO: NEGATIVE
PH UR STRIP: 5 [PH] (ref 5–8)
POTASSIUM SERPL-SCNC: 4.3 MMOL/L (ref 3.5–5.1)
PROT UR STRIP-MCNC: NEGATIVE MG/DL
RBC #/AREA URNS HPF: ABNORMAL /HPF (ref 0–5)
SERVICE CMNT-IMP: ABNORMAL
SODIUM SERPL-SCNC: 138 MMOL/L (ref 136–145)
SP GR UR REFRACTOMETRY: 1.01 (ref 1–1.03)
UA: UC IF INDICATED,UAUC: ABNORMAL
UROBILINOGEN UR QL STRIP.AUTO: 0.2 EU/DL (ref 0.2–1)
WBC URNS QL MICRO: ABNORMAL /HPF (ref 0–4)

## 2019-10-20 PROCEDURE — 74011250637 HC RX REV CODE- 250/637: Performed by: INTERNAL MEDICINE

## 2019-10-20 PROCEDURE — 80048 BASIC METABOLIC PNL TOTAL CA: CPT

## 2019-10-20 PROCEDURE — 36415 COLL VENOUS BLD VENIPUNCTURE: CPT

## 2019-10-20 PROCEDURE — 74011636637 HC RX REV CODE- 636/637: Performed by: INTERNAL MEDICINE

## 2019-10-20 PROCEDURE — 74011250636 HC RX REV CODE- 250/636: Performed by: SURGERY

## 2019-10-20 PROCEDURE — 82962 GLUCOSE BLOOD TEST: CPT

## 2019-10-20 PROCEDURE — 74011250637 HC RX REV CODE- 250/637: Performed by: SURGERY

## 2019-10-20 PROCEDURE — 74011000250 HC RX REV CODE- 250: Performed by: INTERNAL MEDICINE

## 2019-10-20 PROCEDURE — 65270000029 HC RM PRIVATE

## 2019-10-20 PROCEDURE — 81001 URINALYSIS AUTO W/SCOPE: CPT

## 2019-10-20 PROCEDURE — 74011000258 HC RX REV CODE- 258: Performed by: SURGERY

## 2019-10-20 PROCEDURE — 74011636637 HC RX REV CODE- 636/637: Performed by: SURGERY

## 2019-10-20 PROCEDURE — 74011250636 HC RX REV CODE- 250/636: Performed by: INTERNAL MEDICINE

## 2019-10-20 PROCEDURE — 74011250637 HC RX REV CODE- 250/637: Performed by: HOSPITALIST

## 2019-10-20 PROCEDURE — 82550 ASSAY OF CK (CPK): CPT

## 2019-10-20 PROCEDURE — 74011250636 HC RX REV CODE- 250/636: Performed by: HOSPITALIST

## 2019-10-20 RX ORDER — BUMETANIDE 1 MG/1
1 TABLET ORAL 2 TIMES DAILY
Status: DISCONTINUED | OUTPATIENT
Start: 2019-10-21 | End: 2019-10-23 | Stop reason: HOSPADM

## 2019-10-20 RX ORDER — BUMETANIDE 1 MG/1
1 TABLET ORAL 2 TIMES DAILY
Status: DISCONTINUED | OUTPATIENT
Start: 2019-10-20 | End: 2019-10-20 | Stop reason: DRUGHIGH

## 2019-10-20 RX ADMIN — Medication 10 ML: at 08:26

## 2019-10-20 RX ADMIN — FAMOTIDINE 20 MG: 20 TABLET ORAL at 18:00

## 2019-10-20 RX ADMIN — GABAPENTIN 100 MG: 100 CAPSULE ORAL at 16:59

## 2019-10-20 RX ADMIN — ALLOPURINOL 100 MG: 100 TABLET ORAL at 09:07

## 2019-10-20 RX ADMIN — VANCOMYCIN HYDROCHLORIDE 1250 MG: 10 INJECTION, POWDER, LYOPHILIZED, FOR SOLUTION INTRAVENOUS at 16:54

## 2019-10-20 RX ADMIN — Medication 10 ML: at 16:54

## 2019-10-20 RX ADMIN — METRONIDAZOLE 500 MG: 500 INJECTION, SOLUTION INTRAVENOUS at 21:27

## 2019-10-20 RX ADMIN — GABAPENTIN 100 MG: 100 CAPSULE ORAL at 09:07

## 2019-10-20 RX ADMIN — BUMETANIDE 1 MG: 0.25 INJECTION INTRAMUSCULAR; INTRAVENOUS at 09:10

## 2019-10-20 RX ADMIN — METRONIDAZOLE 500 MG: 500 INJECTION, SOLUTION INTRAVENOUS at 09:07

## 2019-10-20 RX ADMIN — INSULIN LISPRO 4 UNITS: 100 INJECTION, SOLUTION INTRAVENOUS; SUBCUTANEOUS at 08:27

## 2019-10-20 RX ADMIN — CEFEPIME HYDROCHLORIDE 2 G: 2 INJECTION, POWDER, FOR SOLUTION INTRAVENOUS at 17:59

## 2019-10-20 RX ADMIN — METOPROLOL TARTRATE 25 MG: 25 TABLET ORAL at 09:07

## 2019-10-20 RX ADMIN — APIXABAN 5 MG: 5 TABLET, FILM COATED ORAL at 12:13

## 2019-10-20 RX ADMIN — POLYETHYLENE GLYCOL 3350 17 G: 17 POWDER, FOR SOLUTION ORAL at 09:07

## 2019-10-20 RX ADMIN — INSULIN LISPRO 2 UNITS: 100 INJECTION, SOLUTION INTRAVENOUS; SUBCUTANEOUS at 16:54

## 2019-10-20 RX ADMIN — INSULIN GLARGINE 18 UNITS: 100 INJECTION, SOLUTION SUBCUTANEOUS at 08:26

## 2019-10-20 RX ADMIN — Medication 10 ML: at 21:28

## 2019-10-20 RX ADMIN — INSULIN LISPRO 3 UNITS: 100 INJECTION, SOLUTION INTRAVENOUS; SUBCUTANEOUS at 12:12

## 2019-10-20 NOTE — PROGRESS NOTES
Day #13 of Vancomycin  Indication:  RLE ischemia and necrosis; S/P AKA    Current regimen:  1250 mg IV Q24H  Abx regimen:  vancomycin + cefepime + metronidazole     ID Following ?: YES  Frequency of BMP?: daily   Recent Labs     10/20/19  0346 10/19/19  0401 10/18/19  0400 10/18/19  0125   WBC  --  12.2*  --  9.4   CREA 1.16* 1.01 1.03* 0.99   BUN 23* 21* 16 17     Est CrCl: 35-40ml/min;  UO:  currently being recorded as unmeasured occurrences. Temp (24hrs), Av.2 °F (36.8 °C), Min:98.1 °F (36.7 °C), Max:98.4 °F (36.9 °C)    Cultures:   10/4 blood - NG, final     Goal trough = 15 mcg/mL    Trough(s)  - 10/12 = 13.7 mcg/mL (24.75 hour level) on 1500 mg Q24H  [continue]  - 10/15 = 16.6 mcg/mL (24 hour level) on 1500 mg Q24H  [continue]  - 10/18 = 22.0 mcg/mL (12 hr post-dose) on 1.5g q24 [change to 1.25g q24h]    Plan: Continue current regimen. Noted bump in SCr today. Will continue to monitor daily and assess new regimen once at steady state.     [address]

## 2019-10-20 NOTE — ROUTINE PROCESS
Bedside shift change report given to rika brady rn (oncoming nurse) by Fredis Reardon rn (offgoing nurse). Report included the following information SBAR and Kardex.

## 2019-10-20 NOTE — PROGRESS NOTES
Bedside shift change report given to [address] (oncoming nurse) by Lian Sinha RN (offgoing nurse). Report included the following information SBAR, Kardex, MAR and Recent Results.

## 2019-10-20 NOTE — PROGRESS NOTES
Scant blood tinge noted on her brief while changing pt, no other evidence of bleeding, amanda-area assess no sign of trauma noted, MD aware of the situation. Watching the Pt and her blood counts.

## 2019-10-20 NOTE — PROGRESS NOTES
Problem: Falls - Risk of  Goal: *Absence of Falls  Description  Document Erika Zavaleta Fall Risk and appropriate interventions in the flowsheet. Outcome: Progressing Towards Goal  Note:   Fall Risk Interventions:  Mobility Interventions: Strengthening exercises (ROM-active/passive)    Mentation Interventions: Adequate sleep, hydration, pain control, Door open when patient unattended, Eyeglasses and hearing aids    Medication Interventions: Patient to call before getting OOB    Elimination Interventions: Call light in reach, Patient to call for help with toileting needs    History of Falls Interventions: Door open when patient unattended, Consult care management for discharge planning, Evaluate medications/consider consulting pharmacy         Problem: Patient Education: Go to Patient Education Activity  Goal: Patient/Family Education  Outcome: Progressing Towards Goal     Problem: Patient Education: Go to Patient Education Activity  Goal: Patient/Family Education  Outcome: Progressing Towards Goal     Problem: Impaired Skin Integrity/Pressure Injury Treatment  Goal: *Improvement of Existing Pressure Injury  Outcome: Progressing Towards Goal  Goal: *Prevention of pressure injury  Description  Document Jagdeep Scale and appropriate interventions in the flowsheet.   Outcome: Progressing Towards Goal  Note:   Pressure Injury Interventions:  Sensory Interventions: Float heels, Maintain/enhance activity level    Moisture Interventions: Apply protective barrier, creams and emollients, Absorbent underpads, Minimize layers    Activity Interventions: Increase time out of bed, Assess need for specialty bed    Mobility Interventions: Float heels, HOB 30 degrees or less    Nutrition Interventions: Document food/fluid/supplement intake    Friction and Shear Interventions: HOB 30 degrees or less                Problem: Pressure Injury - Risk of  Goal: *Prevention of pressure injury  Description  Document Jagdeep Scale and appropriate interventions in the flowsheet.   Outcome: Progressing Towards Goal  Note:   Pressure Injury Interventions:  Sensory Interventions: Float heels, Maintain/enhance activity level    Moisture Interventions: Apply protective barrier, creams and emollients, Absorbent underpads, Minimize layers    Activity Interventions: Increase time out of bed, Assess need for specialty bed    Mobility Interventions: Float heels, HOB 30 degrees or less    Nutrition Interventions: Document food/fluid/supplement intake    Friction and Shear Interventions: HOB 30 degrees or less                Problem: Patient Education: Go to Patient Education Activity  Goal: Patient/Family Education  Outcome: Progressing Towards Goal     Problem: Amputation  Goal: *Progressive mobility and function  Outcome: Progressing Towards Goal  Goal: *Optimal pain control at patient's stated goal  Outcome: Progressing Towards Goal  Goal: *Absence of infection signs and symptoms  Outcome: Progressing Towards Goal  Goal: *Optimal residual limb healing  Outcome: Progressing Towards Goal     Problem: Patient Education: Go to Patient Education Activity  Goal: Patient/Family Education  Outcome: Progressing Towards Goal     Problem: Nutrition Deficit  Goal: *Optimize nutritional status  Outcome: Progressing Towards Goal

## 2019-10-20 NOTE — PROGRESS NOTES
Hospitalist Progress Note  Silverio Moise MD  Answering service: [phone] from in house phone        Date of Service:  10/20/2019  NAME:  Vera Chan  :  1931  MRN:  [id number]      Admission Summary:   75-year-old female with an extensive past medical history including chronic hypoxemic respiratory failure with home oxygen, COPD, chronic kidney disease, type 2 diabetes mellitus, atrial fibrillation, diastolic congestive heart failure, dyslipidemia, primary hypertension, osteoporosis, pulmonary hypertension, depression and anxiety disorder, was brought to the emergency room from home for evaluation of an approximately 2-3 day history of worsening shortness of breath    Interval history / Subjective: The pt feels okay  Lying in bed  No events overnight      Assessment & Plan:     Ischemic right leg:   -s/p RLE Thrombectomy 19 with residual clot in Popliteal Artery. -S/P repeat Thrombectomy 19.    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 Continue Apixaban. -ID following   -was put on IV cefepime, flagyl, vancomycin.   -Leukocytosis persisting and its partly from ischemia.   -Dr Lizzy Lindsey called on 10/16 and said antibiotics could be discharged in 2-3 days,would not advise antibiotics on discharge. Dr Мария Lamb would cover for her for any questions. 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 - discussed with Dr Pedro Ochoa who suggested patient could be discharged when medically stable and follow as out patient,thinks she may ultimately need amputation but would not advise surgery at this time.     Acute on chronic diastolic CHF exarcerbation: NYHA class unknown - improving   -CXR 10/16 +interstitial edema  -on IV Bumex.  Will switch to po bumex  -Cr slightly going up, will continue to monitor    -Troponin negative     Acute on chronic anemia  Acute blood loss anemia due to R AKA 10/14  -Hb low stable.   -normal iron levels   -transfuse for Hb < 7     Paroxysmal atrial fibrillation with intermittent RVR:   -On lopressor  -Monitor on remote tele    -Echocardiogram in September 2019: hyperdynamic EF,multiple valve pathologies(moderate AS,moderate MR,moderate to severe TR) and moderate PAH  - on apixaban. Antiplatelets stopped after discussing with vascular surgery due to epistaxis     Hypokalemia - replaced, will monitor      Lethargy: improved after holding Neurontin      Acute renal insufficiency: resolved but creatinine creping up again, will check again in am and switch Bumex to po and hold until we check cr in am   Hyponatremia:resolved. Hyperkalemia: resolved  Rhabdomyolysis: resolved  Elevated troponin: suspect demand ischemia     Hypertension: controlled, continue metoprolol  Chronic respiratory failure: home 02,spo2 100% on 3 l/min via nasal canula. Off o2 this am    Diabetes mellitus Type II: Contin meds at same doses and cont SSI. Monitor   Anxiety, depression     Chronic pain,likely ischemic   -fentanyl is not helping and its short lived. Increased gabapentin to tid but backed off given lethargy ,scheduled tylenol and prn Roxicodone and continue to reassess.      Code status:DNR. Cristine Messina (phone [phone])  DVT prophylaxis: eliquis  Care Plan discussed with: Sunitha Pace     Disposition: [facility]. CM working on insurance auth.  Ready to go from [place] [id number] Problems  Date Reviewed: 10/14/2019          Codes Class Noted POA    * (Principal) CHF exacerbation ([facility] ICD-10-CM: I50.9  ICD-9-CM: 428.0  9/17/2019 Yes        Hyperkalemia ICD-10-CM: E87.5  ICD-9-CM: 276.7  9/17/2019 Yes        Hyponatremia ICD-10-CM: E87.1  ICD-9-CM: 276.1  9/17/2019 Yes        HOLLAND (acute kidney injury) ([facility] ICD-10-CM: N17.9  ICD-9-CM: 584.9 9/17/2019 Yes        Acute hypoxemic respiratory failure [facility]) ICD-10-CM: J96.01  ICD-9-CM: 518.81  8/8/2011 Yes        Debility ICD-10-CM: R53.81  ICD-9-CM: 799.3  6/28/2011 Yes                Review of Systems:   A comprehensive review of systems was negative except for that written in the HPI. Vital Signs:    Last 24hrs VS reviewed since prior progress note. Most recent are:  Visit Vitals  /82 (BP 1 Location: Left arm, BP Patient Position: At rest)   Pulse 92   Temp 98.1 °F (36.7 °C)   Resp 16   Ht 5' 5\" (1.651 m)   Wt 90.5 kg (199 lb 9.6 oz)   SpO2 97%   Breastfeeding? No   BMI 33.22 kg/m²       No intake or output data in the 24 hours ending 10/20/19 1203     Physical Examination:             Constitutional:  No acute distress, cooperative, pleasant    ENT:  Oral mucous moist, oropharynx benign. Resp:  CTA bilaterally. No wheezing/rhonchi/rales. No accessory muscle use   CV:  Regular rhythm, normal rate, no murmurs, gallops, rubs    GI:  Soft, non distended, non tender. normoactive bowel sounds, no hepatosplenomegaly     Musculoskeletal:  No edema, warm, 2+ pulses throughout    Neurologic:  Moves all extremities. R AKA. Left foot ischemia/gangrene toes       Data Review:    Review and/or order of clinical lab test      Labs:     Recent Labs     10/19/19  0401 10/18/19  0125   WBC 12.2* 9.4   HGB 7.4* 7.6*   HCT 26.4* 26.7*    330     Recent Labs     10/20/19  0346 10/19/19  0401 10/18/19  0400    138 136   K 4.3 4.6 4.0    105 103   CO2 33* 29 23   BUN 23* 21* 16   CREA 1.16* 1.01 1.03*   * 80 204*   CA 8.6 8.3* 8.1*     No results for input(s): SGOT, GPT, ALT, AP, TBIL, TBILI, TP, ALB, GLOB, GGT, AML, LPSE in the last 72 hours. No lab exists for component: AMYP, HLPSE  No results for input(s): INR, PTP, APTT, INREXT in the last 72 hours.    Recent Labs     10/18/19  0125   TIBC 299   PSAT 20   FERR 512*      Lab Results   Component Value Date/Time    Folate 6.3 10/18/2019 01:25 AM      No results for input(s): PH, PCO2, PO2 in the last 72 hours.   Recent Labs     10/20/19  0346 10/18/19  0125 10/17/19  1529    133 145   CKNDX  --   --  1.8   TROIQ  --   --  <0.05     Lab Results   Component Value Date/Time    Cholesterol, total 144 09/17/2019 09:21 AM    HDL Cholesterol 55 09/17/2019 09:21 AM    LDL, calculated 72.6 09/17/2019 09:21 AM    Triglyceride 82 09/17/2019 09:21 AM    CHOL/HDL Ratio 2.6 09/17/2019 09:21 AM     Lab Results   Component Value Date/Time    Glucose (POC) 295 (H) 10/20/2019 11:21 AM    Glucose (POC) 174 (H) 10/20/2019 07:27 AM    Glucose (POC) 228 (H) 10/19/2019 08:56 PM    Glucose (POC) 165 (H) 10/19/2019 04:28 PM    Glucose (POC) 183 (H) 10/19/2019 11:46 AM     Lab Results   Component Value Date/Time    Color DARK YELLOW 10/05/2019 06:02 PM    Appearance CLOUDY (A) 10/05/2019 06:02 PM    Specific gravity 1.018 10/05/2019 06:02 PM    Specific gravity 1.010 12/18/2011 09:28 AM    pH (UA) 5.5 10/05/2019 06:02 PM    Protein 30 (A) 10/05/2019 06:02 PM    Glucose NEGATIVE  10/05/2019 06:02 PM    Ketone NEGATIVE  10/05/2019 06:02 PM    Bilirubin NEGATIVE  09/17/2019 12:49 PM    Urobilinogen >8.0 (H) 10/05/2019 06:02 PM    Nitrites NEGATIVE  10/05/2019 06:02 PM    Leukocyte Esterase TRACE (A) 10/05/2019 06:02 PM    Epithelial cells FEW 10/05/2019 06:02 PM    Bacteria NEGATIVE  10/05/2019 06:02 PM    WBC 0-4 10/05/2019 06:02 PM    RBC 0-5 10/05/2019 06:02 PM         Medications Reviewed:     Current Facility-Administered Medications   Medication Dose Route Frequency    bumetanide (BUMEX) tablet 1 mg  1 mg Oral BID    albuterol-ipratropium (DUO-NEB) 2.5 MG-0.5 MG/3 ML  3 mL Nebulization Q6H PRN    insulin glargine (LANTUS) injection 18 Units  18 Units SubCUTAneous DAILY    gabapentin (NEURONTIN) capsule 100 mg  100 mg Oral TID    vancomycin (VANCOCIN) 1250 mg in  ml infusion  1,250 mg IntraVENous Q24H    metroNIDAZOLE (FLAGYL) IVPB premix 500 mg  500 mg IntraVENous Q12H    acetaminophen (TYLENOL) tablet 1,000 mg  1,000 mg Oral BID    oxyCODONE IR (ROXICODONE) tablet 5 mg  5 mg Oral Q6H PRN    polyethylene glycol (MIRALAX) packet 17 g  17 g Oral DAILY    ALPRAZolam (XANAX) tablet 0.25 mg  0.25 mg Oral BID PRN    cefepime (MAXIPIME) 2 g in 0.9% sodium chloride (MBP/ADV) 100 mL  2 g IntraVENous Q24H    Vancomycin - pharmacy to dose   Other Rx Dosing/Monitoring    insulin lispro (HUMALOG) injection 4 Units  4 Units SubCUTAneous TIDAC    sodium chloride (OCEAN) 0.65 % nasal squeeze bottle 2 Spray  2 Spray Both Nostrils Q2H PRN    oxymetazoline (AFRIN) 0.05 % nasal spray 2 Spray  2 Spray Both Nostrils BID PRN    apixaban (ELIQUIS) tablet 5 mg  5 mg Oral Q12H    insulin lispro (HUMALOG) injection   SubCUTAneous AC&HS    glucose chewable tablet 16 g  4 Tab Oral PRN    glucagon (GLUCAGEN) injection 1 mg  1 mg IntraMUSCular PRN    dextrose 10% infusion 0-250 mL  0-250 mL IntraVENous PRN    alum-mag hydroxide-simeth (MYLANTA) oral suspension 30 mL  30 mL Oral Q4H PRN    famotidine (PEPCID) tablet 20 mg  20 mg Oral QPM    metoprolol tartrate (LOPRESSOR) tablet 25 mg  25 mg Oral BID    traMADol (ULTRAM) tablet 25 mg  25 mg Oral Q8H PRN    ondansetron (ZOFRAN) injection 4 mg  4 mg IntraVENous Q6H PRN    sodium chloride (NS) flush 5-40 mL  5-40 mL IntraVENous Q8H    sodium chloride (NS) flush 5-40 mL  5-40 mL IntraVENous PRN    allopurinol (ZYLOPRIM) tablet 100 mg  100 mg Oral DAILY     ______________________________________________________________________  EXPECTED LENGTH OF STAY: 6d 14h  ACTUAL LENGTH OF STAY:          35                 Eagle Fox MD

## 2019-10-20 NOTE — PROGRESS NOTES
Pt had small amount of dark red blood in her brief while we clean her up, it was at the front of her brief and some on her labia, DR. Fox notified about it, he wants UA done, labs in the AM,  also pt hasn't had great appetite for lunch or dinner, MD is ok for holding her meal dose insulin, received appropriate coverage. Will monitor her.

## 2019-10-21 ENCOUNTER — APPOINTMENT (OUTPATIENT)
Dept: ULTRASOUND IMAGING | Age: 84
DRG: 270 | End: 2019-10-21
Attending: INTERNAL MEDICINE
Payer: MEDICARE

## 2019-10-21 LAB
ANION GAP SERPL CALC-SCNC: 3 MMOL/L (ref 5–15)
BASOPHILS # BLD: 0 K/UL (ref 0–0.1)
BASOPHILS NFR BLD: 0 % (ref 0–1)
BUN SERPL-MCNC: 22 MG/DL (ref 6–20)
BUN/CREAT SERPL: 20 (ref 12–20)
CALCIUM SERPL-MCNC: 8.4 MG/DL (ref 8.5–10.1)
CHLORIDE SERPL-SCNC: 102 MMOL/L (ref 97–108)
CK SERPL-CCNC: 109 U/L (ref 26–192)
CO2 SERPL-SCNC: 33 MMOL/L (ref 21–32)
CREAT SERPL-MCNC: 1.09 MG/DL (ref 0.55–1.02)
DIFFERENTIAL METHOD BLD: ABNORMAL
EOSINOPHIL # BLD: 0.2 K/UL (ref 0–0.4)
EOSINOPHIL NFR BLD: 2 % (ref 0–7)
ERYTHROCYTE [DISTWIDTH] IN BLOOD BY AUTOMATED COUNT: 18.5 % (ref 11.5–14.5)
GLUCOSE BLD STRIP.AUTO-MCNC: 175 MG/DL (ref 65–100)
GLUCOSE BLD STRIP.AUTO-MCNC: 201 MG/DL (ref 65–100)
GLUCOSE BLD STRIP.AUTO-MCNC: 238 MG/DL (ref 65–100)
GLUCOSE BLD STRIP.AUTO-MCNC: 303 MG/DL (ref 65–100)
GLUCOSE SERPL-MCNC: 158 MG/DL (ref 65–100)
HCT VFR BLD AUTO: 23.3 % (ref 35–47)
HGB BLD-MCNC: 6.8 G/DL (ref 11.5–16)
IMM GRANULOCYTES # BLD AUTO: 0.1 K/UL (ref 0–0.04)
IMM GRANULOCYTES NFR BLD AUTO: 1 % (ref 0–0.5)
LYMPHOCYTES # BLD: 1 K/UL (ref 0.8–3.5)
LYMPHOCYTES NFR BLD: 9 % (ref 12–49)
MCH RBC QN AUTO: 26.9 PG (ref 26–34)
MCHC RBC AUTO-ENTMCNC: 29.2 G/DL (ref 30–36.5)
MCV RBC AUTO: 92.1 FL (ref 80–99)
MONOCYTES # BLD: 0.9 K/UL (ref 0–1)
MONOCYTES NFR BLD: 8 % (ref 5–13)
NEUTS SEG # BLD: 8.9 K/UL (ref 1.8–8)
NEUTS SEG NFR BLD: 80 % (ref 32–75)
NRBC # BLD: 0 K/UL (ref 0–0.01)
NRBC BLD-RTO: 0 PER 100 WBC
PLATELET # BLD AUTO: 392 K/UL (ref 150–400)
PMV BLD AUTO: 9.6 FL (ref 8.9–12.9)
POTASSIUM SERPL-SCNC: 3.9 MMOL/L (ref 3.5–5.1)
RBC # BLD AUTO: 2.53 M/UL (ref 3.8–5.2)
RBC MORPH BLD: ABNORMAL
SERVICE CMNT-IMP: ABNORMAL
SODIUM SERPL-SCNC: 138 MMOL/L (ref 136–145)
WBC # BLD AUTO: 11.1 K/UL (ref 3.6–11)

## 2019-10-21 PROCEDURE — 86900 BLOOD TYPING SEROLOGIC ABO: CPT

## 2019-10-21 PROCEDURE — P9016 RBC LEUKOCYTES REDUCED: HCPCS

## 2019-10-21 PROCEDURE — 36430 TRANSFUSION BLD/BLD COMPNT: CPT

## 2019-10-21 PROCEDURE — 80048 BASIC METABOLIC PNL TOTAL CA: CPT

## 2019-10-21 PROCEDURE — 86923 COMPATIBILITY TEST ELECTRIC: CPT

## 2019-10-21 PROCEDURE — 77010033678 HC OXYGEN DAILY

## 2019-10-21 PROCEDURE — 97530 THERAPEUTIC ACTIVITIES: CPT

## 2019-10-21 PROCEDURE — 85025 COMPLETE CBC W/AUTO DIFF WBC: CPT

## 2019-10-21 PROCEDURE — 82550 ASSAY OF CK (CPK): CPT

## 2019-10-21 PROCEDURE — 74011250637 HC RX REV CODE- 250/637: Performed by: SURGERY

## 2019-10-21 PROCEDURE — 74011250637 HC RX REV CODE- 250/637: Performed by: INTERNAL MEDICINE

## 2019-10-21 PROCEDURE — 76856 US EXAM PELVIC COMPLETE: CPT

## 2019-10-21 PROCEDURE — 74011250636 HC RX REV CODE- 250/636: Performed by: HOSPITALIST

## 2019-10-21 PROCEDURE — 82962 GLUCOSE BLOOD TEST: CPT

## 2019-10-21 PROCEDURE — 74011250637 HC RX REV CODE- 250/637: Performed by: HOSPITALIST

## 2019-10-21 PROCEDURE — 65270000029 HC RM PRIVATE

## 2019-10-21 PROCEDURE — 74011636637 HC RX REV CODE- 636/637: Performed by: INTERNAL MEDICINE

## 2019-10-21 PROCEDURE — 74011636637 HC RX REV CODE- 636/637: Performed by: SURGERY

## 2019-10-21 PROCEDURE — 36415 COLL VENOUS BLD VENIPUNCTURE: CPT

## 2019-10-21 RX ORDER — SODIUM CHLORIDE 9 MG/ML
250 INJECTION, SOLUTION INTRAVENOUS AS NEEDED
Status: DISCONTINUED | OUTPATIENT
Start: 2019-10-21 | End: 2019-10-23 | Stop reason: HOSPADM

## 2019-10-21 RX ORDER — MICONAZOLE NITRATE 2 %
POWDER (GRAM) TOPICAL 2 TIMES DAILY
Status: DISCONTINUED | OUTPATIENT
Start: 2019-10-21 | End: 2019-10-23 | Stop reason: HOSPADM

## 2019-10-21 RX ADMIN — APIXABAN 5 MG: 5 TABLET, FILM COATED ORAL at 23:17

## 2019-10-21 RX ADMIN — INSULIN LISPRO 4 UNITS: 100 INJECTION, SOLUTION INTRAVENOUS; SUBCUTANEOUS at 17:30

## 2019-10-21 RX ADMIN — METOPROLOL TARTRATE 25 MG: 25 TABLET ORAL at 10:01

## 2019-10-21 RX ADMIN — Medication 10 ML: at 15:38

## 2019-10-21 RX ADMIN — APIXABAN 5 MG: 5 TABLET, FILM COATED ORAL at 12:21

## 2019-10-21 RX ADMIN — MICONAZOLE NITRATE 2 % TOPICAL POWDER: at 18:00

## 2019-10-21 RX ADMIN — Medication 10 ML: at 21:41

## 2019-10-21 RX ADMIN — INSULIN LISPRO 2 UNITS: 100 INJECTION, SOLUTION INTRAVENOUS; SUBCUTANEOUS at 12:21

## 2019-10-21 RX ADMIN — GABAPENTIN 100 MG: 100 CAPSULE ORAL at 21:40

## 2019-10-21 RX ADMIN — METOPROLOL TARTRATE 25 MG: 25 TABLET ORAL at 21:40

## 2019-10-21 RX ADMIN — POLYETHYLENE GLYCOL 3350 17 G: 17 POWDER, FOR SOLUTION ORAL at 10:02

## 2019-10-21 RX ADMIN — BUMETANIDE 1 MG: 1 TABLET ORAL at 17:30

## 2019-10-21 RX ADMIN — INSULIN LISPRO 1 UNITS: 100 INJECTION, SOLUTION INTRAVENOUS; SUBCUTANEOUS at 21:40

## 2019-10-21 RX ADMIN — INSULIN GLARGINE 18 UNITS: 100 INJECTION, SOLUTION SUBCUTANEOUS at 10:01

## 2019-10-21 RX ADMIN — BUMETANIDE 1 MG: 1 TABLET ORAL at 10:02

## 2019-10-21 RX ADMIN — GABAPENTIN 100 MG: 100 CAPSULE ORAL at 15:38

## 2019-10-21 RX ADMIN — METRONIDAZOLE 500 MG: 500 INJECTION, SOLUTION INTRAVENOUS at 10:02

## 2019-10-21 RX ADMIN — FAMOTIDINE 20 MG: 20 TABLET ORAL at 17:30

## 2019-10-21 RX ADMIN — ALLOPURINOL 100 MG: 100 TABLET ORAL at 10:02

## 2019-10-21 RX ADMIN — Medication 10 ML: at 07:05

## 2019-10-21 NOTE — PROGRESS NOTES
Problem: Mobility Impaired (Adult and Pediatric)  Goal: *Acute Goals and Plan of Care (Insert Text)  Description  FUNCTIONAL STATUS PRIOR TO ADMISSION: Patient was independent for all functional mobility. Patient uses 2L/min O2 via nasal cannula at baseline. HOME SUPPORT PRIOR TO ADMISSION: The patient lived with her son and daughter but did not require assist.    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 Patient will move from supine to sit and sit to supine , scoot up and down and roll side to side in bed with moderate assistance within 7 day(s). 2.  Patient will transfer from bed to chair and chair to bed with maximal assistance using the least restrictive device within 7 day(s). 3.  Patient will perform sit to stand with maximal assistance within 7 day(s). 4.  Patient will participate in residual limb exercise program with max verbal cues for technique and min assist for limb management within 7 days. 5.  Patient will be able to name and perform one technique for assisting with management of phantom limb pain within 7 days. 6.  Patient will be independent with L LE and UE HEP for global strengthening in prep for transfers within 7 days. Weekly re-assessment 10/4/2019  1. Patient will move from supine to sit and sit to supine  in bed with modified independence within 7 day(s). 2.  Patient will transfer from bed to chair and chair to bed with contact guard assist consistently  using the least restrictive device within 7 day(s). 3.  Patient will perform sit to stand with contact guard  assistance  within 7 day(s). 4.  Patient will ambulate with minimal assistance  for 15 feet with the least restrictive device within 7 day(s). Reassessed 9/27/19  1. Patient will move from supine to sit and sit to supine  in bed with modified independence within 7 day(s).     2.  Patient will transfer from bed to chair and chair to bed with moderate assistance  using the least restrictive device within 7 day(s). 3.  Patient will perform sit to stand with moderate assistance  within 7 day(s). 4.  Patient will ambulate with moderate assistance  for 15 feet with the least restrictive device within 7 day(s). Initiated 9/18/2019  1. Patient will move from supine to sit and sit to supine  in bed with independence within 7 day(s). 2.  Patient will transfer from bed to chair and chair to bed with independence using the least restrictive device within 7 day(s). 3.  Patient will perform sit to stand with independence within 7 day(s). 4.  Patient will ambulate with independence for 150 feet with the least restrictive device within 7 day(s). 5.  Patient will ascend/descend 1 stairs with 1 handrail(s) with modified independence within 7 day(s). Outcome: Progressing Towards Goal  PHYSICAL THERAPY TREATMENT  Patient: Paulo Waller (47 y.o. female)  Date: 10/21/2019  Diagnosis: CHF exacerbation (HCC) [I50.9] CHF exacerbation (HCC)  Procedure(s) (LRB):  RIGHT ABOVE KNEE AMPUTATION (Right) 7 Days Post-Op  Precautions: Fall, DNR(AKA 10-14; Hosp admit 9-17-19; 2L O2 PTA)  Chart, physical therapy assessment, plan of care and goals were reviewed. ASSESSMENT  Patient continues with skilled PT services and is progressing towards goals. Pt cooperative with sitting edge of bed today - decreased sitting endurance. Patient performing left hip/knee active exercise in sitting. Current Level of Function Impacting Discharge (mobility/balance): Min assist for bed mobility, close supervision for sitting edge of bed         PLAN :  Patient continues to benefit from skilled intervention to address the above impairments. Continue treatment per established plan of care. to address goals.     Recommendation for discharge: (in order for the patient to meet his/her long term goals)  Therapy up to 5 days/week in SNF setting    This discharge recommendation:  Has been made in collaboration with the attending provider and/or case management    IF patient discharges home will need the following DME: to be determined in rehab setting        SUBJECTIVE:   Patient stated I can sit on the edge of the bed.     OBJECTIVE DATA SUMMARY:   Critical Behavior:  Neurologic State: Drowsy  Orientation Level: Disoriented to situation  Cognition: Decreased command following  Safety/Judgement: Awareness of environment  Functional Mobility Training:  Bed Mobility:  Rolling: Modified independent  Supine to Sit: Minimum assistance  Sit to Supine: Minimum assistance         Balance:  Sitting: Intact; Without support  Sitting - Static: Good (unsupported)  Therapeutic Activity:   Pt agreeable to sit edge of bed 5 mins;  pt sat without loss of balance with and without use of arms for support x 7 mins. Pt able to maintain balance while performing left hip flexion and left knee extension. Pain Rating:  Pt rating pain 10/10 bilateral legs. Activity Tolerance:   Fair  Please refer to the flowsheet for vital signs taken during this treatment.     After treatment patient left in no apparent distress:   Supine in bed and Call bell within reach    COMMUNICATION/COLLABORATION:   The patients plan of care was discussed with: Registered Nurse    Crescencio Pruitt, PT   Time Calculation: 15 mins

## 2019-10-21 NOTE — PROGRESS NOTES
Bedside shift change report given to sammie (oncoming nurse) by Adriel Reid (offgoing nurse). Report included the following information SBAR and Kardex.

## 2019-10-21 NOTE — PROGRESS NOTES
MARIANN Plan:     * [facility] and rehab has auth as of 10/21. * UAI has been successfully completed  * [facility] on \"will Call\" until medically cleared for discharge. Kwame Wren [phone]        Spoke with Eduard Bhakta who confirmed the short term plan at discharge is for [facility]. Have updated SNF as anticipate discharge next week. Have requested SNF start Auth with [facility].     JOSIE MaddenW, [facility] Intern

## 2019-10-21 NOTE — PROGRESS NOTES
10/21/19 0939   Vital Signs   Temp 98.2 °F (36.8 °C)   Temp Source Oral   Pulse (Heart Rate) (!) 104   Heart Rate Source Monitor   Resp Rate 16   O2 Sat (%) 98 %   Level of Consciousness Alert   /79   MAP (Calculated) 101   BP 1 Method Automatic   BP 1 Location Left arm   BP Patient Position At rest   MEWS Score 2   Oxygen Therapy   O2 Device Nasal cannula   O2 Flow Rate (L/min) 3 l/min

## 2019-10-21 NOTE — PROGRESS NOTES
Infectious Disease Progress Note       Subjective:   Ms Nilsa Chin seen today earlier  She was lethargic but awake and responded to simple questions  Denied pain  Denied nausea, vomiting, diarrhea, fever chills  D/W RN             ROS: 10 point ROS obtained and pertinent positives as above. All others negative.          Objective:    Vitals:   Patient Vitals for the past 24 hrs:   Temp Pulse Resp BP SpO2   10/21/19 0939 98.2 °F (36.8 °C) (!) 104 16 144/79 98 %   10/21/19 0444 98.2 °F (36.8 °C) (!) 108 16 128/73 98 %   10/20/19 2056 99.1 °F (37.3 °C) 98 16 128/73 99 %   10/20/19 1504 98.9 °F (37.2 °C) 99 16 117/80 99 %       Physical Exam:  Gen: lethargic  HEENT:   no scleral icterus, no thrush ,   CV: S1,2 heard regularly, no lower extremity edema    Lungs: Clear to auscultation anteriorly,  no wheezing  Abdomen: soft, non tender, non distended,   Skin: no rash on visualized areas   Neuro: sleepy , minimally verbal   Musculoskeletal:   RLE  AKA site, no erythema or drainage seen,  + L foot toes are dark and cool, she denied any sensation to gross touch of toes, demarcating and areas of dorsal foot proximal to toes also now turning darker , no open wounds wt drainage or foul odor noted      Medications:    Current Facility-Administered Medications:     0.9% sodium chloride infusion 250 mL, 250 mL, IntraVENous, PRN, Eagle Fox MD    bumetanide (BUMEX) tablet 1 mg, 1 mg, Oral, BID, RADHA Bhakta MD, 1 mg at 10/21/19 1002    albuterol-ipratropium (DUO-NEB) 2.5 MG-0.5 MG/3 ML, 3 mL, Nebulization, Q6H PRN, ANAMARIA Bhakta MD    insulin glargine (LANTUS) injection 18 Units, 18 Units, SubCUTAneous, DAILY, Madala, Sushma, MD, 18 Units at 10/21/19 1001    gabapentin (NEURONTIN) capsule 100 mg, 100 mg, Oral, TID, Madala, Sushma, MD, Stopped at 10/20/19 2200    acetaminophen (TYLENOL) tablet 1,000 mg, 1,000 mg, Oral, BID, MANOJ Bhakta MD, 1,000 mg at 10/19/19 1734    oxyCODONE IR (ROXICODONE) tablet 5 mg, 5 mg, Oral, Q6H PRN, Ching Bhakta MD, 5 mg at 10/18/19 1436    polyethylene glycol (MIRALAX) packet 17 g, 17 g, Oral, DAILY, EBONY Bhakta MD, 17 g at 10/21/19 1002    ALPRAZolam (XANAX) tablet 0.25 mg, 0.25 mg, Oral, BID PRN, Eloisa Lozano MD, 0.25 mg at 10/17/19 2221    Vancomycin - pharmacy to dose, , Other, Rx Dosing/Monitoring, Eloisa Lozano MD    insulin lispro (HUMALOG) injection 4 Units, 4 Units, SubCUTAneous, TIDAC, Eloisa Lozano MD, Stopped at 10/20/19 1130    sodium chloride (OCEAN) 0.65 % nasal squeeze bottle 2 Spray, 2 Spray, Both Nostrils, Q2H PRN, Eloisa Lozano MD    oxymetazoline (AFRIN) 0.05 % nasal spray 2 Spray, 2 Spray, Both Nostrils, BID PRN, Eloisa Lozano MD    apixaban (ELIQUIS) tablet 5 mg, 5 mg, Oral, Q12H, Eloisa Lozano MD, 5 mg at 10/21/19 1221    insulin lispro (HUMALOG) injection, , SubCUTAneous, AC&HS, Eloisa Lozano MD, 2 Units at 10/21/19 1221    glucose chewable tablet 16 g, 4 Tab, Oral, PRN, Eloisa Lozano MD    glucagon (GLUCAGEN) injection 1 mg, 1 mg, IntraMUSCular, PRN, Eloisa Lozano MD    dextrose 10% infusion 0-250 mL, 0-250 mL, IntraVENous, PRN, Eloisa Lozano MD    alum-mag hydroxide-simeth (MYLANTA) oral suspension 30 mL, 30 mL, Oral, Q4H PRN, Eloisa Lozano MD, 30 mL at 09/26/19 2144    famotidine (PEPCID) tablet 20 mg, 20 mg, Oral, QPM, Eloisa Lozano MD, 20 mg at 10/20/19 1800    metoprolol tartrate (LOPRESSOR) tablet 25 mg, 25 mg, Oral, BID, Eloisa Lozano MD, 25 mg at 10/21/19 1001    traMADol (ULTRAM) tablet 25 mg, 25 mg, Oral, Q8H PRN, Eloisa Lozano MD, 25 mg at 10/19/19 2029    ondansetron (ZOFRAN) injection 4 mg, 4 mg, IntraVENous, Q6H PRN, Eloisa Lozano MD    sodium chloride (NS) flush 5-40 mL, 5-40 mL, IntraVENous, Q8H, Eloisa Lozano MD, 10 mL at 10/21/19 0705    sodium chloride (NS) flush 5-40 mL, 5-40 mL, IntraVENous, PRN, Eloisa Lozano MD, 10 mL at 10/17/19 1640    allopurinol (ZYLOPRIM) tablet 100 mg, 100 mg, Oral, DAILY, Pamela Lopez MD, 100 mg at 10/21/19 1002      Labs:  Recent Results (from the past 24 hour(s))   GLUCOSE, POC    Collection Time: 10/20/19  4:09 PM   Result Value Ref Range    Glucose (POC) 215 (H) 65 - 100 mg/dL    Performed by Bhavya Mariscal    URINALYSIS W/ REFLEX CULTURE    Collection Time: 10/20/19  7:00 PM   Result Value Ref Range    Color YELLOW/STRAW      Appearance CLOUDY (A) CLEAR      Specific gravity 1.010 1.003 - 1.030      pH (UA) 5.0 5.0 - 8.0      Protein NEGATIVE  NEG mg/dL    Glucose 100 (A) NEG mg/dL    Ketone NEGATIVE  NEG mg/dL    Bilirubin NEGATIVE  NEG      Blood SMALL (A) NEG      Urobilinogen 0.2 0.2 - 1.0 EU/dL    Nitrites NEGATIVE  NEG      Leukocyte Esterase NEGATIVE  NEG      WBC 0-4 0 - 4 /hpf    RBC 10-20 0 - 5 /hpf    Epithelial cells FEW FEW /lpf    Bacteria NEGATIVE  NEG /hpf    UA:UC IF INDICATED CULTURE NOT INDICATED BY UA RESULT CNI      Hyaline cast 2-5 0 - 5 /lpf   GLUCOSE, POC    Collection Time: 10/20/19  9:31 PM   Result Value Ref Range    Glucose (POC) 178 (H) 65 - 100 mg/dL    Performed by SEAN GOSS    CK    Collection Time: 10/21/19  4:06 AM   Result Value Ref Range     26 - 860 U/L   METABOLIC PANEL, BASIC    Collection Time: 10/21/19  4:06 AM   Result Value Ref Range    Sodium 138 136 - 145 mmol/L    Potassium 3.9 3.5 - 5.1 mmol/L    Chloride 102 97 - 108 mmol/L    CO2 33 (H) 21 - 32 mmol/L    Anion gap 3 (L) 5 - 15 mmol/L    Glucose 158 (H) 65 - 100 mg/dL    BUN 22 (H) 6 - 20 MG/DL    Creatinine 1.09 (H) 0.55 - 1.02 MG/DL    BUN/Creatinine ratio 20 12 - 20      GFR est AA 57 (L) >60 ml/min/1.73m2    GFR est non-AA 47 (L) >60 ml/min/1.73m2    Calcium 8.4 (L) 8.5 - 10.1 MG/DL   CBC WITH AUTOMATED DIFF    Collection Time: 10/21/19  4:06 AM   Result Value Ref Range    WBC 11.1 (H) 3.6 - 11.0 K/uL    RBC 2.53 (L) 3.80 - 5.20 M/uL    HGB 6.8 (L) 11.5 - 16.0 g/dL    HCT 23.3 (L) 35.0 - 47.0 %    MCV 92.1 80.0 - 99.0 FL    MCH 26.9 26.0 - 34.0 PG    MCHC 29.2 (L) 30.0 - 36.5 g/dL    RDW 18.5 (H) 11.5 - 14.5 %    PLATELET 498 121 - 383 K/uL    MPV 9.6 8.9 - 12.9 FL    NRBC 0.0 0  WBC    ABSOLUTE NRBC 0.00 0.00 - 0.01 K/uL    NEUTROPHILS 80 (H) 32 - 75 %    LYMPHOCYTES 9 (L) 12 - 49 %    MONOCYTES 8 5 - 13 %    EOSINOPHILS 2 0 - 7 %    BASOPHILS 0 0 - 1 %    IMMATURE GRANULOCYTES 1 (H) 0.0 - 0.5 %    ABS. NEUTROPHILS 8.9 (H) 1.8 - 8.0 K/UL    ABS. LYMPHOCYTES 1.0 0.8 - 3.5 K/UL    ABS. MONOCYTES 0.9 0.0 - 1.0 K/UL    ABS. EOSINOPHILS 0.2 0.0 - 0.4 K/UL    ABS. BASOPHILS 0.0 0.0 - 0.1 K/UL    ABS. IMM. GRANS. 0.1 (H) 0.00 - 0.04 K/UL    DF SMEAR SCANNED      RBC COMMENTS OVALOCYTES  PRESENT        RBC COMMENTS RONALD CELLS  PRESENT        RBC COMMENTS ANISOCYTOSIS  1+       GLUCOSE, POC    Collection Time: 10/21/19  6:57 AM   Result Value Ref Range    Glucose (POC) 175 (H) 65 - 100 mg/dL    Performed by [address] [place] [address], POC    Collection Time: 10/21/19 11:19 AM   Result Value Ref Range    Glucose (POC) 201 (H) 65 - 100 mg/dL    Performed by Baudilio Ac            Micro:     Blood: 10/4/19  Specimen Information: Blood        Component Value Ref Range & Units Status   Special Requests: NO SPECIAL REQUESTS    Preliminary   Culture result: NO GROWTH 3 DAYS    Preliminary   Result History       Pathology:      Imaging:  RLE   US  Interpretation Summary        · Probable acute thrombus at the mid and distal right popliteal level. · Absent flow at the distal posterior tibial and anterior tibial levels suggesting occlusion. The right common femoral, deep femoral, femoral, popliteal, posterior tibial and anterior tibial arteries were visualized. No significant plaque is identified from groin to distal thigh. Biphasic flow with no increased velocities were noted. The above knee popliteal waveform is monophasic with low velocities.   The popliteal fossa and distal popliteal contain absent flow and suggests an acute occlusion although it is suboptimally seen. Low echogenicity is noted in the popliteal.       The right posterior tibial and anterior tibial arteries contain absent flow with no color fill and suggests occlusion.      Lower Extremity Arterial Findings     Right Lower Arterial     The right distal popliteal artery is occluded. The right distal popliteal artery has homogeneous plaque. Monophasic Doppler waveforms in the right proximal popliteal artery. Biphasic Doppler waveforms in the right distal common femoral, profunda femoris, proximal superficial femoral and distal superficial femoral artery. Doppler waveforms are absent in the anterior tibial and posterior tibial artery. The following arteries are patent: distal common femoral, profunda femoris, proximal superficial femoral and distal superficial femoral.         MRI brain     FINDINGS:  Tiny acute infarct in the right cerebellum. No acute hemorrhage.     Mild generalized parenchymal volume loss with commensurate dilation of the sulci  and ventricular system. Periventricular deep white matter T2/FLAIR  hyperintensities, and patchy T2/FLAIR hyperintensity in the raymundo, consistent  with mild chronic microvascular ischemic disease. Small chronic infarct in the  right frontal periventricular white matter. There is no cerebellar tonsillar  herniation. Expected arterial flow-voids are present. No evidence of abnormal  enhancement.     Paranasal sinuses are clear. Trace bilateral mastoid effusions. The orbital  contents are within normal limits with bilateral lens implants. No significant  osseous or scalp lesions are identified.     IMPRESSION  IMPRESSION:      1. Tiny acute infarct in the right cerebellum. 2. Mild generalized parenchymal volume loss and mild chronic microvascular  ischemic disease. Small chronic infarct in the right frontal periventricular  white matter.       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 Treated for diastolic CHF exacerbation. Subsequently found to have acute infarct R cerebellum and RLE acute thrombus at the mid and distal right popliteal level. Underwent thrombectomy/embolectomy on 9/20/19. Operative report from 9/20/19 indicates \" thrombectomy yielded fair a amount of thrombus clearly from common iliac area region and this appeared to be organized and fairly thickened\". Then on 9/21/19, underwent thrombectomy of right leg for residual clot , 4-compartment fasciotomy. Operative report from 9/21/19 indicates \"  After entering the fascia, a large amount of discharge and dark blood was seen. 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   Soft tissue and bone margins viable per report   Has   Vancomycin, Cefepime and Flagyl post operatively  R AKA site without signs of cellulitis or drainage , looks clean   Stopped antibiotics today           2) L foot ischemia :    This L foot may very well progress to become infected but at present no signs of open wounds or cellulitis currently   Discussed with patient and her daughter today  Patient is lethargic and not fully able to speak   Daughter says that her mother has given up and they know that antibiotics will not fix the issue   They have plans of taking her to rehab per daughter   D/W with daughter that antibiotics will be not curative in such circumstances and that her leg may progress to gangrene which daughter is aware of  D/W about side effects of antibiotics as well   Daughter says that they would like her to be comfortable and her mother does not want anymore surgery       3) Pathology suggestive of calciphylaxis: plans per primary team and or nephrology. Nephrology consult per primary team based on goals of care     4) COPD    5) Afib     6) DM    6) DVT px  Per primary team         I will sign off at this time. Discussed with patient's daughter and RN today. Thank you for the opportunity to participate in the care of this patient. Please contact with questions or concerns.       Jackie Coyle,    1:00 PM

## 2019-10-21 NOTE — PROGRESS NOTES
Bedside shift change report given to sammie (oncoming nurse) by Nanette Astudillo (offgoing nurse). Report included the following information SBAR and Kardex.

## 2019-10-21 NOTE — PROGRESS NOTES
Occupational Therapy: Patient leaving floor at this time for test. Will follow.   DENZEL Mendoza/LEVON

## 2019-10-21 NOTE — WOUND CARE
Wound Consult:  Re-Consult Visit. Chart reviewed. Consulted for left foot with vascular changes has opened/drained from blistering skin. Spoke with patients nurse to hand off on visit. Patient is resting on a Standing Cloud P500 air support bed. Denied pain today. Patient's son in to visit; hand off on wound assessment/skin findings. Note patient with bright red blood continuing from vaginal area - more than noted last week. Assessment: 
Left dorsal foot - large area of dark discoloration discoloration/vascular perfusion injury - 2nd through 5th toes dry black; dorsal foot with unroofed blister - dry yellow/black base, no surrounding redness or erythema, no odor,  Appears as dry gangrene currently. Foot/toes cool to cold. Vascular following. No injury to left heel. Right AKA well approximated incision with staples, no drainage. Right groin/fold - linear skin breakdown - 0.3 x 2 x 0.1 cm, bright pink above the groin incision from previous vascular intervention - the vascular incision is well healed. Intertrigo. Right of sacrum - clustered pustules in vesicular base; isolated area ~ 1.4 x 1 cm, no surrounding redness, no other lesions noted in groin/buttocks/perianal area. Suspect viral etiology by appearance. Patient alert/oriented x 4; photo taken with her consent for chart/MD. Treatment: 
Dry dressing to left foot, placed foot in heel boot; incontinence skin care/barrier ointment to gluteal/perianal areas. Wound Recommendations: 
Dry dressing to left foot. Monitor clustered area on right buttock area. Skin Care Recommendations: 1. Minimize friction/shear: minimize layers of linen/pads under patient. Continue on air support mattress. 2. Off load pressure/reposition: continue to turn and reposition approximately every 2 hours; float heel and/or use Prevalon boot; waffle cushion for sitting.  
3. Manage Moisture - keep skin folds dry; incontinence skin care; appropriate sized briefs if needed; can use Purewick. 4. Continue to monitor nutrition, pain, and skin risk scale, and skin assessment. Plan: 
Spoke with Dr. Nataly Alarcon to hand off on findings; let him know patient's son was at bedside and would like to talk with him if able; updating orders. We will continue to reassess routinely and as needed. Claudean Duty, RN,[facility] Wound Healing Office [phone] Pager [phone]

## 2019-10-21 NOTE — PROGRESS NOTES
Hospitalist Progress Note  Chandler Ochoa MD  Answering service: [phone] OR 36 from in house phone        Date of Service:  10/21/2019  NAME:  Milton Ramos  :  1931  MRN:  [id number]      Admission Summary:   69-year-old female with an extensive past medical history including chronic hypoxemic respiratory failure with home oxygen, COPD, chronic kidney disease, type 2 diabetes mellitus, atrial fibrillation, diastolic congestive heart failure, dyslipidemia, primary hypertension, osteoporosis, pulmonary hypertension, depression and anxiety disorder, was brought to the emergency room from home for evaluation of an approximately 2-3 day history of worsening shortness of breath    Interval history / Subjective:   Pt reported to have vaginal bleeding and some bleeding in the bladder   Was found to have Hgn of 6.8, one unit of blood ordered but not transfused yet  Still in bed but no complaints  Wound care discovered a new back lesions concerning for HSV infection      Assessment & Plan:     Ischemic right leg:   -s/p RLE Thrombectomy 19 with residual clot in Popliteal Artery. -S/P repeat Thrombectomy 19.    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 Continue Apixaban. -ID following   -was put on IV cefepime, flagyl, vancomycin.   -Leukocytosis persisting and its partly from ischemia.   -Dr Nikolay Sousa called on 10/16 and said antibiotics could be discharged in 2-3 days,would not advise antibiotics on discharge. Dr Saima Galvin would cover for her for any questions. 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   - discussed with Dr Aracely Paula who suggested patient could be discharged when medically stable and follow as out patient,thinks she may ultimately need amputation but would not advise surgery at this time.     Acute on chronic diastolic CHF exarcerbation: NYHA class unknown - improving   -CXR 10/16 +interstitial edema  -on IV Bumex. Switched to po bumex yesterday  -Cr trending down   -Troponin negative     Acute on chronic anemia  Acute blood loss anemia due to R AKA 10/14  -Hb low stable.   -normal iron levels   -transfuse today 1 unit of {PRBCs since hgn dropped to 6.8     Paroxysmal atrial fibrillation with intermittent RVR:   -On lopressor  -Monitor on remote tele    -Echocardiogram in September 2019: hyperdynamic EF,multiple valve pathologies(moderate AS,moderate MR,moderate to severe TR) and moderate PAH  - on apixaban. Antiplatelets stopped after discussing with vascular surgery due to epistaxis     Hypokalemia - replaced, will monitor      Lethargy: improved after holding Neurontin      Acute renal insufficiency: resolved   Hyponatremia:resolved. Hyperkalemia: resolved  Rhabdomyolysis: resolved  Elevated troponin: suspect demand ischemia     Hypertension: controlled, continue metoprolol  Chronic respiratory failure: home 02,spo2 100% on 3 l/min via nasal canula. Off o2 this am    Diabetes mellitus Type II: Contin meds at same doses and cont SSI. Monitor   Anxiety, depression     Chronic pain,likely ischemic   -fentanyl is not helping and its short lived. Increased gabapentin to tid but backed off given lethargy ,scheduled tylenol and prn Roxicodone and continue to reassess.      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 Code status:DNR. Cristine Bhakta (phone [phone])  DVT prophylaxis: eliquis  Care Plan discussed with: Salo Montgomery     Disposition: [facility]. CM working on insurance auth.  Ready to go after addressing vaginal bleeding and anemia       Hospital Problems  Date Reviewed: 10/14/2019          Codes Class Noted POA    * (Principal) CHF exacerbation ([facility] [address] ICD-10-CM: I50.9  ICD-9-CM: 428.0 9/17/2019 Yes        Hyperkalemia ICD-10-CM: E87.5  ICD-9-CM: 276.7  9/17/2019 Yes        Hyponatremia ICD-10-CM: E87.1  ICD-9-CM: 276.1  9/17/2019 Yes        HOLLAND (acute kidney injury) ([facility] ICD-10-CM: N17.9  ICD-9-CM: 584.9  9/17/2019 Yes        Acute hypoxemic respiratory failure ([facility] ICD-10-CM: J96.01  ICD-9-CM: 518.81  8/8/2011 Yes        Debility ICD-10-CM: R53.81  ICD-9-CM: 799.3  6/28/2011 Yes                Review of Systems:   A comprehensive review of systems was negative except for that written in the HPI. Vital Signs:    Last 24hrs VS reviewed since prior progress note. Most recent are:  Visit Vitals  /69 (BP 1 Location: Left arm, BP Patient Position: At rest)   Pulse 91   Temp 97.8 °F (36.6 °C)   Resp 17   Ht 5' 5\" (1.651 m)   Wt 88.9 kg (196 lb)   SpO2 99%   Breastfeeding? No   BMI 32.62 kg/m²         Intake/Output Summary (Last 24 hours) at 10/21/2019 1607  Last data filed at 10/20/2019 1759  Gross per 24 hour   Intake    Output 420 ml   Net -420 ml        Physical Examination:             Constitutional:  No acute distress, cooperative, pleasant    ENT:  Oral mucous moist, oropharynx benign. Resp:  CTA bilaterally. No wheezing/rhonchi/rales. CV:  Regular rhythm, normal rate, no murmurs, gallops, rubs    GI:  Soft, non distended, non tender. normoactive bowel sounds,     Musculoskeletal:  No edema, warm, 2+ pulses throughout               R AKA. Left foot ischemia/gangrene toes       Data Review:    Review and/or order of clinical lab test      Labs:     Recent Labs     10/21/19  0406 10/19/19  0401   WBC 11.1* 12.2*   HGB 6.8* 7.4*   HCT 23.3* 26.4*    322     Recent Labs     10/21/19  0406 10/20/19  0346 10/19/19  0401    138 138   K 3.9 4.3 4.6    102 105   CO2 33* 33* 29   BUN 22* 23* 21*   CREA 1.09* 1.16* 1.01   * 160* 80   CA 8.4* 8.6 8.3*     No results for input(s): SGOT, GPT, ALT, AP, TBIL, TBILI, TP, ALB, GLOB, GGT, AML, LPSE in the last 72 hours.    No lab exists for component: AMYP, HLPSE  No results for input(s): INR, PTP, APTT, INREXT in the last 72 hours. No results for input(s): FE, TIBC, PSAT, FERR in the last 72 hours. Lab Results   Component Value Date/Time    Folate 6.3 10/18/2019 01:25 AM      No results for input(s): PH, PCO2, PO2 in the last 72 hours.   Recent Labs     10/21/19  0406 10/20/19  0346    133     Lab Results   Component Value Date/Time    Cholesterol, total 144 09/17/2019 09:21 AM    HDL Cholesterol 55 09/17/2019 09:21 AM    LDL, calculated 72.6 09/17/2019 09:21 AM    Triglyceride 82 09/17/2019 09:21 AM    CHOL/HDL Ratio 2.6 09/17/2019 09:21 AM     Lab Results   Component Value Date/Time    Glucose (POC) 201 (H) 10/21/2019 11:19 AM    Glucose (POC) 175 (H) 10/21/2019 06:57 AM    Glucose (POC) 178 (H) 10/20/2019 09:31 PM    Glucose (POC) 215 (H) 10/20/2019 04:09 PM    Glucose (POC) 295 (H) 10/20/2019 11:21 AM     Lab Results   Component Value Date/Time    Color YELLOW/STRAW 10/20/2019 07:00 PM    Appearance CLOUDY (A) 10/20/2019 07:00 PM    Specific gravity 1.010 10/20/2019 07:00 PM    Specific gravity 1.010 12/18/2011 09:28 AM    pH (UA) 5.0 10/20/2019 07:00 PM    Protein NEGATIVE  10/20/2019 07:00 PM    Glucose 100 (A) 10/20/2019 07:00 PM    Ketone NEGATIVE  10/20/2019 07:00 PM    Bilirubin NEGATIVE  10/20/2019 07:00 PM    Urobilinogen 0.2 10/20/2019 07:00 PM    Nitrites NEGATIVE  10/20/2019 07:00 PM    Leukocyte Esterase NEGATIVE  10/20/2019 07:00 PM    Epithelial cells FEW 10/20/2019 07:00 PM    Bacteria NEGATIVE  10/20/2019 07:00 PM    WBC 0-4 10/20/2019 07:00 PM    RBC 10-20 10/20/2019 07:00 PM         Medications Reviewed:     Current Facility-Administered Medications   Medication Dose Route Frequency    0.9% sodium chloride infusion 250 mL  250 mL IntraVENous PRN    miconazole (MICOTIN) 2 % powder   Topical BID    bumetanide (BUMEX) tablet 1 mg  1 mg Oral BID    albuterol-ipratropium (DUO-NEB) 2.5 MG-0.5 MG/3 ML  3 mL Nebulization Q6H PRN    insulin glargine (LANTUS) injection 18 Units  18 Units SubCUTAneous DAILY    gabapentin (NEURONTIN) capsule 100 mg  100 mg Oral TID    acetaminophen (TYLENOL) tablet 1,000 mg  1,000 mg Oral BID    oxyCODONE IR (ROXICODONE) tablet 5 mg  5 mg Oral Q6H PRN    polyethylene glycol (MIRALAX) packet 17 g  17 g Oral DAILY    ALPRAZolam (XANAX) tablet 0.25 mg  0.25 mg Oral BID PRN    insulin lispro (HUMALOG) injection 4 Units  4 Units SubCUTAneous TIDAC    sodium chloride (OCEAN) 0.65 % nasal squeeze bottle 2 Spray  2 Spray Both Nostrils Q2H PRN    oxymetazoline (AFRIN) 0.05 % nasal spray 2 Spray  2 Spray Both Nostrils BID PRN    apixaban (ELIQUIS) tablet 5 mg  5 mg Oral Q12H    insulin lispro (HUMALOG) injection   SubCUTAneous AC&HS    glucose chewable tablet 16 g  4 Tab Oral PRN    glucagon (GLUCAGEN) injection 1 mg  1 mg IntraMUSCular PRN    dextrose 10% infusion 0-250 mL  0-250 mL IntraVENous PRN    alum-mag hydroxide-simeth (MYLANTA) oral suspension 30 mL  30 mL Oral Q4H PRN    famotidine (PEPCID) tablet 20 mg  20 mg Oral QPM    metoprolol tartrate (LOPRESSOR) tablet 25 mg  25 mg Oral BID    traMADol (ULTRAM) tablet 25 mg  25 mg Oral Q8H PRN    ondansetron (ZOFRAN) injection 4 mg  4 mg IntraVENous Q6H PRN    sodium chloride (NS) flush 5-40 mL  5-40 mL IntraVENous Q8H    sodium chloride (NS) flush 5-40 mL  5-40 mL IntraVENous PRN    allopurinol (ZYLOPRIM) tablet 100 mg  100 mg Oral DAILY     ______________________________________________________________________  EXPECTED LENGTH OF STAY: 6d 14h  ACTUAL LENGTH OF STAY:          29                 Eagle Fox MD

## 2019-10-22 ENCOUNTER — APPOINTMENT (OUTPATIENT)
Dept: CT IMAGING | Age: 84
DRG: 270 | End: 2019-10-22
Attending: HOSPITALIST
Payer: MEDICARE

## 2019-10-22 LAB
ABO + RH BLD: NORMAL
ANION GAP SERPL CALC-SCNC: 5 MMOL/L (ref 5–15)
ARTERIAL PATENCY WRIST A: YES
ATRIAL RATE: 120 BPM
BASE EXCESS BLD CALC-SCNC: 12 MMOL/L
BASOPHILS # BLD: 0.1 K/UL (ref 0–0.1)
BASOPHILS NFR BLD: 0 % (ref 0–1)
BDY SITE: ABNORMAL
BLD PROD TYP BPU: NORMAL
BLOOD GROUP ANTIBODIES SERPL: NORMAL
BPU ID: NORMAL
BUN SERPL-MCNC: 24 MG/DL (ref 6–20)
BUN/CREAT SERPL: 20 (ref 12–20)
CALCIUM SERPL-MCNC: 8.7 MG/DL (ref 8.5–10.1)
CALCULATED P AXIS, ECG09: 78 DEGREES
CALCULATED R AXIS, ECG10: 42 DEGREES
CALCULATED T AXIS, ECG11: -110 DEGREES
CHLORIDE SERPL-SCNC: 101 MMOL/L (ref 97–108)
CK SERPL-CCNC: 83 U/L (ref 26–192)
CO2 SERPL-SCNC: 32 MMOL/L (ref 21–32)
CREAT SERPL-MCNC: 1.19 MG/DL (ref 0.55–1.02)
CROSSMATCH RESULT,%XM: NORMAL
DIAGNOSIS, 93000: NORMAL
DIFFERENTIAL METHOD BLD: ABNORMAL
EOSINOPHIL # BLD: 0 K/UL (ref 0–0.4)
EOSINOPHIL NFR BLD: 0 % (ref 0–7)
ERYTHROCYTE [DISTWIDTH] IN BLOOD BY AUTOMATED COUNT: 17.5 % (ref 11.5–14.5)
GAS FLOW.O2 O2 DELIVERY SYS: ABNORMAL L/MIN
GAS FLOW.O2 SETTING OXYMISER: 2 L/M
GLUCOSE BLD STRIP.AUTO-MCNC: 142 MG/DL (ref 65–100)
GLUCOSE BLD STRIP.AUTO-MCNC: 153 MG/DL (ref 65–100)
GLUCOSE BLD STRIP.AUTO-MCNC: 162 MG/DL (ref 65–100)
GLUCOSE BLD STRIP.AUTO-MCNC: 206 MG/DL (ref 65–100)
GLUCOSE SERPL-MCNC: 166 MG/DL (ref 65–100)
HCO3 BLD-SCNC: 36.3 MMOL/L (ref 22–26)
HCT VFR BLD AUTO: 27.8 % (ref 35–47)
HGB BLD-MCNC: 8.7 G/DL (ref 11.5–16)
IMM GRANULOCYTES # BLD AUTO: 0.1 K/UL (ref 0–0.04)
IMM GRANULOCYTES NFR BLD AUTO: 1 % (ref 0–0.5)
LYMPHOCYTES # BLD: 1.1 K/UL (ref 0.8–3.5)
LYMPHOCYTES NFR BLD: 9 % (ref 12–49)
MCH RBC QN AUTO: 28.2 PG (ref 26–34)
MCHC RBC AUTO-ENTMCNC: 31.3 G/DL (ref 30–36.5)
MCV RBC AUTO: 90 FL (ref 80–99)
MONOCYTES # BLD: 1 K/UL (ref 0–1)
MONOCYTES NFR BLD: 8 % (ref 5–13)
NEUTS SEG # BLD: 10.2 K/UL (ref 1.8–8)
NEUTS SEG NFR BLD: 82 % (ref 32–75)
NRBC # BLD: 0 K/UL (ref 0–0.01)
NRBC BLD-RTO: 0 PER 100 WBC
O2/TOTAL GAS SETTING VFR VENT: 28 %
P-R INTERVAL, ECG05: 192 MS
PCO2 BLD: 51.7 MMHG (ref 35–45)
PH BLD: 7.45 [PH] (ref 7.35–7.45)
PLATELET # BLD AUTO: 404 K/UL (ref 150–400)
PMV BLD AUTO: 9.8 FL (ref 8.9–12.9)
PO2 BLD: 106 MMHG (ref 80–100)
POTASSIUM SERPL-SCNC: 3.9 MMOL/L (ref 3.5–5.1)
Q-T INTERVAL, ECG07: 320 MS
QRS DURATION, ECG06: 84 MS
QTC CALCULATION (BEZET), ECG08: 452 MS
RBC # BLD AUTO: 3.09 M/UL (ref 3.8–5.2)
SAO2 % BLD: 98 % (ref 92–97)
SERVICE CMNT-IMP: ABNORMAL
SODIUM SERPL-SCNC: 138 MMOL/L (ref 136–145)
SPECIMEN EXP DATE BLD: NORMAL
SPECIMEN TYPE: ABNORMAL
STATUS OF UNIT,%ST: NORMAL
TOTAL RESP. RATE, ITRR: 16
UNIT DIVISION, %UDIV: 0
VENTRICULAR RATE, ECG03: 120 BPM
WBC # BLD AUTO: 12.4 K/UL (ref 3.6–11)

## 2019-10-22 PROCEDURE — 70450 CT HEAD/BRAIN W/O DYE: CPT

## 2019-10-22 PROCEDURE — 82803 BLOOD GASES ANY COMBINATION: CPT

## 2019-10-22 PROCEDURE — 74011636637 HC RX REV CODE- 636/637: Performed by: INTERNAL MEDICINE

## 2019-10-22 PROCEDURE — 82550 ASSAY OF CK (CPK): CPT

## 2019-10-22 PROCEDURE — 82962 GLUCOSE BLOOD TEST: CPT

## 2019-10-22 PROCEDURE — 74011250637 HC RX REV CODE- 250/637: Performed by: INTERNAL MEDICINE

## 2019-10-22 PROCEDURE — 36415 COLL VENOUS BLD VENIPUNCTURE: CPT

## 2019-10-22 PROCEDURE — 74011250637 HC RX REV CODE- 250/637: Performed by: SURGERY

## 2019-10-22 PROCEDURE — 36600 WITHDRAWAL OF ARTERIAL BLOOD: CPT

## 2019-10-22 PROCEDURE — 74011636637 HC RX REV CODE- 636/637: Performed by: SURGERY

## 2019-10-22 PROCEDURE — 77010033678 HC OXYGEN DAILY

## 2019-10-22 PROCEDURE — 80048 BASIC METABOLIC PNL TOTAL CA: CPT

## 2019-10-22 PROCEDURE — 65270000029 HC RM PRIVATE

## 2019-10-22 PROCEDURE — 74011250637 HC RX REV CODE- 250/637: Performed by: HOSPITALIST

## 2019-10-22 PROCEDURE — 93005 ELECTROCARDIOGRAM TRACING: CPT

## 2019-10-22 PROCEDURE — 85025 COMPLETE CBC W/AUTO DIFF WBC: CPT

## 2019-10-22 PROCEDURE — 74011250636 HC RX REV CODE- 250/636: Performed by: HOSPITALIST

## 2019-10-22 RX ORDER — POLYETHYLENE GLYCOL 3350 17 G/17G
17 POWDER, FOR SOLUTION ORAL DAILY
Qty: 30 PACKET | Refills: 0 | Status: SHIPPED
Start: 2019-10-22

## 2019-10-22 RX ORDER — GABAPENTIN 100 MG/1
100 CAPSULE ORAL 3 TIMES DAILY
Qty: 90 CAP | Refills: 0 | Status: SHIPPED | OUTPATIENT
Start: 2019-10-22 | End: 2019-10-23

## 2019-10-22 RX ORDER — NALOXONE HYDROCHLORIDE 0.4 MG/ML
0.2 INJECTION, SOLUTION INTRAMUSCULAR; INTRAVENOUS; SUBCUTANEOUS ONCE
Status: COMPLETED | OUTPATIENT
Start: 2019-10-22 | End: 2019-10-22

## 2019-10-22 RX ORDER — NALOXONE HYDROCHLORIDE 0.4 MG/ML
0.2 INJECTION, SOLUTION INTRAMUSCULAR; INTRAVENOUS; SUBCUTANEOUS ONCE
Status: ACTIVE | OUTPATIENT
Start: 2019-10-22 | End: 2019-10-22

## 2019-10-22 RX ORDER — TRAMADOL HYDROCHLORIDE 50 MG/1
25 TABLET ORAL
Qty: 15 TAB | Refills: 0 | Status: SHIPPED | OUTPATIENT
Start: 2019-10-22 | End: 2019-10-27

## 2019-10-22 RX ORDER — INSULIN GLARGINE 100 [IU]/ML
14 INJECTION, SOLUTION SUBCUTANEOUS DAILY
Qty: 1 VIAL | Refills: 0 | Status: SHIPPED
Start: 2019-10-22 | End: 2019-10-22

## 2019-10-22 RX ORDER — INSULIN LISPRO 100 [IU]/ML
4 INJECTION, SOLUTION INTRAVENOUS; SUBCUTANEOUS
Qty: 1 VIAL | Refills: 1 | Status: SHIPPED
Start: 2019-10-22 | End: 2019-10-31 | Stop reason: DRUGHIGH

## 2019-10-22 RX ORDER — INSULIN LISPRO 100 [IU]/ML
INJECTION, SOLUTION INTRAVENOUS; SUBCUTANEOUS
Qty: 1 VIAL | Refills: 0 | Status: SHIPPED
Start: 2019-10-22 | End: 2019-10-31

## 2019-10-22 RX ORDER — METOPROLOL TARTRATE 50 MG/1
25 TABLET ORAL 2 TIMES DAILY
Qty: 60 TAB | Refills: 12 | Status: SHIPPED
Start: 2019-10-22 | End: 2019-10-31 | Stop reason: DRUGHIGH

## 2019-10-22 RX ORDER — ACETAMINOPHEN 500 MG
1000 TABLET ORAL 2 TIMES DAILY
Qty: 60 TAB | Refills: 0 | Status: SHIPPED
Start: 2019-10-22 | End: 2019-10-31 | Stop reason: DRUGHIGH

## 2019-10-22 RX ORDER — NALOXONE HYDROCHLORIDE 0.4 MG/ML
INJECTION, SOLUTION INTRAMUSCULAR; INTRAVENOUS; SUBCUTANEOUS
Status: DISPENSED
Start: 2019-10-22 | End: 2019-10-22

## 2019-10-22 RX ORDER — INSULIN GLARGINE 100 [IU]/ML
18 INJECTION, SOLUTION SUBCUTANEOUS DAILY
Qty: 1 VIAL | Refills: 0 | Status: SHIPPED
Start: 2019-10-22 | End: 2019-10-31 | Stop reason: DRUGHIGH

## 2019-10-22 RX ORDER — FAMOTIDINE 20 MG/1
20 TABLET, FILM COATED ORAL EVERY EVENING
Qty: 30 TAB | Refills: 0 | Status: SHIPPED
Start: 2019-10-22

## 2019-10-22 RX ORDER — MICONAZOLE NITRATE 2 %
POWDER (GRAM) TOPICAL 2 TIMES DAILY
Qty: 1 BOTTLE | Refills: 0 | Status: SHIPPED
Start: 2019-10-22 | End: 2019-10-31

## 2019-10-22 RX ADMIN — METOPROLOL TARTRATE 25 MG: 25 TABLET ORAL at 10:19

## 2019-10-22 RX ADMIN — GABAPENTIN 100 MG: 100 CAPSULE ORAL at 10:19

## 2019-10-22 RX ADMIN — NALOXONE HYDROCHLORIDE 0.2 MG: 0.4 INJECTION, SOLUTION INTRAMUSCULAR; INTRAVENOUS; SUBCUTANEOUS at 10:56

## 2019-10-22 RX ADMIN — Medication 10 ML: at 06:51

## 2019-10-22 RX ADMIN — POLYETHYLENE GLYCOL 3350 17 G: 17 POWDER, FOR SOLUTION ORAL at 10:18

## 2019-10-22 RX ADMIN — INSULIN LISPRO 4 UNITS: 100 INJECTION, SOLUTION INTRAVENOUS; SUBCUTANEOUS at 07:09

## 2019-10-22 RX ADMIN — INSULIN LISPRO 1 UNITS: 100 INJECTION, SOLUTION INTRAVENOUS; SUBCUTANEOUS at 21:42

## 2019-10-22 RX ADMIN — APIXABAN 5 MG: 5 TABLET, FILM COATED ORAL at 23:00

## 2019-10-22 RX ADMIN — TRAMADOL HYDROCHLORIDE 25 MG: 50 TABLET ORAL at 20:33

## 2019-10-22 RX ADMIN — METOPROLOL TARTRATE 25 MG: 25 TABLET ORAL at 20:34

## 2019-10-22 RX ADMIN — Medication 10 ML: at 21:42

## 2019-10-22 NOTE — PROGRESS NOTES
Hospitalist Progress Note  Balbina Jansen MD  Answering service: [phone] OR 36 from in house phone        Date of Service:  10/22/2019  NAME:  Antonino Hale  :  1931  MRN:  [id number]      Admission Summary:   75-year-old female with an extensive past medical history including chronic hypoxemic respiratory failure with home oxygen, COPD, chronic kidney disease, type 2 diabetes mellitus, atrial fibrillation, diastolic congestive heart failure, dyslipidemia, primary hypertension, osteoporosis, pulmonary hypertension, depression and anxiety disorder, was brought to the emergency room from home for evaluation of an approximately 2-3 day history of worsening shortness of breath    Interval history / Subjective:   Pt reported to have vaginal bleeding and some bleeding in the bladder   Was found to have Hgn of 6.8, one unit of blood ordered but not transfused yet  Still in bed but no complaints  Wound care discovered a new back lesions concerning for HSV infection      Assessment & Plan:     Ischemic right leg:   -s/p RLE Thrombectomy 19 with residual clot in Popliteal Artery. -S/P repeat Thrombectomy 19.    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 Continue Apixaban. -ID following   -was put on IV cefepime, flagyl, vancomycin.   -Leukocytosis persisting and its partly from ischemia.   -Abx discontinued as it was felt that it would not fix the issues. Daughter aware of this.      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 - discussed with Dr Brandi Bustamante who suggested patient could be discharged when medically stable and follow as out patient,thinks she may ultimately need amputation but would not advise surgery at this time. Daughter do not want patient to have another surgery.  She understands that left foot may eventually get infected and gangrenous. Daughter wants patient to be comfortable at this point.      Acute on chronic diastolic CHF exarcerbation: NYHA class unknown - improving   -CXR 10/16 +interstitial edema  -c/w Bumex 1mg BID   -Troponin negative     Acute on chronic anemia  Acute blood loss anemia due to R AKA 10/14  -Hb low stable.   -normal iron levels   -s/p 1U PRBCs  -hb improved from 6.8-->8.7 at discharge.      Paroxysmal atrial fibrillation with intermittent RVR:   -On lopressor 25mg BID  -Echocardiogram in September 2019: hyperdynamic EF,multiple valve pathologies(moderate AS,moderate MR,moderate to severe TR) and moderate PAH  - on apixaban. Antiplatelets stopped after discussing with vascular surgery due to epistaxis  - c/w Eliquis      Hypokalemia - replaced, will monitor      Lethargy: improved     Acute renal insufficiency: resolved   Hyponatremia:resolved. Hyperkalemia: resolved  Rhabdomyolysis: resolved  Elevated troponin: suspect demand ischemia     Hypertension: controlled, continue metoprolol  Chronic respiratory failure: home 02,spo2 100% on 3 l/min via nasal canula. Off o2 this am    Diabetes mellitus Type II: on Lantus 18U , Lispro 4U With meals and Sliding scale, will cont this at discharge. Anxiety, depression     Vaginal bleeding, likely related to Eliquis  - resolved  - US Pelvic: no active bleeding noted.  Thickened fluid-filled endometrium. Consider endometrial hyperplasia or dysplasia or metaplasia. Nonvisualization of the ovaries. Code status:DNR. Cristine Houston (phone [phone])  DVT prophylaxis: eliquis  Care Plan discussed with: Jamie Mancilla     Disposition: [facility]. CM working on insurance auth.  Ready to go after addressing vaginal bleeding and anemia       Hospital Problems  Date Reviewed: 10/14/2019          Codes Class Noted POA    * (Principal) CHF exacerbation ([facility] [address] ICD-10-CM: I50.9  ICD-9-CM: 428.0  9/17/2019 Yes        Hyperkalemia ICD-10-CM: E87.5  ICD-9-CM: 276.7  9/17/2019 Yes        Hyponatremia ICD-10-CM: E87.1  ICD-9-CM: 276.1  9/17/2019 Yes        HOLLAND (acute kidney injury) ([facility] [address] ICD-10-CM: N17.9  ICD-9-CM: 584.9  9/17/2019 Yes        Acute hypoxemic respiratory failure [facility]) ICD-10-CM: J96.01  ICD-9-CM: 518.81  8/8/2011 Yes        Debility ICD-10-CM: R53.81  ICD-9-CM: 799.3  6/28/2011 Yes                Review of Systems:   A comprehensive review of systems was negative except for that written in the HPI. Vital Signs:    Last 24hrs VS reviewed since prior progress note. Most recent are:  Visit Vitals  /79 (BP 1 Location: Left arm, BP Patient Position: At rest)   Pulse 87   Temp 97.3 °F (36.3 °C)   Resp 16   Ht 5' 5\" (1.651 m)   Wt 89.4 kg (197 lb 1.5 oz)   SpO2 100%   Breastfeeding? No   BMI 32.80 kg/m²         Intake/Output Summary (Last 24 hours) at 10/22/2019 1528  Last data filed at 10/22/2019 0847  Gross per 24 hour   Intake 982.5 ml   Output    Net 982.5 ml        Physical Examination:             Constitutional:  No acute distress, cooperative, pleasant    ENT:  Oral mucous moist, oropharynx benign. Resp:  CTA bilaterally. No wheezing/rhonchi/rales. CV:  Regular rhythm, normal rate, no murmurs, gallops, rubs    GI:  Soft, non distended, non tender. normoactive bowel sounds,     Musculoskeletal:  No edema, warm, 2+ pulses throughout               R AKA. Left foot ischemia/gangrene toes       Data Review:    Review and/or order of clinical lab test      Labs:     Recent Labs     10/22/19  0326 10/21/19  0406   WBC 12.4* 11.1*   HGB 8.7* 6.8*   HCT 27.8* 23.3*   * 392     Recent Labs     10/22/19  0326 10/21/19  0406 10/20/19  0346    138 138   K 3.9 3.9 4.3    102 102   CO2 32 33* 33*   BUN 24* 22* 23*   CREA 1.19* 1.09* 1.16*   * 158* 160*   CA 8.7 8.4* 8.6     No results for input(s): SGOT, GPT, ALT, AP, TBIL, TBILI, TP, ALB, GLOB, GGT, AML, LPSE in the last 72 hours.     No lab exists for component: AMYP, HLPSE  No results for input(s): INR, PTP, APTT, INREXT, INREXT in the last 72 hours. No results for input(s): FE, TIBC, PSAT, FERR in the last 72 hours. Lab Results   Component Value Date/Time    Folate 6.3 10/18/2019 01:25 AM      No results for input(s): PH, PCO2, PO2 in the last 72 hours.   Recent Labs     10/22/19  0326 10/21/19  0406 10/20/19  0346   CPK 83 109 133     Lab Results   Component Value Date/Time    Cholesterol, total 144 09/17/2019 09:21 AM    HDL Cholesterol 55 09/17/2019 09:21 AM    LDL, calculated 72.6 09/17/2019 09:21 AM    Triglyceride 82 09/17/2019 09:21 AM    CHOL/HDL Ratio 2.6 09/17/2019 09:21 AM     Lab Results   Component Value Date/Time    Glucose (POC) 142 (H) 10/22/2019 10:29 AM    Glucose (POC) 162 (H) 10/22/2019 06:54 AM    Glucose (POC) 238 (H) 10/21/2019 09:19 PM    Glucose (POC) 303 (H) 10/21/2019 05:20 PM    Glucose (POC) 201 (H) 10/21/2019 11:19 AM     Lab Results   Component Value Date/Time    Color YELLOW/STRAW 10/20/2019 07:00 PM    Appearance CLOUDY (A) 10/20/2019 07:00 PM    Specific gravity 1.010 10/20/2019 07:00 PM    Specific gravity 1.010 12/18/2011 09:28 AM    pH (UA) 5.0 10/20/2019 07:00 PM    Protein NEGATIVE  10/20/2019 07:00 PM    Glucose 100 (A) 10/20/2019 07:00 PM    Ketone NEGATIVE  10/20/2019 07:00 PM    Bilirubin NEGATIVE  10/20/2019 07:00 PM    Urobilinogen 0.2 10/20/2019 07:00 PM    Nitrites NEGATIVE  10/20/2019 07:00 PM    Leukocyte Esterase NEGATIVE  10/20/2019 07:00 PM    Epithelial cells FEW 10/20/2019 07:00 PM    Bacteria NEGATIVE  10/20/2019 07:00 PM    WBC 0-4 10/20/2019 07:00 PM    RBC 10-20 10/20/2019 07:00 PM         Medications Reviewed:     Current Facility-Administered Medications   Medication Dose Route Frequency    naloxone (NARCAN) injection 0.2 mg  0.2 mg IntraVENous ONCE    naloxone (NARCAN) 0.4 mg/mL injection        0.9% sodium chloride infusion 250 mL  250 mL IntraVENous PRN    miconazole (MICOTIN) 2 % powder Topical BID    bumetanide (BUMEX) tablet 1 mg  1 mg Oral BID    albuterol-ipratropium (DUO-NEB) 2.5 MG-0.5 MG/3 ML  3 mL Nebulization Q6H PRN    insulin glargine (LANTUS) injection 18 Units  18 Units SubCUTAneous DAILY    acetaminophen (TYLENOL) tablet 1,000 mg  1,000 mg Oral BID    polyethylene glycol (MIRALAX) packet 17 g  17 g Oral DAILY    insulin lispro (HUMALOG) injection 4 Units  4 Units SubCUTAneous TIDAC    sodium chloride (OCEAN) 0.65 % nasal squeeze bottle 2 Spray  2 Spray Both Nostrils Q2H PRN    oxymetazoline (AFRIN) 0.05 % nasal spray 2 Spray  2 Spray Both Nostrils BID PRN    apixaban (ELIQUIS) tablet 5 mg  5 mg Oral Q12H    insulin lispro (HUMALOG) injection   SubCUTAneous AC&HS    glucose chewable tablet 16 g  4 Tab Oral PRN    glucagon (GLUCAGEN) injection 1 mg  1 mg IntraMUSCular PRN    dextrose 10% infusion 0-250 mL  0-250 mL IntraVENous PRN    alum-mag hydroxide-simeth (MYLANTA) oral suspension 30 mL  30 mL Oral Q4H PRN    famotidine (PEPCID) tablet 20 mg  20 mg Oral QPM    metoprolol tartrate (LOPRESSOR) tablet 25 mg  25 mg Oral BID    traMADol (ULTRAM) tablet 25 mg  25 mg Oral Q8H PRN    ondansetron (ZOFRAN) injection 4 mg  4 mg IntraVENous Q6H PRN    sodium chloride (NS) flush 5-40 mL  5-40 mL IntraVENous Q8H    sodium chloride (NS) flush 5-40 mL  5-40 mL IntraVENous PRN    allopurinol (ZYLOPRIM) tablet 100 mg  100 mg Oral DAILY     ______________________________________________________________________  EXPECTED LENGTH OF STAY: 6d 14h  ACTUAL LENGTH OF STAY:          Tono Bourgeois MD

## 2019-10-22 NOTE — DISCHARGE INSTRUCTIONS
Discharging provider: Mercedez Palacios MD    Primary care provider: Shea Stark MD      [address] COURSE:    80-year-old female with an extensive past medical history including chronic hypoxemic respiratory failure with home oxygen, COPD, chronic kidney disease, type 2 diabetes mellitus, atrial fibrillation, diastolic congestive heart failure, dyslipidemia, primary hypertension, osteoporosis, pulmonary hypertension, depression and anxiety disorder, was brought to the emergency room from home for evaluation of an approximately 2-3 day history of worsening shortness of breath    Ischemic right leg:   -s/p RLE Thrombectomy 9/21/19 with residual clot in Popliteal Artery. -S/P repeat Thrombectomy 9/22/19.    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 Continue Apixaban. -ID following   -was put on IV cefepime, flagyl, vancomycin.   -Leukocytosis persisting and its partly from ischemia.   -Abx discontinued as it was felt that it would not fix the issues. Daughter aware of this.      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 - discussed with Dr Carron Severin who suggested patient could be discharged when medically stable and follow as out patient,thinks she may ultimately need amputation but would not advise surgery at this time. Daughter do not want patient to have another surgery. She understands that left foot may eventually get infected and gangrenous.  Daughter wants patient to be comfortable at this point.      Acute on chronic diastolic CHF exarcerbation: NYHA class unknown - improving   -CXR 10/16 +interstitial edema  -c/w Bumex 1mg BID   -Troponin negative     Acute on chronic anemia  Acute blood loss anemia due to R AKA 10/14  -Hb low stable.   -normal iron levels   -s/p 1U PRBCs  -hb improved from 6.8-->8.7 at discharge.      Paroxysmal atrial fibrillation with intermittent RVR:   -On lopressor 25mg BID  -Echocardiogram in September 2019: hyperdynamic EF,multiple valve pathologies(moderate AS,moderate MR,moderate to severe TR) and moderate PAH  - on apixaban. Antiplatelets stopped after discussing with vascular surgery due to epistaxis  - c/w Eliquis      Hypokalemia - replaced, will monitor      Lethargy: improved     Acute renal insufficiency: resolved   Hyponatremia:resolved. Hyperkalemia: resolved  Rhabdomyolysis: resolved  Elevated troponin: suspect demand ischemia     Hypertension: controlled, continue metoprolol  Chronic respiratory failure: home 02,spo2 100% on 3 l/min via nasal canula. Off o2 this am    Diabetes mellitus Type II: on Lantus 18U , Lispro 4U With meals and Sliding scale, will cont this at discharge. Anxiety, depression    Vaginal bleeding, likely related to Eliquis  - resolved  - US Pelvic: no active bleeding noted.  Thickened fluid-filled endometrium. Consider endometrial hyperplasia or dysplasia or metaplasia. Nonvisualization of the ovaries. FOLLOW-UP CARE RECOMMENDATIONS:    APPOINTMENTS:  Follow-up Information     Follow up With Specialties Details Why Contact Info    [address] [place] [address]  [phone]      Bertha Woods MD Internal Medicine In 2 weeks Discharge follow up  [address] [facility]  [phone]      Silver Bolanos MD Vascular Surgery, General Surgery In 2 weeks [facility]  [phone]              It is very important that you keep follow-up appointment(s). Bring discharge papers, medication list (and/or medication bottles) to follow-up appointments for review by outpatient provider(s). FOLLOW-UP TESTS RECOMMENDED:   · NONE    ONGOING TREATMENT PLAN: as above     PENDING TEST RESULTS:  At the time of discharge the following test results are still pending: NONE. Please review these results as they become available. Specific symptoms to watch for: chest pain, shortness of breath, fever, chills, nausea, vomiting, diarrhea, change in mentation, falling, weakness, bleeding. DIET:  Diabetic Diet    ACTIVITY:  Activity as tolerated    WOUND CARE:  Left dorsal foot - large area of dark discoloration discoloration/vascular perfusion injury - 2nd through 5th toes dry black; dorsal foot with unroofed blister - dry yellow/black base, no surrounding redness or erythema, no odor,  Appears as dry gangrene currently. Foot/toes cool to cold. Vascular following. No injury to left heel. Right AKA well approximated incision with staples, no drainage. Right groin/fold - linear skin breakdown - 0.3 x 2 x 0.1 cm, bright pink above the groin incision from previous vascular intervention - the vascular incision is well healed. Intertrigo. Right of sacrum - clustered pustules in vesicular base; isolated area ~ 1.4 x 1 cm, no surrounding redness, no other lesions noted in groin/buttocks/perianal area. Suspect viral etiology by appearance. Patient alert/oriented x 4; photo taken with her consent for chart/MD.  Treatment:  Dry dressing to left foot, placed foot in heel boot; incontinence skin care/barrier ointment to gluteal/perianal areas. Wound Recommendations:  Dry dressing to left foot. Monitor clustered area on right buttock area. EQUIPMENT needed:  NONE    INCIDENTAL FINDINGS:  NONE    GOALS OF CARE:  X  Eventual return to home/independent/assisted living     Long term SNF      Hospice     No rehospitalization     Patient condition at discharge:   Functional status  X  Poor      Deconditioned      Independent   Cognition    Lucid     Forgetful (some sensescence)     Dementia   Catheters/lines (plus indication)    Mejia     PICC      PEG    X     Code status  X  Full code      DNR    . . . . . . . . . . . . . . . . . . . . . . . . . . . . . . . . . . . . . . . . . . . . . . . Staci Coley . . . . . . . . . . . . . . . . . . . . . . . Aminah Johnson      CHRONIC MEDICAL CONDITIONS:  Problem List as of 10/22/2019 Date Reviewed: 10/14/2019          Codes Class Noted - Resolved    * (Principal) CHF exacerbation ([facility] ICD-10-CM: I50.9  ICD-9-CM: 428.0  9/17/2019 - Present        Hyperkalemia ICD-10-CM: E87.5  ICD-9-CM: 276.7  9/17/2019 - Present        Hyponatremia ICD-10-CM: E87.1  ICD-9-CM: 276.1  9/17/2019 - Present        HOLLAND (acute kidney injury) ([facility] ICD-10-CM: N17.9  ICD-9-CM: 584.9  9/17/2019 - Present        Acute respiratory disease ICD-10-CM: J06.9  ICD-9-CM: 465.9  2/8/2017 - Present        Moderate to severe pulmonary hypertension ([facility] ICD-10-CM: I27.20  ICD-9-CM: 416.8  1/25/2017 - Present        Pulmonary edema cardiac cause ([facility] ICD-10-CM: I50.1  ICD-9-CM: 514  4/1/2016 - Present        SOB (shortness of breath) ICD-10-CM: R06.02  ICD-9-CM: 786.05  3/30/2016 - Present        CHF (congestive heart failure) (HCC) ICD-10-CM: I50.9  ICD-9-CM: 428.0  3/30/2016 - Present        Diastolic CHF, acute on chronic (HCC) ICD-10-CM: I50.33  ICD-9-CM: 428.33, 428.0  1/31/2016 - Present        Aortic stenosis ICD-10-CM: I35.0  ICD-9-CM: 424.1  1/20/2016 - Present        Hypercholesteremia ICD-10-CM: E78.00  ICD-9-CM: 272.0  12/13/2012 - Present        Anxiety ICD-10-CM: F41.9  ICD-9-CM: 300.00  5/21/2012 - Present        Multiple allergies ICD-10-CM: Z88.9  ICD-9-CM: V15.09  10/12/2011 - Present        Bronchitis ICD-10-CM: J40  ICD-9-CM: 490  10/12/2011 - Present        Allergic reaction ICD-10-CM: T78.40XA  ICD-9-CM: 995.3  10/4/2011 - Present        Vein disorder ICD-10-CM: I87.9  ICD-9-CM: 459.9  9/7/2011 - Present        Acute hypoxemic respiratory failure ([facility] ICD-10-CM: J96.01  ICD-9-CM: 518.81  8/8/2011 - Present        Debility ICD-10-CM: R53.81  ICD-9-CM: 799.3  6/28/2011 - Present        Breast cancer ([facility] ICD-10-CM: C50.919  ICD-9-CM: 174.9  5/26/2011 - Present        Anemia ICD-10-CM: D64.9  ICD-9-CM: 285.9  5/26/2011 - Present        Diabetes ([address] ICD-10-CM: E11.9  ICD-9-CM: 250.00  4/19/2011 - Present        HTN (hypertension), benign ICD-10-CM: I10  ICD-9-CM: 401.1  4/19/2011 - Present        RESOLVED: Angioedema ICD-10-CM: T78. 3XXA  ICD-9-CM: 995.1  8/8/2011 - 8/19/2011        RESOLVED: Pneumonia, organism unspecified(486) ICD-10-CM: J18.9  ICD-9-CM: 303  4/19/2011 - 5/26/2011        RESOLVED: Hypoxia ICD-10-CM: R09.02  ICD-9-CM: 799.02  4/19/2011 - 5/26/2011

## 2019-10-22 NOTE — PROGRESS NOTES
Occupational Therapy: Rapid Response called on patient secondary to unresponsiveness. Will hold and follow.   DENZEL Rizzo/LEVON

## 2019-10-22 NOTE — PROGRESS NOTES
Transition of Care Plan to SNF/Rehab    SNF/Rehab Transition:  Patient has been accepted to [facility] and meets criteria for admission. Patient will transported by [facility] and expected to leave at 12:30pm 10/22/19. Communication to Patient/Family:  Met with patient and daughter Jesús Jaime, [phone] and they are agreeable to the transition plan. Communication to SNF/Rehab:  Bedside RN, Thania Spivey , has been notified to update the transition plan to the facility and call report [facility] [phone]. Discharge information has been updated on the AVS.     Discharge instructions to be fax'd to facility via [facility]. SNF/Rehab Transition:  Patient to follow-up with Home Health: [facility]. PCP/Specialist: Palomo Mata      Reviewed and confirmed with facility, [facility] they can manage the patient care needs for the following:     Em Nix with (X) only those applicable:    Medication:  [x]  Medications will be available at the facility  []  IV Antibiotics  [x]  Controlled Substance - hard copy to be sent with patient   [x]  Weekly Labs   Documents:  [x] Hard RX  [] MAR  [] Kardex  [] AVS  [x]Transfer Summary  [x]Discharge   Equipment:  []  CPAP/BiPAP  []  Wound Vacuum  []  Mejia or Urinary Device  []  PICC/Central Line  []  Nebulizer  []  Ventilator   Treatment:  []Isolation (for MRSA, VRE, etc.)  []Surgical Drain Management  []Tracheostomy Care  []Dressing Changes  []Dialysis with transportation and chair time   []PEG Care  [x]Oxygen  []Daily Weights for Heart Failure   Dietary:  []Any diet limitations  []Tube Feedings   []Total Parenteral Management (TPN)   Eligible for Medicaid Long Term Services and Supports  Yes:  [x] Eligible for medical assistance or will become eligible within 180 days and UAI completed. [] Provider/Patient and/or support system has requested screening.   [] UAI copy provided to patient or responsible party,.  [] UAI unavailable at discharge will send once processed to SNF provider. [] UAI unavailable at discharged mailed to patient  No:   [] Private pay and is not financially eligible for Medicaid within the next 180 days. [] Reside out-of-state.   [] A residents of a state owned/operated facility that is licensed  by [address] and [facility] or [facility]  [] Enrollment in [facility] hospice services  [] [address] [place] [address]  [] Patient /Family declines to have screening completed or provide financial information for screening     Financial Resources:  Medicaid    [] Initiated and application pending   [x] Full coverage     Advanced Care Plan:  [x]Surrogate Decision Maker of Care  [x]POA  [x]Communicated Code Status  (DDNR\", \"Full\")    Other     BRODIE Pearl, [facility] Intern

## 2019-10-22 NOTE — PROGRESS NOTES
Physical Therapy: Rapid Response called on patient secondary to unresponsiveness. Will hold and follow.   Ravi Stevenson PT, DPT/L

## 2019-10-22 NOTE — CDMP QUERY
Query 1 of 1 Pt admitted with acute on chronic diastolic CHF. Pt noted to have sepsis. If possible, please document in the progress notes and d/c summary if you are evaluating and / or treating any of the following: 
 
? Sepsis, present on admission, suspected or probable causative organism (please specify) ? Sepsis, not present on admission, suspected or probable causative organism (please specify) ? No Sepsis, suspected or probable localized infection (please specify) ? Sepsis was ruled out (include corresponding diagnosis for patients clinical picture and treatment) ? Other, please specify ? Clinically unable to determine The medical record reflects the following: 
   Risk Factors: necrotic RLE; gangrenous 2-4th toes L foot Clinical Indicators: T > 100.9; WBC > 12k; HR > 90; RR > 20; L Acid 1.4; Procalcitonin 0.9; BCx NGTD; Path report- calciphylaxis Treatment: AKA RLE; IV Vanc, IV Cefepime; IV Flagyl; ID consult Thank you, Juan Diego Perry, RN, BSN, CCM Clinical  
[facility] 
[phone]

## 2019-10-22 NOTE — PROGRESS NOTES
Responded to RRT. Met pt's daughter Pedro Pablo Madrid outside of pt's room as pt was being attended to by medical staff. Listened as she tearfully voiced her fears for the pt. Remained until she could return to the pt's bedside. Chaplains will continue to offer support as needed.   Chaplain Mackey MDiv, [place], [address] ([phone])

## 2019-10-22 NOTE — DISCHARGE SUMMARY
Discharge Summary     Patient:  Oma Talamantes       MRN: [id number]       YOB: 1931       Age: 80 y.o. Date of admission:  9/17/2019    Date of discharge:  10/22/2019    Primary care provider: Dr. Maicol Ocampo MD    Admitting provider:  Kacy Sher MD    Discharging provider:  Jignesh Holland MD - [phone]  If unavailable, call [phone] and ask the  to page the triage hospitalist.    Consultations  · IP CONSULT TO HOSPITALIST  · IP CONSULT TO CARDIOLOGY  · IP CONSULT TO CARDIOLOGY  · IP CONSULT TO NEUROLOGY  · IP CONSULT TO NEPHROLOGY  · IP CONSULT TO PALLIATIVE CARE - PROVIDER  · IP CONSULT TO INFECTIOUS DISEASES  · IP CONSULT TO VASCULAR SURGERY    Procedures  · Procedure(s):  · RIGHT ABOVE [address]    Discharge destination: SNF. The patient is stable for discharge. Admission diagnosis  · CHF exacerbation ([facility] [address] [I50.9]    Current Discharge Medication List      START taking these medications    Details   acetaminophen (TYLENOL) 500 mg tablet Take 2 Tabs by mouth two (2) times a day. Qty: 60 Tab, Refills: 0      apixaban (ELIQUIS) 5 mg tablet Take 1 Tab by mouth every twelve (12) hours every twelve (12) hours. Qty: 60 Tab, Refills: 1      famotidine (PEPCID) 20 mg tablet Take 1 Tab by mouth every evening. Qty: 30 Tab, Refills: 0      gabapentin (NEURONTIN) 100 mg capsule Take 1 Cap by mouth three (3) times daily. Max Daily Amount: 300 mg. Qty: 90 Cap, Refills: 0    Associated Diagnoses: Ischemic leg      !! insulin lispro (HUMALOG) 100 unit/mL injection 4 Units by SubCUTAneous route Before breakfast, lunch, and dinner.   Qty: 1 Vial, Refills: 1      !! insulin lispro (HUMALOG) 100 unit/mL injection INITIATE CORRECTIVE INSULIN PROTOCOL (JAMSHID):  High Sensitivity (thin, ESRD):    AC (before meals), Q6H, and Q4H CORRECTIONAL SCALE only For Blood Sugar (mg/dl) of : 140-199=0 units            200-249=2 units  250-299=3 units  300-349=4 units  350 or greater = Call MD  Give in addition to  Qty: 1 Vial, Refills: 0      miconazole (MICOTIN) 2 % topical powder Apply  to affected area two (2) times a day. To groin  Qty: 1 Bottle, Refills: 0      polyethylene glycol (MIRALAX) 17 gram packet Take 1 Packet by mouth daily. Qty: 30 Packet, Refills: 0      traMADol (ULTRAM) 50 mg tablet Take 0.5 Tabs by mouth every eight (8) hours as needed for Pain for up to 5 days. Max Daily Amount: 75 mg. Qty: 15 Tab, Refills: 0    Associated Diagnoses: Ischemic leg       !! - Potential duplicate medications found. Please discuss with provider. CONTINUE these medications which have CHANGED    Details   metoprolol tartrate (LOPRESSOR) 50 mg tablet Take 0.5 Tabs by mouth two (2) times a day. Qty: 60 Tab, Refills: 12    Associated Diagnoses: HTN (hypertension), benign; Tachycardia      insulin glargine (LANTUS) 100 unit/mL injection 18 Units by SubCUTAneous route daily. Qty: 1 Vial, Refills: 0         CONTINUE these medications which have NOT CHANGED    Details   atorvastatin (LIPITOR) 40 mg tablet Take 40 mg by mouth daily. allopurinol (ZYLOPRIM) 100 mg tablet Take 1 Tab by mouth daily. Qty: 30 Tab, Refills: 12    Associated Diagnoses: Hyperuricemia; Idiopathic gout, unspecified chronicity, unspecified site      bumetanide (BUMEX) 1 mg tablet Take 1 Tab by mouth two (2) times a day. Qty: 60 Tab, Refills: 12    Associated Diagnoses: Coronary artery disease involving native coronary artery of native heart without angina pectoris; Chronic diastolic congestive heart failure ([facility] [address] Edema, unspecified type      ipratropium (ATROVENT) 0.02 % nebulizer solution 0.5 mg by Nebulization route every four (4) hours as needed. albuterol (PROVENTIL VENTOLIN) 2.5 mg /3 mL (0.083 %) nebulizer solution 3 mL by Nebulization route every four (4) hours as needed for Wheezing.   Qty: 1 Package, Refills: 11    Associated Diagnoses: SOB (shortness of breath)         STOP taking these medications       spironolactone (ALDACTONE) 25 mg tablet Comments:   Reason for Stopping:         aspirin 81 mg chewable tablet Comments:   Reason for Stopping:         diphenhydrAMINE (BENADRYL) 25 mg capsule Comments:   Reason for Stopping:         metFORMIN (GLUCOPHAGE) 1,000 mg tablet Comments:   Reason for Stopping:         TRUE METRIX GLUCOSE TEST STRIP strip Comments:   Reason for Stopping:         BD INSULIN SYRINGE ULTRA-FINE 0.5 mL 31 gauge x 5/16 syrg Comments:   Reason for Stopping: Follow-up Information     Follow up With Specialties Details Why Contact Info    [address] [facility]  [phone]      Lexy Hawthorne MD Internal Medicine In 2 weeks Discharge follow up  [address] [facility]  [phone]      Nish Jasmine MD Vascular Surgery, General Surgery In 2 weeks [facility]  [address] [facility]  [phone]            Final discharge diagnoses and brief hospital course  Please also refer to the admission H&P for details on the presenting problem. 55-year-old female with an extensive past medical history including chronic hypoxemic respiratory failure with home oxygen, COPD, chronic kidney disease, type 2 diabetes mellitus, atrial fibrillation, diastolic congestive heart failure, dyslipidemia, primary hypertension, osteoporosis, pulmonary hypertension, depression and anxiety disorder, was brought to the emergency room from home for evaluation of an approximately 2-3 day history of worsening shortness of breath    Ischemic right leg:   -s/p RLE Thrombectomy 9/21/19 with residual clot in Popliteal Artery. -S/P repeat Thrombectomy 9/22/19.    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 Continue Apixaban.    -ID following   -was put on IV cefepime, flagyl, vancomycin.   -Leukocytosis persisting and its partly from ischemia.   -Abx discontinued as it was felt that it would not fix the issues. Daughter aware of this.      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 - discussed with Dr Deanne Pierson who suggested patient could be discharged when medically stable and follow as out patient,thinks she may ultimately need amputation but would not advise surgery at this time. Daughter do not want patient to have another surgery. She understands that left foot may eventually get infected and gangrenous. Daughter wants patient to be comfortable at this point.      Acute on chronic diastolic CHF exarcerbation: NYHA class unknown - improving   -CXR 10/16 +interstitial edema  -c/w Bumex 1mg BID   -Troponin negative     Acute on chronic anemia  Acute blood loss anemia due to R AKA 10/14  -Hb low stable.   -normal iron levels   -s/p 1U PRBCs  -hb improved from 6.8-->8.7 at discharge.      Paroxysmal atrial fibrillation with intermittent RVR:   -On lopressor 25mg BID  -Echocardiogram in September 2019: hyperdynamic EF,multiple valve pathologies(moderate AS,moderate MR,moderate to severe TR) and moderate PAH  - on apixaban. Antiplatelets stopped after discussing with vascular surgery due to epistaxis  - c/w Eliquis      Hypokalemia - replaced, will monitor      Lethargy: improved     Acute renal insufficiency: resolved   Hyponatremia:resolved. Hyperkalemia: resolved  Rhabdomyolysis: resolved  Elevated troponin: suspect demand ischemia     Hypertension: controlled, continue metoprolol  Chronic respiratory failure: home 02,spo2 100% on 3 l/min via nasal canula. Off o2 this am    Diabetes mellitus Type II: on Lantus 18U , Lispro 4U With meals and Sliding scale, will cont this at discharge.    Anxiety, depression    Vaginal bleeding, likely related to Eliquis  - resolved  - US Pelvic: no active bleeding noted.  Thickened fluid-filled endometrium. Consider endometrial hyperplasia or dysplasia or metaplasia. Nonvisualization of the ovaries. FOLLOW-UP CARE RECOMMENDATIONS:    APPOINTMENTS:  It is very important that you keep follow-up appointment(s). Bring discharge papers, medication list (and/or medication bottles) to follow-up appointments for review by outpatient provider(s). FOLLOW-UP TESTS RECOMMENDED:   · NONE    ONGOING TREATMENT PLAN: as above     PENDING TEST RESULTS:  At the time of discharge the following test results are still pending: NONE. Please review these results as they become available. Specific symptoms to watch for: chest pain, shortness of breath, fever, chills, nausea, vomiting, diarrhea, change in mentation, falling, weakness, bleeding. DIET:  Diabetic Diet    ACTIVITY:  Activity as tolerated    WOUND CARE:  Left dorsal foot - large area of dark discoloration discoloration/vascular perfusion injury - 2nd through 5th toes dry black; dorsal foot with unroofed blister - dry yellow/black base, no surrounding redness or erythema, no odor,  Appears as dry gangrene currently. Foot/toes cool to cold. Vascular following. No injury to left heel. Right AKA well approximated incision with staples, no drainage. Right groin/fold - linear skin breakdown - 0.3 x 2 x 0.1 cm, bright pink above the groin incision from previous vascular intervention - the vascular incision is well healed. Intertrigo. Right of sacrum - clustered pustules in vesicular base; isolated area ~ 1.4 x 1 cm, no surrounding redness, no other lesions noted in groin/buttocks/perianal area. Suspect viral etiology by appearance. Patient alert/oriented x 4; photo taken with her consent for chart/MD.  Treatment:  Dry dressing to left foot, placed foot in heel boot; incontinence skin care/barrier ointment to gluteal/perianal areas. Wound Recommendations:  Dry dressing to left foot.   Monitor clustered area on right buttock area. EQUIPMENT needed:  NONE    INCIDENTAL FINDINGS:  NONE    GOALS OF CARE:  X  Eventual return to home/independent/assisted living     Long term SNF      Hospice     No rehospitalization     Patient condition at discharge:   Functional status  X  Poor      Deconditioned      Independent   Cognition    Lucid     Forgetful (some sensescence)     Dementia   Catheters/lines (plus indication)    [place]     PICC      PEG    X     Code status  X  Full code      DNR        Physical examination at discharge  Visit Vitals  /69 (BP 1 Location: Right arm, BP Patient Position: At rest)   Pulse 99   Temp 98.7 °F (37.1 °C)   Resp 18   Ht 5' 5\" (1.651 m)   Wt 89.4 kg (197 lb 1.5 oz)   SpO2 92%   Breastfeeding? No   BMI 32.80 kg/m²     Constitutional:  No acute distress, cooperative, pleasant    ENT:  Oral mucous moist, oropharynx benign. Resp:  CTA bilaterally. No wheezing/rhonchi/rales. CV:  Irregularly irregular    GI:  Soft, non distended, non tender. normoactive bowel sounds,     Musculoskeletal:  No edema, warm, 2+ pulses throughout                           R AKA. Left foot ischemia/gangrene toes        Pertinent imaging studies:      Recent Labs     10/22/19  0326 10/21/19  0406   WBC 12.4* 11.1*   HGB 8.7* 6.8*   HCT 27.8* 23.3*   * 392     Recent Labs     10/22/19  0326 10/21/19  0406 10/20/19  0346    138 138   K 3.9 3.9 4.3    102 102   CO2 32 33* 33*   BUN 24* 22* 23*   CREA 1.19* 1.09* 1.16*   * 158* 160*   CA 8.7 8.4* 8.6     No results for input(s): SGOT, GPT, AP, TBIL, TP, ALB, GLOB, GGT, AML, LPSE in the last 72 hours. No lab exists for component: AMYP, HLPSE  No results for input(s): INR, PTP, APTT, INREXT in the last 72 hours. No results for input(s): FE, TIBC, PSAT, FERR in the last 72 hours. No results for input(s): PH, PCO2, PO2 in the last 72 hours.   Recent Labs     10/22/19  0326 10/21/19  0406 10/20/19  0346   CPK 83 109 000 No components found for: [place]    Chronic Diagnoses:    Problem List as of 10/22/2019 Date Reviewed: 10/14/2019          Codes Class Noted - Resolved    * (Principal) CHF exacerbation ([place] [address] ICD-10-CM: I50.9  ICD-9-CM: 428.0  9/17/2019 - Present        Hyperkalemia ICD-10-CM: E87.5  ICD-9-CM: 276.7  9/17/2019 - Present        Hyponatremia ICD-10-CM: E87.1  ICD-9-CM: 276.1  9/17/2019 - Present        HOLLAND (acute kidney injury) ([place] [address] ICD-10-CM: N17.9  ICD-9-CM: 584.9  9/17/2019 - Present        Acute respiratory disease ICD-10-CM: J06.9  ICD-9-CM: 465.9  2/8/2017 - Present        Moderate to severe pulmonary hypertension ([place] [address] ICD-10-CM: I27.20  ICD-9-CM: 416.8  1/25/2017 - Present        Pulmonary edema cardiac cause ([place] [address] ICD-10-CM: I50.1  ICD-9-CM: 514  4/1/2016 - Present        SOB (shortness of breath) ICD-10-CM: R06.02  ICD-9-CM: 786.05  3/30/2016 - Present        CHF (congestive heart failure) (HCC) ICD-10-CM: I50.9  ICD-9-CM: 428.0  3/30/2016 - Present        Diastolic CHF, acute on chronic (HCC) ICD-10-CM: I50.33  ICD-9-CM: 428.33, 428.0  1/31/2016 - Present        Aortic stenosis ICD-10-CM: I35.0  ICD-9-CM: 424.1  1/20/2016 - Present        Hypercholesteremia ICD-10-CM: E78.00  ICD-9-CM: 272.0  12/13/2012 - Present        Anxiety ICD-10-CM: F41.9  ICD-9-CM: 300.00  5/21/2012 - Present        Multiple allergies ICD-10-CM: Z88.9  ICD-9-CM: V15.09  10/12/2011 - Present        Bronchitis ICD-10-CM: J40  ICD-9-CM: 490  10/12/2011 - Present        Allergic reaction ICD-10-CM: T78.40XA  ICD-9-CM: 995.3  10/4/2011 - Present        Vein disorder ICD-10-CM: I87.9  ICD-9-CM: 459.9  9/7/2011 - Present        Acute hypoxemic respiratory failure ([facility] ICD-10-CM: J96.01  ICD-9-CM: 518.81  8/8/2011 - Present        Debility ICD-10-CM: R53.81  ICD-9-CM: 799.3  6/28/2011 - Present        Breast cancer ([facility] ICD-10-CM: C50.919  ICD-9-CM: 174.9  5/26/2011 - Present        Anemia ICD-10-CM: D64.9  ICD-9-CM: 285.9  5/26/2011 - Present        Diabetes ([facility] [address] ICD-10-CM: E11.9  ICD-9-CM: 250.00  4/19/2011 - Present        HTN (hypertension), benign ICD-10-CM: I10  ICD-9-CM: 401.1  4/19/2011 - Present        RESOLVED: Angioedema ICD-10-CM: T78. 3XXA  ICD-9-CM: 995.1  8/8/2011 - 8/19/2011        RESOLVED: Pneumonia, organism unspecified(486) ICD-10-CM: J18.9  ICD-9-CM: 791  4/19/2011 - 5/26/2011        RESOLVED: Hypoxia ICD-10-CM: R09.02  ICD-9-CM: 799.02  4/19/2011 - 5/26/2011              Time spent on discharge related activities today greater than 30 minutes.       Signed:  Leatha Rivers MD                 Hospitalist, Internal Medicine      Cc: Bertha Woods MD

## 2019-10-22 NOTE — PROGRESS NOTES
Bedside shift change report given to Abelardo Arguelles (oncoming nurse) by Marjorie Yin RN (offgoing nurse). Report included the following information SBAR, Kardex and MAR.

## 2019-10-23 VITALS
HEIGHT: 65 IN | HEART RATE: 80 BPM | DIASTOLIC BLOOD PRESSURE: 73 MMHG | RESPIRATION RATE: 17 BRPM | BODY MASS INDEX: 33.2 KG/M2 | WEIGHT: 199.3 LBS | OXYGEN SATURATION: 98 % | SYSTOLIC BLOOD PRESSURE: 112 MMHG | TEMPERATURE: 98.3 F

## 2019-10-23 LAB
ANION GAP SERPL CALC-SCNC: 5 MMOL/L (ref 5–15)
BASOPHILS # BLD: 0 K/UL (ref 0–0.1)
BASOPHILS NFR BLD: 0 % (ref 0–1)
BUN SERPL-MCNC: 28 MG/DL (ref 6–20)
BUN/CREAT SERPL: 22 (ref 12–20)
CALCIUM SERPL-MCNC: 8.7 MG/DL (ref 8.5–10.1)
CHLORIDE SERPL-SCNC: 101 MMOL/L (ref 97–108)
CK SERPL-CCNC: 61 U/L (ref 26–192)
CO2 SERPL-SCNC: 32 MMOL/L (ref 21–32)
CREAT SERPL-MCNC: 1.27 MG/DL (ref 0.55–1.02)
DIFFERENTIAL METHOD BLD: ABNORMAL
EOSINOPHIL # BLD: 0.1 K/UL (ref 0–0.4)
EOSINOPHIL NFR BLD: 1 % (ref 0–7)
ERYTHROCYTE [DISTWIDTH] IN BLOOD BY AUTOMATED COUNT: 18.1 % (ref 11.5–14.5)
GLUCOSE BLD STRIP.AUTO-MCNC: 188 MG/DL (ref 65–100)
GLUCOSE BLD STRIP.AUTO-MCNC: 283 MG/DL (ref 65–100)
GLUCOSE SERPL-MCNC: 192 MG/DL (ref 65–100)
HCT VFR BLD AUTO: 28.8 % (ref 35–47)
HGB BLD-MCNC: 8.9 G/DL (ref 11.5–16)
IMM GRANULOCYTES # BLD AUTO: 0.1 K/UL (ref 0–0.04)
IMM GRANULOCYTES NFR BLD AUTO: 1 % (ref 0–0.5)
LYMPHOCYTES # BLD: 0.9 K/UL (ref 0.8–3.5)
LYMPHOCYTES NFR BLD: 7 % (ref 12–49)
MCH RBC QN AUTO: 28.2 PG (ref 26–34)
MCHC RBC AUTO-ENTMCNC: 30.9 G/DL (ref 30–36.5)
MCV RBC AUTO: 91.1 FL (ref 80–99)
MONOCYTES # BLD: 1.1 K/UL (ref 0–1)
MONOCYTES NFR BLD: 8 % (ref 5–13)
NEUTS SEG # BLD: 10.5 K/UL (ref 1.8–8)
NEUTS SEG NFR BLD: 83 % (ref 32–75)
NRBC # BLD: 0 K/UL (ref 0–0.01)
NRBC BLD-RTO: 0 PER 100 WBC
PLATELET # BLD AUTO: 405 K/UL (ref 150–400)
PMV BLD AUTO: 9.8 FL (ref 8.9–12.9)
POTASSIUM SERPL-SCNC: 4.1 MMOL/L (ref 3.5–5.1)
RBC # BLD AUTO: 3.16 M/UL (ref 3.8–5.2)
SERVICE CMNT-IMP: ABNORMAL
SERVICE CMNT-IMP: ABNORMAL
SODIUM SERPL-SCNC: 138 MMOL/L (ref 136–145)
WBC # BLD AUTO: 12.7 K/UL (ref 3.6–11)

## 2019-10-23 PROCEDURE — 74011250637 HC RX REV CODE- 250/637: Performed by: SURGERY

## 2019-10-23 PROCEDURE — 80048 BASIC METABOLIC PNL TOTAL CA: CPT

## 2019-10-23 PROCEDURE — 36415 COLL VENOUS BLD VENIPUNCTURE: CPT

## 2019-10-23 PROCEDURE — 85025 COMPLETE CBC W/AUTO DIFF WBC: CPT

## 2019-10-23 PROCEDURE — 74011636637 HC RX REV CODE- 636/637: Performed by: INTERNAL MEDICINE

## 2019-10-23 PROCEDURE — 74011636637 HC RX REV CODE- 636/637: Performed by: SURGERY

## 2019-10-23 PROCEDURE — 74011250637 HC RX REV CODE- 250/637: Performed by: HOSPITALIST

## 2019-10-23 PROCEDURE — 82550 ASSAY OF CK (CPK): CPT

## 2019-10-23 PROCEDURE — 82962 GLUCOSE BLOOD TEST: CPT

## 2019-10-23 RX ORDER — GABAPENTIN 100 MG/1
100 CAPSULE ORAL
Qty: 90 CAP | Refills: 0 | Status: SHIPPED | OUTPATIENT
Start: 2019-10-23 | End: 2019-10-31

## 2019-10-23 RX ADMIN — POLYETHYLENE GLYCOL 3350 17 G: 17 POWDER, FOR SOLUTION ORAL at 09:08

## 2019-10-23 RX ADMIN — BUMETANIDE 1 MG: 1 TABLET ORAL at 09:08

## 2019-10-23 RX ADMIN — METOPROLOL TARTRATE 25 MG: 25 TABLET ORAL at 09:08

## 2019-10-23 RX ADMIN — ALLOPURINOL 100 MG: 100 TABLET ORAL at 09:08

## 2019-10-23 RX ADMIN — Medication 10 ML: at 07:21

## 2019-10-23 RX ADMIN — MICONAZOLE NITRATE 2 % TOPICAL POWDER: at 09:16

## 2019-10-23 RX ADMIN — TRAMADOL HYDROCHLORIDE 25 MG: 50 TABLET ORAL at 09:08

## 2019-10-23 RX ADMIN — INSULIN LISPRO 4 UNITS: 100 INJECTION, SOLUTION INTRAVENOUS; SUBCUTANEOUS at 07:20

## 2019-10-23 RX ADMIN — INSULIN GLARGINE 18 UNITS: 100 INJECTION, SOLUTION SUBCUTANEOUS at 09:17

## 2019-10-23 NOTE — PROGRESS NOTES
10/23/19 0848   Vital Signs   Temp 98.2 °F (36.8 °C)   Temp Source Oral   Pulse (Heart Rate) (!) 119   Heart Rate Source Radial   Resp Rate 21   O2 Sat (%) 99 %   Level of Consciousness Alert   /72   MAP (Calculated) 85   BP 1 Method Automatic   BP 1 Location Right arm   BP Patient Position At rest   MEWS Score 4   Oxygen Therapy   O2 Device Nasal cannula   O2 Flow Rate (L/min) 2 l/min

## 2019-10-23 NOTE — PROGRESS NOTES
Bedside shift change report given to Abelardo Arguelles (oncoming nurse) by Yessica Jolly (offgoing nurse). Report included the following information SBAR, Kardex and MAR.    \

## 2019-10-23 NOTE — DISCHARGE SUMMARY
Discharge Summary     Patient:  Jocelyne Cooper       MRN: [id number]       YOB: 1931       Age: 80 y.o. Date of admission:  9/17/2019    Date of discharge:  10/23/2019    Primary care provider: Dr. David Santana MD    Admitting provider:  Matt Gilliam MD    Discharging provider:  Dipesh Herzog MD - [phone]  If unavailable, call [phone] and ask the  to page the triage hospitalist.    Consultations  IP CONSULT TO HOSPITALIST  IP CONSULT TO CARDIOLOGY  IP CONSULT TO CARDIOLOGY  IP CONSULT TO NEUROLOGY  IP CONSULT TO NEPHROLOGY  IP CONSULT TO PALLIATIVE CARE - PROVIDER  IP CONSULT TO INFECTIOUS DISEASES  IP CONSULT TO VASCULAR SURGERY    Procedures  Procedure(s):  [place]    Discharge destination: SNF. The patient is stable for discharge. Admission diagnosis  CHF exacerbation ([facility] [address] [I50.9]    Current Discharge Medication List      START taking these medications    Details   gabapentin (NEURONTIN) 100 mg capsule Take 1 Cap by mouth nightly. Max Daily Amount: 100 mg. Qty: 90 Cap, Refills: 0    Associated Diagnoses: Ischemic leg      acetaminophen (TYLENOL) 500 mg tablet Take 2 Tabs by mouth two (2) times a day. Qty: 60 Tab, Refills: 0      apixaban (ELIQUIS) 5 mg tablet Take 1 Tab by mouth every twelve (12) hours every twelve (12) hours. Qty: 60 Tab, Refills: 1      famotidine (PEPCID) 20 mg tablet Take 1 Tab by mouth every evening. Qty: 30 Tab, Refills: 0      !! insulin lispro (HUMALOG) 100 unit/mL injection 4 Units by SubCUTAneous route Before breakfast, lunch, and dinner.   Qty: 1 Vial, Refills: 1      !! insulin lispro (HUMALOG) 100 unit/mL injection INITIATE CORRECTIVE INSULIN PROTOCOL (JAMSHID):  High Sensitivity (thin, ESRD):    AC (before meals), Q6H, and Q4H CORRECTIONAL SCALE only For Blood Sugar (mg/dl) of :             140-199=0 units 200-249=2 units  250-299=3 units  300-349=4 units  350 or greater = Call MD  Give in addition to  Qty: 1 Vial, Refills: 0      miconazole (MICOTIN) 2 % topical powder Apply  to affected area two (2) times a day. To groin  Qty: 1 Bottle, Refills: 0      polyethylene glycol (MIRALAX) 17 gram packet Take 1 Packet by mouth daily. Qty: 30 Packet, Refills: 0      traMADol (ULTRAM) 50 mg tablet Take 0.5 Tabs by mouth every eight (8) hours as needed for Pain for up to 5 days. Max Daily Amount: 75 mg. Qty: 15 Tab, Refills: 0    Associated Diagnoses: Ischemic leg       !! - Potential duplicate medications found. Please discuss with provider. CONTINUE these medications which have CHANGED    Details   metoprolol tartrate (LOPRESSOR) 50 mg tablet Take 0.5 Tabs by mouth two (2) times a day. Qty: 60 Tab, Refills: 12    Associated Diagnoses: HTN (hypertension), benign; Tachycardia      insulin glargine (LANTUS) 100 unit/mL injection 18 Units by SubCUTAneous route daily. Qty: 1 Vial, Refills: 0         CONTINUE these medications which have NOT CHANGED    Details   atorvastatin (LIPITOR) 40 mg tablet Take 40 mg by mouth daily. allopurinol (ZYLOPRIM) 100 mg tablet Take 1 Tab by mouth daily. Qty: 30 Tab, Refills: 12    Associated Diagnoses: Hyperuricemia; Idiopathic gout, unspecified chronicity, unspecified site      bumetanide (BUMEX) 1 mg tablet Take 1 Tab by mouth two (2) times a day. Qty: 60 Tab, Refills: 12    Associated Diagnoses: Coronary artery disease involving native coronary artery of native heart without angina pectoris; Chronic diastolic congestive heart failure ([facility] [address] Edema, unspecified type      ipratropium (ATROVENT) 0.02 % nebulizer solution 0.5 mg by Nebulization route every four (4) hours as needed. albuterol (PROVENTIL VENTOLIN) 2.5 mg /3 mL (0.083 %) nebulizer solution 3 mL by Nebulization route every four (4) hours as needed for Wheezing.   Qty: 1 Package, Refills: 11    Associated Diagnoses: SOB (shortness of breath)         STOP taking these medications       spironolactone (ALDACTONE) 25 mg tablet Comments:   Reason for Stopping:         aspirin 81 mg chewable tablet Comments:   Reason for Stopping:         diphenhydrAMINE (BENADRYL) 25 mg capsule Comments:   Reason for Stopping:         metFORMIN (GLUCOPHAGE) 1,000 mg tablet Comments:   Reason for Stopping:         TRUE METRIX GLUCOSE TEST STRIP strip Comments:   Reason for Stopping:         BD INSULIN SYRINGE ULTRA-FINE 0.5 mL 31 gauge x 5/16 syrg Comments:   Reason for Stopping: Follow-up Information     Follow up With Specialties Details Why Contact Info    [address] [facility]  [phone]      Leticia Brice MD Internal Medicine In 2 weeks Discharge follow up  [address] [facility]  [phone]      Germaine Gunter MD Vascular Surgery, General Surgery In 2 weeks Discharge [facility]  [facility]  [phone]            Final discharge diagnoses and brief hospital course  Please also refer to the admission H&P for details on the presenting problem. 71-year-old female with an extensive past medical history including chronic hypoxemic respiratory failure with home oxygen, COPD, chronic kidney disease, type 2 diabetes mellitus, atrial fibrillation, diastolic congestive heart failure, dyslipidemia, primary hypertension, osteoporosis, pulmonary hypertension, depression and anxiety disorder, was brought to the emergency room from home for evaluation of an approximately 2-3 day history of worsening shortness of breath    Ischemic right leg causing sepsis  -s/p RLE Thrombectomy 9/21/19 with residual clot in Popliteal Artery. -S/P repeat Thrombectomy 9/22/19.    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 Continue Apixaban.    -ID following   -was put on IV cefepime, flagyl, vancomycin.   -Leukocytosis persisting and its partly from ischemia. Sepsis   -Abx discontinued as it was felt that it would not fix the issues. Daughter aware of this.      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 - discussed with Dr Andrzej Iniguez who suggested patient could be discharged when medically stable and follow as out patient,thinks she may ultimately need amputation but would not advise surgery at this time. Daughter do not want patient to have another surgery. She understands that left foot may eventually get infected and gangrenous. Daughter wants patient to be comfortable at this point.      Acute on chronic diastolic CHF exarcerbation: NYHA class unknown - improving   -CXR 10/16 +interstitial edema  -c/w Bumex 1mg BID   -Troponin negative     Acute on chronic anemia  Acute blood loss anemia due to R AKA 10/14  -Hb low stable.   -normal iron levels   -s/p 1U PRBCs  -hb improved from 6.8-->8.7 at discharge.      Paroxysmal atrial fibrillation with intermittent RVR:   -On lopressor 25mg BID  -Echocardiogram in September 2019: hyperdynamic EF,multiple valve pathologies(moderate AS,moderate MR,moderate to severe TR) and moderate PAH  - on apixaban. Antiplatelets stopped after discussing with vascular surgery due to epistaxis  - c/w Eliquis      Hypokalemia - replaced, will monitor      Lethargy: improved     Acute renal insufficiency: resolved   Hyponatremia:resolved. Hyperkalemia: resolved  Rhabdomyolysis: resolved  Elevated troponin: suspect demand ischemia     Hypertension: controlled, continue metoprolol  Chronic respiratory failure: home 02,spo2 100% on 3 l/min via nasal canula. Off o2 this am    Diabetes mellitus Type II: on Lantus 18U , Lispro 4U With meals and Sliding scale, will cont this at discharge.    Anxiety, depression    Vaginal bleeding, likely related to Eliquis  - resolved  - US Pelvic: no active bleeding noted.  Thickened fluid-filled endometrium. Consider endometrial hyperplasia or dysplasia or metaplasia. Nonvisualization of the ovaries. FOLLOW-UP CARE RECOMMENDATIONS:    APPOINTMENTS:  It is very important that you keep follow-up appointment(s). Bring discharge papers, medication list (and/or medication bottles) to follow-up appointments for review by outpatient provider(s). FOLLOW-UP TESTS RECOMMENDED:   · NONE    ONGOING TREATMENT PLAN: as above     PENDING TEST RESULTS:  At the time of discharge the following test results are still pending: NONE. Please review these results as they become available. Specific symptoms to watch for: chest pain, shortness of breath, fever, chills, nausea, vomiting, diarrhea, change in mentation, falling, weakness, bleeding. DIET:  Diabetic Diet    ACTIVITY:  Activity as tolerated    WOUND CARE:  Left dorsal foot - large area of dark discoloration discoloration/vascular perfusion injury - 2nd through 5th toes dry black; dorsal foot with unroofed blister - dry yellow/black base, no surrounding redness or erythema, no odor,  Appears as dry gangrene currently. Foot/toes cool to cold. Vascular following. No injury to left heel. Right AKA well approximated incision with staples, no drainage. Right groin/fold - linear skin breakdown - 0.3 x 2 x 0.1 cm, bright pink above the groin incision from previous vascular intervention - the vascular incision is well healed. Intertrigo. Right of sacrum - clustered pustules in vesicular base; isolated area ~ 1.4 x 1 cm, no surrounding redness, no other lesions noted in groin/buttocks/perianal area. Suspect viral etiology by appearance. Patient alert/oriented x 4; photo taken with her consent for chart/MD.  Treatment:  Dry dressing to left foot, placed foot in heel boot; incontinence skin care/barrier ointment to gluteal/perianal areas. Wound Recommendations:  Dry dressing to left foot.   Monitor clustered area on right buttock area. EQUIPMENT needed:  NONE    INCIDENTAL FINDINGS:  NONE    GOALS OF CARE:  X  Eventual return to home/independent/assisted living     Long term SNF      Hospice     No rehospitalization     Patient condition at discharge:   Functional status  X  Poor      Deconditioned      Independent   Cognition    Lucid     Forgetful (some sensescence)     Dementia   Catheters/lines (plus indication)    Mejia     PICC      PEG    X     Code status  X  Full code      DNR        Physical examination at discharge  Visit Vitals  /72 (BP 1 Location: Right arm, BP Patient Position: At rest)   Pulse (!) 102   Temp 98.2 °F (36.8 °C)   Resp 21   Ht 5' 5\" (1.651 m)   Wt 90.4 kg (199 lb 4.7 oz)   SpO2 99%   Breastfeeding? No   BMI 33.16 kg/m²     Constitutional:  No acute distress, cooperative, pleasant    ENT:  Oral mucous moist, oropharynx benign. Resp:  CTA bilaterally. No wheezing/rhonchi/rales. CV:  Irregularly irregular    GI:  Soft, non distended, non tender. normoactive bowel sounds,     Musculoskeletal:  No edema, warm, 2+ pulses throughout                           R AKA. Left foot ischemia/gangrene toes        Pertinent imaging studies:      Recent Labs     10/23/19  0338 10/22/19  0326 10/21/19  0406   WBC 12.7* 12.4* 11.1*   HGB 8.9* 8.7* 6.8*   HCT 28.8* 27.8* 23.3*   * 404* 392     Recent Labs     10/23/19  0338 10/22/19  0326 10/21/19  0406    138 138   K 4.1 3.9 3.9    101 102   CO2 32 32 33*   BUN 28* 24* 22*   CREA 1.27* 1.19* 1.09*   * 166* 158*   CA 8.7 8.7 8.4*     No results for input(s): SGOT, GPT, AP, TBIL, TP, ALB, GLOB, GGT, AML, LPSE in the last 72 hours. No lab exists for component: AMYP, HLPSE  No results for input(s): INR, PTP, APTT, INREXT, INREXT in the last 72 hours. No results for input(s): FE, TIBC, PSAT, FERR in the last 72 hours. No results for input(s): PH, PCO2, PO2 in the last 72 hours.   Recent Labs     10/23/19  0338 10/22/19  0326 10/21/19  0406   CPK 61 83 109     No components found for: GLPOC    Chronic Diagnoses:    Problem List as of 10/23/2019 Date Reviewed: 10/14/2019          Codes Class Noted - Resolved    * (Principal) CHF exacerbation ([facility] ICD-10-CM: I50.9  ICD-9-CM: 428.0  9/17/2019 - Present        Hyperkalemia ICD-10-CM: E87.5  ICD-9-CM: 276.7  9/17/2019 - Present        Hyponatremia ICD-10-CM: E87.1  ICD-9-CM: 276.1  9/17/2019 - Present        HOLLAND (acute kidney injury) ([facility] ICD-10-CM: N17.9  ICD-9-CM: 584.9  9/17/2019 - Present        Acute respiratory disease ICD-10-CM: J06.9  ICD-9-CM: 465.9  2/8/2017 - Present        Moderate to severe pulmonary hypertension ([facility] ICD-10-CM: I27.20  ICD-9-CM: 416.8  1/25/2017 - Present        Pulmonary edema cardiac cause ([facility] ICD-10-CM: I50.1  ICD-9-CM: 514  4/1/2016 - Present        SOB (shortness of breath) ICD-10-CM: R06.02  ICD-9-CM: 786.05  3/30/2016 - Present        CHF (congestive heart failure) (HCC) ICD-10-CM: I50.9  ICD-9-CM: 428.0  3/30/2016 - Present        Diastolic CHF, acute on chronic (HCC) ICD-10-CM: I50.33  ICD-9-CM: 428.33, 428.0  1/31/2016 - Present        Aortic stenosis ICD-10-CM: I35.0  ICD-9-CM: 424.1  1/20/2016 - Present        Hypercholesteremia ICD-10-CM: E78.00  ICD-9-CM: 272.0  12/13/2012 - Present        Anxiety ICD-10-CM: F41.9  ICD-9-CM: 300.00  5/21/2012 - Present        Multiple allergies ICD-10-CM: Z88.9  ICD-9-CM: V15.09  10/12/2011 - Present        Bronchitis ICD-10-CM: J40  ICD-9-CM: 490  10/12/2011 - Present        Allergic reaction ICD-10-CM: T78.40XA  ICD-9-CM: 995.3  10/4/2011 - Present        Vein disorder ICD-10-CM: I87.9  ICD-9-CM: 459.9  9/7/2011 - Present        Acute hypoxemic respiratory failure ([facility] ICD-10-CM: J96.01  ICD-9-CM: 518.81  8/8/2011 - Present        Debility ICD-10-CM: R53.81  ICD-9-CM: 799.3  6/28/2011 - Present        Breast cancer ([facility] ICD-10-CM: C50.919  ICD-9-CM: 174.9  5/26/2011 - Present        Anemia ICD-10-CM: D64.9  ICD-9-CM: 285.9  5/26/2011 - Present        Diabetes ([address] ICD-10-CM: E11.9  ICD-9-CM: 250.00  4/19/2011 - Present        HTN (hypertension), benign ICD-10-CM: I10  ICD-9-CM: 401.1  4/19/2011 - Present        RESOLVED: Angioedema ICD-10-CM: T78. 3XXA  ICD-9-CM: 995.1  8/8/2011 - 8/19/2011        RESOLVED: Pneumonia, organism unspecified(486) ICD-10-CM: J18.9  ICD-9-CM: 894  4/19/2011 - 5/26/2011        RESOLVED: Hypoxia ICD-10-CM: R09.02  ICD-9-CM: 799.02  4/19/2011 - 5/26/2011              Time spent on discharge related activities today greater than 30 minutes.       Signed:  Anahi Dorantes MD                 Hospitalist, Internal Medicine      Cc: Rufina Strauss MD

## 2019-10-31 ENCOUNTER — HOSPITAL ENCOUNTER (OUTPATIENT)
Age: 84
Setting detail: OBSERVATION
Discharge: NURSING, REHAB OR LONG-TERM CARE | End: 2019-11-01
Attending: STUDENT IN AN ORGANIZED HEALTH CARE EDUCATION/TRAINING PROGRAM | Admitting: FAMILY MEDICINE
Payer: MEDICARE

## 2019-10-31 DIAGNOSIS — R60.9 EDEMA, UNSPECIFIED TYPE: ICD-10-CM

## 2019-10-31 DIAGNOSIS — N93.9 VAGINAL BLEEDING: ICD-10-CM

## 2019-10-31 DIAGNOSIS — I25.10 CORONARY ARTERY DISEASE INVOLVING NATIVE CORONARY ARTERY OF NATIVE HEART WITHOUT ANGINA PECTORIS: ICD-10-CM

## 2019-10-31 DIAGNOSIS — D50.0 BLOOD LOSS ANEMIA: Primary | ICD-10-CM

## 2019-10-31 DIAGNOSIS — I50.32 CHRONIC DIASTOLIC CONGESTIVE HEART FAILURE (HCC): ICD-10-CM

## 2019-10-31 LAB
ALBUMIN SERPL-MCNC: 1.9 G/DL (ref 3.5–5)
ALBUMIN/GLOB SERPL: 0.4 {RATIO} (ref 1.1–2.2)
ALP SERPL-CCNC: 150 U/L (ref 45–117)
ALT SERPL-CCNC: 12 U/L (ref 12–78)
ANION GAP SERPL CALC-SCNC: 3 MMOL/L (ref 5–15)
AST SERPL-CCNC: 34 U/L (ref 15–37)
BASOPHILS # BLD: 0 K/UL (ref 0–0.1)
BASOPHILS NFR BLD: 0 % (ref 0–1)
BILIRUB SERPL-MCNC: 0.8 MG/DL (ref 0.2–1)
BUN SERPL-MCNC: 12 MG/DL (ref 6–20)
BUN/CREAT SERPL: 11 (ref 12–20)
CALCIUM SERPL-MCNC: 7.8 MG/DL (ref 8.5–10.1)
CHLORIDE SERPL-SCNC: 96 MMOL/L (ref 97–108)
CO2 SERPL-SCNC: 38 MMOL/L (ref 21–32)
COMMENT, HOLDF: NORMAL
CREAT SERPL-MCNC: 1.14 MG/DL (ref 0.55–1.02)
DIFFERENTIAL METHOD BLD: ABNORMAL
EOSINOPHIL # BLD: 0.3 K/UL (ref 0–0.4)
EOSINOPHIL NFR BLD: 4 % (ref 0–7)
ERYTHROCYTE [DISTWIDTH] IN BLOOD BY AUTOMATED COUNT: 17.6 % (ref 11.5–14.5)
EST. AVERAGE GLUCOSE BLD GHB EST-MCNC: 128 MG/DL
GLOBULIN SER CALC-MCNC: 5.2 G/DL (ref 2–4)
GLUCOSE BLD STRIP.AUTO-MCNC: 104 MG/DL (ref 65–100)
GLUCOSE BLD STRIP.AUTO-MCNC: 114 MG/DL (ref 65–100)
GLUCOSE SERPL-MCNC: 88 MG/DL (ref 65–100)
HBA1C MFR BLD: 6.1 % (ref 4.2–6.3)
HCT VFR BLD AUTO: 24.3 % (ref 35–47)
HGB BLD-MCNC: 7 G/DL (ref 11.5–16)
IMM GRANULOCYTES # BLD AUTO: 0.1 K/UL (ref 0–0.04)
IMM GRANULOCYTES NFR BLD AUTO: 1 % (ref 0–0.5)
LYMPHOCYTES # BLD: 1.4 K/UL (ref 0.8–3.5)
LYMPHOCYTES NFR BLD: 18 % (ref 12–49)
MCH RBC QN AUTO: 27 PG (ref 26–34)
MCHC RBC AUTO-ENTMCNC: 28.8 G/DL (ref 30–36.5)
MCV RBC AUTO: 93.8 FL (ref 80–99)
MONOCYTES # BLD: 0.8 K/UL (ref 0–1)
MONOCYTES NFR BLD: 11 % (ref 5–13)
NEUTS SEG # BLD: 5 K/UL (ref 1.8–8)
NEUTS SEG NFR BLD: 66 % (ref 32–75)
NRBC # BLD: 0 K/UL (ref 0–0.01)
NRBC BLD-RTO: 0 PER 100 WBC
PLATELET # BLD AUTO: 356 K/UL (ref 150–400)
PMV BLD AUTO: 10 FL (ref 8.9–12.9)
POTASSIUM SERPL-SCNC: 3.2 MMOL/L (ref 3.5–5.1)
PROT SERPL-MCNC: 7.1 G/DL (ref 6.4–8.2)
RBC # BLD AUTO: 2.59 M/UL (ref 3.8–5.2)
RBC MORPH BLD: ABNORMAL
SAMPLES BEING HELD,HOLD: NORMAL
SERVICE CMNT-IMP: ABNORMAL
SERVICE CMNT-IMP: ABNORMAL
SODIUM SERPL-SCNC: 137 MMOL/L (ref 136–145)
WBC # BLD AUTO: 7.6 K/UL (ref 3.6–11)

## 2019-10-31 PROCEDURE — 85025 COMPLETE CBC W/AUTO DIFF WBC: CPT

## 2019-10-31 PROCEDURE — 83036 HEMOGLOBIN GLYCOSYLATED A1C: CPT

## 2019-10-31 PROCEDURE — 99218 HC RM OBSERVATION: CPT

## 2019-10-31 PROCEDURE — 99285 EMERGENCY DEPT VISIT HI MDM: CPT

## 2019-10-31 PROCEDURE — 86923 COMPATIBILITY TEST ELECTRIC: CPT

## 2019-10-31 PROCEDURE — 74011636637 HC RX REV CODE- 636/637: Performed by: FAMILY MEDICINE

## 2019-10-31 PROCEDURE — 36415 COLL VENOUS BLD VENIPUNCTURE: CPT

## 2019-10-31 PROCEDURE — 77030029684 HC NEB SM VOL KT MONA -A

## 2019-10-31 PROCEDURE — 80053 COMPREHEN METABOLIC PANEL: CPT

## 2019-10-31 PROCEDURE — 74011250636 HC RX REV CODE- 250/636: Performed by: STUDENT IN AN ORGANIZED HEALTH CARE EDUCATION/TRAINING PROGRAM

## 2019-10-31 PROCEDURE — 74011250637 HC RX REV CODE- 250/637: Performed by: FAMILY MEDICINE

## 2019-10-31 PROCEDURE — 96374 THER/PROPH/DIAG INJ IV PUSH: CPT

## 2019-10-31 PROCEDURE — 86900 BLOOD TYPING SEROLOGIC ABO: CPT

## 2019-10-31 PROCEDURE — 82962 GLUCOSE BLOOD TEST: CPT

## 2019-10-31 RX ORDER — INSULIN GLARGINE 100 [IU]/ML
18 INJECTION, SOLUTION SUBCUTANEOUS
COMMUNITY

## 2019-10-31 RX ORDER — SODIUM CHLORIDE 9 MG/ML
250 INJECTION, SOLUTION INTRAVENOUS AS NEEDED
Status: DISCONTINUED | OUTPATIENT
Start: 2019-10-31 | End: 2019-11-01 | Stop reason: HOSPADM

## 2019-10-31 RX ORDER — FAMOTIDINE 20 MG/1
20 TABLET, FILM COATED ORAL EVERY EVENING
Status: DISCONTINUED | OUTPATIENT
Start: 2019-11-01 | End: 2019-11-01 | Stop reason: HOSPADM

## 2019-10-31 RX ORDER — ACETAMINOPHEN 500 MG
500 TABLET ORAL 2 TIMES DAILY
COMMUNITY

## 2019-10-31 RX ORDER — DEXTROSE MONOHYDRATE 100 MG/ML
0-250 INJECTION, SOLUTION INTRAVENOUS AS NEEDED
Status: DISCONTINUED | OUTPATIENT
Start: 2019-10-31 | End: 2019-11-01 | Stop reason: HOSPADM

## 2019-10-31 RX ORDER — METOPROLOL TARTRATE 25 MG/1
25 TABLET, FILM COATED ORAL 2 TIMES DAILY
Status: ON HOLD | COMMUNITY
End: 2019-11-01 | Stop reason: SDUPTHER

## 2019-10-31 RX ORDER — SODIUM CHLORIDE 0.9 % (FLUSH) 0.9 %
5-40 SYRINGE (ML) INJECTION AS NEEDED
Status: DISCONTINUED | OUTPATIENT
Start: 2019-10-31 | End: 2019-11-01 | Stop reason: HOSPADM

## 2019-10-31 RX ORDER — POTASSIUM CHLORIDE 7.45 MG/ML
10 INJECTION INTRAVENOUS
Status: DISCONTINUED | OUTPATIENT
Start: 2019-10-31 | End: 2019-10-31

## 2019-10-31 RX ORDER — DOXYLAMINE SUCCINATE 25 MG
TABLET ORAL 2 TIMES DAILY
COMMUNITY

## 2019-10-31 RX ORDER — POTASSIUM CHLORIDE 750 MG/1
10 TABLET, FILM COATED, EXTENDED RELEASE ORAL
Status: COMPLETED | OUTPATIENT
Start: 2019-10-31 | End: 2019-10-31

## 2019-10-31 RX ORDER — MAGNESIUM SULFATE 100 %
4 CRYSTALS MISCELLANEOUS AS NEEDED
Status: DISCONTINUED | OUTPATIENT
Start: 2019-10-31 | End: 2019-11-01 | Stop reason: HOSPADM

## 2019-10-31 RX ORDER — IPRATROPIUM BROMIDE AND ALBUTEROL SULFATE 2.5; .5 MG/3ML; MG/3ML
3 SOLUTION RESPIRATORY (INHALATION)
Status: DISCONTINUED | OUTPATIENT
Start: 2019-10-31 | End: 2019-11-01 | Stop reason: HOSPADM

## 2019-10-31 RX ORDER — TRAMADOL HYDROCHLORIDE 50 MG/1
25 TABLET ORAL
COMMUNITY

## 2019-10-31 RX ORDER — INSULIN GLARGINE 100 [IU]/ML
18 INJECTION, SOLUTION SUBCUTANEOUS
Status: DISCONTINUED | OUTPATIENT
Start: 2019-10-31 | End: 2019-11-01 | Stop reason: HOSPADM

## 2019-10-31 RX ORDER — MORPHINE SULFATE 2 MG/ML
2 INJECTION, SOLUTION INTRAMUSCULAR; INTRAVENOUS
Status: COMPLETED | OUTPATIENT
Start: 2019-10-31 | End: 2019-10-31

## 2019-10-31 RX ORDER — SODIUM CHLORIDE 0.9 % (FLUSH) 0.9 %
5-40 SYRINGE (ML) INJECTION EVERY 8 HOURS
Status: DISCONTINUED | OUTPATIENT
Start: 2019-10-31 | End: 2019-11-01 | Stop reason: HOSPADM

## 2019-10-31 RX ORDER — INSULIN LISPRO 100 [IU]/ML
4 INJECTION, SOLUTION INTRAVENOUS; SUBCUTANEOUS
COMMUNITY

## 2019-10-31 RX ORDER — ATORVASTATIN CALCIUM 40 MG/1
40 TABLET, FILM COATED ORAL
Status: DISCONTINUED | OUTPATIENT
Start: 2019-10-31 | End: 2019-11-01 | Stop reason: HOSPADM

## 2019-10-31 RX ORDER — ACETAMINOPHEN 325 MG/1
650 TABLET ORAL
Status: DISCONTINUED | OUTPATIENT
Start: 2019-10-31 | End: 2019-11-01 | Stop reason: HOSPADM

## 2019-10-31 RX ORDER — INSULIN LISPRO 100 [IU]/ML
INJECTION, SOLUTION INTRAVENOUS; SUBCUTANEOUS
Status: DISCONTINUED | OUTPATIENT
Start: 2019-10-31 | End: 2019-11-01 | Stop reason: HOSPADM

## 2019-10-31 RX ADMIN — ATORVASTATIN CALCIUM 40 MG: 40 TABLET, FILM COATED ORAL at 23:49

## 2019-10-31 RX ADMIN — POTASSIUM CHLORIDE 10 MEQ: 750 TABLET, EXTENDED RELEASE ORAL at 21:11

## 2019-10-31 RX ADMIN — ACETAMINOPHEN 650 MG: 325 TABLET, FILM COATED ORAL at 23:49

## 2019-10-31 RX ADMIN — INSULIN GLARGINE 18 UNITS: 100 INJECTION, SOLUTION SUBCUTANEOUS at 18:00

## 2019-10-31 RX ADMIN — MORPHINE SULFATE 2 MG: 2 INJECTION, SOLUTION INTRAMUSCULAR; INTRAVENOUS at 19:54

## 2019-10-31 RX ADMIN — Medication 10 ML: at 23:50

## 2019-10-31 NOTE — ED PROVIDER NOTES
80 y.o. female with past medical history significant for diabetes, HTN, anxiety, hypercholesterolemia, anxiety, pneumonia, breast cancer s/p right mastectomy, osteoporosis, heart failure, and COPD who presents from [facility] via EMS with chief complaint of vaginal bleeding. EMS reports pt began with vaginal bleeding about 3 days ago. Her providers discontinued her blood thinner yesterday, but she continued with bleeding. Today, she had labs drawn and her Hgb was 6.1. Pt was placed in diapers and she bleeds through a diaper every 2-3 hours. Of note, pt was admitted on 9/17/19-10/23/19 for CHF exacerbation, ischemic right leg sepsis, anemia due to right AKA. Prior to discharge, she was anemic and had similar symptoms of vaginal bleeding. She is on 3L O2 at baseline. Pt specifically denies weakness, dizziness, lightheaded, and any other pain or symptoms. There are no other acute medical concerns at this time. Social hx: Never tobacco smoker; Denies EtOH use; Denies illicit drug use  PCP: Maicol Ocampo MD    Note written by Kayden Mesa, as dictated by Vikki Crain MD 5:23 PM    The history is provided by the patient, medical records and the EMS personnel. No  was used.         Past Medical History:   Diagnosis Date    Anxiety     Breast cancer ([address] 2005, 2010    right 2005, left 2010    Cancer [facility])      cancer right breast cancer    Cancer ([address]     cancer left breast/new dx 8/2011 +bx     Chronic obstructive pulmonary disease (HCC)     Diabetes ([address]     Heart failure ([address]     Hypercholesterolemia     Hypertension     Other ill-defined conditions(799.89)     osteopoosis    Other ill-defined conditions(799.89)     high cholesterol    Pneumonia     Psychiatric disorder     anxiety       Past Surgical History:   Procedure Laterality Date    BIOPSY OF BREAST, INCISIONAL      left breast 8/2011 positive for cancer cells    BREAST SURGERY PROCEDURE UNLISTED      right mastectomy    HX BREAST LUMPECTOMY Left 2010    HX MASTECTOMY Right 2005         Family History:   Problem Relation Age of Onset    Hypertension Mother     Stroke Other        Social History     Socioeconomic History    Marital status:      Spouse name: Not on file    Number of children: Not on file    Years of education: Not on file    Highest education level: Not on file   Occupational History    Not on file   Social Needs    Financial resource strain: Not on file    Food insecurity:     Worry: Not on file     Inability: Not on file    Transportation needs:     Medical: Not on file     Non-medical: Not on file   Tobacco Use    Smoking status: Never Smoker    Smokeless tobacco: Never Used   Substance and Sexual Activity    Alcohol use: No    Drug use: No    Sexual activity: Not Currently   Lifestyle    Physical activity:     Days per week: Not on file     Minutes per session: Not on file    Stress: Not on file   Relationships    Social connections:     Talks on phone: Not on file     Gets together: Not on file     Attends Confucianist service: Not on file     Active member of club or organization: Not on file     Attends meetings of clubs or organizations: Not on file     Relationship status: Not on file    Intimate partner violence:     Fear of current or ex partner: Not on file     Emotionally abused: Not on file     Physically abused: Not on file     Forced sexual activity: Not on file   Other Topics Concern    Not on file   Social History Narrative        She does not have a history of smoking, but does have second hand smoking exposure from her  , for a period of 27- 36 yrs, , approx 30 yrs ago. 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         She has two children, her dtr and son, both of whom are present during the interview, patient states that she wants them both to make any medical decisions for her, she wants to be a full code. ALLERGIES: Ace inhibitors    Review of Systems   Constitutional: Negative for chills and fever. Eyes: Negative for photophobia. Respiratory: Negative for shortness of breath. Cardiovascular: Negative for chest pain. Gastrointestinal: Negative for abdominal pain. Genitourinary: Positive for vaginal bleeding. Negative for dysuria. Musculoskeletal: Negative for back pain. Neurological: Negative for dizziness, weakness, light-headedness and headaches. Psychiatric/Behavioral: Negative for confusion. All other systems reviewed and are negative. Vitals:    10/31/19 1728   BP: 118/71   Pulse: 79   Resp: 16   Temp: 98.6 °F (37 °C)   SpO2: 100%            Physical Exam   Constitutional: She appears well-nourished. No distress. HENT:   Head: Normocephalic. Eyes: Pupils are equal, round, and reactive to light. Cardiovascular: Intact distal pulses. Pulmonary/Chest: She is in respiratory distress. Abdominal: She exhibits no distension. Genitourinary:   Genitourinary Comments:  exam performed with RN chaperone, fresh blood without clots noted in the diaper, slight oozing from the vagina, no discharge. No evidence of trauma externally. Musculoskeletal:   Right AKA with staples intact, no hemorrhage   Neurological: She is alert. Skin: Skin is warm. Psychiatric: Her behavior is normal.              MDM  Number of Diagnoses or Management Options  Blood loss anemia:   Vaginal bleeding:   Diagnosis management comments: 75-year-old woman with history of vaginal bleeding in the setting of new anticoagulant use with hemoglobin of 6 on outpatient labs, hemodynamically stable here. She continues to have oozing. She is right at the border of the transfusion limit on our labs. Would benefit from observation to confirm that her bleeding ceases now that her Eliquis has stopped.           Hospitalist Kumar Text for Admission  7:57 PM    ED Room Number: JI22/22  Patient Name and age:  Milton Ramos 80 y.o.  female  Working Diagnosis:   1. Blood loss anemia    2.  Vaginal bleeding      Readmission: yes  Isolation Requirements:  no  Recommended Level of Care:  med/surg  Code Status:  dnr  Department:[facility] Adult ED - 21       Procedures

## 2019-10-31 NOTE — PROGRESS NOTES
Reason for Readmission:     Vaginal bleeding X 3 days, blood thinner discontinued yesterday. Hgb reported 6.1    9/17/19-10/23/19  Above Knee Amputation, CHF exacerbation  [address] [place] [address]  Discharged to SNF         RRAT Score and Risk Level:     RRAT  25/0/1 High Risk     Level of Readmission:    Level II      Care Conference scheduled: To be determined       Resources/supports as identified by patient/family:   Daughter Isauro Suazo [phone], Son James Ferrara [phone], Marco Haro prior to admission she had some assistance in the home. Digna Berry [phone]       Top Challenges facing patient (as identified by patient/family and CM): Finances/Medication cost?     [employer] and [employer]      Son drives to and from MD appointments  Support system or lack thereof? Supportive family  Living arrangements? Admitted from SNF, lives with son and daughter, daughter and her live downstairs and brother lives upstairs  Self-care/ADLs/Cognition? Prior to admission she needed minimal assistance with ADL's       Current Advanced Directive/Advance Care Plan:  DDNR and Advance Directive on file, completed 10/2019           Plan for utilizing home health: To be determined, if discharged from SNF             Braden Son is an 80year old female to [address] [place] [address] ED with complaints of vaginal bleeding. Hgb low. ED work up being completed.       Mar Helm RN, BSN, [facility]  ED Care Management  [phone]

## 2019-10-31 NOTE — ED TRIAGE NOTES
Pt comes in via EMS from [facility] for vaginal bleeding x 3 days/anemia. Pt is a recent R AKA and wears 3L O2 @ baseline. Per facility, pt has been saturating a diaper every 2 hrs. Facility took pt off of blood thinners yesterday. LLE toes black.

## 2019-11-01 VITALS
SYSTOLIC BLOOD PRESSURE: 125 MMHG | RESPIRATION RATE: 18 BRPM | HEART RATE: 82 BPM | OXYGEN SATURATION: 99 % | WEIGHT: 185.9 LBS | DIASTOLIC BLOOD PRESSURE: 72 MMHG | TEMPERATURE: 98.3 F | BODY MASS INDEX: 30.94 KG/M2

## 2019-11-01 LAB
ANION GAP SERPL CALC-SCNC: 3 MMOL/L (ref 5–15)
BASOPHILS # BLD: 0.1 K/UL (ref 0–0.1)
BASOPHILS NFR BLD: 1 % (ref 0–1)
BUN SERPL-MCNC: 13 MG/DL (ref 6–20)
BUN/CREAT SERPL: 11 (ref 12–20)
CALCIUM SERPL-MCNC: 7.8 MG/DL (ref 8.5–10.1)
CHLORIDE SERPL-SCNC: 97 MMOL/L (ref 97–108)
CO2 SERPL-SCNC: 37 MMOL/L (ref 21–32)
CREAT SERPL-MCNC: 1.18 MG/DL (ref 0.55–1.02)
DIFFERENTIAL METHOD BLD: ABNORMAL
EOSINOPHIL # BLD: 0.4 K/UL (ref 0–0.4)
EOSINOPHIL NFR BLD: 4 % (ref 0–7)
ERYTHROCYTE [DISTWIDTH] IN BLOOD BY AUTOMATED COUNT: 17.8 % (ref 11.5–14.5)
GLUCOSE BLD STRIP.AUTO-MCNC: 107 MG/DL (ref 65–100)
GLUCOSE BLD STRIP.AUTO-MCNC: 119 MG/DL (ref 65–100)
GLUCOSE BLD STRIP.AUTO-MCNC: 149 MG/DL (ref 65–100)
GLUCOSE SERPL-MCNC: 119 MG/DL (ref 65–100)
HCT VFR BLD AUTO: 19.7 % (ref 35–47)
HCT VFR BLD AUTO: 23.1 % (ref 35–47)
HGB BLD-MCNC: 6 G/DL (ref 11.5–16)
HGB BLD-MCNC: 7.2 G/DL (ref 11.5–16)
IMM GRANULOCYTES # BLD AUTO: 0.1 K/UL (ref 0–0.04)
IMM GRANULOCYTES NFR BLD AUTO: 1 % (ref 0–0.5)
LYMPHOCYTES # BLD: 2 K/UL (ref 0.8–3.5)
LYMPHOCYTES NFR BLD: 23 % (ref 12–49)
MCH RBC QN AUTO: 27.8 PG (ref 26–34)
MCHC RBC AUTO-ENTMCNC: 30.5 G/DL (ref 30–36.5)
MCV RBC AUTO: 91.2 FL (ref 80–99)
MONOCYTES # BLD: 0.9 K/UL (ref 0–1)
MONOCYTES NFR BLD: 10 % (ref 5–13)
NEUTS SEG # BLD: 5.3 K/UL (ref 1.8–8)
NEUTS SEG NFR BLD: 61 % (ref 32–75)
NRBC # BLD: 0 K/UL (ref 0–0.01)
NRBC BLD-RTO: 0 PER 100 WBC
PLATELET # BLD AUTO: 319 K/UL (ref 150–400)
PMV BLD AUTO: 10 FL (ref 8.9–12.9)
POTASSIUM SERPL-SCNC: 3.3 MMOL/L (ref 3.5–5.1)
RBC # BLD AUTO: 2.16 M/UL (ref 3.8–5.2)
RBC MORPH BLD: ABNORMAL
SERVICE CMNT-IMP: ABNORMAL
SODIUM SERPL-SCNC: 137 MMOL/L (ref 136–145)
WBC # BLD AUTO: 8.8 K/UL (ref 3.6–11)

## 2019-11-01 PROCEDURE — 94760 N-INVAS EAR/PLS OXIMETRY 1: CPT

## 2019-11-01 PROCEDURE — 74011250636 HC RX REV CODE- 250/636: Performed by: NURSE PRACTITIONER

## 2019-11-01 PROCEDURE — 77030029684 HC NEB SM VOL KT MONA -A

## 2019-11-01 PROCEDURE — 77010033678 HC OXYGEN DAILY

## 2019-11-01 PROCEDURE — 36415 COLL VENOUS BLD VENIPUNCTURE: CPT

## 2019-11-01 PROCEDURE — 94640 AIRWAY INHALATION TREATMENT: CPT

## 2019-11-01 PROCEDURE — 99218 HC RM OBSERVATION: CPT

## 2019-11-01 PROCEDURE — 82962 GLUCOSE BLOOD TEST: CPT

## 2019-11-01 PROCEDURE — P9016 RBC LEUKOCYTES REDUCED: HCPCS

## 2019-11-01 PROCEDURE — 74011636637 HC RX REV CODE- 636/637: Performed by: FAMILY MEDICINE

## 2019-11-01 PROCEDURE — 85018 HEMOGLOBIN: CPT

## 2019-11-01 PROCEDURE — 80048 BASIC METABOLIC PNL TOTAL CA: CPT

## 2019-11-01 PROCEDURE — 85025 COMPLETE CBC W/AUTO DIFF WBC: CPT

## 2019-11-01 PROCEDURE — 74011250637 HC RX REV CODE- 250/637: Performed by: INTERNAL MEDICINE

## 2019-11-01 PROCEDURE — 74011250637 HC RX REV CODE- 250/637: Performed by: FAMILY MEDICINE

## 2019-11-01 PROCEDURE — 36430 TRANSFUSION BLD/BLD COMPNT: CPT

## 2019-11-01 PROCEDURE — 74011000250 HC RX REV CODE- 250: Performed by: FAMILY MEDICINE

## 2019-11-01 PROCEDURE — 96376 TX/PRO/DX INJ SAME DRUG ADON: CPT

## 2019-11-01 PROCEDURE — 94664 DEMO&/EVAL PT USE INHALER: CPT

## 2019-11-01 RX ORDER — MORPHINE SULFATE 2 MG/ML
2 INJECTION, SOLUTION INTRAMUSCULAR; INTRAVENOUS
Status: DISCONTINUED | OUTPATIENT
Start: 2019-11-01 | End: 2019-11-01 | Stop reason: HOSPADM

## 2019-11-01 RX ORDER — METOPROLOL TARTRATE 25 MG/1
12.5 TABLET, FILM COATED ORAL 2 TIMES DAILY
Qty: 60 TAB | Refills: 0 | Status: SHIPPED | OUTPATIENT
Start: 2019-11-01

## 2019-11-01 RX ORDER — POTASSIUM CHLORIDE 750 MG/1
20 TABLET, FILM COATED, EXTENDED RELEASE ORAL
Status: DISCONTINUED | OUTPATIENT
Start: 2019-11-01 | End: 2019-11-01

## 2019-11-01 RX ORDER — BUMETANIDE 1 MG/1
1 TABLET ORAL DAILY
Qty: 60 TAB | Refills: 12 | Status: SHIPPED | OUTPATIENT
Start: 2019-11-01

## 2019-11-01 RX ORDER — POTASSIUM CHLORIDE 1.5 G/1.77G
20 POWDER, FOR SOLUTION ORAL
Status: COMPLETED | OUTPATIENT
Start: 2019-11-01 | End: 2019-11-01

## 2019-11-01 RX ADMIN — ACETAMINOPHEN 650 MG: 325 TABLET, FILM COATED ORAL at 04:57

## 2019-11-01 RX ADMIN — IPRATROPIUM BROMIDE AND ALBUTEROL SULFATE 3 ML: .5; 3 SOLUTION RESPIRATORY (INHALATION) at 00:14

## 2019-11-01 RX ADMIN — FAMOTIDINE 20 MG: 20 TABLET ORAL at 18:38

## 2019-11-01 RX ADMIN — ACETAMINOPHEN 650 MG: 325 TABLET, FILM COATED ORAL at 16:44

## 2019-11-01 RX ADMIN — IPRATROPIUM BROMIDE AND ALBUTEROL SULFATE 3 ML: .5; 3 SOLUTION RESPIRATORY (INHALATION) at 09:36

## 2019-11-01 RX ADMIN — Medication 10 ML: at 12:00

## 2019-11-01 RX ADMIN — POTASSIUM CHLORIDE 20 MEQ: 1.5 POWDER, FOR SOLUTION ORAL at 16:33

## 2019-11-01 RX ADMIN — INSULIN LISPRO 2 UNITS: 100 INJECTION, SOLUTION INTRAVENOUS; SUBCUTANEOUS at 11:54

## 2019-11-01 RX ADMIN — MORPHINE SULFATE 2 MG: 2 INJECTION, SOLUTION INTRAMUSCULAR; INTRAVENOUS at 05:53

## 2019-11-01 RX ADMIN — Medication 10 ML: at 05:00

## 2019-11-01 RX ADMIN — MORPHINE SULFATE 2 MG: 2 INJECTION, SOLUTION INTRAMUSCULAR; INTRAVENOUS at 12:00

## 2019-11-01 NOTE — ROUTINE PROCESS
1635:  Pegsanty Peralta notified transportation to  patient at 0PM.  She voiced her brother will meet patient there. Pt also area of discharge at [address] [facility].   
 
1651:  Calling report to [facility].

## 2019-11-01 NOTE — CONSULTS
Gyn Consult    Subjective:     Vikki Crowell is a 80 y.o.  postmenopausal female who is being seen for Postmenopausal bleeding. She is on Eloquis which has been stopped x 2 days now. SHe had an ultrasound/s on last admission 10/21 which revealed a thickened endometrium -consider endometrial hyperplasia, dysplasia or metaplasia. Reportedly she had heavy vaginal bleeding 3 days ago and has been admitted for Blood transfusion and Telemetry monitoring. She has multiple medical problems, is DNR and is refusing any further surgery.     OB/GYN ROS: she complains of Vaginal Bleeding 3-4 days ago which appears to be improving after stopping eloquis  OB/GYN history: history of (postmenopausal), obstetric history: ( : 2, Para: 2, Misc/Ab: 0) and contraception (none)    Patient Active Problem List    Diagnosis Date Noted    CHF exacerbation ([address] 2019    Hyperkalemia 2019    Hyponatremia 2019    HOLLAND (acute kidney injury) ([address] 2019    Acute respiratory disease 2017    Moderate to severe pulmonary hypertension ([address] 2017    Pulmonary edema cardiac cause ([address] 2016    SOB (shortness of breath) 2016    CHF (congestive heart failure) ([address]     Diastolic CHF, acute on chronic ([address] 2016    Aortic stenosis 2016    Hypercholesteremia 2012    Anxiety 2012    Multiple allergies 10/12/2011    Bronchitis 10/12/2011    Allergic reaction 10/04/2011    Vein disorder 2011    Acute hypoxemic respiratory failure ([address] 2011    Debility 2011    Breast cancer ([address] 2011    Anemia 2011    Diabetes ([address] 2011    HTN (hypertension), benign 2011     Past Medical History:   Diagnosis Date    Anxiety     Breast cancer ([address] ,     right , left     Cancer [facility])      cancer right breast cancer    Cancer ([address]     cancer left breast/new dx 2011 +bx     Chronic obstructive pulmonary disease (HCC)     Diabetes [facility]     Heart failure ([facility]     Hypercholesterolemia     Hypertension     Other ill-defined conditions(799.89)     osteopoosis    Other ill-defined conditions(799.89)     high cholesterol    Pneumonia     Psychiatric disorder     anxiety      Past Surgical History:   Procedure Laterality Date    BIOPSY OF BREAST, INCISIONAL      left breast 2011 positive for cancer cells    BREAST SURGERY PROCEDURE UNLISTED      right mastectomy    HX BREAST LUMPECTOMY Left 2010    HX MASTECTOMY Right       Social History     Tobacco Use    Smoking status: Never Smoker    Smokeless tobacco: Never Used   Substance Use Topics    Alcohol use: No      Family History   Problem Relation Age of Onset    Hypertension Mother     Stroke Other       Prior to Admission Medications   Prescriptions Last Dose Informant Patient Reported? Taking?   acetaminophen (TYLENOL) 500 mg tablet   Yes Yes   Sig: Take 500 mg by mouth two (2) times a day. albuterol (PROVENTIL VENTOLIN) 2.5 mg /3 mL (0.083 %) nebulizer solution   No Yes   Sig: 3 mL by Nebulization route every four (4) hours as needed for Wheezing. allopurinol (ZYLOPRIM) 100 mg tablet   No Yes   Sig: Take 1 Tab by mouth daily. apixaban (ELIQUIS) 5 mg tablet Not Taking at Unknown time  No No   Sig: Take 1 Tab by mouth every twelve (12) hours every twelve (12) hours. atorvastatin (LIPITOR) 40 mg tablet   Yes Yes   Sig: Take 40 mg by mouth nightly. bumetanide (BUMEX) 1 mg tablet   No Yes   Sig: Take 1 Tab by mouth two (2) times a day. famotidine (PEPCID) 20 mg tablet   No Yes   Sig: Take 1 Tab by mouth every evening. insulin glargine (LANTUS) 100 unit/mL injection   Yes Yes   Si Units by SubCUTAneous route nightly. Notify MD if BG <60 or >400   insulin lispro (HUMALOG) 100 unit/mL injection   Yes Yes   Si Units by SubCUTAneous route Before breakfast, lunch, and dinner.  Administer if BG >200   ipratropium (ATROVENT) 0.02 % nebulizer solution   Yes Yes   Si.5 mg by Nebulization route every four (4) hours as needed for Other (Shortness of breath/wheezing). metoprolol tartrate (LOPRESSOR) 25 mg tablet   Yes Yes   Sig: Take 25 mg by mouth two (2) times a day. miconazole (MICOTIN) 2 % topical cream   Yes Yes   Sig: Apply  to affected area two (2) times a day. Apply to affected areas/groin   polyethylene glycol (MIRALAX) 17 gram packet   No Yes   Sig: Take 1 Packet by mouth daily. traMADol (ULTRAM) 50 mg tablet   Yes Yes   Sig: Take 25 mg by mouth every eight (8) hours as needed for Pain. Facility-Administered Medications: None     Allergies   Allergen Reactions    Ace Inhibitors Angioedema        Review of Systems:  10 point ROS,  A comprehensive review of systems was negative except for that written in the History of Present Illness. Objective:     Patient Vitals for the past 8 hrs:   BP Temp Pulse Resp SpO2 Weight   19 0939     99 %    19 0818 117/64 98.3 °F (36.8 °C) 86 19 99 %    19 0709 130/62 97.8 °F (36.6 °C) 98 18 100 %    19 0619 136/75 98.2 °F (36.8 °C) (!) 107 18 100 %    19 0525 120/74 98.2 °F (36.8 °C) (!) 105 18 100 %    19 0423 119/74 98 °F (36.7 °C) (!) 108 18 100 %    19 0354 107/72 98.2 °F (36.8 °C) 97 20 100 % 84.3 kg (185 lb 14.4 oz)   19 0325 103/67 98.3 °F (36.8 °C) 89 20 99 %    19 0312 122/68 98.3 °F (36.8 °C) (!) 104 20 100 %      Temp (24hrs), Av.2 °F (36.8 °C), Min:97.8 °F (36.6 °C), Max:98.6 °F (37 °C)    No intake/output data recorded. Physical Exam:   No exam performed today, patient refused exam.  A/0 x 3 NAD VSS Afebrile  Abd- Obese  R-BKA  Pelvic- external v/v-Scant Blood and old clots.                             Bimanual exam  Reveal mildly enlarged uterus w/o adnexal mass palpable                            Speculum exam deferred  Ultrasound reviewed- Reveal 13 MM Enlarged Endometrium consider endometrial hyperplasia, dysplasia or metaplasia    Labs:    Recent Results (from the past 24 hour(s))   CBC WITH AUTOMATED DIFF    Collection Time: 10/31/19  5:38 PM   Result Value Ref Range    WBC 7.6 3.6 - 11.0 K/uL    RBC 2.59 (L) 3.80 - 5.20 M/uL    HGB 7.0 (L) 11.5 - 16.0 g/dL    HCT 24.3 (L) 35.0 - 47.0 %    MCV 93.8 80.0 - 99.0 FL    MCH 27.0 26.0 - 34.0 PG    MCHC 28.8 (L) 30.0 - 36.5 g/dL    RDW 17.6 (H) 11.5 - 14.5 %    PLATELET 515 196 - 364 K/uL    MPV 10.0 8.9 - 12.9 FL    NRBC 0.0 0  WBC    ABSOLUTE NRBC 0.00 0.00 - 0.01 K/uL    NEUTROPHILS 66 32 - 75 %    LYMPHOCYTES 18 12 - 49 %    MONOCYTES 11 5 - 13 %    EOSINOPHILS 4 0 - 7 %    BASOPHILS 0 0 - 1 %    IMMATURE GRANULOCYTES 1 (H) 0.0 - 0.5 %    ABS. NEUTROPHILS 5.0 1.8 - 8.0 K/UL    ABS. LYMPHOCYTES 1.4 0.8 - 3.5 K/UL    ABS. MONOCYTES 0.8 0.0 - 1.0 K/UL    ABS. EOSINOPHILS 0.3 0.0 - 0.4 K/UL    ABS. BASOPHILS 0.0 0.0 - 0.1 K/UL    ABS. IMM. GRANS. 0.1 (H) 0.00 - 0.04 K/UL    DF SMEAR SCANNED      RBC COMMENTS ANISOCYTOSIS  1+       METABOLIC PANEL, COMPREHENSIVE    Collection Time: 10/31/19  5:38 PM   Result Value Ref Range    Sodium 137 136 - 145 mmol/L    Potassium 3.2 (L) 3.5 - 5.1 mmol/L    Chloride 96 (L) 97 - 108 mmol/L    CO2 38 (H) 21 - 32 mmol/L    Anion gap 3 (L) 5 - 15 mmol/L    Glucose 88 65 - 100 mg/dL    BUN 12 6 - 20 MG/DL    Creatinine 1.14 (H) 0.55 - 1.02 MG/DL    BUN/Creatinine ratio 11 (L) 12 - 20      GFR est AA 55 (L) >60 ml/min/1.73m2    GFR est non-AA 45 (L) >60 ml/min/1.73m2    Calcium 7.8 (L) 8.5 - 10.1 MG/DL    Bilirubin, total 0.8 0.2 - 1.0 MG/DL    ALT (SGPT) 12 12 - 78 U/L    AST (SGOT) 34 15 - 37 U/L    Alk.  phosphatase 150 (H) 45 - 117 U/L    Protein, total 7.1 6.4 - 8.2 g/dL    Albumin 1.9 (L) 3.5 - 5.0 g/dL    Globulin 5.2 (H) 2.0 - 4.0 g/dL    A-G Ratio 0.4 (L) 1.1 - 2.2     SAMPLES BEING HELD    Collection Time: 10/31/19  5:38 PM   Result Value Ref Range    SAMPLES BEING HELD 1BLU,1RED     COMMENT Add-on orders for these samples will be processed based on acceptable specimen integrity and analyte stability, which may vary by analyte. TYPE & SCREEN    Collection Time: 10/31/19  5:38 PM   Result Value Ref Range    Crossmatch Expiration 11/03/2019     ABO/Rh(D) A POSITIVE     Antibody screen NEG     Unit number [id number]     Blood component type  LR     Unit division 00     Status of unit ISSUED     Crossmatch result Compatible    HEMOGLOBIN A1C WITH EAG    Collection Time: 10/31/19  5:38 PM   Result Value Ref Range    Hemoglobin A1c 6.1 4.2 - 6.3 %    Est. average glucose 128 mg/dL   GLUCOSE, POC    Collection Time: 10/31/19  9:14 PM   Result Value Ref Range    Glucose (POC) 104 (H) 65 - 100 mg/dL    Performed by [facility], POC    Collection Time: 10/31/19 11:55 PM   Result Value Ref Range    Glucose (POC) 114 (H) 65 - 100 mg/dL    Performed by [address] [place] [address]    CBC WITH AUTOMATED DIFF    Collection Time: 11/01/19  1:07 AM   Result Value Ref Range    WBC 8.8 3.6 - 11.0 K/uL    RBC 2.16 (L) 3.80 - 5.20 M/uL    HGB 6.0 (L) 11.5 - 16.0 g/dL    HCT 19.7 (L) 35.0 - 47.0 %    MCV 91.2 80.0 - 99.0 FL    MCH 27.8 26.0 - 34.0 PG    MCHC 30.5 30.0 - 36.5 g/dL    RDW 17.8 (H) 11.5 - 14.5 %    PLATELET 496 560 - 872 K/uL    MPV 10.0 8.9 - 12.9 FL    NRBC 0.0 0  WBC    ABSOLUTE NRBC 0.00 0.00 - 0.01 K/uL    NEUTROPHILS 61 32 - 75 %    LYMPHOCYTES 23 12 - 49 %    MONOCYTES 10 5 - 13 %    EOSINOPHILS 4 0 - 7 %    BASOPHILS 1 0 - 1 %    IMMATURE GRANULOCYTES 1 (H) 0.0 - 0.5 %    ABS. NEUTROPHILS 5.3 1.8 - 8.0 K/UL    ABS. LYMPHOCYTES 2.0 0.8 - 3.5 K/UL    ABS. MONOCYTES 0.9 0.0 - 1.0 K/UL    ABS. EOSINOPHILS 0.4 0.0 - 0.4 K/UL    ABS. BASOPHILS 0.1 0.0 - 0.1 K/UL    ABS. IMM.  GRANS. 0.1 (H) 0.00 - 0.04 K/UL    DF SMEAR SCANNED      RBC COMMENTS ANISOCYTOSIS  1+        RBC COMMENTS POLYCHROMASIA  1+        RBC COMMENTS OVALOCYTES  1+        RBC COMMENTS SCHISTOCYTES  1+        RBC COMMENTS HYPOCHROMIA  1+       METABOLIC PANEL, BASIC    Collection Time: 11/01/19  1:07 AM   Result Value Ref Range    Sodium 137 136 - 145 mmol/L    Potassium 3.3 (L) 3.5 - 5.1 mmol/L    Chloride 97 97 - 108 mmol/L    CO2 37 (H) 21 - 32 mmol/L    Anion gap 3 (L) 5 - 15 mmol/L    Glucose 119 (H) 65 - 100 mg/dL    BUN 13 6 - 20 MG/DL    Creatinine 1.18 (H) 0.55 - 1.02 MG/DL    BUN/Creatinine ratio 11 (L) 12 - 20      GFR est AA 52 (L) >60 ml/min/1.73m2    GFR est non-AA 43 (L) >60 ml/min/1.73m2    Calcium 7.8 (L) 8.5 - 10.1 MG/DL   GLUCOSE, POC    Collection Time: 11/01/19  5:52 AM   Result Value Ref Range    Glucose (POC) 107 (H) 65 - 100 mg/dL    Performed by Emery Pinon            Assessment:     Active Problems:    Anemia (5/26/2011)    Postmenopausal bleeding with anemia- ultrasound with thickened endometrium 13mm c/w endometrial hyperplasia,dysplasia or metaplasia  On Eloquis - now discontinued which is likely contributing to bleeding    Plan:     I reviewed with Jacquelyn Patric her medical records, physical exam, and review of symptoms. Surgical treatment options were discussed  All questions answered  lab results and schedule of future lab studies reviewed with patient  Patient states she does not desire any surgery  Consider Provera- but contraindicated w/ Breast cancer.   Will need Gyn/Onc consult for biopsy likely outpatient for endometrial hyperplasia, dysplasia, metaplasia bu U;trasound    Signed By: Clarice Todd MD     November 1, 2019

## 2019-11-01 NOTE — H&P
[address]  HISTORY AND PHYSICAL    Name:  Aubree Bauman  MR#:  [id number]  :  [id number]  ACCOUNT #:  [de-identified]  ADMIT DATE:  10/31/2019    CHIEF COMPLAINT:  Vaginal bleeding. HISTORY OF PRESENT ILLNESS:  The patient is an 80-year-old female with past medical history of chronic hypoxic respiratory failure with home oxygen, currently on 2 to 3 L of oxygen at home, COPD, chronic kidney disease stage III, diabetes mellitus type 2, atrial fibrillation, chronic diastolic congestive heart failure, dyslipidemia, primary hypertension, osteoporosis, pulmonary hypertension, depression, anxiety, presents to the hospital with the above-mentioned symptom. The patient was recently admitted on 2019 and discharged on 10/23/2019 with multitude of symptoms. 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 The patient also has left foot ischemia, gangrene of the second to fourth toes with blister on the lateral aspect of the dorsum of the foot. The patient declined surgery on that foot. She also has a history of anemia of chronic disease, is on Eliquis, and had vaginal bleeding. At that point of time, an ultrasound was done which showed thickened endometrium concerning for hyperplasia versus dysplasia versus metaplasia. The patient was discharged home. History was primarily obtained from the patient's daughter. Daughter reports that the patient was at [facility] where she started having some vaginal bleeding that started 3 days ago. Yesterday, her Eliquis was discontinued. The patient continued to bleed. Today, her labs were drawn over there, and she was found to have a hemoglobin of 6.1. She was placed in diapers and bled through diapers every 2 to 3 hours. The facility got concerned and decided to send the patient to the ER.   The patient currently is resting in bed and denies any complaints or problems. The daughter reports that the Eliquis was stopped yesterday. She also denied any headache, blurry vision, sore throat, trouble swallowing, trouble with speech, any chest pain, shortness of breath, cough, fever, chills, abdominal pain, constipation, diarrhea, urinary symptoms, focal or generalized neurological weakness, any falls, injuries, hematemesis, melena, hemoptysis, hematuria, or any other concerns or problems. The daughter reports that the patient has had an infected left leg, which she is aware of, but does not want any surgery. Reports that the patient is DNR. No further history was obtained from the patient or the daughter. PAST MEDICAL HISTORY:  See above. HOME MEDICATIONS:  Currently, the patient is on:  1. Humalog. 2.  Metoprolol 25 mg b.i.d.  3.  Tramadol. 4.  Polyethylene glycol. 5.  Atrovent as needed. 6.  Lantus 15 units daily. 7.  Acetaminophen. 8.  Pepcid 20 mg every evening. 9.  Lipitor 40 mg daily. 10.  Allopurinol. 11.  Bumex 1 mg b.i.d.  12.  Albuterol p.r.n.  13.  Eliquis 5 mg every 12 hours. SOCIAL HISTORY:  Denies tobacco abuse, alcohol abuse, or IV drug abuse. ALLERGIES:  ACE INHIBITORS. REVIEW OF SYMPTOMS:  The patient appears to have underlying dementia, but denies any other complaints. The patient appears to be somewhat confused. Her daughter reports that this is her baseline, but denies any other complaints or problems besides as mentioned above in the full review of symptoms. FAMILY HISTORY:  Mother has a history of hypertension. History of CVA in the family. PHYSICAL EXAMINATION:  VITAL SIGNS:  Temperature 98.6, pulse 94, respiratory rate 21, blood pressure 117/59, pulse oximetry 100% on 3 L. When I was in the room, the patient's blood pressure was 98/57. GENERAL:  Alert x2, awake, does not appear to be in any acute distress. Resting in bed, pleasant female, and appears to be stated age.   HEENT: Pupils are equal and reactive to light. Dry mucous membranes. Tympanic membranes are clear. NECK:  Supple. CHEST:  Decreased basilar breath sounds. CORONARY:  S1 and S2 are heard. A 2/6 systolic ejection murmur heard at the right upper sternal border. ABDOMEN:  Soft, nontender, and nondistended. Bowel sounds are physiological.  EXTREMITIES:  No clubbing, no cyanosis. Right AKA with staples. The left lower extremity shows ischemic gangrenous toes, blackish in color. NEURO/PSYCH:  Limited exam.  DTR 1+ x4. Rest of the exam could not be performed as the patient is not cooperating. SKIN:  Warm. LABORATORY DATA:  White count 7.6, hemoglobin 10, hematocrit 24.3, and platelets 530. Sodium 137, potassium 3.2, chloride 96, bicarbonate 38, anion gap 3, glucose 88, BUN 12, creatinine 1.14, calcium 7.8. Bilirubin total 0.8, ALT 12, AST 34, alkaline phosphatase 150. Glucose 88. ASSESSMENT AND PLAN:  1. Acute blood loss anemia, likely secondary to vaginal bleeding. The patient will be observed on a telemetry bed. We will transfuse 1 unit of packed red blood cells. We will get GYN consult and continue to closely monitor. The daughter reports that she is agreeable to a GYN consult, but is weary of any acute intervention. Further intervention per hospital course. Continue to closely monitor. Will hold Eliquis for now. 2.  Vaginal bleeding. GYN consult has been obtained. The patient recently had an ultrasound done, results are in the chart. We will await GYN input and continue to closely monitor. Further intervention per hospital course. Reassess as needed. 3.  Hypertension. Continue home medications and continue to monitor. 4.  Diabetes. The patient will be on sliding scale NovoLog insulin, Accu-Cheks, diet control, and close monitoring. 5.  Ischemic left foot. I spoke with the daughter, she still does not want any intervention.   Reports that the patient does not want any acute intervention at this point of time, and she does not want any evaluation done for the same. We will monitor for now. 6.  History of chronic obstructive pulmonary disease with chronic hypoxic respiratory failure, currently on oxygen. Continue home oxygen. DuoNeb p.rtanja  7.  Atrial fibrillation. The patient is on Eliquis that has been held. Monitor on tele. Further intervention per hospital course. 8.  Code status, DNR.  9.  Gastrointestinal/deep venous thromboembolism prophylaxis. The patient is on Eliquis.       Mima Brunner MD      [id number]  D:  10/31/2019 21:18  T:  11/01/2019 1:05  JOB #:  [id number]

## 2019-11-01 NOTE — ROUTINE PROCESS
TRANSFER - OUT REPORT: 
 
Verbal report given to [facility] RACHNA RN(name) on Dolores Goetz  being transferred to (unit) for routine progression of care Report consisted of patients Situation, Background, Assessment and  
Recommendations(SBAR). Information from the following report(s) SBAR, ED Summary, Procedure Summary, MAR and Recent Results was reviewed with the receiving nurse. Lines:  
Peripheral IV 10/31/19 Left Antecubital (Active) Site Assessment Clean, dry, & intact 10/31/2019  5:35 PM  
Phlebitis Assessment 0 10/31/2019  5:35 PM  
Infiltration Assessment 0 10/31/2019  5:35 PM  
Dressing Status Clean, dry, & intact 10/31/2019  5:35 PM  
   
Peripheral IV 10/31/19 Left Wrist (Active) Site Assessment Clean, dry, & intact 10/31/2019  5:39 PM  
Phlebitis Assessment 0 10/31/2019  5:39 PM  
Infiltration Assessment 0 10/31/2019  5:39 PM  
Dressing Status Clean, dry, & intact 10/31/2019  5:39 PM  
  
 
Opportunity for questions and clarification was provided. Patient transported with: 
 O2 @ 3 liters

## 2019-11-01 NOTE — PROGRESS NOTES
MARIANN:    - Return to [facility] when stable and may require insurance authorization. Spoke with Terese Messina son and made him aware. -CM notified patient is a [facility] Dimes patient per Fernandez Adjutant [id number]. MOHAN Avila      Update - CM spoke with Ana Ingram at [facility] and they will require [place] [facility] [place] back to [facility] - CM paged Dr. Kristopher Coley and made her aware. Will await PT/OT notes and authorization at this time. Noted previous hospitalization that patient had a UAI completed on 10/17/19. MOHAN Preston      Observation notice provided in writing to patient and/or caregiver as well as verbal explanation of the policy. Patients who are in outpatient status also receive the Observation notice. Copy left at bedside for son to pick-up. MOHAN Avila        CM placed patient on will call with [facility]. MOHAN Avila CM placed an envelope (with [facility])  on chart for medicals to go with patient once [place] is received [place] [facility]. CM received message via [employer] that [facility] has received authorization for patient to return to [facility]. Spoke with daughter and she wants a medical update and prefers return to facility tomorrow - CM will update medical team/spoke with nursing. CM awaiting medical stability at this time. MOHAN Avila      Received message from Ana Ingram at [facility] and patient going to room # 47B at [facility]. Awaiting medical stability and orders at this time.  MOHAN Aivla

## 2019-11-01 NOTE — CONSULTS
Consult called to our office to perform an endometrial biopsy after being seen by a Gynecologist who recommended an outpatient Gyn/Onc referral.     Unfortunately, I am a Hem/Onc physician and not a Gyn/Onc physician. I have not been trained to do, nor do I have priviieges to do an endometrial biopsy. If there is not a Gyn/Onc physician on staff I recommend an outpatient eval with Dr. Lizeth Barrera. Please note that endometrial biopsy can be done in the outpatient setting.      Pauline Sousa MD  Hem/Onc

## 2019-11-01 NOTE — PROGRESS NOTES
Orders received, chart reviewed and patient received sitting up in bed with nursing placing IV L hand. Instruction on benefits of OT. Able to convince patient to sit EOB and get to chair in prep for day. Upon returning patient declining. Upon returning again with PT, patient sitting up higher in bed and self feeding with no A. Unable to convince patient to get up and same with daughter. Both deferring at this time due to concern of pain, just arrived to hospital, and now eating. Instruction on pain meds received, going to have pain as this is the situation with L LE until medical decisions made that we need to continue mobilization and ADLs to prevent further pain and weakness. Informed CM. HR all three attempts: -137. CM spoke with daughter and patient, daughter almost able to convince patient to participate. Call the daughter in order to collaborate to convince patient to get up - Sunday if auth note needed as well for CM.       9/23/19 OT evaluation FUNCTIONAL STATUS PRIOR TO ADMISSION: Patient was modified independent for ADLs and does not use DME for functional mobility. At baseline patient uses 2L supplemental oxygen. Shower chair, grab bars, safety frame toilet. HOME SUPPORT: The patient lived with son and DIL but did not require assist.    Medical hx: COPD, CKD III, DM II, 9/17/19-10/23/19 hospital for R ischemia, 10/14 R AKA, L foot ischemia and gangrene. Family trying to decide on amputation or not. Admitted now for vaginal bleeding and low Hgb and noted to now be on aspiration precautions.

## 2019-11-01 NOTE — PROGRESS NOTES
Physical Therapy 11.2.19 at 1228    Orders received and acknowledged. Pt admitted from [facility] and planned for possible return to facility. Per OT, attempted to see patient earlier but patient declined. Attempted PT evaluation with OT. Pt received in bed and daughter in chair bedside. Pt eating lunch. Provided education on importance of mobility, OOB, role of PT in mobility and disposition. Pt declined OOB for lunch stating \"I do not want to right now. \" Continued to provide education to family and daughter. Alerted CM and aware. Will follow-up schedule permits. Of note, daughter requests to be called prior to mobility assessment with patient to assist in pt encouragement Sunday if auth note needed as well for CM. Thank you.   Vazquez Yin, PT, DPT

## 2019-11-01 NOTE — DISCHARGE INSTRUCTIONS
DISCHARGE SUMMARY from Nurse    PATIENT INSTRUCTIONS:    These are general instructions for a healthy lifestyle:    No smoking/ No tobacco products/ Avoid exposure to second hand smoke  Surgeon General's Warning:  Quitting smoking now greatly reduces serious risk to your health. Obesity, smoking, and sedentary lifestyle greatly increases your risk for illness    A healthy diet, regular physical exercise & weight monitoring are important for maintaining a healthy lifestyle    You may be retaining fluid if you have a history of heart failure or if you experience any of the following symptoms:  Weight gain of 3 pounds or more overnight or 5 pounds in a week, increased swelling in our hands or feet or shortness of breath while lying flat in bed. Please call your doctor as soon as you notice any of these symptoms; do not wait until your next office visit. The discharge information has been reviewed with the patient and Lars Cloud. The patient and Lars Cloud verbalized understanding. Discharge medications reviewed with the patient and HHC and appropriate educational materials and side effects teaching were provided. ___________________________________________________________________________________________________________________________________     Discharge SNF/Rehab Instructions/LTAC       PATIENT ID: Olga Webber  MRN: [id number]   YOB: 1931    DATE OF ADMISSION: 10/31/2019  5:18 PM    DATE OF DISCHARGE: 11/1/2019    PRIMARY CARE PROVIDER: Lo Loving MD       ATTENDING PHYSICIAN: Rochelle Miller MD  DISCHARGING PROVIDER: Radames Nieto MD     To contact this individual call [phone] and ask the  to page. If unavailable ask to be transferred the Adult Hospitalist Department.     CONSULTATIONS: IP CONSULT TO HOSPITALIST  IP CONSULT TO OB GYN  IP CONSULT TO GYNECOLOGICAL ONCOLOGY    PROCEDURES/SURGERIES: * No surgery found *    ADMITTING DIAGNOSES & HOSPITAL COURSE: ASSESSMENT AND PLAN:  1.  Acute blood loss anemia, likely secondary to vaginal bleeding.  Transfused 1 unit of packed red blood cells.  GYN consulted. Recommended gyn onc consult as outpatient. Eliquis held on admission but restarted on DC - would hold for 1-2 more days until bleeding stops. 2.  Vaginal bleeding.  The patient recently had an ultrasound done, results are in the chart.     3.  Hypertension.  Decreased home medications for now. Adjust as needed  4.  Diabetes.  Hgb A1C 6.1  5.  Ischemic left foot.  No intervention.  Reports that the patient does not want any acute intervention at this point of time, and she does not want any evaluation done for the same.     6.  History of chronic obstructive pulmonary disease with chronic hypoxic respiratory failure, currently on oxygen.  Continue home oxygen.  DuoNeb p.r.n.  7.  Atrial fibrillation.  The patient is on Eliquis that has been held.         PENDING TEST RESULTS:   At the time of discharge the following test results are still pending:     FOLLOW UP APPOINTMENTS:    Follow-up Information     Follow up With Specialties Details Why Contact Info    [address] [place] [address]  [phone]      Owen Howe MD Internal Medicine   [address] [facility]  [phone]      Lisa Oliva MD Obstetrics & Gynecology, Gynecology, Obstetrics Call to make appt - need follow up for endomentrial biopsy [address] [facility]  [address] [facility]: hold plavix if continues or worsens    DIET: Cardiac Diet    OXYGEN / BiPAP SETTINGS: 3L    ACTIVITY: Activity as tolerated    DISCHARGE MEDICATIONS:   See Medication Reconciliation Form      NOTIFY YOUR PHYSICIAN FOR ANY OF THE FOLLOWING:   Fever over 101 degrees for 24 hours.    Chest pain, shortness of breath, fever, chills, nausea, vomiting, diarrhea, change in mentation, falling, weakness, bleeding. Severe pain or pain not relieved by medications. Or, any other signs or symptoms that you may have questions about.     DISPOSITION:    Home With:   OT  PT  HH  RN      x SNF/Inpatient Rehab/LTAC    Independent/assisted living    Hospice    Other:       PATIENT CONDITION AT DISCHARGE:     Functional status    Poor    x Deconditioned     Independent      Cognition     Lucid     Forgetful    x Dementia      Catheters/lines (plus indication)    Mejia     PICC     PEG    x None      Code status     Full code    x DNR        CHRONIC MEDICAL DIAGNOSES:  Problem List as of 11/1/2019 Date Reviewed: 10/14/2019          Codes Class Noted - Resolved    CHF exacerbation ([facility] ICD-10-CM: I50.9  ICD-9-CM: 428.0  9/17/2019 - Present        Hyperkalemia ICD-10-CM: E87.5  ICD-9-CM: 276.7  9/17/2019 - Present        Hyponatremia ICD-10-CM: E87.1  ICD-9-CM: 276.1  9/17/2019 - Present        HOLLAND (acute kidney injury) ([facility] ICD-10-CM: N17.9  ICD-9-CM: 584.9  9/17/2019 - Present        Acute respiratory disease ICD-10-CM: J06.9  ICD-9-CM: 465.9  2/8/2017 - Present        Moderate to severe pulmonary hypertension ([facility] ICD-10-CM: I27.20  ICD-9-CM: 416.8  1/25/2017 - Present        Pulmonary edema cardiac cause ([facility] ICD-10-CM: I50.1  ICD-9-CM: 514  4/1/2016 - Present        SOB (shortness of breath) ICD-10-CM: R06.02  ICD-9-CM: 786.05  3/30/2016 - Present        CHF (congestive heart failure) ([facility] ICD-10-CM: I50.9  ICD-9-CM: 428.0  3/30/2016 - Present        Diastolic CHF, acute on chronic (HCC) ICD-10-CM: I50.33  ICD-9-CM: 428.33, 428.0  1/31/2016 - Present        Aortic stenosis ICD-10-CM: I35.0  ICD-9-CM: 424.1  1/20/2016 - Present        Hypercholesteremia ICD-10-CM: E78.00  ICD-9-CM: 272.0  12/13/2012 - Present        Anxiety ICD-10-CM: F41.9  ICD-9-CM: 300.00  5/21/2012 - Present        Multiple allergies ICD-10-CM: Z88.9  ICD-9-CM: V15.09  10/12/2011 - Present        Bronchitis ICD-10-CM: J40  ICD-9-CM: 307  10/12/2011 - Present        Allergic reaction ICD-10-CM: T78.40XA  ICD-9-CM: 995.3  10/4/2011 - Present        Vein disorder ICD-10-CM: I87.9  ICD-9-CM: 459.9  9/7/2011 - Present        Acute hypoxemic respiratory failure ([facility] ICD-10-CM: J96.01  ICD-9-CM: 518.81  8/8/2011 - Present        Debility ICD-10-CM: R53.81  ICD-9-CM: 799.3  6/28/2011 - Present        Breast cancer ([facility] ICD-10-CM: C50.919  ICD-9-CM: 174.9  5/26/2011 - Present        Anemia ICD-10-CM: D64.9  ICD-9-CM: 285.9  5/26/2011 - Present        Diabetes ([facility] ICD-10-CM: E11.9  ICD-9-CM: 250.00  4/19/2011 - Present        HTN (hypertension), benign ICD-10-CM: I10  ICD-9-CM: 401.1  4/19/2011 - Present        RESOLVED: Angioedema ICD-10-CM: T78. 3XXA  ICD-9-CM: 995.1  8/8/2011 - 8/19/2011        RESOLVED: Pneumonia, organism unspecified(486) ICD-10-CM: J18.9  ICD-9-CM: 049  4/19/2011 - 5/26/2011        RESOLVED: Hypoxia ICD-10-CM: R09.02  ICD-9-CM: 799.02  4/19/2011 - 5/26/2011                  Signed:   Baljit Morrissey MD  11/1/2019  3:30 PM

## 2019-11-01 NOTE — PROGRESS NOTES
Admission Medication Reconciliation:    Information obtained from:  [facility] medication list  RxQuery data available¹:  YES    Comments/Recommendations: Patient presenting from [facility]. Attempted to speak with patient about medications and last time she took them, but patient was unsure. Utilized current medication list provided from [facility] to update PTA med list and allergies. 1)  Of note, patient had been on apixaban 5 mg BID, however this was just discontinued yesterday due to onset of bleeding. 2)  Medication changes (since last review): Added  - Miconazole cream  - Tramadol    Adjusted  - Insulin glargine (from daily to at bedtime)    Removed  - Gabapentin  - Insulin lispro sliding scale  - Miconazole powder     ¹RxQuery pharmacy benefit data reflects medications filled and processed through the patient's insurance, however   this data does NOT capture whether the medication was picked up or is currently being taken by the patient. Allergies:  Ace inhibitors    Significant PMH/Disease States:   Past Medical History:   Diagnosis Date    Anxiety     Breast cancer ([facility] 2005, 2010    right 2005, left 2010    Cancer [facility])      cancer right breast cancer    Cancer ([facility]     cancer left breast/new dx 8/2011 +bx     Chronic obstructive pulmonary disease ([address]     Diabetes ([facility]     Heart failure ([facility]     Hypercholesterolemia     Hypertension     Other ill-defined conditions(799.89)     osteopoosis    Other ill-defined conditions(799.89)     high cholesterol    Pneumonia     Psychiatric disorder     anxiety     Chief Complaint for this Admission:    Chief Complaint   Patient presents with    Vaginal Bleeding     Prior to Admission Medications:   Prior to Admission Medications   Prescriptions Last Dose Informant Patient Reported? Taking?   acetaminophen (TYLENOL) 500 mg tablet   Yes Yes   Sig: Take 500 mg by mouth two (2) times a day. albuterol (PROVENTIL VENTOLIN) 2.5 mg /3 mL (0.083 %) nebulizer solution   No Yes   Sig: 3 mL by Nebulization route every four (4) hours as needed for Wheezing. allopurinol (ZYLOPRIM) 100 mg tablet   No Yes   Sig: Take 1 Tab by mouth daily. apixaban (ELIQUIS) 5 mg tablet Not Taking at Unknown time  No No   Sig: Take 1 Tab by mouth every twelve (12) hours every twelve (12) hours. atorvastatin (LIPITOR) 40 mg tablet   Yes Yes   Sig: Take 40 mg by mouth nightly. bumetanide (BUMEX) 1 mg tablet   No Yes   Sig: Take 1 Tab by mouth two (2) times a day. famotidine (PEPCID) 20 mg tablet   No Yes   Sig: Take 1 Tab by mouth every evening. insulin glargine (LANTUS) 100 unit/mL injection   Yes Yes   Si Units by SubCUTAneous route nightly. Notify MD if BG <60 or >400   insulin lispro (HUMALOG) 100 unit/mL injection   Yes Yes   Si Units by SubCUTAneous route Before breakfast, lunch, and dinner. Administer if BG >200   ipratropium (ATROVENT) 0.02 % nebulizer solution   Yes Yes   Si.5 mg by Nebulization route every four (4) hours as needed for Other (Shortness of breath/wheezing). metoprolol tartrate (LOPRESSOR) 25 mg tablet   Yes Yes   Sig: Take 25 mg by mouth two (2) times a day. miconazole (MICOTIN) 2 % topical cream   Yes Yes   Sig: Apply  to affected area two (2) times a day. Apply to affected areas/groin   polyethylene glycol (MIRALAX) 17 gram packet   No Yes   Sig: Take 1 Packet by mouth daily. traMADol (ULTRAM) 50 mg tablet   Yes Yes   Sig: Take 25 mg by mouth every eight (8) hours as needed for Pain. Facility-Administered Medications: None       Please contact the main inpatient pharmacy with any questions or concerns at [phone] and we will direct you to the clinical pharmacist covering this patient's care while in-house.    JULIEN Falk

## 2019-11-01 NOTE — PROGRESS NOTES
Transition of Care Plan to SNF/Rehab    SNF/Rehab Transition:  Patient has been accepted to [facility] and meets criteria for admission. Patient will transported by [facility] and expected to leave at 1830. Communication to Patient/Family:  Called daughter to inform her of transport time (identified care giver) and they are agreeable to the transition plan. Communication to SNF/Rehab:  Bedside RN has been notified to update the transition plan to the facility and call report (phone number [phone]). Discharge information has been updated on the AVS.     Discharge instructions to be fax'd to facility at [facility] # [phone]). SNF/Rehab Transition:  Patient to follow-up with Home Health: [facility]. Reviewed and confirmed with facility,  they can manage the patient care needs for the following:     Katya Garcia with (X) only those applicable:    Medication:  [x]  Medications will be available at the facility  []  IV Antibiotics   []  Controlled Substance - hard copy to be sent with patient   []  Weekly Labs   Documents:  [] Hard RX  [] MAR  [] Kardex  [x] AVS  [x]Transfer Summary  []Discharge   Equipment:  []  CPAP/BiPAP  []  Wound Vacuum  []  Mejia or Urinary Device  []  PICC/Central Line  []  Nebulizer  []  Ventilator   Treatment:  []Isolation (for MRSA, VRE, etc.)  []Surgical Drain Management  []Tracheostomy Care  []Dressing Changes  []Dialysis with transportation and chair time. []PEG Care  []Oxygen  []Daily Weights for Heart Failure   Dietary:  []Any diet limitations  []Tube Feedings   []Total Parenteral Management (TPN)   Eligible for Medicaid Long Term Services and Supports  Yes:  [] Eligible for medical assistance or will become eligible within 180 days and UAI completed. [] Provider/Patient and/or support system has requested screening. [] UAI copy provided to patient or responsible party. [] UAI unavailable at discharge will send once processed to SNF provider.   [] UAI unavailable at discharged mailed to patient  No:   [] Private pay and is not financially eligible for Medicaid within the next 180 days. [] Reside out-of-state.   [] A residents of a state owned/operated facility that is licensed  by [address] [employer] [facility] or [facility]  [] Enrollment in [facility] hospice services  [] [address] [place] [address]  [] Patient /Family declines to have screening completed or provide financial information for screening     Financial Resources:  Medicaid    [] Initiated and application pending   [x] Full coverage     Advanced Care Plan:  []Surrogate Decision Maker of Care  [x]POA  []Communicated Code Status   Other

## 2019-11-01 NOTE — ROUTINE PROCESS
0820Ronae Cantrell voiced pt does not have her belonging with her. Plan to bladder scan but noted patient's brief soaked in urine during skin check. Alisha-care done and new brief placed. Pt did not know she was incontinent. 1111:  H/H sent to lab. 1156: Answered Julissa's question. 1525:  Dr. Isaac Saha voiced Norm Flores agreeable for discharge. Call CM to assist with discharge planning.

## 2019-11-01 NOTE — PROGRESS NOTES
11/01/19 1522   Vitals   Temp 98.7 °F (37.1 °C)   Temp Source Oral   Pulse (Heart Rate) (!) 118   Heart Rate Source Monitor   Resp Rate 18   O2 Sat (%) 100 %   Level of Consciousness Alert   /72   MAP (Calculated) 90   BP 1 Location Left arm   BP 1 Method Automatic   BP Patient Position At rest   MEWS Score 3   Oxygen Therapy   O2 Device Nasal cannula   O2 Flow Rate (L/min) 3 l/min   Pt in AFib rate sporadic

## 2019-11-01 NOTE — ROUTINE PROCESS
TRANSFER - OUT REPORT: 
 
Verbal report given to [address] at [facility] (name) on William Graves  being transferred to [facility] (unit) for routine progression of care Report consisted of patients Situation, Background, Assessment and  
Recommendations(SBAR). Information from the following report(s) SBAR, ED Summary, OR Summary, Intake/Output, MAR, Recent Results and Cardiac Rhythm AFib 80-130s, usuallly HR 90-110s. was reviewed with the receiving nurse. Lines:  
Peripheral IV 10/31/19 Left Antecubital (Active) Site Assessment Clean, dry, & intact 11/1/2019 11:16 AM  
Phlebitis Assessment 0 11/1/2019 11:16 AM  
Infiltration Assessment 0 11/1/2019 11:16 AM  
Dressing Status Clean, dry, & intact 11/1/2019 11:16 AM  
Dressing Type Transparent 11/1/2019 11:16 AM  
Hub Color/Line Status Pink;Capped 11/1/2019 11:16 AM  
Alcohol Cap Used Yes 11/1/2019 11:16 AM  
   
Peripheral IV 10/31/19 Left Wrist (Active) Site Assessment Clean, dry, & intact 11/1/2019 11:16 AM  
Phlebitis Assessment 0 11/1/2019 11:16 AM  
Infiltration Assessment 0 11/1/2019 11:16 AM  
Dressing Status Clean, dry, & intact 11/1/2019 11:16 AM  
Dressing Type Transparent 11/1/2019 11:16 AM  
Hub Color/Line Status Pink;Capped 11/1/2019 11:16 AM  
Alcohol Cap Used Yes 11/1/2019 11:16 AM  
  
 
Opportunity for questions and clarification was provided. Patient transported with: 
 [facility] staff.

## 2019-11-01 NOTE — PROGRESS NOTES
Pt continues to have soft BP's   Will hold anti hypertensive and Bumex for now    transfuse pRBC x1 unit

## 2019-11-01 NOTE — PROGRESS NOTES
Bedside and Verbal shift change report given to Pricila (oncoming nurse) by Teddi Mortimer (offgoing nurse). Report included the following information SBAR, Kardex and Cardiac Rhythm Afib.

## 2019-11-02 LAB
ABO + RH BLD: NORMAL
BLD PROD TYP BPU: NORMAL
BLOOD GROUP ANTIBODIES SERPL: NORMAL
BPU ID: NORMAL
CROSSMATCH RESULT,%XM: NORMAL
SPECIMEN EXP DATE BLD: NORMAL
STATUS OF UNIT,%ST: NORMAL
UNIT DIVISION, %UDIV: 0

## 2019-11-03 NOTE — DISCHARGE SUMMARY
Hospitalist Discharge Summary     Patient ID:  Cas Castro  [id number]  43 y.o.  8/16/1931  10/31/2019    PCP on record: Viridiana Hernandez MD    Admit date: 10/31/2019  Discharge date and time: 11/1/2019    DISCHARGE DIAGNOSIS:  Acute blood loss anemia  Vaginal bleeding  HTN  DM type 2  Ischemic left foot  COPD with chronic resp failure  Afib  CKD stage 3  Chronic diastolic heart failure    CONSULTATIONS:  IP CONSULT TO OB GYN    Excerpted HPI from H&P of Beatrice Carson MD:  HISTORY OF PRESENT ILLNESS:  The patient is an 75-year-old female with past medical history of chronic hypoxic respiratory failure with home oxygen, currently on 2 to 3 L of oxygen at home, COPD, chronic kidney disease stage III, diabetes mellitus type 2, atrial fibrillation, chronic diastolic congestive heart failure, dyslipidemia, primary hypertension, osteoporosis, pulmonary hypertension, depression, anxiety, presents to the hospital with the above-mentioned symptom.     The patient was recently admitted on 09/17/2019 and discharged on 10/23/2019 with multitude of symptoms. 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 The patient also has left foot ischemia, gangrene of the second to fourth toes with blister on the lateral aspect of the dorsum of the foot. The patient declined surgery on that foot. She also has a history of anemia of chronic disease, is on Eliquis, and had vaginal bleeding. At that point of time, an ultrasound was done which showed thickened endometrium concerning for hyperplasia versus dysplasia versus metaplasia. The patient was discharged home. History was primarily obtained from the patient's daughter. Daughter reports that the patient was at [facility] where she started having some vaginal bleeding that started 3 days ago.   Yesterday, her Eliquis was discontinued. The patient continued to bleed. Today, her labs were drawn over there, and she was found to have a hemoglobin of 6.1. She was placed in diapers and bled through diapers every 2 to 3 hours. The facility got concerned and decided to send the patient to the ER. The patient currently is resting in bed and denies any complaints or problems. The daughter reports that the Eliquis was stopped yesterday. She also denied any headache, blurry vision, sore throat, trouble swallowing, trouble with speech, any chest pain, shortness of breath, cough, fever, chills, abdominal pain, constipation, diarrhea, urinary symptoms, focal or generalized neurological weakness, any falls, injuries, hematemesis, melena, hemoptysis, hematuria, or any other concerns or problems. The daughter reports that the patient has had an infected left leg, which she is aware of, but does not want any surgery. Reports that the patient is DNR. No further history was obtained from the patient or the daughter.    ______________________________________________________________________  DISCHARGE SUMMARY/HOSPITAL COURSE:  for full details see H&P, daily progress notes, labs, consult notes. 1.  Acute blood loss anemia, likely secondary to vaginal bleeding.  Transfused 1 unit of packed red blood cells.  GYN consulted. Recommended gyn onc consult as outpatient. Eliquis held on admission but restarted on DC - would hold for 1-2 more days until bleeding stops. 2.  Vaginal bleeding.  The patient recently had an ultrasound done, results are in the chart.     3.  Hypertension.  Decreased home medications for now.  Adjust as needed  4.  Diabetes.  Hgb A1C 6.1  5.  Ischemic left foot.  No intervention.  Reports that the patient does not want any acute intervention at this point of time, and she does not want any evaluation done for the same.     6.  History of chronic obstructive pulmonary disease with chronic hypoxic respiratory failure, currently on oxygen.  Continue home oxygen.  DuoNeb p.r.n.  7.  Atrial fibrillation.  The patient is on Eliquis that has been held.       _______________________________________________________________________  Patient seen and examined by me on discharge day. Chief Complaint / Reason for Physician Visit  Patient thinks she is here for a stroke. Nursing says the bleeding has slowed significantly and patient has only needed to be changed once.       Review of Systems:            Symptom Y/N Comments   Symptom Y/N Comments   Fever/Chills       Chest Pain x      Poor Appetite       Edema        Cough       Abdominal Pain x      Sputum       Joint Pain        SOB/ESPANA       Pruritis/Rash        Nausea/vomit x     Tolerating PT/OT        Diarrhea       Tolerating Diet        Constipation       Other x Left foot pain       Could NOT obtain due to:        VITALS:   Last 24hrs VS reviewed since prior progress note. Most recent are:  Patient Vitals for the past 24 hrs:    Temp Pulse Resp BP SpO2   11/01/19 0939     99 %   11/01/19 0818 98.3 °F (36.8 °C) 86 19 117/64 99 %   11/01/19 0709 97.8 °F (36.6 °C) 98 18 130/62 100 %   11/01/19 0619 98.2 °F (36.8 °C) (!) 107 18 136/75 100 %   11/01/19 0525 98.2 °F (36.8 °C) (!) 105 18 120/74 100 %   11/01/19 0423 98 °F (36.7 °C) (!) 108 18 119/74 100 %   11/01/19 0354 98.2 °F (36.8 °C) 97 20 107/72 100 %   11/01/19 0325 98.3 °F (36.8 °C) 89 20 103/67 99 %   11/01/19 0312 98.3 °F (36.8 °C) (!) 104 20 122/68 100 %   11/01/19 0014     100 %   11/01/19 0000  86      10/31/19 2214 98 °F (36.7 °C) 86 20 114/67 100 %   10/31/19 2130 98.4 °F (36.9 °C) 86 20 100/45 100 %   10/31/19 2100  91 20 119/48 100 %   10/31/19 2030  91 20 106/57 99 %   10/31/19 2000  98 19 126/74 98 %   10/31/19 1930  84 20 112/59 100 %   10/31/19 1909  94 21 117/59 100 %   10/31/19 1730    112/64 100 %   10/31/19 1728 98.6 °F (37 °C) 79 16 118/71 100 %          General:          WD, WN.  Alert, cooperative, no acute distress    EENT:               Anicteric sclerae. MMM  Resp:               CTA bilaterally, no wheezing or rales. No accessory muscle use  CV:                  irRegular  Irregular rhythm,  + lwr edema on the Left and R AKA  GI:                   Soft, Non distended, Non tender.  +Bowel sounds  Neurologic:      Alert and oriented X 2, normal speech,   Psych:             Fair to poor insight. Not anxious nor agitated  Skin:                   No jaundice , +skin changes c/w severe infection and likely gangrene L foot     ____________________________________________________  DISCHARGE MEDICATIONS:   Discharge Medication List as of 11/1/2019  6:58 PM      CONTINUE these medications which have CHANGED    Details   metoprolol tartrate (LOPRESSOR) 25 mg tablet Take 0.5 Tabs by mouth two (2) times a day., Print, Disp-60 Tab, R-0      bumetanide (BUMEX) 1 mg tablet Take 1 Tab by mouth daily. Indications: hold for SBP in AM of 109 or less, Print, Disp-60 Tab, R-12         CONTINUE these medications which have NOT CHANGED    Details   insulin lispro (HUMALOG) 100 unit/mL injection 4 Units by SubCUTAneous route Before breakfast, lunch, and dinner. Administer if BG >200, Historical Med      insulin glargine (LANTUS) 100 unit/mL injection 18 Units by SubCUTAneous route nightly. Notify MD if BG <60 or >400, Historical Med      miconazole (MICOTIN) 2 % topical cream Apply  to affected area two (2) times a day. Apply to affected areas/groin, Historical Med      traMADol (ULTRAM) 50 mg tablet Take 25 mg by mouth every eight (8) hours as needed for Pain., Historical Med      acetaminophen (TYLENOL) 500 mg tablet Take 500 mg by mouth two (2) times a day., Historical Med      apixaban (ELIQUIS) 5 mg tablet Take 1 Tab by mouth every twelve (12) hours every twelve (12) hours. , No Print, Disp-60 Tab, R-1      famotidine (PEPCID) 20 mg tablet Take 1 Tab by mouth every evening., No Print, Disp-30 Tab, R-0 polyethylene glycol (MIRALAX) 17 gram packet Take 1 Packet by mouth daily. , No Print, Disp-30 Packet, R-0      atorvastatin (LIPITOR) 40 mg tablet Take 40 mg by mouth nightly., Historical Med      allopurinol (ZYLOPRIM) 100 mg tablet Take 1 Tab by mouth daily. , Normal, Disp-30 Tab, R-12      ipratropium (ATROVENT) 0.02 % nebulizer solution 0.5 mg by Nebulization route every four (4) hours as needed for Other (Shortness of breath/wheezing). , Historical Med      albuterol (PROVENTIL VENTOLIN) 2.5 mg /3 mL (0.083 %) nebulizer solution 3 mL by Nebulization route every four (4) hours as needed for Wheezing., No Print, Disp-1 Package, R-11             Follow-up Information     Follow up With Specialties Details Why Contact Info    [address] [facility] [place] [place]) [facility]  [phone]      Darren Conklin MD Internal Medicine   [address] [facility]  [phone]      Oseas Powers MD Obstetrics & Gynecology, Gynecology, Obstetrics Call to make appt - need follow up for endomentrial biopsy [address] [address]  [phone]          ________________________________________________________________    Risk of deterioration: Moderate    Condition at Discharge:  Stable  __________________________________________________________________    Disposition  SNF/LTC    ____________________________________________________________________    Code Status: DNR/DNI  ___________________________________________________________________      Total time in minutes spent coordinating this discharge (includes going over instructions, follow-up, prescriptions, and preparing report for sign off to her PCP) : 32  minutes    Signed:  Danny Giordano MD

## 2021-12-18 NOTE — PROGRESS NOTES
Hospitalist Progress Note                               Jamie Varma MD                                     Answering service: [phone] from in house phone           Date of Service:  10/2/2019  NAME:  Antonino Hale  :  1931  MRN:  [id number]      Admission Summary:   20-year-old female with an extensive past medical history including chronic hypoxemic respiratory failure with home oxygen, COPD, chronic kidney disease, type 2 diabetes mellitus, atrial fibrillation, diastolic congestive heart failure, dyslipidemia, primary hypertension, osteoporosis, pulmonary hypertension, depression and anxiety disorder, was brought to the emergency room from home for evaluation of an approximately 2-3 day history of worsening shortness of breath. Per the patient's son, the patient ambulates unaided at baseline; however, in the 2-3 days leading up to the emergency room presentation, noted to have worsening shortness of breath even with easy activity. The patient also complained of some numbness and weakness in the right lower extremity. The patient admitted to being compliant with all her medical therapies. The patient denied associated headaches, dizziness, visual deficits, chest pains, palpitations, nausea, vomiting, cough, sputum production, fevers, chills, abdominal pain, bladder or bowel irregularities. Reason for follow up:   Right leg ischemia. She is poor historian. States that she has RLE pain when touched. Refusing to work with therapy. Assessment & Plan:     Acute thrombus at the mid and distal right popliteal level RLE    - S/p RLE thrombectomy , found residual clot in popliteal artery, s/p repeat thrombectomy 19 per vascular surgery, now with significant muscle ischemia right calf and will likley have chronic foot drop on right.  -She developed epistaxis on aspirin,plavix and Apixaban. Dr Brandi Bustamante recommending holding asa and Plavix indefinitely and resumed Aixaban.  -Per vascular surgery, can hold off amputation for now and recommended OP follow up. [place] [address] on 9/22/19 in setting of ischemic RLE  - serial daily CPK - trending down  - holding statins    Leukocytosis:likley from ischemic foot+rhabdomyolysis. No source of infection evident. Monitor . She is afebrile  -afebrile for days. -??? WBC likely due to muscle ischemia   -will obtain rt foot X ray. Acute on chronic diastolic CHF exacerbation with a preserved EF -improved  Indeterminable NYHA Functional Class due to the multiple Co-morbidities per Cardiology. Hypertension   Dyslipidemia -hold statins due to rhabdomyolysis   - 2D ECHO (9/12/19) with EF 70 percent, pulmonary HTN and TR  - s/p IV diuresis,now on oral lasix. - Cardiology and Heart failure team evals noted and appreciated. -on beta blcoker. HOLLAND on probable CKD stage 2-3-improved  -Renal US negative for obstructive uropathy.   -Nephrology eval noted and appreciated    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 - Holding  Aspirin, Plavix due to epistaxis;statins held  - Neurology eval noted and appreciated. Chronic Hypoxemic respiratory failure due to COPD with home oxygen at 3 l/min. - Nebulizers, inhalers, incentive spirometry. Anemia acute on chronic  - HB 6.8,no obvious source of blood loss  -Transfused 2 units 9/24. HB 9.6  -Per previous hospitalist, Dr Deb Burks is okay to continue with Apixaban. Uncontrolled IDDM type 2 DM with complications including hyperglycemia.   - Hold metformin. - Accucheck monitoring  - Basal -15 U lantus and sliding scale insulins)HS) + increase to  Humalog 4 units ac tid      Osteoporosis - f/u as OP    Anxiety disorder and Depression without any current behavioral disturbances.     Hypocalcemia-resolved  Multifactorial Hyponatremia POA-resolved  Resolved Hyperkalemia    Epistaxis: resolved. -Antiplatelets held now      Obesity   Body mass index is 33.02 kg/m². Prophylaxis: apixaban    Full Code with a guarded prognosis. - Palliative care consulted again ,plan for family meeting on Friday    Spoke to patient's son over phone today, updated clinical course so far. Plan: TBD    Patient not participating in therapy    . Patient's son Nikolas Rosas is at [phone] if needed for updates. Hospital Problems  Date Reviewed: 9/20/2019          Codes Class Noted POA    * (Principal) [facility] ICD-10-CM: I50.9  ICD-9-CM: 428.0  9/17/2019 Yes        Hyperkalemia ICD-10-CM: E87.5  ICD-9-CM: 276.7  9/17/2019 Yes        Hyponatremia ICD-10-CM: E87.1  ICD-9-CM: 276.1  9/17/2019 Yes        HOLLAND (acute kidney injury) ([facility].) ICD-10-CM: N17.9  ICD-9-CM: 584.9  9/17/2019 Yes        Acute hypoxemic respiratory failure ([facility].) ICD-10-CM: J96.01  ICD-9-CM: 518.81  8/8/2011 Yes        Debility ICD-10-CM: R53.81  ICD-9-CM: 799.3  6/28/2011 Yes              Physical Examination:      Last 24hrs VS reviewed since prior progress note. Most recent are:  Visit Vitals  /67 (BP 1 Location: Left arm, BP Patient Position: At rest)   Pulse 88   Temp 98.8 °F (37.1 °C)   Resp 18   Ht 5' 5\" (1.651 m)   Wt 90 kg (198 lb 6.6 oz)   SpO2 98%   Breastfeeding? No   BMI 33.02 kg/m²           Constitutional:  No acute distress. HEENT: Head is atraumatic,EOMI  Un icteric sclera. Oral mucous moist, oropharynx benign. Neck supple,      Resp:  CTA b/l, no wheezing   CV:  S1,S2 wnl    GI:  Soft, non distended, non tender. normoactive bowel sounds,    :  No CVA or suprapubic tenderness   Skin  :  Dry dressing and staples on RLE . Cool RLE, no pulses below the knee and dusky great toe    Musculoskeletal:  RLE slightly bigger than LLE. Absent Dorsalis Pedis Pulse RLE. Power 0/5 RLE, 5/5 other extremities. Unable to palpate pulses LLE but LLE warm and pink.      Neurologic:  Awake, alert and oriented X 2      No intake or output data in the 24 hours ending 10/02/19 1717           Labs:     Recent Labs     10/02/19  0756 09/30/19  0146   WBC 19.0* 19.9*   HGB 8.6* 8.0*   HCT 28.2* 26.3*    308     Recent Labs     10/02/19  0754 09/30/19  0146   * 136   K 4.5 4.1   CL 95* 97   CO2 32 33*   BUN 21* 20   CREA 1.13* 1.03*   * 129*   CA 9.0 8.7     No results for input(s): SGOT, GPT, ALT, AP, TBIL, TBILI, TP, ALB, GLOB, GGT, AML, LPSE in the last 72 hours. No lab exists for component: AMYP, HLPSE  No results for input(s): INR, PTP, APTT, INREXT, INREXT in the last 72 hours.   Lab Results   Component Value Date/Time    Cholesterol, total 144 09/17/2019 09:21 AM    HDL Cholesterol 55 09/17/2019 09:21 AM    LDL, calculated 72.6 09/17/2019 09:21 AM    Triglyceride 82 09/17/2019 09:21 AM    CHOL/HDL Ratio 2.6 09/17/2019 09:21 AM     Lab Results   Component Value Date/Time    Glucose (POC) 289 (H) 10/02/2019 11:20 AM    Glucose (POC) 144 (H) 10/02/2019 06:42 AM    Glucose (POC) 200 (H) 10/01/2019 08:40 PM    Glucose (POC) 254 (H) 10/01/2019 04:33 PM    Glucose (POC) 168 (H) 10/01/2019 11:11 AM     Lab Results   Component Value Date/Time    Color YELLOW/STRAW 09/17/2019 12:49 PM    Appearance CLEAR 09/17/2019 12:49 PM    Specific gravity 1.030 09/17/2019 12:49 PM    Specific gravity 1.010 12/18/2011 09:28 AM    pH (UA) 6.0 09/17/2019 12:49 PM    Protein NEGATIVE  09/17/2019 12:49 PM    Glucose NEGATIVE  09/17/2019 12:49 PM    Ketone NEGATIVE  09/17/2019 12:49 PM    Bilirubin NEGATIVE  09/17/2019 12:49 PM    Urobilinogen 0.2 09/17/2019 12:49 PM    Nitrites NEGATIVE  09/17/2019 12:49 PM    Leukocyte Esterase NEGATIVE  09/17/2019 12:49 PM    Epithelial cells FEW 09/17/2019 12:49 PM    Bacteria NEGATIVE  09/17/2019 12:49 PM    WBC 0-4 09/17/2019 12:49 PM    RBC 0-5 09/17/2019 12:49 PM         Medications Reviewed:     Current Facility-Administered Medications   Medication Dose Route Frequency  [START ON 10/3/2019] insulin lispro (HUMALOG) injection 4 Units  4 Units SubCUTAneous TIDAC    sodium chloride (OCEAN) 0.65 % nasal squeeze bottle 2 Spray  2 Spray Both Nostrils Q2H PRN    oxymetazoline (AFRIN) 0.05 % nasal spray 2 Spray  2 Spray Both Nostrils BID PRN    albuterol-ipratropium (DUO-NEB) 2.5 MG-0.5 MG/3 ML  3 mL Nebulization BID RT    furosemide (LASIX) tablet 40 mg  40 mg Oral DAILY    0.9% sodium chloride infusion 250 mL  250 mL IntraVENous PRN    0.9% sodium chloride infusion 250 mL  250 mL IntraVENous PRN    apixaban (ELIQUIS) tablet 5 mg  5 mg Oral Q12H    insulin lispro (HUMALOG) injection   SubCUTAneous AC&HS    glucose chewable tablet 16 g  4 Tab Oral PRN    glucagon (GLUCAGEN) injection 1 mg  1 mg IntraMUSCular PRN    dextrose 10% infusion 0-250 mL  0-250 mL IntraVENous PRN    albuterol-ipratropium (DUO-NEB) 2.5 MG-0.5 MG/3 ML  3 mL Nebulization Q4H PRN    alum-mag hydroxide-simeth (MYLANTA) oral suspension 30 mL  30 mL Oral Q4H PRN    famotidine (PEPCID) tablet 20 mg  20 mg Oral QPM    metoprolol tartrate (LOPRESSOR) tablet 25 mg  25 mg Oral BID    traMADol (ULTRAM) tablet 25 mg  25 mg Oral Q8H PRN    fentaNYL citrate (PF) injection 50 mcg  50 mcg IntraVENous Q3H PRN    insulin glargine (LANTUS) injection 15 Units  15 Units SubCUTAneous DAILY    ondansetron (ZOFRAN) injection 4 mg  4 mg IntraVENous Q6H PRN    sodium chloride (NS) flush 5-40 mL  5-40 mL IntraVENous Q8H    sodium chloride (NS) flush 5-40 mL  5-40 mL IntraVENous PRN    acetaminophen (TYLENOL) tablet 650 mg  650 mg Oral Q4H PRN    allopurinol (ZYLOPRIM) tablet 100 mg  100 mg Oral DAILY     ______________________________________________________________________  EXPECTED LENGTH OF STAY: 6d 14h  ACTUAL LENGTH OF STAY:          15  Time today is 38 min               Ej Harris MD Patient/Caregiver provided printed discharge information.

## 2022-03-23 NOTE — PROGRESS NOTES
[facility]   [address] [facility], [address] [place] [address]                                          NEW PATIENT HPI/FOLLOW-UP      NAME:  Chitra Farrell   :   1931   MRN:   [id number]   PCP:  Raisa Solorio MD           Subjective: The patient is a 80y.o. year old female with h/o DM, HTN, breast CA, anemia, fatigue, AS, diastolic CHF, SOB, moderate to severe PHTN who returns for a routine follow-up. Since the last visit, patient reports some intermittent SOB. She is compliant with home O2. Denies change in exercise tolerance, chest pain, edema, medication intolerance, palpitations, PND/orthopnea, wheezing, sputum, syncope, dizziness or light headedness. Doing satisfactorily. Review of Systems  General: Pt denies excessive weight gain or loss. Pt is able to conduct ADL's. Respiratory: +shortness of breath, SEPANA, Denies wheezing or stridor.   Cardiovascular: Denies precordial pain, palpitations, edema or PND  Gastrointestinal: Denies poor appetite, indigestion, abdominal pain or blood in stool  Peripheral vascular: Denies claudication, leg cramps  Neuropsychiatric: Denies paresthesias,tingling,numbness,anxiety,depression,fatigue  Musculoskeletal: Denies pain,tenderness, soreness,swelling      Past Medical History:   Diagnosis Date    Anxiety     Breast cancer ([facility] ,     right , left     Cancer [facility])      cancer right breast cancer    Cancer ([facility]     cancer left breast/new dx 2011 +bx     Diabetes ([facility]     Hypercholesterolemia     Hypertension     Other ill-defined conditions(799.89)     osteopoosis    Other ill-defined conditions(799.89)     high cholesterol    Pneumonia     Psychiatric disorder     anxiety     Patient Active Problem List    Diagnosis Date Noted    Acute respiratory disease 2017    Moderate to severe pulmonary hypertension 2017    Pulmonary edema cardiac cause (HCC) 2016    SOB (shortness of breath) 03/30/2016    CHF (congestive heart failure) ([facility]) 51/06/5826    Diastolic CHF, acute on chronic ([facility]) 01/31/2016    Aortic stenosis 01/20/2016    Hypercholesteremia 12/13/2012    Anxiety 05/21/2012    Multiple allergies 10/12/2011    Bronchitis 10/12/2011    Allergic reaction 10/04/2011    Vein disorder 09/07/2011    Fatigue 06/28/2011    Breast cancer ([facility] 05/26/2011    Anemia 05/26/2011    Diabetes ([facility] 04/19/2011    HTN (hypertension), benign 04/19/2011      Past Surgical History:   Procedure Laterality Date    BIOPSY OF BREAST, INCISIONAL      left breast 8/2011 positive for cancer cells    BREAST SURGERY PROCEDURE UNLISTED      right mastectomy    HX BREAST LUMPECTOMY Left 2010    HX MASTECTOMY Right 2005     Allergies   Allergen Reactions    Ace Inhibitors Angioedema      Family History   Problem Relation Age of Onset    Hypertension Mother     Stroke Other       Social History     Social History    Marital status:      Spouse name: N/A    Number of children: N/A    Years of education: N/A     Occupational History    Not on file. Social History Main Topics    Smoking status: Never Smoker    Smokeless tobacco: Never Used    Alcohol use No    Drug use: No    Sexual activity: Not Currently     Other Topics Concern    Not on file     Social History Narrative        She does not have a history of smoking, but does have second hand smoking exposure from her  , for a period of 27- 36 yrs, , approx 30 yrs ago. 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 She has two children, her dtr and son, both of whom are present during the interview, patient states that she wants them both to make any medical decisions for her, she wants to be a full code. Current Outpatient Prescriptions   Medication Sig    atorvastatin (LIPITOR) 40 mg tablet     metoprolol tartrate (LOPRESSOR) 50 mg tablet Take 2 Tabs by mouth two (2) times a day.  allopurinol (ZYLOPRIM) 100 mg tablet Take 1 Tab by mouth daily.  bumetanide (BUMEX) 1 mg tablet Take 1 Tab by mouth two (2) times a day.  citalopram (CELEXA) 20 mg tablet 20 mg daily.  atorvastatin (LIPITOR) 20 mg tablet TAKE 1 TABLET BY MOUTH EVERY DAY    spironolactone (ALDACTONE) 25 mg tablet Take 1 Tab by mouth daily. Indications: Edema    TRUE METRIX GLUCOSE TEST STRIP strip     ipratropium (ATROVENT) 0.02 % nebulizer solution     BD INSULIN SYRINGE ULTRA-FINE 0.5 mL 31 gauge x 5/16 syrg     predniSONE (DELTASONE) 5 mg tablet 6 tabs daily for 2 days then drop to, 4 tabs daily for 2 days then drop to, 2 tabs daily for 2 days then drop to, 1 tab daily for 2 days then stop.  aspirin 81 mg chewable tablet Take 81 mg by mouth daily.  diphenhydrAMINE (BENADRYL) 25 mg capsule Take 25 mg by mouth every six (6) hours as needed (Allergic reaction/Angioedema).  insulin glargine (LANTUS) 100 unit/mL injection 50 Units by SubCUTAneous route nightly. For -175 = 20 units  175-200 = 30 units  > 200 = 40 units     by SubCUTAneous route nightly. Indications: type 2 diabetes mellitus (Patient taking differently: 20-40 Units by SubCUTAneous route nightly. For -175 = 20 units  175-200 = 30 units  > 200 = 40 units     by SubCUTAneous route nightly. Indications: type 2 diabetes mellitus)    ferrous sulfate (IRON) 325 mg (65 mg iron) EC tablet Take 1 Tab by mouth Daily (before breakfast).  denosumab (PROLIA) 60 mg/mL injection 60 mg by SubCUTAneous route. Twice a year (June and December)    metFORMIN (GLUCOPHAGE) 1,000 mg tablet Take 1 Tab by mouth two (2) times a day.  albuterol (PROVENTIL VENTOLIN) 2.5 mg /3 mL (0.083 %) nebulizer solution 3 mL by Nebulization route every four (4) hours as needed for Wheezing. No current facility-administered medications for this visit.          I have reviewed the MAs notes, vitals, problem list, allergy list, medical history, family medical, social history and medications. Objective:     Physical Exam:     Vitals:    10/30/17 1541   BP: 136/76   Pulse: 97   SpO2: 95%   Weight: 191 lb 9.6 oz (86.9 kg)   Height: 5' 5\" (1.651 m)    Body mass index is 31.88 kg/(m^2). General: WDWN elderly woman, in no acute distress. Pleasant affect. HEENT: No carotid bruits, no JVD, trach is midline. Heart:  Normal S1/S2 negative S3 or S4. Regular, crescendo-descrescendo systolic murmur, No gallop or rub.   Respiratory: Clear bilaterally, no wheezing or rales  Abdomen:   Soft, non-tender, bowel sounds are active.   Extremities:  No edema, normal cap refill, no cyanosis. Neuro: A&Ox3, speech clear, gait stable. Skin: Skin color is normal. No rashes or lesions. No diaphoresis. Vascular: 2+ pulses symmetric in all extremities      Data Review:       Cardiographics:    EKG interpretation: none today        Cardiology Labs:    Results for orders placed or performed during the hospital encounter of 17   ECG HOLTER MONITOR, UP  [address], [address] [facility]                                         Test Date:    2017  [facility] Name:     Conrado Cornelius             Department:     Patient ID:   [id number]                Room:           Gender:       Female                   Technician:     :          19-               Requested By: Dr. Doni Tuttle  Order Number:                          Reading MD:   Doni Tuttle MD                    Interpretive Statements  Falcon Mean was in Sinus Rhythm. The average heart rate, excluding ectopy, was 62 BPM with a minimum of 47 BPM   at  01:52D3 and a maximum of 109 BPM at   07:35D3. Heart beats, including ectopy, totaled 991678 beats.     VENTRICULAR ECTOPICS totaled 1011  averaging  22.0 per hour  with 1005   single, 0 paired, 0 trigeminy and 0 R on T.  VENTRICULAR TACHYCARDIA occurred 2 times. The fastest run was at 132 BPM and occurred at 17:42D1 with 3 beats   The longest run was 3 beats and occurred at  17:42D1 at a rate of 132 BPM.    SUPRAVENTRICULAR ECTOPICS totaled 777  averaging  16.9 per hour  ,with 680   single and 48 paired beats. SUPRAVENTRICULAR TACHYCARDIA occurred 9 times. The fastest run was at 129 BPM and occurred at 18:59D2 with 5 beats. The longest run was 13 beats at 13:52D1 at a rate of 117 BPM.                                                  Report Summary    Milena Nunez was in Sinus Rhythm over 48 hr HM recording. A diary was not   submitted. The average heart rate, excluding ectopy, was 62 BPM with a minimum of 47 BPM   at 01:52D3 and a maximum of 109 BPM at 07:35D3. Heart beats, including ectopy, totaled 187649 beats. VENTRICULAR ECTOPICS totaled 1011 averaging 22.0 per hour with 1005 single, 0   paired, 0 trigeminy and 0 R on T.    VENTRICULAR TACHYCARDIA occurred 2 times. The fastest run was at 132 BPM and occurred at 17:42D1 with 3 beats  The longest run was 3 beats and occurred at 17:42D1 at a rate of 132 BPM.    SUPRAVENTRICULAR ECTOPICS totaled 777 averaging 16.9 per hour ,with 680   single and 48 paired beats. SUPRAVENTRICULAR TACHYCARDIA occurred 5 times. The fastest run was at 129 BPM and occurred at 18:59D2 with 5 beats. The longest run was 13 beats at 13:52D1 at a rate of 117 BPM.      Conclusion:    1. Normal sinus rhythm with sinus bradycardia to as slow as 47 bpm and sinus   tachycardia to as rapid as 109 bpm.  2. Rare to occasional single APC's, rare atrial couplets and five (5) short   bursts of essentially regular SVT to as slow as 118 bpm and to as rapid as   130 bpm over durations ranging from 5 to 13 beats.   3. Rare to occasional single PVC's with two (2) short bursts of ventricular   tachycardia (triplets) ranging from 100 to 132 bpm.  4. No conduction abnormalities except for borderline first degree AV HB,   sustained rhythm disturbances, obvious ischemic ST segment changes noted. Signed: Gaurav Starr        Electronically signed by La Dickens MD on Mar  1 2017  5:30PM CDT   Results for orders placed or performed during the hospital encounter of 02/08/17   EKG, 12 LEAD, INITIAL   Result Value Ref Range    Ventricular Rate 80 BPM    Atrial Rate 68 BPM    QRS Duration 88 ms    Q-T Interval 404 ms    QTC Calculation (Bezet) 465 ms    Calculated R Axis 13 degrees    Calculated T Axis 78 degrees    Diagnosis       Atrial fibrillation  ST depression, consider subendocardial injury  When compared with ECG of 01-APR-2016 09:45,  Atrial fibrillation has replaced Sinus rhythm  Confirmed by Nicky Diamond MD, Fortino Stevens ([id number]) on 2/8/2017 10:45:02 AM         Lab Results   Component Value Date/Time    Cholesterol, total 191 04/01/2016 02:37 AM    HDL Cholesterol 97 04/01/2016 02:37 AM    LDL, calculated 82 04/01/2016 02:37 AM    Triglyceride 60 04/01/2016 02:37 AM    CHOL/HDL Ratio 2.0 04/01/2016 02:37 AM       Lab Results   Component Value Date/Time    Sodium 143 04/03/2017 12:00 AM    Potassium 5.2 04/03/2017 12:00 AM    Chloride 99 04/03/2017 12:00 AM    CO2 25 04/03/2017 12:00 AM    Anion gap 7 02/12/2017 02:41 AM    Glucose 206 04/03/2017 12:00 AM    BUN 17 04/03/2017 12:00 AM    Creatinine 0.79 04/03/2017 12:00 AM    BUN/Creatinine ratio 22 04/03/2017 12:00 AM    GFR est AA 79 04/03/2017 12:00 AM    GFR est non-AA 68 04/03/2017 12:00 AM    Calcium 9.2 04/03/2017 12:00 AM    Bilirubin, total 0.5 02/09/2017 05:04 AM    AST (SGOT) 10 02/09/2017 05:04 AM    Alk.  phosphatase 96 02/09/2017 05:04 AM    Protein, total 6.9 02/09/2017 05:04 AM    Albumin 2.6 02/09/2017 05:04 AM    Globulin 4.3 02/09/2017 05:04 AM    A-G Ratio 0.6 02/09/2017 05:04 AM    ALT (SGPT) 17 02/09/2017 05:04 AM          Assessment:       ICD-10-CM [id number]    1. Diastolic congestive heart failure, unspecified congestive heart failure chronicity (HCC) I50.30 428.30      428.0    2. Aortic valve stenosis, etiology of cardiac valve disease unspecified I35.0 424.1    3. HTN (hypertension), benign I10 401.1          Discussion: Patient presents at this time stable from a cardiac perspective. BP was well controlled. Pleased with present status. Discussed/seen with Dr. Garcia Simple: 1. Continue same meds. 2. Lipid profile and labs followed by PCP. 2.Encouraged to exercise to tolerance, lose weight, stop smoking and follow low fat, low cholesterol, low sodium predominantly Plant-based (consider Mediterranean) diet. 3.Follow up: 3-4 months   --  Call with questions or concerns. -- Will follow up any test results by phone and/or f/u here in office if needed. .        I have discussed the diagnosis with the patient and the intended plan as seen in the above orders. The patient has received an after-visit summary and questions were answered concerning future plans. I have discussed any concerning medication side effects and warnings with the patient as well.     Lakia Thakur PA-C  10/30/2017 show

## 2022-09-23 NOTE — PROGRESS NOTES
[facility] accepted- awaiting auth from [email or web address]  - [facility] is on will call  -UAI to be complete prior to admission to SNF. CM spoke with liaison at [facility], confirmed patient has been accepted, just awaiting the auth. CM will continue to monitor for updates. 3:30 CM received call from Denny Powers at [facility] who advised that [facility]gr[facility] Lightning actually  on . CM provided clinicals to assist with resubmitting the Negrito Lightning. CM continuing to monitor.      RIGO Loomis/KASSY normal

## 2022-10-13 NOTE — PROGRESS NOTES
Problem: Mobility Impaired (Adult and Pediatric)  Goal: *Acute Goals and Plan of Care (Insert Text)  Physical Therapy Goals  Initiated 2/11/2017  1. Patient will move from supine to sit and sit to supine , scoot up and down and roll side to side in bed with independence within 7 day(s). 2. Patient will transfer from bed to chair and chair to bed with modified independence using the least restrictive device within 7 day(s). 3. Patient will perform sit to stand with independence within 7 day(s). 4. Patient will ambulate with modified independence for 300 feet with the least restrictive device within 7 day(s). 5. Patient will ascend/descend 2 stairs with no handrail(s) with modified independence within 7 day(s). 6. Patient will improve Tinetti score by 4-5 points within 7 days. PHYSICAL THERAPY TREATMENT  Patient: Ana Maria Enciso (54 y.o. female)  Date: 2/13/2017  Diagnosis: Acute respiratory disease <principal problem not specified>  Precautions: Fall      ASSESSMENT:     Six Minute Walk Test and O2 assessment during gait conducted this date (see below). Pt does qualify for home O2 needs. From a mobility standpoint, pt is at a supervision to modified independent level with rolling walker support. Pt states that she has both a cane and wheeled walker at home; Suggested to patient that she utilize walker for energy conservation and pacing needs. Gait asmita slow but cautious. HECTOR within normal limits. No overt LOB. Able to dual task (vertical/horizontal head turns) without staggering. Mimicked stair negotiation without railings - standing marches with sustained SLS (x 4 seconds) and no LOB/appropriate weight-shifting in order to maintain balance. Pt demonstrating adequate insight into deficits/needs. Therefore, pt has no further skilled acute PT needs. Recommend ambulation in hallway with nursing staff 3 x daily with supervision and wheeled walker.       Pulse Oximetry Assessment:     91% at rest on room air  87% independent while ambulating on room air (following ~ 200 ft of ambulation)  95% at rest on 1 LPM  94-96% while ambulating on 1 LPM        6 MWT results: (Specify if any supplemental oxygen is used, the type, pre, during and post sats.)      Distance Walked in Feet (ft): 275 ft. Pre Heart Rate: 79   Pre O2 Saturation: 90 on RA       Post Heart Rate: 107   Post O2 Saturation: 93 (On 1 L/min)   ** Dropped to 87% @ ~ 200 ft on RA   Assistive device used: Assistive Device: Gait belt;Walker, rolling          Normative data:   Men 39-80 years old = 1889 feet; Women 3680 years zjf=3033 feet  Modified 10 point Ehsan RPE scale utilized:  0 = no breathlessness at all ---> 10 = maximum exertion  Please refer to the flowsheet for any additional vital signs taken during this treatment. Progression toward goals:  [X]    Improving appropriately and progressing toward goals  [ ]    Improving slowly and progressing toward goals  [ ]    Not making progress toward goals and plan of care will be adjusted       PLAN:  Patient continues to benefit from skilled intervention to address the above impairments. Continue treatment per established plan of care. Discharge Recommendations:  None  Further Equipment Recommendations for Discharge:  Home O2        SUBJECTIVE:   Patient stated Without the oxygen, I am SOB.       OBJECTIVE DATA SUMMARY:   Critical Behavior:  Neurologic State: Alert  Orientation Level: Oriented X4  Cognition: Appropriate decision making  Safety/Judgement: Awareness of environment, Home safety, Good awareness of safety precautions, Insight into deficits      Functional Mobility Training:  Bed Mobility:  Supine to Sit: Modified independent; Additional time  Sit to Supine: Modified independent; Additional time  Scooting: Modified independent; Additional time  Transfers:  Sit to Stand: Supervision  Stand to Sit: Supervision  Balance:  Sitting: Intact  Standing: Intact; With support (RW)  Ambulation/Gait Training:  Distance (ft): 275 Feet (ft) (In 6 minutes)  Assistive Device: Gait belt;Walker, rolling  Ambulation - Level of Assistance: Supervision (For safety only)  Gait Abnormalities: Decreased step clearance  Base of Support:  (WNL)  Speed/Chana: Slow (Cautious)  Step Length: Right shortened;Left shortened     Pain:  Pain Scale 1: Numeric (0 - 10)  Pain Intensity 1: 0     Activity Tolerance:   Please refer to the flowsheet for vital signs taken during this treatment.   After treatment:   [ ]    Patient left in no apparent distress sitting up in chair - Plans to sit up when daughter arrives  [X]    Patient left in no apparent distress in bed  [X]    Call bell left within reach  [X]    Nursing notified  [ ]    Caregiver present  [ ]    Bed alarm activated      COMMUNICATION/COLLABORATION:   The patients plan of care was discussed with: Registered Nurse     Dimple Chowdhury PT, DPT   Time Calculation: 18 mins

## 2023-07-14 NOTE — PROGRESS NOTES
Infectious Disease Progress Note       Subjective:   Ms Olesya Montemayor seen earlier today  Complained of pain   Denied nausea, vomiting, diarrhea        ROS: 10 point ROS obtained and pertinent positives as above. All others negative.          Objective:    Vitals:   Patient Vitals for the past 24 hrs:   Temp Pulse Resp BP SpO2   10/10/19 1516 97.5 °F (36.4 °C) 84 18 126/69 100 %   10/10/19 0825 97.7 °F (36.5 °C) 95 18 133/75 100 %   10/10/19 0252 97.4 °F (36.3 °C) 78 18 137/74 99 %   10/09/19 2040  80  114/67    10/09/19 2038 98.4 °F (36.9 °C) 86 18 106/58 100 %       Physical Exam:  Gen: No apparent distress  HEENT:   no scleral icterus, no thrush ,   CV: S1,2 heard regularly, no lower extremity edema    Lungs: Clear to auscultation anteriorly,  no wheezing  Abdomen: soft, non tender, non distended,   Skin: no rash on visualized areas   Neuro: sleepy , minimally verbal   Musculoskeletal:   RLE dressing recently dressed, no erythema surrounding dressing, cool foot, she denied sensation to gross touch , unable to palpate pedal pulses       Medications:    Current Facility-Administered Medications:     0.9% sodium chloride infusion 250 mL, 250 mL, IntraVENous, PRN, Vic Avina,     vancomycin (VANCOCIN) 1250 mg in  ml infusion, 1,250 mg, IntraVENous, Q36H, Go, Jackie R, DO, Last Rate: 125 mL/hr at 10/10/19 1105, 1,250 mg at 10/10/19 1105    ALPRAZolam (XANAX) tablet 0.25 mg, 0.25 mg, Oral, BID PRN, Basilio LOYA, NP, 0.25 mg at 10/10/19 1357    cefepime (MAXIPIME) 2 g in 0.9% sodium chloride (MBP/ADV) 100 mL, 2 g, IntraVENous, Q24H, Gomadam, Jackie R, DO, Last Rate: 200 mL/hr at 10/10/19 1713, 2 g at 10/10/19 1713    metroNIDAZOLE (FLAGYL) IVPB premix 500 mg, 500 mg, IntraVENous, Q8H, Jackie Coyle DO, Last Rate: 100 mL/hr at 10/10/19 1753, 500 mg at 10/10/19 1753    Vancomycin - pharmacy to dose, , Other, Rx Dosing/Monitoring, Jackie Coyle DO    albuterol-ipratropium (DUO-NEB) 2.5 MG-0.5 MG/3 ML, 3 mL, Nebulization, Q4H PRN, Marquita GUZMAN MD, 3 mL at 10/09/19 0601    insulin lispro (HUMALOG) injection 4 Units, 4 Units, SubCUTAneous, TIDAC, Ten Beck MD, 4 Units at 10/10/19 1713    sodium chloride (OCEAN) 0.65 % nasal squeeze bottle 2 Spray, 2 Spray, Both Nostrils, Q2H PRN, Livia CSOIBRITTANY GUZMAN MD    oxymetazoline (AFRIN) 0.05 % nasal spray 2 Spray, 2 Spray, Both Nostrils, BID PRN, Livia MHCHRISTOPHER GUZMAN MD    furosemide (LASIX) tablet 40 mg, 40 mg, Oral, DAILY, HartleMalou MD, 40 mg at 10/10/19 0935    0.9% sodium chloride infusion 250 mL, 250 mL, IntraVENous, PRN, KACI Bhakta MD    0.9% sodium chloride infusion 250 mL, 250 mL, IntraVENous, PRN, Sunitha Tavares MD    apixaban (ELIQUIS) tablet 5 mg, 5 mg, Oral, Q12H, Sunitha Tavares MD, 5 mg at 10/10/19 1105    insulin lispro (HUMALOG) injection, , SubCUTAneous, AC&HS, Yonas Wasserman MD, Stopped at 10/07/19 0730    glucose chewable tablet 16 g, 4 Tab, Oral, PRN, Livia NGITHEA GUZMAN MD    glucagon (GLUCAGEN) injection 1 mg, 1 mg, IntraMUSCular, PRN, DebQuirino hunt MD    dextrose 10% infusion 0-250 mL, 0-250 mL, IntraVENous, PRN, Kylieeb, QBWVZFG MD MEGAN    albuterol-ipratropium (DUO-NEB) 2.5 MG-0.5 MG/3 ML, 3 mL, Nebulization, Q4H PRN, Marquita GUZMAN MD, 3 mL at 09/26/19 2019    alum-mag hydroxide-simeth (MYLANTA) oral suspension 30 mL, 30 mL, Oral, Q4H PRN, Shmuel Nguyen MD, 30 mL at 09/26/19 2144    famotidine (PEPCID) tablet 20 mg, 20 mg, Oral, QPM, Shmuel Nguyen MD, 20 mg at 10/10/19 1713    metoprolol tartrate (LOPRESSOR) tablet 25 mg, 25 mg, Oral, BID, Shmuel Nguyen MD, 25 mg at 10/10/19 0935    traMADol (ULTRAM) tablet 25 mg, 25 mg, Oral, Q8H PRN, Sunitha Tavares MD, 25 mg at 10/10/19 1851    fentaNYL citrate (PF) injection 50 mcg, 50 mcg, IntraVENous, Q3H PRN, Sunitha Tavares MD, 50 mcg at 10/09/19 1703    insulin glargine (LANTUS) injection 15 Units, 15 Units, SubCUTAneous, DAILY, Chapo Harris MD, 15 Units at 10/10/19 0935    ondansetron [facility]) injection 4 mg, 4 mg, IntraVENous, Q6H PRN, Chapo Harris MD    sodium chloride (NS) flush 5-40 mL, 5-40 mL, IntraVENous, Q8H, Chapo Harris MD, 10 mL at 10/10/19 1348    sodium chloride (NS) flush 5-40 mL, 5-40 mL, IntraVENous, PRN, Chapo Harris MD, 10 mL at 10/08/19 1237    acetaminophen (TYLENOL) tablet 650 mg, 650 mg, Oral, Q4H PRN, Chapo Harris MD, 650 mg at 10/10/19 0418    allopurinol (ZYLOPRIM) tablet 100 mg, 100 mg, Oral, DAILY, Chapo Harris MD, 100 mg at 10/10/19 0935      Labs:  Recent Results (from the past 24 hour(s))   GLUCOSE, POC    Collection Time: 10/09/19  9:35 PM   Result Value Ref Range    Glucose (POC) 127 (H) 65 - 100 mg/dL    Performed by Koko Killian    CK    Collection Time: 10/10/19  2:55 AM   Result Value Ref Range     (H) 26 - 955 U/L   METABOLIC PANEL, BASIC    Collection Time: 10/10/19  2:55 AM   Result Value Ref Range    Sodium 137 136 - 145 mmol/L    Potassium 3.6 3.5 - 5.1 mmol/L    Chloride 101 97 - 108 mmol/L    CO2 28 21 - 32 mmol/L    Anion gap 8 5 - 15 mmol/L    Glucose 128 (H) 65 - 100 mg/dL    BUN 24 (H) 6 - 20 MG/DL    Creatinine 1.02 0.55 - 1.02 MG/DL    BUN/Creatinine ratio 24 (H) 12 - 20      GFR est AA >60 >60 ml/min/1.73m2    GFR est non-AA 51 (L) >60 ml/min/1.73m2    Calcium 8.6 8.5 - 10.1 MG/DL   CBC W/O DIFF    Collection Time: 10/10/19  2:55 AM   Result Value Ref Range    WBC 17.6 (H) 3.6 - 11.0 K/uL    RBC 2.90 (L) 3.80 - 5.20 M/uL    HGB 7.9 (L) 11.5 - 16.0 g/dL    HCT 25.9 (L) 35.0 - 47.0 %    MCV 89.3 80.0 - 99.0 FL    MCH 27.2 26.0 - 34.0 PG    MCHC 30.5 30.0 - 36.5 g/dL    RDW 15.9 (H) 11.5 - 14.5 %    PLATELET 036 (H) 829 - 400 K/uL    MPV 9.5 8.9 - 12.9 FL    NRBC 0.0 0  WBC    ABSOLUTE NRBC 0.00 0.00 - 0.01 K/uL   GLUCOSE, POC    Collection Time: 10/10/19  6:19 AM   Result Value Ref Range    Glucose (POC) 169 (H) 65 - 100 mg/dL    Performed by Michi MoellersKeyEffxk St, POC    Collection Time: 10/10/19 10:58 AM   Result Value Ref Range    Glucose (POC) 194 (H) 65 - 100 mg/dL    Performed by Tello Ford    GLUCOSE, POC    Collection Time: 10/10/19  4:04 PM   Result Value Ref Range    Glucose (POC) 180 (H) 65 - 100 mg/dL    Performed by Brenda Granado            Micro:     Blood: 10/4/19  Specimen Information: Blood        Component Value Ref Range & Units Status   Special Requests: NO SPECIAL REQUESTS    Preliminary   Culture result: NO GROWTH 3 DAYS    Preliminary   Result History       Pathology:      Imaging:  RLE   US  Interpretation Summary        · Probable acute thrombus at the mid and distal right popliteal level. · Absent flow at the distal posterior tibial and anterior tibial levels suggesting occlusion. The right common femoral, deep femoral, femoral, popliteal, posterior tibial and anterior tibial arteries were visualized. No significant plaque is identified from groin to distal thigh. Biphasic flow with no increased velocities were noted. The above knee popliteal waveform is monophasic with low velocities. The popliteal fossa and distal popliteal contain absent flow and suggests an acute occlusion although it is suboptimally seen. Low echogenicity is noted in the popliteal.       The right posterior tibial and anterior tibial arteries contain absent flow with no color fill and suggests occlusion.      Lower Extremity Arterial Findings     Right Lower Arterial     The right distal popliteal artery is occluded. The right distal popliteal artery has homogeneous plaque. Monophasic Doppler waveforms in the right proximal popliteal artery. Biphasic Doppler waveforms in the right distal common femoral, profunda femoris, proximal superficial femoral and distal superficial femoral artery. Doppler waveforms are absent in the anterior tibial and posterior tibial artery.  The following arteries are patent: distal common femoral, profunda femoris, proximal superficial femoral and distal superficial femoral.         MRI brain     FINDINGS:  Tiny acute infarct in the right cerebellum. No acute hemorrhage.     Mild generalized parenchymal volume loss with commensurate dilation of the sulci  and ventricular system. Periventricular deep white matter T2/FLAIR  hyperintensities, and patchy T2/FLAIR hyperintensity in the raymundo, consistent  with mild chronic microvascular ischemic disease. Small chronic infarct in the  right frontal periventricular white matter. There is no cerebellar tonsillar  herniation. Expected arterial flow-voids are present. No evidence of abnormal  enhancement.     Paranasal sinuses are clear. Trace bilateral mastoid effusions. The orbital  contents are within normal limits with bilateral lens implants. No significant  osseous or scalp lesions are identified.     IMPRESSION  IMPRESSION:      1. Tiny acute infarct in the right cerebellum. 2. Mild generalized parenchymal volume loss and mild chronic microvascular  ischemic disease. Small chronic infarct in the right frontal periventricular  white matter. Assessment / Plan    Ms Loree Jiang is a 80year old lady wt hx of copd, afib, DM admitted on on 9/17/19 with dypsnea. Treated for diastolic CHF exacerbation. Subsequently found to have acute infarct R cerebellum and RLE acute thrombus at the mid and distal right popliteal level. Underwent thrombectomy/embolectomy on 9/20/19. Operative report from 9/20/19 indicates \" thrombectomy yielded fair a amount of thrombus clearly from common iliac area region and this appeared to be organized and fairly thickened\". Then on 9/21/19, underwent thrombectomy of right leg for residual clot , 4-compartment fasciotomy. Operative report from 9/21/19 indicates \"  After entering the fascia, a large amount of discharge and dark blood was seen.   The muscle appeared to be significantly abnormal in this location almost mushy with difficulty\"      On labs has had consistent leukocytosis and on 10/4/19 had fever up to 101.6 for which Infectious Disease was consulted. Zosyn started for concerns of infected RLE on 10/5/19. WBC elevated between 17-21. 1) RLE ischemia, necrosis, and popliteal thrombus status post thrombectomy 9/20 and repeat thrombectomy/fasciotomy 9/21/19  Fever curve imporved   On exam has foul odor from RLE wound areas and necrosis seen   No clear superficial cellulitis around the incisions site on the medial side   Blood cx negative so far   Renally dosed Vancomycin, Cefepime and Flagyl for now  Antibiotic delivery to site is challenging if inadequate perfusion   Being seen by palliative care and vascular surgery   Patient stated to me today that her family is going to have a conference call to discuss condition  If no plans for surgery based on patient wishes, will need address goals of care with ongoing antibiotics   Need to monitor renal function closely   Has CK elevation and therefore avoiding daptomycin   Avoiding Zyvox given QTc levels > 500 on last EKG   Discussed side effects with high risk for nephrotoxicity, CDI and other antibiotic adverse effects including bone marrow suppression, lowering seizure threshold    2) COPD    3) Afib     4) DM    5) DVT px                Thank you for the opportunity to participate in the care of this patient. Please contact with questions or concerns.       Rocael Yoon, DO   7:55 PM Patient has morbid obesity with BMI - 79 , weight 460 lbs and height 5'4". Patient needs cardiac and pulmonary optimization before final bariatric surgery and for EGD needs at least cardiac optimization done after coronary CTA .Patient had initial visits with Pulmonology and Cardiology already , cardiac pending coronary CTA, Pulmonology evaluation pending PFTs and sleep studies to assess for JAVIER. Will notify Anesthesia and OR booking about pt's weight .

## 2023-07-31 NOTE — PROGRESS NOTES
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  - Cr stable  - volume stable for now  - can add back diuretics today  - daily labs    Probable CKD:  - unclear baseline but suspect she has some component of CKD  - likely from DM and HTN     Acute CVA:  - R cerebellar infarct  - on ASA, plavix and statin  - per neurology     HFpEF:  - start po diuretics today     DM2:  - on insulin    Blood loss anemia:  - hgb stable  - transfused 2 units PRBCs on 9/24    PAD w/ cold LEs/ RLE Ischemia   - S/p RT leg revascularization in OR 9/20  - had Thrombus at RT popliteal artery and had RT leg embolectomy + fasciotomy on 9/22       Interval History:  Seen and examined. Cr stable. Some pain over RLE. No cp or sob. More LE edema today. Current Medications: all current  Medications have been eviewed in EPIC  Review of Systems: Pertinent items are noted in HPI. Objective:  Vitals:    Vitals:    09/24/19 2250 09/24/19 2345 09/25/19 0015 09/25/19 0856   BP: 126/42 124/44 129/51 145/63   Pulse:   68 85   Resp: 20 20 18 22   Temp: 99 °F (37.2 °C) 98.6 °F (37 °C) 99 °F (37.2 °C) 99.2 °F (37.3 °C)   SpO2:    98%   Weight:   96.5 kg (212 lb 11.9 oz)    Height:         Intake and Output:  No intake/output data recorded.   09/23 1901 - 09/25 0700  In: 848 [P.O.:228]  Out: 2250 [Urine:2250]    Physical Examination:  General: NAD,Conversant, frail  Neck:  Supple, no mass  Resp:  Lungs mostly clear b/l  CV:  RRR,  no murmur or rub, no LE edema, no palpable LE pulses, cold extremities  GI:  Soft, NT, + Bowel sounds, no hepatosplenomegaly  Neurologic:  Non focal  Psych:             AAO x 3 appropriate affect   Skin:  No Rash  :  No altman    [x]    High complexity decision making was performed  [x]    Patient is at high-risk of decompensation with multiple organ involvement    Lab Data Personally Reviewed: I have reviewed all the pertinent labs, microbiology data and radiology studies during assessment. Recent Labs     09/25/19 0429 09/24/19  0530 09/23/19  0350    137 135*   K 4.8 4.7 4.6    105 103   CO2 27 26 26   * 147* 125*   BUN 26* 36* 46*   CREA 1.13* 1.30* 1.77*   CA 9.0 8.3* 7.8*   PHOS  --  2.9 3.6   ALB  --  1.8* 2.0*     Recent Labs     09/25/19 0429 09/24/19  0842 09/23/19  0350   WBC 17.8* 13.8*  --    HGB 9.6* 6.8* 7.2*   HCT 30.5* 22.5* 23.6*    251  --      Lab Results   Component Value Date/Time    Specimen Description: NARES 09/27/2011 03:58 AM    Specimen Description: BLOOD 08/15/2011 06:15 AM    Specimen Description: CATH URINE 08/15/2011 06:15 AM     Lab Results   Component Value Date/Time    Culture result: MRSA NOT PRESENT 03/31/2016 12:30 PM    Culture result:  03/31/2016 12:30 PM         Screening of patient nares for MRSA is for surveillance purposes and, if positive, to facilitate isolation considerations in high risk settings. It is not intended for automatic decolonization interventions per se as regimens are not sufficiently effective to warrant routine use. Culture result:  09/27/2011 03:58 AM     MRSA PRESENT      Screening of patient nares for MRSA is for surveillance purposes and, if positive, to facilitate isolation considerations in high risk settings. It is not intended for automatic decolonization interventions per se as regimens are not sufficiently effective to warrant routine use.      Recent Results (from the past 24 hour(s))   TYPE + CROSSMATCH    Collection Time: 09/24/19 10:53 AM   Result Value Ref Range    Crossmatch Expiration 09/27/2019     ABO/Rh(D) A POSITIVE     Antibody screen NEG     Unit number [id number]     Blood component type RC LR     Unit division 00     Status of unit TRANSFUSED     Crossmatch result Compatible     Unit number [id number]     Blood component type RC LR     Unit division 00     Status of unit TRANSFUSED     Crossmatch result Compatible    GLUCOSE, POC    Collection Time: 09/24/19 11:09 AM   Result Value Ref Range    Glucose (POC) 225 (H) 65 - 100 mg/dL    Performed by Abebe Holloway    GLUCOSE, POC    Collection Time: 09/24/19  3:40 PM   Result Value Ref Range    Glucose (POC) 221 (H) 65 - 100 mg/dL    Performed by Abebe Holloway    GLUCOSE, POC    Collection Time: 09/24/19  9:26 PM   Result Value Ref Range    Glucose (POC) 166 (H) 65 - 100 mg/dL    Performed by Opal Mink    METABOLIC PANEL, BASIC    Collection Time: 09/25/19  4:29 AM   Result Value Ref Range    Sodium 136 136 - 145 mmol/L    Potassium 4.8 3.5 - 5.1 mmol/L    Chloride 104 97 - 108 mmol/L    CO2 27 21 - 32 mmol/L    Anion gap 5 5 - 15 mmol/L    Glucose 165 (H) 65 - 100 mg/dL    BUN 26 (H) 6 - 20 MG/DL    Creatinine 1.13 (H) 0.55 - 1.02 MG/DL    BUN/Creatinine ratio 23 (H) 12 - 20      GFR est AA 55 (L) >60 ml/min/1.73m2    GFR est non-AA 45 (L) >60 ml/min/1.73m2    Calcium 9.0 8.5 - 10.1 MG/DL   CBC WITH AUTOMATED DIFF    Collection Time: 09/25/19  4:29 AM   Result Value Ref Range    WBC 17.8 (H) 3.6 - 11.0 K/uL    RBC 3.43 (L) 3.80 - 5.20 M/uL    HGB 9.6 (L) 11.5 - 16.0 g/dL    HCT 30.5 (L) 35.0 - 47.0 %    MCV 88.9 80.0 - 99.0 FL    MCH 28.0 26.0 - 34.0 PG    MCHC 31.5 30.0 - 36.5 g/dL    RDW 15.6 (H) 11.5 - 14.5 %    PLATELET 900 733 - 288 K/uL    MPV 10.1 8.9 - 12.9 FL    NRBC 0.2 (H) 0  WBC    ABSOLUTE NRBC 0.04 (H) 0.00 - 0.01 K/uL    NEUTROPHILS 85 (H) 32 - 75 %    LYMPHOCYTES 3 (L) 12 - 49 %    MONOCYTES 11 5 - 13 %    EOSINOPHILS 0 0 - 7 %    BASOPHILS 0 0 - 1 %    METAMYELOCYTES 1 (H) 0 %    IMMATURE GRANULOCYTES 0 %    ABS. NEUTROPHILS 15.1 (H) 1.8 - 8.0 K/UL    ABS. LYMPHOCYTES 0.5 (L) 0.8 - 3.5 K/UL    ABS. MONOCYTES 2.0 (H) 0.0 - 1.0 K/UL    ABS. EOSINOPHILS 0.0 0.0 - 0.4 K/UL    ABS. BASOPHILS 0.0 0.0 - 0.1 K/UL    ABS. IMM. GRANS. 0.0 K/UL    DF MANUAL      RBC COMMENTS ANISOCYTOSIS  1+        RBC COMMENTS POLYCHROMASIA  1+        RBC COMMENTS RONALD CELLS  PRESENT       CK    Collection Time: 09/25/19  4:29 AM   Result Value Ref Range    CK 7,435 (H) 26 - 192 U/L   GLUCOSE, POC    Collection Time: 09/25/19  7:25 AM   Result Value Ref Range    Glucose (POC) 157 (H) 65 - 100 mg/dL    Performed by Matthew Boyd MD  [address]  Phone - [phone]   Fax - [phone]  [email or web address] Gabapentin Counseling: I discussed with the patient the risks of gabapentin including but not limited to dizziness, somnolence, fatigue and ataxia.

## (undated) DEVICE — (D)PREP SKN CHLRAPRP APPL 26ML -- CONVERT TO ITEM 371833

## (undated) DEVICE — PADDING CST 4INX4YD --

## (undated) DEVICE — SHEAR RMFG HARMONIC FOCUS 9CM -- OEM ITEM L#322125

## (undated) DEVICE — TAPE,ELASTIC,FOAM,CURAD,3"X5.5YD,LF: Brand: CURAD

## (undated) DEVICE — AGENT HEMSTAT W4XL4IN OXIDIZED REGENERATED CELOS ABSRB SFT

## (undated) DEVICE — TOWEL SURG W17XL27IN STD BLU COT NONFENESTRATED PREWASHED

## (undated) DEVICE — PACK,BASIC,SIRUS,V: Brand: MEDLINE

## (undated) DEVICE — COVER,TABLE,60X90,STERILE: Brand: MEDLINE

## (undated) DEVICE — SPONGE LAP 18X18IN STRL -- 5/PK

## (undated) DEVICE — PAD,ABDOMINAL,5"X9",ST,LF,25/BX: Brand: MEDLINE INDUSTRIES, INC.

## (undated) DEVICE — Device

## (undated) DEVICE — VASCULAR-RICHMOND-LF: Brand: MEDLINE INDUSTRIES, INC.

## (undated) DEVICE — DRAPE,REIN 53X77,STERILE: Brand: MEDLINE

## (undated) DEVICE — BANDAGE,GAUZE,BULKEE II,4.5"X4.1YD,STRL: Brand: MEDLINE

## (undated) DEVICE — 2108 SERIES SAGITTAL BLADE (18.6 X 0.8 X 73.8MM)

## (undated) DEVICE — BAG ISOL TRNSPRT 18X18.5IN LF --

## (undated) DEVICE — 1200 GUARD II KIT W/5MM TUBE W/O VAC TUBE: Brand: GUARDIAN

## (undated) DEVICE — AMC/4 ARTERIAL NEEDLE – 18GA X 2.75” (7CM): Brand: ARTERIAL NEEDLE

## (undated) DEVICE — SOLUTION IRRIG 1000ML H2O STRL BLT

## (undated) DEVICE — ROCKER SWITCH PENCIL BLADE ELECTRODE, HOLSTER: Brand: EDGE

## (undated) DEVICE — KIT,1200CC CANISTER,3/16"X6' TUBING: Brand: MEDLINE INDUSTRIES, INC.

## (undated) DEVICE — INFECTION CONTROL KIT SYS

## (undated) DEVICE — STOCKINETTE,IMPERVIOUS,12X48,STERILE: Brand: MEDLINE

## (undated) DEVICE — SUTURE PERMAHAND SZ 0 L30IN NONABSORBABLE BLK SILK BRAID A306H

## (undated) DEVICE — APPLICATOR BNDG 1MM ADH PREMIERPRO EXOFIN

## (undated) DEVICE — DRAPE XR CASS L UNIV FIT ADH CLSR

## (undated) DEVICE — SOLUTION IV 1000ML 0.9% SOD CHL

## (undated) DEVICE — SYR 3ML LL TIP 1/10ML GRAD --

## (undated) DEVICE — DRESSING PETRO W3XL8IN N ADH OIL EMUL GZ CURAD

## (undated) DEVICE — NEEDLE HYPO 25GA L1.5IN BVL ORIENTED ECLIPSE

## (undated) DEVICE — DRAPE,EXTREMITY,89X128,STERILE: Brand: MEDLINE

## (undated) DEVICE — STERILE POLYISOPRENE POWDER-FREE SURGICAL GLOVES WITH EMOLLIENT COATING: Brand: PROTEXIS

## (undated) DEVICE — SUTURE PERMAHAND SZ 2-0 L30IN NONABSORBABLE BLK SILK W/O A305H

## (undated) DEVICE — PINNACLE INTRODUCER SHEATH: Brand: PINNACLE

## (undated) DEVICE — SURGICAL PROCEDURE PACK BASIN MAJ SET CUST NO CAUT

## (undated) DEVICE — SOLUTION IV 500ML 0.9% SOD CHL FLX CONT

## (undated) DEVICE — HANDLE LT SNAP ON ULT DURABLE LENS FOR TRUMPF ALC DISPOSABLE

## (undated) DEVICE — GOWN,SIRUS,FABRNF,XL,20/CS: Brand: MEDLINE

## (undated) DEVICE — FOGARTY ARTERIAL EMBOLECTOMY CATHETER 4F 80CM: Brand: FOGARTY

## (undated) DEVICE — REM POLYHESIVE ADULT PATIENT RETURN ELECTRODE: Brand: VALLEYLAB

## (undated) DEVICE — STRAP,POSITIONING,KNEE/BODY,FOAM,4X60": Brand: MEDLINE

## (undated) DEVICE — SUTURE VCRL SZ 2-0 L36IN ABSRB UD L40MM CT 1/2 CIR J957H

## (undated) DEVICE — SUTURE MCRYL SZ 4-0 L27IN ABSRB UD L19MM PS-2 1/2 CIR PRIM Y426H

## (undated) DEVICE — DRAPE XR C ARM 41X74IN LF --

## (undated) DEVICE — TRAY PREP DRY W/ PREM GLV 2 APPL 6 SPNG 2 UNDPD 1 OVERWRAP

## (undated) DEVICE — SLIM BODY SKIN STAPLER: Brand: APPOSE ULC

## (undated) DEVICE — SUTURE PROL 5-0 L18IN NONABSORBABLE BLU RB-2 L13MM 1/2 CIR 8713H

## (undated) DEVICE — SPONGE GZ W4XL4IN COT 12 PLY TYP VII WVN C FLD DSGN

## (undated) DEVICE — BANDAGE COBAN 4 IN COMPR W4INXL5YD FOAM COHESIVE QUIK STK SELF ADH SFT